# Patient Record
Sex: MALE | Race: WHITE | NOT HISPANIC OR LATINO | Employment: OTHER | URBAN - NONMETROPOLITAN AREA
[De-identification: names, ages, dates, MRNs, and addresses within clinical notes are randomized per-mention and may not be internally consistent; named-entity substitution may affect disease eponyms.]

---

## 2020-05-13 ENCOUNTER — HISTORICAL (OUTPATIENT)
Dept: ADMINISTRATIVE | Facility: HOSPITAL | Age: 80
End: 2020-05-13

## 2020-05-13 LAB
ANION GAP SERPL CALC-SCNC: 9.1 MEQ/L (ref 10–20)
BUN SERPL-MCNC: 45 MG/DL (ref 7–18)
CALCIUM SERPL-MCNC: 9.4 MG/DL (ref 8.5–10.1)
CHLORIDE SERPL-SCNC: 110 MMOL/L (ref 98–107)
CO2 SERPL-SCNC: 29 MMOL/L (ref 22–32)
CREAT SERPL-MCNC: 1.95 MG/DL (ref 0.7–1.3)
EGFR: 36 ML/MIN/1.73M
GLUCOSE SERPL-MCNC: 110 MG/DL (ref 70–99)
OSMOC: 299 MOSM/KG (ref 275–295)
POTASSIUM SERPL-SCNC: 4.1 MMOL/L (ref 3.5–5.1)
SODIUM BLD-SCNC: 144 MMOL/L (ref 136–145)

## 2020-07-08 ENCOUNTER — HISTORICAL (OUTPATIENT)
Dept: ADMINISTRATIVE | Facility: HOSPITAL | Age: 80
End: 2020-07-08

## 2020-07-08 LAB
ALBUMIN SERPL BCP-MCNC: 3.5 G/DL (ref 3.5–5)
ALBUMIN/GLOB SERPL ELPH: 1 {RATIO} (ref 1.5–2.2)
ALP SERPL-CCNC: 71 U/L (ref 50–136)
ALT SERPL W P-5'-P-CCNC: 19 U/L (ref 16–61)
ANION GAP SERPL CALC-SCNC: 8.2 MEQ/L (ref 10–20)
AST SERPL-CCNC: 16 U/L (ref 15–37)
BILIRUB SERPL-MCNC: 0.58 MG/DL (ref 0.2–1)
BUN SERPL-MCNC: 36 MG/DL (ref 7–18)
CALCIUM SERPL-MCNC: 8.8 MG/DL (ref 8.5–10.1)
CHLORIDE SERPL-SCNC: 105 MMOL/L (ref 98–107)
CO2 SERPL-SCNC: 27 MMOL/L (ref 22–32)
CREAT SERPL-MCNC: 2.13 MG/DL (ref 0.7–1.3)
EGFR: 32 ML/MIN/1.73M
ERYTHROCYTE [DISTWIDTH] IN BLOOD BY AUTOMATED COUNT: 14.7 % (ref 11.5–14.5)
GLOBULIN: 3.6 G/DL (ref 2.3–3.5)
GLUCOSE SERPL-MCNC: 106 MG/DL (ref 70–99)
HCT VFR BLD AUTO: 36.6 % (ref 43.5–53.7)
HGB BLD-MCNC: 12.2 G/DL (ref 14.1–18.1)
IPF: 8.7
MAGNESIUM SERPL-MCNC: 2.4 MG/DL (ref 1.8–2.4)
MCH RBC QN AUTO: 32.4 PG (ref 27–31)
MCHC RBC AUTO-ENTMCNC: 33.3 G% (ref 32–35)
MCV RBC AUTO: 97.1 FL (ref 80–97)
OSMOC: 281 MOSM/KG (ref 275–295)
PHOSPHATE FLD-MCNC: 3.34 MG/DL (ref 2.5–4.9)
PMV BLD AUTO: 11 FL (ref 7.4–10.4)
PMV BLD AUTO: 134 10 (ref 142–424)
POTASSIUM SERPL-SCNC: 4.2 MMOL/L (ref 3.5–5.1)
PROT SERPL-MCNC: 7.1 G/DL (ref 6.4–8.2)
RBC # BLD AUTO: 3.77 M/UL (ref 4.69–6.13)
SODIUM BLD-SCNC: 136 MMOL/L (ref 136–145)
WBC # BLD AUTO: 7.1 10 (ref 4.6–10.2)

## 2020-09-23 ENCOUNTER — LAB VISIT (OUTPATIENT)
Dept: LAB | Facility: HOSPITAL | Age: 80
End: 2020-09-23
Attending: INTERNAL MEDICINE
Payer: MEDICARE

## 2020-09-23 ENCOUNTER — HOSPITAL ENCOUNTER (OUTPATIENT)
Dept: RADIOLOGY | Facility: HOSPITAL | Age: 80
Discharge: HOME | End: 2020-09-23
Attending: INTERNAL MEDICINE
Payer: MEDICARE

## 2020-09-23 DIAGNOSIS — Z01.811 PRE-OP CHEST EXAM: Primary | ICD-10-CM

## 2020-09-23 DIAGNOSIS — Z01.818 PRE-OP EVALUATION: Primary | ICD-10-CM

## 2020-09-23 DIAGNOSIS — Z01.811 PRE-OP CHEST EXAM: ICD-10-CM

## 2020-09-23 DIAGNOSIS — I20.9 ANGINA PECTORIS: ICD-10-CM

## 2020-09-23 LAB
ALBUMIN SERPL BCP-MCNC: 3.4 G/DL (ref 3.5–5.2)
ALP SERPL-CCNC: 65 U/L (ref 55–135)
ALT SERPL W/O P-5'-P-CCNC: 25 U/L (ref 10–44)
ANION GAP SERPL CALC-SCNC: 10 MMOL/L (ref 8–16)
AST SERPL-CCNC: 18 U/L (ref 10–40)
BASOPHILS # BLD AUTO: 0.03 K/UL (ref 0–0.2)
BASOPHILS NFR BLD: 0.5 % (ref 0–1.9)
BILIRUB SERPL-MCNC: 0.7 MG/DL (ref 0.1–1)
BUN SERPL-MCNC: 35 MG/DL (ref 8–23)
CALCIUM SERPL-MCNC: 9.1 MG/DL (ref 8.7–10.5)
CHLORIDE SERPL-SCNC: 104 MMOL/L (ref 95–110)
CO2 SERPL-SCNC: 26 MMOL/L (ref 23–29)
CREAT SERPL-MCNC: 1.9 MG/DL (ref 0.5–1.4)
DIFFERENTIAL METHOD: ABNORMAL
EOSINOPHIL # BLD AUTO: 0.1 K/UL (ref 0–0.5)
EOSINOPHIL NFR BLD: 1.3 % (ref 0–8)
ERYTHROCYTE [DISTWIDTH] IN BLOOD BY AUTOMATED COUNT: 14.7 % (ref 11.5–14.5)
EST. GFR  (AFRICAN AMERICAN): 38.5 ML/MIN/1.73 M^2
EST. GFR  (NON AFRICAN AMERICAN): 33.3 ML/MIN/1.73 M^2
GLUCOSE SERPL-MCNC: 87 MG/DL (ref 70–110)
HCT VFR BLD AUTO: 35.8 % (ref 40–54)
HGB BLD-MCNC: 11.6 G/DL (ref 14–18)
IMM GRANULOCYTES # BLD AUTO: 0.02 K/UL (ref 0–0.04)
IMM GRANULOCYTES NFR BLD AUTO: 0.3 % (ref 0–0.5)
LYMPHOCYTES # BLD AUTO: 1.3 K/UL (ref 1–4.8)
LYMPHOCYTES NFR BLD: 21 % (ref 18–48)
MCH RBC QN AUTO: 31.3 PG (ref 27–31)
MCHC RBC AUTO-ENTMCNC: 32.4 G/DL (ref 32–36)
MCV RBC AUTO: 97 FL (ref 82–98)
MONOCYTES # BLD AUTO: 0.7 K/UL (ref 0.3–1)
MONOCYTES NFR BLD: 10.4 % (ref 4–15)
NEUTROPHILS # BLD AUTO: 4.2 K/UL (ref 1.8–7.7)
NEUTROPHILS NFR BLD: 66.5 % (ref 38–73)
NRBC BLD-RTO: 0 /100 WBC
PLATELET # BLD AUTO: 152 K/UL (ref 150–350)
PMV BLD AUTO: 10.4 FL (ref 9.2–12.9)
POTASSIUM SERPL-SCNC: 4.2 MMOL/L (ref 3.5–5.1)
PROT SERPL-MCNC: 7.5 G/DL (ref 6–8.4)
RBC # BLD AUTO: 3.71 M/UL (ref 4.6–6.2)
SARS-COV-2 RNA RESP QL NAA+PROBE: NOT DETECTED
SODIUM SERPL-SCNC: 140 MMOL/L (ref 136–145)
WBC # BLD AUTO: 6.32 K/UL (ref 3.9–12.7)

## 2020-09-23 PROCEDURE — 71046 X-RAY EXAM CHEST 2 VIEWS: CPT | Mod: TC

## 2020-09-23 PROCEDURE — U0002 COVID-19 LAB TEST NON-CDC: HCPCS

## 2020-09-23 PROCEDURE — 80053 COMPREHEN METABOLIC PANEL: CPT

## 2020-09-23 PROCEDURE — 85025 COMPLETE CBC W/AUTO DIFF WBC: CPT

## 2020-09-23 PROCEDURE — 36415 COLL VENOUS BLD VENIPUNCTURE: CPT

## 2020-10-15 ENCOUNTER — APPOINTMENT (OUTPATIENT)
Dept: LAB | Facility: HOSPITAL | Age: 80
End: 2020-10-15
Attending: INTERNAL MEDICINE
Payer: MEDICARE

## 2020-10-15 DIAGNOSIS — Z01.810 PRE-OPERATIVE CARDIOVASCULAR EXAMINATION: Primary | ICD-10-CM

## 2020-10-15 DIAGNOSIS — U07.1 INFECTION DUE TO 2019-NCOV: ICD-10-CM

## 2020-10-15 LAB — SARS-COV-2 RNA RESP QL NAA+PROBE: NOT DETECTED

## 2020-10-15 PROCEDURE — U0002 COVID-19 LAB TEST NON-CDC: HCPCS

## 2020-10-18 ENCOUNTER — HOSPITAL ENCOUNTER (EMERGENCY)
Facility: HOSPITAL | Age: 80
Discharge: HOME | End: 2020-10-18
Attending: EMERGENCY MEDICINE
Payer: MEDICARE

## 2020-10-18 VITALS
DIASTOLIC BLOOD PRESSURE: 75 MMHG | WEIGHT: 255.69 LBS | RESPIRATION RATE: 16 BRPM | BODY MASS INDEX: 35.8 KG/M2 | OXYGEN SATURATION: 98 % | TEMPERATURE: 98 F | SYSTOLIC BLOOD PRESSURE: 132 MMHG | HEIGHT: 71 IN | HEART RATE: 78 BPM

## 2020-10-18 DIAGNOSIS — T14.8XXA MULTIPLE SKIN TEARS: ICD-10-CM

## 2020-10-18 DIAGNOSIS — S09.90XA MINOR HEAD INJURY WITHOUT LOSS OF CONSCIOUSNESS, INITIAL ENCOUNTER: ICD-10-CM

## 2020-10-18 DIAGNOSIS — S01.81XA FACIAL LACERATION, INITIAL ENCOUNTER: ICD-10-CM
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
## 2020-10-18 PROCEDURE — 12011 RPR F/E/E/N/L/M 2.5 CM/<: CPT

## 2020-10-18 PROCEDURE — 99284 EMERGENCY DEPT VISIT MOD MDM: CPT | Mod: 25

## 2020-10-18 PROCEDURE — 25000003 PHARM REV CODE 250: Performed by: EMERGENCY MEDICINE

## 2020-10-18 RX ORDER — SILVER NITRATE 38.21; 12.74 MG/1; MG/1
1 STICK TOPICAL
Status: COMPLETED | OUTPATIENT
Start: 2020-10-18 | End: 2020-10-18

## 2020-10-18 RX ORDER — SILVER NITRATE 38.21; 12.74 MG/1; MG/1
1 STICK TOPICAL
Status: DISCONTINUED | OUTPATIENT
Start: 2020-10-18 | End: 2020-10-18

## 2020-10-18 RX ORDER — EZETIMIBE 10 MG/1
10 TABLET ORAL DAILY
Status: ON HOLD | COMMUNITY
End: 2022-08-25 | Stop reason: HOSPADM

## 2020-10-18 RX ORDER — LIDOCAINE HYDROCHLORIDE AND EPINEPHRINE 10; 10 MG/ML; UG/ML
10 INJECTION, SOLUTION INFILTRATION; PERINEURAL
Status: COMPLETED | OUTPATIENT
Start: 2020-10-18 | End: 2020-10-18

## 2020-10-18 RX ADMIN — LIDOCAINE HYDROCHLORIDE,EPINEPHRINE BITARTRATE 10 ML: 10; .01 INJECTION, SOLUTION INFILTRATION; PERINEURAL at 03:10

## 2020-10-18 RX ADMIN — SILVER NITRATE APPLICATORS 1 APPLICATOR: 25; 75 STICK TOPICAL at 03:10

## 2020-10-18 NOTE — ED PROVIDER NOTES
Encounter Date: 10/18/2020       History     Chief Complaint   Patient presents with    Fall     Pt to ER states he fell.  Pt has puncture wound to right brow and skin tear to left forearm.  Pt c/o pain to right brow.      This is a 79-year-old white male with a history of meningioma, CAD, and hypertension who was brought into the emergency department by EMS after sustaining a fall outdoors.  The patient reports he is walking the grass and tripped over a piece of wood creating him to fall and sustaining multiple injuries including a puncture wound to the right eyebrow area and multiple skin tears to bilateral arms.  Patient reports heavy bleeding from right eyebrow trauma, stating that he takes Eliquis which was placed on hold 2 days ago for an elective angiogram.  Patient denies LOC, nausea/vomiting.  Only complaint of pain is to the right brow area.        Review of patient's allergies indicates:  No Known Allergies  Past Medical History:   Diagnosis Date    Brain tumor     Hypertension     Neuropathy     Renal disorder      Past Surgical History:   Procedure Laterality Date    triple bypass       History reviewed. No pertinent family history.  Social History     Tobacco Use    Smoking status: Former Smoker    Smokeless tobacco: Never Used   Substance Use Topics    Alcohol use: Not Currently     Frequency: Never    Drug use: Not on file     Review of Systems   Constitutional: Negative.    Respiratory: Negative.    Cardiovascular: Negative.    Skin: Positive for wound (Multiple skin tears to bilateral upper extremities, right eyebrow puncture wound).   Neurological: Negative.    Hematological: Bruises/bleeds easily.       Physical Exam     Initial Vitals   BP Pulse Resp Temp SpO2   10/18/20 1540 10/18/20 1540 10/18/20 1540 10/18/20 1547 10/18/20 1540   (!) 175/75 89 16 98.3 °F (36.8 °C) 98 %      MAP       --                Physical Exam    Nursing note and vitals reviewed.  Constitutional: He appears  well-developed and well-nourished. No distress.   HENT:   Head: Normocephalic and atraumatic.   Mouth/Throat: Oropharynx is clear and moist.   Eyes: EOM are normal. Pupils are equal, round, and reactive to light.   Neck: Normal range of motion. Neck supple.   Cardiovascular: Normal rate.   Pulmonary/Chest: Breath sounds normal.   Abdominal: Soft. Bowel sounds are normal.   Musculoskeletal: Normal range of motion.   Neurological: He is alert and oriented to person, place, and time. GCS score is 15. GCS eye subscore is 4. GCS verbal subscore is 5. GCS motor subscore is 6.   Skin: Skin is warm and dry. Capillary refill takes less than 2 seconds.   Multiple skin tears noted to bilateral upper extremities; see corresponding photos uploaded to chart.  Right eyebrow puncture wound noted to lateral aspect of right eyebrow with heavy active bleeding; see corresponding photos   Psychiatric: He has a normal mood and affect. Thought content normal.         ED Course   Lac Repair    Date/Time: 10/18/2020 4:22 PM  Performed by: Jade Catalan NP  Authorized by: Aj Lewis MD     Consent:     Consent obtained:  Verbal    Consent given by:  Patient    Risks discussed:  Pain  Anesthesia (see MAR for exact dosages):     Anesthesia method:  Local infiltration    Local anesthetic:  Lidocaine 1% WITH epi  Laceration details:     Location:  Face    Face location:  R eyebrow    Length (cm):  0.3  Repair type:     Repair type:  Simple  Pre-procedure details:     Preparation:  Patient was prepped and draped in usual sterile fashion  Exploration:     Contaminated: no    Treatment:     Area cleansed with:  Betadine    Amount of cleaning:  Standard    Irrigation solution:  Sterile saline    Irrigation method:  Syringe    Visualized foreign bodies/material removed: no    Skin repair:     Repair method:  Sutures    Suture size:  5-0    Number of sutures:  2  Approximation:     Approximation:  Close  Post-procedure details:     Patient  tolerance of procedure:  Tolerated well, no immediate complications      Labs Reviewed - No data to display       Imaging Results          CT Head Without Contrast (Final result)  Result time 10/18/20 16:17:33    Final result by Marshal Acevedo MD (10/18/20 16:17:33)                 Impression:      1. No acute intracranial findings.  2. 3.4 cm isodense mass in the left frontal region with mild local mass effect, possible meningioma.  Follow-up MRI with contrast recommended.      Electronically signed by: Marshal Acevedo MD  Date:    10/18/2020  Time:    16:17             Narrative:    EXAMINATION:  CT HEAD WITHOUT CONTRAST    CLINICAL HISTORY:  Syncope, simple, normal neuro exam;Syncope, simple, normal neuro exam;    TECHNIQUE:  Iterative reconstruction technique was used.    CT/cardiac nuclear exam/s in prior 12 months:  1.    COMPARISON:  MRI brain 10/09/2015.    FINDINGS:  There is diffuse atrophy and chronic microvascular ischemic change.  There is a 3.4 x 2.2 isodense mass in the left frontal region.  This mass appears to be extra-axial and may represent a meningioma.  Follow-up MRI with contrast is recommended.  There is mild local mass effect.  No midline shift.  Cranial bones are unremarkable.  There is mild paranasal sinus disease.                                                             Clinical Impression:     ICD-10-CM ICD-9-CM   1. Fall, initial encounter  W19.XXXA E888.9   2. Facial laceration, initial encounter  S01.81XA 873.40   3. Multiple skin tears  T14.8XXA 879.8   4. Minor head injury without loss of consciousness, initial encounter  S09.90XA 959.01                          ED Disposition Condition    Discharge Stable        ED Prescriptions     None        Follow-up Information     Follow up With Specialties Details Why Contact Info    PCP Follow UP  Call in 6 days for follow-up, For suture removal                                        Jade Catalan NP  10/30/20 6768

## 2020-10-18 NOTE — ED PROVIDER NOTES
Encounter Date: 10/18/2020       History     Chief Complaint   Patient presents with    Fall     Pt to ER states he fell.  Pt has puncture wound to right brow and skin tear to left forearm.  Pt c/o pain to right brow.      HPI  Review of patient's allergies indicates:  No Known Allergies  Past Medical History:   Diagnosis Date    Brain tumor     Hypertension     Neuropathy     Renal disorder      Past Surgical History:   Procedure Laterality Date    triple bypass       History reviewed. No pertinent family history.  Social History     Tobacco Use    Smoking status: Former Smoker    Smokeless tobacco: Never Used   Substance Use Topics    Alcohol use: Not Currently     Frequency: Never    Drug use: Not on file     Review of Systems    Physical Exam     Initial Vitals   BP Pulse Resp Temp SpO2   10/18/20 1540 10/18/20 1540 10/18/20 1540 10/18/20 1547 10/18/20 1540   (!) 175/75 89 16 98.3 °F (36.8 °C) 98 %      MAP       --                Physical Exam    ED Course   Procedures  Labs Reviewed - No data to display                       Imaging Results          CT Head Without Contrast (Final result)  Result time 10/18/20 16:17:33    Final result by Marshal Acevedo MD (10/18/20 16:17:33)                 Impression:      1. No acute intracranial findings.  2. 3.4 cm isodense mass in the left frontal region with mild local mass effect, possible meningioma.  Follow-up MRI with contrast recommended.      Electronically signed by: Marshal Acevedo MD  Date:    10/18/2020  Time:    16:17             Narrative:    EXAMINATION:  CT HEAD WITHOUT CONTRAST    CLINICAL HISTORY:  Syncope, simple, normal neuro exam;Syncope, simple, normal neuro exam;    TECHNIQUE:  Iterative reconstruction technique was used.    CT/cardiac nuclear exam/s in prior 12 months:  1.    COMPARISON:  MRI brain 10/09/2015.    FINDINGS:  There is diffuse atrophy and chronic microvascular ischemic change.  There is a 3.4 x 2.2 isodense mass in the left  frontal region.  This mass appears to be extra-axial and may represent a meningioma.  Follow-up MRI with contrast is recommended.  There is mild local mass effect.  No midline shift.  Cranial bones are unremarkable.  There is mild paranasal sinus disease.                                                             Clinical Impression:     ICD-10-CM ICD-9-CM   1. Fall, initial encounter  W19.XXXA E888.9   2. Facial laceration, initial encounter  S01.81XA 873.40   3. Multiple skin tears  T14.8XXA 879.8   4. Minor head injury without loss of consciousness, initial encounter  S09.90XA 959.01                          ED Disposition Condition    Discharge Stable        ED Prescriptions     None        Follow-up Information     Follow up With Specialties Details Why Contact Info    PCP Follow UP  Call in 6 days for follow-up, For suture removal                                        Aj Lewis MD  10/19/20 0609

## 2020-10-18 NOTE — ED NOTES
Puncture would above right eye cleaned with betadine and then silver nitrate applied per ER MD and sutured per ER NP. Patient tolerated well.    Skin tear x 2 to right forearm. Size 2x2 in. Cleaned with betadine/saline solution. Pad dry with gauze. Opticell with silver applied to wound and then mepelix dressing placed on top to both sides. Patient and family educated to leave dressing on for 7 days as designed unless becoming heavily soiled.  Expressed understanding.

## 2020-10-18 NOTE — DISCHARGE INSTRUCTIONS
Wash wounds twice daily with soap and water up and keep skin tears covered with nonadherent dressings.  Follow-up with your primary doctor this week for further management and for suture removal.

## 2021-02-13 ENCOUNTER — HOSPITAL ENCOUNTER (EMERGENCY)
Facility: HOSPITAL | Age: 81
Discharge: HOME | End: 2021-02-13
Attending: FAMILY MEDICINE
Payer: MEDICARE

## 2021-02-13 VITALS
OXYGEN SATURATION: 99 % | WEIGHT: 242.63 LBS | HEART RATE: 70 BPM | TEMPERATURE: 99 F | BODY MASS INDEX: 33.97 KG/M2 | SYSTOLIC BLOOD PRESSURE: 124 MMHG | DIASTOLIC BLOOD PRESSURE: 58 MMHG | RESPIRATION RATE: 18 BRPM | HEIGHT: 71 IN

## 2021-02-13 DIAGNOSIS — R11.2 NAUSEA AND VOMITING, INTRACTABILITY OF VOMITING NOT SPECIFIED, UNSPECIFIED VOMITING TYPE: ICD-10-CM

## 2021-02-13 DIAGNOSIS — R19.7 DIARRHEA, UNSPECIFIED TYPE: Primary | ICD-10-CM

## 2021-02-13 DIAGNOSIS — R53.1 WEAKNESS: ICD-10-CM

## 2021-02-13 LAB
ALBUMIN SERPL BCP-MCNC: 3.8 G/DL (ref 3.5–5.2)
ALP SERPL-CCNC: 72 U/L (ref 55–135)
ALT SERPL W/O P-5'-P-CCNC: 21 U/L (ref 10–44)
ANION GAP SERPL CALC-SCNC: 8 MMOL/L (ref 8–16)
AST SERPL-CCNC: 18 U/L (ref 10–40)
BASOPHILS # BLD AUTO: 0.05 K/UL (ref 0–0.2)
BASOPHILS NFR BLD: 0.7 % (ref 0–1.9)
BILIRUB SERPL-MCNC: 0.8 MG/DL (ref 0.1–1)
BUN SERPL-MCNC: 36 MG/DL (ref 8–23)
CALCIUM SERPL-MCNC: 9.4 MG/DL (ref 8.7–10.5)
CHLORIDE SERPL-SCNC: 110 MMOL/L (ref 95–110)
CO2 SERPL-SCNC: 27 MMOL/L (ref 23–29)
CREAT SERPL-MCNC: 2.6 MG/DL (ref 0.5–1.4)
DIFFERENTIAL METHOD: ABNORMAL
EOSINOPHIL # BLD AUTO: 0.2 K/UL (ref 0–0.5)
EOSINOPHIL NFR BLD: 2.8 % (ref 0–8)
ERYTHROCYTE [DISTWIDTH] IN BLOOD BY AUTOMATED COUNT: 15 % (ref 11.5–14.5)
EST. GFR  (AFRICAN AMERICAN): 26.1 ML/MIN/1.73 M^2
EST. GFR  (NON AFRICAN AMERICAN): 22.6 ML/MIN/1.73 M^2
GLUCOSE SERPL-MCNC: 89 MG/DL (ref 70–110)
HCT VFR BLD AUTO: 38.6 % (ref 40–54)
HGB BLD-MCNC: 12.6 G/DL (ref 14–18)
IMM GRANULOCYTES # BLD AUTO: 0.01 K/UL (ref 0–0.04)
IMM GRANULOCYTES NFR BLD AUTO: 0.1 % (ref 0–0.5)
LIPASE SERPL-CCNC: 225 U/L (ref 23–300)
LYMPHOCYTES # BLD AUTO: 1.5 K/UL (ref 1–4.8)
LYMPHOCYTES NFR BLD: 20.1 % (ref 18–48)
MCH RBC QN AUTO: 30.8 PG (ref 27–31)
MCHC RBC AUTO-ENTMCNC: 32.6 G/DL (ref 32–36)
MCV RBC AUTO: 94 FL (ref 82–98)
MONOCYTES # BLD AUTO: 0.7 K/UL (ref 0.3–1)
MONOCYTES NFR BLD: 9.5 % (ref 4–15)
NEUTROPHILS # BLD AUTO: 5 K/UL (ref 1.8–7.7)
NEUTROPHILS NFR BLD: 66.8 % (ref 38–73)
NRBC BLD-RTO: 0 /100 WBC
PLATELET # BLD AUTO: 153 K/UL (ref 150–350)
PMV BLD AUTO: 11.2 FL (ref 9.2–12.9)
POTASSIUM SERPL-SCNC: 3.3 MMOL/L (ref 3.5–5.1)
PROT SERPL-MCNC: 7.5 G/DL (ref 6–8.4)
RBC # BLD AUTO: 4.09 M/UL (ref 4.6–6.2)
SODIUM SERPL-SCNC: 145 MMOL/L (ref 136–145)
WBC # BLD AUTO: 7.55 K/UL (ref 3.9–12.7)

## 2021-02-13 PROCEDURE — 99285 EMERGENCY DEPT VISIT HI MDM: CPT | Mod: 25

## 2021-02-13 PROCEDURE — 83690 ASSAY OF LIPASE: CPT

## 2021-02-13 PROCEDURE — 80053 COMPREHEN METABOLIC PANEL: CPT

## 2021-02-13 PROCEDURE — 93010 EKG 12-LEAD: ICD-10-PCS | Mod: ,,, | Performed by: INTERNAL MEDICINE

## 2021-02-13 PROCEDURE — 93010 ELECTROCARDIOGRAM REPORT: CPT | Mod: ,,, | Performed by: INTERNAL MEDICINE

## 2021-02-13 PROCEDURE — 93005 ELECTROCARDIOGRAM TRACING: CPT

## 2021-02-13 PROCEDURE — 25000003 PHARM REV CODE 250: Performed by: FAMILY MEDICINE

## 2021-02-13 PROCEDURE — 96360 HYDRATION IV INFUSION INIT: CPT

## 2021-02-13 PROCEDURE — 36415 COLL VENOUS BLD VENIPUNCTURE: CPT

## 2021-02-13 PROCEDURE — 85025 COMPLETE CBC W/AUTO DIFF WBC: CPT

## 2021-02-13 RX ORDER — LOVASTATIN 10 MG/1
10 TABLET ORAL NIGHTLY
COMMUNITY
End: 2022-06-14

## 2021-02-13 RX ORDER — OXCARBAZEPINE 600 MG/1
600 TABLET, FILM COATED ORAL 2 TIMES DAILY
COMMUNITY
End: 2022-05-09

## 2021-02-13 RX ORDER — GABAPENTIN 300 MG/1
300 CAPSULE ORAL 2 TIMES DAILY
COMMUNITY
End: 2022-03-30 | Stop reason: ALTCHOICE

## 2021-02-13 RX ORDER — TAMSULOSIN HYDROCHLORIDE 0.4 MG/1
0.4 CAPSULE ORAL DAILY
COMMUNITY

## 2021-02-13 RX ORDER — FLUOXETINE HYDROCHLORIDE 20 MG/1
20 CAPSULE ORAL DAILY
COMMUNITY
End: 2022-04-08 | Stop reason: ALTCHOICE

## 2021-02-13 RX ORDER — SENNOSIDES 8.6 MG/1
1 TABLET ORAL 2 TIMES DAILY
COMMUNITY
End: 2022-03-30 | Stop reason: ALTCHOICE

## 2021-02-13 RX ORDER — DIPHENOXYLATE HYDROCHLORIDE AND ATROPINE SULFATE 2.5; .025 MG/1; MG/1
1 TABLET ORAL 2 TIMES DAILY PRN
Qty: 6 TABLET | Refills: 0 | Status: ON HOLD | OUTPATIENT
Start: 2021-02-13 | End: 2022-01-07 | Stop reason: HOSPADM

## 2021-02-13 RX ORDER — POTASSIUM CHLORIDE 750 MG/1
10 TABLET, EXTENDED RELEASE ORAL
Status: COMPLETED | OUTPATIENT
Start: 2021-02-13 | End: 2021-02-13

## 2021-02-13 RX ORDER — ONDANSETRON 4 MG/1
4 TABLET, FILM COATED ORAL EVERY 12 HOURS PRN
Qty: 8 TABLET | Refills: 0 | Status: ON HOLD | OUTPATIENT
Start: 2021-02-13 | End: 2022-01-07 | Stop reason: HOSPADM

## 2021-02-13 RX ORDER — TORSEMIDE 20 MG/1
20 TABLET ORAL 2 TIMES DAILY
Status: ON HOLD | COMMUNITY
End: 2022-01-07 | Stop reason: SDUPTHER

## 2021-02-13 RX ORDER — HYDROCODONE BITARTRATE AND ACETAMINOPHEN 5; 325 MG/1; MG/1
1 TABLET ORAL EVERY 4 HOURS PRN
COMMUNITY
End: 2022-05-09 | Stop reason: SDUPTHER

## 2021-02-13 RX ORDER — DIPHENOXYLATE HYDROCHLORIDE AND ATROPINE SULFATE 2.5; .025 MG/1; MG/1
1 TABLET ORAL
Status: COMPLETED | OUTPATIENT
Start: 2021-02-13 | End: 2021-02-13

## 2021-02-13 RX ORDER — CARVEDILOL 12.5 MG/1
12.5 TABLET ORAL 2 TIMES DAILY WITH MEALS
Status: ON HOLD | COMMUNITY
End: 2022-01-07 | Stop reason: HOSPADM

## 2021-02-13 RX ORDER — MULTIVIT WITH MINERALS/HERBS
TABLET ORAL DAILY
COMMUNITY
End: 2022-03-30 | Stop reason: ALTCHOICE

## 2021-02-13 RX ORDER — ASPIRIN 81 MG/1
81 TABLET ORAL DAILY
COMMUNITY

## 2021-02-13 RX ADMIN — POTASSIUM CHLORIDE 10 MEQ: 750 TABLET, FILM COATED, EXTENDED RELEASE ORAL at 01:02

## 2021-02-13 RX ADMIN — DIPHENOXYLATE HYDROCHLORIDE AND ATROPINE SULFATE 1 TABLET: 2.5; .025 TABLET ORAL at 12:02

## 2021-02-13 RX ADMIN — SODIUM CHLORIDE 1000 ML: 0.9 INJECTION, SOLUTION INTRAVENOUS at 12:02

## 2021-02-25 ENCOUNTER — IMMUNIZATION (OUTPATIENT)
Dept: OBSTETRICS AND GYNECOLOGY | Facility: CLINIC | Age: 81
End: 2021-02-25
Payer: MEDICARE

## 2021-02-25 DIAGNOSIS — Z23 NEED FOR VACCINATION: Primary | ICD-10-CM

## 2021-02-25 PROCEDURE — 91300 COVID-19, MRNA, LNP-S, PF, 30 MCG/0.3 ML DOSE VACCINE: ICD-10-PCS | Mod: ,,, | Performed by: ANESTHESIOLOGY

## 2021-02-25 PROCEDURE — 0001A COVID-19, MRNA, LNP-S, PF, 30 MCG/0.3 ML DOSE VACCINE: ICD-10-PCS | Mod: CV19,,, | Performed by: ANESTHESIOLOGY

## 2021-02-25 PROCEDURE — 91300 COVID-19, MRNA, LNP-S, PF, 30 MCG/0.3 ML DOSE VACCINE: CPT | Mod: ,,, | Performed by: ANESTHESIOLOGY

## 2021-02-25 PROCEDURE — 0001A COVID-19, MRNA, LNP-S, PF, 30 MCG/0.3 ML DOSE VACCINE: CPT | Mod: CV19,,, | Performed by: ANESTHESIOLOGY

## 2021-03-18 ENCOUNTER — IMMUNIZATION (OUTPATIENT)
Dept: OBSTETRICS AND GYNECOLOGY | Facility: CLINIC | Age: 81
End: 2021-03-18
Payer: MEDICARE

## 2021-03-18 DIAGNOSIS — Z23 NEED FOR VACCINATION: Primary | ICD-10-CM

## 2021-03-18 PROCEDURE — 91300 COVID-19, MRNA, LNP-S, PF, 30 MCG/0.3 ML DOSE VACCINE: CPT | Mod: ,,, | Performed by: ANESTHESIOLOGY

## 2021-03-18 PROCEDURE — 91300 COVID-19, MRNA, LNP-S, PF, 30 MCG/0.3 ML DOSE VACCINE: ICD-10-PCS | Mod: ,,, | Performed by: ANESTHESIOLOGY

## 2021-03-18 PROCEDURE — 0002A COVID-19, MRNA, LNP-S, PF, 30 MCG/0.3 ML DOSE VACCINE: CPT | Mod: CV19,,, | Performed by: ANESTHESIOLOGY

## 2021-03-18 PROCEDURE — 0002A COVID-19, MRNA, LNP-S, PF, 30 MCG/0.3 ML DOSE VACCINE: ICD-10-PCS | Mod: CV19,,, | Performed by: ANESTHESIOLOGY

## 2021-12-21 ENCOUNTER — HOSPITAL ENCOUNTER (OUTPATIENT)
Dept: RADIOLOGY | Facility: HOSPITAL | Age: 81
Discharge: HOME | End: 2021-12-21
Attending: INTERNAL MEDICINE
Payer: MEDICARE

## 2021-12-21 DIAGNOSIS — R59.0 LOCALIZED ENLARGED LYMPH NODES: ICD-10-CM

## 2021-12-21 PROCEDURE — 71250 CT THORAX DX C-: CPT | Mod: TC

## 2021-12-28 ENCOUNTER — HOSPITAL ENCOUNTER (INPATIENT)
Facility: HOSPITAL | Age: 81
LOS: 10 days | Discharge: HOME HEALTH | DRG: 193 | End: 2022-01-07
Attending: STUDENT IN AN ORGANIZED HEALTH CARE EDUCATION/TRAINING PROGRAM | Admitting: EMERGENCY MEDICINE
Payer: MEDICARE

## 2021-12-28 DIAGNOSIS — R53.1 WEAKNESS: ICD-10-CM

## 2021-12-28 DIAGNOSIS — R06.02 SHORTNESS OF BREATH: ICD-10-CM

## 2021-12-28 DIAGNOSIS — J18.9 PNEUMONIA DUE TO INFECTIOUS ORGANISM, UNSPECIFIED LATERALITY, UNSPECIFIED PART OF LUNG: ICD-10-CM

## 2021-12-28 DIAGNOSIS — J81.0 ACUTE PULMONARY EDEMA: ICD-10-CM

## 2021-12-28 DIAGNOSIS — I50.9 ACUTE ON CHRONIC CONGESTIVE HEART FAILURE, UNSPECIFIED HEART FAILURE TYPE: Primary | ICD-10-CM

## 2021-12-28 DIAGNOSIS — J18.9 PNEUMONIA: ICD-10-CM

## 2021-12-28 LAB
ALBUMIN SERPL BCP-MCNC: 3.2 G/DL (ref 3.5–5.2)
ALP SERPL-CCNC: 58 U/L (ref 55–135)
ALT SERPL W/O P-5'-P-CCNC: 19 U/L (ref 10–44)
ANION GAP SERPL CALC-SCNC: 6 MMOL/L (ref 8–16)
AST SERPL-CCNC: 18 U/L (ref 10–40)
BASOPHILS # BLD AUTO: 0.07 K/UL (ref 0–0.2)
BASOPHILS NFR BLD: 0.6 % (ref 0–1.9)
BILIRUB SERPL-MCNC: 0.7 MG/DL (ref 0.1–1)
BILIRUB UR QL STRIP: NEGATIVE
BUN SERPL-MCNC: 27 MG/DL (ref 8–23)
CALCIUM SERPL-MCNC: 9.6 MG/DL (ref 8.7–10.5)
CHLORIDE SERPL-SCNC: 106 MMOL/L (ref 95–110)
CLARITY UR: CLEAR
CO2 SERPL-SCNC: 31 MMOL/L (ref 23–29)
COLOR UR: YELLOW
CREAT SERPL-MCNC: 1.8 MG/DL (ref 0.5–1.4)
CTP QC/QA: YES
DIFFERENTIAL METHOD: ABNORMAL
EOSINOPHIL # BLD AUTO: 0.8 K/UL (ref 0–0.5)
EOSINOPHIL NFR BLD: 7.5 % (ref 0–8)
ERYTHROCYTE [DISTWIDTH] IN BLOOD BY AUTOMATED COUNT: 15.9 % (ref 11.5–14.5)
EST. GFR  (AFRICAN AMERICAN): 40.5 ML/MIN/1.73 M^2
EST. GFR  (NON AFRICAN AMERICAN): 35 ML/MIN/1.73 M^2
GLUCOSE SERPL-MCNC: 111 MG/DL (ref 70–110)
GLUCOSE UR QL STRIP: NEGATIVE
HCT VFR BLD AUTO: 23.9 % (ref 40–54)
HGB BLD-MCNC: 7.9 G/DL (ref 14–18)
HGB UR QL STRIP: NEGATIVE
IMM GRANULOCYTES # BLD AUTO: 0.04 K/UL (ref 0–0.04)
IMM GRANULOCYTES NFR BLD AUTO: 0.4 % (ref 0–0.5)
KETONES UR QL STRIP: NEGATIVE
LACTATE SERPL-SCNC: 1.6 MMOL/L (ref 0.5–2.2)
LEUKOCYTE ESTERASE UR QL STRIP: NEGATIVE
LYMPHOCYTES # BLD AUTO: 1.8 K/UL (ref 1–4.8)
LYMPHOCYTES NFR BLD: 16 % (ref 18–48)
MAGNESIUM SERPL-MCNC: 2.1 MG/DL (ref 1.6–2.6)
MCH RBC QN AUTO: 33.2 PG (ref 27–31)
MCHC RBC AUTO-ENTMCNC: 33.1 G/DL (ref 32–36)
MCV RBC AUTO: 100 FL (ref 82–98)
MONOCYTES # BLD AUTO: 1.1 K/UL (ref 0.3–1)
MONOCYTES NFR BLD: 10.1 % (ref 4–15)
NEUTROPHILS # BLD AUTO: 7.3 K/UL (ref 1.8–7.7)
NEUTROPHILS NFR BLD: 65.4 % (ref 38–73)
NITRITE UR QL STRIP: NEGATIVE
NRBC BLD-RTO: 0 /100 WBC
NT-PROBNP SERPL-MCNC: 3179 PG/ML (ref 5–1800)
OB PNL STL: NEGATIVE
PH UR STRIP: 7 [PH] (ref 5–8)
PLATELET # BLD AUTO: 212 K/UL (ref 150–450)
PMV BLD AUTO: 11.2 FL (ref 9.2–12.9)
POTASSIUM SERPL-SCNC: 2.9 MMOL/L (ref 3.5–5.1)
PROT SERPL-MCNC: 7.3 G/DL (ref 6–8.4)
PROT UR QL STRIP: NEGATIVE
RBC # BLD AUTO: 2.38 M/UL (ref 4.6–6.2)
SARS-COV-2 RDRP RESP QL NAA+PROBE: NEGATIVE
SODIUM SERPL-SCNC: 143 MMOL/L (ref 136–145)
SP GR UR STRIP: 1.01 (ref 1–1.03)
TROPONIN I SERPL DL<=0.01 NG/ML-MCNC: 32.4 PG/ML (ref 0–60)
TSH SERPL DL<=0.005 MIU/L-ACNC: 0.86 UIU/ML (ref 0.4–4)
URN SPEC COLLECT METH UR: NORMAL
UROBILINOGEN UR STRIP-ACNC: NEGATIVE EU/DL
WBC # BLD AUTO: 11.14 K/UL (ref 3.9–12.7)

## 2021-12-28 PROCEDURE — U0002 COVID-19 LAB TEST NON-CDC: HCPCS | Performed by: STUDENT IN AN ORGANIZED HEALTH CARE EDUCATION/TRAINING PROGRAM

## 2021-12-28 PROCEDURE — 84484 ASSAY OF TROPONIN QUANT: CPT | Performed by: STUDENT IN AN ORGANIZED HEALTH CARE EDUCATION/TRAINING PROGRAM

## 2021-12-28 PROCEDURE — 36415 COLL VENOUS BLD VENIPUNCTURE: CPT | Performed by: STUDENT IN AN ORGANIZED HEALTH CARE EDUCATION/TRAINING PROGRAM

## 2021-12-28 PROCEDURE — 83880 ASSAY OF NATRIURETIC PEPTIDE: CPT | Performed by: STUDENT IN AN ORGANIZED HEALTH CARE EDUCATION/TRAINING PROGRAM

## 2021-12-28 PROCEDURE — 93010 EKG 12-LEAD: ICD-10-PCS | Mod: ,,, | Performed by: INTERNAL MEDICINE

## 2021-12-28 PROCEDURE — 25000003 PHARM REV CODE 250: Performed by: NURSE PRACTITIONER

## 2021-12-28 PROCEDURE — 99900035 HC TECH TIME PER 15 MIN (STAT)

## 2021-12-28 PROCEDURE — 84443 ASSAY THYROID STIM HORMONE: CPT | Performed by: STUDENT IN AN ORGANIZED HEALTH CARE EDUCATION/TRAINING PROGRAM

## 2021-12-28 PROCEDURE — 81003 URINALYSIS AUTO W/O SCOPE: CPT | Performed by: STUDENT IN AN ORGANIZED HEALTH CARE EDUCATION/TRAINING PROGRAM

## 2021-12-28 PROCEDURE — 25000242 PHARM REV CODE 250 ALT 637 W/ HCPCS: Performed by: NURSE PRACTITIONER

## 2021-12-28 PROCEDURE — 11000001 HC ACUTE MED/SURG PRIVATE ROOM

## 2021-12-28 PROCEDURE — 93005 ELECTROCARDIOGRAM TRACING: CPT

## 2021-12-28 PROCEDURE — 99285 EMERGENCY DEPT VISIT HI MDM: CPT | Mod: 25

## 2021-12-28 PROCEDURE — 82272 OCCULT BLD FECES 1-3 TESTS: CPT | Performed by: STUDENT IN AN ORGANIZED HEALTH CARE EDUCATION/TRAINING PROGRAM

## 2021-12-28 PROCEDURE — 93010 ELECTROCARDIOGRAM REPORT: CPT | Mod: ,,, | Performed by: INTERNAL MEDICINE

## 2021-12-28 PROCEDURE — 63600175 PHARM REV CODE 636 W HCPCS: Performed by: STUDENT IN AN ORGANIZED HEALTH CARE EDUCATION/TRAINING PROGRAM

## 2021-12-28 PROCEDURE — 94761 N-INVAS EAR/PLS OXIMETRY MLT: CPT

## 2021-12-28 PROCEDURE — 85025 COMPLETE CBC W/AUTO DIFF WBC: CPT | Performed by: STUDENT IN AN ORGANIZED HEALTH CARE EDUCATION/TRAINING PROGRAM

## 2021-12-28 PROCEDURE — 25000003 PHARM REV CODE 250: Performed by: STUDENT IN AN ORGANIZED HEALTH CARE EDUCATION/TRAINING PROGRAM

## 2021-12-28 PROCEDURE — 96374 THER/PROPH/DIAG INJ IV PUSH: CPT

## 2021-12-28 PROCEDURE — 80053 COMPREHEN METABOLIC PANEL: CPT | Performed by: STUDENT IN AN ORGANIZED HEALTH CARE EDUCATION/TRAINING PROGRAM

## 2021-12-28 PROCEDURE — 83605 ASSAY OF LACTIC ACID: CPT | Performed by: STUDENT IN AN ORGANIZED HEALTH CARE EDUCATION/TRAINING PROGRAM

## 2021-12-28 PROCEDURE — 83735 ASSAY OF MAGNESIUM: CPT | Performed by: STUDENT IN AN ORGANIZED HEALTH CARE EDUCATION/TRAINING PROGRAM

## 2021-12-28 PROCEDURE — 87040 BLOOD CULTURE FOR BACTERIA: CPT | Performed by: STUDENT IN AN ORGANIZED HEALTH CARE EDUCATION/TRAINING PROGRAM

## 2021-12-28 RX ORDER — PROMETHAZINE HYDROCHLORIDE AND DEXTROMETHORPHAN HYDROBROMIDE 6.25; 15 MG/5ML; MG/5ML
SYRUP ORAL
Status: ON HOLD | COMMUNITY
Start: 2021-12-13 | End: 2022-01-07 | Stop reason: HOSPADM

## 2021-12-28 RX ORDER — POTASSIUM CHLORIDE 20 MEQ/1
20 TABLET, EXTENDED RELEASE ORAL DAILY
Status: ON HOLD | COMMUNITY
Start: 2021-12-21 | End: 2022-01-07 | Stop reason: HOSPADM

## 2021-12-28 RX ORDER — GUAIFENESIN 100 MG/5ML
200 SOLUTION ORAL EVERY 6 HOURS PRN
Status: DISCONTINUED | OUTPATIENT
Start: 2021-12-28 | End: 2022-01-07 | Stop reason: HOSPADM

## 2021-12-28 RX ORDER — FAMOTIDINE 10 MG/ML
20 INJECTION INTRAVENOUS DAILY
Status: DISCONTINUED | OUTPATIENT
Start: 2021-12-29 | End: 2021-12-31

## 2021-12-28 RX ORDER — IPRATROPIUM BROMIDE AND ALBUTEROL SULFATE 2.5; .5 MG/3ML; MG/3ML
3 SOLUTION RESPIRATORY (INHALATION)
Status: DISCONTINUED | OUTPATIENT
Start: 2021-12-28 | End: 2022-01-07 | Stop reason: HOSPADM

## 2021-12-28 RX ORDER — HYDROCODONE BITARTRATE AND ACETAMINOPHEN 10; 325 MG/1; MG/1
TABLET ORAL
Status: ON HOLD | COMMUNITY
Start: 2021-12-13 | End: 2022-01-07 | Stop reason: HOSPADM

## 2021-12-28 RX ORDER — UMECLIDINIUM BROMIDE AND VILANTEROL TRIFENATATE 62.5; 25 UG/1; UG/1
POWDER RESPIRATORY (INHALATION)
COMMUNITY
Start: 2021-12-15 | End: 2022-03-30 | Stop reason: ALTCHOICE

## 2021-12-28 RX ORDER — ONDANSETRON 2 MG/ML
4 INJECTION INTRAMUSCULAR; INTRAVENOUS EVERY 8 HOURS PRN
Status: DISCONTINUED | OUTPATIENT
Start: 2021-12-28 | End: 2022-01-07 | Stop reason: HOSPADM

## 2021-12-28 RX ORDER — SODIUM CHLORIDE 0.9 % (FLUSH) 0.9 %
10 SYRINGE (ML) INJECTION
Status: DISCONTINUED | OUTPATIENT
Start: 2021-12-28 | End: 2022-01-07 | Stop reason: HOSPADM

## 2021-12-28 RX ORDER — ACETAMINOPHEN 325 MG/1
650 TABLET ORAL EVERY 8 HOURS PRN
Status: DISCONTINUED | OUTPATIENT
Start: 2021-12-28 | End: 2021-12-28

## 2021-12-28 RX ORDER — IBUPROFEN 100 MG/5ML
1000 SUSPENSION, ORAL (FINAL DOSE FORM) ORAL DAILY
COMMUNITY
End: 2022-03-30 | Stop reason: ALTCHOICE

## 2021-12-28 RX ORDER — CARVEDILOL 6.25 MG/1
TABLET ORAL
COMMUNITY
Start: 2021-12-21 | End: 2022-03-30 | Stop reason: ALTCHOICE

## 2021-12-28 RX ORDER — FUROSEMIDE 10 MG/ML
80 INJECTION INTRAMUSCULAR; INTRAVENOUS
Status: COMPLETED | OUTPATIENT
Start: 2021-12-28 | End: 2021-12-28

## 2021-12-28 RX ORDER — ACETAMINOPHEN 325 MG/1
650 TABLET ORAL EVERY 8 HOURS PRN
Status: DISCONTINUED | OUTPATIENT
Start: 2021-12-28 | End: 2022-01-07 | Stop reason: HOSPADM

## 2021-12-28 RX ORDER — TALC
6 POWDER (GRAM) TOPICAL NIGHTLY PRN
Status: DISCONTINUED | OUTPATIENT
Start: 2021-12-28 | End: 2022-01-07 | Stop reason: HOSPADM

## 2021-12-28 RX ORDER — LEVOFLOXACIN 500 MG/1
500 TABLET, FILM COATED ORAL DAILY
Status: ON HOLD | COMMUNITY
Start: 2021-12-21 | End: 2022-01-07 | Stop reason: HOSPADM

## 2021-12-28 RX ADMIN — APIXABAN 5 MG: 5 TABLET, FILM COATED ORAL at 09:12

## 2021-12-28 RX ADMIN — Medication 1 TABLET: at 02:12

## 2021-12-28 RX ADMIN — IPRATROPIUM BROMIDE AND ALBUTEROL SULFATE 3 ML: 2.5; .5 SOLUTION RESPIRATORY (INHALATION) at 11:12

## 2021-12-28 RX ADMIN — GUAIFENESIN 200 MG: 200 SOLUTION ORAL at 10:12

## 2021-12-28 RX ADMIN — POTASSIUM BICARBONATE 25 MEQ: 978 TABLET, EFFERVESCENT ORAL at 02:12

## 2021-12-28 RX ADMIN — FUROSEMIDE 80 MG: 10 INJECTION, SOLUTION INTRAVENOUS at 12:12

## 2021-12-28 NOTE — ED PROVIDER NOTES
Encounter Date: 12/28/2021       History     Chief Complaint   Patient presents with    Shortness of Breath     Pt sent to ED via Dr. Domínguez for admission for unsuccessful outpatient treatment of pneumonia/heart failure. Pt stated that he has been sick for the past month or so.      81-year-old male with history of AFib on Eliquis, hypertension referred from PCP office for admission due to failure of outpatient therapy for pneumonia and heart failure.  Patient says that he has been feeling weak for over a month despite finishing ends antibiotics and being on his medication.  Patient denies any chest pain but does endorse orthopnea, bilateral lower extremity swelling, dyspnea on exertion, chronic cough        Review of patient's allergies indicates:  No Known Allergies  Past Medical History:   Diagnosis Date    Brain tumor     Hypertension     Neuropathy     Renal disorder      Past Surgical History:   Procedure Laterality Date    APPENDECTOMY      HIP SURGERY      KNEE SURGERY      SHOULDER SURGERY      triple bypass       No family history on file.  Social History     Tobacco Use    Smoking status: Former Smoker    Smokeless tobacco: Never Used   Substance Use Topics    Alcohol use: Not Currently     Review of Systems   Constitutional: Positive for fatigue.   HENT: Negative.    Respiratory: Positive for shortness of breath and wheezing.    Cardiovascular: Positive for leg swelling. Negative for chest pain.   Gastrointestinal: Negative.    Genitourinary: Negative.    Musculoskeletal: Negative.    Skin: Negative.    Neurological: Negative.    Psychiatric/Behavioral: Negative.    All other systems reviewed and are negative.      Physical Exam     Initial Vitals [12/28/21 1203]   BP Pulse Resp Temp SpO2   (!) 158/80 90 (!) 28 97.8 °F (36.6 °C) 96 %      MAP       --         Physical Exam    Nursing note and vitals reviewed.  Constitutional:   Patient sitting upright in no respiratory distress speaking full  sentences   HENT:   Head: Normocephalic and atraumatic.   Eyes: Conjunctivae are normal.   Neck:   Normal range of motion.  Cardiovascular:   Irregular irregular   Pulmonary/Chest:   Expiratory wheezing and crackles but good air movement bilaterally   Abdominal: Abdomen is soft. Bowel sounds are normal.   Musculoskeletal:         General: Normal range of motion.      Cervical back: Normal range of motion.     Neurological: He is alert. GCS score is 15. GCS eye subscore is 4. GCS verbal subscore is 5. GCS motor subscore is 6.   Skin: Skin is warm.   Psychiatric: He has a normal mood and affect. Thought content normal.         ED Course   Procedures  Labs Reviewed   CBC W/ AUTO DIFFERENTIAL - Abnormal; Notable for the following components:       Result Value    RBC 2.38 (*)     Hemoglobin 7.9 (*)     Hematocrit 23.9 (*)      (*)     MCH 33.2 (*)     RDW 15.9 (*)     Mono # 1.1 (*)     Eos # 0.8 (*)     Lymph % 16.0 (*)     All other components within normal limits   COMPREHENSIVE METABOLIC PANEL - Abnormal; Notable for the following components:    Potassium 2.9 (*)     CO2 31 (*)     Glucose 111 (*)     BUN 27 (*)     Creatinine 1.8 (*)     Albumin 3.2 (*)     Anion Gap 6 (*)     eGFR if  40.5 (*)     eGFR if non  35.0 (*)     All other components within normal limits   NT-PRO NATRIURETIC PEPTIDE - Abnormal; Notable for the following components:    NT-proBNP 3179 (*)     All other components within normal limits   CULTURE, BLOOD   CULTURE, BLOOD   LACTIC ACID, PLASMA   TROPONIN I HIGH SENSITIVITY   MAGNESIUM   TSH   URINALYSIS, REFLEX TO URINE CULTURE    Narrative:     Preferred Collection Type->Urine, Clean Catch  Specimen Source->Urine   OCCULT BLOOD X 1, STOOL   SARS-COV-2 RDRP GENE    Narrative:     This test utilizes isothermal nucleic acid amplification   technology to detect the SARS-CoV-2 RdRp nucleic acid segment.   The analytical sensitivity (limit of detection) is 125  genome   equivalents/mL.   A POSITIVE result implies infection with the SARS-CoV-2 virus;   the patient is presumed to be contagious.     A NEGATIVE result means that SARS-CoV-2 nucleic acids are not   present above the limit of detection. A NEGATIVE result should be   treated as presumptive. It does not rule out the possibility of   COVID-19 and should not be the sole basis for treatment decisions.   If COVID-19 is strongly suspected based on clinical and exposure   history, re-testing using an alternate molecular assay should be   considered.   This test is only for use under the Food and Drug   Administration s Emergency Use Authorization (EUA).   Commercial kits are provided by [employer].   Performance characteristics of the EUA have been independently   verified by [facility] Department of   Pathology and Laboratory Medicine.   _________________________________________________________________   The authorized Fact Sheet for Healthcare Providers and the authorized Fact   Sheet for Patients of the ID NOW COVID-19 are available on the FDA   website:     https://www.fda.gov/media/482545/download  https://www.fda.gov/media/722280/download         EKG Readings: (Independently Interpreted)   Initial Reading: No STEMI.   AFib with junctional pacemaker with PVC and junctional pacemaker.  Left axis deviation.     ECG Results          EKG 12-lead (In process)  Result time 12/28/21 14:42:33    In process by Interface, Lab In [facility] (12/28/21 14:42:33)                 Narrative:    Test Reason : R53.1,    Vent. Rate : 086 BPM     Atrial Rate : 072 BPM     P-R Int : 000 ms          QRS Dur : 136 ms      QT Int : 426 ms       P-R-T Axes : 000 -56 109 degrees     QTc Int : 509 ms    Atrial fibrillation with a competing junctional pacemaker with premature  ventricular or aberrantly conducted complexes  Left axis deviation  LVH with QRS widening  T wave abnormality, consider lateral ischemia  Abnormal  ECG  When compared with ECG of 13-FEB-2021 11:51,  (RBBB and left anterior fascicular block) is no longer Present    Referred By: [email or web address]   SELF           Confirmed By:                             Imaging Results          X-Ray Chest 1 View (Final result)  Result time 12/28/21 15:11:31    Final result by Jamey Gannon MD (12/28/21 15:11:31)                 Impression:      Right lower lung zone opacity, corresponding with mass identified on recent chest CT.  Mass appears increased in size from prior exam, may reflect interval increase in size of mass and or associated atelectasis or pneumonia.      Electronically signed by: Jamey Gannon MD  Date:    12/28/2021  Time:    15:11             Narrative:    EXAMINATION:  XR CHEST 1 VIEW    CLINICAL HISTORY:  Weakness, pulmonary mass on recent chest    COMPARISON:  CT chest without IV contrast 12/21/2021.    FINDINGS:  Frontal chest radiograph demonstrates heterogeneous opacity projecting over the right lower lung zone, corresponding with site of mass identified recent chest CT.  There is no pleural fluid.  The cardiomediastinal silhouette is unremarkable.  No osseous abnormality.                                 Medications   B12-levomefolate calcium-B6 (FOLBIC RF) 2-1.13-25 mg tablet 1 tablet (1 tablet Oral Given 12/28/21 1437)   apixaban tablet 5 mg (has no administration in time range)   furosemide injection 80 mg (80 mg Intravenous Given 12/28/21 1249)   potassium bicarbonate disintegrating tablet 25 mEq (25 mEq Oral Given 12/28/21 1437)     Medical Decision Making:   Initial Assessment:   81-year-old male with history of AFib on Eliquis, pacemaker, hypertension referred from PCP office for admission due to failure of outpatient therapy for pneumonia and heart failure.  Afebrile non tachy.  Saturating 96% on room air.  Patient appears comfortable.  Physical concerning for fluid overload.  Will get labs and imaging to rule out sepsis, electrolyte  derangement, CHF, infection.  Plan to admit for diuresis  Clinical Tests:   Lab Tests: Ordered and Reviewed  The following lab test(s) were unremarkable: CBC, CMP, Troponin, BNP and Urinalysis  Radiological Study: Ordered and Reviewed  Medical Tests: Reviewed and Ordered             ED Course as of 12/28/21 1643   Tue Dec 28, 2021   1308 EKG 12-lead [HD]   1334 Labs and imaging noted.  Replete potassium.  Hemoglobin appears low.  Will check for rectal bleeding.  Will repeat patient's folate and B12 [HD]   1606 No lower GI bleed.  Hemoglobin may be low due to fluid overload along with electrolyte and vitamin deficiency.  Will start patient's Eliquis tomorrow as patient is high risk for PEs [HD]      ED Course User Index  [HD] Tristian Cortes MD             Clinical Impression:   Final diagnoses:  [R53.1] Weakness  [I50.9] Acute on chronic congestive heart failure, unspecified heart failure type (Primary)  [J81.0] Acute pulmonary edema          ED Disposition Condition    Admit               Tristian Cortes MD  12/28/21 2434

## 2021-12-29 ENCOUNTER — CLINICAL SUPPORT (OUTPATIENT)
Dept: CARDIOLOGY | Facility: HOSPITAL | Age: 81
DRG: 193 | End: 2021-12-29
Payer: MEDICARE

## 2021-12-29 PROBLEM — Z79.899 DVT PROPHYLAXIS: Status: ACTIVE | Noted: 2021-12-29

## 2021-12-29 PROBLEM — J81.0 ACUTE PULMONARY EDEMA: Status: ACTIVE | Noted: 2021-12-29

## 2021-12-29 PROBLEM — I50.9 ACUTE ON CHRONIC CONGESTIVE HEART FAILURE: Status: ACTIVE | Noted: 2021-12-29

## 2021-12-29 PROBLEM — J18.9 PNEUMONIA: Status: ACTIVE | Noted: 2021-12-29

## 2021-12-29 PROBLEM — R53.1 WEAKNESS: Status: ACTIVE | Noted: 2021-12-29

## 2021-12-29 LAB
ALBUMIN SERPL BCP-MCNC: 2.7 G/DL (ref 3.5–5.2)
ALP SERPL-CCNC: 50 U/L (ref 55–135)
ALT SERPL W/O P-5'-P-CCNC: 19 U/L (ref 10–44)
ANION GAP SERPL CALC-SCNC: 6 MMOL/L (ref 8–16)
AST SERPL-CCNC: 20 U/L (ref 10–40)
BASOPHILS # BLD AUTO: 0.05 K/UL (ref 0–0.2)
BASOPHILS NFR BLD: 0.6 % (ref 0–1.9)
BILIRUB SERPL-MCNC: 0.6 MG/DL (ref 0.1–1)
BUN SERPL-MCNC: 23 MG/DL (ref 8–23)
CALCIUM SERPL-MCNC: 9.1 MG/DL (ref 8.7–10.5)
CHLORIDE SERPL-SCNC: 107 MMOL/L (ref 95–110)
CO2 SERPL-SCNC: 31 MMOL/L (ref 23–29)
CREAT SERPL-MCNC: 1.5 MG/DL (ref 0.5–1.4)
DIFFERENTIAL METHOD: ABNORMAL
EOSINOPHIL # BLD AUTO: 0.6 K/UL (ref 0–0.5)
EOSINOPHIL NFR BLD: 7.6 % (ref 0–8)
ERYTHROCYTE [DISTWIDTH] IN BLOOD BY AUTOMATED COUNT: 15.8 % (ref 11.5–14.5)
EST. GFR  (AFRICAN AMERICAN): 50.5 ML/MIN/1.73 M^2
EST. GFR  (NON AFRICAN AMERICAN): 43.7 ML/MIN/1.73 M^2
GLUCOSE SERPL-MCNC: 103 MG/DL (ref 70–110)
HCT VFR BLD AUTO: 32.7 % (ref 40–54)
HGB BLD-MCNC: 10.8 G/DL (ref 14–18)
IMM GRANULOCYTES # BLD AUTO: 0.03 K/UL (ref 0–0.04)
IMM GRANULOCYTES NFR BLD AUTO: 0.4 % (ref 0–0.5)
LYMPHOCYTES # BLD AUTO: 1.3 K/UL (ref 1–4.8)
LYMPHOCYTES NFR BLD: 15.3 % (ref 18–48)
MCH RBC QN AUTO: 32.7 PG (ref 27–31)
MCHC RBC AUTO-ENTMCNC: 33 G/DL (ref 32–36)
MCV RBC AUTO: 99 FL (ref 82–98)
MONOCYTES # BLD AUTO: 0.9 K/UL (ref 0.3–1)
MONOCYTES NFR BLD: 11 % (ref 4–15)
NEUTROPHILS # BLD AUTO: 5.5 K/UL (ref 1.8–7.7)
NEUTROPHILS NFR BLD: 65.1 % (ref 38–73)
NRBC BLD-RTO: 0 /100 WBC
PLATELET # BLD AUTO: 153 K/UL (ref 150–450)
PMV BLD AUTO: 10.9 FL (ref 9.2–12.9)
POTASSIUM SERPL-SCNC: 3.1 MMOL/L (ref 3.5–5.1)
PROT SERPL-MCNC: 6.4 G/DL (ref 6–8.4)
RBC # BLD AUTO: 3.3 M/UL (ref 4.6–6.2)
SODIUM SERPL-SCNC: 144 MMOL/L (ref 136–145)
WBC # BLD AUTO: 8.37 K/UL (ref 3.9–12.7)

## 2021-12-29 PROCEDURE — 94761 N-INVAS EAR/PLS OXIMETRY MLT: CPT

## 2021-12-29 PROCEDURE — 63600175 PHARM REV CODE 636 W HCPCS: Performed by: NURSE PRACTITIONER

## 2021-12-29 PROCEDURE — 99900031 HC PATIENT EDUCATION (STAT)

## 2021-12-29 PROCEDURE — 93306 TTE W/DOPPLER COMPLETE: CPT

## 2021-12-29 PROCEDURE — 27000221 HC OXYGEN, UP TO 24 HOURS

## 2021-12-29 PROCEDURE — 85025 COMPLETE CBC W/AUTO DIFF WBC: CPT | Performed by: STUDENT IN AN ORGANIZED HEALTH CARE EDUCATION/TRAINING PROGRAM

## 2021-12-29 PROCEDURE — 25000003 PHARM REV CODE 250: Performed by: NURSE PRACTITIONER

## 2021-12-29 PROCEDURE — 94640 AIRWAY INHALATION TREATMENT: CPT

## 2021-12-29 PROCEDURE — 36415 COLL VENOUS BLD VENIPUNCTURE: CPT | Performed by: STUDENT IN AN ORGANIZED HEALTH CARE EDUCATION/TRAINING PROGRAM

## 2021-12-29 PROCEDURE — 11000001 HC ACUTE MED/SURG PRIVATE ROOM

## 2021-12-29 PROCEDURE — 63600175 PHARM REV CODE 636 W HCPCS: Performed by: INTERNAL MEDICINE

## 2021-12-29 PROCEDURE — 25000242 PHARM REV CODE 250 ALT 637 W/ HCPCS: Performed by: NURSE PRACTITIONER

## 2021-12-29 PROCEDURE — 99900035 HC TECH TIME PER 15 MIN (STAT)

## 2021-12-29 PROCEDURE — 80053 COMPREHEN METABOLIC PANEL: CPT | Performed by: STUDENT IN AN ORGANIZED HEALTH CARE EDUCATION/TRAINING PROGRAM

## 2021-12-29 PROCEDURE — 25000003 PHARM REV CODE 250: Performed by: STUDENT IN AN ORGANIZED HEALTH CARE EDUCATION/TRAINING PROGRAM

## 2021-12-29 RX ORDER — GUAIFENESIN 600 MG/1
600 TABLET, EXTENDED RELEASE ORAL 2 TIMES DAILY
Status: DISCONTINUED | OUTPATIENT
Start: 2021-12-29 | End: 2022-01-07 | Stop reason: HOSPADM

## 2021-12-29 RX ORDER — EZETIMIBE 10 MG/1
10 TABLET ORAL DAILY
Status: DISCONTINUED | OUTPATIENT
Start: 2021-12-29 | End: 2022-01-07 | Stop reason: HOSPADM

## 2021-12-29 RX ORDER — GABAPENTIN 300 MG/1
300 CAPSULE ORAL 2 TIMES DAILY
Status: DISCONTINUED | OUTPATIENT
Start: 2021-12-29 | End: 2022-01-07 | Stop reason: HOSPADM

## 2021-12-29 RX ORDER — HYDROCODONE BITARTRATE AND ACETAMINOPHEN 5; 325 MG/1; MG/1
1 TABLET ORAL EVERY 4 HOURS PRN
Status: DISCONTINUED | OUTPATIENT
Start: 2021-12-29 | End: 2022-01-07 | Stop reason: HOSPADM

## 2021-12-29 RX ORDER — LINEZOLID 2 MG/ML
600 INJECTION, SOLUTION INTRAVENOUS
Status: DISCONTINUED | OUTPATIENT
Start: 2021-12-29 | End: 2021-12-29

## 2021-12-29 RX ORDER — LINEZOLID 2 MG/ML
600 INJECTION, SOLUTION INTRAVENOUS
Status: COMPLETED | OUTPATIENT
Start: 2021-12-29 | End: 2022-01-04

## 2021-12-29 RX ORDER — POTASSIUM CHLORIDE 7.45 MG/ML
10 INJECTION INTRAVENOUS 3 TIMES DAILY
Status: DISCONTINUED | OUTPATIENT
Start: 2021-12-29 | End: 2021-12-29

## 2021-12-29 RX ORDER — PRAVASTATIN SODIUM 10 MG/1
10 TABLET ORAL NIGHTLY
Status: DISCONTINUED | OUTPATIENT
Start: 2021-12-29 | End: 2022-01-07 | Stop reason: HOSPADM

## 2021-12-29 RX ORDER — TAMSULOSIN HYDROCHLORIDE 0.4 MG/1
0.4 CAPSULE ORAL DAILY
Status: DISCONTINUED | OUTPATIENT
Start: 2021-12-29 | End: 2022-01-07 | Stop reason: HOSPADM

## 2021-12-29 RX ORDER — ASPIRIN 81 MG/1
81 TABLET ORAL DAILY
Status: DISCONTINUED | OUTPATIENT
Start: 2021-12-29 | End: 2022-01-07 | Stop reason: HOSPADM

## 2021-12-29 RX ORDER — CARVEDILOL 3.12 MG/1
6.25 TABLET ORAL 2 TIMES DAILY
Status: DISCONTINUED | OUTPATIENT
Start: 2021-12-29 | End: 2022-01-07 | Stop reason: HOSPADM

## 2021-12-29 RX ORDER — FLUOXETINE HYDROCHLORIDE 20 MG/1
20 CAPSULE ORAL DAILY
Status: DISCONTINUED | OUTPATIENT
Start: 2021-12-29 | End: 2022-01-07 | Stop reason: HOSPADM

## 2021-12-29 RX ORDER — OXCARBAZEPINE 300 MG/1
600 TABLET, FILM COATED ORAL 2 TIMES DAILY
Status: DISCONTINUED | OUTPATIENT
Start: 2021-12-29 | End: 2022-01-07 | Stop reason: HOSPADM

## 2021-12-29 RX ORDER — FUROSEMIDE 10 MG/ML
40 INJECTION INTRAMUSCULAR; INTRAVENOUS
Status: DISCONTINUED | OUTPATIENT
Start: 2021-12-29 | End: 2021-12-29

## 2021-12-29 RX ADMIN — GUAIFENESIN 600 MG: 600 TABLET, EXTENDED RELEASE ORAL at 09:12

## 2021-12-29 RX ADMIN — GABAPENTIN 300 MG: 300 CAPSULE ORAL at 09:12

## 2021-12-29 RX ADMIN — FLUOXETINE HYDROCHLORIDE 20 MG: 20 CAPSULE ORAL at 11:12

## 2021-12-29 RX ADMIN — ASPIRIN 81 MG: 81 TABLET, COATED ORAL at 11:12

## 2021-12-29 RX ADMIN — APIXABAN 5 MG: 5 TABLET, FILM COATED ORAL at 09:12

## 2021-12-29 RX ADMIN — CARVEDILOL 6.25 MG: 3.12 TABLET, FILM COATED ORAL at 09:12

## 2021-12-29 RX ADMIN — IPRATROPIUM BROMIDE AND ALBUTEROL SULFATE 3 ML: 2.5; .5 SOLUTION RESPIRATORY (INHALATION) at 04:12

## 2021-12-29 RX ADMIN — IPRATROPIUM BROMIDE AND ALBUTEROL SULFATE 3 ML: 2.5; .5 SOLUTION RESPIRATORY (INHALATION) at 12:12

## 2021-12-29 RX ADMIN — IPRATROPIUM BROMIDE AND ALBUTEROL SULFATE 3 ML: 2.5; .5 SOLUTION RESPIRATORY (INHALATION) at 07:12

## 2021-12-29 RX ADMIN — OXCARBAZEPINE 600 MG: 300 TABLET, FILM COATED ORAL at 09:12

## 2021-12-29 RX ADMIN — IPRATROPIUM BROMIDE AND ALBUTEROL SULFATE 3 ML: 2.5; .5 SOLUTION RESPIRATORY (INHALATION) at 08:12

## 2021-12-29 RX ADMIN — PIPERACILLIN AND TAZOBACTAM 4.5 G: 4; .5 INJECTION, POWDER, LYOPHILIZED, FOR SOLUTION INTRAVENOUS; PARENTERAL at 10:12

## 2021-12-29 RX ADMIN — GUAIFENESIN 200 MG: 200 SOLUTION ORAL at 08:12

## 2021-12-29 RX ADMIN — LINEZOLID 600 MG: 600 INJECTION, SOLUTION INTRAVENOUS at 10:12

## 2021-12-29 RX ADMIN — APIXABAN 5 MG: 5 TABLET, FILM COATED ORAL at 08:12

## 2021-12-29 RX ADMIN — PIPERACILLIN AND TAZOBACTAM 4.5 G: 4; .5 INJECTION, POWDER, LYOPHILIZED, FOR SOLUTION INTRAVENOUS; PARENTERAL at 06:12

## 2021-12-29 RX ADMIN — GABAPENTIN 300 MG: 300 CAPSULE ORAL at 11:12

## 2021-12-29 RX ADMIN — PRAVASTATIN SODIUM 10 MG: 10 TABLET ORAL at 09:12

## 2021-12-29 RX ADMIN — EZETIMIBE 10 MG: 10 TABLET ORAL at 11:12

## 2021-12-29 RX ADMIN — CARVEDILOL 6.25 MG: 3.12 TABLET, FILM COATED ORAL at 11:12

## 2021-12-29 RX ADMIN — Medication 1 TABLET: at 08:12

## 2021-12-29 RX ADMIN — TAMSULOSIN HYDROCHLORIDE 0.4 MG: 0.4 CAPSULE ORAL at 11:12

## 2021-12-29 NOTE — NURSING
Writer spoke with SALUD De La Cruz Np concerning pt with congested cough, potassium level and med ordered, oxygen and history of A-fib.  New orders added.

## 2021-12-29 NOTE — PLAN OF CARE
[place] [facility]  Initial Discharge Assessment       Primary Care Provider: Navi Domínguez III, MD    Admission Diagnosis: Acute pulmonary edema [J81.0]  Weakness [R53.1]  Acute on chronic congestive heart failure, unspecified heart failure type [I50.9]    Admission Date: 12/28/2021  Expected Discharge Date:          Payor: MEDICARE / Plan: MEDICARE PART A & B / Product Type: Government /     Extended Emergency Contact Information  Primary Emergency Contact: Aleyda Lei  Mobile Phone: [phone]  Relation: Relative  Secondary Emergency Contact: Aj Robin  Mobile Phone: [phone]  Relation: Relative  Preferred language: English   needed? No    Discharge Plan A: Home with family  Discharge Plan B: Home with family,Home Health      [facility] [place] [address] [address]  [facility] [address]  [facility] [address]  Phone: [phone] Fax: [phone]    [facility] [address]  [address] [facility] [address]  Phone: [phone] Fax: [phone]      Initial Assessment (most recent)     Adult Discharge Assessment - 12/29/21 1109        Discharge Assessment    Assessment Type Discharge Planning Assessment     Confirmed/corrected address, phone number and insurance Yes     Confirmed Demographics Correct on Facesheet     Source of Information patient;family   The patient ex-wife, Aleyda, was present at his beside.    When was your last doctors appointment? --   Aleyda stated that his appointment was a few weeks ago.    Reason For Admission Pneumonia     Lives With grandchild(ibrahima)   The patient stated that his grandchildren lives with him, but he will be moving in with Aleyda once he is discharged.    Do you expect to return to your current living situation? No     Do you have help at home or someone to help you manage your care at home? Yes     Who are your caregiver(s) and their phone number(s)? Aleyda (relative) [phone]     Prior to  hospitilization cognitive status: Alert/Oriented     Current cognitive status: Alert/Oriented     Walking or Climbing Stairs Difficulty ambulation difficulty, requires equipment     Mobility Management cane (straight) and rollator (PRN)     Dressing/Bathing Difficulty bathing difficulty, assistance 1 person     Dressing/Bathing Management shower chair     Home Layout Able to live on 1st floor     Equipment Currently Used at Home shower chair;cane, straight;nebulizer   rollator (PRN) , and inhaler    Readmission within 30 days? No     Patient currently being followed by outpatient case management? No     Do you currently have service(s) that help you manage your care at home? No     Do you take prescription medications? Yes     Do you have prescription coverage? Yes     Do you have any problems affording any of your prescribed medications? No     Is the patient taking medications as prescribed? yes     Who is going to help you get home at discharge? Aleyda (relative) [phone]     How do you get to doctors appointments? family or friend will provide     Are you on dialysis? No     Do you take coumadin? No     Discharge Plan A Home with family     Discharge Plan B Home with family;Home Health     DME Needed Upon Discharge  --   TBD    Discharge Plan discussed with: Patient   The patient's ex-wife, Aleyda, was present at her bedside.              Initial discharge assessment is completed. JAILENE went to see the patient in his room. He was alert and oriented to the questions being asked. The patient's ex-wife, Aleyda was present at the patient's bedside. The patient stated that he wanted to add Aleyda to his emergency contact. The patient currently lives with his grandchildren, but he plans to move-in with Aleyda once he is discharged. He has a cane (straight), rollatar (PRN), inhaler, and nebulizer.     JAILENE will continue to monitor.

## 2021-12-29 NOTE — PLAN OF CARE
12/29/21 0748   Medicare Message   Important Message from Medicare regarding Discharge Appeal Rights Given to patient/caregiver;Explained to patient/caregiver;Signed/date by patient/caregiver   Date IMM was signed 12/28/21   Time IMM was signed 1034

## 2021-12-29 NOTE — H&P
[facility] [place] [facility]  History & Physical    Patient Name: Jamey Lei  MRN: [id number]  Patient Class: IP- Inpatient  Admission Date: 12/28/2021  Attending Physician: Navi Domínguez III, MD   Primary Care Provider: Navi Domínguez III, MD         Patient information was obtained from patient, past medical records and ER records.     Subjective:     Principal Problem:Pneumonia    Chief Complaint:   Chief Complaint   Patient presents with    Shortness of Breath     Pt sent to ED via Dr. Domínguez for admission for unsuccessful outpatient treatment of pneumonia/heart failure. Pt stated that he has been sick for the past month or so.         HPI:    Shortness of Breath       Pt sent to ED via Dr. Domínguez for admission for unsuccessful outpatient treatment of pneumonia/heart failure. Pt stated that he has been sick for the past month or so.       81-year-old male with history of AFib on Eliquis, hypertension referred from PCP office for admission due to failure of outpatient therapy for pneumonia and heart failure.  Patient says that he has been feeling weak for over a month despite finishing ends antibiotics and being on his medication.  Patient denies any chest pain but does endorse orthopnea, bilateral lower extremity swelling, dyspnea on exertion, chronic cough      Past Medical History:   Diagnosis Date    Arthritis     Brain tumor     Coronary artery disease     Encounter for blood transfusion     Hypertension     Neuropathy     Renal disorder        Past Surgical History:   Procedure Laterality Date    APPENDECTOMY      HIP SURGERY      KNEE SURGERY      SHOULDER SURGERY      triple bypass         Review of patient's allergies indicates:  No Known Allergies    No current facility-administered medications on file prior to encounter.     Current Outpatient Medications on File Prior to Encounter   Medication Sig    ANORO ELLIPTA 62.5-25 mcg/actuation DsDv     apixaban (ELIQUIS) 5 mg Tab  Take 5 mg by mouth 2 (two) times daily.    ascorbic acid, vitamin C, (VITAMIN C) 1000 MG tablet Take 1,000 mg by mouth once daily.    aspirin (ECOTRIN) 81 MG EC tablet Take 81 mg by mouth once daily.    b complex vitamins tablet Take by mouth once daily.    diphenoxylate-atropine 2.5-0.025 mg (LOMOTIL) 2.5-0.025 mg per tablet Take 1 tablet by mouth 2 (two) times daily as needed for Diarrhea.    ergocalciferol, vitamin D2, (VITAMIN D ORAL) Take 1,000 capsules by mouth once daily.    ezetimibe (ZETIA) 10 mg tablet Take 10 mg by mouth once daily.    FLUoxetine 20 MG capsule Take 20 mg by mouth once daily.    gabapentin (NEURONTIN) 300 MG capsule Take 300 mg by mouth 2 (two) times daily.    HYDROcodone-acetaminophen (NORCO)  mg per tablet TAKE 1 TABLET BY MOUTH TWICE DAILY AS NEEDED FOR PAIN WILL MAKE DROWSY    lovastatin (MEVACOR) 10 MG tablet Take 10 mg by mouth every evening.    senna (SENOKOT) 8.6 mg tablet Take 1 tablet by mouth 2 (two) times daily.     tamsulosin (FLOMAX) 0.4 mg Cap Take 0.4 mg by mouth once daily.    torsemide (DEMADEX) 20 MG Tab Take 20 mg by mouth 2 (two) times a day.    carvediloL (COREG) 12.5 MG tablet Take 12.5 mg by mouth 2 (two) times daily with meals.    carvediloL (COREG) 6.25 MG tablet SMARTSI Tablet(s) By Mouth Every 12 Hours    HYDROcodone-acetaminophen (NORCO) 5-325 mg per tablet Take 1 tablet by mouth every 4 (four) hours as needed for Pain.    levoFLOXacin (LEVAQUIN) 500 MG tablet Take 500 mg by mouth once daily.    ondansetron (ZOFRAN) 4 MG tablet Take 1 tablet (4 mg total) by mouth every 12 (twelve) hours as needed for Nausea.    OXcarbazepine (TRILEPTAL) 600 MG Tab Take 600 mg by mouth 2 (two) times daily.    potassium chloride SA (K-DUR,KLOR-CON) 20 MEQ tablet Take 20 mEq by mouth once daily.    promethazine-dextromethorphan (PROMETHAZINE-DM) 6.25-15 mg/5 mL Syrp TAKE 1 TEASPOONFUL BY MOUTH EVERY 6 HOURS AS NEEDED WILL MAKE DROWSY     Family  History    None       Tobacco Use    Smoking status: Former Smoker    Smokeless tobacco: Never Used   Substance and Sexual Activity    Alcohol use: Not Currently    Drug use: Not on file    Sexual activity: Not on file     Review of Systems   Constitutional: Positive for activity change, appetite change and fatigue.   Respiratory: Positive for cough, shortness of breath and wheezing.    Cardiovascular: Positive for leg swelling. Negative for chest pain.   Gastrointestinal: Positive for abdominal distention.   Musculoskeletal: Positive for arthralgias and myalgias.   Neurological: Positive for weakness.     Objective:     Vital Signs (Most Recent):  Temp: 97.6 °F (36.4 °C) (12/29/21 0756)  Pulse: 89 (12/29/21 0808)  Resp: 16 (12/29/21 0808)  BP: (!) 154/79 (12/29/21 0756)  SpO2: 99 % (12/29/21 0756) Vital Signs (24h Range):  Temp:  [97.6 °F (36.4 °C)-98.3 °F (36.8 °C)] 97.6 °F (36.4 °C)  Pulse:  [] 89  Resp:  [14-73] 16  SpO2:  [84 %-100 %] 99 %  BP: (138-216)/(64-92) 154/79     Weight: 113.4 kg (250 lb)  Body mass index is 35.87 kg/m².    Physical Exam  Constitutional:       Appearance: He is obese. He is ill-appearing.   HENT:      Head: Normocephalic.      Nose: Nose normal.      Mouth/Throat:      Mouth: Mucous membranes are moist.   Cardiovascular:      Rate and Rhythm: Normal rate. Rhythm irregular.      Pulses: Normal pulses.   Pulmonary:      Breath sounds: Wheezing and rhonchi present.   Abdominal:      General: Bowel sounds are normal.   Musculoskeletal:      Right lower leg: Edema present.      Left lower leg: Edema present.   Neurological:      Mental Status: He is alert. Mental status is at baseline.             Significant Labs:   All pertinent labs within the past 24 hours have been reviewed.  Blood Culture:   Recent Labs   Lab 12/28/21  1252 12/28/21  1256   LABBLOO No Growth to date No Growth to date     CBC:   Recent Labs   Lab 12/28/21  1252 12/29/21  0530   WBC 11.14 8.37   HGB 7.9*  10.8*   HCT 23.9* 32.7*    153     CMP:   Recent Labs   Lab 12/28/21  1252 12/29/21  0530    144   K 2.9* 3.1*    107   CO2 31* 31*   * 103   BUN 27* 23   CREATININE 1.8* 1.5*   CALCIUM 9.6 9.1   PROT 7.3 6.4   ALBUMIN 3.2* 2.7*   BILITOT 0.7 0.6   ALKPHOS 58 50*   AST 18 20   ALT 19 19   ANIONGAP 6* 6*   EGFRNONAA 35.0* 43.7*     Lactic Acid:   Recent Labs   Lab 12/28/21  1251   LACTATE 1.6     Troponin: No results for input(s): TROPONINI in the last 48 hours.  TSH:   Recent Labs   Lab 12/28/21  1252   TSH 0.857     Urine Culture: No results for input(s): LABURIN in the last 48 hours.  Urine Studies:   Recent Labs   Lab 12/28/21  1440   COLORU Yellow   APPEARANCEUA Clear   PHUR 7.0   SPECGRAV 1.010   PROTEINUA Negative   GLUCUA Negative   KETONESU Negative   BILIRUBINUA Negative   OCCULTUA Negative   NITRITE Negative   UROBILINOGEN Negative   LEUKOCYTESUR Negative       Significant Imaging: Chest xray:Right lower lung zone opacity, corresponding with mass identified on recent chest CT.  Mass appears increased in size from prior exam, may reflect interval increase in size of mass and or associated atelectasis or pneumonia.      Assessment/Plan:     * Pneumonia  Zosyn and zyvox, duo nebs, oxygen per protocol, mucinex, cough medication and monitor. Patient has failed multiple outpatient treatments      DVT prophylaxis  On eliquis      Weakness  Likely secondary to acute illness, monitor and incorporate PT when appropriate      Acute pulmonary edema  Lasix drip       Acute on chronic congestive heart failure  Start lasix drip and consult cardiology for assistance in management, accurate I/o's        VTE Risk Mitigation (From admission, onward)         Ordered     IP VTE HIGH RISK PATIENT  Once         12/28/21 1805     Place sequential compression device  Until discontinued         12/28/21 1805     apixaban tablet 5 mg  2 times daily         12/28/21 1607                   Rosalva Ventura,  NP  Department of Hospital Medicine   [facility]

## 2021-12-29 NOTE — PLAN OF CARE
Pt noted with congested cough. Some relief with cough med and neb treatments. No acute resp distress noted. Amb to BR using a cane wit stand bye assist. Void without diff. Edema to bilateral lower ext, with circumferential redness. Small amt of clear drainage noted when dressing removed. More dryness to the area when left open to air. Continue to observe.

## 2021-12-29 NOTE — SUBJECTIVE & OBJECTIVE
Past Medical History:   Diagnosis Date    Arthritis     Brain tumor     Coronary artery disease     Encounter for blood transfusion     Hypertension     Neuropathy     Renal disorder        Past Surgical History:   Procedure Laterality Date    APPENDECTOMY      HIP SURGERY      KNEE SURGERY      SHOULDER SURGERY      triple bypass         Review of patient's allergies indicates:  No Known Allergies    No current facility-administered medications on file prior to encounter.     Current Outpatient Medications on File Prior to Encounter   Medication Sig    ANORO ELLIPTA 62.5-25 mcg/actuation DsDv     apixaban (ELIQUIS) 5 mg Tab Take 5 mg by mouth 2 (two) times daily.    ascorbic acid, vitamin C, (VITAMIN C) 1000 MG tablet Take 1,000 mg by mouth once daily.    aspirin (ECOTRIN) 81 MG EC tablet Take 81 mg by mouth once daily.    b complex vitamins tablet Take by mouth once daily.    diphenoxylate-atropine 2.5-0.025 mg (LOMOTIL) 2.5-0.025 mg per tablet Take 1 tablet by mouth 2 (two) times daily as needed for Diarrhea.    ergocalciferol, vitamin D2, (VITAMIN D ORAL) Take 1,000 capsules by mouth once daily.    ezetimibe (ZETIA) 10 mg tablet Take 10 mg by mouth once daily.    FLUoxetine 20 MG capsule Take 20 mg by mouth once daily.    gabapentin (NEURONTIN) 300 MG capsule Take 300 mg by mouth 2 (two) times daily.    HYDROcodone-acetaminophen (NORCO)  mg per tablet TAKE 1 TABLET BY MOUTH TWICE DAILY AS NEEDED FOR PAIN WILL MAKE DROWSY    lovastatin (MEVACOR) 10 MG tablet Take 10 mg by mouth every evening.    senna (SENOKOT) 8.6 mg tablet Take 1 tablet by mouth 2 (two) times daily.     tamsulosin (FLOMAX) 0.4 mg Cap Take 0.4 mg by mouth once daily.    torsemide (DEMADEX) 20 MG Tab Take 20 mg by mouth 2 (two) times a day.    carvediloL (COREG) 12.5 MG tablet Take 12.5 mg by mouth 2 (two) times daily with meals.    carvediloL (COREG) 6.25 MG tablet SMARTSI Tablet(s) By Mouth Every 12 Hours     HYDROcodone-acetaminophen (NORCO) 5-325 mg per tablet Take 1 tablet by mouth every 4 (four) hours as needed for Pain.    levoFLOXacin (LEVAQUIN) 500 MG tablet Take 500 mg by mouth once daily.    ondansetron (ZOFRAN) 4 MG tablet Take 1 tablet (4 mg total) by mouth every 12 (twelve) hours as needed for Nausea.    OXcarbazepine (TRILEPTAL) 600 MG Tab Take 600 mg by mouth 2 (two) times daily.    potassium chloride SA (K-DUR,KLOR-CON) 20 MEQ tablet Take 20 mEq by mouth once daily.    promethazine-dextromethorphan (PROMETHAZINE-DM) 6.25-15 mg/5 mL Syrp TAKE 1 TEASPOONFUL BY MOUTH EVERY 6 HOURS AS NEEDED WILL MAKE DROWSY     Family History    None       Tobacco Use    Smoking status: Former Smoker    Smokeless tobacco: Never Used   Substance and Sexual Activity    Alcohol use: Not Currently    Drug use: Not on file    Sexual activity: Not on file     Review of Systems   Constitutional: Positive for activity change, appetite change and fatigue.   Respiratory: Positive for cough, shortness of breath and wheezing.    Cardiovascular: Positive for leg swelling. Negative for chest pain.   Gastrointestinal: Positive for abdominal distention.   Musculoskeletal: Positive for arthralgias and myalgias.   Neurological: Positive for weakness.     Objective:     Vital Signs (Most Recent):  Temp: 97.6 °F (36.4 °C) (12/29/21 0756)  Pulse: 89 (12/29/21 0808)  Resp: 16 (12/29/21 0808)  BP: (!) 154/79 (12/29/21 0756)  SpO2: 99 % (12/29/21 0756) Vital Signs (24h Range):  Temp:  [97.6 °F (36.4 °C)-98.3 °F (36.8 °C)] 97.6 °F (36.4 °C)  Pulse:  [] 89  Resp:  [14-73] 16  SpO2:  [84 %-100 %] 99 %  BP: (138-216)/(64-92) 154/79     Weight: 113.4 kg (250 lb)  Body mass index is 35.87 kg/m².    Physical Exam  Constitutional:       Appearance: He is obese. He is ill-appearing.   HENT:      Head: Normocephalic.      Nose: Nose normal.      Mouth/Throat:      Mouth: Mucous membranes are moist.   Cardiovascular:      Rate and Rhythm:  Normal rate. Rhythm irregular.      Pulses: Normal pulses.   Pulmonary:      Breath sounds: Wheezing and rhonchi present.   Abdominal:      General: Bowel sounds are normal.   Musculoskeletal:      Right lower leg: Edema present.      Left lower leg: Edema present.   Neurological:      Mental Status: He is alert. Mental status is at baseline.             Significant Labs:   All pertinent labs within the past 24 hours have been reviewed.  Blood Culture:   Recent Labs   Lab 12/28/21  1252 12/28/21  1256   LABBLOO No Growth to date No Growth to date     CBC:   Recent Labs   Lab 12/28/21  1252 12/29/21  0530   WBC 11.14 8.37   HGB 7.9* 10.8*   HCT 23.9* 32.7*    153     CMP:   Recent Labs   Lab 12/28/21  1252 12/29/21  0530    144   K 2.9* 3.1*    107   CO2 31* 31*   * 103   BUN 27* 23   CREATININE 1.8* 1.5*   CALCIUM 9.6 9.1   PROT 7.3 6.4   ALBUMIN 3.2* 2.7*   BILITOT 0.7 0.6   ALKPHOS 58 50*   AST 18 20   ALT 19 19   ANIONGAP 6* 6*   EGFRNONAA 35.0* 43.7*     Lactic Acid:   Recent Labs   Lab 12/28/21  1251   LACTATE 1.6     Troponin: No results for input(s): TROPONINI in the last 48 hours.  TSH:   Recent Labs   Lab 12/28/21  1252   TSH 0.857     Urine Culture: No results for input(s): LABURIN in the last 48 hours.  Urine Studies:   Recent Labs   Lab 12/28/21  1440   COLORU Yellow   APPEARANCEUA Clear   PHUR 7.0   SPECGRAV 1.010   PROTEINUA Negative   GLUCUA Negative   KETONESU Negative   BILIRUBINUA Negative   OCCULTUA Negative   NITRITE Negative   UROBILINOGEN Negative   LEUKOCYTESUR Negative       Significant Imaging: Chest xray:Right lower lung zone opacity, corresponding with mass identified on recent chest CT.  Mass appears increased in size from prior exam, may reflect interval increase in size of mass and or associated atelectasis or pneumonia.

## 2021-12-29 NOTE — CONSULTS
[facility]  Cardiology  Consult Note    Patient Name: Jamey Lei  Patient : 1940  MRN: [id number]  Admission Date: 2021  Hospital Length of Stay: 1 days  Code Status: Full Code   Attending Provider: Navi Domínguez III, MD   Consulting Provider: SOPHIE Flor  Primary Care Physician: Navi Domínguez III, MD  Principal Problem:Pneumonia      Patient information was obtained from patient, past medical records and ER records.     Inpatient consult to Cardiology  Consult performed by: SOPHIE Flor  Consult ordered by: Rosalva Ventura NP        Subjective:     Chief Complaint:  Shortness of breath      HPI:   Patient is a 81-year-old male with CAD s/p 3V CABG in , permanent A. fib on Eliquis, moderate to severe bilateral CVD, hypertension, hyperlipidemia and stage III chronic kidney disease.    Presented to hospital after outpatient follow up with pcp for recent PNA treatment.  Continues to have shortness of breath and edema.  Most recent CT thorax suggested an enlarging pulmonary mass for which he is in need of biopsy, concerning for malignancy per CT report.  Denies chest pain.  Patient was started on lasix gtt on admission.      Past Medical History:   Diagnosis Date    Arthritis     Brain tumor     Coronary artery disease     Encounter for blood transfusion     Hypertension     Neuropathy     Renal disorder        Past Surgical History:   Procedure Laterality Date    APPENDECTOMY      HIP SURGERY      KNEE SURGERY      SHOULDER SURGERY      triple bypass         Review of patient's allergies indicates:  No Known Allergies    No current facility-administered medications on file prior to encounter.     Current Outpatient Medications on File Prior to Encounter   Medication Sig    ANORO ELLIPTA 62.5-25 mcg/actuation DsDv     apixaban (ELIQUIS) 5 mg Tab Take 5 mg by mouth 2 (two) times daily.    ascorbic acid, vitamin C, (VITAMIN C) 1000 MG tablet Take 1,000 mg by  mouth once daily.    aspirin (ECOTRIN) 81 MG EC tablet Take 81 mg by mouth once daily.    b complex vitamins tablet Take by mouth once daily.    diphenoxylate-atropine 2.5-0.025 mg (LOMOTIL) 2.5-0.025 mg per tablet Take 1 tablet by mouth 2 (two) times daily as needed for Diarrhea.    ergocalciferol, vitamin D2, (VITAMIN D ORAL) Take 1,000 capsules by mouth once daily.    ezetimibe (ZETIA) 10 mg tablet Take 10 mg by mouth once daily.    FLUoxetine 20 MG capsule Take 20 mg by mouth once daily.    gabapentin (NEURONTIN) 300 MG capsule Take 300 mg by mouth 2 (two) times daily.    HYDROcodone-acetaminophen (NORCO)  mg per tablet TAKE 1 TABLET BY MOUTH TWICE DAILY AS NEEDED FOR PAIN WILL MAKE DROWSY    lovastatin (MEVACOR) 10 MG tablet Take 10 mg by mouth every evening.    senna (SENOKOT) 8.6 mg tablet Take 1 tablet by mouth 2 (two) times daily.     tamsulosin (FLOMAX) 0.4 mg Cap Take 0.4 mg by mouth once daily.    torsemide (DEMADEX) 20 MG Tab Take 20 mg by mouth 2 (two) times a day.    carvediloL (COREG) 12.5 MG tablet Take 12.5 mg by mouth 2 (two) times daily with meals.    carvediloL (COREG) 6.25 MG tablet SMARTSI Tablet(s) By Mouth Every 12 Hours    HYDROcodone-acetaminophen (NORCO) 5-325 mg per tablet Take 1 tablet by mouth every 4 (four) hours as needed for Pain.    levoFLOXacin (LEVAQUIN) 500 MG tablet Take 500 mg by mouth once daily.    ondansetron (ZOFRAN) 4 MG tablet Take 1 tablet (4 mg total) by mouth every 12 (twelve) hours as needed for Nausea.    OXcarbazepine (TRILEPTAL) 600 MG Tab Take 600 mg by mouth 2 (two) times daily.    potassium chloride SA (K-DUR,KLOR-CON) 20 MEQ tablet Take 20 mEq by mouth once daily.    promethazine-dextromethorphan (PROMETHAZINE-DM) 6.25-15 mg/5 mL Syrp TAKE 1 TEASPOONFUL BY MOUTH EVERY 6 HOURS AS NEEDED WILL MAKE DROWSY     Family History    None       Tobacco Use    Smoking status: Former Smoker    Smokeless tobacco: Never Used   Substance and  Sexual Activity    Alcohol use: Not Currently    Drug use: Not on file    Sexual activity: Not on file     Review of Systems   Constitutional: Positive for activity change and fatigue.   HENT: Negative.    Eyes: Negative.    Respiratory: Positive for shortness of breath and wheezing.    Cardiovascular: Positive for leg swelling. Negative for chest pain and palpitations.   Gastrointestinal: Negative.    Endocrine: Negative.    Genitourinary: Negative.    Musculoskeletal: Negative.    Skin: Negative.    Allergic/Immunologic: Negative.    Neurological: Positive for weakness.   Hematological: Negative.    Psychiatric/Behavioral: Negative.      Objective:     Vital Signs (Most Recent):  Temp: 97.6 °F (36.4 °C) (12/29/21 0756)  Pulse: 89 (12/29/21 0808)  Resp: 16 (12/29/21 0808)  BP: (!) 154/79 (12/29/21 0756)  SpO2: 99 % (12/29/21 0756) Vital Signs (24h Range):  Temp:  [97.6 °F (36.4 °C)-98.3 °F (36.8 °C)] 97.6 °F (36.4 °C)  Pulse:  [] 89  Resp:  [14-73] 16  SpO2:  [84 %-100 %] 99 %  BP: (138-216)/(64-92) 154/79     Weight: 113.4 kg (250 lb)  Body mass index is 35.87 kg/m².    SpO2: 99 %  O2 Device (Oxygen Therapy): nasal cannula      Intake/Output Summary (Last 24 hours) at 12/29/2021 1050  Last data filed at 12/29/2021 0600  Gross per 24 hour   Intake 880 ml   Output 1050 ml   Net -170 ml       Lines/Drains/Airways     Peripheral Intravenous Line                 Peripheral IV - Single Lumen 12/28/21 1244 18 G Right Antecubital <1 day                Physical Exam  Vitals and nursing note reviewed.   Constitutional:       Appearance: He is obese. He is ill-appearing.   HENT:      Head: Normocephalic.      Nose: Nose normal.      Mouth/Throat:      Mouth: Mucous membranes are moist.   Eyes:      Pupils: Pupils are equal, round, and reactive to light.   Cardiovascular:      Rate and Rhythm: Normal rate. Rhythm irregular.      Heart sounds: No murmur heard.      Pulmonary:      Breath sounds: Wheezing and rhonchi  present.   Abdominal:      General: Abdomen is flat.      Palpations: Abdomen is soft.   Musculoskeletal:      Right lower leg: Edema present.      Left lower leg: Edema present.   Skin:     General: Skin is warm and dry.   Neurological:      General: No focal deficit present.      Mental Status: He is alert.   Psychiatric:         Mood and Affect: Mood normal.         Behavior: Behavior normal.         Significant Labs:  All pertinent lab results from the last 24 hours have been reviewed.   Recent Lab Results  (Last 5 results in the past 72 hours)      12/29/21  0530   12/28/21  1440   12/28/21  1429   12/28/21  1319   12/28/21  1256        Albumin 2.7               Alkaline Phosphatase 50               ALT 19               Anion Gap 6               Appearance, UA   Clear             AST 20               Baso # 0.05               Basophil % 0.6               Bilirubin (UA)   Negative             BILIRUBIN TOTAL 0.6  Comment: For infants and newborns, interpretation of results should be based  on gestational age, weight and in agreement with clinical  observations.    Premature Infant recommended reference ranges:  Up to 24 hours.............<8.0 mg/dL  Up to 48 hours............<12.0 mg/dL  3-5 days..................<15.0 mg/dL  6-29 days.................<15.0 mg/dL    For patients on Eltrombopag therapy, use of Dimension [place] TBIL is   not   recommended.                 Blood Culture, Routine         No Growth to date  [P]       BUN 23               Calcium 9.1               Chloride 107               CO2 31               Color, UA   Yellow             Creatinine 1.5               Differential Method Automated               eGFR if  50.5               eGFR if non  43.7  Comment: Calculation used to obtain the estimated glomerular filtration  rate (eGFR) is the CKD-EPI equation.                  Eos # 0.6               Eosinophil % 7.6               Glucose 103                Glucose, UA   Negative             Gran # (ANC) 5.5               Gran % 65.1               Hematocrit 32.7               Hemoglobin 10.8               Immature Grans (Abs) 0.03  Comment: Mild elevation in immature granulocytes is non specific and   can be seen in a variety of conditions including stress response,   acute inflammation, trauma and pregnancy. Correlation with other   laboratory and clinical findings is essential.                 Immature Granulocytes 0.4               Ketones, UA   Negative             Leukocytes, UA   Negative             Lymph # 1.3               Lymph % 15.3               MCH 32.7               MCHC 33.0               MCV 99               Mono # 0.9               Mono % 11.0               MPV 10.9               NITRITE UA   Negative             nRBC 0               Occult Blood     Negative           Occult Blood UA   Negative             pH, UA   7.0             Platelets 153               Potassium 3.1               PROTEIN TOTAL 6.4               Protein, UA   Negative  Comment: Recommend a 24 hour urine protein or a urine   protein/creatinine ratio if globulin induced proteinuria is  clinically suspected.                Acceptable       Yes         RBC 3.30               RDW 15.8               SARS-CoV-2 RNA, Amplification, Qual       Negative         Sodium 144               Specific [place], UA   1.010             Specimen UA   Urine, Clean Catch             UROBILINOGEN UA   Negative             WBC 8.37                                     [P] - Preliminary Result             Significant Imaging:  Imaging Results          X-Ray Chest 1 View (Final result)  Result time 12/28/21 15:11:31    Final result by Jamey Gannon MD (12/28/21 15:11:31)                 Impression:      Right lower lung zone opacity, corresponding with mass identified on recent chest CT.  Mass appears increased in size from prior exam, may reflect interval increase in size of mass and  or associated atelectasis or pneumonia.      Electronically signed by: Jamey Gannon MD  Date:    12/28/2021  Time:    15:11             Narrative:    EXAMINATION:  XR CHEST 1 VIEW    CLINICAL HISTORY:  Weakness, pulmonary mass on recent chest    COMPARISON:  CT chest without IV contrast 12/21/2021.    FINDINGS:  Frontal chest radiograph demonstrates heterogeneous opacity projecting over the right lower lung zone, corresponding with site of mass identified recent chest CT.  There is no pleural fluid.  The cardiomediastinal silhouette is unremarkable.  No osseous abnormality.                                        TELEMETRY: Chronic atrial fibrillation with controlled ventricular response.     LAST PERFUSION:   10/05/2021     This is a normal perfusion study, no perfusion defects noted.   This scan is suggestive of low risk for future cardiovascular events.   The left ventricular cavity is noted to be normal on the stress study. The left ventricular ejection fraction was calculated to be 58% and left ventricular global function is normal.   The study quality is good.       MEDICATIONS:     Current Facility-Administered Medications:     acetaminophen tablet 650 mg, 650 mg, Oral, Q8H PRN, Rosalva Ventura NP    albuterol-ipratropium 2.5 mg-0.5 mg/3 mL nebulizer solution 3 mL, 3 mL, Nebulization, Q4H WAKE, Rosalva Ventura NP, 3 mL at 12/29/21 0808    albuterol-ipratropium 2.5 mg-0.5 mg/3 mL nebulizer solution 3 mL, 3 mL, Nebulization, Q2H PRN, Rosalva Ventura NP, 3 mL at 12/29/21 0431    apixaban tablet 5 mg, 5 mg, Oral, BID, Rosalva Ventura NP, 5 mg at 12/29/21 0826    aspirin EC tablet 81 mg, 81 mg, Oral, Daily, Rosalva Ventura NP    carvediloL tablet 6.25 mg, 6.25 mg, Oral, BID, Rosalva Ventura NP    ezetimibe tablet 10 mg, 10 mg, Oral, Daily, Rosalva Ventura NP    famotidine (PF) injection 20 mg, 20 mg, Intravenous, Daily, Rosalva Ventura NP    FLUoxetine capsule 20 mg, 20  mg, Oral, Daily, Rosalva Ventura NP    furosemide (LASIX) 200 mg in dextrose 5 % 100 mL continuous infusion (conc: 2 mg/mL), 10 mg/hr, Intravenous, Continuous, Rosalva Ventura NP    gabapentin capsule 300 mg, 300 mg, Oral, BID, Rosalva Ventura NP    guaiFENesin 100 mg/5 ml syrup 200 mg, 200 mg, Oral, Q6H PRN, Rosalva Ventura NP, 200 mg at 12/29/21 0826    guaiFENesin 12 hr tablet 600 mg, 600 mg, Oral, BID, Rosalva Ventura NP    HYDROcodone-acetaminophen 5-325 mg per tablet 1 tablet, 1 tablet, Oral, Q4H PRN, Rosalva Ventura NP    linezolid 600 mg/300 mL IVPB 600 mg, 600 mg, Intravenous, Q12H, Rosalva Ventura NP    melatonin tablet 6 mg, 6 mg, Oral, Nightly PRN, Rosalva Ventura NP    ondansetron injection 4 mg, 4 mg, Intravenous, Q8H PRN, Rosalva Ventura NP    OXcarbazepine tablet 600 mg, 600 mg, Oral, BID, Rosalva Ventura NP    piperacillin-tazobactam 4.5 g in dextrose 5 % 100 mL IVPB (ready to mix system), 4.5 g, Intravenous, Q8H, Rosalva Ventura NP    pravastatin tablet 10 mg, 10 mg, Oral, QHS, Rosalva Ventura NP    sodium chloride 0.9% flush 10 mL, 10 mL, Intravenous, PRN, Rosalva Ventura NP    tamsulosin 24 hr capsule 0.4 mg, 0.4 mg, Oral, Daily, Rosalva Ventura NP      Assessment and Plan:     Active Diagnoses:    Diagnosis Date Noted POA    PRINCIPAL PROBLEM:  Pneumonia [J18.9] 12/29/2021 Yes    Acute on chronic congestive heart failure [I50.9] 12/29/2021 Yes    Acute pulmonary edema [J81.0] 12/29/2021 Yes    Weakness [R53.1] 12/29/2021 Yes    DVT prophylaxis [Z29.9] 12/29/2021 Not Applicable      Problems Resolved During this Admission:       VTE Risk Mitigation (From admission, onward)         Ordered     IP VTE HIGH RISK PATIENT  Once         12/28/21 1805     Place sequential compression device  Until discontinued         12/28/21 1805     apixaban tablet 5 mg  2 times daily         12/28/21 1607                Assessment and  Plan:  1.  Pneumonia:   continue therapy as directed by IM services.     2.  Shortness of breath:   CXR suggestive of increasing mass vs PNA.  Obtain echo to check EF.  Nuclear imaging from 10/5/21 showed a preserved ejection fraction. Peripheral edema likely from pulmonary process.     3.  Permanent atrial fibrillation:  Rate stable.     4.  CAD:  No chest pain.  Continue to follow     5.  HTN:  Overall BP stable.      6.  CKD:   Creatinine stable.  Continue to monitor closely while on lasix gtt    Thank you for your consult.          SOPHIE Flor  Cardiology  [place] [facility]  12/29/2021

## 2021-12-29 NOTE — ASSESSMENT & PLAN NOTE
Zosyn and zyvox, duo nebs, oxygen per protocol, mucinex, cough medication and monitor. Patient has failed multiple outpatient treatments

## 2021-12-29 NOTE — HPI
Shortness of Breath       Pt sent to ED via Dr. Domínguez for admission for unsuccessful outpatient treatment of pneumonia/heart failure. Pt stated that he has been sick for the past month or so.       81-year-old male with history of AFib on Eliquis, hypertension referred from PCP office for admission due to failure of outpatient therapy for pneumonia and heart failure.  Patient says that he has been feeling weak for over a month despite finishing ends antibiotics and being on his medication.  Patient denies any chest pain but does endorse orthopnea, bilateral lower extremity swelling, dyspnea on exertion, chronic cough

## 2021-12-30 PROBLEM — R60.9 EDEMA: Status: ACTIVE | Noted: 2021-12-30

## 2021-12-30 PROBLEM — E87.6 HYPOKALEMIA: Status: ACTIVE | Noted: 2021-12-30

## 2021-12-30 PROBLEM — S81.802A WOUND OF LEFT LEG: Status: ACTIVE | Noted: 2021-12-30

## 2021-12-30 LAB
ALBUMIN SERPL BCP-MCNC: 2.7 G/DL (ref 3.5–5.2)
ALP SERPL-CCNC: 49 U/L (ref 55–135)
ALT SERPL W/O P-5'-P-CCNC: 19 U/L (ref 10–44)
ANION GAP SERPL CALC-SCNC: 7 MMOL/L (ref 8–16)
AORTIC ROOT ANNULUS: 3.48 CM
AST SERPL-CCNC: 20 U/L (ref 10–40)
AV INDEX (PROSTH): 1.09
AV MEAN GRADIENT: 2 MMHG
AV PEAK GRADIENT: 3 MMHG
AV VALVE AREA: 5.51 CM2
AV VELOCITY RATIO: 1.08
BASOPHILS # BLD AUTO: 0.06 K/UL (ref 0–0.2)
BASOPHILS NFR BLD: 0.8 % (ref 0–1.9)
BILIRUB SERPL-MCNC: 0.6 MG/DL (ref 0.1–1)
BSA FOR ECHO PROCEDURE: 2.37 M2
BUN SERPL-MCNC: 23 MG/DL (ref 8–23)
CALCIUM SERPL-MCNC: 9.2 MG/DL (ref 8.7–10.5)
CHLORIDE SERPL-SCNC: 105 MMOL/L (ref 95–110)
CO2 SERPL-SCNC: 32 MMOL/L (ref 23–29)
CREAT SERPL-MCNC: 1.6 MG/DL (ref 0.5–1.4)
CV ECHO LV RWT: 0.54 CM
DIFFERENTIAL METHOD: ABNORMAL
DOP CALC AO PEAK VEL: 0.9 M/S
DOP CALC AO VTI: 19.64 CM
DOP CALC LVOT AREA: 5.1 CM2
DOP CALC LVOT DIAMETER: 2.54 CM
DOP CALC LVOT PEAK VEL: 0.97 M/S
DOP CALC LVOT STROKE VOLUME: 108.23 CM3
DOP CALCLVOT PEAK VEL VTI: 21.37 CM
E WAVE DECELERATION TIME: 135.77 MSEC
E/A RATIO: 2.12
ECHO LV POSTERIOR WALL: 1.26 CM (ref 0.6–1.1)
EJECTION FRACTION: 65 %
EOSINOPHIL # BLD AUTO: 0.8 K/UL (ref 0–0.5)
EOSINOPHIL NFR BLD: 10.6 % (ref 0–8)
ERYTHROCYTE [DISTWIDTH] IN BLOOD BY AUTOMATED COUNT: 15.7 % (ref 11.5–14.5)
EST. GFR  (AFRICAN AMERICAN): 46.7 ML/MIN/1.73 M^2
EST. GFR  (NON AFRICAN AMERICAN): 40.4 ML/MIN/1.73 M^2
FRACTIONAL SHORTENING: 36 % (ref 28–44)
GLUCOSE SERPL-MCNC: 126 MG/DL (ref 70–110)
HCT VFR BLD AUTO: 34.4 % (ref 40–54)
HGB BLD-MCNC: 11.2 G/DL (ref 14–18)
IMM GRANULOCYTES # BLD AUTO: 0.03 K/UL (ref 0–0.04)
IMM GRANULOCYTES NFR BLD AUTO: 0.4 % (ref 0–0.5)
INTERVENTRICULAR SEPTUM: 1.74 CM (ref 0.6–1.1)
LEFT ATRIUM SIZE: 4.93 CM
LEFT INTERNAL DIMENSION IN SYSTOLE: 3.02 CM (ref 2.1–4)
LEFT VENTRICLE DIASTOLIC VOLUME INDEX: 45.16 ML/M2
LEFT VENTRICLE DIASTOLIC VOLUME: 102.96 ML
LEFT VENTRICLE MASS INDEX: 129 G/M2
LEFT VENTRICLE SYSTOLIC VOLUME INDEX: 15.6 ML/M2
LEFT VENTRICLE SYSTOLIC VOLUME: 35.67 ML
LEFT VENTRICULAR INTERNAL DIMENSION IN DIASTOLE: 4.71 CM (ref 3.5–6)
LEFT VENTRICULAR MASS: 294.98 G
LYMPHOCYTES # BLD AUTO: 1.1 K/UL (ref 1–4.8)
LYMPHOCYTES NFR BLD: 15.7 % (ref 18–48)
MAGNESIUM SERPL-MCNC: 2.1 MG/DL (ref 1.6–2.6)
MCH RBC QN AUTO: 32.6 PG (ref 27–31)
MCHC RBC AUTO-ENTMCNC: 32.6 G/DL (ref 32–36)
MCV RBC AUTO: 100 FL (ref 82–98)
MONOCYTES # BLD AUTO: 0.7 K/UL (ref 0.3–1)
MONOCYTES NFR BLD: 9.9 % (ref 4–15)
MV PEAK A VEL: 0.58 M/S
MV PEAK E VEL: 1.23 M/S
MV STENOSIS PRESSURE HALF TIME: 39.37 MS
MV VALVE AREA P 1/2 METHOD: 5.59 CM2
NEUTROPHILS # BLD AUTO: 4.6 K/UL (ref 1.8–7.7)
NEUTROPHILS NFR BLD: 62.6 % (ref 38–73)
NRBC BLD-RTO: 0 /100 WBC
PISA MRMAX VEL: 0.04 M/S
PISA TR MAX VEL: 2.57 M/S
PLATELET # BLD AUTO: 160 K/UL (ref 150–450)
PMV BLD AUTO: 10.9 FL (ref 9.2–12.9)
POTASSIUM SERPL-SCNC: 2.8 MMOL/L (ref 3.5–5.1)
PROT SERPL-MCNC: 6.6 G/DL (ref 6–8.4)
PV PEAK VELOCITY: 0.94 CM/S
RA PRESSURE: 8 MMHG
RBC # BLD AUTO: 3.44 M/UL (ref 4.6–6.2)
RIGHT VENTRICULAR END-DIASTOLIC DIMENSION: 3.62 CM
SODIUM SERPL-SCNC: 144 MMOL/L (ref 136–145)
TR MAX PG: 26 MMHG
TV REST PULMONARY ARTERY PRESSURE: 34 MMHG
WBC # BLD AUTO: 7.27 K/UL (ref 3.9–12.7)

## 2021-12-30 PROCEDURE — 99900031 HC PATIENT EDUCATION (STAT)

## 2021-12-30 PROCEDURE — 27000221 HC OXYGEN, UP TO 24 HOURS

## 2021-12-30 PROCEDURE — 80053 COMPREHEN METABOLIC PANEL: CPT | Performed by: NURSE PRACTITIONER

## 2021-12-30 PROCEDURE — 25000003 PHARM REV CODE 250: Performed by: NURSE PRACTITIONER

## 2021-12-30 PROCEDURE — 63600175 PHARM REV CODE 636 W HCPCS: Performed by: NURSE PRACTITIONER

## 2021-12-30 PROCEDURE — 83735 ASSAY OF MAGNESIUM: CPT | Performed by: NURSE PRACTITIONER

## 2021-12-30 PROCEDURE — 63600175 PHARM REV CODE 636 W HCPCS: Performed by: INTERNAL MEDICINE

## 2021-12-30 PROCEDURE — 97161 PT EVAL LOW COMPLEX 20 MIN: CPT

## 2021-12-30 PROCEDURE — 94761 N-INVAS EAR/PLS OXIMETRY MLT: CPT

## 2021-12-30 PROCEDURE — 11000001 HC ACUTE MED/SURG PRIVATE ROOM

## 2021-12-30 PROCEDURE — 36415 COLL VENOUS BLD VENIPUNCTURE: CPT | Performed by: NURSE PRACTITIONER

## 2021-12-30 PROCEDURE — 97165 OT EVAL LOW COMPLEX 30 MIN: CPT

## 2021-12-30 PROCEDURE — 99900035 HC TECH TIME PER 15 MIN (STAT)

## 2021-12-30 PROCEDURE — 94640 AIRWAY INHALATION TREATMENT: CPT

## 2021-12-30 PROCEDURE — 25000242 PHARM REV CODE 250 ALT 637 W/ HCPCS: Performed by: NURSE PRACTITIONER

## 2021-12-30 PROCEDURE — 85025 COMPLETE CBC W/AUTO DIFF WBC: CPT | Performed by: NURSE PRACTITIONER

## 2021-12-30 RX ORDER — POTASSIUM CHLORIDE 20 MEQ/1
40 TABLET, EXTENDED RELEASE ORAL 2 TIMES DAILY
Status: DISCONTINUED | OUTPATIENT
Start: 2021-12-30 | End: 2022-01-07 | Stop reason: HOSPADM

## 2021-12-30 RX ADMIN — PIPERACILLIN AND TAZOBACTAM 4.5 G: 4; .5 INJECTION, POWDER, LYOPHILIZED, FOR SOLUTION INTRAVENOUS; PARENTERAL at 11:12

## 2021-12-30 RX ADMIN — TAMSULOSIN HYDROCHLORIDE 0.4 MG: 0.4 CAPSULE ORAL at 08:12

## 2021-12-30 RX ADMIN — IPRATROPIUM BROMIDE AND ALBUTEROL SULFATE 3 ML: 2.5; .5 SOLUTION RESPIRATORY (INHALATION) at 07:12

## 2021-12-30 RX ADMIN — PIPERACILLIN AND TAZOBACTAM 4.5 G: 4; .5 INJECTION, POWDER, LYOPHILIZED, FOR SOLUTION INTRAVENOUS; PARENTERAL at 02:12

## 2021-12-30 RX ADMIN — GUAIFENESIN 600 MG: 600 TABLET, EXTENDED RELEASE ORAL at 08:12

## 2021-12-30 RX ADMIN — CARVEDILOL 6.25 MG: 3.12 TABLET, FILM COATED ORAL at 08:12

## 2021-12-30 RX ADMIN — OXCARBAZEPINE 600 MG: 300 TABLET, FILM COATED ORAL at 08:12

## 2021-12-30 RX ADMIN — FLUOXETINE HYDROCHLORIDE 20 MG: 20 CAPSULE ORAL at 08:12

## 2021-12-30 RX ADMIN — GABAPENTIN 300 MG: 300 CAPSULE ORAL at 08:12

## 2021-12-30 RX ADMIN — APIXABAN 5 MG: 5 TABLET, FILM COATED ORAL at 08:12

## 2021-12-30 RX ADMIN — IPRATROPIUM BROMIDE AND ALBUTEROL SULFATE 3 ML: 2.5; .5 SOLUTION RESPIRATORY (INHALATION) at 04:12

## 2021-12-30 RX ADMIN — LINEZOLID 600 MG: 600 INJECTION, SOLUTION INTRAVENOUS at 09:12

## 2021-12-30 RX ADMIN — FAMOTIDINE 20 MG: 10 INJECTION INTRAVENOUS at 09:12

## 2021-12-30 RX ADMIN — POTASSIUM CHLORIDE 40 MEQ: 1500 TABLET, EXTENDED RELEASE ORAL at 08:12

## 2021-12-30 RX ADMIN — POTASSIUM CHLORIDE 40 MEQ: 1500 TABLET, EXTENDED RELEASE ORAL at 11:12

## 2021-12-30 RX ADMIN — FUROSEMIDE 10 MG/HR: 10 INJECTION, SOLUTION INTRAMUSCULAR; INTRAVENOUS at 06:12

## 2021-12-30 RX ADMIN — IPRATROPIUM BROMIDE AND ALBUTEROL SULFATE 3 ML: 2.5; .5 SOLUTION RESPIRATORY (INHALATION) at 08:12

## 2021-12-30 RX ADMIN — ASPIRIN 81 MG: 81 TABLET, COATED ORAL at 08:12

## 2021-12-30 RX ADMIN — EZETIMIBE 10 MG: 10 TABLET ORAL at 08:12

## 2021-12-30 RX ADMIN — PRAVASTATIN SODIUM 10 MG: 10 TABLET ORAL at 08:12

## 2021-12-30 RX ADMIN — IPRATROPIUM BROMIDE AND ALBUTEROL SULFATE 3 ML: 2.5; .5 SOLUTION RESPIRATORY (INHALATION) at 12:12

## 2021-12-30 NOTE — RESPIRATORY THERAPY
12/30/21 1304   PRE-TX-O2   O2 Device (Oxygen Therapy) (S)  nasal cannula   $ Is the patient on Low Flow Oxygen? Yes   Flow (L/min) (S)  1   Oxygen Concentration (%) 24   SpO2 (S)  99 %   Pulse 68   Resp 20   Positioning Sitting in chair       Weaned patient's oxygen from 2lpm nasal cannula to 1lpm nasal cannula at this time. No distress noted. Julienne ARIAS notified

## 2021-12-30 NOTE — SUBJECTIVE & OBJECTIVE
Past Medical History:   Diagnosis Date    Arthritis     Brain tumor     Coronary artery disease     Encounter for blood transfusion     Hypertension     Neuropathy     Renal disorder        Past Surgical History:   Procedure Laterality Date    APPENDECTOMY      HIP SURGERY      KNEE SURGERY      SHOULDER SURGERY      triple bypass         Review of patient's allergies indicates:  No Known Allergies    No current facility-administered medications on file prior to encounter.     Current Outpatient Medications on File Prior to Encounter   Medication Sig    ANORO ELLIPTA 62.5-25 mcg/actuation DsDv     apixaban (ELIQUIS) 5 mg Tab Take 5 mg by mouth 2 (two) times daily.    ascorbic acid, vitamin C, (VITAMIN C) 1000 MG tablet Take 1,000 mg by mouth once daily.    aspirin (ECOTRIN) 81 MG EC tablet Take 81 mg by mouth once daily.    b complex vitamins tablet Take by mouth once daily.    diphenoxylate-atropine 2.5-0.025 mg (LOMOTIL) 2.5-0.025 mg per tablet Take 1 tablet by mouth 2 (two) times daily as needed for Diarrhea.    ergocalciferol, vitamin D2, (VITAMIN D ORAL) Take 1,000 capsules by mouth once daily.    ezetimibe (ZETIA) 10 mg tablet Take 10 mg by mouth once daily.    FLUoxetine 20 MG capsule Take 20 mg by mouth once daily.    gabapentin (NEURONTIN) 300 MG capsule Take 300 mg by mouth 2 (two) times daily.    HYDROcodone-acetaminophen (NORCO)  mg per tablet TAKE 1 TABLET BY MOUTH TWICE DAILY AS NEEDED FOR PAIN WILL MAKE DROWSY    lovastatin (MEVACOR) 10 MG tablet Take 10 mg by mouth every evening.    senna (SENOKOT) 8.6 mg tablet Take 1 tablet by mouth 2 (two) times daily.     tamsulosin (FLOMAX) 0.4 mg Cap Take 0.4 mg by mouth once daily.    torsemide (DEMADEX) 20 MG Tab Take 20 mg by mouth 2 (two) times a day.    carvediloL (COREG) 12.5 MG tablet Take 12.5 mg by mouth 2 (two) times daily with meals.    carvediloL (COREG) 6.25 MG tablet SMARTSI Tablet(s) By Mouth Every 12 Hours     HYDROcodone-acetaminophen (NORCO) 5-325 mg per tablet Take 1 tablet by mouth every 4 (four) hours as needed for Pain.    levoFLOXacin (LEVAQUIN) 500 MG tablet Take 500 mg by mouth once daily.    ondansetron (ZOFRAN) 4 MG tablet Take 1 tablet (4 mg total) by mouth every 12 (twelve) hours as needed for Nausea.    OXcarbazepine (TRILEPTAL) 600 MG Tab Take 600 mg by mouth 2 (two) times daily.    potassium chloride SA (K-DUR,KLOR-CON) 20 MEQ tablet Take 20 mEq by mouth once daily.    promethazine-dextromethorphan (PROMETHAZINE-DM) 6.25-15 mg/5 mL Syrp TAKE 1 TEASPOONFUL BY MOUTH EVERY 6 HOURS AS NEEDED WILL MAKE DROWSY     Family History    None       Tobacco Use    Smoking status: Former Smoker    Smokeless tobacco: Never Used   Substance and Sexual Activity    Alcohol use: Not Currently    Drug use: Not on file    Sexual activity: Not on file     Review of Systems   Constitutional: Positive for activity change, appetite change and fatigue.   Respiratory: Positive for cough, shortness of breath and wheezing.    Cardiovascular: Positive for leg swelling. Negative for chest pain.   Gastrointestinal: Positive for abdominal distention.   Musculoskeletal: Positive for arthralgias and myalgias.   Neurological: Positive for weakness.     Objective:     Vital Signs (Most Recent):  Temp: 97.9 °F (36.6 °C) (12/30/21 0735)  Pulse: 82 (12/30/21 0833)  Resp: 18 (12/30/21 0833)  BP: (!) 164/82 (12/30/21 0735)  SpO2: 99 % (12/30/21 0833) Vital Signs (24h Range):  Temp:  [97.8 °F (36.6 °C)-99 °F (37.2 °C)] 97.9 °F (36.6 °C)  Pulse:  [] 82  Resp:  [18-22] 18  SpO2:  [94 %-100 %] 99 %  BP: (122-164)/(64-95) 164/82     Weight: 112.2 kg (247 lb 6.4 oz)  Body mass index is 35.5 kg/m².    Physical Exam  Constitutional:       Appearance: He is obese. He is ill-appearing.   HENT:      Head: Normocephalic.      Nose: Nose normal.      Mouth/Throat:      Mouth: Mucous membranes are moist.   Cardiovascular:      Rate and  Rhythm: Normal rate. Rhythm irregular.      Pulses: Normal pulses.   Pulmonary:      Breath sounds: Wheezing and rhonchi present.   Abdominal:      General: Bowel sounds are normal.   Musculoskeletal:      Right lower leg: Edema present.      Left lower leg: Edema present.   Skin:         Neurological:      Mental Status: He is alert. Mental status is at baseline.             Significant Labs:   All pertinent labs within the past 24 hours have been reviewed.  Blood Culture:   Recent Labs   Lab 12/28/21  1252 12/28/21  1256   LABBLOO No Growth to date  No Growth to date No Growth to date  No Growth to date     CBC:   Recent Labs   Lab 12/28/21  1252 12/29/21  0530 12/30/21  0847   WBC 11.14 8.37 7.27   HGB 7.9* 10.8* 11.2*   HCT 23.9* 32.7* 34.4*    153 160     CMP:   Recent Labs   Lab 12/28/21  1252 12/29/21  0530 12/30/21  0847    144 144   K 2.9* 3.1* 2.8*    107 105   CO2 31* 31* 32*   * 103 126*   BUN 27* 23 23   CREATININE 1.8* 1.5* 1.6*   CALCIUM 9.6 9.1 9.2   PROT 7.3 6.4 6.6   ALBUMIN 3.2* 2.7* 2.7*   BILITOT 0.7 0.6 0.6   ALKPHOS 58 50* 49*   AST 18 20 20   ALT 19 19 19   ANIONGAP 6* 6* 7*   EGFRNONAA 35.0* 43.7* 40.4*     Lactic Acid:   Recent Labs   Lab 12/28/21  1251   LACTATE 1.6     Troponin: No results for input(s): TROPONINI in the last 48 hours.  TSH:   Recent Labs   Lab 12/28/21  1252   TSH 0.857     Urine Culture: No results for input(s): LABURIN in the last 48 hours.  Urine Studies:   Recent Labs   Lab 12/28/21  1440   COLORU Yellow   APPEARANCEUA Clear   PHUR 7.0   SPECGRAV 1.010   PROTEINUA Negative   GLUCUA Negative   KETONESU Negative   BILIRUBINUA Negative   OCCULTUA Negative   NITRITE Negative   UROBILINOGEN Negative   LEUKOCYTESUR Negative       Significant Imaging: Chest xray:Right lower lung zone opacity, corresponding with mass identified on recent chest CT.  Mass appears increased in size from prior exam, may reflect interval increase in size of mass and or  associated atelectasis or pneumonia.

## 2021-12-30 NOTE — PROGRESS NOTES
12/30/21 1437        Altered Skin Integrity 12/28/21 2129 Right anterior;distal;lower Leg #2   Date First Assessed/Time First Assessed: 12/28/21 2129   Altered Skin Integrity Present on Admission: yes  Side: Right  Orientation: anterior;distal;lower  Location: Leg  Wound Number: #2  Is this injury device related?: No   Description of Altered Skin Integrity Intact skin with non-blanchable redness of localized area   Dressing Appearance Open to air   Periwound Area Hemosiderin Staining;Swelling   Compression Unna's Boot   Dressing Change Due 01/03/22

## 2021-12-30 NOTE — ASSESSMENT & PLAN NOTE
Likely secondary to acute illness, monitor and incorporate PT when appropriate    12/30/21 [facility] add PT/OT eval and treat

## 2021-12-30 NOTE — PT/OT/SLP EVAL
Occupational Therapy   Evaluation    Name: Jamey Lei  MRN: [id number]  Admitting Diagnosis:  Pneumonia  Recent Surgery: * No surgery found *      Recommendations:     Discharge Recommendations: other (see comments) (To be further determined based on progress prior to discharge.)  Discharge Equipment Recommendations:  none  Barriers to discharge:  Other (Comment) (Medical and functional status)    Assessment:     Jamey Lei is a 81 y.o. male with a medical diagnosis of Pneumonia.  He presents with functional deficits impacting independence with ADL's including functional mobility. Performance deficits affecting function are weakness,impaired endurance,impaired self care skills,impaired functional mobilty,impaired balance,edema,impaired cardiopulmonary response to activity.     Rehab Prognosis:  Good; patient would benefit from acute skilled OT services to address these deficits and reach maximum level of function.       Plan:     Patient to be seen 6 x/week to address the above listed problems via self-care/home management,therapeutic activities,therapeutic exercises  · Plan of Care Expires: 01/07/22  · Plan of Care Reviewed with: patient    Subjective     Chief Complaint: weakness  Patient/Family Comments/goals: Pt would like to increase his strength while regain independence in order to return home without assistance needed from family.    Occupational Profile:  Living Environment: Pt lives with family in a [facility] without steps to enter / exit.  Previous level of function: Modified Independent   Roles and Routines: ADL's and light IADL's  Equipment Used at Home:  cane, straight,grab bar,shower chair,walker, rolling,wheelchair,nebulizer  Assistance upon Discharge: Pt's family can assist as needed.     Pain/Comfort:  · Pain Rating 1: 0/10  · Pain Rating Post-Intervention 1: 0/10    Patients cultural, spiritual, Alevism conflicts given the current situation: no    Objective:     Communicated with: nurse prior  to session.  Patient found up in chair with telemetry,peripheral IV upon OT entry to room.    General Precautions: Standard, fall   Orthopedic Precautions:N/A   Braces: N/A  Respiratory Status: Room air     Occupational Performance:     Bed Mobility:    · Patient completed Rolling/Turning to Left with  supervision  · Patient completed Rolling/Turning to Right with supervision  · Patient completed Scooting/Bridging with supervision  · Patient completed Supine to Sit with supervision  · Patient completed Sit to Supine with supervision     Functional Mobility/Transfers:  · Patient completed Sit <> Stand Transfer with contact guard assistance  with  straight cane   · Patient completed Bed <> Chair Transfer using Step Transfer technique with contact guard assistance with straight cane  · Patient completed Toilet Transfer Step Transfer technique with minimum assistance with  grab bars  · Functional Mobility: Pt ambulated 37' requiring CGA utilizing SC on room air.     Activities of Daily Living:  · Feeding:  independence    · Grooming: setup assistance    · Bathing: minimum assistance    · Upper Body Dressing: setup assistance    · Lower Body Dressing: moderate assistance    · Toileting: minimum assistance      Cognitive/Visual Perceptual:  Cognitive/Psychosocial Skills:  -       Oriented to: Person, Place, Time and Situation   -       Follows Commands/attention:Follows multistep  commands  -       Communication: clear/fluent  -       Memory: No Deficits noted  -       Safety awareness/insight to disability: intact   -       Mood/Affect/Coping skills/emotional control: Appropriate to situation    Physical Exam:  Postural examination/scapula alignment: -       Rounded shoulders  Edema:  Mild bilateral lower legs  Sensation: -       Intact  light/touch    Dominant hand: -       Right  Upper Extremity Range of Motion:  -       Right Upper Extremity: WFL  -       Left Upper Extremity: WFL  Upper Extremity Strength: -        Right Upper Extremity: 4- to 4 / 5  -       Left Upper Extremity: 4- to 4 / 5   Strength: -       Right Upper Extremity: 4 / 5  -       Left Upper Extremity: 4 / 5  Fine Motor Coordination: -       Intact  Gross motor coordination: WFL     AMPA 6 Click ADL: 21    Treatment & Education:  Pt was provided education / instruction regarding role of OT and established OT POC.    Patient left up in chair with all lines intact, call button in reach and nurse notifiedEducation:      GOALS:   Goals to be met by: 01/07/21     Patient will increase functional independence with ADLs by performing:    UE Dressing with Modified [place].  LE Dressing with Modified [place].  Grooming while standing at sink with Modified [place].  Toileting from toilet with Modified [place] for hygiene and clothing management.   Bathing from  shower chair/bench with Supervision.  Step transfer with Modified [place]  Toilet transfer to toilet with Modified [place].  Increased functional strength to 4+/5 for bilateral UE's.      History:     Past Medical History:   Diagnosis Date    Arthritis     Brain tumor     Coronary artery disease     Encounter for blood transfusion     Hypertension     Neuropathy     Renal disorder        Past Surgical History:   Procedure Laterality Date    APPENDECTOMY      HIP SURGERY      KNEE SURGERY      SHOULDER SURGERY      triple bypass         Time Tracking:     OT Date of Treatment: 12/30/21  OT Start Time: 1645  OT Stop Time: 1728  OT Total Time (min): 43 min    Billable Minutes:Evaluation 43    OT/DENISHA: OT          12/30/2021

## 2021-12-30 NOTE — ASSESSMENT & PLAN NOTE
Start lasix drip and consult cardiology for assistance in management, accurate I/o's    12/30/21 [facility] continue lasix drip, diuresing well

## 2021-12-30 NOTE — PROGRESS NOTES
[facility] Surg  Cardiology  Progress Note    Patient Name: Jamey Lei  Patient :  1940  MRN: [id number]  Admission Date: 2021  Hospital Length of Stay: 2 days  Code Status: Full Code   Attending Physician: Navi Domníguez III, MD   Primary Care Physician: Navi Domínguez III, MD  Expected Discharge Date:   Principal Problem:Pneumonia    Subjective:     Brief HPI:     Chief Complaint:  Shortness of breath       HPI:   Patient is a 81-year-old male with CAD s/p 3V CABG in , permanent A. fib on Eliquis, moderate to severe bilateral CVD, hypertension, hyperlipidemia and stage III chronic kidney disease.     Presented to hospital after outpatient follow up with pcp for recent PNA treatment.  Continues to have shortness of breath and edema.  Most recent CT thorax suggested an enlarging pulmonary mass for which he is in need of biopsy, concerning for malignancy per CT report.  Denies chest pain.  Patient was started on lasix gtt on admission.       Interval History:   2021:  Continues with cough and shortness of breath.  No chest pain.      Review of Systems:  Review of Systems   Constitutional: Positive for fatigue.   HENT: Negative.    Eyes: Negative.    Respiratory: Positive for cough, shortness of breath and wheezing.    Cardiovascular: Positive for leg swelling. Negative for chest pain and palpitations.   Gastrointestinal: Negative.    Endocrine: Negative.    Genitourinary: Negative.    Musculoskeletal: Positive for gait problem.   Skin: Positive for wound.   Allergic/Immunologic: Negative.    Neurological: Positive for weakness.   Hematological: Negative.    Psychiatric/Behavioral: Negative.        Social History:  Social History     Tobacco Use    Smoking status: Former Smoker    Smokeless tobacco: Never Used   Substance Use Topics    Alcohol use: Not Currently       Objective:     Vital Signs (Most Recent):  Temp: 97.9 °F (36.6 °C) (21 0735)  Pulse: 82 (21 0833)  Resp:  18 (12/30/21 0833)  BP: (!) 164/82 (12/30/21 0735)  SpO2: 99 % (12/30/21 0833) Vital Signs (24h Range):  Temp:  [97.8 °F (36.6 °C)-99 °F (37.2 °C)] 97.9 °F (36.6 °C)  Pulse:  [] 82  Resp:  [18-22] 18  SpO2:  [94 %-100 %] 99 %  BP: (122-164)/(64-95) 164/82     Weight: 112.2 kg (247 lb 6.4 oz)  Body mass index is 35.5 kg/m².    SpO2: 99 %  O2 Device (Oxygen Therapy): nasal cannula      Intake/Output Summary (Last 24 hours) at 12/30/2021 0844  Last data filed at 12/30/2021 0500  Gross per 24 hour   Intake 360 ml   Output 1300 ml   Net -940 ml       Lines/Drains/Airways     Peripheral Intravenous Line                 Peripheral IV - Single Lumen 12/28/21 1244 18 G Right Antecubital 1 day                VTE Risk Mitigation (From admission, onward)         Ordered     IP VTE HIGH RISK PATIENT  Once         12/28/21 1805     Place sequential compression device  Until discontinued         12/28/21 1805     apixaban tablet 5 mg  2 times daily         12/28/21 1607                Significant Labs:   Recent Lab Results  (Last 5 results in the past 72 hours)      12/29/21  0530   12/28/21  1440   12/28/21  1429   12/28/21  1319   12/28/21  1256        Albumin 2.7               Alkaline Phosphatase 50               ALT 19               Anion Gap 6               Appearance, UA   Clear             AST 20               Baso # 0.05               Basophil % 0.6               Bilirubin (UA)   Negative             BILIRUBIN TOTAL 0.6  Comment: For infants and newborns, interpretation of results should be based  on gestational age, weight and in agreement with clinical  observations.    Premature Infant recommended reference ranges:  Up to 24 hours.............<8.0 mg/dL  Up to 48 hours............<12.0 mg/dL  3-5 days..................<15.0 mg/dL  6-29 days.................<15.0 mg/dL    For patients on Eltrombopag therapy, use of Dimension [place] TBIL is   not   recommended.                 Blood Culture, Routine         No  Growth to date  [P]                No Growth to date  [P]       BUN 23               Calcium 9.1               Chloride 107               CO2 31               Color, UA   Yellow             Creatinine 1.5               Differential Method Automated               eGFR if  50.5               eGFR if non  43.7  Comment: Calculation used to obtain the estimated glomerular filtration  rate (eGFR) is the CKD-EPI equation.                  Eos # 0.6               Eosinophil % 7.6               Glucose 103               Glucose, UA   Negative             Gran # (ANC) 5.5               Gran % 65.1               Hematocrit 32.7               Hemoglobin 10.8               Immature Grans (Abs) 0.03  Comment: Mild elevation in immature granulocytes is non specific and   can be seen in a variety of conditions including stress response,   acute inflammation, trauma and pregnancy. Correlation with other   laboratory and clinical findings is essential.                 Immature Granulocytes 0.4               Ketones, UA   Negative             Leukocytes, UA   Negative             Lymph # 1.3               Lymph % 15.3               MCH 32.7               MCHC 33.0               MCV 99               Mono # 0.9               Mono % 11.0               MPV 10.9               NITRITE UA   Negative             nRBC 0               Occult Blood     Negative           Occult Blood UA   Negative             pH, UA   7.0             Platelets 153               Potassium 3.1               PROTEIN TOTAL 6.4               Protein, UA   Negative  Comment: Recommend a 24 hour urine protein or a urine   protein/creatinine ratio if globulin induced proteinuria is  clinically suspected.                Acceptable       Yes         RBC 3.30               RDW 15.8               SARS-CoV-2 RNA, Amplification, Qual       Negative         Sodium 144               Specific [place], UA   1.010              Specimen UA   Urine, Clean Catch             UROBILINOGEN UA   Negative             WBC 8.37                                     [P] - Preliminary Result             Significant Imaging:   Imaging Results          X-Ray Chest 1 View (Final result)  Result time 12/28/21 15:11:31    Final result by Jamey Gannon MD (12/28/21 15:11:31)                 Impression:      Right lower lung zone opacity, corresponding with mass identified on recent chest CT.  Mass appears increased in size from prior exam, may reflect interval increase in size of mass and or associated atelectasis or pneumonia.      Electronically signed by: Jamey Gannon MD  Date:    12/28/2021  Time:    15:11             Narrative:    EXAMINATION:  XR CHEST 1 VIEW    CLINICAL HISTORY:  Weakness, pulmonary mass on recent chest    COMPARISON:  CT chest without IV contrast 12/21/2021.    FINDINGS:  Frontal chest radiograph demonstrates heterogeneous opacity projecting over the right lower lung zone, corresponding with site of mass identified recent chest CT.  There is no pleural fluid.  The cardiomediastinal silhouette is unremarkable.  No osseous abnormality.                                Recent Diagnostics:    Last Echocardiogram:  Results pending    Telemetry:  Atrial fibrillation with controlled rate       MEDICATIONS:    Current Facility-Administered Medications:     acetaminophen tablet 650 mg, 650 mg, Oral, Q8H PRN, Rosalva Ventura NP    albuterol-ipratropium 2.5 mg-0.5 mg/3 mL nebulizer solution 3 mL, 3 mL, Nebulization, Q4H WAKE, Rosalva Ventura, NP, 3 mL at 12/30/21 0833    albuterol-ipratropium 2.5 mg-0.5 mg/3 mL nebulizer solution 3 mL, 3 mL, Nebulization, Q2H PRN, Rosalva Ventura NP, 3 mL at 12/29/21 0431    apixaban tablet 5 mg, 5 mg, Oral, BID, Rosalva Ventura NP, 5 mg at 12/29/21 2145    aspirin EC tablet 81 mg, 81 mg, Oral, Daily, Rosalva Ventura NP, 81 mg at 12/29/21 1107    carvediloL tablet 6.25 mg, 6.25  mg, Oral, BID, Rosalva F Audrey, NP, 6.25 mg at 12/29/21 2145    ezetimibe tablet 10 mg, 10 mg, Oral, Daily, Rosalva F Audrey, NP, 10 mg at 12/29/21 1107    famotidine (PF) injection 20 mg, 20 mg, Intravenous, Daily, Rosalva Guyutier, NP    FLUoxetine capsule 20 mg, 20 mg, Oral, Daily, Rosalva F Audrey, NP, 20 mg at 12/29/21 1107    furosemide (LASIX) 200 mg in dextrose 5 % 100 mL continuous infusion (conc: 2 mg/mL), 10 mg/hr, Intravenous, Continuous, Rosalva Guyutier, NP, Last Rate: 5 mL/hr at 12/30/21 0640, 10 mg/hr at 12/30/21 0640    gabapentin capsule 300 mg, 300 mg, Oral, BID, Rosalva F Audrey, NP, 300 mg at 12/29/21 2145    guaiFENesin 100 mg/5 ml syrup 200 mg, 200 mg, Oral, Q6H PRN, Rosalva F Audrey, NP, 200 mg at 12/29/21 0826    guaiFENesin 12 hr tablet 600 mg, 600 mg, Oral, BID, Rosalva F Audrey, NP, 600 mg at 12/29/21 2145    HYDROcodone-acetaminophen 5-325 mg per tablet 1 tablet, 1 tablet, Oral, Q4H PRN, Rosalva F Audrey, NP    linezolid 600 mg/300 mL IVPB 600 mg, 600 mg, Intravenous, Q12H, Navi H. Catrachita LAWLER MD, Stopped at 12/29/21 2323    melatonin tablet 6 mg, 6 mg, Oral, Nightly PRN, Rosalva F Audrey, NP    ondansetron injection 4 mg, 4 mg, Intravenous, Q8H PRN, Rosalva F Audrey, NP    OXcarbazepine tablet 600 mg, 600 mg, Oral, BID, Rosalva F Audrey, NP, 600 mg at 12/29/21 2145    piperacillin-tazobactam 4.5 g in dextrose 5 % 100 mL IVPB (ready to mix system), 4.5 g, Intravenous, Q8H, Rosalva Ventura NP, Stopped at 12/30/21 0640    pravastatin tablet 10 mg, 10 mg, Oral, QHS, Rosalva Ventura NP, 10 mg at 12/29/21 2145    sodium chloride 0.9% flush 10 mL, 10 mL, Intravenous, PRN, Rosalva Ventura NP    tamsulosin 24 hr capsule 0.4 mg, 0.4 mg, Oral, Daily, Rosalva Ventura NP, 0.4 mg at 12/29/21 1107    Physical Exam:  Physical Exam  Vitals and nursing note reviewed.   Constitutional:       Appearance: Normal appearance. He is obese.   HENT:       Head: Normocephalic.      Nose: Nose normal.      Mouth/Throat:      Mouth: Mucous membranes are moist.   Eyes:      Pupils: Pupils are equal, round, and reactive to light.   Cardiovascular:      Rate and Rhythm: Normal rate. Rhythm irregular.      Heart sounds: No murmur heard.      Pulmonary:      Effort: Pulmonary effort is normal.      Breath sounds: Wheezing and rhonchi present.   Abdominal:      General: Abdomen is flat.   Musculoskeletal:      Right lower leg: Edema present.      Left lower leg: Edema present.   Skin:     General: Skin is warm.      Capillary Refill: Capillary refill takes less than 2 seconds.      Findings: Lesion present.   Neurological:      General: No focal deficit present.   Psychiatric:         Mood and Affect: Mood normal.         Behavior: Behavior normal.         Assessment:     Active Diagnoses:    Diagnosis Date Noted POA    PRINCIPAL PROBLEM:  Pneumonia [J18.9] 12/29/2021 Yes    Acute on chronic congestive heart failure [I50.9] 12/29/2021 Yes    Acute pulmonary edema [J81.0] 12/29/2021 Yes    Weakness [R53.1] 12/29/2021 Yes    DVT prophylaxis [Z29.9] 12/29/2021 Not Applicable      Problems Resolved During this Admission:       VTE Risk Mitigation (From admission, onward)         Ordered     IP VTE HIGH RISK PATIENT  Once         12/28/21 1805     Place sequential compression device  Until discontinued         12/28/21 1805     apixaban tablet 5 mg  2 times daily         12/28/21 1607                    Plan:     Assessment and Plan:  1.  Pneumonia:   continue therapy as directed by IM services.   12/30/2021:  Continues to have cough, sometimes productive.  Continue current therapy.     2.  Shortness of breath:   CXR suggestive of increasing mass vs PNA.  Obtain echo to check EF.  Nuclear imaging from 10/5/21 showed a preserved ejection fraction. Peripheral edema likely from pulmonary process.   12/30/2021:  Echo results pending.  Continue lasix and pulmonary treatment.       3.  Permanent atrial fibrillation:  Rate stable.   12/30/2021:  Rate remains stable on telemetry. Continue eliquis.      4.  CAD:  No chest pain.  Continue to follow      5.  HTN:  Overall BP stable.       6.  CKD:   Creatinine stable.  Continue to monitor closely while on lasix gtt    7.  Venous stasis ulcerations:  12/30/2021: Wound care consult today for evaluation.                             SOPHIE Flor  Cardiology  [place] [facility] S[facility]  12/30/2021

## 2021-12-30 NOTE — PROGRESS NOTES
12/30/21 1433        Altered Skin Integrity 12/28/21 2124 Left anterior;distal;lower Leg #1 Venous Ulcer Partial thickness tissue loss. Shallow open ulcer with a red or pink wound bed, without slough. Intact or Open/Ruptured Serum-filled blister.   Date First Assessed/Time First Assessed: 12/28/21 2124   Altered Skin Integrity Present on Admission: yes  Side: Left  Orientation: anterior;distal;lower  Location: Leg  Wound Number: #1  Is this injury device related?: No  Primary Wound Type: Venous ...   Wound Image    Description of Altered Skin Integrity Partial thickness tissue loss. Shallow open ulcer with a red or pink wound bed, without slough. Intact or Open/Ruptured Serum-filled blister.   Dressing Appearance Moist drainage   Drainage Amount Small   Drainage Characteristics/Odor Serous   Appearance Moist;Red   Tissue loss description Partial thickness   Periwound Area Hemosiderin Staining;Swelling   Wound Edges Defined   Wound Length (cm) 3 cm   Wound Width (cm) 1.5 cm   Wound Depth (cm) 0.05 cm   Wound Volume (cm^3) 0.225 cm^3   Wound Surface Area (cm^2) 4.5 cm^2   Care Cleansed with:;Wound cleanser   Dressing Applied;Hydrofiber  (Hydrofiber with silver)   Compression Unna's Boot   Dressing Change Due 01/03/22   RICKY 1.06

## 2021-12-30 NOTE — RESPIRATORY THERAPY
12/30/21 1705   PRE-TX-O2   O2 Device (Oxygen Therapy) (S)  room air   SpO2 (S)  96 %   Pulse 81   Resp 20   Positioning Sitting on edge of bed (dangling)       Weaned patient's Oxygen from 1lpm nasal cannula to room air at this time. No distress noted on room air. Julienne ARIAS notified

## 2021-12-30 NOTE — ASSESSMENT & PLAN NOTE
Zosyn and zyvox, duo nebs, oxygen per protocol, mucinex, cough medication and monitor. Patient has failed multiple outpatient treatments    12/30/21 [facility] continue with treatment

## 2021-12-30 NOTE — PT/OT/SLP EVAL
Physical Therapy Evaluation    Patient Name:  Jamey Lei   MRN:  [id number]    Recommendations:     Discharge Recommendations:      Discharge Equipment Recommendations:     Barriers to discharge: decreased fxnl mobility    Assessment:     Jamey Lei is a 81 y.o. male admitted with a medical diagnosis of Pneumonia.  He presents with the following impairments/functional limitations:  impaired endurance,weakness,impaired functional mobilty .    Rehab Prognosis: Fair; patient would benefit from acute skilled PT services to address these deficits and reach maximum level of function.    Recent Surgery: * No surgery found *      Plan:     During this hospitalization, patient to be seen 6 x/week to address the identified rehab impairments via gait training,therapeutic activities,therapeutic exercises and progress toward the following goals:    · Plan of Care Expires:       Subjective     Chief Complaint:  Patient/Family Comments/goals:  Pain/Comfort:  · Pain Rating 1: 0/10    Patients cultural, spiritual, Catholic conflicts given the current situation:      Living Environment:  Pt lives with family in his house  Prior to admission, patients level of function was mod ind with a cane.  Equipment used at home: cane, straight,shower chair.  DME owned (not currently used): .  Upon discharge, patient will have assistance from family    Objective:     Communicated with nurse prior to session.  Patient found supine with    upon PT entry to room.    General Precautions: Standard, fall   Orthopedic Precautions:    Braces:    Respiratory Status: Nasal cannula, flow 2 L/min    Exams:  · RLE ROM: WFL  · RLE Strength: hip 3+/5 and rest of leg normal strength  · LLE ROM: WFL  · LLE Strength: same as RLE    Functional Mobility:  · Bed Mobility:     · Rolling Left:  supervision  · Rolling Right: supervision  · Scooting: supervision  · Supine to Sit: supervision  · Sit to Supine: supervision  · Transfers:     · Sit to Stand:   contact guard assistance with straight cane  · Bed to Chair: contact guard assistance with  straight cane  using  Step Transfer  · Gait: pt ambulated with straight cane about 25 ft in room    Therapeutic Activities and Exercises:       AM-PAC 6 CLICK MOBILITY  Total Score:24     Patient left supine with call button in reach.    GOALS:   STGs  1. Pt will be mod ind with transfers with cane  2. Pt will be modind with cane for 150ft or greater    History:     Past Medical History:   Diagnosis Date    Arthritis     Brain tumor     Coronary artery disease     Encounter for blood transfusion     Hypertension     Neuropathy     Renal disorder        Past Surgical History:   Procedure Laterality Date    APPENDECTOMY      HIP SURGERY      KNEE SURGERY      SHOULDER SURGERY      triple bypass         Time Tracking:     PT Received On:    PT Start Time: 1117     PT Stop Time: 1140  PT Total Time (min): 23 min     Billable Minutes: Evaluation 23      12/30/2021

## 2021-12-30 NOTE — PROGRESS NOTES
[facility] Medicine  Progress Note    Patient Name: Jaemy Lei  MRN: [id number]  Patient Class: IP- Inpatient   Admission Date: 12/28/2021  Length of Stay: 2 days  Attending Physician: Navi Domínguez III, MD  Primary Care Provider: Navi Domínguez III, MD        Subjective:     Principal Problem:Pneumonia        HPI:   Shortness of Breath       Pt sent to ED via Dr. Domínguez for admission for unsuccessful outpatient treatment of pneumonia/heart failure. Pt stated that he has been sick for the past month or so.       81-year-old male with history of AFib on Eliquis, hypertension referred from PCP office for admission due to failure of outpatient therapy for pneumonia and heart failure.  Patient says that he has been feeling weak for over a month despite finishing ends antibiotics and being on his medication.  Patient denies any chest pain but does endorse orthopnea, bilateral lower extremity swelling, dyspnea on exertion, chronic cough      Overview/Hospital Course:  12/30/21 [facility] patient reports that he is still having significant coughing, no shortness of breath, sitting up in recliner. Cardiology following, on lasix drip, significant edema and wounds to lower ext. Will continue lasix drip, continue abx for pneumonia, have wound care see, consider unna boots      Past Medical History:   Diagnosis Date    Arthritis     Brain tumor     Coronary artery disease     Encounter for blood transfusion     Hypertension     Neuropathy     Renal disorder        Past Surgical History:   Procedure Laterality Date    APPENDECTOMY      HIP SURGERY      KNEE SURGERY      SHOULDER SURGERY      triple bypass         Review of patient's allergies indicates:  No Known Allergies    No current facility-administered medications on file prior to encounter.     Current Outpatient Medications on File Prior to Encounter   Medication Sig    ANORO ELLIPTA 62.5-25 mcg/actuation DsDv     apixaban (ELIQUIS) 5 mg Tab  Take 5 mg by mouth 2 (two) times daily.    ascorbic acid, vitamin C, (VITAMIN C) 1000 MG tablet Take 1,000 mg by mouth once daily.    aspirin (ECOTRIN) 81 MG EC tablet Take 81 mg by mouth once daily.    b complex vitamins tablet Take by mouth once daily.    diphenoxylate-atropine 2.5-0.025 mg (LOMOTIL) 2.5-0.025 mg per tablet Take 1 tablet by mouth 2 (two) times daily as needed for Diarrhea.    ergocalciferol, vitamin D2, (VITAMIN D ORAL) Take 1,000 capsules by mouth once daily.    ezetimibe (ZETIA) 10 mg tablet Take 10 mg by mouth once daily.    FLUoxetine 20 MG capsule Take 20 mg by mouth once daily.    gabapentin (NEURONTIN) 300 MG capsule Take 300 mg by mouth 2 (two) times daily.    HYDROcodone-acetaminophen (NORCO)  mg per tablet TAKE 1 TABLET BY MOUTH TWICE DAILY AS NEEDED FOR PAIN WILL MAKE DROWSY    lovastatin (MEVACOR) 10 MG tablet Take 10 mg by mouth every evening.    senna (SENOKOT) 8.6 mg tablet Take 1 tablet by mouth 2 (two) times daily.     tamsulosin (FLOMAX) 0.4 mg Cap Take 0.4 mg by mouth once daily.    torsemide (DEMADEX) 20 MG Tab Take 20 mg by mouth 2 (two) times a day.    carvediloL (COREG) 12.5 MG tablet Take 12.5 mg by mouth 2 (two) times daily with meals.    carvediloL (COREG) 6.25 MG tablet SMARTSI Tablet(s) By Mouth Every 12 Hours    HYDROcodone-acetaminophen (NORCO) 5-325 mg per tablet Take 1 tablet by mouth every 4 (four) hours as needed for Pain.    levoFLOXacin (LEVAQUIN) 500 MG tablet Take 500 mg by mouth once daily.    ondansetron (ZOFRAN) 4 MG tablet Take 1 tablet (4 mg total) by mouth every 12 (twelve) hours as needed for Nausea.    OXcarbazepine (TRILEPTAL) 600 MG Tab Take 600 mg by mouth 2 (two) times daily.    potassium chloride SA (K-DUR,KLOR-CON) 20 MEQ tablet Take 20 mEq by mouth once daily.    promethazine-dextromethorphan (PROMETHAZINE-DM) 6.25-15 mg/5 mL Syrp TAKE 1 TEASPOONFUL BY MOUTH EVERY 6 HOURS AS NEEDED WILL MAKE DROWSY     Family  History    None       Tobacco Use    Smoking status: Former Smoker    Smokeless tobacco: Never Used   Substance and Sexual Activity    Alcohol use: Not Currently    Drug use: Not on file    Sexual activity: Not on file     Review of Systems   Constitutional: Positive for activity change, appetite change and fatigue.   Respiratory: Positive for cough, shortness of breath and wheezing.    Cardiovascular: Positive for leg swelling. Negative for chest pain.   Gastrointestinal: Positive for abdominal distention.   Musculoskeletal: Positive for arthralgias and myalgias.   Neurological: Positive for weakness.     Objective:     Vital Signs (Most Recent):  Temp: 97.9 °F (36.6 °C) (12/30/21 0735)  Pulse: 82 (12/30/21 0833)  Resp: 18 (12/30/21 0833)  BP: (!) 164/82 (12/30/21 0735)  SpO2: 99 % (12/30/21 0833) Vital Signs (24h Range):  Temp:  [97.8 °F (36.6 °C)-99 °F (37.2 °C)] 97.9 °F (36.6 °C)  Pulse:  [] 82  Resp:  [18-22] 18  SpO2:  [94 %-100 %] 99 %  BP: (122-164)/(64-95) 164/82     Weight: 112.2 kg (247 lb 6.4 oz)  Body mass index is 35.5 kg/m².    Physical Exam  Constitutional:       Appearance: He is obese. He is ill-appearing.   HENT:      Head: Normocephalic.      Nose: Nose normal.      Mouth/Throat:      Mouth: Mucous membranes are moist.   Cardiovascular:      Rate and Rhythm: Normal rate. Rhythm irregular.      Pulses: Normal pulses.   Pulmonary:      Breath sounds: Wheezing and rhonchi present.   Abdominal:      General: Bowel sounds are normal.   Musculoskeletal:      Right lower leg: Edema present.      Left lower leg: Edema present.   Skin:         Neurological:      Mental Status: He is alert. Mental status is at baseline.             Significant Labs:   All pertinent labs within the past 24 hours have been reviewed.  Blood Culture:   Recent Labs   Lab 12/28/21  1252 12/28/21  1256   LABBLOO No Growth to date  No Growth to date No Growth to date  No Growth to date     CBC:   Recent Labs   Lab  12/28/21  1252 12/29/21  0530 12/30/21  0847   WBC 11.14 8.37 7.27   HGB 7.9* 10.8* 11.2*   HCT 23.9* 32.7* 34.4*    153 160     CMP:   Recent Labs   Lab 12/28/21  1252 12/29/21  0530 12/30/21  0847    144 144   K 2.9* 3.1* 2.8*    107 105   CO2 31* 31* 32*   * 103 126*   BUN 27* 23 23   CREATININE 1.8* 1.5* 1.6*   CALCIUM 9.6 9.1 9.2   PROT 7.3 6.4 6.6   ALBUMIN 3.2* 2.7* 2.7*   BILITOT 0.7 0.6 0.6   ALKPHOS 58 50* 49*   AST 18 20 20   ALT 19 19 19   ANIONGAP 6* 6* 7*   EGFRNONAA 35.0* 43.7* 40.4*     Lactic Acid:   Recent Labs   Lab 12/28/21  1251   LACTATE 1.6     Troponin: No results for input(s): TROPONINI in the last 48 hours.  TSH:   Recent Labs   Lab 12/28/21  1252   TSH 0.857     Urine Culture: No results for input(s): LABURIN in the last 48 hours.  Urine Studies:   Recent Labs   Lab 12/28/21  1440   COLORU Yellow   APPEARANCEUA Clear   PHUR 7.0   SPECGRAV 1.010   PROTEINUA Negative   GLUCUA Negative   KETONESU Negative   BILIRUBINUA Negative   OCCULTUA Negative   NITRITE Negative   UROBILINOGEN Negative   LEUKOCYTESUR Negative       Significant Imaging: Chest xray:Right lower lung zone opacity, corresponding with mass identified on recent chest CT.  Mass appears increased in size from prior exam, may reflect interval increase in size of mass and or associated atelectasis or pneumonia.        Assessment/Plan:      * Pneumonia  Zosyn and zyvox, duo nebs, oxygen per protocol, mucinex, cough medication and monitor. Patient has failed multiple outpatient treatments    12/30/21 [facility] continue with treatment       Hypokalemia  Replace and monitor.       Wound of left leg  Consult wound care      Edema  Continue lasix drip and consult wound care of unna boots      DVT prophylaxis  On eliquis      Weakness  Likely secondary to acute illness, monitor and incorporate PT when appropriate    12/30/21 [facility] add PT/OT eval and treat      Acute pulmonary edema  Lasix drip       Acute on chronic  congestive heart failure  Start lasix drip and consult cardiology for assistance in management, accurate I/o's    12/30/21 [facility] continue lasix drip, diuresing well        VTE Risk Mitigation (From admission, onward)         Ordered     IP VTE HIGH RISK PATIENT  Once         12/28/21 1805     Place sequential compression device  Until discontinued         12/28/21 1805     apixaban tablet 5 mg  2 times daily         12/28/21 1607                Discharge Planning   GLORY:      Code Status: Full Code   Is the patient medically ready for discharge?:     Reason for patient still in hospital (select all that apply): Patient trending condition, Laboratory test, Treatment and PT / OT recommendations  Discharge Plan A: Home with family                  Rosalva Ventura NP  Department of Hospital Medicine   [facility]urg

## 2021-12-31 LAB
ALBUMIN SERPL BCP-MCNC: 2.8 G/DL (ref 3.5–5.2)
ALP SERPL-CCNC: 53 U/L (ref 55–135)
ALT SERPL W/O P-5'-P-CCNC: 20 U/L (ref 10–44)
ANION GAP SERPL CALC-SCNC: 8 MMOL/L (ref 8–16)
AST SERPL-CCNC: 22 U/L (ref 10–40)
BASOPHILS # BLD AUTO: 0.07 K/UL (ref 0–0.2)
BASOPHILS NFR BLD: 0.7 % (ref 0–1.9)
BILIRUB SERPL-MCNC: 0.6 MG/DL (ref 0.1–1)
BUN SERPL-MCNC: 26 MG/DL (ref 8–23)
CALCIUM SERPL-MCNC: 9.4 MG/DL (ref 8.7–10.5)
CHLORIDE SERPL-SCNC: 105 MMOL/L (ref 95–110)
CO2 SERPL-SCNC: 30 MMOL/L (ref 23–29)
CREAT SERPL-MCNC: 1.7 MG/DL (ref 0.5–1.4)
DIFFERENTIAL METHOD: ABNORMAL
EOSINOPHIL # BLD AUTO: 0.9 K/UL (ref 0–0.5)
EOSINOPHIL NFR BLD: 9.3 % (ref 0–8)
ERYTHROCYTE [DISTWIDTH] IN BLOOD BY AUTOMATED COUNT: 15.5 % (ref 11.5–14.5)
EST. GFR  (AFRICAN AMERICAN): 43.4 ML/MIN/1.73 M^2
EST. GFR  (NON AFRICAN AMERICAN): 37.5 ML/MIN/1.73 M^2
GLUCOSE SERPL-MCNC: 115 MG/DL (ref 70–110)
HCT VFR BLD AUTO: 34.8 % (ref 40–54)
HGB BLD-MCNC: 11.4 G/DL (ref 14–18)
IMM GRANULOCYTES # BLD AUTO: 0.04 K/UL (ref 0–0.04)
IMM GRANULOCYTES NFR BLD AUTO: 0.4 % (ref 0–0.5)
LYMPHOCYTES # BLD AUTO: 1.3 K/UL (ref 1–4.8)
LYMPHOCYTES NFR BLD: 14 % (ref 18–48)
MAGNESIUM SERPL-MCNC: 2.1 MG/DL (ref 1.6–2.6)
MCH RBC QN AUTO: 32.3 PG (ref 27–31)
MCHC RBC AUTO-ENTMCNC: 32.8 G/DL (ref 32–36)
MCV RBC AUTO: 99 FL (ref 82–98)
MONOCYTES # BLD AUTO: 1 K/UL (ref 0.3–1)
MONOCYTES NFR BLD: 10.1 % (ref 4–15)
NEUTROPHILS # BLD AUTO: 6.2 K/UL (ref 1.8–7.7)
NEUTROPHILS NFR BLD: 65.5 % (ref 38–73)
NRBC BLD-RTO: 0 /100 WBC
PLATELET # BLD AUTO: 176 K/UL (ref 150–450)
PMV BLD AUTO: 11.1 FL (ref 9.2–12.9)
POTASSIUM SERPL-SCNC: 3.4 MMOL/L (ref 3.5–5.1)
PROT SERPL-MCNC: 6.8 G/DL (ref 6–8.4)
RBC # BLD AUTO: 3.53 M/UL (ref 4.6–6.2)
SODIUM SERPL-SCNC: 143 MMOL/L (ref 136–145)
WBC # BLD AUTO: 9.46 K/UL (ref 3.9–12.7)

## 2021-12-31 PROCEDURE — 25000003 PHARM REV CODE 250: Performed by: INTERNAL MEDICINE

## 2021-12-31 PROCEDURE — 97116 GAIT TRAINING THERAPY: CPT | Mod: CQ

## 2021-12-31 PROCEDURE — 97530 THERAPEUTIC ACTIVITIES: CPT

## 2021-12-31 PROCEDURE — 25000003 PHARM REV CODE 250: Performed by: NURSE PRACTITIONER

## 2021-12-31 PROCEDURE — 63600175 PHARM REV CODE 636 W HCPCS: Performed by: INTERNAL MEDICINE

## 2021-12-31 PROCEDURE — 99900031 HC PATIENT EDUCATION (STAT)

## 2021-12-31 PROCEDURE — 94640 AIRWAY INHALATION TREATMENT: CPT

## 2021-12-31 PROCEDURE — 83735 ASSAY OF MAGNESIUM: CPT | Performed by: NURSE PRACTITIONER

## 2021-12-31 PROCEDURE — 85025 COMPLETE CBC W/AUTO DIFF WBC: CPT | Performed by: NURSE PRACTITIONER

## 2021-12-31 PROCEDURE — 11000001 HC ACUTE MED/SURG PRIVATE ROOM

## 2021-12-31 PROCEDURE — 25000242 PHARM REV CODE 250 ALT 637 W/ HCPCS: Performed by: NURSE PRACTITIONER

## 2021-12-31 PROCEDURE — 80053 COMPREHEN METABOLIC PANEL: CPT | Performed by: NURSE PRACTITIONER

## 2021-12-31 PROCEDURE — 36415 COLL VENOUS BLD VENIPUNCTURE: CPT | Performed by: NURSE PRACTITIONER

## 2021-12-31 PROCEDURE — 27000221 HC OXYGEN, UP TO 24 HOURS

## 2021-12-31 PROCEDURE — 99900035 HC TECH TIME PER 15 MIN (STAT)

## 2021-12-31 PROCEDURE — 97110 THERAPEUTIC EXERCISES: CPT

## 2021-12-31 PROCEDURE — 94761 N-INVAS EAR/PLS OXIMETRY MLT: CPT

## 2021-12-31 PROCEDURE — 63600175 PHARM REV CODE 636 W HCPCS: Performed by: NURSE PRACTITIONER

## 2021-12-31 RX ORDER — FAMOTIDINE 20 MG/1
20 TABLET, FILM COATED ORAL DAILY
Status: DISCONTINUED | OUTPATIENT
Start: 2021-12-31 | End: 2022-01-07 | Stop reason: HOSPADM

## 2021-12-31 RX ADMIN — IPRATROPIUM BROMIDE AND ALBUTEROL SULFATE 3 ML: 2.5; .5 SOLUTION RESPIRATORY (INHALATION) at 07:12

## 2021-12-31 RX ADMIN — PIPERACILLIN AND TAZOBACTAM 4.5 G: 4; .5 INJECTION, POWDER, LYOPHILIZED, FOR SOLUTION INTRAVENOUS; PARENTERAL at 01:12

## 2021-12-31 RX ADMIN — APIXABAN 5 MG: 5 TABLET, FILM COATED ORAL at 08:12

## 2021-12-31 RX ADMIN — TAMSULOSIN HYDROCHLORIDE 0.4 MG: 0.4 CAPSULE ORAL at 09:12

## 2021-12-31 RX ADMIN — APIXABAN 5 MG: 5 TABLET, FILM COATED ORAL at 09:12

## 2021-12-31 RX ADMIN — EZETIMIBE 10 MG: 10 TABLET ORAL at 09:12

## 2021-12-31 RX ADMIN — FLUOXETINE HYDROCHLORIDE 20 MG: 20 CAPSULE ORAL at 09:12

## 2021-12-31 RX ADMIN — GABAPENTIN 300 MG: 300 CAPSULE ORAL at 09:12

## 2021-12-31 RX ADMIN — CARVEDILOL 6.25 MG: 3.12 TABLET, FILM COATED ORAL at 09:12

## 2021-12-31 RX ADMIN — LINEZOLID 600 MG: 600 INJECTION, SOLUTION INTRAVENOUS at 08:12

## 2021-12-31 RX ADMIN — GABAPENTIN 300 MG: 300 CAPSULE ORAL at 08:12

## 2021-12-31 RX ADMIN — FUROSEMIDE 10 MG/HR: 10 INJECTION, SOLUTION INTRAMUSCULAR; INTRAVENOUS at 01:12

## 2021-12-31 RX ADMIN — GUAIFENESIN 600 MG: 600 TABLET, EXTENDED RELEASE ORAL at 08:12

## 2021-12-31 RX ADMIN — OXCARBAZEPINE 600 MG: 300 TABLET, FILM COATED ORAL at 08:12

## 2021-12-31 RX ADMIN — IPRATROPIUM BROMIDE AND ALBUTEROL SULFATE 3 ML: 2.5; .5 SOLUTION RESPIRATORY (INHALATION) at 12:12

## 2021-12-31 RX ADMIN — POTASSIUM CHLORIDE 40 MEQ: 1500 TABLET, EXTENDED RELEASE ORAL at 09:12

## 2021-12-31 RX ADMIN — FUROSEMIDE 10 MG/HR: 10 INJECTION, SOLUTION INTRAMUSCULAR; INTRAVENOUS at 10:12

## 2021-12-31 RX ADMIN — PRAVASTATIN SODIUM 10 MG: 10 TABLET ORAL at 08:12

## 2021-12-31 RX ADMIN — FAMOTIDINE 20 MG: 20 TABLET ORAL at 09:12

## 2021-12-31 RX ADMIN — POTASSIUM CHLORIDE 40 MEQ: 1500 TABLET, EXTENDED RELEASE ORAL at 08:12

## 2021-12-31 RX ADMIN — CARVEDILOL 6.25 MG: 3.12 TABLET, FILM COATED ORAL at 08:12

## 2021-12-31 RX ADMIN — PIPERACILLIN AND TAZOBACTAM 4.5 G: 4; .5 INJECTION, POWDER, LYOPHILIZED, FOR SOLUTION INTRAVENOUS; PARENTERAL at 05:12

## 2021-12-31 RX ADMIN — LINEZOLID 600 MG: 600 INJECTION, SOLUTION INTRAVENOUS at 09:12

## 2021-12-31 RX ADMIN — GUAIFENESIN 600 MG: 600 TABLET, EXTENDED RELEASE ORAL at 09:12

## 2021-12-31 RX ADMIN — PIPERACILLIN AND TAZOBACTAM 4.5 G: 4; .5 INJECTION, POWDER, LYOPHILIZED, FOR SOLUTION INTRAVENOUS; PARENTERAL at 09:12

## 2021-12-31 RX ADMIN — IPRATROPIUM BROMIDE AND ALBUTEROL SULFATE 3 ML: 2.5; .5 SOLUTION RESPIRATORY (INHALATION) at 04:12

## 2021-12-31 RX ADMIN — OXCARBAZEPINE 600 MG: 300 TABLET, FILM COATED ORAL at 09:12

## 2021-12-31 RX ADMIN — ASPIRIN 81 MG: 81 TABLET, COATED ORAL at 09:12

## 2021-12-31 NOTE — PROGRESS NOTES
Pharmacist Intervention IV to PO Note    Jamey Lei is a 81 y.o. male being treated with IV medication famotidine    Patient Data:    Vital Signs (Most Recent):  Temp: 97.7 °F (36.5 °C) (12/31/21 0721)  Pulse: 96 (12/31/21 0735)  Resp: 20 (12/31/21 0735)  BP: (!) 144/77 (12/31/21 0721)  SpO2: (!) 92 % (12/31/21 0735)   Vital Signs (72h Range):  Temp:  [97.6 °F (36.4 °C)-99 °F (37.2 °C)]   Pulse:  []   Resp:  [14-73]   BP: (122-216)/(64-95)   SpO2:  [84 %-100 %]      CBC:  Recent Labs   Lab 12/29/21  0530 12/30/21  0847 12/31/21  0355   WBC 8.37 7.27 9.46   RBC 3.30* 3.44* 3.53*   HGB 10.8* 11.2* 11.4*   HCT 32.7* 34.4* 34.8*    160 176   MCV 99* 100* 99*   MCH 32.7* 32.6* 32.3*   MCHC 33.0 32.6 32.8     CMP:     Recent Labs   Lab 12/29/21  0530 12/29/21  0530 12/30/21  0847 12/30/21  0847 12/31/21  0355     --  126*  --  115*   CALCIUM 9.1   < > 9.2   < > 9.4   ALBUMIN 2.7*  --  2.7*  --  2.8*   PROT 6.4  --  6.6  --  6.8     --  144  --  143   K 3.1*  --  2.8*  --  3.4*   CO2 31*  --  32*  --  30*     --  105  --  105   BUN 23  --  23  --  26*   CREATININE 1.5*  --  1.6*  --  1.7*   ALKPHOS 50*  --  49*  --  53*   ALT 19  --  19  --  20   AST 20  --  20  --  22   BILITOT 0.6  --  0.6  --  0.6    < > = values in this interval not displayed.       Dietary Orders:  Diet Orders  Report           Diet Cardiac: Cardiac starting at 12/28 1806            Based on the following criteria, this patient qualifies for intravenous to oral conversion:  [x] The patients gastrointestinal tract is functioning (tolerating medications via oral or enteral route for 24 hours and tolerating food or enteral feeds for 24 hours).  [x] The patient is hemodynamically stable for 24 hours (heart rate <100 beats per minute, systolic blood pressure >99 mm Hg, and respiratory rate <20 breaths per minute).  [x] The patient shows clinical improvement (afebrile for at least 24 hours and white blood cell count  downtrending or normalized). Additionally, the patient must be non-neutropenic (absolute neutrophil count >500 cells/mm3).  [x] For antimicrobials, the patient has received IV therapy for at least 24 hours.    IV medication famotidine will be changed to oral medication     Pharmacist's Name: KAMLESH MACKEY  Pharmacist's Extension: [phone]

## 2021-12-31 NOTE — PROGRESS NOTES
[facility] Medicine  Progress Note    Patient Name: Jamey Lei  MRN: [id number]  Patient Class: IP- Inpatient   Admission Date: 12/28/2021  Length of Stay: 3 days  Attending Physician: Navi Domínguez III, MD  Primary Care Provider: Navi Domínguez III, MD        Subjective:     Principal Problem:Pneumonia        HPI:   Shortness of Breath       Pt sent to ED via Dr. Domínguez for admission for unsuccessful outpatient treatment of pneumonia/heart failure. Pt stated that he has been sick for the past month or so.       81-year-old male with history of AFib on Eliquis, hypertension referred from PCP office for admission due to failure of outpatient therapy for pneumonia and heart failure.  Patient says that he has been feeling weak for over a month despite finishing ends antibiotics and being on his medication.  Patient denies any chest pain but does endorse orthopnea, bilateral lower extremity swelling, dyspnea on exertion, chronic cough      Overview/Hospital Course:  12/30/21 [facility] patient reports that he is still having significant coughing, no shortness of breath, sitting up in recliner. Cardiology following, on lasix drip, significant edema and wounds to lower ext. Will continue lasix drip, continue abx for pneumonia, have wound care see, consider unna boots    12/31/21 CG: Still with cough, says he is still swollen though this has gotten better with lasix and wraps.       No new subjective & objective note has been filed under this hospital service since the last note was generated.      Assessment/Plan:      * Pneumonia  Zosyn and zyvox, duo nebs, oxygen per protocol, mucinex, cough medication and monitor. Patient has failed multiple outpatient treatments    12/30/21 [facility] continue with treatment       Hypokalemia  Replace and monitor.       Wound of left leg  Consult wound care      Edema  Continue lasix drip and consult wound care of unna boots      DVT prophylaxis  On  eliquis      Weakness  Likely secondary to acute illness, monitor and incorporate PT when appropriate    12/30/21 [facility] add PT/OT eval and treat      Acute pulmonary edema  Lasix drip       Acute on chronic congestive heart failure  Start lasix drip and consult cardiology for assistance in management, accurate I/o's    12/30/21 [facility] continue lasix drip, diuresing well        VTE Risk Mitigation (From admission, onward)         Ordered     IP VTE HIGH RISK PATIENT  Once         12/28/21 1805     Place sequential compression device  Until discontinued         12/28/21 1805     apixaban tablet 5 mg  2 times daily         12/28/21 1607                Discharge Planning   GLORY:      Code Status: Full Code   Is the patient medically ready for discharge?:     Reason for patient still in hospital (select all that apply): Treatment  Discharge Plan A: Home with family                  Eddie Newton DO  Department of Hospital Medicine   [facility] S[facility]

## 2021-12-31 NOTE — PLAN OF CARE
Problem: Adult Inpatient Plan of Care  Goal: Plan of Care Review  Outcome: Ongoing, Progressing     Problem: Fall Injury Risk  Goal: Absence of Fall and Fall-Related Injury  Outcome: Ongoing, Progressing     Plan of care reviewed with patient.

## 2021-12-31 NOTE — PT/OT/SLP PROGRESS
Physical Therapy Treatment    Patient Name:  Jamey Lei   MRN:  [id number]    Time Tracking:     PT Start Time: 0931     PT Stop Time: 0939  PT Total Time (min): 8 min     Billable Minutes: Gait Training 8  Natalie Gudino, PTA  12/31/2021      Subjective     Patient/Family Comments/goals: Pt agreeable to therapy. Pt states they just rewrapped his legs. He reports he feels tightness in calf from swelling.   Pain/Comfort: none reported      Objective:     General Precautions: Standard, fall     Communicated with PT prior to session. Patient found up in chair upon PT entry to room.     Functional Mobility:     · Sit to Stand:  stand by assistance with straight cane     Therapeutic Activities and Exercises:  gt x 25', CGA d/t 1 episode of slight imbalance using pt's personal SPC. Returned to chair for seated recovery. Instructed on LE ex's including heel & toe raises & LAQ, w/ rest breaks as needed due to breathing recovery and coughing spells.      Patient left up in chair with all lines intact and Nrs present.     GOALS:   STGs  1. Pt will be mod ind with transfers with cane  2. Pt will be modind with cane for 150ft or greater    Assessment:     Jamey Lei is a 81 y.o. male admitted with a medical diagnosis of Pneumonia. Pt's gait distance limited by coughing spells with exertion.     Plan:     During this hospitalization, patient to be seen 6 x/week to address the identified rehab impairments gait training,therapeutic activities,therapeutic exercises and progress toward the goals.      Recommendations:     Cont to progress as tolerated.

## 2021-12-31 NOTE — PT/OT/SLP PROGRESS
Occupational Therapy   Treatment    Name: Jamey Lei  MRN: [id number]  Admitting Diagnosis:  Pneumonia       Recommendations:     Discharge Recommendations: other (see comments) (To be further determined based on progress prior to discharge.)  Discharge Equipment Recommendations:  none  Barriers to discharge:  Other (Comment) (Medical and functional status)    Assessment:     Jamey Lei is a 81 y.o. male with a medical diagnosis of Pneumonia. Performance deficits affecting function are weakness,impaired endurance,impaired self care skills,impaired functional mobilty,impaired balance,edema,impaired cardiopulmonary response to activity.     Rehab Prognosis:  Good; patient would benefit from acute skilled OT services to address these deficits and reach maximum level of function.       Plan:     Patient to be seen 6 x/week to address the above listed problems via self-care/home management,therapeutic activities,therapeutic exercises  · Plan of Care Expires: 01/07/22  · Plan of Care Reviewed with: patient    Subjective     Pain/Comfort:  · Pain Rating 1: 0/10  · Pain Rating Post-Intervention 1: 0/10    Objective:     Communicated with: nurse prior to session.  Patient found up in chair with telemetry,peripheral IV upon OT entry to room.    General Precautions: Standard, fall   Orthopedic Precautions:N/A   Braces:    Respiratory Status: Room air     Occupational Performance:     Functional Mobility/Transfers:  · Patient completed Sit <> Stand Transfer with contact guard assistance  with  straight cane   · Patient completed Bed <> Chair Transfer using Step Transfer technique with contact guard assistance with straight cane  · Functional Mobility: Pt ambulated 72' requiring CGA utilizing SC on room air.      Treatment & Education:  Pt was cooperative and motivated without verbal encouragement while exhibiting positive affect. He participated in functional transfer retraining to / from recliner and chair emphasizing  fall prevention and use of SC providing extra time with repetition requiring CGA secondary to intermittent steadying assist for safety with min verbal and tactile cueing for safety and technique. He ambulated 72' between surfaces requiring CGA utilizing SC on room air. Also, he participated in therapeutic exercise performing 3x10 seated pushups requiring verbal and tactile cueing for technique challenging him to lift buttocks off seated surface to end range elbow extension in order to strengthen triceps brachii to improve performance with sit <> stand transitions.    Patient left up in chair with all lines intact, call button in reach and nurse notifiedEducation:      GOALS:   Multidisciplinary Problems     Occupational Therapy Goals        Problem: Occupational Therapy Goal    Goal Priority Disciplines Outcome Interventions   Occupational Therapy Goal     OT, PT/OT     Description: Goals to be met by: 01/07/21     Patient will increase functional independence with ADLs by performing:    UE Dressing with Modified [place].  LE Dressing with Modified [place].  Grooming while standing at sink with Modified [place].  Toileting from toilet with Modified [place] for hygiene and clothing management.   Bathing from  shower chair/bench with Supervision.  Step transfer with Modified [place]  Toilet transfer to toilet with Modified [place].  Increased functional strength to 4+/5 for bilateral UE's.                     Time Tracking:     OT Date of Treatment: 12/31/21  OT Start Time: 1330  OT Stop Time: 1405  OT Total Time (min): 35 min    Billable Minutes:Therapeutic Activity 15  Therapeutic Exercise 20    OT/DENISHA: OT          12/31/2021

## 2022-01-01 LAB
ALBUMIN SERPL BCP-MCNC: 2.9 G/DL (ref 3.5–5.2)
ALP SERPL-CCNC: 48 U/L (ref 55–135)
ALT SERPL W/O P-5'-P-CCNC: 18 U/L (ref 10–44)
ANION GAP SERPL CALC-SCNC: 9 MMOL/L (ref 8–16)
AST SERPL-CCNC: 17 U/L (ref 10–40)
BASOPHILS # BLD AUTO: 0.06 K/UL (ref 0–0.2)
BASOPHILS NFR BLD: 0.7 % (ref 0–1.9)
BILIRUB SERPL-MCNC: 0.7 MG/DL (ref 0.1–1)
BUN SERPL-MCNC: 28 MG/DL (ref 8–23)
CALCIUM SERPL-MCNC: 9.8 MG/DL (ref 8.7–10.5)
CHLORIDE SERPL-SCNC: 105 MMOL/L (ref 95–110)
CO2 SERPL-SCNC: 30 MMOL/L (ref 23–29)
CREAT SERPL-MCNC: 1.6 MG/DL (ref 0.5–1.4)
DIFFERENTIAL METHOD: ABNORMAL
EOSINOPHIL # BLD AUTO: 0.7 K/UL (ref 0–0.5)
EOSINOPHIL NFR BLD: 7.7 % (ref 0–8)
ERYTHROCYTE [DISTWIDTH] IN BLOOD BY AUTOMATED COUNT: 15.6 % (ref 11.5–14.5)
EST. GFR  (AFRICAN AMERICAN): 46.7 ML/MIN/1.73 M^2
EST. GFR  (NON AFRICAN AMERICAN): 40.4 ML/MIN/1.73 M^2
GLUCOSE SERPL-MCNC: 109 MG/DL (ref 70–110)
HCT VFR BLD AUTO: 35.6 % (ref 40–54)
HGB BLD-MCNC: 11.6 G/DL (ref 14–18)
IMM GRANULOCYTES # BLD AUTO: 0.03 K/UL (ref 0–0.04)
IMM GRANULOCYTES NFR BLD AUTO: 0.3 % (ref 0–0.5)
LYMPHOCYTES # BLD AUTO: 1.4 K/UL (ref 1–4.8)
LYMPHOCYTES NFR BLD: 15.3 % (ref 18–48)
MAGNESIUM SERPL-MCNC: 2.1 MG/DL (ref 1.6–2.6)
MCH RBC QN AUTO: 32.2 PG (ref 27–31)
MCHC RBC AUTO-ENTMCNC: 32.6 G/DL (ref 32–36)
MCV RBC AUTO: 99 FL (ref 82–98)
MONOCYTES # BLD AUTO: 0.9 K/UL (ref 0.3–1)
MONOCYTES NFR BLD: 10 % (ref 4–15)
NEUTROPHILS # BLD AUTO: 6 K/UL (ref 1.8–7.7)
NEUTROPHILS NFR BLD: 66 % (ref 38–73)
NRBC BLD-RTO: 0 /100 WBC
PLATELET # BLD AUTO: 163 K/UL (ref 150–450)
PMV BLD AUTO: 10.7 FL (ref 9.2–12.9)
POTASSIUM SERPL-SCNC: 3.4 MMOL/L (ref 3.5–5.1)
PROT SERPL-MCNC: 7 G/DL (ref 6–8.4)
RBC # BLD AUTO: 3.6 M/UL (ref 4.6–6.2)
SODIUM SERPL-SCNC: 144 MMOL/L (ref 136–145)
WBC # BLD AUTO: 9.01 K/UL (ref 3.9–12.7)

## 2022-01-01 PROCEDURE — 25000242 PHARM REV CODE 250 ALT 637 W/ HCPCS: Performed by: NURSE PRACTITIONER

## 2022-01-01 PROCEDURE — 99900031 HC PATIENT EDUCATION (STAT)

## 2022-01-01 PROCEDURE — 25000003 PHARM REV CODE 250: Performed by: NURSE PRACTITIONER

## 2022-01-01 PROCEDURE — 94761 N-INVAS EAR/PLS OXIMETRY MLT: CPT

## 2022-01-01 PROCEDURE — 80053 COMPREHEN METABOLIC PANEL: CPT | Performed by: NURSE PRACTITIONER

## 2022-01-01 PROCEDURE — 25000003 PHARM REV CODE 250: Performed by: INTERNAL MEDICINE

## 2022-01-01 PROCEDURE — 94640 AIRWAY INHALATION TREATMENT: CPT

## 2022-01-01 PROCEDURE — 63600175 PHARM REV CODE 636 W HCPCS: Performed by: NURSE PRACTITIONER

## 2022-01-01 PROCEDURE — 99900035 HC TECH TIME PER 15 MIN (STAT)

## 2022-01-01 PROCEDURE — 36415 COLL VENOUS BLD VENIPUNCTURE: CPT | Performed by: NURSE PRACTITIONER

## 2022-01-01 PROCEDURE — 63600175 PHARM REV CODE 636 W HCPCS: Performed by: INTERNAL MEDICINE

## 2022-01-01 PROCEDURE — 11000001 HC ACUTE MED/SURG PRIVATE ROOM

## 2022-01-01 PROCEDURE — 85025 COMPLETE CBC W/AUTO DIFF WBC: CPT | Performed by: NURSE PRACTITIONER

## 2022-01-01 PROCEDURE — 83735 ASSAY OF MAGNESIUM: CPT | Performed by: NURSE PRACTITIONER

## 2022-01-01 RX ADMIN — OXCARBAZEPINE 600 MG: 300 TABLET, FILM COATED ORAL at 08:01

## 2022-01-01 RX ADMIN — PRAVASTATIN SODIUM 10 MG: 10 TABLET ORAL at 08:01

## 2022-01-01 RX ADMIN — PIPERACILLIN AND TAZOBACTAM 4.5 G: 4; .5 INJECTION, POWDER, LYOPHILIZED, FOR SOLUTION INTRAVENOUS; PARENTERAL at 10:01

## 2022-01-01 RX ADMIN — GUAIFENESIN 600 MG: 600 TABLET, EXTENDED RELEASE ORAL at 08:01

## 2022-01-01 RX ADMIN — IPRATROPIUM BROMIDE AND ALBUTEROL SULFATE 3 ML: 2.5; .5 SOLUTION RESPIRATORY (INHALATION) at 08:01

## 2022-01-01 RX ADMIN — PIPERACILLIN AND TAZOBACTAM 4.5 G: 4; .5 INJECTION, POWDER, LYOPHILIZED, FOR SOLUTION INTRAVENOUS; PARENTERAL at 02:01

## 2022-01-01 RX ADMIN — APIXABAN 5 MG: 5 TABLET, FILM COATED ORAL at 08:01

## 2022-01-01 RX ADMIN — IPRATROPIUM BROMIDE AND ALBUTEROL SULFATE 3 ML: 2.5; .5 SOLUTION RESPIRATORY (INHALATION) at 12:01

## 2022-01-01 RX ADMIN — FLUOXETINE HYDROCHLORIDE 20 MG: 20 CAPSULE ORAL at 08:01

## 2022-01-01 RX ADMIN — ASPIRIN 81 MG: 81 TABLET, COATED ORAL at 08:01

## 2022-01-01 RX ADMIN — FAMOTIDINE 20 MG: 20 TABLET ORAL at 08:01

## 2022-01-01 RX ADMIN — IPRATROPIUM BROMIDE AND ALBUTEROL SULFATE 3 ML: 2.5; .5 SOLUTION RESPIRATORY (INHALATION) at 04:01

## 2022-01-01 RX ADMIN — POTASSIUM CHLORIDE 40 MEQ: 1500 TABLET, EXTENDED RELEASE ORAL at 08:01

## 2022-01-01 RX ADMIN — CARVEDILOL 6.25 MG: 3.12 TABLET, FILM COATED ORAL at 08:01

## 2022-01-01 RX ADMIN — LINEZOLID 600 MG: 600 INJECTION, SOLUTION INTRAVENOUS at 08:01

## 2022-01-01 RX ADMIN — FUROSEMIDE 10 MG/HR: 10 INJECTION, SOLUTION INTRAMUSCULAR; INTRAVENOUS at 08:01

## 2022-01-01 RX ADMIN — EZETIMIBE 10 MG: 10 TABLET ORAL at 08:01

## 2022-01-01 RX ADMIN — GABAPENTIN 300 MG: 300 CAPSULE ORAL at 08:01

## 2022-01-01 RX ADMIN — IPRATROPIUM BROMIDE AND ALBUTEROL SULFATE 3 ML: 2.5; .5 SOLUTION RESPIRATORY (INHALATION) at 07:01

## 2022-01-01 RX ADMIN — PIPERACILLIN AND TAZOBACTAM 4.5 G: 4; .5 INJECTION, POWDER, LYOPHILIZED, FOR SOLUTION INTRAVENOUS; PARENTERAL at 05:01

## 2022-01-01 RX ADMIN — TAMSULOSIN HYDROCHLORIDE 0.4 MG: 0.4 CAPSULE ORAL at 08:01

## 2022-01-01 NOTE — PROGRESS NOTES
[facility] Medicine  Progress Note    Patient Name: Jamey Lei  MRN: [id number]  Patient Class: IP- Inpatient   Admission Date: 12/28/2021  Length of Stay: 4 days  Attending Physician: Navi Domínguez III, MD  Primary Care Provider: Navi Domínguez III, MD        Subjective:     Principal Problem:Pneumonia        HPI:   Shortness of Breath       Pt sent to ED via Dr. Domínguez for admission for unsuccessful outpatient treatment of pneumonia/heart failure. Pt stated that he has been sick for the past month or so.       81-year-old male with history of AFib on Eliquis, hypertension referred from PCP office for admission due to failure of outpatient therapy for pneumonia and heart failure.  Patient says that he has been feeling weak for over a month despite finishing ends antibiotics and being on his medication.  Patient denies any chest pain but does endorse orthopnea, bilateral lower extremity swelling, dyspnea on exertion, chronic cough      Overview/Hospital Course:  12/30/21 [facility] patient reports that he is still having significant coughing, no shortness of breath, sitting up in recliner. Cardiology following, on lasix drip, significant edema and wounds to lower ext. Will continue lasix drip, continue abx for pneumonia, have wound care see, consider unna boots    12/31/21 CG: Still with cough, says he is still swollen though this has gotten better with lasix and wraps.     1/1/22 CG: Legs still wrapped, no acute overnight events. Still fluid overloaded      No new subjective & objective note has been filed under this hospital service since the last note was generated.      Assessment/Plan:      * Pneumonia  Zosyn and zyvox, duo nebs, oxygen per protocol, mucinex, cough medication and monitor. Patient has failed multiple outpatient treatments    12/30/21 [facility] continue with treatment       Hypokalemia  Replace and monitor.       Wound of left leg  Consult wound care      Edema  Continue lasix drip and  consult wound care of unna boots      DVT prophylaxis  On eliquis      Weakness  Likely secondary to acute illness, monitor and incorporate PT when appropriate    12/30/21 [facility] add PT/OT eval and treat      Acute pulmonary edema  Lasix drip       Acute on chronic congestive heart failure  Start lasix drip and consult cardiology for assistance in management, accurate I/o's    12/30/21 [facility] continue lasix drip, diuresing well        VTE Risk Mitigation (From admission, onward)         Ordered     IP VTE HIGH RISK PATIENT  Once         12/28/21 1805     Place sequential compression device  Until discontinued         12/28/21 1805     apixaban tablet 5 mg  2 times daily         12/28/21 1607                Discharge Planning   GLORY:      Code Status: Full Code   Is the patient medically ready for discharge?:     Reason for patient still in hospital (select all that apply): Treatment  Discharge Plan A: Home with family                  Eddie Newton DO  Department of Hospital Medicine   [facility]urg

## 2022-01-02 LAB
ALBUMIN SERPL BCP-MCNC: 2.8 G/DL (ref 3.5–5.2)
ALP SERPL-CCNC: 60 U/L (ref 55–135)
ALT SERPL W/O P-5'-P-CCNC: 18 U/L (ref 10–44)
ANION GAP SERPL CALC-SCNC: 7 MMOL/L (ref 8–16)
AST SERPL-CCNC: 15 U/L (ref 10–40)
BACTERIA BLD CULT: NORMAL
BACTERIA BLD CULT: NORMAL
BASOPHILS # BLD AUTO: 0.07 K/UL (ref 0–0.2)
BASOPHILS NFR BLD: 0.7 % (ref 0–1.9)
BILIRUB SERPL-MCNC: 0.5 MG/DL (ref 0.1–1)
BUN SERPL-MCNC: 34 MG/DL (ref 8–23)
CALCIUM SERPL-MCNC: 9.9 MG/DL (ref 8.7–10.5)
CHLORIDE SERPL-SCNC: 108 MMOL/L (ref 95–110)
CO2 SERPL-SCNC: 30 MMOL/L (ref 23–29)
CREAT SERPL-MCNC: 2 MG/DL (ref 0.5–1.4)
DIFFERENTIAL METHOD: ABNORMAL
EOSINOPHIL # BLD AUTO: 0.9 K/UL (ref 0–0.5)
EOSINOPHIL NFR BLD: 8.4 % (ref 0–8)
ERYTHROCYTE [DISTWIDTH] IN BLOOD BY AUTOMATED COUNT: 15.6 % (ref 11.5–14.5)
EST. GFR  (AFRICAN AMERICAN): 35.6 ML/MIN/1.73 M^2
EST. GFR  (NON AFRICAN AMERICAN): 30.8 ML/MIN/1.73 M^2
GLUCOSE SERPL-MCNC: 117 MG/DL (ref 70–110)
HCT VFR BLD AUTO: 35.5 % (ref 40–54)
HGB BLD-MCNC: 11.4 G/DL (ref 14–18)
IMM GRANULOCYTES # BLD AUTO: 0.02 K/UL (ref 0–0.04)
IMM GRANULOCYTES NFR BLD AUTO: 0.2 % (ref 0–0.5)
LYMPHOCYTES # BLD AUTO: 1.3 K/UL (ref 1–4.8)
LYMPHOCYTES NFR BLD: 13 % (ref 18–48)
MAGNESIUM SERPL-MCNC: 2.4 MG/DL (ref 1.6–2.6)
MCH RBC QN AUTO: 32.3 PG (ref 27–31)
MCHC RBC AUTO-ENTMCNC: 32.1 G/DL (ref 32–36)
MCV RBC AUTO: 101 FL (ref 82–98)
MONOCYTES # BLD AUTO: 1 K/UL (ref 0.3–1)
MONOCYTES NFR BLD: 9.5 % (ref 4–15)
NEUTROPHILS # BLD AUTO: 6.9 K/UL (ref 1.8–7.7)
NEUTROPHILS NFR BLD: 68.2 % (ref 38–73)
NRBC BLD-RTO: 0 /100 WBC
PLATELET # BLD AUTO: 163 K/UL (ref 150–450)
PMV BLD AUTO: 11 FL (ref 9.2–12.9)
POTASSIUM SERPL-SCNC: 3.8 MMOL/L (ref 3.5–5.1)
PROT SERPL-MCNC: 6.9 G/DL (ref 6–8.4)
RBC # BLD AUTO: 3.53 M/UL (ref 4.6–6.2)
SODIUM SERPL-SCNC: 145 MMOL/L (ref 136–145)
WBC # BLD AUTO: 10.07 K/UL (ref 3.9–12.7)

## 2022-01-02 PROCEDURE — 94640 AIRWAY INHALATION TREATMENT: CPT

## 2022-01-02 PROCEDURE — 36415 COLL VENOUS BLD VENIPUNCTURE: CPT | Performed by: NURSE PRACTITIONER

## 2022-01-02 PROCEDURE — 25000003 PHARM REV CODE 250: Performed by: NURSE PRACTITIONER

## 2022-01-02 PROCEDURE — 25000242 PHARM REV CODE 250 ALT 637 W/ HCPCS: Performed by: NURSE PRACTITIONER

## 2022-01-02 PROCEDURE — 83735 ASSAY OF MAGNESIUM: CPT | Performed by: NURSE PRACTITIONER

## 2022-01-02 PROCEDURE — 80053 COMPREHEN METABOLIC PANEL: CPT | Performed by: NURSE PRACTITIONER

## 2022-01-02 PROCEDURE — 63600175 PHARM REV CODE 636 W HCPCS: Performed by: INTERNAL MEDICINE

## 2022-01-02 PROCEDURE — 94761 N-INVAS EAR/PLS OXIMETRY MLT: CPT

## 2022-01-02 PROCEDURE — 63600175 PHARM REV CODE 636 W HCPCS: Performed by: NURSE PRACTITIONER

## 2022-01-02 PROCEDURE — 99900031 HC PATIENT EDUCATION (STAT)

## 2022-01-02 PROCEDURE — 99900035 HC TECH TIME PER 15 MIN (STAT)

## 2022-01-02 PROCEDURE — 11000001 HC ACUTE MED/SURG PRIVATE ROOM

## 2022-01-02 PROCEDURE — 85025 COMPLETE CBC W/AUTO DIFF WBC: CPT | Performed by: NURSE PRACTITIONER

## 2022-01-02 PROCEDURE — 25000003 PHARM REV CODE 250: Performed by: INTERNAL MEDICINE

## 2022-01-02 RX ADMIN — FUROSEMIDE 10 MG/HR: 10 INJECTION, SOLUTION INTRAMUSCULAR; INTRAVENOUS at 04:01

## 2022-01-02 RX ADMIN — PIPERACILLIN AND TAZOBACTAM 4.5 G: 4; .5 INJECTION, POWDER, LYOPHILIZED, FOR SOLUTION INTRAVENOUS; PARENTERAL at 09:01

## 2022-01-02 RX ADMIN — TAMSULOSIN HYDROCHLORIDE 0.4 MG: 0.4 CAPSULE ORAL at 08:01

## 2022-01-02 RX ADMIN — APIXABAN 5 MG: 5 TABLET, FILM COATED ORAL at 08:01

## 2022-01-02 RX ADMIN — IPRATROPIUM BROMIDE AND ALBUTEROL SULFATE 3 ML: 2.5; .5 SOLUTION RESPIRATORY (INHALATION) at 04:01

## 2022-01-02 RX ADMIN — EZETIMIBE 10 MG: 10 TABLET ORAL at 08:01

## 2022-01-02 RX ADMIN — IPRATROPIUM BROMIDE AND ALBUTEROL SULFATE 3 ML: 2.5; .5 SOLUTION RESPIRATORY (INHALATION) at 07:01

## 2022-01-02 RX ADMIN — IPRATROPIUM BROMIDE AND ALBUTEROL SULFATE 3 ML: 2.5; .5 SOLUTION RESPIRATORY (INHALATION) at 11:01

## 2022-01-02 RX ADMIN — LINEZOLID 600 MG: 600 INJECTION, SOLUTION INTRAVENOUS at 08:01

## 2022-01-02 RX ADMIN — GABAPENTIN 300 MG: 300 CAPSULE ORAL at 09:01

## 2022-01-02 RX ADMIN — GUAIFENESIN 200 MG: 200 SOLUTION ORAL at 04:01

## 2022-01-02 RX ADMIN — LINEZOLID 600 MG: 600 INJECTION, SOLUTION INTRAVENOUS at 09:01

## 2022-01-02 RX ADMIN — FLUOXETINE HYDROCHLORIDE 20 MG: 20 CAPSULE ORAL at 08:01

## 2022-01-02 RX ADMIN — PRAVASTATIN SODIUM 10 MG: 10 TABLET ORAL at 09:01

## 2022-01-02 RX ADMIN — APIXABAN 5 MG: 5 TABLET, FILM COATED ORAL at 09:01

## 2022-01-02 RX ADMIN — POTASSIUM CHLORIDE 40 MEQ: 1500 TABLET, EXTENDED RELEASE ORAL at 08:01

## 2022-01-02 RX ADMIN — CARVEDILOL 6.25 MG: 3.12 TABLET, FILM COATED ORAL at 09:01

## 2022-01-02 RX ADMIN — PIPERACILLIN AND TAZOBACTAM 4.5 G: 4; .5 INJECTION, POWDER, LYOPHILIZED, FOR SOLUTION INTRAVENOUS; PARENTERAL at 01:01

## 2022-01-02 RX ADMIN — CARVEDILOL 6.25 MG: 3.12 TABLET, FILM COATED ORAL at 08:01

## 2022-01-02 RX ADMIN — FAMOTIDINE 20 MG: 20 TABLET ORAL at 08:01

## 2022-01-02 RX ADMIN — GUAIFENESIN 600 MG: 600 TABLET, EXTENDED RELEASE ORAL at 08:01

## 2022-01-02 RX ADMIN — GUAIFENESIN 600 MG: 600 TABLET, EXTENDED RELEASE ORAL at 09:01

## 2022-01-02 RX ADMIN — PIPERACILLIN AND TAZOBACTAM 4.5 G: 4; .5 INJECTION, POWDER, LYOPHILIZED, FOR SOLUTION INTRAVENOUS; PARENTERAL at 05:01

## 2022-01-02 RX ADMIN — GABAPENTIN 300 MG: 300 CAPSULE ORAL at 08:01

## 2022-01-02 RX ADMIN — POTASSIUM CHLORIDE 40 MEQ: 1500 TABLET, EXTENDED RELEASE ORAL at 09:01

## 2022-01-02 RX ADMIN — OXCARBAZEPINE 600 MG: 300 TABLET, FILM COATED ORAL at 09:01

## 2022-01-02 RX ADMIN — ASPIRIN 81 MG: 81 TABLET, COATED ORAL at 08:01

## 2022-01-02 RX ADMIN — OXCARBAZEPINE 600 MG: 300 TABLET, FILM COATED ORAL at 08:01

## 2022-01-02 NOTE — PROGRESS NOTES
[facility] Medicine  Progress Note    Patient Name: Jamey Lei  MRN: [id number]  Patient Class: IP- Inpatient   Admission Date: 12/28/2021  Length of Stay: 5 days  Attending Physician: Navi Domínguez III, MD  Primary Care Provider: Navi Domínguez III, MD        Subjective:     Principal Problem:Pneumonia        HPI:   Shortness of Breath       Pt sent to ED via Dr. Domínguez for admission for unsuccessful outpatient treatment of pneumonia/heart failure. Pt stated that he has been sick for the past month or so.       81-year-old male with history of AFib on Eliquis, hypertension referred from PCP office for admission due to failure of outpatient therapy for pneumonia and heart failure.  Patient says that he has been feeling weak for over a month despite finishing ends antibiotics and being on his medication.  Patient denies any chest pain but does endorse orthopnea, bilateral lower extremity swelling, dyspnea on exertion, chronic cough      Overview/Hospital Course:  12/30/21 [facility] patient reports that he is still having significant coughing, no shortness of breath, sitting up in recliner. Cardiology following, on lasix drip, significant edema and wounds to lower ext. Will continue lasix drip, continue abx for pneumonia, have wound care see, consider unna boots    12/31/21 [facility]: Still with cough, says he is still swollen though this has gotten better with lasix and wraps.     1/1/22 [facility]: Legs still wrapped, no acute overnight events. Still fluid overloaded    1/2/22 [facility]: Still with coarse breath sounds. Gentle diuresis      No new subjective & objective note has been filed under this hospital service since the last note was generated.      Assessment/Plan:      * Pneumonia  Zosyn and zyvox, duo nebs, oxygen per protocol, mucinex, cough medication and monitor. Patient has failed multiple outpatient treatments    12/30/21 [facility] continue with treatment       Hypokalemia  Replace and monitor.       Wound of  left leg  Consult wound care      Edema  Continue lasix drip and consult wound care of unna boots      DVT prophylaxis  On eliquis      Weakness  Likely secondary to acute illness, monitor and incorporate PT when appropriate    12/30/21 [facility] add PT/OT eval and treat      Acute pulmonary edema  Lasix drip       Acute on chronic congestive heart failure  Start lasix drip and consult cardiology for assistance in management, accurate I/o's    12/30/21 [facility] continue lasix drip, diuresing well        VTE Risk Mitigation (From admission, onward)         Ordered     IP VTE HIGH RISK PATIENT  Once         12/28/21 1805     Place sequential compression device  Until discontinued         12/28/21 1805     apixaban tablet 5 mg  2 times daily         12/28/21 1607                Discharge Planning   GLORY:      Code Status: Full Code   Is the patient medically ready for discharge?:     Reason for patient still in hospital (select all that apply): Treatment  Discharge Plan A: Home with family                  Eddie Newton DO  Department of Hospital Medicine   [facility] Surg

## 2022-01-03 LAB
ALBUMIN SERPL BCP-MCNC: 3 G/DL (ref 3.5–5.2)
ALP SERPL-CCNC: 52 U/L (ref 55–135)
ALT SERPL W/O P-5'-P-CCNC: 19 U/L (ref 10–44)
ANION GAP SERPL CALC-SCNC: 6 MMOL/L (ref 8–16)
AST SERPL-CCNC: 14 U/L (ref 10–40)
BASOPHILS # BLD AUTO: 0.09 K/UL (ref 0–0.2)
BASOPHILS NFR BLD: 0.8 % (ref 0–1.9)
BILIRUB SERPL-MCNC: 0.5 MG/DL (ref 0.1–1)
BUN SERPL-MCNC: 37 MG/DL (ref 8–23)
CALCIUM SERPL-MCNC: 10 MG/DL (ref 8.7–10.5)
CHLORIDE SERPL-SCNC: 106 MMOL/L (ref 95–110)
CO2 SERPL-SCNC: 33 MMOL/L (ref 23–29)
CREAT SERPL-MCNC: 2 MG/DL (ref 0.5–1.4)
DIFFERENTIAL METHOD: ABNORMAL
EOSINOPHIL # BLD AUTO: 0.8 K/UL (ref 0–0.5)
EOSINOPHIL NFR BLD: 6.9 % (ref 0–8)
ERYTHROCYTE [DISTWIDTH] IN BLOOD BY AUTOMATED COUNT: 15.4 % (ref 11.5–14.5)
EST. GFR  (AFRICAN AMERICAN): 35.6 ML/MIN/1.73 M^2
EST. GFR  (NON AFRICAN AMERICAN): 30.8 ML/MIN/1.73 M^2
GLUCOSE SERPL-MCNC: 116 MG/DL (ref 70–110)
HCT VFR BLD AUTO: 37 % (ref 40–54)
HGB BLD-MCNC: 12 G/DL (ref 14–18)
IMM GRANULOCYTES # BLD AUTO: 0.06 K/UL (ref 0–0.04)
IMM GRANULOCYTES NFR BLD AUTO: 0.5 % (ref 0–0.5)
LYMPHOCYTES # BLD AUTO: 1.5 K/UL (ref 1–4.8)
LYMPHOCYTES NFR BLD: 12.8 % (ref 18–48)
MAGNESIUM SERPL-MCNC: 2.3 MG/DL (ref 1.6–2.6)
MCH RBC QN AUTO: 33 PG (ref 27–31)
MCHC RBC AUTO-ENTMCNC: 32.4 G/DL (ref 32–36)
MCV RBC AUTO: 102 FL (ref 82–98)
MONOCYTES # BLD AUTO: 1 K/UL (ref 0.3–1)
MONOCYTES NFR BLD: 8.1 % (ref 4–15)
NEUTROPHILS # BLD AUTO: 8.4 K/UL (ref 1.8–7.7)
NEUTROPHILS NFR BLD: 70.9 % (ref 38–73)
NRBC BLD-RTO: 0 /100 WBC
PLATELET # BLD AUTO: 184 K/UL (ref 150–450)
PMV BLD AUTO: 10.7 FL (ref 9.2–12.9)
POTASSIUM SERPL-SCNC: 3.6 MMOL/L (ref 3.5–5.1)
PROT SERPL-MCNC: 7.3 G/DL (ref 6–8.4)
RBC # BLD AUTO: 3.64 M/UL (ref 4.6–6.2)
SODIUM SERPL-SCNC: 145 MMOL/L (ref 136–145)
WBC # BLD AUTO: 11.79 K/UL (ref 3.9–12.7)

## 2022-01-03 PROCEDURE — 83735 ASSAY OF MAGNESIUM: CPT | Performed by: NURSE PRACTITIONER

## 2022-01-03 PROCEDURE — 63600175 PHARM REV CODE 636 W HCPCS: Performed by: INTERNAL MEDICINE

## 2022-01-03 PROCEDURE — 36410 VNPNXR 3YR/> PHY/QHP DX/THER: CPT

## 2022-01-03 PROCEDURE — 85025 COMPLETE CBC W/AUTO DIFF WBC: CPT | Performed by: NURSE PRACTITIONER

## 2022-01-03 PROCEDURE — 94640 AIRWAY INHALATION TREATMENT: CPT

## 2022-01-03 PROCEDURE — 25000003 PHARM REV CODE 250: Performed by: NURSE PRACTITIONER

## 2022-01-03 PROCEDURE — 97530 THERAPEUTIC ACTIVITIES: CPT

## 2022-01-03 PROCEDURE — 63600175 PHARM REV CODE 636 W HCPCS: Performed by: NURSE PRACTITIONER

## 2022-01-03 PROCEDURE — 25000003 PHARM REV CODE 250: Performed by: INTERNAL MEDICINE

## 2022-01-03 PROCEDURE — 25000242 PHARM REV CODE 250 ALT 637 W/ HCPCS: Performed by: NURSE PRACTITIONER

## 2022-01-03 PROCEDURE — 97535 SELF CARE MNGMENT TRAINING: CPT

## 2022-01-03 PROCEDURE — 11000001 HC ACUTE MED/SURG PRIVATE ROOM

## 2022-01-03 PROCEDURE — 80053 COMPREHEN METABOLIC PANEL: CPT | Performed by: NURSE PRACTITIONER

## 2022-01-03 PROCEDURE — 97110 THERAPEUTIC EXERCISES: CPT

## 2022-01-03 PROCEDURE — 99900035 HC TECH TIME PER 15 MIN (STAT)

## 2022-01-03 PROCEDURE — C1751 CATH, INF, PER/CENT/MIDLINE: HCPCS

## 2022-01-03 PROCEDURE — 94761 N-INVAS EAR/PLS OXIMETRY MLT: CPT

## 2022-01-03 PROCEDURE — 99900031 HC PATIENT EDUCATION (STAT)

## 2022-01-03 PROCEDURE — 36415 COLL VENOUS BLD VENIPUNCTURE: CPT | Performed by: NURSE PRACTITIONER

## 2022-01-03 RX ORDER — METOLAZONE 5 MG/1
5 TABLET ORAL DAILY
Status: DISCONTINUED | OUTPATIENT
Start: 2022-01-03 | End: 2022-01-07 | Stop reason: HOSPADM

## 2022-01-03 RX ADMIN — GUAIFENESIN 200 MG: 200 SOLUTION ORAL at 10:01

## 2022-01-03 RX ADMIN — LINEZOLID 600 MG: 600 INJECTION, SOLUTION INTRAVENOUS at 08:01

## 2022-01-03 RX ADMIN — POTASSIUM CHLORIDE 40 MEQ: 1500 TABLET, EXTENDED RELEASE ORAL at 08:01

## 2022-01-03 RX ADMIN — ASPIRIN 81 MG: 81 TABLET, COATED ORAL at 08:01

## 2022-01-03 RX ADMIN — IPRATROPIUM BROMIDE AND ALBUTEROL SULFATE 3 ML: 2.5; .5 SOLUTION RESPIRATORY (INHALATION) at 08:01

## 2022-01-03 RX ADMIN — APIXABAN 5 MG: 5 TABLET, FILM COATED ORAL at 08:01

## 2022-01-03 RX ADMIN — GUAIFENESIN 600 MG: 600 TABLET, EXTENDED RELEASE ORAL at 08:01

## 2022-01-03 RX ADMIN — HYDROCODONE BITARTRATE AND ACETAMINOPHEN 1 TABLET: 5; 325 TABLET ORAL at 08:01

## 2022-01-03 RX ADMIN — FUROSEMIDE 10 MG/HR: 10 INJECTION, SOLUTION INTRAMUSCULAR; INTRAVENOUS at 05:01

## 2022-01-03 RX ADMIN — METOLAZONE 5 MG: 5 TABLET ORAL at 12:01

## 2022-01-03 RX ADMIN — FLUOXETINE HYDROCHLORIDE 20 MG: 20 CAPSULE ORAL at 08:01

## 2022-01-03 RX ADMIN — GUAIFENESIN 200 MG: 200 SOLUTION ORAL at 08:01

## 2022-01-03 RX ADMIN — CARVEDILOL 6.25 MG: 3.12 TABLET, FILM COATED ORAL at 08:01

## 2022-01-03 RX ADMIN — EZETIMIBE 10 MG: 10 TABLET ORAL at 08:01

## 2022-01-03 RX ADMIN — IPRATROPIUM BROMIDE AND ALBUTEROL SULFATE 3 ML: 2.5; .5 SOLUTION RESPIRATORY (INHALATION) at 05:01

## 2022-01-03 RX ADMIN — PRAVASTATIN SODIUM 10 MG: 10 TABLET ORAL at 08:01

## 2022-01-03 RX ADMIN — PIPERACILLIN AND TAZOBACTAM 4.5 G: 4; .5 INJECTION, POWDER, LYOPHILIZED, FOR SOLUTION INTRAVENOUS; PARENTERAL at 03:01

## 2022-01-03 RX ADMIN — TAMSULOSIN HYDROCHLORIDE 0.4 MG: 0.4 CAPSULE ORAL at 08:01

## 2022-01-03 RX ADMIN — IPRATROPIUM BROMIDE AND ALBUTEROL SULFATE 3 ML: 2.5; .5 SOLUTION RESPIRATORY (INHALATION) at 12:01

## 2022-01-03 RX ADMIN — OXCARBAZEPINE 600 MG: 300 TABLET, FILM COATED ORAL at 08:01

## 2022-01-03 RX ADMIN — PIPERACILLIN AND TAZOBACTAM 4.5 G: 4; .5 INJECTION, POWDER, LYOPHILIZED, FOR SOLUTION INTRAVENOUS; PARENTERAL at 09:01

## 2022-01-03 RX ADMIN — FAMOTIDINE 20 MG: 20 TABLET ORAL at 08:01

## 2022-01-03 RX ADMIN — GABAPENTIN 300 MG: 300 CAPSULE ORAL at 08:01

## 2022-01-03 RX ADMIN — PIPERACILLIN AND TAZOBACTAM 4.5 G: 4; .5 INJECTION, POWDER, LYOPHILIZED, FOR SOLUTION INTRAVENOUS; PARENTERAL at 05:01

## 2022-01-03 NOTE — PROGRESS NOTES
01/03/22 1033        Altered Skin Integrity 12/28/21 2129 Right anterior;distal;lower Leg #2   Date First Assessed/Time First Assessed: 12/28/21 2129   Altered Skin Integrity Present on Admission: yes  Side: Right  Orientation: anterior;distal;lower  Location: Leg  Wound Number: #2  Is this injury device related?: No   Description of Altered Skin Integrity Intact skin with non-blanchable redness of localized area   Dressing Appearance Dry;Clean;Intact   Drainage Amount None   Periwound Area Hemosiderin Staining;Swelling   Care Cleansed with:  (Cleansing cloths)   Compression Unna's Boot   Dressing Change Due 01/06/22

## 2022-01-03 NOTE — PROGRESS NOTES
01/03/22 1037        Altered Skin Integrity 12/28/21 2124 Left anterior;distal;lower Leg #1 Venous Ulcer Partial thickness tissue loss. Shallow open ulcer with a red or pink wound bed, without slough. Intact or Open/Ruptured Serum-filled blister.   Date First Assessed/Time First Assessed: 12/28/21 2124   Altered Skin Integrity Present on Admission: yes  Side: Left  Orientation: anterior;distal;lower  Location: Leg  Wound Number: #1  Is this injury device related?: No  Primary Wound Type: Venous ...   Wound Image    Description of Altered Skin Integrity Partial thickness tissue loss. Shallow open ulcer with a red or pink wound bed, without slough. Intact or Open/Ruptured Serum-filled blister.   Dressing Appearance Moist drainage   Drainage Amount Small   Drainage Characteristics/Odor Yellow;Serosanguineous   Appearance Red;Pink;Moist   Tissue loss description Partial thickness   Periwound Area Hemosiderin Staining;Swelling   Wound Edges Defined   Wound Length (cm) 2.5 cm   Wound Width (cm) 1.4 cm   Wound Depth (cm) 0.05 cm   Wound Volume (cm^3) 0.175 cm^3   Wound Surface Area (cm^2) 3.5 cm^2   Care Cleansed with:;Wound cleanser   Dressing Applied;Hydrofiber  (Applied Hydrofiber with silver to wound bed)   Compression Unna's Boot   Dressing Change Due 01/06/22

## 2022-01-03 NOTE — ASSESSMENT & PLAN NOTE
Zosyn and zyvox, duo nebs, oxygen per protocol, mucinex, cough medication and monitor. Patient has failed multiple outpatient treatments    12/30/21 [facility] continue with treatment     1/3/22 [facility] continue treatment

## 2022-01-03 NOTE — ASSESSMENT & PLAN NOTE
Likely secondary to acute illness, monitor and incorporate PT when appropriate    12/30/21 [facility] add PT/OT eval and treat    1/3/22 [facility] continue PT/OT

## 2022-01-03 NOTE — SUBJECTIVE & OBJECTIVE
Past Medical History:   Diagnosis Date    Arthritis     Brain tumor     Coronary artery disease     Encounter for blood transfusion     Hypertension     Neuropathy     Renal disorder        Past Surgical History:   Procedure Laterality Date    APPENDECTOMY      HIP SURGERY      KNEE SURGERY      SHOULDER SURGERY      triple bypass         Review of patient's allergies indicates:  No Known Allergies    No current facility-administered medications on file prior to encounter.     Current Outpatient Medications on File Prior to Encounter   Medication Sig    ANORO ELLIPTA 62.5-25 mcg/actuation DsDv     apixaban (ELIQUIS) 5 mg Tab Take 5 mg by mouth 2 (two) times daily.    ascorbic acid, vitamin C, (VITAMIN C) 1000 MG tablet Take 1,000 mg by mouth once daily.    aspirin (ECOTRIN) 81 MG EC tablet Take 81 mg by mouth once daily.    b complex vitamins tablet Take by mouth once daily.    diphenoxylate-atropine 2.5-0.025 mg (LOMOTIL) 2.5-0.025 mg per tablet Take 1 tablet by mouth 2 (two) times daily as needed for Diarrhea.    ergocalciferol, vitamin D2, (VITAMIN D ORAL) Take 1,000 capsules by mouth once daily.    ezetimibe (ZETIA) 10 mg tablet Take 10 mg by mouth once daily.    FLUoxetine 20 MG capsule Take 20 mg by mouth once daily.    gabapentin (NEURONTIN) 300 MG capsule Take 300 mg by mouth 2 (two) times daily.    HYDROcodone-acetaminophen (NORCO)  mg per tablet TAKE 1 TABLET BY MOUTH TWICE DAILY AS NEEDED FOR PAIN WILL MAKE DROWSY    lovastatin (MEVACOR) 10 MG tablet Take 10 mg by mouth every evening.    senna (SENOKOT) 8.6 mg tablet Take 1 tablet by mouth 2 (two) times daily.     tamsulosin (FLOMAX) 0.4 mg Cap Take 0.4 mg by mouth once daily.    torsemide (DEMADEX) 20 MG Tab Take 20 mg by mouth 2 (two) times a day.    carvediloL (COREG) 12.5 MG tablet Take 12.5 mg by mouth 2 (two) times daily with meals.    carvediloL (COREG) 6.25 MG tablet SMARTSI Tablet(s) By Mouth Every 12 Hours     HYDROcodone-acetaminophen (NORCO) 5-325 mg per tablet Take 1 tablet by mouth every 4 (four) hours as needed for Pain.    levoFLOXacin (LEVAQUIN) 500 MG tablet Take 500 mg by mouth once daily.    ondansetron (ZOFRAN) 4 MG tablet Take 1 tablet (4 mg total) by mouth every 12 (twelve) hours as needed for Nausea.    OXcarbazepine (TRILEPTAL) 600 MG Tab Take 600 mg by mouth 2 (two) times daily.    potassium chloride SA (K-DUR,KLOR-CON) 20 MEQ tablet Take 20 mEq by mouth once daily.    promethazine-dextromethorphan (PROMETHAZINE-DM) 6.25-15 mg/5 mL Syrp TAKE 1 TEASPOONFUL BY MOUTH EVERY 6 HOURS AS NEEDED WILL MAKE DROWSY     Family History    None       Tobacco Use    Smoking status: Former Smoker    Smokeless tobacco: Never Used   Substance and Sexual Activity    Alcohol use: Not Currently    Drug use: Not on file    Sexual activity: Not on file     Review of Systems   Constitutional: Positive for activity change, appetite change and fatigue.   Respiratory: Positive for cough, shortness of breath and wheezing.    Cardiovascular: Positive for leg swelling. Negative for chest pain.   Gastrointestinal: Positive for abdominal distention.   Musculoskeletal: Positive for arthralgias and myalgias.   Neurological: Positive for weakness.     Objective:     Vital Signs (Most Recent):  Temp: 97.6 °F (36.4 °C) (01/03/22 1130)  Pulse: 85 (01/03/22 1130)  Resp: 20 (01/03/22 1130)  BP: 125/61 (01/03/22 1130)  SpO2: (!) 94 % (01/03/22 1130) Vital Signs (24h Range):  Temp:  [97.6 °F (36.4 °C)-98 °F (36.7 °C)] 97.6 °F (36.4 °C)  Pulse:  [] 85  Resp:  [18-20] 20  SpO2:  [93 %-98 %] 94 %  BP: (125-173)/(61-89) 125/61     Weight: 112.2 kg (247 lb 6.4 oz)  Body mass index is 35.5 kg/m².    Physical Exam  Constitutional:       Appearance: He is obese. He is ill-appearing.   HENT:      Head: Normocephalic.      Nose: Nose normal.      Mouth/Throat:      Mouth: Mucous membranes are moist.   Cardiovascular:      Rate and  Rhythm: Normal rate. Rhythm irregular.      Pulses: Normal pulses.   Pulmonary:      Breath sounds: Wheezing and rhonchi present.   Abdominal:      General: Bowel sounds are normal.   Musculoskeletal:      Right lower leg: Edema present.      Left lower leg: Edema present.   Skin:         Neurological:      Mental Status: He is alert. Mental status is at baseline.             Significant Labs:   All pertinent labs within the past 24 hours have been reviewed.  Blood Culture:   No results for input(s): LABBLOO in the last 48 hours.  CBC:   Recent Labs   Lab 01/02/22  0519 01/03/22  0512   WBC 10.07 11.79   HGB 11.4* 12.0*   HCT 35.5* 37.0*    184     CMP:   Recent Labs   Lab 01/02/22 0519 01/03/22 0512    145   K 3.8 3.6    106   CO2 30* 33*   * 116*   BUN 34* 37*   CREATININE 2.0* 2.0*   CALCIUM 9.9 10.0   PROT 6.9 7.3   ALBUMIN 2.8* 3.0*   BILITOT 0.5 0.5   ALKPHOS 60 52*   AST 15 14   ALT 18 19   ANIONGAP 7* 6*   EGFRNONAA 30.8* 30.8*     Lactic Acid:   No results for input(s): LACTATE in the last 48 hours.  Troponin: No results for input(s): TROPONINI in the last 48 hours.  TSH:   Recent Labs   Lab 12/28/21  1252   TSH 0.857     Urine Culture: No results for input(s): LABURIN in the last 48 hours.  Urine Studies:   No results for input(s): COLORU, APPEARANCEUA, PHUR, SPECGRAV, PROTEINUA, GLUCUA, KETONESU, BILIRUBINUA, OCCULTUA, NITRITE, UROBILINOGEN, LEUKOCYTESUR, RBCUA, WBCUA, BACTERIA, SQUAMEPITHEL, HYALINECASTS in the last 48 hours.    Invalid input(s): WRIGHTSUR    Significant Imaging: Chest xray:Right lower lung zone opacity, corresponding with mass identified on recent chest CT.  Mass appears increased in size from prior exam, may reflect interval increase in size of mass and or associated atelectasis or pneumonia.

## 2022-01-03 NOTE — ASSESSMENT & PLAN NOTE
Start lasix drip and consult cardiology for assistance in management, accurate I/o's    12/30/21 [facility] continue lasix drip, diuresing well    1/3/22 [facility]: patient still + on I/o's will add zaroxolyn and monitor. Will also scan bladder post void

## 2022-01-03 NOTE — PT/OT/SLP PROGRESS
Occupational Therapy   Treatment    Name: Jamey Lei  MRN: [id number]  Admitting Diagnosis:  Pneumonia       Recommendations:     Discharge Recommendations: other (see comments) (To be further determined based on progress prior to discharge.)  Discharge Equipment Recommendations:  none  Barriers to discharge:  Other (Comment) (Medical and functional status)    Assessment:     Jamey Lei is a 81 y.o. male with a medical diagnosis of Pneumonia. Performance deficits affecting function are weakness,impaired endurance,impaired self care skills,impaired functional mobilty,impaired balance,edema,impaired cardiopulmonary response to activity.     Rehab Prognosis:  Good; patient would benefit from acute skilled OT services to address these deficits and reach maximum level of function.       Plan:     Patient to be seen 6 x/week to address the above listed problems via self-care/home management,therapeutic activities,therapeutic exercises  · Plan of Care Expires: 01/07/22  · Plan of Care Reviewed with: patient    Subjective     Pain/Comfort:  · Pain Rating 1: 7/10  · Location - Side 1: Left  · Location 1: knee  · Pain Addressed 1: Pre-medicate for activity,Cessation of Activity,Reposition,Other (see comments) (BioFreeze application)  · Pain Rating Post-Intervention 1: 4/10    Objective:     Communicated with: nurse prior to session.  Patient found up in chair with telemetry,peripheral IV upon OT entry to room.    General Precautions: Standard, fall   Orthopedic Precautions:N/A   Braces:    Respiratory Status: Room air     Occupational Performance:     Functional Mobility/Transfers:  · Patient completed Sit <> Stand Transfer with stand by assistance  with  rolling walker   · Patient completed Bed <> Chair Transfer using Step Transfer technique with stand by assistance with rolling walker  · Functional Mobility: Pt ambulated 84' requiring SBA utilizing RW on room air.    Activities of Daily Living:  · Lower Body Dressing:  minimum assistance secondary to steadying assist when threading bilateral LE's into boxers and when pulling / adjusting boxers to waist       AMPA 6 Click ADL:      Treatment & Education:  Pt was cooperative and motivated with verbal encouragement secondary to increased pain in (L) knee. He participated in ADL retraining regarding LB dressing emphasizing fall prevention providing extra time requiring min assist secondary to steadying assist when threading bilateral LE's into boxers and when pulling / adjusting boxers to waist with additional cueing. Pt then participated in functional transfer retraining to / from recliner and chair emphasizing fall prevention and use of RW providing extra time with repetition requiring SBA for safety with min verbal cueing for safety and technique. He ambulated 84' between surfaces requiring SBA utilizing RW on room air. Next, he participated in therapeutic exercise performing 3x12 seated pushups requiring verbal and tactile cueing for technique challenging him to lift buttocks off seated surface to end range elbow extension in order to strengthen triceps brachii to improve performance with sit <> stand transitions.    Patient left up in chair with call button in reach and nurse notifiedEducation:      GOALS:   Multidisciplinary Problems     Occupational Therapy Goals        Problem: Occupational Therapy Goal    Goal Priority Disciplines Outcome Interventions   Occupational Therapy Goal     OT, PT/OT     Description: Goals to be met by: 01/07/21     Patient will increase functional independence with ADLs by performing:    UE Dressing with Modified [place].  LE Dressing with Modified [place].  Grooming while standing at sink with Modified [place].  Toileting from toilet with Modified [place] for hygiene and clothing management.   Bathing from  shower chair/bench with Supervision.  Step transfer with Modified [place]  Toilet transfer to toilet with Modified  [employer].  Increased functional strength to 4+/5 for bilateral UE's.                     Time Tracking:     OT Date of Treatment: 01/03/22  OT Start Time: 1235  OT Stop Time: 1315  OT Total Time (min): 40 min    Billable Minutes:Self Care/Home Management 10  Therapeutic Activity 15  Therapeutic Exercise 15    OT/DENISHA: OT          1/3/2022

## 2022-01-03 NOTE — PT/OT/SLP PROGRESS
"Physical Therapy Treatment    Patient Name:  Jamey Lei   MRN:  [id number]    Time Tracking:     PT Start Time: 0955     PT Stop Time: 1010  PT Total Time (min): 15 min     Billable Minutes: There Act 15  Zechariah Nguyen, PT  01/03/2022      Subjective     Patient/Family Comments/goals: Pt agreeable to therapy. Pt states (L) knee pain "but I don't know why. I didn't do anything."   Pain/Comfort: 6/10 (L) knee      Objective:     General Precautions: Standard, fall     Communicated with RN prior to session. Patient found up in chair upon PT entry to room.     Functional Mobility:  · Sit to stand with SPC, CGA.    Therapeutic Activities and Exercises:  Stood with SPC, weight shifting, AROM (L) LE x 5 minutes. Seated heel / toe raises, LAQ, hip flexion 2x10 (B). Pain noted with (L) knee ROM.      Patient left up in chair with all lines intact and Nrs present.     GOALS:   STGs  1. Pt will be mod ind with transfers with cane  2. Pt will be modind with cane for 150ft or greater    Assessment:     Jamey Lei is a 81 y.o. male admitted with a medical diagnosis of Pneumonia. Pt's gait distance limited by coughing spells with exertion.     Plan:     During this hospitalization, patient to be seen 6 x/week to address the identified rehab impairments gait training,therapeutic activities,therapeutic exercises and progress toward the goals.      Recommendations:     Cont to progress as tolerated.   "

## 2022-01-03 NOTE — PROGRESS NOTES
[facility] Medicine  Progress Note    Patient Name: Jamey Lei  MRN: [id number]  Patient Class: IP- Inpatient   Admission Date: 12/28/2021  Length of Stay: 6 days  Attending Physician: Navi Domínguez III, MD  Primary Care Provider: Navi Domínguez III, MD        Subjective:     Principal Problem:Pneumonia        HPI:   Shortness of Breath       Pt sent to ED via Dr. Domínguez for admission for unsuccessful outpatient treatment of pneumonia/heart failure. Pt stated that he has been sick for the past month or so.       81-year-old male with history of AFib on Eliquis, hypertension referred from PCP office for admission due to failure of outpatient therapy for pneumonia and heart failure.  Patient says that he has been feeling weak for over a month despite finishing ends antibiotics and being on his medication.  Patient denies any chest pain but does endorse orthopnea, bilateral lower extremity swelling, dyspnea on exertion, chronic cough      Overview/Hospital Course:  12/30/21 L[facility] patient reports that he is still having significant coughing, no shortness of breath, sitting up in recliner. Cardiology following, on lasix drip, significant edema and wounds to lower ext. Will continue lasix drip, continue abx for pneumonia, have wound care see, consider unna boots    12/31/21 CG: Still with cough, says he is still swollen though this has gotten better with lasix and wraps.     1/1/22 CG: Legs still wrapped, no acute overnight events. Still fluid overloaded    1/2/22 CG: Still with coarse breath sounds. Gentle diuresis    1/3/22 LFC Still with coarse breath sounds and coughing. Sitting up in the recliner with legs elevated. Patient still positive with I/o's get bladder scan and add zaroxolyn       Past Medical History:   Diagnosis Date    Arthritis     Brain tumor     Coronary artery disease     Encounter for blood transfusion     Hypertension     Neuropathy     Renal disorder        Past Surgical  History:   Procedure Laterality Date    APPENDECTOMY      HIP SURGERY      KNEE SURGERY      SHOULDER SURGERY      triple bypass         Review of patient's allergies indicates:  No Known Allergies    No current facility-administered medications on file prior to encounter.     Current Outpatient Medications on File Prior to Encounter   Medication Sig    ANORO ELLIPTA 62.5-25 mcg/actuation DsDv     apixaban (ELIQUIS) 5 mg Tab Take 5 mg by mouth 2 (two) times daily.    ascorbic acid, vitamin C, (VITAMIN C) 1000 MG tablet Take 1,000 mg by mouth once daily.    aspirin (ECOTRIN) 81 MG EC tablet Take 81 mg by mouth once daily.    b complex vitamins tablet Take by mouth once daily.    diphenoxylate-atropine 2.5-0.025 mg (LOMOTIL) 2.5-0.025 mg per tablet Take 1 tablet by mouth 2 (two) times daily as needed for Diarrhea.    ergocalciferol, vitamin D2, (VITAMIN D ORAL) Take 1,000 capsules by mouth once daily.    ezetimibe (ZETIA) 10 mg tablet Take 10 mg by mouth once daily.    FLUoxetine 20 MG capsule Take 20 mg by mouth once daily.    gabapentin (NEURONTIN) 300 MG capsule Take 300 mg by mouth 2 (two) times daily.    HYDROcodone-acetaminophen (NORCO)  mg per tablet TAKE 1 TABLET BY MOUTH TWICE DAILY AS NEEDED FOR PAIN WILL MAKE DROWSY    lovastatin (MEVACOR) 10 MG tablet Take 10 mg by mouth every evening.    senna (SENOKOT) 8.6 mg tablet Take 1 tablet by mouth 2 (two) times daily.     tamsulosin (FLOMAX) 0.4 mg Cap Take 0.4 mg by mouth once daily.    torsemide (DEMADEX) 20 MG Tab Take 20 mg by mouth 2 (two) times a day.    carvediloL (COREG) 12.5 MG tablet Take 12.5 mg by mouth 2 (two) times daily with meals.    carvediloL (COREG) 6.25 MG tablet SMARTSI Tablet(s) By Mouth Every 12 Hours    HYDROcodone-acetaminophen (NORCO) 5-325 mg per tablet Take 1 tablet by mouth every 4 (four) hours as needed for Pain.    levoFLOXacin (LEVAQUIN) 500 MG tablet Take 500 mg by mouth once daily.    ondansetron  (ZOFRAN) 4 MG tablet Take 1 tablet (4 mg total) by mouth every 12 (twelve) hours as needed for Nausea.    OXcarbazepine (TRILEPTAL) 600 MG Tab Take 600 mg by mouth 2 (two) times daily.    potassium chloride SA (K-DUR,KLOR-CON) 20 MEQ tablet Take 20 mEq by mouth once daily.    promethazine-dextromethorphan (PROMETHAZINE-DM) 6.25-15 mg/5 mL Syrp TAKE 1 TEASPOONFUL BY MOUTH EVERY 6 HOURS AS NEEDED WILL MAKE DROWSY     Family History    None       Tobacco Use    Smoking status: Former Smoker    Smokeless tobacco: Never Used   Substance and Sexual Activity    Alcohol use: Not Currently    Drug use: Not on file    Sexual activity: Not on file     Review of Systems   Constitutional: Positive for activity change, appetite change and fatigue.   Respiratory: Positive for cough, shortness of breath and wheezing.    Cardiovascular: Positive for leg swelling. Negative for chest pain.   Gastrointestinal: Positive for abdominal distention.   Musculoskeletal: Positive for arthralgias and myalgias.   Neurological: Positive for weakness.     Objective:     Vital Signs (Most Recent):  Temp: 97.6 °F (36.4 °C) (01/03/22 1130)  Pulse: 85 (01/03/22 1130)  Resp: 20 (01/03/22 1130)  BP: 125/61 (01/03/22 1130)  SpO2: (!) 94 % (01/03/22 1130) Vital Signs (24h Range):  Temp:  [97.6 °F (36.4 °C)-98 °F (36.7 °C)] 97.6 °F (36.4 °C)  Pulse:  [] 85  Resp:  [18-20] 20  SpO2:  [93 %-98 %] 94 %  BP: (125-173)/(61-89) 125/61     Weight: 112.2 kg (247 lb 6.4 oz)  Body mass index is 35.5 kg/m².    Physical Exam  Constitutional:       Appearance: He is obese. He is ill-appearing.   HENT:      Head: Normocephalic.      Nose: Nose normal.      Mouth/Throat:      Mouth: Mucous membranes are moist.   Cardiovascular:      Rate and Rhythm: Normal rate. Rhythm irregular.      Pulses: Normal pulses.   Pulmonary:      Breath sounds: Wheezing and rhonchi present.   Abdominal:      General: Bowel sounds are normal.   Musculoskeletal:      Right lower  leg: Edema present.      Left lower leg: Edema present.   Skin:         Neurological:      Mental Status: He is alert. Mental status is at baseline.             Significant Labs:   All pertinent labs within the past 24 hours have been reviewed.  Blood Culture:   No results for input(s): LABBLOO in the last 48 hours.  CBC:   Recent Labs   Lab 01/02/22  0519 01/03/22  0512   WBC 10.07 11.79   HGB 11.4* 12.0*   HCT 35.5* 37.0*    184     CMP:   Recent Labs   Lab 01/02/22  0519 01/03/22  0512    145   K 3.8 3.6    106   CO2 30* 33*   * 116*   BUN 34* 37*   CREATININE 2.0* 2.0*   CALCIUM 9.9 10.0   PROT 6.9 7.3   ALBUMIN 2.8* 3.0*   BILITOT 0.5 0.5   ALKPHOS 60 52*   AST 15 14   ALT 18 19   ANIONGAP 7* 6*   EGFRNONAA 30.8* 30.8*     Lactic Acid:   No results for input(s): LACTATE in the last 48 hours.  Troponin: No results for input(s): TROPONINI in the last 48 hours.  TSH:   Recent Labs   Lab 12/28/21  1252   TSH 0.857     Urine Culture: No results for input(s): LABURIN in the last 48 hours.  Urine Studies:   No results for input(s): COLORU, APPEARANCEUA, PHUR, SPECGRAV, PROTEINUA, GLUCUA, KETONESU, BILIRUBINUA, OCCULTUA, NITRITE, UROBILINOGEN, LEUKOCYTESUR, RBCUA, WBCUA, BACTERIA, SQUAMEPITHEL, HYALINECASTS in the last 48 hours.    Invalid input(s): WRIGHTSUR    Significant Imaging: Chest xray:Right lower lung zone opacity, corresponding with mass identified on recent chest CT.  Mass appears increased in size from prior exam, may reflect interval increase in size of mass and or associated atelectasis or pneumonia.        Assessment/Plan:      * Pneumonia  Zosyn and zyvox, duo nebs, oxygen per protocol, mucinex, cough medication and monitor. Patient has failed multiple outpatient treatments    12/30/21 LFC continue with treatment     1/3/22 LFC continue treatment       Hypokalemia  Replace and monitor.       Wound of left leg  Consult wound care      Edema  Continue lasix drip and consult wound  care of unna boots        DVT prophylaxis  On eliquis      Weakness  Likely secondary to acute illness, monitor and incorporate PT when appropriate    12/30/21 LF add PT/OT eval and treat    1/3/22 LF continue PT/OT      Acute pulmonary edema  Lasix drip add zaroxolyn      Acute on chronic congestive heart failure  Start lasix drip and consult cardiology for assistance in management, accurate I/o's    12/30/21 LFC continue lasix drip, diuresing well    1/3/22 LFC: patient still + on I/o's will add zaroxolyn and monitor. Will also scan bladder post void         VTE Risk Mitigation (From admission, onward)         Ordered     IP VTE HIGH RISK PATIENT  Once         12/28/21 1805     Place sequential compression device  Until discontinued         12/28/21 1805     apixaban tablet 5 mg  2 times daily         12/28/21 1607                Discharge Planning   GLORY:      Code Status: Full Code   Is the patient medically ready for discharge?:     Reason for patient still in hospital (select all that apply): Patient trending condition, Laboratory test, Treatment and PT / OT recommendations  Discharge Plan A: Home with family                  Rosalva Ventura NP  Department of Hospital Medicine   [facility]

## 2022-01-04 LAB
ALBUMIN SERPL BCP-MCNC: 3.1 G/DL (ref 3.5–5.2)
ALP SERPL-CCNC: 52 U/L (ref 55–135)
ALT SERPL W/O P-5'-P-CCNC: 19 U/L (ref 10–44)
ANION GAP SERPL CALC-SCNC: 10 MMOL/L (ref 8–16)
AST SERPL-CCNC: 16 U/L (ref 10–40)
BASOPHILS # BLD AUTO: 0.09 K/UL (ref 0–0.2)
BASOPHILS NFR BLD: 0.7 % (ref 0–1.9)
BILIRUB SERPL-MCNC: 0.7 MG/DL (ref 0.1–1)
BUN SERPL-MCNC: 42 MG/DL (ref 8–23)
CALCIUM SERPL-MCNC: 10.4 MG/DL (ref 8.7–10.5)
CHLORIDE SERPL-SCNC: 104 MMOL/L (ref 95–110)
CO2 SERPL-SCNC: 30 MMOL/L (ref 23–29)
CREAT SERPL-MCNC: 2.1 MG/DL (ref 0.5–1.4)
DIFFERENTIAL METHOD: ABNORMAL
EOSINOPHIL # BLD AUTO: 0.7 K/UL (ref 0–0.5)
EOSINOPHIL NFR BLD: 5.5 % (ref 0–8)
ERYTHROCYTE [DISTWIDTH] IN BLOOD BY AUTOMATED COUNT: 15.6 % (ref 11.5–14.5)
EST. GFR  (AFRICAN AMERICAN): 33.6 ML/MIN/1.73 M^2
EST. GFR  (NON AFRICAN AMERICAN): 29.1 ML/MIN/1.73 M^2
GLUCOSE SERPL-MCNC: 115 MG/DL (ref 70–110)
HCT VFR BLD AUTO: 37 % (ref 40–54)
HGB BLD-MCNC: 12 G/DL (ref 14–18)
IMM GRANULOCYTES # BLD AUTO: 0.04 K/UL (ref 0–0.04)
IMM GRANULOCYTES NFR BLD AUTO: 0.3 % (ref 0–0.5)
LYMPHOCYTES # BLD AUTO: 1.7 K/UL (ref 1–4.8)
LYMPHOCYTES NFR BLD: 14.4 % (ref 18–48)
MAGNESIUM SERPL-MCNC: 2.5 MG/DL (ref 1.6–2.6)
MCH RBC QN AUTO: 32.5 PG (ref 27–31)
MCHC RBC AUTO-ENTMCNC: 32.4 G/DL (ref 32–36)
MCV RBC AUTO: 100 FL (ref 82–98)
MONOCYTES # BLD AUTO: 0.9 K/UL (ref 0.3–1)
MONOCYTES NFR BLD: 7.1 % (ref 4–15)
NEUTROPHILS # BLD AUTO: 8.7 K/UL (ref 1.8–7.7)
NEUTROPHILS NFR BLD: 72 % (ref 38–73)
NRBC BLD-RTO: 0 /100 WBC
PLATELET # BLD AUTO: 187 K/UL (ref 150–450)
PMV BLD AUTO: 10.7 FL (ref 9.2–12.9)
POTASSIUM SERPL-SCNC: 3.4 MMOL/L (ref 3.5–5.1)
PROT SERPL-MCNC: 7.5 G/DL (ref 6–8.4)
RBC # BLD AUTO: 3.69 M/UL (ref 4.6–6.2)
SODIUM SERPL-SCNC: 144 MMOL/L (ref 136–145)
WBC # BLD AUTO: 12.05 K/UL (ref 3.9–12.7)

## 2022-01-04 PROCEDURE — 97110 THERAPEUTIC EXERCISES: CPT

## 2022-01-04 PROCEDURE — 99900035 HC TECH TIME PER 15 MIN (STAT)

## 2022-01-04 PROCEDURE — 99900031 HC PATIENT EDUCATION (STAT)

## 2022-01-04 PROCEDURE — 94640 AIRWAY INHALATION TREATMENT: CPT

## 2022-01-04 PROCEDURE — 25000003 PHARM REV CODE 250: Performed by: NURSE PRACTITIONER

## 2022-01-04 PROCEDURE — 83735 ASSAY OF MAGNESIUM: CPT | Performed by: NURSE PRACTITIONER

## 2022-01-04 PROCEDURE — 97530 THERAPEUTIC ACTIVITIES: CPT

## 2022-01-04 PROCEDURE — C1751 CATH, INF, PER/CENT/MIDLINE: HCPCS

## 2022-01-04 PROCEDURE — 36410 VNPNXR 3YR/> PHY/QHP DX/THER: CPT

## 2022-01-04 PROCEDURE — 25000242 PHARM REV CODE 250 ALT 637 W/ HCPCS: Performed by: NURSE PRACTITIONER

## 2022-01-04 PROCEDURE — 11000001 HC ACUTE MED/SURG PRIVATE ROOM

## 2022-01-04 PROCEDURE — 85025 COMPLETE CBC W/AUTO DIFF WBC: CPT | Performed by: NURSE PRACTITIONER

## 2022-01-04 PROCEDURE — 63600175 PHARM REV CODE 636 W HCPCS: Performed by: INTERNAL MEDICINE

## 2022-01-04 PROCEDURE — 63600175 PHARM REV CODE 636 W HCPCS: Performed by: NURSE PRACTITIONER

## 2022-01-04 PROCEDURE — 80053 COMPREHEN METABOLIC PANEL: CPT | Performed by: NURSE PRACTITIONER

## 2022-01-04 PROCEDURE — 25000003 PHARM REV CODE 250: Performed by: INTERNAL MEDICINE

## 2022-01-04 PROCEDURE — 94761 N-INVAS EAR/PLS OXIMETRY MLT: CPT

## 2022-01-04 RX ADMIN — FAMOTIDINE 20 MG: 20 TABLET ORAL at 08:01

## 2022-01-04 RX ADMIN — HYDROCODONE BITARTRATE AND ACETAMINOPHEN 1 TABLET: 5; 325 TABLET ORAL at 03:01

## 2022-01-04 RX ADMIN — IPRATROPIUM BROMIDE AND ALBUTEROL SULFATE 3 ML: 2.5; .5 SOLUTION RESPIRATORY (INHALATION) at 07:01

## 2022-01-04 RX ADMIN — GUAIFENESIN 600 MG: 600 TABLET, EXTENDED RELEASE ORAL at 08:01

## 2022-01-04 RX ADMIN — IPRATROPIUM BROMIDE AND ALBUTEROL SULFATE 3 ML: 2.5; .5 SOLUTION RESPIRATORY (INHALATION) at 04:01

## 2022-01-04 RX ADMIN — PRAVASTATIN SODIUM 10 MG: 10 TABLET ORAL at 08:01

## 2022-01-04 RX ADMIN — GUAIFENESIN 200 MG: 200 SOLUTION ORAL at 03:01

## 2022-01-04 RX ADMIN — CARVEDILOL 6.25 MG: 3.12 TABLET, FILM COATED ORAL at 08:01

## 2022-01-04 RX ADMIN — IPRATROPIUM BROMIDE AND ALBUTEROL SULFATE 3 ML: 2.5; .5 SOLUTION RESPIRATORY (INHALATION) at 12:01

## 2022-01-04 RX ADMIN — ASPIRIN 81 MG: 81 TABLET, COATED ORAL at 08:01

## 2022-01-04 RX ADMIN — GABAPENTIN 300 MG: 300 CAPSULE ORAL at 08:01

## 2022-01-04 RX ADMIN — GUAIFENESIN 200 MG: 200 SOLUTION ORAL at 08:01

## 2022-01-04 RX ADMIN — PIPERACILLIN AND TAZOBACTAM 4.5 G: 4; .5 INJECTION, POWDER, LYOPHILIZED, FOR SOLUTION INTRAVENOUS; PARENTERAL at 09:01

## 2022-01-04 RX ADMIN — METOLAZONE 5 MG: 5 TABLET ORAL at 08:01

## 2022-01-04 RX ADMIN — HYDROCODONE BITARTRATE AND ACETAMINOPHEN 1 TABLET: 5; 325 TABLET ORAL at 08:01

## 2022-01-04 RX ADMIN — OXCARBAZEPINE 600 MG: 300 TABLET, FILM COATED ORAL at 08:01

## 2022-01-04 RX ADMIN — POTASSIUM CHLORIDE 40 MEQ: 1500 TABLET, EXTENDED RELEASE ORAL at 08:01

## 2022-01-04 RX ADMIN — PIPERACILLIN AND TAZOBACTAM 4.5 G: 4; .5 INJECTION, POWDER, LYOPHILIZED, FOR SOLUTION INTRAVENOUS; PARENTERAL at 05:01

## 2022-01-04 RX ADMIN — APIXABAN 5 MG: 5 TABLET, FILM COATED ORAL at 08:01

## 2022-01-04 RX ADMIN — POTASSIUM CHLORIDE 40 MEQ: 1500 TABLET, EXTENDED RELEASE ORAL at 10:01

## 2022-01-04 RX ADMIN — EZETIMIBE 10 MG: 10 TABLET ORAL at 08:01

## 2022-01-04 RX ADMIN — PIPERACILLIN AND TAZOBACTAM 4.5 G: 4; .5 INJECTION, POWDER, LYOPHILIZED, FOR SOLUTION INTRAVENOUS; PARENTERAL at 04:01

## 2022-01-04 RX ADMIN — LINEZOLID 600 MG: 600 INJECTION, SOLUTION INTRAVENOUS at 08:01

## 2022-01-04 RX ADMIN — FUROSEMIDE 10 MG/HR: 10 INJECTION, SOLUTION INTRAMUSCULAR; INTRAVENOUS at 08:01

## 2022-01-04 RX ADMIN — TAMSULOSIN HYDROCHLORIDE 0.4 MG: 0.4 CAPSULE ORAL at 08:01

## 2022-01-04 RX ADMIN — FLUOXETINE HYDROCHLORIDE 20 MG: 20 CAPSULE ORAL at 08:01

## 2022-01-04 NOTE — PHYSICIAN QUERY
PT Name: Berny Rebolledo  MR #: [id number]     DOCUMENTATION CLARIFICATION     CDS/: Patricia Matthews RN CDIS               Contact information: [email or web address]    This form is a permanent document in the medical record.     Query Date: January 3, 2022    By submitting this query, we are merely seeking further clarification of documentation.  Please utilize your independent clinical judgment when addressing the question(s) below.    The Medical Record contains the following   Indicators Supporting Clinical Findings Location in Medical Record   x Heart Failure documented    Acute on chronic congestive heart failure  Start lasix drip and consult cardiology for assistance in management, accurate I/o's     12/30/21 LFC continue lasix drip, diuresing well     1/3/22 LFC: patient still + on I/o's will add zaroxolyn and monitor. Will also scan bladder post void    HM note 1/3    x BNP Results for BERNY REBOLLEDO (MRN [id number]) as of 1/3/2022 18:18   Ref. Range 12/28/2021 12:52   NT-proBNP Latest Ref Range: 5 - 1800 pg/mL 3179 (H)    Labs    x EF/Echo The estimated ejection fraction is 65%. Concentric hypertrophy and normal systolic function.  Normal left ventricular diastolic function.  Mild right ventricular enlargement.  Mild right atrial enlargement.  Mild-to-moderate mitral regurgitation.  Mild to moderate tricuspid regurgitation.  The estimated PA systolic pressure is 34 mmHg.     TTE 12/29    x Radiology findings   Right lower lung zone opacity, corresponding with mass identified on recent chest CT.  Mass appears increased in size from prior exam, may reflect interval increase in size of mass and or associated atelectasis or pneumonia.   CXR 12/28    x Subjective/Objective Respiratory Conditions      Breath sounds: Wheezing and rhonchi present   H&P     Recent/Current MI      Heart Transplant, LVAD     x Edema, JVD Musculoskeletal:      Right lower leg: Edema present.      Left lower leg: Edema present. H&P      Ascites     x Diuretics/Meds furosemide (LASIX) 200 mg in dextrose 5 % 100 mL continuous infusion (conc: 2 mg/mL)  Rate: 5 mL/hr Dose: 10 mg/hr  Freq: Continuous Route: IV  Start: 12/29/21 1015    metOLazone tablet 5 mg  Dose: 5 mg  Freq: Daily Route: Oral  Start: 01/03/22 1300    furosemide injection 80 mg  Dose: 80 mg  Freq: ED 1 Time Route: IV  Start: 12/28/21 1230 End: 12/28/21 1249 MAR             MAR           MAR     Other Treatment      Other       Heart failure is a clinical diagnosis which includes symptomatic fluid retention, elevated intracardiac pressures, and/or the inability of the heart to deliver adequate blood flow.    Heart Failure with reduced Ejection Fraction (HFrEF) or Systolic Heart Failure (loses ability to contract normally, EF is <40%)    Heart Failure with preserved Ejection Fraction (HFpEF) or Diastolic Heart Failure (stiff ventricles, does not relax properly, EF is >50%)     Heart Failure with Combined Systolic and Diastolic Failure (stiff ventricles, does not relax properly and EF is <50%)    Mid-range or mildly reduced ejection fraction (HFmrEF) is classified as systolic heart failure.   Common clues to acute exacerbation:  Rapidly progressive symptoms (w/in 2 weeks of presentation), using IV diuretics, using supplemental O2, pulmonary edema on Xray, new or worsening pleural effusion, +JVD or other signs of volume overload, MI w/in 4 weeks, and/or BNP >500  The clinical guidelines noted are only system guidelines, and do not replace the providers clinical judgment.    Provider, please specify the diagnosis associated with the above clinical findings.    [  X ]  Acute on Chronic Diastolic Heart Failure (HFpEF) - worsening of CHF signs/symptoms in preexisting CHF   [   ]  Other (please specify): ___________________________________   [  ]  Clinically Undetermined         Please document in your progress notes daily for the duration of treatment until resolved and include in your  discharge summary.    References:  American Heart Association editorial staff. (2017, May). Ejection Fraction Heart Failure Measurement. American Heart Association. https://www.heart.org/en/health-topics/heart-failure/diagnosing-heart-failure/ejection-fraction-heart-failure-measurement#:~:text=Ejection%20fraction%20(EF)%20is%20a,pushed%20out%20with%20each%20heartbeat  TORSTEN Valderrama (2020, December 15). Heart failure with preserved ejection fraction: Clinical manifestations and diagnosis. [employer]. https://www.[employer].MMIT/contents/heart-failure-with-preserved-ejection-fraction-clinical-manifestations-and-diagnosis.  ICD-10-CM/PCS Coding Clinic Third Quarter ICD-10, Effective with discharges: September 8, 2020 [facility] § Heart failure with mid-range or mildly reduced ejection fraction (2020).  Form No. 52784

## 2022-01-04 NOTE — ASSESSMENT & PLAN NOTE
Zosyn and zyvox, duo nebs, oxygen per protocol, mucinex, cough medication and monitor. Patient has failed multiple outpatient treatments    12/30/21 L[facility] continue with treatment     1/3/22 [facility] continue treatment     1/4/22 [facility] continue treatment

## 2022-01-04 NOTE — PT/OT/SLP PROGRESS
Physical Therapy Treatment    Patient Name:  Jamey Lei   MRN:  [id number]    Recommendations:     Discharge Recommendations:      Discharge Equipment Recommendations:     Barriers to discharge: weakness and decreased fxnl mobility    Assessment:     Jamey Lei is a 81 y.o. male admitted with a medical diagnosis of Pneumonia.  He presents with the following impairments/functional limitations:  impaired endurance,weakness,impaired functional mobilty . Pt was still feeling very weak from earlier walk. So he performed standing BLE exs.    Rehab Prognosis: Good; patient would benefit from acute skilled PT services to address these deficits and reach maximum level of function.    Recent Surgery: * No surgery found *      Plan:     During this hospitalization, patient to be seen 6 x/week to address the identified rehab impairments via gait training,therapeutic activities,therapeutic exercises and progress toward the following goals:    · Plan of Care Expires:       Subjective     Chief Complaint: weakness  Patient/Family Comments/goals:   Pain/Comfort:  ·        Objective:     Communicated with nurse prior to session.  Patient found supine with   upon PT entry to room.     General Precautions: Standard, fall   Orthopedic Precautions:    Braces:    Respiratory Status: Room air     Functional Mobility:  · Transfers:     · Sit to Stand:  contact guard assistance with rolling walker  · Bed to Chair: contact guard assistance with  rolling walker  using  Step Transfer      AM-PAC 6 CLICK MOBILITY          Therapeutic Activities and Exercises:   pt performed 2 sets of 15 BLEexs in supported standing    Patient left supine with call button in reach..    GOALS:   Multidisciplinary Problems     Physical Therapy Goals     Not on file                Time Tracking:     PT Received On:    PT Start Time: 1540     PT Stop Time: 1555  PT Total Time (min): 15 min     Billable Minutes: Therapeutic Activity 15             PTA Visit  Number: 1     01/04/2022

## 2022-01-04 NOTE — PROGRESS NOTES
[facility] Medicine  Progress Note    Patient Name: aJmey Lei  MRN: [id number]  Patient Class: IP- Inpatient   Admission Date: 12/28/2021  Length of Stay: 7 days  Attending Physician: Navi Domínguez III, MD  Primary Care Provider: Navi Domínguez III, MD        Subjective:     Principal Problem:Pneumonia        HPI:   Shortness of Breath       Pt sent to ED via Dr. Domínguez for admission for unsuccessful outpatient treatment of pneumonia/heart failure. Pt stated that he has been sick for the past month or so.       81-year-old male with history of AFib on Eliquis, hypertension referred from PCP office for admission due to failure of outpatient therapy for pneumonia and heart failure.  Patient says that he has been feeling weak for over a month despite finishing ends antibiotics and being on his medication.  Patient denies any chest pain but does endorse orthopnea, bilateral lower extremity swelling, dyspnea on exertion, chronic cough      Overview/Hospital Course:  12/30/21 LFC patient reports that he is still having significant coughing, no shortness of breath, sitting up in recliner. Cardiology following, on lasix drip, significant edema and wounds to lower ext. Will continue lasix drip, continue abx for pneumonia, have wound care see, consider unna boots    12/31/21 CG: Still with cough, says he is still swollen though this has gotten better with lasix and wraps.     1/1/22 CG: Legs still wrapped, no acute overnight events. Still fluid overloaded    1/2/22 CG: Still with coarse breath sounds. Gentle diuresis    1/3/22 LFC Still with coarse breath sounds and coughing. Sitting up in the recliner with legs elevated. Patient still positive with I/o's get bladder scan and add zaroxolyn     1/4/22 LFC despite orders still no accurate I/o's have discussed with patient. Today he reports that he is feeling better, less coughing      Past Medical History:   Diagnosis Date    Arthritis     Brain tumor      Coronary artery disease     Encounter for blood transfusion     Hypertension     Neuropathy     Renal disorder        Past Surgical History:   Procedure Laterality Date    APPENDECTOMY      HIP SURGERY      KNEE SURGERY      SHOULDER SURGERY      triple bypass         Review of patient's allergies indicates:  No Known Allergies    No current facility-administered medications on file prior to encounter.     Current Outpatient Medications on File Prior to Encounter   Medication Sig    ANORO ELLIPTA 62.5-25 mcg/actuation DsDv     apixaban (ELIQUIS) 5 mg Tab Take 5 mg by mouth 2 (two) times daily.    ascorbic acid, vitamin C, (VITAMIN C) 1000 MG tablet Take 1,000 mg by mouth once daily.    aspirin (ECOTRIN) 81 MG EC tablet Take 81 mg by mouth once daily.    b complex vitamins tablet Take by mouth once daily.    diphenoxylate-atropine 2.5-0.025 mg (LOMOTIL) 2.5-0.025 mg per tablet Take 1 tablet by mouth 2 (two) times daily as needed for Diarrhea.    ergocalciferol, vitamin D2, (VITAMIN D ORAL) Take 1,000 capsules by mouth once daily.    ezetimibe (ZETIA) 10 mg tablet Take 10 mg by mouth once daily.    FLUoxetine 20 MG capsule Take 20 mg by mouth once daily.    gabapentin (NEURONTIN) 300 MG capsule Take 300 mg by mouth 2 (two) times daily.    HYDROcodone-acetaminophen (NORCO)  mg per tablet TAKE 1 TABLET BY MOUTH TWICE DAILY AS NEEDED FOR PAIN WILL MAKE DROWSY    lovastatin (MEVACOR) 10 MG tablet Take 10 mg by mouth every evening.    senna (SENOKOT) 8.6 mg tablet Take 1 tablet by mouth 2 (two) times daily.     tamsulosin (FLOMAX) 0.4 mg Cap Take 0.4 mg by mouth once daily.    torsemide (DEMADEX) 20 MG Tab Take 20 mg by mouth 2 (two) times a day.    carvediloL (COREG) 12.5 MG tablet Take 12.5 mg by mouth 2 (two) times daily with meals.    carvediloL (COREG) 6.25 MG tablet SMARTSI Tablet(s) By Mouth Every 12 Hours    HYDROcodone-acetaminophen (NORCO) 5-325 mg per tablet Take 1 tablet  by mouth every 4 (four) hours as needed for Pain.    levoFLOXacin (LEVAQUIN) 500 MG tablet Take 500 mg by mouth once daily.    ondansetron (ZOFRAN) 4 MG tablet Take 1 tablet (4 mg total) by mouth every 12 (twelve) hours as needed for Nausea.    OXcarbazepine (TRILEPTAL) 600 MG Tab Take 600 mg by mouth 2 (two) times daily.    potassium chloride SA (K-DUR,KLOR-CON) 20 MEQ tablet Take 20 mEq by mouth once daily.    promethazine-dextromethorphan (PROMETHAZINE-DM) 6.25-15 mg/5 mL Syrp TAKE 1 TEASPOONFUL BY MOUTH EVERY 6 HOURS AS NEEDED WILL MAKE DROWSY     Family History    None       Tobacco Use    Smoking status: Former Smoker    Smokeless tobacco: Never Used   Substance and Sexual Activity    Alcohol use: Not Currently    Drug use: Not on file    Sexual activity: Not on file     Review of Systems   Constitutional: Positive for activity change, appetite change and fatigue.   Respiratory: Positive for cough, shortness of breath and wheezing.    Cardiovascular: Positive for leg swelling. Negative for chest pain.   Gastrointestinal: Positive for abdominal distention.   Musculoskeletal: Positive for arthralgias and myalgias.   Neurological: Positive for weakness.     Objective:     Vital Signs (Most Recent):  Temp: 98.9 °F (37.2 °C) (01/04/22 0723)  Pulse: 100 (01/04/22 0744)  Resp: 20 (01/04/22 0828)  BP: (!) 157/72 (01/04/22 0723)  SpO2: (!) 93 % (01/04/22 0744) Vital Signs (24h Range):  Temp:  [97.4 °F (36.3 °C)-98.9 °F (37.2 °C)] 98.9 °F (37.2 °C)  Pulse:  [] 100  Resp:  [18-20] 20  SpO2:  [93 %-97 %] 93 %  BP: (125-195)/(61-88) 157/72     Weight: 112.2 kg (247 lb 6.4 oz)  Body mass index is 35.5 kg/m².    Physical Exam  Constitutional:       Appearance: He is obese. He is ill-appearing.   HENT:      Head: Normocephalic.      Nose: Nose normal.      Mouth/Throat:      Mouth: Mucous membranes are moist.   Cardiovascular:      Rate and Rhythm: Normal rate. Rhythm irregular.      Pulses: Normal pulses.    Pulmonary:      Breath sounds: Wheezing and rhonchi present.   Abdominal:      General: Bowel sounds are normal.   Musculoskeletal:      Right lower leg: Edema present.      Left lower leg: Edema present.   Skin:         Neurological:      Mental Status: He is alert. Mental status is at baseline.             Significant Labs:   All pertinent labs within the past 24 hours have been reviewed.  Blood Culture:   No results for input(s): LABBLOO in the last 48 hours.  CBC:   Recent Labs   Lab 01/03/22  0512 01/04/22  0544   WBC 11.79 12.05   HGB 12.0* 12.0*   HCT 37.0* 37.0*    187     CMP:   Recent Labs   Lab 01/03/22  0512 01/04/22  0544    144   K 3.6 3.4*    104   CO2 33* 30*   * 115*   BUN 37* 42*   CREATININE 2.0* 2.1*   CALCIUM 10.0 10.4   PROT 7.3 7.5   ALBUMIN 3.0* 3.1*   BILITOT 0.5 0.7   ALKPHOS 52* 52*   AST 14 16   ALT 19 19   ANIONGAP 6* 10   EGFRNONAA 30.8* 29.1*     Lactic Acid:   No results for input(s): LACTATE in the last 48 hours.  Troponin: No results for input(s): TROPONINI in the last 48 hours.  TSH:   Recent Labs   Lab 12/28/21  1252   TSH 0.857       Significant Imaging: Chest xray:Right lower lung zone opacity, corresponding with mass identified on recent chest CT.  Mass appears increased in size from prior exam, may reflect interval increase in size of mass and or associated atelectasis or pneumonia.        Assessment/Plan:      * Pneumonia  Zosyn and zyvox, duo nebs, oxygen per protocol, mucinex, cough medication and monitor. Patient has failed multiple outpatient treatments    12/30/21 LFC continue with treatment     1/3/22 LFC continue treatment     1/4/22 LFC continue treatment      Hypokalemia  Replace and monitor.       Wound of left leg  Consult wound care      Edema  Continue lasix drip and consult wound care of unna boots        DVT prophylaxis  On eliquis      Weakness  Likely secondary to acute illness, monitor and incorporate PT when  appropriate    12/30/21 LF add PT/OT eval and treat    1/3/22 LF continue PT/OT    1/4/22 LF continue Pt/OT      Acute pulmonary edema  Lasix drip add zaroxolyn    1/4/22 LF continue treatment      Acute on chronic congestive heart failure  Start lasix drip and consult cardiology for assistance in management, accurate I/o's    12/30/21 LF continue lasix drip, diuresing well    1/3/22 LFC: patient still + on I/o's will add zaroxolyn and monitor. Will also scan bladder post void     1/4/22 LFC: encouraged accurate I/o's. Post void bladder scan 233      VTE Risk Mitigation (From admission, onward)         Ordered     IP VTE HIGH RISK PATIENT  Once         12/28/21 1805     Place sequential compression device  Until discontinued         12/28/21 1805     apixaban tablet 5 mg  2 times daily         12/28/21 1607                Discharge Planning   GLORY:      Code Status: Full Code   Is the patient medically ready for discharge?:     Reason for patient still in hospital (select all that apply): Patient trending condition, Laboratory test, Treatment and PT / OT recommendations  Discharge Plan A: Home with family                  Rosalva Ventura NP  Department of Hospital Medicine   [facility]

## 2022-01-04 NOTE — ASSESSMENT & PLAN NOTE
Start lasix drip and consult cardiology for assistance in management, accurate I/o's    12/30/21 LFC continue lasix drip, diuresing well    1/3/22 LFC: patient still + on I/o's will add zaroxolyn and monitor. Will also scan bladder post void     1/4/22 LFC: encouraged accurate I/o's. Post void bladder scan 233

## 2022-01-04 NOTE — PHYSICIAN QUERY
PT Name: Jamey Lei  MR #: [id number]    DOCUMENTATION CLARIFICATION     CDS/: Patricia Matthews RN CDIS              Contact information: [email or web address]      This form is a permanent document in the medical record.     Query Date: January 3, 2022    By submitting this query, we are merely seeking further clarification of documentation. Please utilize your independent clinical judgment when addressing the question(s) below.    The Medical Record contains the following:   Indicator   Supporting Clinical Findings Location in Medical Record   x Ulcer/Injury Wound of left leg  Consult wound care HM note 1/3    x Wound Care Consult  01/03/22 1037        Altered Skin Integrity 12/28/21 2124 Left anterior;distal;lower Leg #1 Venous Ulcer Partial thickness tissue loss. Shallow open ulcer with a red or pink wound bed, without slough. Intact or Open/Ruptured Serum-filled blister.   Date First Assessed/Time First Assessed: 12/28/21 2124   Altered Skin Integrity Present on Admission: yes  Side: Left  Orientation: anterior;distal;lower  Location: Leg  Wound Number: #1  Is this injury device related?: No  Primary Wound Type: Venous ...   Wound Image    Description of Altered Skin Integrity Partial thickness tissue loss. Shallow open ulcer with a red or pink wound bed, without slough. Intact or Open/Ruptured Serum-filled blister.   Dressing Appearance Moist drainage   Drainage Amount Small   Drainage Characteristics/Odor Yellow;Serosanguineous   Appearance Red;Pink;Moist   Tissue loss description Partial thickness   Periwound Area Hemosiderin Staining;Swelling   Wound Edges Defined   Wound Length (cm) 2.5 cm   Wound Width (cm) 1.4 cm   Wound Depth (cm) 0.05 cm   Wound Volume (cm^3) 0.175 cm^3   Wound Surface Area (cm^2) 3.5 cm^2   Care Cleansed with:;Wound cleanser   Dressing Applied;Hydrofiber  (Applied Hydrofiber with silver to wound bed)   Compression Unna's Boot   Dressing Change Due 01/06/22      Wound care note  1/3     Radiology Findings      Acute/Chronic Illness      Medication/Treatment      Other       Provider, please provide the integumentary diagnosis related to the documentation of the Left lower leg :     [X   ] Non-pressure ulcer, skin breakdown only   [   ] Non-pressure ulcer, exposed fat layer   [   ] Non-pressure ulcer, other depth (please specify): ___________   [   ] Other Integumentary Diagnosis (please specify): ___________   [  ] Clinically Undetermined       Please document in your progress notes daily for the duration of treatment, until resolved, and include in your discharge summary.    Form No. [id number]

## 2022-01-04 NOTE — ASSESSMENT & PLAN NOTE
Likely secondary to acute illness, monitor and incorporate PT when appropriate    12/30/21 LFC add PT/OT eval and treat    1/3/22 LFC continue PT/OT    1/4/22 LFC continue Pt/OT

## 2022-01-04 NOTE — SUBJECTIVE & OBJECTIVE
Past Medical History:   Diagnosis Date    Arthritis     Brain tumor     Coronary artery disease     Encounter for blood transfusion     Hypertension     Neuropathy     Renal disorder        Past Surgical History:   Procedure Laterality Date    APPENDECTOMY      HIP SURGERY      KNEE SURGERY      SHOULDER SURGERY      triple bypass         Review of patient's allergies indicates:  No Known Allergies    No current facility-administered medications on file prior to encounter.     Current Outpatient Medications on File Prior to Encounter   Medication Sig    ANORO ELLIPTA 62.5-25 mcg/actuation DsDv     apixaban (ELIQUIS) 5 mg Tab Take 5 mg by mouth 2 (two) times daily.    ascorbic acid, vitamin C, (VITAMIN C) 1000 MG tablet Take 1,000 mg by mouth once daily.    aspirin (ECOTRIN) 81 MG EC tablet Take 81 mg by mouth once daily.    b complex vitamins tablet Take by mouth once daily.    diphenoxylate-atropine 2.5-0.025 mg (LOMOTIL) 2.5-0.025 mg per tablet Take 1 tablet by mouth 2 (two) times daily as needed for Diarrhea.    ergocalciferol, vitamin D2, (VITAMIN D ORAL) Take 1,000 capsules by mouth once daily.    ezetimibe (ZETIA) 10 mg tablet Take 10 mg by mouth once daily.    FLUoxetine 20 MG capsule Take 20 mg by mouth once daily.    gabapentin (NEURONTIN) 300 MG capsule Take 300 mg by mouth 2 (two) times daily.    HYDROcodone-acetaminophen (NORCO)  mg per tablet TAKE 1 TABLET BY MOUTH TWICE DAILY AS NEEDED FOR PAIN WILL MAKE DROWSY    lovastatin (MEVACOR) 10 MG tablet Take 10 mg by mouth every evening.    senna (SENOKOT) 8.6 mg tablet Take 1 tablet by mouth 2 (two) times daily.     tamsulosin (FLOMAX) 0.4 mg Cap Take 0.4 mg by mouth once daily.    torsemide (DEMADEX) 20 MG Tab Take 20 mg by mouth 2 (two) times a day.    carvediloL (COREG) 12.5 MG tablet Take 12.5 mg by mouth 2 (two) times daily with meals.    carvediloL (COREG) 6.25 MG tablet SMARTSI Tablet(s) By Mouth Every 12 Hours     HYDROcodone-acetaminophen (NORCO) 5-325 mg per tablet Take 1 tablet by mouth every 4 (four) hours as needed for Pain.    levoFLOXacin (LEVAQUIN) 500 MG tablet Take 500 mg by mouth once daily.    ondansetron (ZOFRAN) 4 MG tablet Take 1 tablet (4 mg total) by mouth every 12 (twelve) hours as needed for Nausea.    OXcarbazepine (TRILEPTAL) 600 MG Tab Take 600 mg by mouth 2 (two) times daily.    potassium chloride SA (K-DUR,KLOR-CON) 20 MEQ tablet Take 20 mEq by mouth once daily.    promethazine-dextromethorphan (PROMETHAZINE-DM) 6.25-15 mg/5 mL Syrp TAKE 1 TEASPOONFUL BY MOUTH EVERY 6 HOURS AS NEEDED WILL MAKE DROWSY     Family History    None       Tobacco Use    Smoking status: Former Smoker    Smokeless tobacco: Never Used   Substance and Sexual Activity    Alcohol use: Not Currently    Drug use: Not on file    Sexual activity: Not on file     Review of Systems   Constitutional: Positive for activity change, appetite change and fatigue.   Respiratory: Positive for cough, shortness of breath and wheezing.    Cardiovascular: Positive for leg swelling. Negative for chest pain.   Gastrointestinal: Positive for abdominal distention.   Musculoskeletal: Positive for arthralgias and myalgias.   Neurological: Positive for weakness.     Objective:     Vital Signs (Most Recent):  Temp: 98.9 °F (37.2 °C) (01/04/22 0723)  Pulse: 100 (01/04/22 0744)  Resp: 20 (01/04/22 0828)  BP: (!) 157/72 (01/04/22 0723)  SpO2: (!) 93 % (01/04/22 0744) Vital Signs (24h Range):  Temp:  [97.4 °F (36.3 °C)-98.9 °F (37.2 °C)] 98.9 °F (37.2 °C)  Pulse:  [] 100  Resp:  [18-20] 20  SpO2:  [93 %-97 %] 93 %  BP: (125-195)/(61-88) 157/72     Weight: 112.2 kg (247 lb 6.4 oz)  Body mass index is 35.5 kg/m².    Physical Exam  Constitutional:       Appearance: He is obese. He is ill-appearing.   HENT:      Head: Normocephalic.      Nose: Nose normal.      Mouth/Throat:      Mouth: Mucous membranes are moist.   Cardiovascular:      Rate  and Rhythm: Normal rate. Rhythm irregular.      Pulses: Normal pulses.   Pulmonary:      Breath sounds: Wheezing and rhonchi present.   Abdominal:      General: Bowel sounds are normal.   Musculoskeletal:      Right lower leg: Edema present.      Left lower leg: Edema present.   Skin:         Neurological:      Mental Status: He is alert. Mental status is at baseline.             Significant Labs:   All pertinent labs within the past 24 hours have been reviewed.  Blood Culture:   No results for input(s): LABBLOO in the last 48 hours.  CBC:   Recent Labs   Lab 01/03/22  0512 01/04/22  0544   WBC 11.79 12.05   HGB 12.0* 12.0*   HCT 37.0* 37.0*    187     CMP:   Recent Labs   Lab 01/03/22  0512 01/04/22  0544    144   K 3.6 3.4*    104   CO2 33* 30*   * 115*   BUN 37* 42*   CREATININE 2.0* 2.1*   CALCIUM 10.0 10.4   PROT 7.3 7.5   ALBUMIN 3.0* 3.1*   BILITOT 0.5 0.7   ALKPHOS 52* 52*   AST 14 16   ALT 19 19   ANIONGAP 6* 10   EGFRNONAA 30.8* 29.1*     Lactic Acid:   No results for input(s): LACTATE in the last 48 hours.  Troponin: No results for input(s): TROPONINI in the last 48 hours.  TSH:   Recent Labs   Lab 12/28/21  1252   TSH 0.857       Significant Imaging: Chest xray:Right lower lung zone opacity, corresponding with mass identified on recent chest CT.  Mass appears increased in size from prior exam, may reflect interval increase in size of mass and or associated atelectasis or pneumonia.

## 2022-01-04 NOTE — PT/OT/SLP PROGRESS
Occupational Therapy   Treatment    Name: Jamey Lei  MRN: [id number]  Admitting Diagnosis:  Pneumonia       Recommendations:     Discharge Recommendations: other (see comments) (To be further determined based on progress prior to discharge.)  Discharge Equipment Recommendations:  none  Barriers to discharge:  Other (Comment) (Medical and functional status)    Assessment:     Jamey Lei is a 81 y.o. male with a medical diagnosis of Pneumonia. Performance deficits affecting function are weakness,impaired endurance,impaired self care skills,impaired functional mobilty,impaired balance,edema,impaired cardiopulmonary response to activity.     Rehab Prognosis:  Good; patient would benefit from acute skilled OT services to address these deficits and reach maximum level of function.       Plan:     Patient to be seen 6 x/week to address the above listed problems via self-care/home management,therapeutic activities,therapeutic exercises  · Plan of Care Expires: 01/07/22  · Plan of Care Reviewed with: patient    Subjective     Pain/Comfort:  · Pain Rating 1: 6/10  · Location - Side 1: Bilateral  · Location 1: knee  · Pain Addressed 1: Reposition,Cessation of Activity,Nurse notified,Other (see comments) ([employer] application)  · Pain Rating Post-Intervention 1: 5/10    Objective:     Communicated with: nurse prior to session.  Patient found up in chair with telemetry upon OT entry to room.    General Precautions: Standard, fall   Orthopedic Precautions:N/A   Braces:    Respiratory Status: Room air     Occupational Performance:     Functional Mobility/Transfers:  · Patient completed Sit <> Stand Transfer with stand by assistance  with  rolling walker   · Patient completed Bed <> Chair Transfer using Step Transfer technique with stand by assistance with rolling walker  · Patient completed Toilet Transfer Step Transfer technique with stand by assistance with  grab bars  · Functional Mobility: Pt ambulated 88' requiring  SBA utilizing RW.    Treatment & Education:  Pt was cooperative and motivated with moderate verbal encouragement secondary to bilateral knee pain. He participated in extensive functional transfer retraining to / from recliner, chair, and toilet emphasizing fall prevention and use of RW providing extra time with repetition requiring supervision secondary to SBA for safety with verbal cueing for safety awareness. Pt then participated in therapeutic exercise performing 3x12-14 seated pushups requiring verbal and tactile cueing for technique challenging him to lift buttocks off seated surface to end range elbow extension in order to strengthen triceps brachii to improve performance with sit <> stand transitions. He ambulated 88' requiring SBA utilizing RW.    Patient left up in chair with call button in reach and nurse notifiedEducation:      GOALS:   Multidisciplinary Problems     Occupational Therapy Goals        Problem: Occupational Therapy Goal    Goal Priority Disciplines Outcome Interventions   Occupational Therapy Goal     OT, PT/OT     Description: Goals to be met by: 01/07/21     Patient will increase functional independence with ADLs by performing:    UE Dressing with Modified [place].  LE Dressing with Modified [place].  Grooming while standing at sink with Modified [place].  Toileting from toilet with Modified [place] for hygiene and clothing management.   Bathing from  shower chair/bench with Supervision.  Step transfer with Modified [place]  Toilet transfer to toilet with Modified [place].  Increased functional strength to 4+/5 for bilateral UE's.                     Time Tracking:     OT Date of Treatment: 01/04/22  OT Start Time: 1430  OT Stop Time: 1515  OT Total Time (min): 45 min    Billable Minutes:Therapeutic Activity 25  Therapeutic Exercise 20    OT/DENISHA: OT          1/4/2022

## 2022-01-05 LAB
ALBUMIN SERPL BCP-MCNC: 3.1 G/DL (ref 3.5–5.2)
ALP SERPL-CCNC: 54 U/L (ref 55–135)
ALT SERPL W/O P-5'-P-CCNC: 24 U/L (ref 10–44)
ANION GAP SERPL CALC-SCNC: 10 MMOL/L (ref 8–16)
AST SERPL-CCNC: 20 U/L (ref 10–40)
BASOPHILS # BLD AUTO: 0.09 K/UL (ref 0–0.2)
BASOPHILS NFR BLD: 0.7 % (ref 0–1.9)
BILIRUB SERPL-MCNC: 0.6 MG/DL (ref 0.1–1)
BUN SERPL-MCNC: 57 MG/DL (ref 8–23)
CALCIUM SERPL-MCNC: 10.1 MG/DL (ref 8.7–10.5)
CHLORIDE SERPL-SCNC: 101 MMOL/L (ref 95–110)
CO2 SERPL-SCNC: 32 MMOL/L (ref 23–29)
CREAT SERPL-MCNC: 2.7 MG/DL (ref 0.5–1.4)
DIFFERENTIAL METHOD: ABNORMAL
EOSINOPHIL # BLD AUTO: 0.9 K/UL (ref 0–0.5)
EOSINOPHIL NFR BLD: 7.1 % (ref 0–8)
ERYTHROCYTE [DISTWIDTH] IN BLOOD BY AUTOMATED COUNT: 15.2 % (ref 11.5–14.5)
EST. GFR  (AFRICAN AMERICAN): 24.8 ML/MIN/1.73 M^2
EST. GFR  (NON AFRICAN AMERICAN): 21.4 ML/MIN/1.73 M^2
GLUCOSE SERPL-MCNC: 103 MG/DL (ref 70–110)
HCT VFR BLD AUTO: 36.8 % (ref 40–54)
HGB BLD-MCNC: 12 G/DL (ref 14–18)
IMM GRANULOCYTES # BLD AUTO: 0.04 K/UL (ref 0–0.04)
IMM GRANULOCYTES NFR BLD AUTO: 0.3 % (ref 0–0.5)
LYMPHOCYTES # BLD AUTO: 1.9 K/UL (ref 1–4.8)
LYMPHOCYTES NFR BLD: 15.1 % (ref 18–48)
MAGNESIUM SERPL-MCNC: 2.5 MG/DL (ref 1.6–2.6)
MCH RBC QN AUTO: 32.6 PG (ref 27–31)
MCHC RBC AUTO-ENTMCNC: 32.6 G/DL (ref 32–36)
MCV RBC AUTO: 100 FL (ref 82–98)
MONOCYTES # BLD AUTO: 1.1 K/UL (ref 0.3–1)
MONOCYTES NFR BLD: 8.6 % (ref 4–15)
NEUTROPHILS # BLD AUTO: 8.7 K/UL (ref 1.8–7.7)
NEUTROPHILS NFR BLD: 68.2 % (ref 38–73)
NRBC BLD-RTO: 0 /100 WBC
PLATELET # BLD AUTO: 186 K/UL (ref 150–450)
PMV BLD AUTO: 10.8 FL (ref 9.2–12.9)
POTASSIUM SERPL-SCNC: 3.4 MMOL/L (ref 3.5–5.1)
PROT SERPL-MCNC: 7.5 G/DL (ref 6–8.4)
RBC # BLD AUTO: 3.68 M/UL (ref 4.6–6.2)
SODIUM SERPL-SCNC: 143 MMOL/L (ref 136–145)
WBC # BLD AUTO: 12.79 K/UL (ref 3.9–12.7)

## 2022-01-05 PROCEDURE — 25000003 PHARM REV CODE 250: Performed by: NURSE PRACTITIONER

## 2022-01-05 PROCEDURE — 99900035 HC TECH TIME PER 15 MIN (STAT)

## 2022-01-05 PROCEDURE — 25000242 PHARM REV CODE 250 ALT 637 W/ HCPCS: Performed by: NURSE PRACTITIONER

## 2022-01-05 PROCEDURE — 99900031 HC PATIENT EDUCATION (STAT)

## 2022-01-05 PROCEDURE — 25000003 PHARM REV CODE 250: Performed by: INTERNAL MEDICINE

## 2022-01-05 PROCEDURE — 80053 COMPREHEN METABOLIC PANEL: CPT | Performed by: NURSE PRACTITIONER

## 2022-01-05 PROCEDURE — 94640 AIRWAY INHALATION TREATMENT: CPT

## 2022-01-05 PROCEDURE — 85025 COMPLETE CBC W/AUTO DIFF WBC: CPT | Performed by: NURSE PRACTITIONER

## 2022-01-05 PROCEDURE — 11000001 HC ACUTE MED/SURG PRIVATE ROOM

## 2022-01-05 PROCEDURE — 83735 ASSAY OF MAGNESIUM: CPT | Performed by: NURSE PRACTITIONER

## 2022-01-05 PROCEDURE — 94761 N-INVAS EAR/PLS OXIMETRY MLT: CPT

## 2022-01-05 PROCEDURE — 97116 GAIT TRAINING THERAPY: CPT

## 2022-01-05 PROCEDURE — 36415 COLL VENOUS BLD VENIPUNCTURE: CPT | Performed by: NURSE PRACTITIONER

## 2022-01-05 PROCEDURE — 63600175 PHARM REV CODE 636 W HCPCS: Performed by: NURSE PRACTITIONER

## 2022-01-05 RX ADMIN — ASPIRIN 81 MG: 81 TABLET, COATED ORAL at 09:01

## 2022-01-05 RX ADMIN — POTASSIUM CHLORIDE 40 MEQ: 1500 TABLET, EXTENDED RELEASE ORAL at 09:01

## 2022-01-05 RX ADMIN — IPRATROPIUM BROMIDE AND ALBUTEROL SULFATE 3 ML: 2.5; .5 SOLUTION RESPIRATORY (INHALATION) at 07:01

## 2022-01-05 RX ADMIN — GABAPENTIN 300 MG: 300 CAPSULE ORAL at 09:01

## 2022-01-05 RX ADMIN — FLUOXETINE HYDROCHLORIDE 20 MG: 20 CAPSULE ORAL at 09:01

## 2022-01-05 RX ADMIN — PIPERACILLIN AND TAZOBACTAM 4.5 G: 4; .5 INJECTION, POWDER, LYOPHILIZED, FOR SOLUTION INTRAVENOUS; PARENTERAL at 01:01

## 2022-01-05 RX ADMIN — GUAIFENESIN 600 MG: 600 TABLET, EXTENDED RELEASE ORAL at 09:01

## 2022-01-05 RX ADMIN — IPRATROPIUM BROMIDE AND ALBUTEROL SULFATE 3 ML: 2.5; .5 SOLUTION RESPIRATORY (INHALATION) at 08:01

## 2022-01-05 RX ADMIN — METOLAZONE 5 MG: 5 TABLET ORAL at 09:01

## 2022-01-05 RX ADMIN — CARVEDILOL 6.25 MG: 3.12 TABLET, FILM COATED ORAL at 09:01

## 2022-01-05 RX ADMIN — EZETIMIBE 10 MG: 10 TABLET ORAL at 09:01

## 2022-01-05 RX ADMIN — TAMSULOSIN HYDROCHLORIDE 0.4 MG: 0.4 CAPSULE ORAL at 09:01

## 2022-01-05 RX ADMIN — FAMOTIDINE 20 MG: 20 TABLET ORAL at 09:01

## 2022-01-05 RX ADMIN — APIXABAN 5 MG: 5 TABLET, FILM COATED ORAL at 09:01

## 2022-01-05 RX ADMIN — OXCARBAZEPINE 600 MG: 300 TABLET, FILM COATED ORAL at 09:01

## 2022-01-05 RX ADMIN — IPRATROPIUM BROMIDE AND ALBUTEROL SULFATE 3 ML: 2.5; .5 SOLUTION RESPIRATORY (INHALATION) at 04:01

## 2022-01-05 RX ADMIN — IPRATROPIUM BROMIDE AND ALBUTEROL SULFATE 3 ML: 2.5; .5 SOLUTION RESPIRATORY (INHALATION) at 01:01

## 2022-01-05 RX ADMIN — PIPERACILLIN AND TAZOBACTAM 4.5 G: 4; .5 INJECTION, POWDER, LYOPHILIZED, FOR SOLUTION INTRAVENOUS; PARENTERAL at 05:01

## 2022-01-05 RX ADMIN — PIPERACILLIN AND TAZOBACTAM 4.5 G: 4; .5 INJECTION, POWDER, LYOPHILIZED, FOR SOLUTION INTRAVENOUS; PARENTERAL at 09:01

## 2022-01-05 RX ADMIN — PRAVASTATIN SODIUM 10 MG: 10 TABLET ORAL at 09:01

## 2022-01-05 NOTE — ASSESSMENT & PLAN NOTE
Zosyn and zyvox, duo nebs, oxygen per protocol, mucinex, cough medication and monitor. Patient has failed multiple outpatient treatments    12/30/21 LFC continue with treatment     1/3/22 LFC continue treatment     1/4/22 LFC continue treatment    1/5/22 LFC continue treatment patient will need biopsy for lung mass soon, however need to optimize condition

## 2022-01-05 NOTE — PT/OT/SLP PROGRESS
Occupational Therapy   Treatment    Name: Jamey Lei  MRN: [id number]  Admitting Diagnosis:  Pneumonia       Recommendations:     Discharge Recommendations: other (see comments) (To be further determined based on progress prior to discharge.)  Discharge Equipment Recommendations:  none  Barriers to discharge:  Other (Comment) (Medical and functional status)    Assessment:     Jamey Lei is a 81 y.o. male with a medical diagnosis of Pneumonia. Performance deficits affecting function are weakness,impaired endurance,impaired self care skills,impaired functional mobilty,impaired balance,edema,impaired cardiopulmonary response to activity.     Rehab Prognosis:  Good; patient would benefit from acute skilled OT services to address these deficits and reach maximum level of function.       Plan:     Patient to be seen 6 x/week to address the above listed problems via self-care/home management,therapeutic activities,therapeutic exercises  · Plan of Care Expires: 01/07/22  · Plan of Care Reviewed with: patient    Subjective     Pain/Comfort:  · Pain Rating 1: 5/10  · Location - Side 1: Bilateral  · Location 1: knee  · Pain Addressed 1: Reposition,Cessation of Activity,Nurse notified ([employer] application)  · Pain Rating Post-Intervention 1: 4/10    Objective:     Communicated with: nurse prior to session.  Patient found up in chair with telemetry,peripheral IV upon OT entry to room.    General Precautions: Standard, fall   Orthopedic Precautions:N/A   Braces:    Respiratory Status: Room air     Occupational Performance:     Functional Mobility/Transfers:  · Patient completed Sit <> Stand Transfer with stand by assistance  with  rolling walker   · Patient completed Bed <> Chair Transfer using Step Transfer technique with contact guard assistance with rolling walker  · Patient completed Toilet Transfer Step Transfer technique with minimum assistance with  grab bars  · Functional Mobility: Pt ambulated 42'  requiring CGA utilizing RW.    Activities of Daily Living:  · Toileting: contact guard assistance secondary to intermittent steadying assist during clothing management    Treatment & Education:  Pt was cooperative and motivated with verbal encouragement secondary to bilateral knee pain. He participated in functional transfer retraining to / from recliner and toilet emphasizing fall prevention and use of RW providing extra time requiring CGA to min assist secondary to steadying assist with verbal and tactile cueing for safety awareness. Pt then participated in ADL retraining regarding toileting providing extra time requiring CGA secondary to intermittent steadying assist during clothing management with verbal and tactile cueing. He ambulated 42' between surfaces requiring CGA utilizing RW.    Patient left up in chair with call button in reach and nurse notifiedEducation:      GOALS:   Multidisciplinary Problems     Occupational Therapy Goals        Problem: Occupational Therapy Goal    Goal Priority Disciplines Outcome Interventions   Occupational Therapy Goal     OT, PT/OT     Description: Goals to be met by: 01/07/21     Patient will increase functional independence with ADLs by performing:    UE Dressing with Modified Howard.  LE Dressing with Modified Howard.  Grooming while standing at sink with Modified Howard.  Toileting from toilet with Modified Howard for hygiene and clothing management.   Bathing from  shower chair/bench with Supervision.  Step transfer with Modified Howard  Toilet transfer to toilet with Modified Howard.  Increased functional strength to 4+/5 for bilateral UE's.                     Time Tracking:     OT Date of Treatment: 01/05/22  OT Start Time: 1511  OT Stop Time: 1539  OT Total Time (min): 28 min    Billable Minutes:Self Care/Home Management 15  Therapeutic Activity 13    OT/DENISHA: OT          1/5/2022

## 2022-01-05 NOTE — SUBJECTIVE & OBJECTIVE
Past Medical History:   Diagnosis Date    Arthritis     Brain tumor     Coronary artery disease     Encounter for blood transfusion     Hypertension     Neuropathy     Renal disorder        Past Surgical History:   Procedure Laterality Date    APPENDECTOMY      HIP SURGERY      KNEE SURGERY      SHOULDER SURGERY      triple bypass         Review of patient's allergies indicates:  No Known Allergies    No current facility-administered medications on file prior to encounter.     Current Outpatient Medications on File Prior to Encounter   Medication Sig    ANORO ELLIPTA 62.5-25 mcg/actuation DsDv     apixaban (ELIQUIS) 5 mg Tab Take 5 mg by mouth 2 (two) times daily.    ascorbic acid, vitamin C, (VITAMIN C) 1000 MG tablet Take 1,000 mg by mouth once daily.    aspirin (ECOTRIN) 81 MG EC tablet Take 81 mg by mouth once daily.    b complex vitamins tablet Take by mouth once daily.    diphenoxylate-atropine 2.5-0.025 mg (LOMOTIL) 2.5-0.025 mg per tablet Take 1 tablet by mouth 2 (two) times daily as needed for Diarrhea.    ergocalciferol, vitamin D2, (VITAMIN D ORAL) Take 1,000 capsules by mouth once daily.    ezetimibe (ZETIA) 10 mg tablet Take 10 mg by mouth once daily.    FLUoxetine 20 MG capsule Take 20 mg by mouth once daily.    gabapentin (NEURONTIN) 300 MG capsule Take 300 mg by mouth 2 (two) times daily.    HYDROcodone-acetaminophen (NORCO)  mg per tablet TAKE 1 TABLET BY MOUTH TWICE DAILY AS NEEDED FOR PAIN WILL MAKE DROWSY    lovastatin (MEVACOR) 10 MG tablet Take 10 mg by mouth every evening.    senna (SENOKOT) 8.6 mg tablet Take 1 tablet by mouth 2 (two) times daily.     tamsulosin (FLOMAX) 0.4 mg Cap Take 0.4 mg by mouth once daily.    torsemide (DEMADEX) 20 MG Tab Take 20 mg by mouth 2 (two) times a day.    carvediloL (COREG) 12.5 MG tablet Take 12.5 mg by mouth 2 (two) times daily with meals.    carvediloL (COREG) 6.25 MG tablet SMARTSI Tablet(s) By Mouth Every 12 Hours     HYDROcodone-acetaminophen (NORCO) 5-325 mg per tablet Take 1 tablet by mouth every 4 (four) hours as needed for Pain.    levoFLOXacin (LEVAQUIN) 500 MG tablet Take 500 mg by mouth once daily.    ondansetron (ZOFRAN) 4 MG tablet Take 1 tablet (4 mg total) by mouth every 12 (twelve) hours as needed for Nausea.    OXcarbazepine (TRILEPTAL) 600 MG Tab Take 600 mg by mouth 2 (two) times daily.    potassium chloride SA (K-DUR,KLOR-CON) 20 MEQ tablet Take 20 mEq by mouth once daily.    promethazine-dextromethorphan (PROMETHAZINE-DM) 6.25-15 mg/5 mL Syrp TAKE 1 TEASPOONFUL BY MOUTH EVERY 6 HOURS AS NEEDED WILL MAKE DROWSY     Family History    None       Tobacco Use    Smoking status: Former Smoker    Smokeless tobacco: Never Used   Substance and Sexual Activity    Alcohol use: Not Currently    Drug use: Not on file    Sexual activity: Not on file     Review of Systems   Constitutional: Positive for activity change, appetite change and fatigue.   Respiratory: Positive for cough, shortness of breath and wheezing.    Cardiovascular: Positive for leg swelling. Negative for chest pain.   Gastrointestinal: Positive for abdominal distention.   Musculoskeletal: Positive for arthralgias and myalgias.   Neurological: Positive for weakness.     Objective:     Vital Signs (Most Recent):  Temp: 97.6 °F (36.4 °C) (01/05/22 0756)  Pulse: 105 (01/05/22 0756)  Resp: 20 (01/05/22 0756)  BP: (!) 144/67 (01/05/22 0756)  SpO2: 97 % (01/05/22 0756) Vital Signs (24h Range):  Temp:  [97 °F (36.1 °C)-97.9 °F (36.6 °C)] 97.6 °F (36.4 °C)  Pulse:  [] 105  Resp:  [18-30] 20  SpO2:  [91 %-100 %] 97 %  BP: (115-154)/(55-86) 144/67     Weight: 108 kg (238 lb)  Body mass index is 34.15 kg/m².    Physical Exam  Constitutional:       Appearance: He is obese. He is ill-appearing.   HENT:      Head: Normocephalic.      Nose: Nose normal.      Mouth/Throat:      Mouth: Mucous membranes are moist.   Cardiovascular:      Rate and Rhythm:  Normal rate. Rhythm irregular.      Pulses: Normal pulses.   Pulmonary:      Breath sounds: Wheezing and rhonchi present.   Abdominal:      General: Bowel sounds are normal.   Musculoskeletal:      Right lower leg: Edema present.      Left lower leg: Edema present.   Skin:         Neurological:      Mental Status: He is alert. Mental status is at baseline.             Significant Labs:   All pertinent labs within the past 24 hours have been reviewed.  Blood Culture:   No results for input(s): LABBLOO in the last 48 hours.  CBC:   Recent Labs   Lab 01/04/22  0544 01/05/22  0545   WBC 12.05 12.79*   HGB 12.0* 12.0*   HCT 37.0* 36.8*    186     CMP:   Recent Labs   Lab 01/04/22  0544 01/05/22  0545    143   K 3.4* 3.4*    101   CO2 30* 32*   * 103   BUN 42* 57*   CREATININE 2.1* 2.7*   CALCIUM 10.4 10.1   PROT 7.5 7.5   ALBUMIN 3.1* 3.1*   BILITOT 0.7 0.6   ALKPHOS 52* 54*   AST 16 20   ALT 19 24   ANIONGAP 10 10   EGFRNONAA 29.1* 21.4*     Lactic Acid:   No results for input(s): LACTATE in the last 48 hours.  Troponin: No results for input(s): TROPONINI in the last 48 hours.  TSH:   Recent Labs   Lab 12/28/21  1252   TSH 0.857       Significant Imaging: Chest xray:Right lower lung zone opacity, corresponding with mass identified on recent chest CT.  Mass appears increased in size from prior exam, may reflect interval increase in size of mass and or associated atelectasis or pneumonia.

## 2022-01-05 NOTE — ASSESSMENT & PLAN NOTE
Likely secondary to acute illness, monitor and incorporate PT when appropriate    12/30/21 [facility] add PT/OT eval and treat    1/3/22 [facility] continue PT/OT    1/4/22 [facility] continue Pt/OT    1/5/22 [facility] PT/OT

## 2022-01-05 NOTE — ASSESSMENT & PLAN NOTE
Start lasix drip and consult cardiology for assistance in management, accurate I/o's    12/30/21 [facility] continue lasix drip, diuresing well    1/3/22 [facility]: patient still + on I/o's will add zaroxolyn and monitor. Will also scan bladder post void     1/4/22 [facility]: encouraged accurate I/o's. Post void bladder scan 233    1/5/22 [facility] patient is diuresing well continue accurate I/o's down 16 pounds since admission

## 2022-01-05 NOTE — PT/OT/SLP PROGRESS
Physical Therapy Treatment    Patient Name:  Jamey Lei   MRN:  [id number]    Recommendations:     Discharge Recommendations:      Discharge Equipment Recommendations:     Barriers to discharge: decreased fxnl mobility and safety    Assessment:     Jamey Lei is a 81 y.o. male admitted with a medical diagnosis of Pneumonia.  He presents with the following impairments/functional limitations:  impaired endurance,weakness,impaired functional mobilty . PT appeared more confused today. He asked about his wife several times, and claimed he could not walk with IV connected.    Rehab Prognosis: Good; patient would benefit from acute skilled PT services to address these deficits and reach maximum level of function.    Recent Surgery: * No surgery found *      Plan:     During this hospitalization, patient to be seen 6 x/week to address the identified rehab impairments via gait training,therapeutic activities,therapeutic exercises and progress toward the following goals:    · Plan of Care Expires:       Subjective     Chief Complaint: none  Patient/Family Comments/goals: none  Pain/Comfort:  ·        Objective:     Communicated with nurse prior to session.  Patient found supine with   upon PT entry to room.     General Precautions: Standard, fall   Orthopedic Precautions:    Braces:    Respiratory Status: Room air     Functional Mobility:  · Transfers:     · Sit to Stand:  minimum assistance with rolling walker  · Bed to Chair: minimum assistance with  rolling walker  using  Step Transfer  · Gait: he ambulated 100ft with rw with cga/sba with pt needing directional cues      AM-PAC 6 CLICK MOBILITY          Therapeutic Activities and Exercises:      Patient left supine with call button in reach..    GOALS:   Multidisciplinary Problems     Physical Therapy Goals     Not on file                Time Tracking:     PT Received On:    PT Start Time: 1540     PT Stop Time: 1555  PT Total Time (min): 15 min     Billable Minutes:  Gait Training 15             PTA Visit Number: 1 01/05/2022

## 2022-01-05 NOTE — PROGRESS NOTES
[facility] Medicine  Progress Note    Patient Name: Jamey Lei  MRN: [id number]  Patient Class: IP- Inpatient   Admission Date: 12/28/2021  Length of Stay: 8 days  Attending Physician: Navi Domínguez III, MD  Primary Care Provider: Navi Domínguez III, MD        Subjective:     Principal Problem:Pneumonia        HPI:   Shortness of Breath       Pt sent to ED via Dr. Domínguez for admission for unsuccessful outpatient treatment of pneumonia/heart failure. Pt stated that he has been sick for the past month or so.       81-year-old male with history of AFib on Eliquis, hypertension referred from PCP office for admission due to failure of outpatient therapy for pneumonia and heart failure.  Patient says that he has been feeling weak for over a month despite finishing ends antibiotics and being on his medication.  Patient denies any chest pain but does endorse orthopnea, bilateral lower extremity swelling, dyspnea on exertion, chronic cough      Overview/Hospital Course:  12/30/21 [facility] patient reports that he is still having significant coughing, no shortness of breath, sitting up in recliner. Cardiology following, on lasix drip, significant edema and wounds to lower ext. Will continue lasix drip, continue abx for pneumonia, have wound care see, consider unna boots    12/31/21 [facility]: Still with cough, says he is still swollen though this has gotten better with lasix and wraps.     1/1/22 [facility]: Legs still wrapped, no acute overnight events. Still fluid overloaded    1/2/22 [facility]: Still with coarse breath sounds. Gentle diuresis    1/3/22 [facility] Still with coarse breath sounds and coughing. Sitting up in the recliner with legs elevated. Patient still positive with I/o's get bladder scan and add zaroxolyn     1/4/22 [facility] despite orders still no accurate I/o's have discussed with patient. Today he reports that he is feeling better, less coughing    1/5/22 [facility] patient is down 16 pounds since admission. Discussed kidney  function and that patient appear a little dry however this is goal based on condition      Past Medical History:   Diagnosis Date    Arthritis     Brain tumor     Coronary artery disease     Encounter for blood transfusion     Hypertension     Neuropathy     Renal disorder        Past Surgical History:   Procedure Laterality Date    APPENDECTOMY      HIP SURGERY      KNEE SURGERY      SHOULDER SURGERY      triple bypass         Review of patient's allergies indicates:  No Known Allergies    No current facility-administered medications on file prior to encounter.     Current Outpatient Medications on File Prior to Encounter   Medication Sig    ANORO ELLIPTA 62.5-25 mcg/actuation DsDv     apixaban (ELIQUIS) 5 mg Tab Take 5 mg by mouth 2 (two) times daily.    ascorbic acid, vitamin C, (VITAMIN C) 1000 MG tablet Take 1,000 mg by mouth once daily.    aspirin (ECOTRIN) 81 MG EC tablet Take 81 mg by mouth once daily.    b complex vitamins tablet Take by mouth once daily.    diphenoxylate-atropine 2.5-0.025 mg (LOMOTIL) 2.5-0.025 mg per tablet Take 1 tablet by mouth 2 (two) times daily as needed for Diarrhea.    ergocalciferol, vitamin D2, (VITAMIN D ORAL) Take 1,000 capsules by mouth once daily.    ezetimibe (ZETIA) 10 mg tablet Take 10 mg by mouth once daily.    FLUoxetine 20 MG capsule Take 20 mg by mouth once daily.    gabapentin (NEURONTIN) 300 MG capsule Take 300 mg by mouth 2 (two) times daily.    HYDROcodone-acetaminophen (NORCO)  mg per tablet TAKE 1 TABLET BY MOUTH TWICE DAILY AS NEEDED FOR PAIN WILL MAKE DROWSY    lovastatin (MEVACOR) 10 MG tablet Take 10 mg by mouth every evening.    senna (SENOKOT) 8.6 mg tablet Take 1 tablet by mouth 2 (two) times daily.     tamsulosin (FLOMAX) 0.4 mg Cap Take 0.4 mg by mouth once daily.    torsemide (DEMADEX) 20 MG Tab Take 20 mg by mouth 2 (two) times a day.    carvediloL (COREG) 12.5 MG tablet Take 12.5 mg by mouth 2 (two) times daily with  meals.    carvediloL (COREG) 6.25 MG tablet SMARTSI Tablet(s) By Mouth Every 12 Hours    HYDROcodone-acetaminophen (NORCO) 5-325 mg per tablet Take 1 tablet by mouth every 4 (four) hours as needed for Pain.    levoFLOXacin (LEVAQUIN) 500 MG tablet Take 500 mg by mouth once daily.    ondansetron (ZOFRAN) 4 MG tablet Take 1 tablet (4 mg total) by mouth every 12 (twelve) hours as needed for Nausea.    OXcarbazepine (TRILEPTAL) 600 MG Tab Take 600 mg by mouth 2 (two) times daily.    potassium chloride SA (K-DUR,KLOR-CON) 20 MEQ tablet Take 20 mEq by mouth once daily.    promethazine-dextromethorphan (PROMETHAZINE-DM) 6.25-15 mg/5 mL Syrp TAKE 1 TEASPOONFUL BY MOUTH EVERY 6 HOURS AS NEEDED WILL MAKE DROWSY     Family History    None       Tobacco Use    Smoking status: Former Smoker    Smokeless tobacco: Never Used   Substance and Sexual Activity    Alcohol use: Not Currently    Drug use: Not on file    Sexual activity: Not on file     Review of Systems   Constitutional: Positive for activity change, appetite change and fatigue.   Respiratory: Positive for cough, shortness of breath and wheezing.    Cardiovascular: Positive for leg swelling. Negative for chest pain.   Gastrointestinal: Positive for abdominal distention.   Musculoskeletal: Positive for arthralgias and myalgias.   Neurological: Positive for weakness.     Objective:     Vital Signs (Most Recent):  Temp: 97.6 °F (36.4 °C) (22 0756)  Pulse: 105 (22 0756)  Resp: 20 (22 0756)  BP: (!) 144/67 (22 0756)  SpO2: 97 % (22 0756) Vital Signs (24h Range):  Temp:  [97 °F (36.1 °C)-97.9 °F (36.6 °C)] 97.6 °F (36.4 °C)  Pulse:  [] 105  Resp:  [18-30] 20  SpO2:  [91 %-100 %] 97 %  BP: (115-154)/(55-86) 144/67     Weight: 108 kg (238 lb)  Body mass index is 34.15 kg/m².    Physical Exam  Constitutional:       Appearance: He is obese. He is ill-appearing.   HENT:      Head: Normocephalic.      Nose: Nose normal.       Mouth/Throat:      Mouth: Mucous membranes are moist.   Cardiovascular:      Rate and Rhythm: Normal rate. Rhythm irregular.      Pulses: Normal pulses.   Pulmonary:      Breath sounds: Wheezing and rhonchi present.   Abdominal:      General: Bowel sounds are normal.   Musculoskeletal:      Right lower leg: Edema present.      Left lower leg: Edema present.   Skin:         Neurological:      Mental Status: He is alert. Mental status is at baseline.             Significant Labs:   All pertinent labs within the past 24 hours have been reviewed.  Blood Culture:   No results for input(s): LABBLOO in the last 48 hours.  CBC:   Recent Labs   Lab 01/04/22  0544 01/05/22  0545   WBC 12.05 12.79*   HGB 12.0* 12.0*   HCT 37.0* 36.8*    186     CMP:   Recent Labs   Lab 01/04/22  0544 01/05/22  0545    143   K 3.4* 3.4*    101   CO2 30* 32*   * 103   BUN 42* 57*   CREATININE 2.1* 2.7*   CALCIUM 10.4 10.1   PROT 7.5 7.5   ALBUMIN 3.1* 3.1*   BILITOT 0.7 0.6   ALKPHOS 52* 54*   AST 16 20   ALT 19 24   ANIONGAP 10 10   EGFRNONAA 29.1* 21.4*     Lactic Acid:   No results for input(s): LACTATE in the last 48 hours.  Troponin: No results for input(s): TROPONINI in the last 48 hours.  TSH:   Recent Labs   Lab 12/28/21  1252   TSH 0.857       Significant Imaging: Chest xray:Right lower lung zone opacity, corresponding with mass identified on recent chest CT.  Mass appears increased in size from prior exam, may reflect interval increase in size of mass and or associated atelectasis or pneumonia.        Assessment/Plan:      * Pneumonia  Zosyn and zyvox, duo nebs, oxygen per protocol, mucinex, cough medication and monitor. Patient has failed multiple outpatient treatments    12/30/21 [facility] continue with treatment     1/3/22 [facility] continue treatment     1/4/22 [facility] continue treatment    1/5/22 [facility] continue treatment patient will need biopsy for lung mass soon, however need to optimize  condition      Hypokalemia  Replace and monitor.       Wound of left leg  Consult wound care      Edema  Continue lasix drip and consult wound care of unna boots        DVT prophylaxis  On eliquis      Weakness  Likely secondary to acute illness, monitor and incorporate PT when appropriate    12/30/21 [facility] add PT/OT eval and treat    1/3/22 [facility] continue PT/OT    1/4/22 [facility] continue Pt/OT    1/5/22 [facility] PT/OT      Acute pulmonary edema  Lasix drip add zaroxolyn    1/4/22 [facility] continue treatment      Acute on chronic congestive heart failure  Start lasix drip and consult cardiology for assistance in management, accurate I/o's    12/30/21 [facility] continue lasix drip, diuresing well    1/3/22 [facility]: patient still + on I/o's will add zaroxolyn and monitor. Will also scan bladder post void     1/4/22 [facility]: encouraged accurate I/o's. Post void bladder scan 233    1/5/22 [facility] patient is diuresing well continue accurate I/o's down 16 pounds since admission      VTE Risk Mitigation (From admission, onward)         Ordered     IP VTE HIGH RISK PATIENT  Once         12/28/21 1805     Place sequential compression device  Until discontinued         12/28/21 1805     apixaban tablet 5 mg  2 times daily         12/28/21 1607                Discharge Planning   GLORY:      Code Status: Full Code   Is the patient medically ready for discharge?:     Reason for patient still in hospital (select all that apply): Patient trending condition, Laboratory test, Treatment and PT / OT recommendations  Discharge Plan A: Home with family                  Rosalva Ventura NP  Department of Hospital Medicine   [facility]

## 2022-01-05 NOTE — PLAN OF CARE
01/05/22 1413   Medicare Message   Important Message from Medicare regarding Discharge Appeal Rights Given to patient/caregiver;Explained to patient/caregiver;Signed/date by patient/caregiver   Date IMM was signed 01/05/22   Time IMM was signed [id number]

## 2022-01-06 LAB
ALBUMIN SERPL BCP-MCNC: 2.9 G/DL (ref 3.5–5.2)
ALP SERPL-CCNC: 46 U/L (ref 55–135)
ALT SERPL W/O P-5'-P-CCNC: 22 U/L (ref 10–44)
ANION GAP SERPL CALC-SCNC: 10 MMOL/L (ref 8–16)
AST SERPL-CCNC: 19 U/L (ref 10–40)
BASOPHILS # BLD AUTO: 0.08 K/UL (ref 0–0.2)
BASOPHILS NFR BLD: 0.6 % (ref 0–1.9)
BILIRUB SERPL-MCNC: 0.6 MG/DL (ref 0.1–1)
BUN SERPL-MCNC: 72 MG/DL (ref 8–23)
CALCIUM SERPL-MCNC: 9.4 MG/DL (ref 8.7–10.5)
CHLORIDE SERPL-SCNC: 101 MMOL/L (ref 95–110)
CO2 SERPL-SCNC: 31 MMOL/L (ref 23–29)
CREAT SERPL-MCNC: 2.8 MG/DL (ref 0.5–1.4)
DIFFERENTIAL METHOD: ABNORMAL
EOSINOPHIL # BLD AUTO: 0.8 K/UL (ref 0–0.5)
EOSINOPHIL NFR BLD: 6.4 % (ref 0–8)
ERYTHROCYTE [DISTWIDTH] IN BLOOD BY AUTOMATED COUNT: 15.2 % (ref 11.5–14.5)
EST. GFR  (AFRICAN AMERICAN): 23.7 ML/MIN/1.73 M^2
EST. GFR  (NON AFRICAN AMERICAN): 20.5 ML/MIN/1.73 M^2
GLUCOSE SERPL-MCNC: 113 MG/DL (ref 70–110)
HCT VFR BLD AUTO: 34.9 % (ref 40–54)
HGB BLD-MCNC: 11.5 G/DL (ref 14–18)
IMM GRANULOCYTES # BLD AUTO: 0.04 K/UL (ref 0–0.04)
IMM GRANULOCYTES NFR BLD AUTO: 0.3 % (ref 0–0.5)
LYMPHOCYTES # BLD AUTO: 1.4 K/UL (ref 1–4.8)
LYMPHOCYTES NFR BLD: 11.2 % (ref 18–48)
MAGNESIUM SERPL-MCNC: 2.5 MG/DL (ref 1.6–2.6)
MCH RBC QN AUTO: 32.8 PG (ref 27–31)
MCHC RBC AUTO-ENTMCNC: 33 G/DL (ref 32–36)
MCV RBC AUTO: 99 FL (ref 82–98)
MONOCYTES # BLD AUTO: 1.1 K/UL (ref 0.3–1)
MONOCYTES NFR BLD: 8.8 % (ref 4–15)
NEUTROPHILS # BLD AUTO: 9.4 K/UL (ref 1.8–7.7)
NEUTROPHILS NFR BLD: 72.7 % (ref 38–73)
NRBC BLD-RTO: 0 /100 WBC
PLATELET # BLD AUTO: 164 K/UL (ref 150–450)
PMV BLD AUTO: 10.4 FL (ref 9.2–12.9)
POTASSIUM SERPL-SCNC: 3.4 MMOL/L (ref 3.5–5.1)
PROT SERPL-MCNC: 7.1 G/DL (ref 6–8.4)
RBC # BLD AUTO: 3.51 M/UL (ref 4.6–6.2)
SODIUM SERPL-SCNC: 142 MMOL/L (ref 136–145)
WBC # BLD AUTO: 12.87 K/UL (ref 3.9–12.7)

## 2022-01-06 PROCEDURE — 97530 THERAPEUTIC ACTIVITIES: CPT

## 2022-01-06 PROCEDURE — 11000001 HC ACUTE MED/SURG PRIVATE ROOM

## 2022-01-06 PROCEDURE — 97116 GAIT TRAINING THERAPY: CPT

## 2022-01-06 PROCEDURE — 99900031 HC PATIENT EDUCATION (STAT)

## 2022-01-06 PROCEDURE — 83735 ASSAY OF MAGNESIUM: CPT | Performed by: NURSE PRACTITIONER

## 2022-01-06 PROCEDURE — 99900035 HC TECH TIME PER 15 MIN (STAT)

## 2022-01-06 PROCEDURE — 25000003 PHARM REV CODE 250: Performed by: INTERNAL MEDICINE

## 2022-01-06 PROCEDURE — 85025 COMPLETE CBC W/AUTO DIFF WBC: CPT | Performed by: NURSE PRACTITIONER

## 2022-01-06 PROCEDURE — 27000221 HC OXYGEN, UP TO 24 HOURS

## 2022-01-06 PROCEDURE — 94640 AIRWAY INHALATION TREATMENT: CPT

## 2022-01-06 PROCEDURE — 94761 N-INVAS EAR/PLS OXIMETRY MLT: CPT

## 2022-01-06 PROCEDURE — 25000242 PHARM REV CODE 250 ALT 637 W/ HCPCS: Performed by: NURSE PRACTITIONER

## 2022-01-06 PROCEDURE — 80053 COMPREHEN METABOLIC PANEL: CPT | Performed by: NURSE PRACTITIONER

## 2022-01-06 PROCEDURE — 25000003 PHARM REV CODE 250: Performed by: NURSE PRACTITIONER

## 2022-01-06 PROCEDURE — 97110 THERAPEUTIC EXERCISES: CPT

## 2022-01-06 PROCEDURE — 36415 COLL VENOUS BLD VENIPUNCTURE: CPT | Performed by: NURSE PRACTITIONER

## 2022-01-06 PROCEDURE — 63600175 PHARM REV CODE 636 W HCPCS: Performed by: NURSE PRACTITIONER

## 2022-01-06 RX ORDER — FUROSEMIDE 40 MG/1
80 TABLET ORAL 2 TIMES DAILY
Status: DISCONTINUED | OUTPATIENT
Start: 2022-01-06 | End: 2022-01-07 | Stop reason: HOSPADM

## 2022-01-06 RX ADMIN — OXCARBAZEPINE 600 MG: 300 TABLET, FILM COATED ORAL at 09:01

## 2022-01-06 RX ADMIN — POTASSIUM CHLORIDE 40 MEQ: 1500 TABLET, EXTENDED RELEASE ORAL at 09:01

## 2022-01-06 RX ADMIN — IPRATROPIUM BROMIDE AND ALBUTEROL SULFATE 3 ML: 2.5; .5 SOLUTION RESPIRATORY (INHALATION) at 07:01

## 2022-01-06 RX ADMIN — APIXABAN 5 MG: 5 TABLET, FILM COATED ORAL at 11:01

## 2022-01-06 RX ADMIN — ASPIRIN 81 MG: 81 TABLET, COATED ORAL at 11:01

## 2022-01-06 RX ADMIN — PRAVASTATIN SODIUM 10 MG: 10 TABLET ORAL at 09:01

## 2022-01-06 RX ADMIN — IPRATROPIUM BROMIDE AND ALBUTEROL SULFATE 3 ML: 2.5; .5 SOLUTION RESPIRATORY (INHALATION) at 04:01

## 2022-01-06 RX ADMIN — GABAPENTIN 300 MG: 300 CAPSULE ORAL at 11:01

## 2022-01-06 RX ADMIN — OXCARBAZEPINE 600 MG: 300 TABLET, FILM COATED ORAL at 11:01

## 2022-01-06 RX ADMIN — POTASSIUM CHLORIDE 40 MEQ: 1500 TABLET, EXTENDED RELEASE ORAL at 11:01

## 2022-01-06 RX ADMIN — GUAIFENESIN 600 MG: 600 TABLET, EXTENDED RELEASE ORAL at 09:01

## 2022-01-06 RX ADMIN — APIXABAN 5 MG: 5 TABLET, FILM COATED ORAL at 09:01

## 2022-01-06 RX ADMIN — GABAPENTIN 300 MG: 300 CAPSULE ORAL at 09:01

## 2022-01-06 RX ADMIN — TAMSULOSIN HYDROCHLORIDE 0.4 MG: 0.4 CAPSULE ORAL at 11:01

## 2022-01-06 RX ADMIN — GUAIFENESIN 600 MG: 600 TABLET, EXTENDED RELEASE ORAL at 11:01

## 2022-01-06 RX ADMIN — PIPERACILLIN AND TAZOBACTAM 4.5 G: 4; .5 INJECTION, POWDER, LYOPHILIZED, FOR SOLUTION INTRAVENOUS; PARENTERAL at 11:01

## 2022-01-06 RX ADMIN — FAMOTIDINE 20 MG: 20 TABLET ORAL at 11:01

## 2022-01-06 RX ADMIN — FUROSEMIDE 80 MG: 40 TABLET ORAL at 06:01

## 2022-01-06 RX ADMIN — EZETIMIBE 10 MG: 10 TABLET ORAL at 11:01

## 2022-01-06 RX ADMIN — CARVEDILOL 6.25 MG: 3.12 TABLET, FILM COATED ORAL at 09:01

## 2022-01-06 RX ADMIN — PIPERACILLIN AND TAZOBACTAM 4.5 G: 4; .5 INJECTION, POWDER, LYOPHILIZED, FOR SOLUTION INTRAVENOUS; PARENTERAL at 02:01

## 2022-01-06 RX ADMIN — IPRATROPIUM BROMIDE AND ALBUTEROL SULFATE 3 ML: 2.5; .5 SOLUTION RESPIRATORY (INHALATION) at 01:01

## 2022-01-06 RX ADMIN — METOLAZONE 5 MG: 5 TABLET ORAL at 11:01

## 2022-01-06 RX ADMIN — FLUOXETINE HYDROCHLORIDE 20 MG: 20 CAPSULE ORAL at 11:01

## 2022-01-06 RX ADMIN — PIPERACILLIN AND TAZOBACTAM 4.5 G: 4; .5 INJECTION, POWDER, LYOPHILIZED, FOR SOLUTION INTRAVENOUS; PARENTERAL at 06:01

## 2022-01-06 RX ADMIN — CARVEDILOL 6.25 MG: 3.12 TABLET, FILM COATED ORAL at 11:01

## 2022-01-06 NOTE — ASSESSMENT & PLAN NOTE
Zosyn and zyvox, duo nebs, oxygen per protocol, mucinex, cough medication and monitor. Patient has failed multiple outpatient treatments    12/30/21 [facility] continue with treatment     1/3/22 [facility] continue treatment     1/4/22 [facility] continue treatment    1/5/22 [facility] continue treatment patient will need biopsy for lung mass soon, however need to optimize condition    1/6/22 [facility] continue treatment on zosyn #9 completed linezolid x 7 days

## 2022-01-06 NOTE — PROGRESS NOTES
01/06/22 1125        Altered Skin Integrity 12/28/21 2124 Left anterior;distal;lower Leg #1 Venous Ulcer Partial thickness tissue loss. Shallow open ulcer with a red or pink wound bed, without slough. Intact or Open/Ruptured Serum-filled blister.   Date First Assessed/Time First Assessed: 12/28/21 2124   Altered Skin Integrity Present on Admission: yes  Side: Left  Orientation: anterior;distal;lower  Location: Leg  Wound Number: #1  Is this injury device related?: No  Primary Wound Type: Venous ...   Description of Altered Skin Integrity Partial thickness tissue loss. Shallow open ulcer with a red or pink wound bed, without slough. Intact or Open/Ruptured Serum-filled blister.   Dressing Appearance Moist drainage   Drainage Amount Small   Drainage Characteristics/Odor Serosanguineous   Appearance Pink;Red   Tissue loss description Partial thickness   Red (%), Wound Tissue Color 100 %   Periwound Area Swelling;Hemosiderin Staining  (Swelling decreased from Monday)   Wound Edges Defined   Wound Length (cm)   (Wound not measured today)   Care Cleansed with:;Wound cleanser   Dressing Applied;Hydrofiber;Other (comment)  (Applied hydrofiber with silver to wound bed)   Compression Ifeoma's Boot   Dressing Change Due 01/10/22

## 2022-01-06 NOTE — ASSESSMENT & PLAN NOTE
Lasix drip add zaroxolyn    1/4/22 [facility] continue treatment    1/6/22 [facility] stop lasix drip change to lasix po bid, continue zaroxolyn

## 2022-01-06 NOTE — PROGRESS NOTES
01/06/22 1127        Altered Skin Integrity 12/28/21 2129 Right anterior;distal;lower Leg #2   Date First Assessed/Time First Assessed: 12/28/21 2129   Altered Skin Integrity Present on Admission: yes  Side: Right  Orientation: anterior;distal;lower  Location: Leg  Wound Number: #2  Is this injury device related?: No   Description of Altered Skin Integrity Intact skin with non-blanchable redness of localized area   Dressing Appearance Dry;Intact;Clean   Drainage Amount None   Periwound Area Swelling;Hemosiderin Staining  (Swelling improved from Monday)   Care   (CG showered patient and cleansed with soap and water)   Compression Unna's Boot   Dressing Change Due 01/10/22

## 2022-01-06 NOTE — PROGRESS NOTES
[facility] Medicine  Progress Note    Patient Name: Jamey Lei  MRN: [id number]  Patient Class: IP- Inpatient   Admission Date: 12/28/2021  Length of Stay: 9 days  Attending Physician: Navi Domínguez III, MD  Primary Care Provider: Navi Domínguez III, MD        Subjective:     Principal Problem:Pneumonia        HPI:   Shortness of Breath       Pt sent to ED via Dr. Domínguez for admission for unsuccessful outpatient treatment of pneumonia/heart failure. Pt stated that he has been sick for the past month or so.       81-year-old male with history of AFib on Eliquis, hypertension referred from PCP office for admission due to failure of outpatient therapy for pneumonia and heart failure.  Patient says that he has been feeling weak for over a month despite finishing ends antibiotics and being on his medication.  Patient denies any chest pain but does endorse orthopnea, bilateral lower extremity swelling, dyspnea on exertion, chronic cough      Overview/Hospital Course:  12/30/21 [facility] patient reports that he is still having significant coughing, no shortness of breath, sitting up in recliner. Cardiology following, on lasix drip, significant edema and wounds to lower ext. Will continue lasix drip, continue abx for pneumonia, have wound care see, consider unna boots    12/31/21 CG: Still with cough, says he is still swollen though this has gotten better with lasix and wraps.     1/1/22 CG: Legs still wrapped, no acute overnight events. Still fluid overloaded    1/2/22 CG: Still with coarse breath sounds. Gentle diuresis    1/3/22 [facility] Still with coarse breath sounds and coughing. Sitting up in the recliner with legs elevated. Patient still positive with I/o's get bladder scan and add zaroxolyn     1/4/22 [facility] despite orders still no accurate I/o's have discussed with patient. Today he reports that he is feeling better, less coughing    1/5/22 [facility] patient is down 16 pounds since admission. Discussed kidney  function and that patient appear a little dry however this is goal based on condition    1/6/22 [facility] family at bedside. Patient weight is stable, legs unwrapped today and significant improvement in swelling. Will stop lasix drip today and monitor plan for likely discharge home tomorrow      Past Medical History:   Diagnosis Date    Arthritis     Brain tumor     Coronary artery disease     Encounter for blood transfusion     Hypertension     Neuropathy     Renal disorder        Past Surgical History:   Procedure Laterality Date    APPENDECTOMY      HIP SURGERY      KNEE SURGERY      SHOULDER SURGERY      triple bypass         Review of patient's allergies indicates:  No Known Allergies    No current facility-administered medications on file prior to encounter.     Current Outpatient Medications on File Prior to Encounter   Medication Sig    ANORO ELLIPTA 62.5-25 mcg/actuation DsDv     apixaban (ELIQUIS) 5 mg Tab Take 5 mg by mouth 2 (two) times daily.    ascorbic acid, vitamin C, (VITAMIN C) 1000 MG tablet Take 1,000 mg by mouth once daily.    aspirin (ECOTRIN) 81 MG EC tablet Take 81 mg by mouth once daily.    b complex vitamins tablet Take by mouth once daily.    diphenoxylate-atropine 2.5-0.025 mg (LOMOTIL) 2.5-0.025 mg per tablet Take 1 tablet by mouth 2 (two) times daily as needed for Diarrhea.    ergocalciferol, vitamin D2, (VITAMIN D ORAL) Take 1,000 capsules by mouth once daily.    ezetimibe (ZETIA) 10 mg tablet Take 10 mg by mouth once daily.    FLUoxetine 20 MG capsule Take 20 mg by mouth once daily.    gabapentin (NEURONTIN) 300 MG capsule Take 300 mg by mouth 2 (two) times daily.    HYDROcodone-acetaminophen (NORCO)  mg per tablet TAKE 1 TABLET BY MOUTH TWICE DAILY AS NEEDED FOR PAIN WILL MAKE DROWSY    lovastatin (MEVACOR) 10 MG tablet Take 10 mg by mouth every evening.    senna (SENOKOT) 8.6 mg tablet Take 1 tablet by mouth 2 (two) times daily.     tamsulosin (FLOMAX) 0.4  mg Cap Take 0.4 mg by mouth once daily.    torsemide (DEMADEX) 20 MG Tab Take 20 mg by mouth 2 (two) times a day.    carvediloL (COREG) 12.5 MG tablet Take 12.5 mg by mouth 2 (two) times daily with meals.    carvediloL (COREG) 6.25 MG tablet SMARTSI Tablet(s) By Mouth Every 12 Hours    HYDROcodone-acetaminophen (NORCO) 5-325 mg per tablet Take 1 tablet by mouth every 4 (four) hours as needed for Pain.    levoFLOXacin (LEVAQUIN) 500 MG tablet Take 500 mg by mouth once daily.    ondansetron (ZOFRAN) 4 MG tablet Take 1 tablet (4 mg total) by mouth every 12 (twelve) hours as needed for Nausea.    OXcarbazepine (TRILEPTAL) 600 MG Tab Take 600 mg by mouth 2 (two) times daily.    potassium chloride SA (K-DUR,KLOR-CON) 20 MEQ tablet Take 20 mEq by mouth once daily.    promethazine-dextromethorphan (PROMETHAZINE-DM) 6.25-15 mg/5 mL Syrp TAKE 1 TEASPOONFUL BY MOUTH EVERY 6 HOURS AS NEEDED WILL MAKE DROWSY     Family History    None       Tobacco Use    Smoking status: Former Smoker    Smokeless tobacco: Never Used   Substance and Sexual Activity    Alcohol use: Not Currently    Drug use: Not on file    Sexual activity: Not on file     Review of Systems   Constitutional: Positive for activity change, appetite change and fatigue.   Respiratory: Positive for cough, shortness of breath and wheezing.    Cardiovascular: Positive for leg swelling. Negative for chest pain.   Gastrointestinal: Positive for abdominal distention.   Musculoskeletal: Positive for arthralgias and myalgias.   Neurological: Positive for weakness.     Objective:     Vital Signs (Most Recent):  Temp: 97.9 °F (36.6 °C) (22)  Pulse: 86 (22 0750)  Resp: 20 (22)  BP: 135/60 (2234)  SpO2: 95 % (22) Vital Signs (24h Range):  Temp:  [97.5 °F (36.4 °C)-98.3 °F (36.8 °C)] 97.9 °F (36.6 °C)  Pulse:  [] 86  Resp:  [17-30] 20  SpO2:  [92 %-95 %] 95 %  BP: (120-158)/(60-87) 135/60     Weight: 108 kg  (238 lb)  Body mass index is 34.15 kg/m².    Physical Exam  Constitutional:       Appearance: He is obese. He is ill-appearing.   HENT:      Head: Normocephalic.      Nose: Nose normal.      Mouth/Throat:      Mouth: Mucous membranes are moist.   Cardiovascular:      Rate and Rhythm: Normal rate. Rhythm irregular.      Pulses: Normal pulses.   Pulmonary:      Breath sounds: Wheezing and rhonchi present.   Abdominal:      General: Bowel sounds are normal.   Musculoskeletal:      Right lower leg: Edema present.      Left lower leg: Edema present.   Skin:         Neurological:      Mental Status: He is alert. Mental status is at baseline.             Significant Labs:   All pertinent labs within the past 24 hours have been reviewed.  Blood Culture:   No results for input(s): LABBLOO in the last 48 hours.  CBC:   Recent Labs   Lab 01/05/22  0545 01/06/22  0537   WBC 12.79* 12.87*   HGB 12.0* 11.5*   HCT 36.8* 34.9*    164     CMP:   Recent Labs   Lab 01/05/22  0545 01/06/22  0537    142   K 3.4* 3.4*    101   CO2 32* 31*    113*   BUN 57* 72*   CREATININE 2.7* 2.8*   CALCIUM 10.1 9.4   PROT 7.5 7.1   ALBUMIN 3.1* 2.9*   BILITOT 0.6 0.6   ALKPHOS 54* 46*   AST 20 19   ALT 24 22   ANIONGAP 10 10   EGFRNONAA 21.4* 20.5*     Lactic Acid:   No results for input(s): LACTATE in the last 48 hours.  Troponin: No results for input(s): TROPONINI in the last 48 hours.  TSH:   Recent Labs   Lab 12/28/21  1252   TSH 0.857       Significant Imaging: Chest xray:Right lower lung zone opacity, corresponding with mass identified on recent chest CT.  Mass appears increased in size from prior exam, may reflect interval increase in size of mass and or associated atelectasis or pneumonia.        Assessment/Plan:      * Pneumonia  Zosyn and zyvox, duo nebs, oxygen per protocol, mucinex, cough medication and monitor. Patient has failed multiple outpatient treatments    12/30/21 [facility] continue with treatment     1/3/22  [facility] continue treatment     1/4/22 [facility] continue treatment    1/5/22 [facility] continue treatment patient will need biopsy for lung mass soon, however need to optimize condition    1/6/22 [facility] continue treatment on zosyn #9 completed linezolid x 7 days      Hypokalemia  Replace and monitor.     1/6/22 [facility] continue replacement and monitor.     Wound of left leg  Consult wound care    1/6/22 [facility] improving continue wound care    Edema  Continue lasix drip and consult wound care of unna boots    1/6/22 [facility] significant improvement in swelling to BLE        DVT prophylaxis  On eliquis      Weakness  Likely secondary to acute illness, monitor and incorporate PT when appropriate    12/30/21 [facility] add PT/OT eval and treat    1/3/22 [facility] continue PT/OT    1/4/22 [facility] continue Pt/OT    1/5/22 [facility] PT/OT    1/6/22 [facility] continue PT/OT    Acute pulmonary edema  Lasix drip add zaroxolyn    1/4/22 [facility] continue treatment    1/6/22 [facility] stop lasix drip change to lasix po bid, continue zaroxolyn    Acute on chronic congestive heart failure  Start lasix drip and consult cardiology for assistance in management, accurate I/o's    12/30/21 [facility] continue lasix drip, diuresing well    1/3/22 [facility]: patient still + on I/o's will add zaroxolyn and monitor. Will also scan bladder post void     1/4/22 [facility]: encouraged accurate I/o's. Post void bladder scan 233    1/5/22 [facility] patient is diuresing well continue accurate I/o's down 16 pounds since admission    1/6/22 [facility] stable      VTE Risk Mitigation (From admission, onward)         Ordered     IP VTE HIGH RISK PATIENT  Once         12/28/21 1805     Place sequential compression device  Until discontinued         12/28/21 1805     apixaban tablet 5 mg  2 times daily         12/28/21 1607                Discharge Planning   GLORY:      Code Status: Full Code   Is the patient medically ready for discharge?:     Reason for patient still in hospital (select all that apply): Patient trending condition, Laboratory test,  Treatment and PT / OT recommendations  Discharge Plan A: Home with family                  Rosalva Ventura NP  Department of Hospital Medicine   [facility]

## 2022-01-06 NOTE — RESPIRATORY THERAPY
01/06/22 1050   Home Oxygen Qualification   $ Home O2 Qualification Tech time 15 minutes   Room Air SpO2 At Rest 93 %   Room Air SpO2 During Ambulation (!) 84 %   Ambulation O2 LPM 2 LPM   SpO2 Post Ambulation 94 %   Post Ambulation Heart Rate 109 bpm     Jadyn ARIAS notified.

## 2022-01-06 NOTE — SUBJECTIVE & OBJECTIVE
Past Medical History:   Diagnosis Date    Arthritis     Brain tumor     Coronary artery disease     Encounter for blood transfusion     Hypertension     Neuropathy     Renal disorder        Past Surgical History:   Procedure Laterality Date    APPENDECTOMY      HIP SURGERY      KNEE SURGERY      SHOULDER SURGERY      triple bypass         Review of patient's allergies indicates:  No Known Allergies    No current facility-administered medications on file prior to encounter.     Current Outpatient Medications on File Prior to Encounter   Medication Sig    ANORO ELLIPTA 62.5-25 mcg/actuation DsDv     apixaban (ELIQUIS) 5 mg Tab Take 5 mg by mouth 2 (two) times daily.    ascorbic acid, vitamin C, (VITAMIN C) 1000 MG tablet Take 1,000 mg by mouth once daily.    aspirin (ECOTRIN) 81 MG EC tablet Take 81 mg by mouth once daily.    b complex vitamins tablet Take by mouth once daily.    diphenoxylate-atropine 2.5-0.025 mg (LOMOTIL) 2.5-0.025 mg per tablet Take 1 tablet by mouth 2 (two) times daily as needed for Diarrhea.    ergocalciferol, vitamin D2, (VITAMIN D ORAL) Take 1,000 capsules by mouth once daily.    ezetimibe (ZETIA) 10 mg tablet Take 10 mg by mouth once daily.    FLUoxetine 20 MG capsule Take 20 mg by mouth once daily.    gabapentin (NEURONTIN) 300 MG capsule Take 300 mg by mouth 2 (two) times daily.    HYDROcodone-acetaminophen (NORCO)  mg per tablet TAKE 1 TABLET BY MOUTH TWICE DAILY AS NEEDED FOR PAIN WILL MAKE DROWSY    lovastatin (MEVACOR) 10 MG tablet Take 10 mg by mouth every evening.    senna (SENOKOT) 8.6 mg tablet Take 1 tablet by mouth 2 (two) times daily.     tamsulosin (FLOMAX) 0.4 mg Cap Take 0.4 mg by mouth once daily.    torsemide (DEMADEX) 20 MG Tab Take 20 mg by mouth 2 (two) times a day.    carvediloL (COREG) 12.5 MG tablet Take 12.5 mg by mouth 2 (two) times daily with meals.    carvediloL (COREG) 6.25 MG tablet SMARTSI Tablet(s) By Mouth Every 12 Hours     HYDROcodone-acetaminophen (NORCO) 5-325 mg per tablet Take 1 tablet by mouth every 4 (four) hours as needed for Pain.    levoFLOXacin (LEVAQUIN) 500 MG tablet Take 500 mg by mouth once daily.    ondansetron (ZOFRAN) 4 MG tablet Take 1 tablet (4 mg total) by mouth every 12 (twelve) hours as needed for Nausea.    OXcarbazepine (TRILEPTAL) 600 MG Tab Take 600 mg by mouth 2 (two) times daily.    potassium chloride SA (K-DUR,KLOR-CON) 20 MEQ tablet Take 20 mEq by mouth once daily.    promethazine-dextromethorphan (PROMETHAZINE-DM) 6.25-15 mg/5 mL Syrp TAKE 1 TEASPOONFUL BY MOUTH EVERY 6 HOURS AS NEEDED WILL MAKE DROWSY     Family History    None       Tobacco Use    Smoking status: Former Smoker    Smokeless tobacco: Never Used   Substance and Sexual Activity    Alcohol use: Not Currently    Drug use: Not on file    Sexual activity: Not on file     Review of Systems   Constitutional: Positive for activity change, appetite change and fatigue.   Respiratory: Positive for cough, shortness of breath and wheezing.    Cardiovascular: Positive for leg swelling. Negative for chest pain.   Gastrointestinal: Positive for abdominal distention.   Musculoskeletal: Positive for arthralgias and myalgias.   Neurological: Positive for weakness.     Objective:     Vital Signs (Most Recent):  Temp: 97.9 °F (36.6 °C) (01/06/22 0734)  Pulse: 86 (01/06/22 0750)  Resp: 20 (01/06/22 0750)  BP: 135/60 (01/06/22 0734)  SpO2: 95 % (01/06/22 0750) Vital Signs (24h Range):  Temp:  [97.5 °F (36.4 °C)-98.3 °F (36.8 °C)] 97.9 °F (36.6 °C)  Pulse:  [] 86  Resp:  [17-30] 20  SpO2:  [92 %-95 %] 95 %  BP: (120-158)/(60-87) 135/60     Weight: 108 kg (238 lb)  Body mass index is 34.15 kg/m².    Physical Exam  Constitutional:       Appearance: He is obese. He is ill-appearing.   HENT:      Head: Normocephalic.      Nose: Nose normal.      Mouth/Throat:      Mouth: Mucous membranes are moist.   Cardiovascular:      Rate and Rhythm: Normal  rate. Rhythm irregular.      Pulses: Normal pulses.   Pulmonary:      Breath sounds: Wheezing and rhonchi present.   Abdominal:      General: Bowel sounds are normal.   Musculoskeletal:      Right lower leg: Edema present.      Left lower leg: Edema present.   Skin:         Neurological:      Mental Status: He is alert. Mental status is at baseline.             Significant Labs:   All pertinent labs within the past 24 hours have been reviewed.  Blood Culture:   No results for input(s): LABBLOO in the last 48 hours.  CBC:   Recent Labs   Lab 01/05/22  0545 01/06/22  0537   WBC 12.79* 12.87*   HGB 12.0* 11.5*   HCT 36.8* 34.9*    164     CMP:   Recent Labs   Lab 01/05/22  0545 01/06/22  0537    142   K 3.4* 3.4*    101   CO2 32* 31*    113*   BUN 57* 72*   CREATININE 2.7* 2.8*   CALCIUM 10.1 9.4   PROT 7.5 7.1   ALBUMIN 3.1* 2.9*   BILITOT 0.6 0.6   ALKPHOS 54* 46*   AST 20 19   ALT 24 22   ANIONGAP 10 10   EGFRNONAA 21.4* 20.5*     Lactic Acid:   No results for input(s): LACTATE in the last 48 hours.  Troponin: No results for input(s): TROPONINI in the last 48 hours.  TSH:   Recent Labs   Lab 12/28/21  1252   TSH 0.857       Significant Imaging: Chest xray:Right lower lung zone opacity, corresponding with mass identified on recent chest CT.  Mass appears increased in size from prior exam, may reflect interval increase in size of mass and or associated atelectasis or pneumonia.

## 2022-01-06 NOTE — ASSESSMENT & PLAN NOTE
Likely secondary to acute illness, monitor and incorporate PT when appropriate    12/30/21 [facility] add PT/OT eval and treat    1/3/22 [facility] continue PT/OT    1/4/22 [facility] continue Pt/OT    1/5/22 [facility] PT/OT    1/6/22 [facility] continue PT/OT

## 2022-01-06 NOTE — ASSESSMENT & PLAN NOTE
Start lasix drip and consult cardiology for assistance in management, accurate I/o's    12/30/21 [facility] continue lasix drip, diuresing well    1/3/22 [facility]: patient still + on I/o's will add zaroxolyn and monitor. Will also scan bladder post void     1/4/22 [facility]: encouraged accurate I/o's. Post void bladder scan 233    1/5/22 [facility] patient is diuresing well continue accurate I/o's down 16 pounds since admission    1/6/22 [facility] stable

## 2022-01-06 NOTE — PLAN OF CARE
01/06/22 1206   Post-Acute Status   Post-Acute Authorization Home Health   Home Health Status Referrals Sent   Discharge Plan   Discharge Plan A Home with family;Home Health   Discharge Plan B Home with family;Home Health     Patient and his family requested home health care with [facility]. The patient's choice form was signed by the patient. New referral was sent to [facility] Via [facility].    Addendum: 1300-The patient was approved for home health care per Leeanna with [facility].

## 2022-01-06 NOTE — PT/OT/SLP PROGRESS
Occupational Therapy   Treatment    Name: Jamey Lei  MRN: [id number]  Admitting Diagnosis:  Pneumonia       Recommendations:     Discharge Recommendations: other (see comments) (To be further determined based on progress prior to discharge.)  Discharge Equipment Recommendations:  none  Barriers to discharge:  Other (Comment) (Medical and functional status)    Assessment:     Jamey Lei is a 81 y.o. male with a medical diagnosis of Pneumonia. Performance deficits affecting function are weakness,impaired endurance,impaired self care skills,impaired functional mobilty,impaired balance,edema,impaired cardiopulmonary response to activity.     Rehab Prognosis:  Good; patient would benefit from acute skilled OT services to address these deficits and reach maximum level of function.       Plan:     Patient to be seen 6 x/week to address the above listed problems via self-care/home management,therapeutic activities,therapeutic exercises  · Plan of Care Expires: 01/07/22  · Plan of Care Reviewed with: patient    Subjective     Pain/Comfort:  · Pain Rating 1: 5/10  · Location - Side 1: Left  · Location 1: knee  · Pain Addressed 1: Reposition,Cessation of Activity  · Pain Rating Post-Intervention 1: 3/10    Objective:     Communicated with: nurse prior to session.  Patient found up in chair with telemetry,peripheral IV upon OT entry to room.    General Precautions: Standard, fall   Orthopedic Precautions:N/A   Braces:    Respiratory Status: Room air     Occupational Performance:     Functional Mobility/Transfers:  · Patient completed Sit <> Stand Transfer with supervision  with  rolling walker   · Patient completed Bed <> Chair Transfer using Step Transfer technique with supervision with rolling walker  · Patient completed Toilet Transfer Step Transfer technique with stand by assistance with  grab bars  · Functional Mobility: Pt ambulated 45' requiring SBA utilizing RW.      Treatment & Education:  Pt was cooperative and  motivated with minimal verbal encouragement while exhibiting more positive affect. He participated in functional transfer retraining to / from recliner and toilet emphasizing fall prevention and use of RW providing extra time requiring supervision secondary to verbal cueing for safety awareness. Pt ambulated 45' between surfaces requiring SBA utilizing RW. Also, he participated in therapeutic exercise performing 4x12 seated pushups requiring verbal and tactile cueing for technique challenging him to lift buttocks off seated surface to end range elbow extension in order to strengthen triceps brachii to improve performance with sit <> stand transitions.    Patient left up in chair with call button in reach and nurse notifiedEducation:      GOALS:   Multidisciplinary Problems     Occupational Therapy Goals        Problem: Occupational Therapy Goal    Goal Priority Disciplines Outcome Interventions   Occupational Therapy Goal     OT, PT/OT     Description: Goals to be met by: 01/07/21     Patient will increase functional independence with ADLs by performing:    UE Dressing with Modified [place].  LE Dressing with Modified [place].  Grooming while standing at sink with Modified [place].  Toileting from toilet with Modified [place] for hygiene and clothing management.   Bathing from  shower chair/bench with Supervision.  Step transfer with Modified [place]  Toilet transfer to toilet with Modified [place].  Increased functional strength to 4+/5 for bilateral UE's.                     Time Tracking:     OT Date of Treatment: 01/06/22  OT Start Time: 1546  OT Stop Time: 1620  OT Total Time (min): 34 min    Billable Minutes:Therapeutic Activity 15  Therapeutic Exercise 19    OT/DENISHA: OT          1/6/2022

## 2022-01-06 NOTE — PLAN OF CARE
01/06/22 1214   Post-Acute Status   Post-Acute Authorization E   E Status Referrals Sent       New order for home oxygen. Referral sent to [facility] via [facility]. Notified Antonio with [facility] of new referral. She is aware that the anticipated discharge date is tomorrow.

## 2022-01-06 NOTE — ASSESSMENT & PLAN NOTE
Continue lasix drip and consult wound care of unna boots    1/6/22 [facility] significant improvement in swelling to BLE

## 2022-01-06 NOTE — PROGRESS NOTES
Physical Therapy Treatment    Patient Name:  Jamey Lei   MRN:  [id number]    Recommendations:     Discharge Recommendations:      Discharge Equipment Recommendations:     Barriers to discharge: decreased safety and fxn'l mobility    Assessment:     Jamey Lei is a 81 y.o. male admitted with a medical diagnosis of Pneumonia.  He presents with the following impairments/functional limitations:  impaired endurance,weakness,impaired functional mobilty . Pt less confused today and quality of gait was better.    Rehab Prognosis: Good; patient would benefit from acute skilled PT services to address these deficits and reach maximum level of function.    Recent Surgery: * No surgery found *      Plan:     During this hospitalization, patient to be seen 6 x/week to address the identified rehab impairments via gait training,therapeutic activities,therapeutic exercises and progress toward the following goals:    · Plan of Care Expires:       Subjective     Chief Complaint:  Patient/Family Comments/goals:   Pain/Comfort:  ·        Objective:     Communicated with nurse prior to session.  Patient found supine with   upon PT entry to room.     General Precautions: Standard, fall   Orthopedic Precautions:    Braces:    Respiratory Status: Nasal cannula, flow 2 L/min     Functional Mobility:  · Transfers:     · Sit to Stand:  minimum assistance with rolling walker  · Bed to Chair: minimum assistance with  rolling walker  using  Step Transfer  · Gait: ambulated 120ft with rw and 2l o2 with cga      AM-PAC 6 CLICK MOBILITY          Therapeutic Activities and Exercises:       Patient left supine with call button in reach..    GOALS:   Multidisciplinary Problems     Physical Therapy Goals     Not on file                Time Tracking:     PT Received On:    PT Start Time: 1716     PT Stop Time: 1728  PT Total Time (min): 12 min     Billable Minutes: Gait Training 12             PTA Visit Number: 1 01/06/2022

## 2022-01-07 VITALS
WEIGHT: 237 LBS | HEIGHT: 70 IN | TEMPERATURE: 98 F | DIASTOLIC BLOOD PRESSURE: 55 MMHG | BODY MASS INDEX: 33.93 KG/M2 | OXYGEN SATURATION: 92 % | RESPIRATION RATE: 20 BRPM | HEART RATE: 87 BPM | SYSTOLIC BLOOD PRESSURE: 107 MMHG

## 2022-01-07 LAB
ALBUMIN SERPL BCP-MCNC: 3 G/DL (ref 3.5–5.2)
ALP SERPL-CCNC: 52 U/L (ref 55–135)
ALT SERPL W/O P-5'-P-CCNC: 24 U/L (ref 10–44)
ANION GAP SERPL CALC-SCNC: 9 MMOL/L (ref 8–16)
AST SERPL-CCNC: 19 U/L (ref 10–40)
BASOPHILS # BLD AUTO: 0.08 K/UL (ref 0–0.2)
BASOPHILS NFR BLD: 0.7 % (ref 0–1.9)
BILIRUB SERPL-MCNC: 0.6 MG/DL (ref 0.1–1)
BUN SERPL-MCNC: 81 MG/DL (ref 8–23)
CALCIUM SERPL-MCNC: 10 MG/DL (ref 8.7–10.5)
CHLORIDE SERPL-SCNC: 100 MMOL/L (ref 95–110)
CO2 SERPL-SCNC: 32 MMOL/L (ref 23–29)
CREAT SERPL-MCNC: 3 MG/DL (ref 0.5–1.4)
DIFFERENTIAL METHOD: ABNORMAL
EOSINOPHIL # BLD AUTO: 0.7 K/UL (ref 0–0.5)
EOSINOPHIL NFR BLD: 6.9 % (ref 0–8)
ERYTHROCYTE [DISTWIDTH] IN BLOOD BY AUTOMATED COUNT: 14.7 % (ref 11.5–14.5)
EST. GFR  (AFRICAN AMERICAN): 21.8 ML/MIN/1.73 M^2
EST. GFR  (NON AFRICAN AMERICAN): 18.9 ML/MIN/1.73 M^2
GLUCOSE SERPL-MCNC: 98 MG/DL (ref 70–110)
HCT VFR BLD AUTO: 34 % (ref 40–54)
HGB BLD-MCNC: 11.5 G/DL (ref 14–18)
IMM GRANULOCYTES # BLD AUTO: 0.03 K/UL (ref 0–0.04)
IMM GRANULOCYTES NFR BLD AUTO: 0.3 % (ref 0–0.5)
LYMPHOCYTES # BLD AUTO: 1.8 K/UL (ref 1–4.8)
LYMPHOCYTES NFR BLD: 16.9 % (ref 18–48)
MAGNESIUM SERPL-MCNC: 2.6 MG/DL (ref 1.6–2.6)
MCH RBC QN AUTO: 33.2 PG (ref 27–31)
MCHC RBC AUTO-ENTMCNC: 33.8 G/DL (ref 32–36)
MCV RBC AUTO: 98 FL (ref 82–98)
MONOCYTES # BLD AUTO: 1.1 K/UL (ref 0.3–1)
MONOCYTES NFR BLD: 10.6 % (ref 4–15)
NEUTROPHILS # BLD AUTO: 6.9 K/UL (ref 1.8–7.7)
NEUTROPHILS NFR BLD: 64.6 % (ref 38–73)
NRBC BLD-RTO: 0 /100 WBC
PLATELET # BLD AUTO: 157 K/UL (ref 150–450)
PMV BLD AUTO: 10.5 FL (ref 9.2–12.9)
POTASSIUM SERPL-SCNC: 3.3 MMOL/L (ref 3.5–5.1)
PROT SERPL-MCNC: 7.3 G/DL (ref 6–8.4)
RBC # BLD AUTO: 3.46 M/UL (ref 4.6–6.2)
SODIUM SERPL-SCNC: 141 MMOL/L (ref 136–145)
WBC # BLD AUTO: 10.74 K/UL (ref 3.9–12.7)

## 2022-01-07 PROCEDURE — 25000003 PHARM REV CODE 250: Performed by: NURSE PRACTITIONER

## 2022-01-07 PROCEDURE — 94640 AIRWAY INHALATION TREATMENT: CPT

## 2022-01-07 PROCEDURE — 36415 COLL VENOUS BLD VENIPUNCTURE: CPT | Performed by: NURSE PRACTITIONER

## 2022-01-07 PROCEDURE — 94761 N-INVAS EAR/PLS OXIMETRY MLT: CPT

## 2022-01-07 PROCEDURE — 25000003 PHARM REV CODE 250: Performed by: INTERNAL MEDICINE

## 2022-01-07 PROCEDURE — 83735 ASSAY OF MAGNESIUM: CPT | Performed by: NURSE PRACTITIONER

## 2022-01-07 PROCEDURE — 99900031 HC PATIENT EDUCATION (STAT)

## 2022-01-07 PROCEDURE — 27000221 HC OXYGEN, UP TO 24 HOURS

## 2022-01-07 PROCEDURE — 63600175 PHARM REV CODE 636 W HCPCS: Performed by: NURSE PRACTITIONER

## 2022-01-07 PROCEDURE — 99900035 HC TECH TIME PER 15 MIN (STAT)

## 2022-01-07 PROCEDURE — 85025 COMPLETE CBC W/AUTO DIFF WBC: CPT | Performed by: NURSE PRACTITIONER

## 2022-01-07 PROCEDURE — 80053 COMPREHEN METABOLIC PANEL: CPT | Performed by: NURSE PRACTITIONER

## 2022-01-07 PROCEDURE — 25000242 PHARM REV CODE 250 ALT 637 W/ HCPCS: Performed by: NURSE PRACTITIONER

## 2022-01-07 RX ORDER — POTASSIUM CHLORIDE 20 MEQ/1
40 TABLET, EXTENDED RELEASE ORAL 2 TIMES DAILY
Qty: 120 TABLET | Refills: 0 | Status: SHIPPED | OUTPATIENT
Start: 2022-01-07 | End: 2022-02-06

## 2022-01-07 RX ORDER — GUAIFENESIN 600 MG/1
600 TABLET, EXTENDED RELEASE ORAL 2 TIMES DAILY
Qty: 20 TABLET | Refills: 0 | Status: SHIPPED | OUTPATIENT
Start: 2022-01-07 | End: 2022-01-17

## 2022-01-07 RX ORDER — TORSEMIDE 20 MG/1
40 TABLET ORAL 2 TIMES DAILY
Qty: 120 TABLET | Refills: 0 | Status: SHIPPED | OUTPATIENT
Start: 2022-01-07 | End: 2022-02-06

## 2022-01-07 RX ORDER — TORSEMIDE 20 MG/1
20 TABLET ORAL DAILY
Qty: 30 TABLET | Refills: 11 | Status: SHIPPED | OUTPATIENT
Start: 2022-01-07 | End: 2022-03-03 | Stop reason: SDUPTHER

## 2022-01-07 RX ORDER — METOLAZONE 5 MG/1
5 TABLET ORAL DAILY
Qty: 30 TABLET | Refills: 0 | Status: SHIPPED | OUTPATIENT
Start: 2022-01-08 | End: 2022-06-14

## 2022-01-07 RX ADMIN — EZETIMIBE 10 MG: 10 TABLET ORAL at 09:01

## 2022-01-07 RX ADMIN — APIXABAN 5 MG: 5 TABLET, FILM COATED ORAL at 09:01

## 2022-01-07 RX ADMIN — ASPIRIN 81 MG: 81 TABLET, COATED ORAL at 09:01

## 2022-01-07 RX ADMIN — GABAPENTIN 300 MG: 300 CAPSULE ORAL at 09:01

## 2022-01-07 RX ADMIN — CARVEDILOL 6.25 MG: 3.12 TABLET, FILM COATED ORAL at 09:01

## 2022-01-07 RX ADMIN — METOLAZONE 5 MG: 5 TABLET ORAL at 09:01

## 2022-01-07 RX ADMIN — FUROSEMIDE 80 MG: 40 TABLET ORAL at 09:01

## 2022-01-07 RX ADMIN — TAMSULOSIN HYDROCHLORIDE 0.4 MG: 0.4 CAPSULE ORAL at 09:01

## 2022-01-07 RX ADMIN — FLUOXETINE HYDROCHLORIDE 20 MG: 20 CAPSULE ORAL at 09:01

## 2022-01-07 RX ADMIN — FAMOTIDINE 20 MG: 20 TABLET ORAL at 09:01

## 2022-01-07 RX ADMIN — IPRATROPIUM BROMIDE AND ALBUTEROL SULFATE 3 ML: 2.5; .5 SOLUTION RESPIRATORY (INHALATION) at 07:01

## 2022-01-07 RX ADMIN — OXCARBAZEPINE 600 MG: 300 TABLET, FILM COATED ORAL at 09:01

## 2022-01-07 RX ADMIN — GUAIFENESIN 200 MG: 200 SOLUTION ORAL at 09:01

## 2022-01-07 RX ADMIN — POTASSIUM CHLORIDE 40 MEQ: 1500 TABLET, EXTENDED RELEASE ORAL at 09:01

## 2022-01-07 RX ADMIN — PIPERACILLIN AND TAZOBACTAM 4.5 G: 4; .5 INJECTION, POWDER, LYOPHILIZED, FOR SOLUTION INTRAVENOUS; PARENTERAL at 02:01

## 2022-01-07 RX ADMIN — GUAIFENESIN 600 MG: 600 TABLET, EXTENDED RELEASE ORAL at 09:01

## 2022-01-07 RX ADMIN — PIPERACILLIN AND TAZOBACTAM 4.5 G: 4; .5 INJECTION, POWDER, LYOPHILIZED, FOR SOLUTION INTRAVENOUS; PARENTERAL at 09:01

## 2022-01-07 NOTE — ASSESSMENT & PLAN NOTE
Continue lasix drip and consult wound care of unna boots    1/6/22 [facility] significant improvement in swelling to BLE    1/7/22 [facility] continue outpatient unna boots per home health

## 2022-01-07 NOTE — ASSESSMENT & PLAN NOTE
Zosyn and zyvox, duo nebs, oxygen per protocol, mucinex, cough medication and monitor. Patient has failed multiple outpatient treatments    12/30/21 [facility] continue with treatment     1/3/22 [facility] continue treatment     1/4/22 [facility] continue treatment    1/5/22 [facility] continue treatment patient will need biopsy for lung mass soon, however need to optimize condition    1/6/22 [facility] continue treatment on zosyn #9 completed linezolid x 7 days    1/7/22 [facility] antibiotics completed

## 2022-01-07 NOTE — ASSESSMENT & PLAN NOTE
Replace and monitor.     1/6/22 [facility] continue replacement and monitor.     1/7/22 [facility] monitor labs outpatient

## 2022-01-07 NOTE — ASSESSMENT & PLAN NOTE
Likely secondary to acute illness, monitor and incorporate PT when appropriate    12/30/21 [facility] add PT/OT eval and treat    1/3/22 [facility] continue PT/OT    1/4/22 [facility] continue Pt/OT    1/5/22 [facility] PT/OT    1/7/22 [facility] home health for PT/OT    1/6/22 [facility] continue PT/OT

## 2022-01-07 NOTE — DISCHARGE SUMMARY
[facility] Medicine  Discharge Summary      Patient Name: Jamey Lei  MRN: [id number]  Patient Class: IP- Inpatient  Admission Date: 12/28/2021  Hospital Length of Stay: 10 days  Discharge Date and Time:  01/07/2022 10:04 AM  Attending Physician: Navi Domínguez III, MD   Discharging Provider: Rosalva Ventura NP  Primary Care Provider: Navi Domínguez III, MD      HPI:    Shortness of Breath       Pt sent to ED via Dr. Domínguez for admission for unsuccessful outpatient treatment of pneumonia/heart failure. Pt stated that he has been sick for the past month or so.       81-year-old male with history of AFib on Eliquis, hypertension referred from PCP office for admission due to failure of outpatient therapy for pneumonia and heart failure.  Patient says that he has been feeling weak for over a month despite finishing ends antibiotics and being on his medication.  Patient denies any chest pain but does endorse orthopnea, bilateral lower extremity swelling, dyspnea on exertion, chronic cough      * No surgery found *      Hospital Course:   12/30/21 [facility] patient reports that he is still having significant coughing, no shortness of breath, sitting up in recliner. Cardiology following, on lasix drip, significant edema and wounds to lower ext. Will continue lasix drip, continue abx for pneumonia, have wound care see, consider unna boots    12/31/21 [facility]: Still with cough, says he is still swollen though this has gotten better with lasix and wraps.     1/1/22 [facility]: Legs still wrapped, no acute overnight events. Still fluid overloaded    1/2/22 [facility]: Still with coarse breath sounds. Gentle diuresis    1/3/22 [facility] Still with coarse breath sounds and coughing. Sitting up in the recliner with legs elevated. Patient still positive with I/o's get bladder scan and add zaroxolyn     1/4/22 [facility] despite orders still no accurate I/o's have discussed with patient. Today he reports that he is feeling better, less  coughing    1/5/22 [facility] patient is down 16 pounds since admission. Discussed kidney function and that patient appear a little dry however this is goal based on condition    1/6/22 [facility] family at bedside. Patient weight is stable, legs unwrapped today and significant improvement in swelling. Will stop lasix drip today and monitor plan for likely discharge home tomorrow       Goals of Care Treatment Preferences:  Code Status: Full Code      Consults:   Consults (From admission, onward)        Status Ordering Provider     Inpatient consult to Social Work/Case Management  Once        Provider:  (Not yet assigned)    Completed BETSY BAUTISTA     Inpatient consult to Social Work/Case Management  Once        Provider:  (Not yet assigned)    Acknowledged BETSY BAUTISTA     Inpatient consult to Cardiology  Once        Provider:  Christian Webster MD    Completed BETSY BAUTISTA          * Pneumonia  Zosyn and zyvox, duo nebs, oxygen per protocol, mucinex, cough medication and monitor. Patient has failed multiple outpatient treatments    12/30/21 [facility] continue with treatment     1/3/22 [facility] continue treatment     1/4/22 [facility] continue treatment    1/5/22 [facility] continue treatment patient will need biopsy for lung mass soon, however need to optimize condition    1/6/22 [facility] continue treatment on zosyn #9 completed linezolid x 7 days    1/7/22 [facility] antibiotics completed      Hypokalemia  Replace and monitor.     1/6/22 [facility] continue replacement and monitor.     1/7/22 [facility] monitor labs outpatient    Wound of left leg  Consult wound care    1/6/22 [facility] improving continue wound care    Edema  Continue lasix drip and consult wound care of unna boots    1/6/22 [facility] significant improvement in swelling to BLE    1/7/22 [facility] continue outpatient unna boots per home health      DVT prophylaxis  On eliquis      Weakness  Likely secondary to acute illness, monitor and incorporate PT when appropriate    12/30/21 [facility] add PT/OT eval and  treat    1/3/22 [facility] continue PT/OT    1/4/22 [facility] continue Pt/OT    1/5/22 [facility] PT/OT    1/7/22 [facility] home health for PT/OT    1/6/22 [facility] continue PT/OT    Acute pulmonary edema  Lasix drip add zaroxolyn    1/4/22 [facility] continue treatment    1/6/22 [facility] stop lasix drip change to lasix po bid, continue zaroxolyn    Acute on chronic congestive heart failure  Start lasix drip and consult cardiology for assistance in management, accurate I/o's    12/30/21 [facility] continue lasix drip, diuresing well    1/3/22 [facility]: patient still + on I/o's will add zaroxolyn and monitor. Will also scan bladder post void     1/4/22 [facility]: encouraged accurate I/o's. Post void bladder scan 233    1/5/22 [facility] patient is diuresing well continue accurate I/o's down 16 pounds since admission    1/6/22 [facility] stable    1/7/22 [facility] down 17 pounds since admission torsemide 40 mg po bid and zaroxlyn 5 mg po daily      Final Active Diagnoses:    Diagnosis Date Noted POA    PRINCIPAL PROBLEM:  Pneumonia [J18.9] 12/29/2021 Yes    Edema [R60.9] 12/30/2021 Yes    Wound of left leg [S81.802A] 12/30/2021 Yes    Hypokalemia [E87.6] 12/30/2021 Yes    Acute on chronic congestive heart failure [I50.9] 12/29/2021 Yes    Acute pulmonary edema [J81.0] 12/29/2021 Yes    Weakness [R53.1] 12/29/2021 Yes    DVT prophylaxis [Z29.9] 12/29/2021 Not Applicable      Problems Resolved During this Admission:       Discharged Condition: good    Disposition: Home-Health Care [facility]    Follow Up:   Contact information for follow-up providers     Navi Domínguez III, MD In 1 week.    Specialty: Internal Medicine  Why: hospital follow up  Contact information:  [address] [place] [id number]  [phone]                   Contact information for after-discharge care     Durable Medical Equipment     [facility] .    Service: Durable Medical Equipment  Contact information:  [facility] [employer]  [facility] [id number] [phone]                           Patient Instructions:     "  OXYGEN FOR HOME USE     Order Specific Question Answer Comments   Liter Flow 2    Duration Continuous    Qualifying Test Performed at: Activity    Oxygen saturation at rest 93    Oxygen saturation with activity 84    Oxygen saturation with activity on oxygen 94    Portable mode: pulse dose acceptable    Mode: Conserving device    Route nasal cannula    Device: home concentrator with portable concentrator    Length of need (in months): 99 mos    Patient condition with qualifying saturation CHF    Height: 5' 10" (1.778 m)    Weight: 108 kg (238 lb)    Alternative treatment measures have been tried or considered and deemed clinically ineffective. Yes      Ambulatory referral/consult to Home Health   Standing Status: Future   Referral Priority: Routine Referral Type: Home Health   Referral Reason: Specialty Services Required   Requested Specialty: Home Health Services   Number of Visits Requested: 1     Diet Cardiac     Home O2 eval (desaturation screen)     Activity as tolerated       Significant Diagnostic Studies: Labs:   CMP   Recent Labs   Lab 01/06/22  0537 01/07/22  0710    141   K 3.4* 3.3*    100   CO2 31* 32*   * 98   BUN 72* 81*   CREATININE 2.8* 3.0*   CALCIUM 9.4 10.0   PROT 7.1 7.3   ALBUMIN 2.9* 3.0*   BILITOT 0.6 0.6   ALKPHOS 46* 52*   AST 19 19   ALT 22 24   ANIONGAP 10 9   ESTGFRAFRICA 23.7* 21.8*   EGFRNONAA 20.5* 18.9*    and CBC   Recent Labs   Lab 01/06/22  0537 01/06/22  0537 01/07/22  0710   WBC 12.87*  --  10.74   HGB 11.5*  --  11.5*   HCT 34.9*   < > 34.0*     --  157    < > = values in this interval not displayed.       Pending Diagnostic Studies:     None         Medications:  Reconciled Home Medications:      Medication List      START taking these medications    guaiFENesin 600 mg 12 hr tablet  Commonly known as: MUCINEX  Take 1 tablet (600 mg total) by mouth 2 (two) times daily. for 10 days     metOLazone 5 MG tablet  Commonly known as: ZAROXOLYN  Take 1 " tablet (5 mg total) by mouth once daily.  Start taking on: 2022        CHANGE how you take these medications    carvediloL 6.25 MG tablet  Commonly known as: COREG  SMARTSI Tablet(s) By Mouth Every 12 Hours  What changed: Another medication with the same name was removed. Continue taking this medication, and follow the directions you see here.     HYDROcodone-acetaminophen 5-325 mg per tablet  Commonly known as: NORCO  Take 1 tablet by mouth every 4 (four) hours as needed for Pain.  What changed: Another medication with the same name was removed. Continue taking this medication, and follow the directions you see here.     potassium chloride SA 20 MEQ tablet  Commonly known as: K-DUR,KLOR-CON  Take 2 tablets (40 mEq total) by mouth 2 (two) times daily.  What changed:   · how much to take  · when to take this     * torsemide 20 MG Tab  Commonly known as: DEMADEX  Take 1 tablet (20 mg total) by mouth once daily.  What changed: You were already taking a medication with the same name, and this prescription was added. Make sure you understand how and when to take each.     * torsemide 20 MG Tab  Commonly known as: DEMADEX  Take 2 tablets (40 mg total) by mouth 2 (two) times a day.  What changed: how much to take         * This list has 2 medication(s) that are the same as other medications prescribed for you. Read the directions carefully, and ask your doctor or other care provider to review them with you.            CONTINUE taking these medications    ANORO ELLIPTA 62.5-25 mcg/actuation Dsdv  Generic drug: umeclidinium-vilanteroL     apixaban 5 mg Tab  Commonly known as: ELIQUIS  Take 5 mg by mouth 2 (two) times daily.     aspirin 81 MG EC tablet  Commonly known as: ECOTRIN  Take 81 mg by mouth once daily.     b complex vitamins tablet  Take by mouth once daily.     ezetimibe 10 mg tablet  Commonly known as: ZETIA  Take 10 mg by mouth once daily.     FLUoxetine 20 MG capsule  Take 20 mg by mouth once  daily.     gabapentin 300 MG capsule  Commonly known as: NEURONTIN  Take 300 mg by mouth 2 (two) times daily.     lovastatin 10 MG tablet  Commonly known as: MEVACOR  Take 10 mg by mouth every evening.     OXcarbazepine 600 MG Tab  Commonly known as: TRILEPTAL  Take 600 mg by mouth 2 (two) times daily.     senna 8.6 mg tablet  Commonly known as: SENOKOT  Take 1 tablet by mouth 2 (two) times daily.     tamsulosin 0.4 mg Cap  Commonly known as: FLOMAX  Take 0.4 mg by mouth once daily.     VITAMIN C 1000 MG tablet  Generic drug: ascorbic acid (vitamin C)  Take 1,000 mg by mouth once daily.     VITAMIN D ORAL  Take 1,000 capsules by mouth once daily.        STOP taking these medications    diphenoxylate-atropine 2.5-0.025 mg 2.5-0.025 mg per tablet  Commonly known as: LOMOTIL     levoFLOXacin 500 MG tablet  Commonly known as: LEVAQUIN     ondansetron 4 MG tablet  Commonly known as: ZOFRAN     promethazine-dextromethorphan 6.25-15 mg/5 mL Syrp  Commonly known as: PROMETHAZINE-DM            Indwelling Lines/Drains at time of discharge:   Lines/Drains/Airways     None                 Time spent on the discharge of patient: 45 minutes         Rosalva Ventuar NP  Department of Hospital Medicine  [facility]

## 2022-01-07 NOTE — ASSESSMENT & PLAN NOTE
Start lasix drip and consult cardiology for assistance in management, accurate I/o's    12/30/21 [facility] continue lasix drip, diuresing well    1/3/22 [facility]: patient still + on I/o's will add zaroxolyn and monitor. Will also scan bladder post void     1/4/22 [facility]: encouraged accurate I/o's. Post void bladder scan 233    1/5/22 [facility] patient is diuresing well continue accurate I/o's down 16 pounds since admission    1/6/22 [facility] stable    1/7/22 [facility] down 17 pounds since admission torsemide 40 mg po bid and zaroxlyn 5 mg po daily

## 2022-01-17 ENCOUNTER — HOSPITAL ENCOUNTER (EMERGENCY)
Facility: HOSPITAL | Age: 82
Discharge: HOME | End: 2022-01-17
Attending: EMERGENCY MEDICINE
Payer: MEDICARE

## 2022-01-17 VITALS
DIASTOLIC BLOOD PRESSURE: 64 MMHG | WEIGHT: 231.13 LBS | OXYGEN SATURATION: 98 % | HEIGHT: 70 IN | TEMPERATURE: 98 F | HEART RATE: 98 BPM | SYSTOLIC BLOOD PRESSURE: 141 MMHG | RESPIRATION RATE: 18 BRPM | BODY MASS INDEX: 33.09 KG/M2
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
## 2022-01-17 DIAGNOSIS — S00.83XA CONTUSION OF OTHER PART OF HEAD, INITIAL ENCOUNTER: ICD-10-CM

## 2022-01-17 PROCEDURE — 99284 EMERGENCY DEPT VISIT MOD MDM: CPT | Mod: 25

## 2022-01-17 NOTE — ED PROVIDER NOTES
EMERGENCY DEPARTMENT HISTORY AND PHYSICAL EXAM          Date: 2022   Patient Name: Jamey Lei       History of Presenting Illness           Chief Complaint   Patient presents with    Fall     Fall at home while in the restroom. Hit occipital area of head has small abrasion to head. No LOC, denies pain.          History Provided By: Patient and Children    [id number]   Jamey Lei is a 81 y.o. male with PMHX of hypertension, CHF, recent admission for pneumonia and 2 week hospitalization who presents to the emergency department C/O fall.    Patient reports fall at home and restroom.  Patient says he is unsure why felt did not lose consciousness.  No dizziness.  Patient hit back of head.  No LOC.  Patient grossly denies all complaints to me.  Was recently discharged home after admission for pneumonia still complain of some left-sided pleuritic chest pain.  No new injuries.  Patient is on Eliquis and aspirin.      PCP: Navi Domínguez III, MD        No current facility-administered medications for this encounter.     Current Outpatient Medications   Medication Sig Dispense Refill    ANORO ELLIPTA 62.5-25 mcg/actuation DsDv       apixaban (ELIQUIS) 5 mg Tab Take 5 mg by mouth 2 (two) times daily.      ascorbic acid, vitamin C, (VITAMIN C) 1000 MG tablet Take 1,000 mg by mouth once daily.      aspirin (ECOTRIN) 81 MG EC tablet Take 81 mg by mouth once daily.      b complex vitamins tablet Take by mouth once daily.      carvediloL (COREG) 6.25 MG tablet SMARTSI Tablet(s) By Mouth Every 12 Hours      ergocalciferol, vitamin D2, (VITAMIN D ORAL) Take 1,000 capsules by mouth once daily.      ezetimibe (ZETIA) 10 mg tablet Take 10 mg by mouth once daily.      FLUoxetine 20 MG capsule Take 20 mg by mouth once daily.      gabapentin (NEURONTIN) 300 MG capsule Take 300 mg by mouth 2 (two) times daily.      guaiFENesin (MUCINEX) 600 mg 12 hr tablet Take 1 tablet (600 mg total) by mouth 2 (two) times daily. for  10 days 20 tablet 0    HYDROcodone-acetaminophen (NORCO) 5-325 mg per tablet Take 1 tablet by mouth every 4 (four) hours as needed for Pain.      lovastatin (MEVACOR) 10 MG tablet Take 10 mg by mouth every evening.      metOLazone (ZAROXOLYN) 5 MG tablet Take 1 tablet (5 mg total) by mouth once daily. 30 tablet 0    OXcarbazepine (TRILEPTAL) 600 MG Tab Take 600 mg by mouth 2 (two) times daily.      potassium chloride SA (K-DUR,KLOR-CON) 20 MEQ tablet Take 2 tablets (40 mEq total) by mouth 2 (two) times daily. 120 tablet 0    senna (SENOKOT) 8.6 mg tablet Take 1 tablet by mouth 2 (two) times daily.       tamsulosin (FLOMAX) 0.4 mg Cap Take 0.4 mg by mouth once daily.      torsemide (DEMADEX) 20 MG Tab Take 1 tablet (20 mg total) by mouth once daily. 30 tablet 11    torsemide (DEMADEX) 20 MG Tab Take 2 tablets (40 mg total) by mouth 2 (two) times a day. 120 tablet 0           Past History     Past Medical History:   Past Medical History:   Diagnosis Date    Arthritis     Brain tumor     Coronary artery disease     Encounter for blood transfusion     Hypertension     Neuropathy     Renal disorder         Past Surgical History:   Past Surgical History:   Procedure Laterality Date    APPENDECTOMY      HIP SURGERY      KNEE SURGERY      SHOULDER SURGERY      triple bypass          Family History:   No family history on file.     Social History:   Social History     Tobacco Use    Smoking status: Former Smoker    Smokeless tobacco: Never Used   Substance Use Topics    Alcohol use: Not Currently        Allergies:   Review of patient's allergies indicates:  No Known Allergies       Review of Systems   Review of Systems   Constitutional: Positive for activity change, appetite change and fatigue. Negative for chills and fever.   Respiratory: Negative for shortness of breath.    Cardiovascular: Positive for chest pain (pleurisy left).   Musculoskeletal: Negative for back pain, gait problem, neck pain and  "neck stiffness.   Skin: Positive for wound.   Neurological: Negative for headaches.   All other systems reviewed and are negative.               Physical Exam     Vitals:    01/17/22 1315 01/17/22 1653   BP: (!) 143/63 (!) 141/64   BP Location: Right arm Right arm   Patient Position: Sitting Sitting   Pulse: 89 98   Resp: 18 18   Temp: 98.5 °F (36.9 °C) 98.1 °F (36.7 °C)   TempSrc: Oral Oral   SpO2: 98%    Weight: 104.8 kg (231 lb 1.6 oz)    Height: 5' 10" (1.778 m)       Physical Exam  Vitals and nursing note reviewed.   Constitutional:       General: He is not in acute distress.     Appearance: Normal appearance. He is well-developed. He is obese. He is not ill-appearing.   HENT:      Head: Normocephalic.      Comments: Small abrasion and hematoma left occipital     Nose: No congestion or rhinorrhea.   Eyes:      Conjunctiva/sclera: Conjunctivae normal.      Pupils: Pupils are equal, round, and reactive to light.   Cardiovascular:      Rate and Rhythm: Regular rhythm.      Heart sounds: Normal heart sounds.   Pulmonary:      Effort: Pulmonary effort is normal. No respiratory distress.      Breath sounds: Decreased breath sounds and wheezing present.   Abdominal:      General: There is no distension.      Palpations: Abdomen is soft.      Tenderness: There is no abdominal tenderness.   Musculoskeletal:         General: No deformity or signs of injury. Normal range of motion.      Cervical back: Normal range of motion and neck supple. No rigidity or tenderness.   Skin:     General: Skin is warm and dry.      Coloration: Skin is not pale.      Findings: No lesion.   Neurological:      General: No focal deficit present.      Mental Status: He is alert and oriented to person, place, and time.      Cranial Nerves: No cranial nerve deficit.      Sensory: No sensory deficit.      Motor: No weakness.   Psychiatric:         Mood and Affect: Mood normal.         Behavior: Behavior normal.              Diagnostic Study " Results      Labs -   No results found for this or any previous visit (from the past 12 hour(s)).     Radiologic Studies -    CT Head Without Contrast   Final Result      No evidence of acute intracranial hemorrhage.      Approximately 3.9 x 3.0 cm isodense, likely extra-axial mass at the left frontal region.  This mass appears to have slightly increased in size from the previous exam and could reflect a meningioma.  If not already performed, a follow-up MRI of the brain with contrast may be obtained on a nonemergent basis for further evaluation.      Chronic brain parenchymal changes as above.         Electronically signed by: Nick Jovel MD   Date:    01/17/2022   Time:    15:37           Medications given in the ED-   Medications - No data to display        Medical Decision Making    I am the first provider for this patient.     I reviewed the vital signs, available nursing notes, past medical history, past surgical history, family history and social history.     Vital Signs-Reviewed the patient's vital signs.     Pulse Oximetry Analysis and Interpretation:    98% on NC, normal baseline        Records Reviewed: Old medical records.  Nursing notes.  Previous radiology studies.      Provider Notes (Medical Decision Making): Jamey Lei is a 81 y.o. male here after fall.  Denies syncopal episode.  Patient was minor hematoma to posterior scalp.  Given age and on blood thinners and antiplatelet agents require CT rule out subdural or intracranial hemorrhage.      Procedures:   Procedures      ED Course:    CT head negative for acute pathology.  Will refer to primary care for follow-up as patient has possible meningioma on CT which has been ill stated in prior studies.  Return precautions given.           Diagnosis and Disposition     Critical Care:      DISCHARGE NOTE:       Jamey Lei's  results have been reviewed with him.  He has been counseled regarding his diagnosis, treatment, and plan.  He verbally conveys  understanding and agreement of the signs, symptoms, diagnosis, treatment and prognosis and additionally agrees to follow up as discussed.  He also agrees with the care-plan and conveys that all of his questions have been answered.  I have also provided discharge instructions for him that include: educational information regarding their diagnosis and treatment, and list of reasons why they would want to return to the ED prior to their follow-up appointment, should his condition change. He has been provided with education for proper emergency department utilization.         CLINICAL IMPRESSION:        1. Fall, initial encounter    2. Contusion of other part of head, initial encounter              PLAN:   1. Discharge Home  2.      Medication List      ASK your doctor about these medications    ANORO ELLIPTA 62.5-25 mcg/actuation Dsdv  Generic drug: umeclidinium-vilanteroL     apixaban 5 mg Tab  Commonly known as: ELIQUIS     aspirin 81 MG EC tablet  Commonly known as: ECOTRIN     b complex vitamins tablet     carvediloL 6.25 MG tablet  Commonly known as: COREG     ezetimibe 10 mg tablet  Commonly known as: ZETIA     FLUoxetine 20 MG capsule     gabapentin 300 MG capsule  Commonly known as: NEURONTIN     guaiFENesin 600 mg 12 hr tablet  Commonly known as: MUCINEX  Take 1 tablet (600 mg total) by mouth 2 (two) times daily. for 10 days     HYDROcodone-acetaminophen 5-325 mg per tablet  Commonly known as: NORCO     lovastatin 10 MG tablet  Commonly known as: MEVACOR     metOLazone 5 MG tablet  Commonly known as: ZAROXOLYN  Take 1 tablet (5 mg total) by mouth once daily.     OXcarbazepine 600 MG Tab  Commonly known as: TRILEPTAL     potassium chloride SA 20 MEQ tablet  Commonly known as: K-DUR,KLOR-CON  Take 2 tablets (40 mEq total) by mouth 2 (two) times daily.     senna 8.6 mg tablet  Commonly known as: SENOKOT     tamsulosin 0.4 mg Cap  Commonly known as: FLOMAX     * torsemide 20 MG Tab  Commonly known as:  DEMADEX  Take 1 tablet (20 mg total) by mouth once daily.     * torsemide 20 MG Tab  Commonly known as: DEMADEX  Take 2 tablets (40 mg total) by mouth 2 (two) times a day.     VITAMIN C 1000 MG tablet  Generic drug: ascorbic acid (vitamin C)     VITAMIN D ORAL         * This list has 2 medication(s) that are the same as other medications prescribed for you. Read the directions carefully, and ask your doctor or other care provider to review them with you.               3. Navi Domínguez III, MD  [address] [place] [id number]  [phone]    Schedule an appointment as soon as possible for a visit   Primary care follow up       _______________________________     Please note that this dictation was completed with [email or web address], the computer voice recognition software.  Quite often unanticipated grammatical, syntax, homophones, and other interpretive errors are inadvertently transcribed by the computer software.  Please disregard these errors.  Please excuse any errors that have escaped final proofreading.             Jonathan Betts MD  01/17/22 [id number]

## 2022-01-18 ENCOUNTER — APPOINTMENT (OUTPATIENT)
Dept: LAB | Facility: HOSPITAL | Age: 82
End: 2022-01-18
Attending: INTERNAL MEDICINE
Payer: MEDICARE

## 2022-01-18 DIAGNOSIS — U07.1 CLINICAL DIAGNOSIS OF SEVERE ACUTE RESPIRATORY SYNDROME CORONAVIRUS 2 (SARS-COV-2) DISEASE: Primary | ICD-10-CM

## 2022-01-18 LAB — SARS-COV-2 RDRP RESP QL NAA+PROBE: NEGATIVE

## 2022-01-18 PROCEDURE — U0002 COVID-19 LAB TEST NON-CDC: HCPCS | Performed by: INTERNAL MEDICINE

## 2022-03-03 ENCOUNTER — HOSPITAL ENCOUNTER (EMERGENCY)
Facility: HOSPITAL | Age: 82
Discharge: HOME | End: 2022-03-03
Attending: STUDENT IN AN ORGANIZED HEALTH CARE EDUCATION/TRAINING PROGRAM
Payer: MEDICARE

## 2022-03-03 VITALS
SYSTOLIC BLOOD PRESSURE: 135 MMHG | DIASTOLIC BLOOD PRESSURE: 86 MMHG | BODY MASS INDEX: 35.42 KG/M2 | HEART RATE: 88 BPM | RESPIRATION RATE: 18 BRPM | HEIGHT: 70 IN | OXYGEN SATURATION: 99 % | TEMPERATURE: 98 F | WEIGHT: 247.38 LBS

## 2022-03-03 DIAGNOSIS — M79.89 LEG SWELLING: Primary | ICD-10-CM

## 2022-03-03 PROCEDURE — 99282 EMERGENCY DEPT VISIT SF MDM: CPT

## 2022-03-03 RX ORDER — TORSEMIDE 20 MG/1
40 TABLET ORAL DAILY
Qty: 60 TABLET | Refills: 11 | Status: SHIPPED | OUTPATIENT
Start: 2022-03-03 | End: 2022-03-30 | Stop reason: SDUPTHER

## 2022-03-03 NOTE — ED PROVIDER NOTES
Encounter Date: 3/3/2022       History     Chief Complaint   Patient presents with    Leg Swelling     Pt had edema to lower extremities and are weeping. HHN onscene unable to contact PCP and referred to ED for evaluation.      81-year-old male with history of congestive heart failure on torsemide, hypertension presents with bilateral lower extremity swelling with weeping.  Patient says that swelling has been going on for months.  Patient also endorses chronic dyspnea on exertion.  Denies any chest pain, vomiting, fever, chills        Review of patient's allergies indicates:  No Known Allergies  Past Medical History:   Diagnosis Date    Arthritis     Brain tumor     Coronary artery disease     Encounter for blood transfusion     Hypertension     Neuropathy     Renal disorder      Past Surgical History:   Procedure Laterality Date    APPENDECTOMY      HIP SURGERY      KNEE SURGERY      SHOULDER SURGERY      triple bypass       No family history on file.  Social History     Tobacco Use    Smoking status: Former Smoker    Smokeless tobacco: Never Used   Substance Use Topics    Alcohol use: Not Currently     Review of Systems   Constitutional: Negative.    HENT: Negative.    Respiratory: Positive for cough and shortness of breath.    Cardiovascular: Positive for leg swelling.   Gastrointestinal: Negative.    Genitourinary: Negative.    Musculoskeletal: Negative.    Skin: Positive for wound.   Neurological: Negative.    Psychiatric/Behavioral: Negative.    All other systems reviewed and are negative.      Physical Exam     Initial Vitals [03/03/22 1149]   BP Pulse Resp Temp SpO2   135/86 88 18 97.7 °F (36.5 °C) 99 %      MAP       --         Physical Exam    Nursing note and vitals reviewed.  Constitutional: Vital signs are normal. No distress.   HENT:   Head: Normocephalic and atraumatic.   Eyes: Conjunctivae and lids are normal.   Neck: Trachea normal. Neck supple.   Cardiovascular: Normal rate, regular  rhythm, normal heart sounds and normal pulses.   Pulmonary/Chest: Breath sounds normal. No respiratory distress. He has no wheezes. He has no rhonchi.   Abdominal: Abdomen is soft. Bowel sounds are normal. He exhibits no distension. There is no abdominal tenderness. There is no rebound and no guarding.   Musculoskeletal:         General: Normal range of motion.      Cervical back: Neck supple.     Neurological: He is alert and oriented to person, place, and time. He has normal strength. GCS eye subscore is 4. GCS verbal subscore is 5. GCS motor subscore is 6.   Skin: Capillary refill takes less than 2 seconds.   Bilateral lower extremity swelling up to knee 3+.  Weeping on bilateral lower extremity clear fluid without any cellulitic change.  Nontender to palpation.  No erythema   Psychiatric: He has a normal mood and affect. His speech is normal. Thought content normal.         ED Course   Procedures  Labs Reviewed - No data to display       Imaging Results    None          Medications - No data to display  Medical Decision Making:   Initial Assessment:   81-year-old male with history of congestive heart failure on torsemide, hypertension presents with bilateral lower extremity swelling with weeping.  Afebrile vitals stable.  Physical noted.  Wound from severe edema.  No cellulitic change.  Will wrap leg and give follow-up to wound care.  Advised patient to increase torsemide to 40 mg daily and follow-up with PCP                      Clinical Impression:   Final diagnoses:  [M79.89] Leg swelling (Primary)                 Tristian Cortes MD  03/03/22 5802

## 2022-03-22 ENCOUNTER — OFFICE VISIT (OUTPATIENT)
Dept: WOUND CARE | Facility: HOSPITAL | Age: 82
End: 2022-03-22
Attending: STUDENT IN AN ORGANIZED HEALTH CARE EDUCATION/TRAINING PROGRAM
Payer: MEDICARE

## 2022-03-22 VITALS
SYSTOLIC BLOOD PRESSURE: 146 MMHG | HEART RATE: 70 BPM | TEMPERATURE: 98 F | DIASTOLIC BLOOD PRESSURE: 78 MMHG | RESPIRATION RATE: 20 BRPM

## 2022-03-22 DIAGNOSIS — L97.822 NON-PRESSURE CHRONIC ULCER OF OTHER PART OF LEFT LOWER LEG WITH FAT LAYER EXPOSED: ICD-10-CM

## 2022-03-22 DIAGNOSIS — I87.332: ICD-10-CM

## 2022-03-22 DIAGNOSIS — L97.812 NON-PRESSURE CHRONIC ULCER OF OTHER PART OF RIGHT LOWER LEG WITH FAT LAYER EXPOSED: Primary | ICD-10-CM

## 2022-03-22 DIAGNOSIS — I87.331 IDIOPATHIC CHRONIC VENOUS HYPERTENSION OF RIGHT LOWER EXTREMITY WITH ULCER AND INFLAMMATION: ICD-10-CM

## 2022-03-22 PROCEDURE — 99203 OFFICE O/P NEW LOW 30 MIN: CPT | Mod: ,,, | Performed by: STUDENT IN AN ORGANIZED HEALTH CARE EDUCATION/TRAINING PROGRAM

## 2022-03-22 PROCEDURE — 99203 PR OFFICE/OUTPT VISIT, NEW, LEVL III, 30-44 MIN: ICD-10-PCS | Mod: ,,, | Performed by: STUDENT IN AN ORGANIZED HEALTH CARE EDUCATION/TRAINING PROGRAM

## 2022-03-22 PROCEDURE — 29580 STRAPPING UNNA BOOT: CPT | Mod: RT

## 2022-03-22 PROCEDURE — 99213 OFFICE O/P EST LOW 20 MIN: CPT

## 2022-03-22 PROCEDURE — 29581 APPL MULTLAYER CMPRN SYS LEG: CPT

## 2022-03-28 NOTE — H&P
[facility]  Wound Care  History and Physical    Problem List Items Addressed This Visit    None     Visit Diagnoses     Non-pressure chronic ulcer of other part of right lower leg with fat layer exposed    -  Primary    Non-pressure chronic ulcer of other part of left lower leg with fat layer exposed        Idiopathic chronic venous hypertension of lower extremity with ulcer and inflammation, left        Idiopathic chronic venous hypertension of right lower extremity with ulcer and inflammation                History:  80yo male with history of CHF, CAD, HTN, and venous insufficiency who presents with bilateral LE swelling.  Home health nurse noticed excessive LE swelling and cutaneous weeping two weeks ago, and initiated unna boot treatment.  Per family, swelling has greatly improved since and weeping has stopped.  Patient voices no complaints at this time.     Past Medical History:   Diagnosis Date    Arthritis     Brain tumor     Coronary artery disease     Encounter for blood transfusion     Hypertension     Neuropathy     Renal disorder        Past Surgical History:   Procedure Laterality Date    APPENDECTOMY      HIP SURGERY      KNEE SURGERY      SHOULDER SURGERY      triple bypass         No family history on file.     reports that he has quit smoking. He has never used smokeless tobacco. He reports previous alcohol use. No history on file for drug use.    has a current medication list which includes the following prescription(s): anoro ellipta, apixaban, ascorbic acid (vitamin c), aspirin, b complex vitamins, carvedilol, ergocalciferol (vitamin d2), ezetimibe, fluoxetine, gabapentin, hydrocodone-acetaminophen, lovastatin, metolazone, oxcarbazepine, senna, tamsulosin, and torsemide.    Allergies:  Patient has no known allergies.    Review of Systems:  Review of Systems   Constitutional: Negative for chills and fever.   Respiratory: Negative for cough and shortness of breath.     Cardiovascular: Positive for orthopnea and leg swelling. Negative for chest pain.   Gastrointestinal: Negative for abdominal pain, blood in stool, constipation, diarrhea, heartburn, melena, nausea and vomiting.   Skin: Negative for itching and rash.        Darkening of skin to bilateral lower legs          Vitals:    03/22/22 1135   BP: (!) 146/78   Pulse: 70   Resp: 20   Temp: 98 °F (36.7 °C)         BMI:  There is no height or weight on file to calculate BMI.    Physical Exam:  Physical Exam  Constitutional:       General: He is not in acute distress.     Appearance: He is obese. He is ill-appearing. He is not toxic-appearing.   Cardiovascular:      Rate and Rhythm: Normal rate and regular rhythm.   Pulmonary:      Effort: Pulmonary effort is normal. No respiratory distress.   Musculoskeletal:      Right lower leg: Edema present.      Left lower leg: Edema present.      Comments: +1 pitting edema to bilateral LE   Few scattered, sub-centimeter skin excoriations to bilateral LE without erythema, induration, or purulence    Skin:     General: Skin is warm and dry.      Capillary Refill: Capillary refill takes less than 2 seconds.      Comments: Stasis dermatitis to bilateral LE to the knee         A1C:  No results for input(s): HGBA1C in the last 2160 hours.  BMP:  Recent Labs   Lab Result Units 01/05/22  0545 01/06/22  0537 01/07/22  0710   Glucose mg/dL 103 113* 98   Sodium mmol/L 143 142 141   Potassium mmol/L 3.4* 3.4* 3.3*   Chloride mmol/L 101 101 100   CO2 mmol/L 32* 31* 32*   BUN mg/dL 57* 72* 81*   Creatinine mg/dL 2.7* 2.8* 3.0*   Calcium mg/dL 10.1 9.4 10.0   Magnesium mg/dL 2.5 2.5 2.6      CBC:  Recent Labs   Lab Result Units 01/05/22  0545 01/06/22  0537 01/07/22  0710   WBC K/uL 12.79* 12.87* 10.74   RBC M/uL 3.68* 3.51* 3.46*   Hemoglobin g/dL 12.0* 11.5* 11.5*   Hematocrit % 36.8* 34.9* 34.0*   Platelets K/uL 186 164 157   MCV fL 100* 99* 98   MCH pg 32.6* 32.8* 33.2*   MCHC g/dL 32.6 33.0 33.8      CMP:  Recent Labs   Lab Result Units 01/05/22  0545 01/06/22  0537 01/07/22  0710   Glucose mg/dL 103 113* 98   Calcium mg/dL 10.1 9.4 10.0   Albumin g/dL 3.1* 2.9* 3.0*   Total Protein g/dL 7.5 7.1 7.3   Sodium mmol/L 143 142 141   Potassium mmol/L 3.4* 3.4* 3.3*   CO2 mmol/L 32* 31* 32*   Chloride mmol/L 101 101 100   BUN mg/dL 57* 72* 81*   Alkaline Phosphatase U/L 54* 46* 52*   ALT U/L 24 22 24   AST U/L 20 19 19   Total Bilirubin mg/dL 0.6 0.6 0.6     PREALBUMIN:  No results for input(s): PREALBUMIN in the last 2160 hours.  WOUND CULTURES:  No results for input(s): LABAERO in the last 2160 hours.        Plan:  See Wound Docs note for plan and follow up.  Continue bilateral unna boots  Continuous leg elevation while sitting  RTC 1 week       Katrina Castille [facility]

## 2022-03-30 PROBLEM — N25.81 SECONDARY HYPERPARATHYROIDISM OF RENAL ORIGIN: Status: ACTIVE | Noted: 2022-03-30

## 2022-03-30 PROBLEM — G40.909 SEIZURE DISORDER: Status: ACTIVE | Noted: 2022-03-30

## 2022-03-30 PROBLEM — R60.0 BILATERAL LOWER EXTREMITY EDEMA: Status: ACTIVE | Noted: 2021-12-30

## 2022-04-04 PROBLEM — J81.0 ACUTE PULMONARY EDEMA: Status: RESOLVED | Noted: 2021-12-29 | Resolved: 2022-04-04

## 2022-04-10 ENCOUNTER — HOSPITAL ENCOUNTER (INPATIENT)
Facility: HOSPITAL | Age: 82
LOS: 8 days | Discharge: NURSING, REHAB OR LONG-TERM CARE | DRG: 193 | End: 2022-04-18
Attending: FAMILY MEDICINE | Admitting: INTERNAL MEDICINE
Payer: MEDICARE

## 2022-04-10 DIAGNOSIS — J18.9 PNEUMONIA DUE TO INFECTIOUS ORGANISM, UNSPECIFIED LATERALITY, UNSPECIFIED PART OF LUNG: ICD-10-CM

## 2022-04-10 DIAGNOSIS — J18.9 PNEUMONIA: ICD-10-CM

## 2022-04-10 DIAGNOSIS — R56.9 SEIZURE: ICD-10-CM

## 2022-04-10 DIAGNOSIS — N18.4 CKD (CHRONIC KIDNEY DISEASE), STAGE IV: ICD-10-CM

## 2022-04-10 DIAGNOSIS — A41.9 SEPSIS, DUE TO UNSPECIFIED ORGANISM, UNSPECIFIED WHETHER ACUTE ORGAN DYSFUNCTION PRESENT: ICD-10-CM

## 2022-04-10 DIAGNOSIS — J18.9 PNEUMONIA OF RIGHT LOWER LOBE DUE TO INFECTIOUS ORGANISM: Primary | ICD-10-CM

## 2022-04-10 DIAGNOSIS — G92.9 ENCEPHALOPATHY, TOXIC: ICD-10-CM

## 2022-04-10 DIAGNOSIS — E87.6 HYPOKALEMIA: ICD-10-CM

## 2022-04-10 LAB
ALBUMIN SERPL BCP-MCNC: 2.3 G/DL (ref 3.5–5.2)
ALP SERPL-CCNC: 53 U/L (ref 55–135)
ALT SERPL W/O P-5'-P-CCNC: 30 U/L (ref 10–44)
ANION GAP SERPL CALC-SCNC: 7 MMOL/L (ref 8–16)
AST SERPL-CCNC: 22 U/L (ref 10–40)
BASOPHILS # BLD AUTO: 0.05 K/UL (ref 0–0.2)
BASOPHILS NFR BLD: 0.4 % (ref 0–1.9)
BILIRUB SERPL-MCNC: 0.5 MG/DL (ref 0.1–1)
BILIRUB UR QL STRIP: NEGATIVE
BUN SERPL-MCNC: 50 MG/DL (ref 8–23)
CALCIUM SERPL-MCNC: 9.5 MG/DL (ref 8.7–10.5)
CHLORIDE SERPL-SCNC: 103 MMOL/L (ref 95–110)
CLARITY UR: CLEAR
CO2 SERPL-SCNC: 34 MMOL/L (ref 23–29)
COLOR UR: YELLOW
CREAT SERPL-MCNC: 1.7 MG/DL (ref 0.5–1.4)
CTP QC/QA: YES
DIFFERENTIAL METHOD: ABNORMAL
EOSINOPHIL # BLD AUTO: 0.1 K/UL (ref 0–0.5)
EOSINOPHIL NFR BLD: 0.8 % (ref 0–8)
ERYTHROCYTE [DISTWIDTH] IN BLOOD BY AUTOMATED COUNT: 17.1 % (ref 11.5–14.5)
EST. GFR  (AFRICAN AMERICAN): 43.4 ML/MIN/1.73 M^2
EST. GFR  (NON AFRICAN AMERICAN): 37.5 ML/MIN/1.73 M^2
GLUCOSE SERPL-MCNC: 134 MG/DL (ref 70–110)
GLUCOSE UR QL STRIP: NEGATIVE
HCT VFR BLD AUTO: 34.8 % (ref 40–54)
HGB BLD-MCNC: 11 G/DL (ref 14–18)
HGB UR QL STRIP: NEGATIVE
IMM GRANULOCYTES # BLD AUTO: 0.14 K/UL (ref 0–0.04)
IMM GRANULOCYTES NFR BLD AUTO: 1.1 % (ref 0–0.5)
KETONES UR QL STRIP: NEGATIVE
LACTATE SERPL-SCNC: 1.1 MMOL/L (ref 0.5–2.2)
LEUKOCYTE ESTERASE UR QL STRIP: NEGATIVE
LYMPHOCYTES # BLD AUTO: 1.3 K/UL (ref 1–4.8)
LYMPHOCYTES NFR BLD: 9.8 % (ref 18–48)
MAGNESIUM SERPL-MCNC: 2.2 MG/DL (ref 1.6–2.6)
MCH RBC QN AUTO: 29.5 PG (ref 27–31)
MCHC RBC AUTO-ENTMCNC: 31.6 G/DL (ref 32–36)
MCV RBC AUTO: 93 FL (ref 82–98)
MONOCYTES # BLD AUTO: 0.9 K/UL (ref 0.3–1)
MONOCYTES NFR BLD: 7.2 % (ref 4–15)
NEUTROPHILS # BLD AUTO: 10.5 K/UL (ref 1.8–7.7)
NEUTROPHILS NFR BLD: 80.7 % (ref 38–73)
NITRITE UR QL STRIP: NEGATIVE
NRBC BLD-RTO: 0 /100 WBC
PH UR STRIP: 5 [PH] (ref 5–8)
PLATELET # BLD AUTO: 309 K/UL (ref 150–450)
PMV BLD AUTO: 9.9 FL (ref 9.2–12.9)
POTASSIUM SERPL-SCNC: 2.8 MMOL/L (ref 3.5–5.1)
PROT SERPL-MCNC: 7.2 G/DL (ref 6–8.4)
PROT UR QL STRIP: NEGATIVE
RBC # BLD AUTO: 3.73 M/UL (ref 4.6–6.2)
SARS-COV-2 RDRP RESP QL NAA+PROBE: NEGATIVE
SODIUM SERPL-SCNC: 144 MMOL/L (ref 136–145)
SP GR UR STRIP: 1.01 (ref 1–1.03)
URN SPEC COLLECT METH UR: NORMAL
UROBILINOGEN UR STRIP-ACNC: 1 EU/DL
WBC # BLD AUTO: 12.97 K/UL (ref 3.9–12.7)

## 2022-04-10 PROCEDURE — 83605 ASSAY OF LACTIC ACID: CPT | Performed by: FAMILY MEDICINE

## 2022-04-10 PROCEDURE — 93005 ELECTROCARDIOGRAM TRACING: CPT

## 2022-04-10 PROCEDURE — U0002 COVID-19 LAB TEST NON-CDC: HCPCS | Performed by: FAMILY MEDICINE

## 2022-04-10 PROCEDURE — 93010 ELECTROCARDIOGRAM REPORT: CPT | Mod: ,,, | Performed by: INTERNAL MEDICINE

## 2022-04-10 PROCEDURE — 80053 COMPREHEN METABOLIC PANEL: CPT | Performed by: FAMILY MEDICINE

## 2022-04-10 PROCEDURE — 96374 THER/PROPH/DIAG INJ IV PUSH: CPT

## 2022-04-10 PROCEDURE — 36415 COLL VENOUS BLD VENIPUNCTURE: CPT | Performed by: FAMILY MEDICINE

## 2022-04-10 PROCEDURE — 99291 CRITICAL CARE FIRST HOUR: CPT | Mod: 25

## 2022-04-10 PROCEDURE — 96375 TX/PRO/DX INJ NEW DRUG ADDON: CPT

## 2022-04-10 PROCEDURE — 87040 BLOOD CULTURE FOR BACTERIA: CPT | Performed by: FAMILY MEDICINE

## 2022-04-10 PROCEDURE — 85025 COMPLETE CBC W/AUTO DIFF WBC: CPT | Performed by: FAMILY MEDICINE

## 2022-04-10 PROCEDURE — 83735 ASSAY OF MAGNESIUM: CPT | Performed by: FAMILY MEDICINE

## 2022-04-10 PROCEDURE — 96360 HYDRATION IV INFUSION INIT: CPT

## 2022-04-10 PROCEDURE — 81003 URINALYSIS AUTO W/O SCOPE: CPT | Performed by: FAMILY MEDICINE

## 2022-04-10 PROCEDURE — 63600175 PHARM REV CODE 636 W HCPCS: Performed by: FAMILY MEDICINE

## 2022-04-10 PROCEDURE — 93010 EKG 12-LEAD: ICD-10-PCS | Mod: ,,, | Performed by: INTERNAL MEDICINE

## 2022-04-10 PROCEDURE — 11000001 HC ACUTE MED/SURG PRIVATE ROOM

## 2022-04-10 PROCEDURE — 25000003 PHARM REV CODE 250: Performed by: FAMILY MEDICINE

## 2022-04-10 RX ORDER — GUAIFENESIN 600 MG/1
1200 TABLET, EXTENDED RELEASE ORAL 2 TIMES DAILY
Status: DISCONTINUED | OUTPATIENT
Start: 2022-04-11 | End: 2022-04-18 | Stop reason: HOSPADM

## 2022-04-10 RX ORDER — DOCUSATE SODIUM 100 MG/1
100 CAPSULE, LIQUID FILLED ORAL 2 TIMES DAILY
Status: ON HOLD | COMMUNITY
End: 2022-08-25 | Stop reason: HOSPADM

## 2022-04-10 RX ORDER — POTASSIUM CHLORIDE 7.45 MG/ML
10 INJECTION INTRAVENOUS
Status: COMPLETED | OUTPATIENT
Start: 2022-04-10 | End: 2022-04-10

## 2022-04-10 RX ORDER — TORSEMIDE 10 MG/1
20 TABLET ORAL 2 TIMES DAILY
Status: ON HOLD | COMMUNITY
End: 2022-04-29 | Stop reason: SDUPTHER

## 2022-04-10 RX ORDER — POTASSIUM CHLORIDE 750 MG/1
20 CAPSULE, EXTENDED RELEASE ORAL ONCE
COMMUNITY
End: 2022-05-09 | Stop reason: SDUPTHER

## 2022-04-10 RX ORDER — OXCARBAZEPINE 150 MG/1
150 TABLET, FILM COATED ORAL 2 TIMES DAILY
Status: DISCONTINUED | OUTPATIENT
Start: 2022-04-11 | End: 2022-04-11

## 2022-04-10 RX ORDER — DOCUSATE SODIUM 100 MG/1
100 CAPSULE, LIQUID FILLED ORAL 2 TIMES DAILY
Status: DISCONTINUED | OUTPATIENT
Start: 2022-04-11 | End: 2022-04-18 | Stop reason: HOSPADM

## 2022-04-10 RX ORDER — ASPIRIN 81 MG/1
81 TABLET ORAL DAILY
COMMUNITY
End: 2022-07-27

## 2022-04-10 RX ORDER — GUAIFENESIN 600 MG/1
1200 TABLET, EXTENDED RELEASE ORAL 2 TIMES DAILY
Status: ON HOLD | COMMUNITY
End: 2022-08-25 | Stop reason: HOSPADM

## 2022-04-10 RX ORDER — LOVASTATIN 10 MG/1
10 TABLET ORAL NIGHTLY
Status: ON HOLD | COMMUNITY
End: 2022-04-29 | Stop reason: HOSPADM

## 2022-04-10 RX ORDER — CHOLECALCIFEROL (VITAMIN D3) 25 MCG
1000 TABLET ORAL DAILY
Status: ON HOLD | COMMUNITY
End: 2022-09-19 | Stop reason: HOSPADM

## 2022-04-10 RX ORDER — MICONAZOLE NITRATE 2 %
POWDER (GRAM) TOPICAL
Status: ON HOLD | COMMUNITY
End: 2022-08-21

## 2022-04-10 RX ORDER — EZETIMIBE 10 MG/1
10 TABLET ORAL DAILY
Status: ON HOLD | COMMUNITY
End: 2022-08-25 | Stop reason: HOSPADM

## 2022-04-10 RX ORDER — METOPROLOL TARTRATE 25 MG/1
25 TABLET, FILM COATED ORAL 2 TIMES DAILY
Status: DISCONTINUED | OUTPATIENT
Start: 2022-04-11 | End: 2022-04-18 | Stop reason: HOSPADM

## 2022-04-10 RX ORDER — OXCARBAZEPINE 600 MG/1
150 TABLET, FILM COATED ORAL 2 TIMES DAILY
Status: ON HOLD | COMMUNITY
End: 2022-04-29 | Stop reason: HOSPADM

## 2022-04-10 RX ORDER — POTASSIUM CHLORIDE 1.5 G/1.58G
40 POWDER, FOR SOLUTION ORAL DAILY
Status: DISCONTINUED | OUTPATIENT
Start: 2022-04-11 | End: 2022-04-11

## 2022-04-10 RX ORDER — METOPROLOL TARTRATE 25 MG/1
25 TABLET, FILM COATED ORAL 2 TIMES DAILY
COMMUNITY
End: 2022-08-26 | Stop reason: ALTCHOICE

## 2022-04-10 RX ORDER — TORSEMIDE 20 MG/1
20 TABLET ORAL 2 TIMES DAILY
Status: DISCONTINUED | OUTPATIENT
Start: 2022-04-11 | End: 2022-04-11

## 2022-04-10 RX ORDER — OXCARBAZEPINE 150 MG/1
150 TABLET, FILM COATED ORAL 2 TIMES DAILY
Status: DISCONTINUED | OUTPATIENT
Start: 2022-04-10 | End: 2022-04-10

## 2022-04-10 RX ORDER — ASPIRIN 81 MG/1
81 TABLET ORAL DAILY
Status: DISCONTINUED | OUTPATIENT
Start: 2022-04-11 | End: 2022-04-18 | Stop reason: HOSPADM

## 2022-04-10 RX ORDER — LEVOFLOXACIN 5 MG/ML
750 INJECTION, SOLUTION INTRAVENOUS
Status: DISCONTINUED | OUTPATIENT
Start: 2022-04-11 | End: 2022-04-10

## 2022-04-10 RX ORDER — LEVOFLOXACIN 5 MG/ML
750 INJECTION, SOLUTION INTRAVENOUS
Status: DISCONTINUED | OUTPATIENT
Start: 2022-04-11 | End: 2022-04-11

## 2022-04-10 RX ORDER — EZETIMIBE 10 MG/1
10 TABLET ORAL DAILY
Status: DISCONTINUED | OUTPATIENT
Start: 2022-04-11 | End: 2022-04-18 | Stop reason: HOSPADM

## 2022-04-10 RX ORDER — LANOLIN ALCOHOL/MO/W.PET/CERES
400 CREAM (GRAM) TOPICAL ONCE
Status: COMPLETED | OUTPATIENT
Start: 2022-04-11 | End: 2022-04-10

## 2022-04-10 RX ORDER — HYDROCODONE BITARTRATE AND ACETAMINOPHEN 5; 325 MG/1; MG/1
1 TABLET ORAL EVERY 6 HOURS PRN
COMMUNITY
End: 2022-07-27 | Stop reason: SDUPTHER

## 2022-04-10 RX ORDER — CALCIUM CARBONATE 600 MG
600 TABLET ORAL ONCE
Status: ON HOLD | COMMUNITY
End: 2022-04-29 | Stop reason: HOSPADM

## 2022-04-10 RX ORDER — ATORVASTATIN CALCIUM 20 MG/1
20 TABLET, FILM COATED ORAL DAILY
Status: DISCONTINUED | OUTPATIENT
Start: 2022-04-11 | End: 2022-04-18 | Stop reason: HOSPADM

## 2022-04-10 RX ADMIN — SODIUM CHLORIDE, SODIUM LACTATE, POTASSIUM CHLORIDE, AND CALCIUM CHLORIDE 3129 ML: .6; .31; .03; .02 INJECTION, SOLUTION INTRAVENOUS at 11:04

## 2022-04-10 RX ADMIN — POTASSIUM BICARBONATE 20 MEQ: 391 TABLET, EFFERVESCENT ORAL at 11:04

## 2022-04-10 RX ADMIN — Medication 400 MG: at 11:04

## 2022-04-10 RX ADMIN — LEVOFLOXACIN 750 MG: 750 INJECTION, SOLUTION INTRAVENOUS at 11:04

## 2022-04-10 RX ADMIN — POTASSIUM CHLORIDE 10 MEQ: 7.46 INJECTION, SOLUTION INTRAVENOUS at 11:04

## 2022-04-11 PROBLEM — I12.9 CHRONIC KIDNEY DISEASE DUE TO HYPERTENSION: Status: ACTIVE | Noted: 2022-04-11

## 2022-04-11 PROBLEM — N18.30 STAGE 3 CHRONIC KIDNEY DISEASE: Status: ACTIVE | Noted: 2022-04-11

## 2022-04-11 PROBLEM — N18.4 CKD (CHRONIC KIDNEY DISEASE), STAGE IV: Status: ACTIVE | Noted: 2022-04-11

## 2022-04-11 PROBLEM — G40.909 SEIZURE DISORDER: Status: ACTIVE | Noted: 2022-04-11

## 2022-04-11 PROBLEM — N25.81 HYPERPARATHYROIDISM DUE TO RENAL INSUFFICIENCY: Status: ACTIVE | Noted: 2022-04-11

## 2022-04-11 PROBLEM — G57.93 NEUROPATHY INVOLVING BOTH LOWER EXTREMITIES: Status: ACTIVE | Noted: 2022-04-11

## 2022-04-11 PROBLEM — J18.9 PNEUMONIA: Status: ACTIVE | Noted: 2022-04-11

## 2022-04-11 PROBLEM — Z79.899 OTHER LONG TERM (CURRENT) DRUG THERAPY: Status: ACTIVE | Noted: 2022-04-11

## 2022-04-11 PROBLEM — R91.8 LUNG MASS: Status: ACTIVE | Noted: 2022-04-11

## 2022-04-11 PROBLEM — E66.01 MORBID OBESITY: Status: ACTIVE | Noted: 2022-04-11

## 2022-04-11 PROBLEM — E87.1 HYPONATREMIA: Status: ACTIVE | Noted: 2022-04-11

## 2022-04-11 PROBLEM — D32.9 MENINGIOMA: Status: ACTIVE | Noted: 2022-04-11

## 2022-04-11 PROBLEM — M19.90 CHRONIC OSTEOARTHRITIS: Status: ACTIVE | Noted: 2022-04-11

## 2022-04-11 PROBLEM — E87.6 HYPOKALEMIA: Status: ACTIVE | Noted: 2022-04-11

## 2022-04-11 LAB — LACTATE SERPL-SCNC: 3.5 MMOL/L (ref 0.5–2.2)

## 2022-04-11 PROCEDURE — 63600175 PHARM REV CODE 636 W HCPCS: Performed by: INTERNAL MEDICINE

## 2022-04-11 PROCEDURE — 63600175 PHARM REV CODE 636 W HCPCS: Performed by: FAMILY MEDICINE

## 2022-04-11 PROCEDURE — 25000242 PHARM REV CODE 250 ALT 637 W/ HCPCS: Performed by: FAMILY MEDICINE

## 2022-04-11 PROCEDURE — 99900035 HC TECH TIME PER 15 MIN (STAT)

## 2022-04-11 PROCEDURE — 27000221 HC OXYGEN, UP TO 24 HOURS

## 2022-04-11 PROCEDURE — 25000003 PHARM REV CODE 250: Performed by: INTERNAL MEDICINE

## 2022-04-11 PROCEDURE — 83605 ASSAY OF LACTIC ACID: CPT | Performed by: FAMILY MEDICINE

## 2022-04-11 PROCEDURE — 94640 AIRWAY INHALATION TREATMENT: CPT

## 2022-04-11 PROCEDURE — 99900031 HC PATIENT EDUCATION (STAT)

## 2022-04-11 PROCEDURE — 11000001 HC ACUTE MED/SURG PRIVATE ROOM

## 2022-04-11 PROCEDURE — 94761 N-INVAS EAR/PLS OXIMETRY MLT: CPT

## 2022-04-11 PROCEDURE — 63600175 PHARM REV CODE 636 W HCPCS

## 2022-04-11 PROCEDURE — 36415 COLL VENOUS BLD VENIPUNCTURE: CPT | Performed by: FAMILY MEDICINE

## 2022-04-11 PROCEDURE — 25000003 PHARM REV CODE 250: Performed by: FAMILY MEDICINE

## 2022-04-11 RX ORDER — LORAZEPAM 2 MG/ML
INJECTION INTRAMUSCULAR
Status: COMPLETED
Start: 2022-04-11 | End: 2022-04-11

## 2022-04-11 RX ORDER — OXCARBAZEPINE 300 MG/1
300 TABLET, FILM COATED ORAL 2 TIMES DAILY
Status: DISCONTINUED | OUTPATIENT
Start: 2022-04-11 | End: 2022-04-11

## 2022-04-11 RX ORDER — ACETAMINOPHEN 325 MG/1
650 TABLET ORAL EVERY 8 HOURS PRN
Status: DISCONTINUED | OUTPATIENT
Start: 2022-04-11 | End: 2022-04-18 | Stop reason: HOSPADM

## 2022-04-11 RX ORDER — DEXTROSE MONOHYDRATE, SODIUM CHLORIDE, AND POTASSIUM CHLORIDE 50; 1.49; 4.5 G/1000ML; G/1000ML; G/1000ML
INJECTION, SOLUTION INTRAVENOUS CONTINUOUS
Status: DISCONTINUED | OUTPATIENT
Start: 2022-04-11 | End: 2022-04-13

## 2022-04-11 RX ORDER — ENOXAPARIN SODIUM 100 MG/ML
30 INJECTION SUBCUTANEOUS EVERY 24 HOURS
Status: DISCONTINUED | OUTPATIENT
Start: 2022-04-11 | End: 2022-04-11

## 2022-04-11 RX ORDER — OXCARBAZEPINE 300 MG/1
600 TABLET, FILM COATED ORAL 2 TIMES DAILY
Status: DISCONTINUED | OUTPATIENT
Start: 2022-04-11 | End: 2022-04-12

## 2022-04-11 RX ORDER — ONDANSETRON 2 MG/ML
4 INJECTION INTRAMUSCULAR; INTRAVENOUS EVERY 8 HOURS PRN
Status: DISCONTINUED | OUTPATIENT
Start: 2022-04-11 | End: 2022-04-18 | Stop reason: HOSPADM

## 2022-04-11 RX ORDER — ALBUTEROL SULFATE 2.5 MG/.5ML
2.5 SOLUTION RESPIRATORY (INHALATION) EVERY 4 HOURS PRN
Status: DISPENSED | OUTPATIENT
Start: 2022-04-11 | End: 2022-04-14

## 2022-04-11 RX ORDER — SODIUM CHLORIDE, SODIUM LACTATE, POTASSIUM CHLORIDE, CALCIUM CHLORIDE 600; 310; 30; 20 MG/100ML; MG/100ML; MG/100ML; MG/100ML
INJECTION, SOLUTION INTRAVENOUS CONTINUOUS
Status: DISCONTINUED | OUTPATIENT
Start: 2022-04-11 | End: 2022-04-11

## 2022-04-11 RX ORDER — TALC
6 POWDER (GRAM) TOPICAL NIGHTLY PRN
Status: DISCONTINUED | OUTPATIENT
Start: 2022-04-11 | End: 2022-04-18 | Stop reason: HOSPADM

## 2022-04-11 RX ORDER — ENOXAPARIN SODIUM 100 MG/ML
40 INJECTION SUBCUTANEOUS EVERY 24 HOURS
Status: DISCONTINUED | OUTPATIENT
Start: 2022-04-11 | End: 2022-04-18 | Stop reason: HOSPADM

## 2022-04-11 RX ORDER — MORPHINE SULFATE 2 MG/ML
2 INJECTION, SOLUTION INTRAMUSCULAR; INTRAVENOUS EVERY 4 HOURS PRN
Status: DISCONTINUED | OUTPATIENT
Start: 2022-04-11 | End: 2022-04-18 | Stop reason: HOSPADM

## 2022-04-11 RX ORDER — POTASSIUM CHLORIDE 7.45 MG/ML
10 INJECTION INTRAVENOUS
Status: COMPLETED | OUTPATIENT
Start: 2022-04-11 | End: 2022-04-11

## 2022-04-11 RX ORDER — TORSEMIDE 20 MG/1
20 TABLET ORAL DAILY
Status: DISCONTINUED | OUTPATIENT
Start: 2022-04-12 | End: 2022-04-13

## 2022-04-11 RX ADMIN — ASPIRIN 81 MG: 81 TABLET, COATED ORAL at 12:04

## 2022-04-11 RX ADMIN — DOCUSATE SODIUM 100 MG: 100 CAPSULE, LIQUID FILLED ORAL at 01:04

## 2022-04-11 RX ADMIN — ALBUTEROL SULFATE 2.5 MG: 2.5 SOLUTION RESPIRATORY (INHALATION) at 01:04

## 2022-04-11 RX ADMIN — METOPROLOL TARTRATE 25 MG: 25 TABLET, FILM COATED ORAL at 01:04

## 2022-04-11 RX ADMIN — PIPERACILLIN AND TAZOBACTAM 4.5 G: 4; .5 INJECTION, POWDER, LYOPHILIZED, FOR SOLUTION INTRAVENOUS; PARENTERAL at 06:04

## 2022-04-11 RX ADMIN — POTASSIUM CHLORIDE 10 MEQ: 7.46 INJECTION, SOLUTION INTRAVENOUS at 09:04

## 2022-04-11 RX ADMIN — GUAIFENESIN 1200 MG: 600 TABLET, EXTENDED RELEASE ORAL at 12:04

## 2022-04-11 RX ADMIN — METOPROLOL TARTRATE 25 MG: 25 TABLET, FILM COATED ORAL at 12:04

## 2022-04-11 RX ADMIN — ATORVASTATIN CALCIUM 20 MG: 20 TABLET, FILM COATED ORAL at 12:04

## 2022-04-11 RX ADMIN — SODIUM CHLORIDE, SODIUM LACTATE, POTASSIUM CHLORIDE, AND CALCIUM CHLORIDE: .6; .31; .03; .02 INJECTION, SOLUTION INTRAVENOUS at 01:04

## 2022-04-11 RX ADMIN — POTASSIUM CHLORIDE 10 MEQ: 7.46 INJECTION, SOLUTION INTRAVENOUS at 12:04

## 2022-04-11 RX ADMIN — POTASSIUM BICARBONATE 40 MEQ: 391 TABLET, EFFERVESCENT ORAL at 02:04

## 2022-04-11 RX ADMIN — PIPERACILLIN AND TAZOBACTAM 4.5 G: 4; .5 INJECTION, POWDER, LYOPHILIZED, FOR SOLUTION INTRAVENOUS; PARENTERAL at 02:04

## 2022-04-11 RX ADMIN — TORSEMIDE 20 MG: 20 TABLET ORAL at 01:04

## 2022-04-11 RX ADMIN — LORAZEPAM: 2 INJECTION INTRAMUSCULAR; INTRAVENOUS at 05:04

## 2022-04-11 RX ADMIN — DOCUSATE SODIUM 100 MG: 100 CAPSULE, LIQUID FILLED ORAL at 12:04

## 2022-04-11 RX ADMIN — ALBUTEROL SULFATE 2.5 MG: 2.5 SOLUTION RESPIRATORY (INHALATION) at 07:04

## 2022-04-11 RX ADMIN — ENOXAPARIN SODIUM 40 MG: 40 INJECTION SUBCUTANEOUS at 04:04

## 2022-04-11 RX ADMIN — OXCARBAZEPINE 600 MG: 300 TABLET, FILM COATED ORAL at 12:04

## 2022-04-11 RX ADMIN — POTASSIUM CHLORIDE 10 MEQ: 7.46 INJECTION, SOLUTION INTRAVENOUS at 11:04

## 2022-04-11 RX ADMIN — GUAIFENESIN 1200 MG: 600 TABLET, EXTENDED RELEASE ORAL at 01:04

## 2022-04-11 RX ADMIN — DEXTROSE, SODIUM CHLORIDE, AND POTASSIUM CHLORIDE: 5; .45; .15 INJECTION INTRAVENOUS at 11:04

## 2022-04-11 RX ADMIN — POTASSIUM CHLORIDE 10 MEQ: 7.46 INJECTION, SOLUTION INTRAVENOUS at 01:04

## 2022-04-11 RX ADMIN — EZETIMIBE 10 MG: 10 TABLET ORAL at 12:04

## 2022-04-11 RX ADMIN — LORAZEPAM 1 MG: 2 INJECTION INTRAMUSCULAR; INTRAVENOUS at 05:04

## 2022-04-11 RX ADMIN — PIPERACILLIN AND TAZOBACTAM 4.5 G: 4; .5 INJECTION, POWDER, LYOPHILIZED, FOR SOLUTION INTRAVENOUS; PARENTERAL at 11:04

## 2022-04-11 RX ADMIN — DEXTROSE, SODIUM CHLORIDE, AND POTASSIUM CHLORIDE: 5; .45; .15 INJECTION INTRAVENOUS at 09:04

## 2022-04-11 NOTE — ASSESSMENT & PLAN NOTE
Body mass index is 31.66 kg/m². Morbid obesity complicates all aspects of disease management from diagnostic modalities to treatment. Weight loss encouraged and health benefits explained to patient.

## 2022-04-11 NOTE — HPI
Patient is a 81-year-old male with a history of multiple comorbidities including a right-sided lung mass that was biopsied recently within the last few weeks and was negative for malignancy.  The mass is spiculated and enlarging and is very concerning for malignancy.  The patient has had a deterioration in his condition over the last few months with several prolonged hospitalizations.  The patient has type 2 diabetes chronic kidney disease morbid obesity peripheral neuropathy and chronic lower back pain requiring chronic opiate therapy.  The patient is requiring more and more care from caregivers and family to assist him in his ADLs.  Patient reportedly over the last few days has not been active in self stopped taking his medicines and began having seizures.  The patient has a history of a meningioma and seizure disorder.  This morning the patient is snoring he is resting he was given doses of IV Ativan for his seizure last night in the emergency department.  I have spoken with the patient's family and I am recommending do not resuscitate while we care for the patient's medical issues.  They will have a family meeting and discuss.

## 2022-04-11 NOTE — CONSULTS
[facility] Surg  Adult Nutrition  Consult Note    SUMMARY     Recommendations    Recommendation/Intervention: 1. Add Lino 1 pk po BID to promote wound healing 2. Encourage meal consumption of 75% 3. Assist with meal set up to provide the best opportunity for improved po intake 4. Will monitor glucose and modify to ADA restrictions as necessary  Goals: increase po intake and promote wound healing  Nutrition Goal Status: new    Assessment and Plan    Nutrition Problem  Increased nutrient needs; Overweight/Obesity    Related to (etiology):   Altered skin integrity; decrease in po intake; pt is 132% IBW    Signs and Symptoms (as evidenced by):   Wound to right buttock with consult for wound care and RD recs; less than 25% intake; BMI 31.6    Interventions/Recommendations (treatment strategy):  1. Add Lino 1 pk po BID to promote wound healing 2. Encourage meal consumption of 75% 3. Assist with meal set up to provide the best opportunity for improved po intake 4. Will monitor glucose and modify to ADA restrictions as necessary  Goals: increase po intake and promote wound healing  Nutrition Goal Status: new  Nutrition Diagnosis Status:   New       Malnutrition Assessment                                       Reason for Assessment    Reason For Assessment: consult, identified at risk by screening criteria (altered skin integrity)  Diagnosis: other (see comments) (pneumonia)  Relevant Medical History: right side lung mass, DM type 2, CKD, morbin obesity, peripheral neuropathy, seizures  General Information Comments: Pt resting. Spoke with exwife at bedside. Poor po intake reported currently and prior to admit. Pt requires some assistance with meal setup but is able to feed himself. No issues with difficutly chewing or swallowing reported. Pt has wound to right buttock. Regular diet ordered. When reviewing medical history, exwife stated pt does not have diabetes. Will monitor glucose levels and modify diet as  "needed.    Nutrition Risk Screen    Nutrition Risk Screen: large or nonhealing wound, burn or pressure injury    Nutrition/Diet History    Food Preferences: none  Spiritual, Cultural Beliefs, Anglican Practices, Values that Affect Care: no  Food Allergies: NKFA  Factors Affecting Nutritional Intake: decreased appetite    Anthropometrics    Temp: 97.5 °F (36.4 °C)  Height Method: Stated  Height: 5' 11" (180.3 cm)  Height (inches): 71 in  Weight Method: Bed Scale  Weight: 103 kg (227 lb 1.2 oz)  Weight (lb): 227.08 lb  Ideal Body Weight (IBW), Male: 172 lb  % Ideal Body Weight, Male (lb): 132.02 %  BMI (Calculated): 31.7  BMI Grade: 30 - 34.9- obesity - grade I  Usual Body Weight (UBW), k kg  % Usual Body Weight: 101.19  % Weight Change From Usual Weight: 0.98 %  Weight Loss Since Admission: 0 lb  % Weight Change Since Admission: 0       Lab/Procedures/Meds    Pertinent Labs Reviewed: reviewed  Pertinent Labs Comments: Glu 134, Alb 2.3  Pertinent Medications Reviewed: reviewed    Physical Findings/Assessment         Estimated/Assessed Needs    Weight Used For Calorie Calculations: 84 kg (185 lb 3 oz)  Energy Calorie Requirements (kcal): 2100  Energy Need Method: Kcal/kg  Protein Requirements: 101  Weight Used For Protein Calculations: 84 kg (185 lb 3 oz)     Estimated Fluid Requirement Method: RDA Method  RDA Method (mL): 2100         Nutrition Prescription Ordered    Current Diet Order: regular    Evaluation of Received Nutrient/Fluid Intake    Energy Calories Required: not meeting needs  Protein Required: not meeting needs  Fluid Required: not meeting needs  Tolerance: tolerating  % Intake of Estimated Energy Needs: 0 - 25 %  % Meal Intake: 0 - 25 %    Nutrition Risk    Level of Risk/Frequency of Follow-up: moderate       Monitor and Evaluation    Food and Nutrient Intake: energy intake  Food and Nutrient Adminstration: diet order  Anthropometric Measurements: weight, body mass index  Biochemical Data, " Medical Tests and Procedures: glucose/endocrine profile  Nutrition-Focused Physical Findings: skin       Nutrition Follow-Up    RD Follow-up?: Yes

## 2022-04-11 NOTE — PLAN OF CARE
04/11/22 1503   Medicare Message   Important Message from Medicare regarding Discharge Appeal Rights Given to patient/caregiver;Explained to patient/caregiver;Signed/date by patient/caregiver  (family member at bedside.  Patient is sleeping.  Caretaker signs IM after CM explains medicare IM appeal rights.  Copy left at bedside.)   Date IMM was signed 04/11/22   Time IMM was signed 1505   Caretaker at bedside.  Patient is sleeping.  Explained medicare IM appeal rights and caretaker signs.  Copy left at bedside.

## 2022-04-11 NOTE — NURSING
Pt is complaining that he cannot urinate. Bladder scan done. Left side 405ml center 772ml, right 992ml. Notified Dr. Barton to place espinal.

## 2022-04-11 NOTE — PLAN OF CARE
[facility]  Initial Discharge Assessment       Primary Care Provider: No primary care provider on file.    Admission Diagnosis: Seizure [R56.9]  Pneumonia of right lower lobe due to infectious organism [J18.9]  Sepsis, due to unspecified organism, unspecified whether acute organ dysfunction present [A41.9]    Admission Date: 4/10/2022  Expected Discharge Date:     Discharge Barriers Identified: None    Payor: MEDICARE / Plan: MEDICARE PART A & B / Product Type: Government /     Extended Emergency Contact Information  Primary Emergency Contact: BROOKE Lei  Mobile Phone: [phone]  Relation: Son  Preferred language: English   needed? No    Discharge Plan A: Home with family, Home Health  Discharge Plan B: Rehab      [facility] [place] [address] [address]  [facility] [address]  Phone: [phone] Fax: [phone]      Initial Assessment (most recent)     Adult Discharge Assessment - 04/11/22 0735        Discharge Assessment    Assessment Type Discharge Planning Assessment     Confirmed/corrected address, phone number and insurance Yes     Source of Information family   spoke with patient's son  brooke Lei    Communicated GLORY with patient/caregiver No     Reason For Admission seizue     Lives With grandchild(ibrahima);other (see comments)   grand daughter and her  live with patient, son checks on patient frequently    Facility Arrived From: home     Do you expect to return to your current living situation? Yes     Do you have help at home or someone to help you manage your care at home? Yes     Who are your caregiver(s) and their phone number(s)? [facility], also sitters 6 hours day 5 days week     Prior to hospitilization cognitive status: Unable to Assess     Current cognitive status: --   somnolent at this time    Walking or Climbing Stairs Difficulty ambulation difficulty, assistance 1 person;ambulation difficulty, requires equipment      Dressing/Bathing Difficulty bathing difficulty, assistance 1 person     Dressing/Bathing Management needs assistance for dressing and bathing     Home Accessibility wheelchair accessible     Home Layout Able to live on 1st floor     Equipment Currently Used at Home walker, rolling;wheelchair;oxygen     Readmission within 30 days? No     Patient currently being followed by outpatient case management? No     Do you currently have service(s) that help you manage your care at home? Yes     How Many hours does patient receive services 6   private sitters 6 hours day    Name and Contact number of agency [facility]     Is the pt/caregiver preference to resume services with current agency Yes     Do you take prescription medications? Yes     Do you have any problems affording any of your prescribed medications? No     Is the patient taking medications as prescribed? yes   family manages medications for patient.    Who is going to help you get home at discharge? family and and resume services     How do you get to doctors appointments? family or friend will provide     Are you on dialysis? No     Do you take coumadin? No     Discharge Plan A Home with family;Home Health     Discharge Plan B Rehab     DME Needed Upon Discharge  bedside commode     Discharge Plan discussed with: Adult children     Discharge Barriers Identified None               Patient is sleeping at this time,  Spoke with son per phone,  He states patient lives in his own home, and grand daughter and her  stay with the patient.  Also has sitters at home 6 hours day, 5 days/week according to son.  States home [facility] is seeing patient, he thinks patient is being seen by [facility].  Patient has walker, wc, oxygen concentrator, and portable oxygen at home.  Son states does not have BSC and would like one.  Son stating plan is for patient to return home upon discharge, or would be interested in IPR if appropriate for patient when  medically stable.   Reached out juni [facility] and they state patient is not currenlty being seen by the.  Reached out to [facility], they state they have seen patient in the past, but he is not currently being seen by them.    Uses [facility] in [place].  Family manages patient's medications.

## 2022-04-11 NOTE — SUBJECTIVE & OBJECTIVE
History reviewed. No pertinent past medical history.    History reviewed. No pertinent surgical history.    Review of patient's allergies indicates:  No Known Allergies    No current facility-administered medications on file prior to encounter.     Current Outpatient Medications on File Prior to Encounter   Medication Sig    aspirin (ECOTRIN) 81 MG EC tablet Take 81 mg by mouth once daily.    calcium carbonate (OS-DAVID) 600 mg calcium (1,500 mg) Tab Take 600 mg by mouth once.    docusate sodium (COLACE) 100 MG capsule Take 100 mg by mouth 2 (two) times daily.    ezetimibe (ZETIA) 10 mg tablet Take 10 mg by mouth once daily.    guaiFENesin (MUCINEX) 600 mg 12 hr tablet Take 1,200 mg by mouth 2 (two) times daily.    HYDROcodone-acetaminophen (NORCO) 5-325 mg per tablet Take 1 tablet by mouth every 6 (six) hours as needed for Pain.    lovastatin (MEVACOR) 10 MG tablet Take 10 mg by mouth every evening.    metoprolol tartrate (LOPRESSOR) 25 MG tablet Take 25 mg by mouth 2 (two) times daily.    miconazole NITRATE 2 % (MICOTIN) 2 % top powder Apply topically as needed for Itching.    multivitamin capsule Take 1 capsule by mouth once daily.    OXcarbazepine (TRILEPTAL) 600 MG Tab Take 150 mg by mouth 2 (two) times daily.    potassium chloride (MICRO-K) 10 MEQ CpSR Take 20 mEq by mouth once. Every other day    torsemide (DEMADEX) 10 MG Tab Take 20 mg by mouth 2 (two) times daily.    vitamin D (VITAMIN D3) 1000 units Tab Take 1,000 Units by mouth once daily.     Family History    None       Tobacco Use    Smoking status: Not on file    Smokeless tobacco: Not on file   Substance and Sexual Activity    Alcohol use: Not on file    Drug use: Not on file    Sexual activity: Not on file     Review of Systems   Unable to perform ROS: Mental status change   Objective:     Vital Signs (Most Recent):  Temp: 97.9 °F (36.6 °C) (04/11/22 0725)  Pulse: 75 (04/11/22 0749)  Resp: 20 (04/11/22 0749)  BP: 136/81 (04/11/22 0725)  SpO2: 97 %  (04/11/22 0749) Vital Signs (24h Range):  Temp:  [97.5 °F (36.4 °C)-98.6 °F (37 °C)] 97.9 °F (36.6 °C)  Pulse:  [] 75  Resp:  [14-22] 20  SpO2:  [95 %-98 %] 97 %  BP: (136-155)/(75-91) 136/81     Weight: 103 kg (227 lb)  Body mass index is 31.66 kg/m².    Physical Exam  Constitutional:       General: He is not in acute distress.     Appearance: He is obese. He is ill-appearing.   HENT:      Head: Normocephalic and atraumatic.      Nose: Nose normal. No congestion or rhinorrhea.      Mouth/Throat:      Mouth: Mucous membranes are dry.      Pharynx: No oropharyngeal exudate.   Eyes:      General: No scleral icterus.     Pupils: Pupils are equal, round, and reactive to light.   Neck:      Vascular: No carotid bruit.   Cardiovascular:      Rate and Rhythm: Normal rate and regular rhythm.      Heart sounds: Normal heart sounds. No murmur heard.    No friction rub. No gallop.   Pulmonary:      Effort: No respiratory distress.      Breath sounds: No stridor. Rhonchi present. No wheezing or rales.   Chest:      Chest wall: No tenderness.   Abdominal:      General: There is no distension.      Palpations: There is no mass.      Tenderness: There is no abdominal tenderness. There is no right CVA tenderness, left CVA tenderness, guarding or rebound.      Hernia: No hernia is present.   Musculoskeletal:         General: No swelling, tenderness or deformity.      Cervical back: Normal range of motion. No rigidity.      Right lower leg: Edema present.      Left lower leg: Edema present.   Lymphadenopathy:      Cervical: No cervical adenopathy.   Skin:     Coloration: Skin is not jaundiced or pale.      Findings: No rash.   Neurological:      Comments: + obtunded, snoring        Significant Labs: All pertinent labs within the past 24 hours have been reviewed.    Significant Imaging: I have reviewed all pertinent imaging results/findings within the past 24 hours.

## 2022-04-11 NOTE — ED PROVIDER NOTES
Encounter Date: 4/10/2022       History     Chief Complaint   Patient presents with    Seizures     Pt had a witnessed seizure at home. Brought in by EMS. Pt was caught by family and did not hit his head. Pt has a Hx of seizures, CHF, and brain tumors. Pt has been non-compliant with his medications for 3+ days     81 -year-old male presents the ED accompanied by brother with significant past medical history for right-sided brain mass presents the ED with reports of decreased appetite for the last 3 days.  Further reports he has not been taking his medications or eating and drinking.  Patient denies any falls per report they called him prior to falling.  Family reports patient was admitted to the hospital 2 months ago for pneumonia then was transferred to rehab in [place].  Patient has been home a total of 3 weeks since discharge from rehab.  His PCP is       Seizures   This is a recurrent problem. The current episode started just prior to arrival. The problem has been unchanged. There was 1 seizure. The most recent episode lasted less than 30 seconds. Pertinent negatives include no sleepiness, no confusion, no headaches, no speech difficulty, no visual disturbance, no neck stiffness, no sore throat, no chest pain, no cough, no nausea, no vomiting, no diarrhea and no muscle weakness. Characteristics do not include eye blinking, eye deviation, bowel incontinence, bladder incontinence, rhythmic jerking, loss of consciousness, bit tongue, apnea or cyanosis. Witnessed: Patient family reports weakness no focal shaking. The seizures did not continue in the ED. The seizure(s) had no focality. Possible causes include missed seizure meds. There were no medications administered prior to arrival.     Review of patient's allergies indicates:  No Known Allergies  History reviewed. No pertinent past medical history.  History reviewed. No pertinent surgical history.  History reviewed. No pertinent family history.      Review of Systems   HENT: Negative for sore throat.    Eyes: Negative for visual disturbance.   Respiratory: Negative for apnea and cough.    Cardiovascular: Negative for chest pain and cyanosis.   Gastrointestinal: Negative for bowel incontinence, diarrhea, nausea and vomiting.   Genitourinary: Negative for bladder incontinence.   Neurological: Positive for seizures. Negative for loss of consciousness, speech difficulty and headaches.   Psychiatric/Behavioral: Negative for confusion.   All other systems reviewed and are negative.      Physical Exam     Initial Vitals [04/10/22 2200]   BP Pulse Resp Temp SpO2   (!) 149/75 (!) 114 14 98.6 °F (37 °C) 96 %      MAP       --         Physical Exam    Nursing note and vitals reviewed.  Constitutional: Vital signs are normal. He appears well-developed and well-nourished.   HENT:   Head: Normocephalic and atraumatic.   Eyes: Conjunctivae, EOM and lids are normal. Pupils are equal, round, and reactive to light. Lids are everted and swept, no foreign bodies found.   Neck: Trachea normal and phonation normal. Neck supple.   Normal range of motion.   Full passive range of motion without pain.     Cardiovascular: Normal rate, regular rhythm, normal heart sounds, intact distal pulses and normal pulses.   Pulmonary/Chest: He has no decreased breath sounds. He has no wheezes. He has rhonchi in the right lower field. He has rales in the right lower field.   Abdominal: Abdomen is soft. Bowel sounds are normal. He exhibits no distension. There is no abdominal tenderness.   Musculoskeletal:      Cervical back: Full passive range of motion without pain, normal range of motion and neck supple.     Neurological: He is alert. He has normal strength and normal reflexes. He displays normal reflexes. No cranial nerve deficit or sensory deficit.   Skin: Capillary refill takes less than 2 seconds. No rash and no abscess noted. No erythema. No pallor.         ED Course   Critical  Care    Date/Time: 4/11/2022 12:03 AM  Performed by: Golden Wallace Jr., MD  Authorized by: Golden Wallace Jr., MD   Direct patient critical care time: 15 minutes  Additional history critical care time: 15 minutes  Ordering / reviewing critical care time: 15 minutes  Documentation critical care time: 15 minutes  Total critical care time (exclusive of procedural time) : 60 minutes  Critical care was necessary to treat or prevent imminent or life-threatening deterioration of the following conditions: circulatory failure, metabolic crisis, toxidrome, shock, hepatic failure, cardiac failure, CNS failure or compromise, renal failure, trauma, respiratory failure, dehydration, endocrine crisis and sepsis.  Critical care was time spent personally by me on the following activities: discussions with consultants, evaluation of patient's response to treatment, obtaining history from patient or surrogate, ordering and review of laboratory studies, pulse oximetry, review of old charts, discussions with primary provider, ordering and performing treatments and interventions, examination of patient, ordering and review of radiographic studies and re-evaluation of patient's condition.        Labs Reviewed   CBC W/ AUTO DIFFERENTIAL - Abnormal; Notable for the following components:       Result Value    WBC 12.97 (*)     RBC 3.73 (*)     Hemoglobin 11.0 (*)     Hematocrit 34.8 (*)     MCHC 31.6 (*)     RDW 17.1 (*)     Immature Granulocytes 1.1 (*)     Gran # (ANC) 10.5 (*)     Immature Grans (Abs) 0.14 (*)     Gran % 80.7 (*)     Lymph % 9.8 (*)     All other components within normal limits   COMPREHENSIVE METABOLIC PANEL - Abnormal; Notable for the following components:    Potassium 2.8 (*)     CO2 34 (*)     Glucose 134 (*)     BUN 50 (*)     Creatinine 1.7 (*)     Albumin 2.3 (*)     Alkaline Phosphatase 53 (*)     Anion Gap 7 (*)     eGFR if  43.4 (*)     eGFR if non  37.5 (*)     All other  components within normal limits   CULTURE, BLOOD   CULTURE, BLOOD   LACTIC ACID, PLASMA   URINALYSIS, REFLEX TO URINE CULTURE    Narrative:     Preferred Collection Type->Urine, Clean Catch  Specimen Source->Urine   MAGNESIUM   SARS-COV-2 RDRP GENE     EKG Readings: (Independently Interpreted)   Rhythm: Normal Sinus Rhythm. Heart Rate: 112. Conduction: Normal. ST Segments: Normal ST Segments. T Waves: Normal. Clinical Impression: Sinus Tachycardia with PVCs       Imaging Results          CT Head Without Contrast (In process)                X-Ray Chest AP Portable (Preliminary result)  Result time 04/10/22 23:14:33    ED Interpretation by Golden Wallace Jr., MD (04/10/22 23:14:33, [facility], Emergency Medicine)    Right medial lung infiltrate                               Medications   levoFLOXacin 750 mg/150 mL IVPB 750 mg (750 mg Intravenous New Bag 4/10/22 2349)   aspirin EC tablet 81 mg (has no administration in time range)   docusate sodium capsule 100 mg (has no administration in time range)   ezetimibe tablet 10 mg (has no administration in time range)   guaiFENesin 12 hr tablet 1,200 mg (has no administration in time range)   atorvastatin tablet 20 mg (has no administration in time range)   metoprolol tartrate (LOPRESSOR) tablet 25 mg (has no administration in time range)   OXcarbazepine tablet 150 mg (has no administration in time range)   torsemide tablet 20 mg (has no administration in time range)   potassium chloride packet 40 mEq (has no administration in time range)   lactated ringers bolus 3,129 mL (0 mL/kg × 104.3 kg Intravenous Stopped 4/10/22 2348)   potassium bicarbonate disintegrating tablet 20 mEq (20 mEq Oral Given 4/10/22 2314)   potassium chloride 10 mEq in 100 mL IVPB (10 mEq Intravenous New Bag 4/10/22 2314)   magnesium oxide tablet 400 mg (400 mg Oral Given 4/10/22 2314)     Medical Decision Making:   Clinical Tests:   Lab Tests: Ordered and Reviewed  The following lab  "test(s) were unremarkable: CBC, CMP, Urinalysis and Lactate  Radiological Study: Ordered and Reviewed  Medical Tests: Ordered and Reviewed  Sepsis Perfusion Assessment: "I attest a sepsis perfusion exam was performed within 6 hours of sepsis, severe sepsis, or septic shock presentation, following fluid resuscitation."    Sepsis Perfusion Assessment Complete: 4/11/2022 12:01 AM    ED Management:  The patient has 2/4 sirs criteria with normal lactic acid.  Source of right-sided pneumonia.  No severe sepsis.  Patient has noted multiple electrolyte abnormalities.  Replete with potassium and magnesium.  Patient will be started on Levaquin which will be really does secondary to creatinine 1.7.  Patient will need to be admitted secondary to inability to take p.o. medications multiple comorbidities.  His primary care provider Dr. Domínguez will consult his on-call colleague Dr. Yang   Other:   I have discussed this case with another health care provider.       <> Summary of the Discussion: Discussed patient's presentation, vital signs, labs, x-ray reports with Dr. Yang.  She accepted admission to [facility]                      Clinical Impression:   Final diagnoses:  [R56.9] Seizure  [J18.9] Pneumonia of right lower lobe due to infectious organism (Primary)  [A41.9] Sepsis, due to unspecified organism, unspecified whether acute organ dysfunction present          ED Disposition Condition    Admit               Golden Wallace Jr., MD  04/11/22 0004    "

## 2022-04-11 NOTE — NURSING
At about 0530, patient had a seizure, 1mg ativan given, lactate 3.5. doctor Trey notified. Zosyn ordered. See MAR. Unable to reach son on phone. Two attempts.

## 2022-04-11 NOTE — H&P
[facility] Medicine  History & Physical    Patient Name: Jamey Lei  MRN: [id number]  Patient Class: IP- Inpatient  Admission Date: 4/10/2022  Attending Physician: Navi Domínguez III, MD  Primary Care Provider: No primary care provider on file.         Patient information was obtained from patient, past medical records and ER records.     Subjective:     Principal Problem:Pneumonia    Chief Complaint:   Chief Complaint   Patient presents with    Seizures     Pt had a witnessed seizure at home. Brought in by EMS. Pt was caught by family and did not hit his head. Pt has a Hx of seizures, CHF, and brain tumors. Pt has been non-compliant with his medications for 3+ days        HPI: Patient is a 81-year-old male with a history of multiple comorbidities including a right-sided lung mass that was biopsied recently within the last few weeks and was negative for malignancy.  The mass is spiculated and enlarging and is very concerning for malignancy.  The patient has had a deterioration in his condition over the last few months with several prolonged hospitalizations.  The patient has type 2 diabetes chronic kidney disease morbid obesity peripheral neuropathy and chronic lower back pain requiring chronic opiate therapy.  The patient is requiring more and more care from caregivers and family to assist him in his ADLs.  Patient reportedly over the last few days has not been active in self stopped taking his medicines and began having seizures.  The patient has a history of a meningioma and seizure disorder.  This morning the patient is snoring he is resting he was given doses of IV Ativan for his seizure last night in the emergency department.  I have spoken with the patient's family and I am recommending do not resuscitate while we care for the patient's medical issues.  They will have a family meeting and discuss.      History reviewed. No pertinent past medical history.    History reviewed. No pertinent  surgical history.    Review of patient's allergies indicates:  No Known Allergies    No current facility-administered medications on file prior to encounter.     Current Outpatient Medications on File Prior to Encounter   Medication Sig    aspirin (ECOTRIN) 81 MG EC tablet Take 81 mg by mouth once daily.    calcium carbonate (OS-DAVID) 600 mg calcium (1,500 mg) Tab Take 600 mg by mouth once.    docusate sodium (COLACE) 100 MG capsule Take 100 mg by mouth 2 (two) times daily.    ezetimibe (ZETIA) 10 mg tablet Take 10 mg by mouth once daily.    guaiFENesin (MUCINEX) 600 mg 12 hr tablet Take 1,200 mg by mouth 2 (two) times daily.    HYDROcodone-acetaminophen (NORCO) 5-325 mg per tablet Take 1 tablet by mouth every 6 (six) hours as needed for Pain.    lovastatin (MEVACOR) 10 MG tablet Take 10 mg by mouth every evening.    metoprolol tartrate (LOPRESSOR) 25 MG tablet Take 25 mg by mouth 2 (two) times daily.    miconazole NITRATE 2 % (MICOTIN) 2 % top powder Apply topically as needed for Itching.    multivitamin capsule Take 1 capsule by mouth once daily.    OXcarbazepine (TRILEPTAL) 600 MG Tab Take 150 mg by mouth 2 (two) times daily.    potassium chloride (MICRO-K) 10 MEQ CpSR Take 20 mEq by mouth once. Every other day    torsemide (DEMADEX) 10 MG Tab Take 20 mg by mouth 2 (two) times daily.    vitamin D (VITAMIN D3) 1000 units Tab Take 1,000 Units by mouth once daily.     Family History    None       Tobacco Use    Smoking status: Not on file    Smokeless tobacco: Not on file   Substance and Sexual Activity    Alcohol use: Not on file    Drug use: Not on file    Sexual activity: Not on file     Review of Systems   Unable to perform ROS: Mental status change   Objective:     Vital Signs (Most Recent):  Temp: 97.9 °F (36.6 °C) (04/11/22 0725)  Pulse: 75 (04/11/22 0749)  Resp: 20 (04/11/22 0749)  BP: 136/81 (04/11/22 0725)  SpO2: 97 % (04/11/22 0749) Vital Signs (24h Range):  Temp:  [97.5 °F (36.4  °C)-98.6 °F (37 °C)] 97.9 °F (36.6 °C)  Pulse:  [] 75  Resp:  [14-22] 20  SpO2:  [95 %-98 %] 97 %  BP: (136-155)/(75-91) 136/81     Weight: 103 kg (227 lb)  Body mass index is 31.66 kg/m².    Physical Exam  Constitutional:       General: He is not in acute distress.     Appearance: He is obese. He is ill-appearing.   HENT:      Head: Normocephalic and atraumatic.      Nose: Nose normal. No congestion or rhinorrhea.      Mouth/Throat:      Mouth: Mucous membranes are dry.      Pharynx: No oropharyngeal exudate.   Eyes:      General: No scleral icterus.     Pupils: Pupils are equal, round, and reactive to light.   Neck:      Vascular: No carotid bruit.   Cardiovascular:      Rate and Rhythm: Normal rate and regular rhythm.      Heart sounds: Normal heart sounds. No murmur heard.    No friction rub. No gallop.   Pulmonary:      Effort: No respiratory distress.      Breath sounds: No stridor. Rhonchi present. No wheezing or rales.   Chest:      Chest wall: No tenderness.   Abdominal:      General: There is no distension.      Palpations: There is no mass.      Tenderness: There is no abdominal tenderness. There is no right CVA tenderness, left CVA tenderness, guarding or rebound.      Hernia: No hernia is present.   Musculoskeletal:         General: No swelling, tenderness or deformity.      Cervical back: Normal range of motion. No rigidity.      Right lower leg: Edema present.      Left lower leg: Edema present.   Lymphadenopathy:      Cervical: No cervical adenopathy.   Skin:     Coloration: Skin is not jaundiced or pale.      Findings: No rash.   Neurological:      Comments: + obtunded, snoring        Significant Labs: All pertinent labs within the past 24 hours have been reviewed.    Significant Imaging: I have reviewed all pertinent imaging results/findings within the past 24 hours.    Assessment/Plan:     * Pneumonia        Morbid obesity  Body mass index is 31.66 kg/m². Morbid obesity complicates all  aspects of disease management from diagnostic modalities to treatment. Weight loss encouraged and health benefits explained to patient.         Neuropathy involving both lower extremities        CKD (chronic kidney disease), stage IV        Lung mass        Seizure disorder        Meningioma        Hypokalemia        Chronic osteoarthritis          VTE Risk Mitigation (From admission, onward)         Ordered     enoxaparin injection 30 mg  Daily         04/11/22 0824     IP VTE HIGH RISK PATIENT  Once         04/11/22 0026     Place sequential compression device  Until discontinued         04/11/22 0026                   Navi Domínguez III, MD  Department of Hospital Medicine   [facility]

## 2022-04-11 NOTE — PROGRESS NOTES
Pharmacist Renal Dose Adjustment Note    Jamey Lei is a 81 y.o. male being treated with the medication levofloxacin    Patient Data:    Vital Signs (Most Recent):  Temp: 98.6 °F (37 °C) (04/10/22 2200)  Pulse: (!) 114 (04/10/22 2200)  Resp: 14 (04/10/22 2200)  BP: (!) 149/75 (04/10/22 2200)  SpO2: 96 % (04/10/22 2200) Vital Signs (72h Range):  Temp:  [98.6 °F (37 °C)]   Pulse:  [114]   Resp:  [14]   BP: (149)/(75)   SpO2:  [96 %]      Recent Labs   Lab 04/10/22  2226   CREATININE 1.7*     Serum creatinine: 1.7 mg/dL (H) 04/10/22 2226  Estimated creatinine clearance: 43.3 mL/min (A)    Medication:levofloxacin dose: 500mg frequency q24h will be changed to medication:levofloxacin dose:750mg frequency:q48h    Pharmacist's Name: Katerin Cannon  Pharmacist's Extension:

## 2022-04-11 NOTE — PLAN OF CARE
04/11/22 0716   Medicare Message   Important Message from Medicare regarding Discharge Appeal Rights Explained to patient/caregiver  (obtained by admission, not date or time documented)   Im obtained per admitting, no documentation of time and date.

## 2022-04-11 NOTE — PLAN OF CARE
Spoke to [facility] to confirm if patient is active with their service.  [facility] stating they have never had him as patient.  Reached out to [facility] per phone, they state hs is not a currently patient but they have seen him in the past.

## 2022-04-12 PROBLEM — G92.9 ENCEPHALOPATHY, TOXIC: Status: ACTIVE | Noted: 2022-04-12

## 2022-04-12 LAB
ALBUMIN SERPL BCP-MCNC: 1.9 G/DL (ref 3.5–5.2)
ALP SERPL-CCNC: 39 U/L (ref 55–135)
ALT SERPL W/O P-5'-P-CCNC: 26 U/L (ref 10–44)
ANION GAP SERPL CALC-SCNC: 5 MMOL/L (ref 8–16)
AST SERPL-CCNC: 22 U/L (ref 10–40)
BASOPHILS # BLD AUTO: 0.06 K/UL (ref 0–0.2)
BASOPHILS NFR BLD: 0.6 % (ref 0–1.9)
BILIRUB SERPL-MCNC: 0.4 MG/DL (ref 0.1–1)
BUN SERPL-MCNC: 35 MG/DL (ref 8–23)
CALCIUM SERPL-MCNC: 9 MG/DL (ref 8.7–10.5)
CHLORIDE SERPL-SCNC: 105 MMOL/L (ref 95–110)
CO2 SERPL-SCNC: 34 MMOL/L (ref 23–29)
CREAT SERPL-MCNC: 1.5 MG/DL (ref 0.5–1.4)
DIFFERENTIAL METHOD: ABNORMAL
EOSINOPHIL # BLD AUTO: 0.2 K/UL (ref 0–0.5)
EOSINOPHIL NFR BLD: 2.2 % (ref 0–8)
ERYTHROCYTE [DISTWIDTH] IN BLOOD BY AUTOMATED COUNT: 17.2 % (ref 11.5–14.5)
EST. GFR  (AFRICAN AMERICAN): 50.5 ML/MIN/1.73 M^2
EST. GFR  (NON AFRICAN AMERICAN): 43.7 ML/MIN/1.73 M^2
GLUCOSE SERPL-MCNC: 97 MG/DL (ref 70–110)
HCT VFR BLD AUTO: 32.7 % (ref 40–54)
HGB BLD-MCNC: 10 G/DL (ref 14–18)
IMM GRANULOCYTES # BLD AUTO: 0.1 K/UL (ref 0–0.04)
IMM GRANULOCYTES NFR BLD AUTO: 0.9 % (ref 0–0.5)
LYMPHOCYTES # BLD AUTO: 1.1 K/UL (ref 1–4.8)
LYMPHOCYTES NFR BLD: 10.1 % (ref 18–48)
MAGNESIUM SERPL-MCNC: 2.1 MG/DL (ref 1.6–2.6)
MCH RBC QN AUTO: 29.6 PG (ref 27–31)
MCHC RBC AUTO-ENTMCNC: 30.6 G/DL (ref 32–36)
MCV RBC AUTO: 97 FL (ref 82–98)
MONOCYTES # BLD AUTO: 0.8 K/UL (ref 0.3–1)
MONOCYTES NFR BLD: 7.3 % (ref 4–15)
NEUTROPHILS # BLD AUTO: 8.4 K/UL (ref 1.8–7.7)
NEUTROPHILS NFR BLD: 78.9 % (ref 38–73)
NRBC BLD-RTO: 0 /100 WBC
PLATELET # BLD AUTO: 264 K/UL (ref 150–450)
PMV BLD AUTO: 10.4 FL (ref 9.2–12.9)
POTASSIUM SERPL-SCNC: 3.7 MMOL/L (ref 3.5–5.1)
PROT SERPL-MCNC: 6.4 G/DL (ref 6–8.4)
RBC # BLD AUTO: 3.38 M/UL (ref 4.6–6.2)
SODIUM SERPL-SCNC: 144 MMOL/L (ref 136–145)
WBC # BLD AUTO: 10.65 K/UL (ref 3.9–12.7)

## 2022-04-12 PROCEDURE — 80053 COMPREHEN METABOLIC PANEL: CPT | Performed by: INTERNAL MEDICINE

## 2022-04-12 PROCEDURE — 94640 AIRWAY INHALATION TREATMENT: CPT

## 2022-04-12 PROCEDURE — 97162 PT EVAL MOD COMPLEX 30 MIN: CPT

## 2022-04-12 PROCEDURE — 99900035 HC TECH TIME PER 15 MIN (STAT)

## 2022-04-12 PROCEDURE — 97165 OT EVAL LOW COMPLEX 30 MIN: CPT

## 2022-04-12 PROCEDURE — 11000001 HC ACUTE MED/SURG PRIVATE ROOM

## 2022-04-12 PROCEDURE — 25000003 PHARM REV CODE 250: Performed by: FAMILY MEDICINE

## 2022-04-12 PROCEDURE — 27000221 HC OXYGEN, UP TO 24 HOURS

## 2022-04-12 PROCEDURE — 36415 COLL VENOUS BLD VENIPUNCTURE: CPT | Performed by: INTERNAL MEDICINE

## 2022-04-12 PROCEDURE — 97535 SELF CARE MNGMENT TRAINING: CPT

## 2022-04-12 PROCEDURE — 25000242 PHARM REV CODE 250 ALT 637 W/ HCPCS: Performed by: FAMILY MEDICINE

## 2022-04-12 PROCEDURE — 83735 ASSAY OF MAGNESIUM: CPT | Performed by: INTERNAL MEDICINE

## 2022-04-12 PROCEDURE — 99900031 HC PATIENT EDUCATION (STAT)

## 2022-04-12 PROCEDURE — 94761 N-INVAS EAR/PLS OXIMETRY MLT: CPT

## 2022-04-12 PROCEDURE — 85025 COMPLETE CBC W/AUTO DIFF WBC: CPT | Performed by: INTERNAL MEDICINE

## 2022-04-12 PROCEDURE — 25000003 PHARM REV CODE 250: Performed by: INTERNAL MEDICINE

## 2022-04-12 PROCEDURE — 63600175 PHARM REV CODE 636 W HCPCS: Performed by: INTERNAL MEDICINE

## 2022-04-12 RX ORDER — OXCARBAZEPINE 300 MG/1
300 TABLET, FILM COATED ORAL 2 TIMES DAILY
Status: DISCONTINUED | OUTPATIENT
Start: 2022-04-12 | End: 2022-04-18 | Stop reason: HOSPADM

## 2022-04-12 RX ORDER — MUPIROCIN 20 MG/G
OINTMENT TOPICAL 2 TIMES DAILY
Status: COMPLETED | OUTPATIENT
Start: 2022-04-12 | End: 2022-04-16

## 2022-04-12 RX ADMIN — GUAIFENESIN 1200 MG: 600 TABLET, EXTENDED RELEASE ORAL at 09:04

## 2022-04-12 RX ADMIN — ALBUTEROL SULFATE 2.5 MG: 2.5 SOLUTION RESPIRATORY (INHALATION) at 07:04

## 2022-04-12 RX ADMIN — ASPIRIN 81 MG: 81 TABLET, COATED ORAL at 09:04

## 2022-04-12 RX ADMIN — TORSEMIDE 20 MG: 20 TABLET ORAL at 09:04

## 2022-04-12 RX ADMIN — GUAIFENESIN 1200 MG: 600 TABLET, EXTENDED RELEASE ORAL at 08:04

## 2022-04-12 RX ADMIN — POTASSIUM BICARBONATE 40 MEQ: 391 TABLET, EFFERVESCENT ORAL at 09:04

## 2022-04-12 RX ADMIN — METOPROLOL TARTRATE 25 MG: 25 TABLET, FILM COATED ORAL at 08:04

## 2022-04-12 RX ADMIN — MUPIROCIN: 20 OINTMENT TOPICAL at 08:04

## 2022-04-12 RX ADMIN — DOCUSATE SODIUM 100 MG: 100 CAPSULE, LIQUID FILLED ORAL at 08:04

## 2022-04-12 RX ADMIN — PIPERACILLIN AND TAZOBACTAM 4.5 G: 4; .5 INJECTION, POWDER, LYOPHILIZED, FOR SOLUTION INTRAVENOUS; PARENTERAL at 02:04

## 2022-04-12 RX ADMIN — METOPROLOL TARTRATE 25 MG: 25 TABLET, FILM COATED ORAL at 09:04

## 2022-04-12 RX ADMIN — EZETIMIBE 10 MG: 10 TABLET ORAL at 09:04

## 2022-04-12 RX ADMIN — DEXTROSE, SODIUM CHLORIDE, AND POTASSIUM CHLORIDE: 5; .45; .15 INJECTION INTRAVENOUS at 02:04

## 2022-04-12 RX ADMIN — Medication 6 MG: at 08:04

## 2022-04-12 RX ADMIN — OXCARBAZEPINE 300 MG: 300 TABLET, FILM COATED ORAL at 08:04

## 2022-04-12 RX ADMIN — MUPIROCIN: 20 OINTMENT TOPICAL at 09:04

## 2022-04-12 RX ADMIN — ALBUTEROL SULFATE 2.5 MG: 2.5 SOLUTION RESPIRATORY (INHALATION) at 08:04

## 2022-04-12 RX ADMIN — ATORVASTATIN CALCIUM 20 MG: 20 TABLET, FILM COATED ORAL at 09:04

## 2022-04-12 RX ADMIN — OXCARBAZEPINE 300 MG: 300 TABLET, FILM COATED ORAL at 09:04

## 2022-04-12 RX ADMIN — PIPERACILLIN AND TAZOBACTAM 4.5 G: 4; .5 INJECTION, POWDER, LYOPHILIZED, FOR SOLUTION INTRAVENOUS; PARENTERAL at 08:04

## 2022-04-12 RX ADMIN — ENOXAPARIN SODIUM 40 MG: 40 INJECTION SUBCUTANEOUS at 04:04

## 2022-04-12 RX ADMIN — ALBUTEROL SULFATE 2.5 MG: 2.5 SOLUTION RESPIRATORY (INHALATION) at 12:04

## 2022-04-12 RX ADMIN — DOCUSATE SODIUM 100 MG: 100 CAPSULE, LIQUID FILLED ORAL at 09:04

## 2022-04-12 RX ADMIN — PIPERACILLIN AND TAZOBACTAM 4.5 G: 4; .5 INJECTION, POWDER, LYOPHILIZED, FOR SOLUTION INTRAVENOUS; PARENTERAL at 11:04

## 2022-04-12 NOTE — PT/OT/SLP EVAL
"Occupational Therapy   Evaluation    Name: Jamey Lei  MRN: [id number]  Admitting Diagnosis:  Pneumonia  Recent Surgery: * No surgery found *      Recommendations:     Discharge Recommendations: rehabilitation facility, nursing facility, skilled  Discharge Equipment Recommendations:   (TBD)  Barriers to discharge:  Decreased caregiver support (Increased need for assistance with ADLs)    Assessment:     Jamey Lei is a 81 y.o. male with a medical diagnosis of Pneumonia.   Performance deficits affecting function: weakness, impaired endurance, impaired self care skills, impaired functional mobilty, gait instability, impaired balance, impaired cognition, decreased safety awareness, impaired coordination, impaired skin, impaired cardiopulmonary response to activity.      Rehab Prognosis: Fair; patient would benefit from acute skilled OT services to address these deficits and reach maximum level of function.       Plan:     Patient to be seen 5 x/week to address the above listed problems via self-care/home management, therapeutic activities, therapeutic exercises, neuromuscular re-education  · Plan of Care Expires: 04/22/22  · Plan of Care Reviewed with: patient, caregiver, son    Subjective     Chief Complaint: "I need to go to the bathroom"  Patient/Family Comments/goals: Pt's son reports they are unsure at this time for pt's course of care, he reports that the pt has been in and out of hospital for over a year and that he has had inpatient rehabilitation before and that has just returned home back to his old habits including non-compliance with medication, general inactivity and poor diet.      Occupational Profile:  Living Environment: house on slab  Previous level of function: required "a lot" of assistance for ADLs, used a Rollator for functional mobility.  According to son and ex-wife, pt had multiple falls at home over course of year.   Roles and Routines: Pt with increased need for assistance with " ADLs.  Equipment Used at Home:  bedside commode, rollator  Assistance upon Discharge: IRF vs SNF    Pain/Comfort:  · Pain Rating 1: 0/10    Patients cultural, spiritual, Temple conflicts given the current situation: no    Objective:     Communicated with: nursing prior to session.  Patient found supine with espinal catheter, oxygen, peripheral IV, pulse ox (continuous), telemetry upon OT entry to room.    General Precautions: Standard, fall   Orthopedic Precautions:N/A   Braces: N/A  Respiratory Status: Nasal cannula, flow 2 L/min    Occupational Performance:    Bed Mobility:    · Patient completed Supine to Sit with moderate assistance  · Patient completed Sit to Supine with moderate assistance    Functional Mobility/Transfers:  · Patient completed Toilet Transfer Step Transfer technique with moderate assistance with  rolling walker  · Functional Mobility: Pt with two side steps after stand transfer from [facility] with MOD A for discoordination.     Activities of Daily Living:  · Feeding:  minimum assistance set up, cues  · Grooming: minimum assistance set up, cues  · Bathing: maximal assistance set up, cues, balance  · Upper Body Dressing: moderate assistance n/a  · Lower Body Dressing: total assistance due to poor dynamic sitting and standing balance  · Toileting: total assistance for donning, doffing brief and hygiene    Cognitive/Visual Perceptual:  Cognitive/Psychosocial Skills:     -       Oriented to: Person, Place and Situation   -       Follows Commands/attention:Follows one-step commands    Physical Exam:  Balance:    -       Dynamic sitting- Poor  Motor Planning:    -       impaired  Upper Extremity Range of Motion:     -       Right Upper Extremity: WFL  -       Left Upper Extremity: WFL  Upper Extremity Strength:    -       Right Upper Extremity: Deficits: 3/5  -       Left Upper Extremity: Deficits: 3/5   Strength:    -       Right Upper Extremity: Deficits: 2/5  -       Left Upper Extremity:  Deficits: 2/5  Gross motor coordination:   ataxia    AMPAC 6 Click ADL:  AMPAC Total Score: 12    Treatment & Education:  Pt agreeable to evaluation today, due to needing to get up to commode for a bowel movement.  Pt's ex-wife and son in room reported pt intermittently confused, able to communicate needs.  Pt with one LOB in standing during transfer to [facility], did not result in fall, required tactile force to right him.   Education:    Patient left supine with all lines intact, call button in reach and family present    GOALS:   Multidisciplinary Problems     Occupational Therapy Goals        Problem: Occupational Therapy    Goal Priority Disciplines Outcome Interventions   Occupational Therapy Goal     OT, PT/OT     Description: Patient will increase functional independence with ADLs by performing:    Grooming while seated with Supervision.  Toileting from bedside commode with Supervision for hygiene and clothing management.   Sitting at edge of bed x15 minutes with Supervision.  Supine to sit with Contact Guard Assistance.  Toilet transfer to bedside commode with Contact Guard Assistance.                     History:     History reviewed. No pertinent past medical history.    History reviewed. No pertinent surgical history.    Time Tracking:     OT Date of Treatment: 04/12/22  OT Start Time: 1120  OT Stop Time: 1149  OT Total Time (min): 29 min    Billable Minutes:Evaluation 15  Self Care/Home Management 14    4/12/2022

## 2022-04-12 NOTE — ASSESSMENT & PLAN NOTE
Body mass index is 31.67 kg/m². Morbid obesity complicates all aspects of disease management from diagnostic modalities to treatment. Weight loss encouraged and health benefits explained to patient.

## 2022-04-12 NOTE — PLAN OF CARE
Pt. Rested well through the night, very sleepy, not able to stay awake long enough to swallow po meds, able to talk but disoriented to place, time, situation, follows commands, delayed responses, makes needs known, indwelling espinal in place, patent, draining to urometer bag for urinary retention, continuous IV fluids with KCL as ordered, oral suction at bedside, congestion nonproductive cough, tele# [phone],  instructed to call with needs/assistance, will continue to monitor.

## 2022-04-12 NOTE — HOSPITAL COURSE
4/12/2022 FM:  Patient is awake and alert and family at the bedside.  Family states when he takes his antiepileptic he becomes very groggy and sedated.  We are going to try a lower dose while he is here and we are treating him for pneumonia.  Otherwise the patient is eating his breakfast and seems to have a good appetite and eating well will start therapy today.  4/13/2022 FM:  Patient ate 100% of breakfast this morning.  Patient's family is at bedside.  We discuss code status and the patient does not want mechanical ventilation and intubation so we will place partial code status on the chart.  Patient otherwise is having increasing coughing and rattling in the chest today.  He did attempt to get out of bed yesterday and is severely debilitated and weak.  The family is asking about inpatient rehab and I have informed them that we need to see effort and some improvements over the next day or so to ensure that the prognosis is fair to good.  4/14/2022 FM:  Patient's having audible rhonchi today.  Patient was up out of bed yesterday and sat in a chair until 2:00 a.m..  We are attempting inpatient rehab therapy.  I am adding nebulized treatments today and Mucomyst.  4/15/2022 CG: Patient is still having some shortness of breath with rhonchi. He says the breathing treatments have helped a little bit.   4/16/2022 CG: Doing well, sitting up in bed and feels less short of breath  4/17/2022 CG:  No new changes, continue with breathing treatments and breath sounds still coarse.  4/18/2022 FM:  Patient is sitting up in the chair and ate 100% of his breakfast.  Patient is eager to begin working with therapy.  His performance on Friday was not significant enough to be able to complete inpatient rehab but we will reassess today and hopefully moved inpatient rehab in home.  I have informed the family if he is not able to moved inpatient rehab will have to make arrangements at home or a skilled nursing facility.  Discharge Note:   Patient's pulmonary symptoms improved but he is still requiring IV antibiotics.  We suspect that the patient has a postobstructive pneumonia.  Patient was accepted to inpatient rehab and we will move up stairs today for aggressive therapy and then follow up with General surgery in the outpatient setting.

## 2022-04-12 NOTE — SUBJECTIVE & OBJECTIVE
History reviewed. No pertinent past medical history.    History reviewed. No pertinent surgical history.    Review of patient's allergies indicates:  No Known Allergies    No current facility-administered medications on file prior to encounter.     Current Outpatient Medications on File Prior to Encounter   Medication Sig    aspirin (ECOTRIN) 81 MG EC tablet Take 81 mg by mouth once daily.    calcium carbonate (OS-DAVID) 600 mg calcium (1,500 mg) Tab Take 600 mg by mouth once.    docusate sodium (COLACE) 100 MG capsule Take 100 mg by mouth 2 (two) times daily.    ezetimibe (ZETIA) 10 mg tablet Take 10 mg by mouth once daily.    guaiFENesin (MUCINEX) 600 mg 12 hr tablet Take 1,200 mg by mouth 2 (two) times daily.    HYDROcodone-acetaminophen (NORCO) 5-325 mg per tablet Take 1 tablet by mouth every 6 (six) hours as needed for Pain.    lovastatin (MEVACOR) 10 MG tablet Take 10 mg by mouth every evening.    metoprolol tartrate (LOPRESSOR) 25 MG tablet Take 25 mg by mouth 2 (two) times daily.    miconazole NITRATE 2 % (MICOTIN) 2 % top powder Apply topically as needed for Itching.    multivitamin capsule Take 1 capsule by mouth once daily.    OXcarbazepine (TRILEPTAL) 600 MG Tab Take 150 mg by mouth 2 (two) times daily.    potassium chloride (MICRO-K) 10 MEQ CpSR Take 20 mEq by mouth once. Every other day    torsemide (DEMADEX) 10 MG Tab Take 20 mg by mouth 2 (two) times daily.    vitamin D (VITAMIN D3) 1000 units Tab Take 1,000 Units by mouth once daily.     Family History    None       Tobacco Use    Smoking status: Not on file    Smokeless tobacco: Not on file   Substance and Sexual Activity    Alcohol use: Not on file    Drug use: Not on file    Sexual activity: Not on file     Review of Systems   Unable to perform ROS: Mental status change   Objective:     Vital Signs (Most Recent):  Temp: 98 °F (36.7 °C) (04/12/22 0725)  Pulse: 95 (04/12/22 0725)  Resp: 20 (04/12/22 0725)  BP: (!) 157/86 (04/12/22 0725)  SpO2: 100 %  (04/12/22 0725) Vital Signs (24h Range):  Temp:  [96.1 °F (35.6 °C)-98.3 °F (36.8 °C)] 98 °F (36.7 °C)  Pulse:  [] 95  Resp:  [18-30] 20  SpO2:  [95 %-100 %] 100 %  BP: (132-176)/(72-90) 157/86     Weight: 103 kg (227 lb 1.2 oz)  Body mass index is 31.67 kg/m².    Physical Exam  Constitutional:       General: He is not in acute distress.     Appearance: He is obese. He is not ill-appearing.   HENT:      Head: Normocephalic and atraumatic.      Nose: Nose normal. No congestion or rhinorrhea.      Mouth/Throat:      Mouth: Mucous membranes are dry.      Pharynx: No oropharyngeal exudate.   Eyes:      General: No scleral icterus.     Pupils: Pupils are equal, round, and reactive to light.   Neck:      Vascular: No carotid bruit.   Cardiovascular:      Rate and Rhythm: Normal rate and regular rhythm.      Heart sounds: Normal heart sounds. No murmur heard.    No friction rub. No gallop.   Pulmonary:      Effort: No respiratory distress.      Breath sounds: No stridor. Rhonchi present. No wheezing or rales.   Chest:      Chest wall: No tenderness.   Abdominal:      General: There is no distension.      Palpations: There is no mass.      Tenderness: There is no abdominal tenderness. There is no right CVA tenderness, left CVA tenderness, guarding or rebound.      Hernia: No hernia is present.   Musculoskeletal:         General: No swelling, tenderness or deformity.      Cervical back: Normal range of motion. No rigidity.      Right lower leg: Edema present.      Left lower leg: Edema present.   Lymphadenopathy:      Cervical: No cervical adenopathy.   Skin:     Coloration: Skin is not jaundiced or pale.      Findings: No rash.   Neurological:      Motor: Weakness present.   Psychiatric:         Mood and Affect: Mood normal.         Behavior: Behavior normal.        Significant Labs: All pertinent labs within the past 24 hours have been reviewed.    Significant Imaging: I have reviewed all pertinent imaging  results/findings within the past 24 hours.

## 2022-04-12 NOTE — PT/OT/SLP EVAL
Physical Therapy Evaluation    Patient Name:  Jamey Lei   MRN:  [id number]    Recommendations:     Discharge Recommendations:  rehabilitation facility   Discharge Equipment Recommendations:     Barriers to discharge: decreased fxn'l mobility    Assessment:     Jamey Lei is a 81 y.o. male admitted with a medical diagnosis of Pneumonia.  He presents with the following impairments/functional limitations:  weakness, impaired endurance, impaired functional mobilty .    Rehab Prognosis: Good; patient would benefit from acute skilled PT services to address these deficits and reach maximum level of function.    Recent Surgery: * No surgery found *      Plan:     During this hospitalization, patient to be seen   to address the identified rehab impairments via gait training, therapeutic activities, therapeutic exercises and progress toward the following goals:    · Plan of Care Expires:       Subjective     Chief Complaint: weakness  Patient/Family Comments/goals:   Pain/Comfort:  ·      Patients cultural, spiritual, Restorationist conflicts given the current situation:      Living Environment:  Lives in a house on a slab  Prior to admission, patients level of function was one that he required a lot of assist for adls and used a rollator.  Equipment used at home:  .  DME owned (not currently used):   Upon discharge, patient will have assistance from family    Objective:     Communicated with nurse  prior to session.  Patient found supine with    upon PT entry to room.    General Precautions: Standard,     Orthopedic Precautions:    Braces:    Respiratory Status: Nasal cannula, flow   L/min    Exams:  · RLE ROM: WFL  · RLE Strength: 3+/5 hip but rest of leg 4/5  · LLE ROM: WFL  LLE strength same as right  Functional Mobility:  · Bed Mobility:     · Rolling Left:  minimum assistance  · Rolling Right: minimum assistance  · Scooting: moderate assistance  · Supine to Sit: moderate assistance  · Sit to Supine: moderate  assistance  · Transfers:     · Sit to Stand:  moderate assistance with no AD  · Bed to Chair: moderate assistance with  no AD  using  Stand Pivot  · Gait: took 2 steps with moda with PT support in front    Therapeutic Activities and Exercises:       AM-PAC 6 CLICK MOBILITY  Total Score:11     Patient left supine with call button in reach.    GOALS:   Multidisciplinary Problems     Physical Therapy Goals     Not on file                History:     History reviewed. No pertinent past medical history.    History reviewed. No pertinent surgical history.    Time Tracking:     PT Received On:    PT Start Time: 1510     PT Stop Time: 1533  PT Total Time (min): 23 min     Billable Minutes: Evaluation 23 04/12/2022

## 2022-04-12 NOTE — PROGRESS NOTES
[facility] Medicine  Progress Note    Patient Name: Jamey Lei  MRN: [id number]  Patient Class: IP- Inpatient   Admission Date: 4/10/2022  Length of Stay: 2 days  Attending Physician: Navi Domínguez III, MD  Primary Care Provider: Navi Domínguez III, MD        Subjective:     Principal Problem:Pneumonia        HPI:  Patient is a 81-year-old male with a history of multiple comorbidities including a right-sided lung mass that was biopsied recently within the last few weeks and was negative for malignancy.  The mass is spiculated and enlarging and is very concerning for malignancy.  The patient has had a deterioration in his condition over the last few months with several prolonged hospitalizations.  The patient has type 2 diabetes chronic kidney disease morbid obesity peripheral neuropathy and chronic lower back pain requiring chronic opiate therapy.  The patient is requiring more and more care from caregivers and family to assist him in his ADLs.  Patient reportedly over the last few days has not been active in self stopped taking his medicines and began having seizures.  The patient has a history of a meningioma and seizure disorder.  This morning the patient is snoring he is resting he was given doses of IV Ativan for his seizure last night in the emergency department.  I have spoken with the patient's family and I am recommending do not resuscitate while we care for the patient's medical issues.  They will have a family meeting and discuss.      Overview/Hospital Course:  4/12/2022 FM:  Patient is awake and alert and family at the bedside.  Family states when he takes his antiepileptic he becomes very groggy and sedated.  We are going to try a lower dose while he is here and we are treating him for pneumonia.  Otherwise the patient is eating his breakfast and seems to have a good appetite and eating well will start therapy today.      History reviewed. No pertinent past medical  history.    History reviewed. No pertinent surgical history.    Review of patient's allergies indicates:  No Known Allergies    No current facility-administered medications on file prior to encounter.     Current Outpatient Medications on File Prior to Encounter   Medication Sig    aspirin (ECOTRIN) 81 MG EC tablet Take 81 mg by mouth once daily.    calcium carbonate (OS-DAVID) 600 mg calcium (1,500 mg) Tab Take 600 mg by mouth once.    docusate sodium (COLACE) 100 MG capsule Take 100 mg by mouth 2 (two) times daily.    ezetimibe (ZETIA) 10 mg tablet Take 10 mg by mouth once daily.    guaiFENesin (MUCINEX) 600 mg 12 hr tablet Take 1,200 mg by mouth 2 (two) times daily.    HYDROcodone-acetaminophen (NORCO) 5-325 mg per tablet Take 1 tablet by mouth every 6 (six) hours as needed for Pain.    lovastatin (MEVACOR) 10 MG tablet Take 10 mg by mouth every evening.    metoprolol tartrate (LOPRESSOR) 25 MG tablet Take 25 mg by mouth 2 (two) times daily.    miconazole NITRATE 2 % (MICOTIN) 2 % top powder Apply topically as needed for Itching.    multivitamin capsule Take 1 capsule by mouth once daily.    OXcarbazepine (TRILEPTAL) 600 MG Tab Take 150 mg by mouth 2 (two) times daily.    potassium chloride (MICRO-K) 10 MEQ CpSR Take 20 mEq by mouth once. Every other day    torsemide (DEMADEX) 10 MG Tab Take 20 mg by mouth 2 (two) times daily.    vitamin D (VITAMIN D3) 1000 units Tab Take 1,000 Units by mouth once daily.     Family History    None       Tobacco Use    Smoking status: Not on file    Smokeless tobacco: Not on file   Substance and Sexual Activity    Alcohol use: Not on file    Drug use: Not on file    Sexual activity: Not on file     Review of Systems   Unable to perform ROS: Mental status change   Objective:     Vital Signs (Most Recent):  Temp: 98 °F (36.7 °C) (04/12/22 0725)  Pulse: 95 (04/12/22 0725)  Resp: 20 (04/12/22 0725)  BP: (!) 157/86 (04/12/22 0725)  SpO2: 100 % (04/12/22 0725) Vital  Signs (24h Range):  Temp:  [96.1 °F (35.6 °C)-98.3 °F (36.8 °C)] 98 °F (36.7 °C)  Pulse:  [] 95  Resp:  [18-30] 20  SpO2:  [95 %-100 %] 100 %  BP: (132-176)/(72-90) 157/86     Weight: 103 kg (227 lb 1.2 oz)  Body mass index is 31.67 kg/m².    Physical Exam  Constitutional:       General: He is not in acute distress.     Appearance: He is obese. He is not ill-appearing.   HENT:      Head: Normocephalic and atraumatic.      Nose: Nose normal. No congestion or rhinorrhea.      Mouth/Throat:      Mouth: Mucous membranes are dry.      Pharynx: No oropharyngeal exudate.   Eyes:      General: No scleral icterus.     Pupils: Pupils are equal, round, and reactive to light.   Neck:      Vascular: No carotid bruit.   Cardiovascular:      Rate and Rhythm: Normal rate and regular rhythm.      Heart sounds: Normal heart sounds. No murmur heard.    No friction rub. No gallop.   Pulmonary:      Effort: No respiratory distress.      Breath sounds: No stridor. Rhonchi present. No wheezing or rales.   Chest:      Chest wall: No tenderness.   Abdominal:      General: There is no distension.      Palpations: There is no mass.      Tenderness: There is no abdominal tenderness. There is no right CVA tenderness, left CVA tenderness, guarding or rebound.      Hernia: No hernia is present.   Musculoskeletal:         General: No swelling, tenderness or deformity.      Cervical back: Normal range of motion. No rigidity.      Right lower leg: Edema present.      Left lower leg: Edema present.   Lymphadenopathy:      Cervical: No cervical adenopathy.   Skin:     Coloration: Skin is not jaundiced or pale.      Findings: No rash.   Neurological:      Motor: Weakness present.   Psychiatric:         Mood and Affect: Mood normal.         Behavior: Behavior normal.        Significant Labs: All pertinent labs within the past 24 hours have been reviewed.    Significant Imaging: I have reviewed all pertinent imaging results/findings within the past  24 hours.      Assessment/Plan:      * Pneumonia  improved      Encephalopathy, toxic        Morbid obesity  Body mass index is 31.67 kg/m². Morbid obesity complicates all aspects of disease management from diagnostic modalities to treatment. Weight loss encouraged and health benefits explained to patient.         Neuropathy involving both lower extremities        CKD (chronic kidney disease), stage IV        Lung mass  Strong suspect for ca, initial biopsy was negative.      Seizure disorder  Try lower dose of tileptal.      Meningioma        Hypokalemia  Labs pending      Chronic osteoarthritis          VTE Risk Mitigation (From admission, onward)         Ordered     enoxaparin injection 40 mg  Daily         04/11/22 0914     IP VTE HIGH RISK PATIENT  Once         04/11/22 0026     Place sequential compression device  Until discontinued         04/11/22 0026                Discharge Planning   GLORY:      Code Status: Full Code   Is the patient medically ready for discharge?:     Reason for patient still in hospital (select all that apply): Patient trending condition  Discharge Plan A: Home with family, Home Health                  Navi Domínguez III, MD  Department of Hospital Medicine   [facility] Surg

## 2022-04-13 LAB
ALBUMIN SERPL BCP-MCNC: 1.7 G/DL (ref 3.5–5.2)
ALP SERPL-CCNC: 40 U/L (ref 55–135)
ALT SERPL W/O P-5'-P-CCNC: 22 U/L (ref 10–44)
ANION GAP SERPL CALC-SCNC: 3 MMOL/L (ref 8–16)
AST SERPL-CCNC: 20 U/L (ref 10–40)
BASOPHILS # BLD AUTO: 0.05 K/UL (ref 0–0.2)
BASOPHILS NFR BLD: 0.5 % (ref 0–1.9)
BILIRUB SERPL-MCNC: 0.5 MG/DL (ref 0.1–1)
BUN SERPL-MCNC: 37 MG/DL (ref 8–23)
CALCIUM SERPL-MCNC: 8.7 MG/DL (ref 8.7–10.5)
CHLORIDE SERPL-SCNC: 105 MMOL/L (ref 95–110)
CO2 SERPL-SCNC: 34 MMOL/L (ref 23–29)
CREAT SERPL-MCNC: 1.6 MG/DL (ref 0.5–1.4)
DIFFERENTIAL METHOD: ABNORMAL
EOSINOPHIL # BLD AUTO: 0.2 K/UL (ref 0–0.5)
EOSINOPHIL NFR BLD: 2.1 % (ref 0–8)
ERYTHROCYTE [DISTWIDTH] IN BLOOD BY AUTOMATED COUNT: 17.1 % (ref 11.5–14.5)
EST. GFR  (AFRICAN AMERICAN): 46.7 ML/MIN/1.73 M^2
EST. GFR  (NON AFRICAN AMERICAN): 40.4 ML/MIN/1.73 M^2
GLUCOSE SERPL-MCNC: 96 MG/DL (ref 70–110)
HCT VFR BLD AUTO: 30.1 % (ref 40–54)
HGB BLD-MCNC: 9.2 G/DL (ref 14–18)
IMM GRANULOCYTES # BLD AUTO: 0.09 K/UL (ref 0–0.04)
IMM GRANULOCYTES NFR BLD AUTO: 0.9 % (ref 0–0.5)
LYMPHOCYTES # BLD AUTO: 1.3 K/UL (ref 1–4.8)
LYMPHOCYTES NFR BLD: 13 % (ref 18–48)
MAGNESIUM SERPL-MCNC: 2.1 MG/DL (ref 1.6–2.6)
MCH RBC QN AUTO: 29.3 PG (ref 27–31)
MCHC RBC AUTO-ENTMCNC: 30.6 G/DL (ref 32–36)
MCV RBC AUTO: 96 FL (ref 82–98)
MONOCYTES # BLD AUTO: 0.7 K/UL (ref 0.3–1)
MONOCYTES NFR BLD: 6.7 % (ref 4–15)
NEUTROPHILS # BLD AUTO: 7.6 K/UL (ref 1.8–7.7)
NEUTROPHILS NFR BLD: 76.8 % (ref 38–73)
NRBC BLD-RTO: 0 /100 WBC
PLATELET # BLD AUTO: 231 K/UL (ref 150–450)
PMV BLD AUTO: 10 FL (ref 9.2–12.9)
POTASSIUM SERPL-SCNC: 4.1 MMOL/L (ref 3.5–5.1)
PROT SERPL-MCNC: 6.3 G/DL (ref 6–8.4)
RBC # BLD AUTO: 3.14 M/UL (ref 4.6–6.2)
SODIUM SERPL-SCNC: 142 MMOL/L (ref 136–145)
WBC # BLD AUTO: 9.93 K/UL (ref 3.9–12.7)

## 2022-04-13 PROCEDURE — 99900031 HC PATIENT EDUCATION (STAT)

## 2022-04-13 PROCEDURE — 63600175 PHARM REV CODE 636 W HCPCS: Performed by: INTERNAL MEDICINE

## 2022-04-13 PROCEDURE — 97110 THERAPEUTIC EXERCISES: CPT

## 2022-04-13 PROCEDURE — 97530 THERAPEUTIC ACTIVITIES: CPT

## 2022-04-13 PROCEDURE — 25000003 PHARM REV CODE 250: Performed by: FAMILY MEDICINE

## 2022-04-13 PROCEDURE — 25000242 PHARM REV CODE 250 ALT 637 W/ HCPCS: Performed by: FAMILY MEDICINE

## 2022-04-13 PROCEDURE — 94640 AIRWAY INHALATION TREATMENT: CPT

## 2022-04-13 PROCEDURE — 11000001 HC ACUTE MED/SURG PRIVATE ROOM

## 2022-04-13 PROCEDURE — 85025 COMPLETE CBC W/AUTO DIFF WBC: CPT | Performed by: INTERNAL MEDICINE

## 2022-04-13 PROCEDURE — 36415 COLL VENOUS BLD VENIPUNCTURE: CPT | Performed by: INTERNAL MEDICINE

## 2022-04-13 PROCEDURE — 80053 COMPREHEN METABOLIC PANEL: CPT | Performed by: INTERNAL MEDICINE

## 2022-04-13 PROCEDURE — 25000003 PHARM REV CODE 250: Performed by: INTERNAL MEDICINE

## 2022-04-13 PROCEDURE — 83735 ASSAY OF MAGNESIUM: CPT | Performed by: INTERNAL MEDICINE

## 2022-04-13 PROCEDURE — 94761 N-INVAS EAR/PLS OXIMETRY MLT: CPT

## 2022-04-13 PROCEDURE — 27000221 HC OXYGEN, UP TO 24 HOURS

## 2022-04-13 PROCEDURE — 99900035 HC TECH TIME PER 15 MIN (STAT)

## 2022-04-13 RX ORDER — TORSEMIDE 20 MG/1
100 TABLET ORAL DAILY
Status: DISCONTINUED | OUTPATIENT
Start: 2022-04-13 | End: 2022-04-18 | Stop reason: HOSPADM

## 2022-04-13 RX ADMIN — OXCARBAZEPINE 300 MG: 300 TABLET, FILM COATED ORAL at 08:04

## 2022-04-13 RX ADMIN — ENOXAPARIN SODIUM 40 MG: 40 INJECTION SUBCUTANEOUS at 05:04

## 2022-04-13 RX ADMIN — MUPIROCIN: 20 OINTMENT TOPICAL at 09:04

## 2022-04-13 RX ADMIN — TORSEMIDE 100 MG: 20 TABLET ORAL at 09:04

## 2022-04-13 RX ADMIN — ALBUTEROL SULFATE 2.5 MG: 2.5 SOLUTION RESPIRATORY (INHALATION) at 09:04

## 2022-04-13 RX ADMIN — METOPROLOL TARTRATE 25 MG: 25 TABLET, FILM COATED ORAL at 09:04

## 2022-04-13 RX ADMIN — PIPERACILLIN AND TAZOBACTAM 4.5 G: 4; .5 INJECTION, POWDER, LYOPHILIZED, FOR SOLUTION INTRAVENOUS; PARENTERAL at 11:04

## 2022-04-13 RX ADMIN — MUPIROCIN: 20 OINTMENT TOPICAL at 08:04

## 2022-04-13 RX ADMIN — ATORVASTATIN CALCIUM 20 MG: 20 TABLET, FILM COATED ORAL at 09:04

## 2022-04-13 RX ADMIN — Medication 6 MG: at 08:04

## 2022-04-13 RX ADMIN — PIPERACILLIN AND TAZOBACTAM 4.5 G: 4; .5 INJECTION, POWDER, LYOPHILIZED, FOR SOLUTION INTRAVENOUS; PARENTERAL at 03:04

## 2022-04-13 RX ADMIN — GUAIFENESIN 1200 MG: 600 TABLET, EXTENDED RELEASE ORAL at 08:04

## 2022-04-13 RX ADMIN — ASPIRIN 81 MG: 81 TABLET, COATED ORAL at 09:04

## 2022-04-13 RX ADMIN — OXCARBAZEPINE 300 MG: 300 TABLET, FILM COATED ORAL at 09:04

## 2022-04-13 RX ADMIN — PIPERACILLIN AND TAZOBACTAM 4.5 G: 4; .5 INJECTION, POWDER, LYOPHILIZED, FOR SOLUTION INTRAVENOUS; PARENTERAL at 07:04

## 2022-04-13 RX ADMIN — EZETIMIBE 10 MG: 10 TABLET ORAL at 09:04

## 2022-04-13 RX ADMIN — ALBUTEROL SULFATE 2.5 MG: 2.5 SOLUTION RESPIRATORY (INHALATION) at 07:04

## 2022-04-13 RX ADMIN — DOCUSATE SODIUM 100 MG: 100 CAPSULE, LIQUID FILLED ORAL at 08:04

## 2022-04-13 RX ADMIN — DOCUSATE SODIUM 100 MG: 100 CAPSULE, LIQUID FILLED ORAL at 09:04

## 2022-04-13 RX ADMIN — GUAIFENESIN 1200 MG: 600 TABLET, EXTENDED RELEASE ORAL at 09:04

## 2022-04-13 RX ADMIN — METOPROLOL TARTRATE 25 MG: 25 TABLET, FILM COATED ORAL at 08:04

## 2022-04-13 RX ADMIN — POTASSIUM BICARBONATE 40 MEQ: 391 TABLET, EFFERVESCENT ORAL at 09:04

## 2022-04-13 NOTE — PLAN OF CARE
04/13/22 1335   Medicare Message   Important Message from Medicare regarding Discharge Appeal Rights Given to patient/caregiver;Explained to patient/caregiver;Signed/date by patient/caregiver   Date IMM was signed 04/13/22   Time IMM was signed [id number]

## 2022-04-13 NOTE — SUBJECTIVE & OBJECTIVE
History reviewed. No pertinent past medical history.    History reviewed. No pertinent surgical history.    Review of patient's allergies indicates:  No Known Allergies    No current facility-administered medications on file prior to encounter.     Current Outpatient Medications on File Prior to Encounter   Medication Sig    aspirin (ECOTRIN) 81 MG EC tablet Take 81 mg by mouth once daily.    calcium carbonate (OS-DAVID) 600 mg calcium (1,500 mg) Tab Take 600 mg by mouth once.    docusate sodium (COLACE) 100 MG capsule Take 100 mg by mouth 2 (two) times daily.    ezetimibe (ZETIA) 10 mg tablet Take 10 mg by mouth once daily.    guaiFENesin (MUCINEX) 600 mg 12 hr tablet Take 1,200 mg by mouth 2 (two) times daily.    HYDROcodone-acetaminophen (NORCO) 5-325 mg per tablet Take 1 tablet by mouth every 6 (six) hours as needed for Pain.    lovastatin (MEVACOR) 10 MG tablet Take 10 mg by mouth every evening.    metoprolol tartrate (LOPRESSOR) 25 MG tablet Take 25 mg by mouth 2 (two) times daily.    miconazole NITRATE 2 % (MICOTIN) 2 % top powder Apply topically as needed for Itching.    multivitamin capsule Take 1 capsule by mouth once daily.    OXcarbazepine (TRILEPTAL) 600 MG Tab Take 150 mg by mouth 2 (two) times daily.    potassium chloride (MICRO-K) 10 MEQ CpSR Take 20 mEq by mouth once. Every other day    torsemide (DEMADEX) 10 MG Tab Take 20 mg by mouth 2 (two) times daily.    vitamin D (VITAMIN D3) 1000 units Tab Take 1,000 Units by mouth once daily.     Family History    None       Tobacco Use    Smoking status: Not on file    Smokeless tobacco: Not on file   Substance and Sexual Activity    Alcohol use: Not on file    Drug use: Not on file    Sexual activity: Not on file     Review of Systems   Constitutional:  Positive for activity change and appetite change. Negative for chills and fever.   HENT:  Negative for ear pain, mouth sores, nosebleeds and sore throat.    Eyes:  Negative for visual disturbance.    Respiratory:  Positive for cough and shortness of breath. Negative for wheezing.    Cardiovascular:  Positive for leg swelling. Negative for chest pain and palpitations.   Gastrointestinal:  Negative for abdominal distention, abdominal pain, blood in stool, constipation, diarrhea, nausea and vomiting.   Endocrine: Negative for polyphagia.   Genitourinary:  Negative for difficulty urinating, dysuria, flank pain and frequency.   Musculoskeletal:  Positive for arthralgias, back pain and myalgias.   Skin:  Negative for rash.   Neurological:  Positive for seizures and weakness. Negative for dizziness, tremors, syncope, facial asymmetry, speech difficulty and headaches.   Hematological:  Negative for adenopathy.   Psychiatric/Behavioral:  Positive for confusion. Negative for agitation and hallucinations. The patient is nervous/anxious.    Objective:     Vital Signs (Most Recent):  Temp: 97.2 °F (36.2 °C) (04/13/22 0735)  Pulse: 92 (04/13/22 0735)  Resp: 18 (04/13/22 0735)  BP: (!) 145/72 (04/13/22 0735)  SpO2: (!) 94 % (04/13/22 0735) Vital Signs (24h Range):  Temp:  [97.2 °F (36.2 °C)-98.3 °F (36.8 °C)] 97.2 °F (36.2 °C)  Pulse:  [] 92  Resp:  [18-20] 18  SpO2:  [94 %-99 %] 94 %  BP: (113-160)/(72-93) 145/72     Weight: 103 kg (227 lb 1.2 oz)  Body mass index is 31.67 kg/m².    Physical Exam  Constitutional:       General: He is not in acute distress.     Appearance: He is obese. He is not ill-appearing.   HENT:      Head: Normocephalic and atraumatic.      Nose: Nose normal. No congestion or rhinorrhea.      Mouth/Throat:      Mouth: Mucous membranes are dry.      Pharynx: No oropharyngeal exudate.   Eyes:      General: No scleral icterus.     Pupils: Pupils are equal, round, and reactive to light.   Neck:      Vascular: No carotid bruit.   Cardiovascular:      Rate and Rhythm: Normal rate and regular rhythm.      Heart sounds: Normal heart sounds. No murmur heard.    No friction rub. No gallop.   Pulmonary:       Effort: No respiratory distress.      Breath sounds: No stridor. Rhonchi present. No wheezing or rales.   Chest:      Chest wall: No tenderness.   Abdominal:      General: There is no distension.      Palpations: There is no mass.      Tenderness: There is no abdominal tenderness. There is no right CVA tenderness, left CVA tenderness, guarding or rebound.      Hernia: No hernia is present.   Musculoskeletal:         General: No swelling, tenderness or deformity.      Cervical back: Normal range of motion. No rigidity.      Right lower leg: Edema present.      Left lower leg: Edema present.   Lymphadenopathy:      Cervical: No cervical adenopathy.   Skin:     Coloration: Skin is not jaundiced or pale.      Findings: No rash.   Neurological:      Motor: Weakness present.   Psychiatric:         Mood and Affect: Mood normal.         Behavior: Behavior normal.        Significant Labs: All pertinent labs within the past 24 hours have been reviewed.    Significant Imaging: I have reviewed all pertinent imaging results/findings within the past 24 hours.

## 2022-04-13 NOTE — PLAN OF CARE
Pt. Sitting up resting in bed, able to make needs known, congested nonproductive cough noted, more alert this shift, takes meds whole one at a time, tele# [phone], indwelling espinal, patient, draining, for urinary retention, continuous IV fluids as ordered, on continuous O2 per nasal cannula at 2L, disoriented to place, time, situation, call bell in reach, instructed to call with needs, no evidence of learning.

## 2022-04-13 NOTE — PT/OT/SLP PROGRESS
Occupational Therapy   Treatment    Name: Jamey Lei  MRN: [id number]  Admitting Diagnosis:  Pneumonia       Recommendations:     Discharge Recommendations: rehabilitation facility  Discharge Equipment Recommendations:   (TBD)  Barriers to discharge:   Medical and functional status    Assessment:     Jamey Lei is a 81 y.o. male with a medical diagnosis of Pneumonia. Performance deficits affecting function are weakness, impaired endurance, impaired self care skills, impaired functional mobilty, gait instability, impaired balance, impaired cognition, decreased coordination, decreased safety awareness, impaired skin, edema, impaired cardiopulmonary response to activity.     Rehab Prognosis:  Good; patient would benefit from acute skilled OT services to address these deficits and reach maximum level of function.       Plan:     Patient to be seen 5 x/week to address the above listed problems via self-care/home management, therapeutic activities, therapeutic exercises, neuromuscular re-education  · Plan of Care Expires: 04/22/22  · Plan of Care Reviewed with: patient, caregiver, son    Subjective     Pain/Comfort:  · Pain Rating 1: 0/10  · Pain Rating Post-Intervention 1: 0/10    Objective:     Communicated with: nurse prior to session.  Patient found supine with telemetry, pulse ox (continuous), peripheral IV, oxygen, espinal catheter upon OT entry to room.    General Precautions: Standard, fall   Orthopedic Precautions:N/A   Braces:    Respiratory Status: Nasal cannula, flow 3 L/min     Occupational Performance:     Bed Mobility:    · Patient completed Rolling/Turning to Left with  minimum assistance  · Patient completed Rolling/Turning to Right with minimum assistance  · Patient completed Scooting/Bridging with moderate assistance  · Patient completed Supine to Sit with moderate assistance  · Patient completed Sit to Supine with moderate assistance     Functional Mobility/Transfers:  · Patient completed Sit <>  Stand Transfer with moderate assistance  with  rolling walker   · Patient completed Bed <> Chair Transfer using Step Transfer technique with moderate assistance with rolling walker  · Functional Mobility: Pt ambulated 12' requiring steadying assist utilizing RW.      Treatment & Education:  Pt was cooperative and motivated without verbal encouragement while verbalizing desire to sit in recliner as opposed to bed. He performed bed mobility requiring mostly mod assist secondary to lifting assist and stabilization of trunk during supine to sit transition, scooting assist at bilateral hips to EOB, and lifting assist of bilateral LE's during sit to supine transition. Pt then participated in functional transfer retraining to / from bed, recliner, and BS emphasizing fall prevention providing extra time requiring mod assist secondary to lifting assist and steadying with mod verbal and tactile cueing for safety awareness and technique utilizing RW. Also, he participated in therapeutic exercise performing 3x8 seated pushups requiring lifting assist, and verbal and tactile cueing for technique challenging him to lift buttocks off seated surface to end range elbow extension in order to strengthen triceps brachii to improve performance with sit <> stand transitions particularly lower surfaces.    Patient left up in chair with all lines intact, call button in reach and nurse notifiedEducation:      GOALS:   Multidisciplinary Problems     Occupational Therapy Goals        Problem: Occupational Therapy    Goal Priority Disciplines Outcome Interventions   Occupational Therapy Goal     OT, PT/OT     Description: Patient will increase functional independence with ADLs by performing:    Grooming while seated with Supervision.  Toileting from bedside commode with Supervision for hygiene and clothing management.   Sitting at edge of bed x15 minutes with Supervision.  Supine to sit with Contact Guard Assistance.  Toilet transfer to  bedside commode with Contact Guard Assistance.                     Time Tracking:     OT Date of Treatment: 04/13/22  OT Start Time: 1630  OT Stop Time: 1715  OT Total Time (min): 45 min    Billable Minutes:Therapeutic Activity 25  Therapeutic Exercise 20    OT/DENISHA: OT          4/13/2022

## 2022-04-13 NOTE — PROGRESS NOTES
[facility] Medicine  Progress Note    Patient Name: Jamey Lei  MRN: [id number]  Patient Class: IP- Inpatient   Admission Date: 4/10/2022  Length of Stay: 3 days  Attending Physician: Navi Domínguez III, MD  Primary Care Provider: Navi Domínguez III, MD        Subjective:     Principal Problem:Pneumonia        HPI:  Patient is a 81-year-old male with a history of multiple comorbidities including a right-sided lung mass that was biopsied recently within the last few weeks and was negative for malignancy.  The mass is spiculated and enlarging and is very concerning for malignancy.  The patient has had a deterioration in his condition over the last few months with several prolonged hospitalizations.  The patient has type 2 diabetes chronic kidney disease morbid obesity peripheral neuropathy and chronic lower back pain requiring chronic opiate therapy.  The patient is requiring more and more care from caregivers and family to assist him in his ADLs.  Patient reportedly over the last few days has not been active in self stopped taking his medicines and began having seizures.  The patient has a history of a meningioma and seizure disorder.  This morning the patient is snoring he is resting he was given doses of IV Ativan for his seizure last night in the emergency department.  I have spoken with the patient's family and I am recommending do not resuscitate while we care for the patient's medical issues.  They will have a family meeting and discuss.      Overview/Hospital Course:  4/12/2022 FM:  Patient is awake and alert and family at the bedside.  Family states when he takes his antiepileptic he becomes very groggy and sedated.  We are going to try a lower dose while he is here and we are treating him for pneumonia.  Otherwise the patient is eating his breakfast and seems to have a good appetite and eating well will start therapy today.  4/13/2022 FM:  Patient ate 100% of breakfast this morning.   Patient's family is at bedside.  We discuss code status and the patient does not want mechanical ventilation and intubation so we will place partial code status on the chart.  Patient otherwise is having increasing coughing and rattling in the chest today.  He did attempt to get out of bed yesterday and is severely debilitated and weak.  The family is asking about inpatient rehab and I have informed them that we need to see effort and some improvements over the next day or so to ensure that the prognosis is fair to good.      History reviewed. No pertinent past medical history.    History reviewed. No pertinent surgical history.    Review of patient's allergies indicates:  No Known Allergies    No current facility-administered medications on file prior to encounter.     Current Outpatient Medications on File Prior to Encounter   Medication Sig    aspirin (ECOTRIN) 81 MG EC tablet Take 81 mg by mouth once daily.    calcium carbonate (OS-DAVID) 600 mg calcium (1,500 mg) Tab Take 600 mg by mouth once.    docusate sodium (COLACE) 100 MG capsule Take 100 mg by mouth 2 (two) times daily.    ezetimibe (ZETIA) 10 mg tablet Take 10 mg by mouth once daily.    guaiFENesin (MUCINEX) 600 mg 12 hr tablet Take 1,200 mg by mouth 2 (two) times daily.    HYDROcodone-acetaminophen (NORCO) 5-325 mg per tablet Take 1 tablet by mouth every 6 (six) hours as needed for Pain.    lovastatin (MEVACOR) 10 MG tablet Take 10 mg by mouth every evening.    metoprolol tartrate (LOPRESSOR) 25 MG tablet Take 25 mg by mouth 2 (two) times daily.    miconazole NITRATE 2 % (MICOTIN) 2 % top powder Apply topically as needed for Itching.    multivitamin capsule Take 1 capsule by mouth once daily.    OXcarbazepine (TRILEPTAL) 600 MG Tab Take 150 mg by mouth 2 (two) times daily.    potassium chloride (MICRO-K) 10 MEQ CpSR Take 20 mEq by mouth once. Every other day    torsemide (DEMADEX) 10 MG Tab Take 20 mg by mouth 2 (two) times daily.     vitamin D (VITAMIN D3) 1000 units Tab Take 1,000 Units by mouth once daily.     Family History    None       Tobacco Use    Smoking status: Not on file    Smokeless tobacco: Not on file   Substance and Sexual Activity    Alcohol use: Not on file    Drug use: Not on file    Sexual activity: Not on file     Review of Systems   Constitutional:  Positive for activity change and appetite change. Negative for chills and fever.   HENT:  Negative for ear pain, mouth sores, nosebleeds and sore throat.    Eyes:  Negative for visual disturbance.   Respiratory:  Positive for cough and shortness of breath. Negative for wheezing.    Cardiovascular:  Positive for leg swelling. Negative for chest pain and palpitations.   Gastrointestinal:  Negative for abdominal distention, abdominal pain, blood in stool, constipation, diarrhea, nausea and vomiting.   Endocrine: Negative for polyphagia.   Genitourinary:  Negative for difficulty urinating, dysuria, flank pain and frequency.   Musculoskeletal:  Positive for arthralgias, back pain and myalgias.   Skin:  Negative for rash.   Neurological:  Positive for seizures and weakness. Negative for dizziness, tremors, syncope, facial asymmetry, speech difficulty and headaches.   Hematological:  Negative for adenopathy.   Psychiatric/Behavioral:  Positive for confusion. Negative for agitation and hallucinations. The patient is nervous/anxious.    Objective:     Vital Signs (Most Recent):  Temp: 97.2 °F (36.2 °C) (04/13/22 0735)  Pulse: 92 (04/13/22 0735)  Resp: 18 (04/13/22 0735)  BP: (!) 145/72 (04/13/22 0735)  SpO2: (!) 94 % (04/13/22 0735) Vital Signs (24h Range):  Temp:  [97.2 °F (36.2 °C)-98.3 °F (36.8 °C)] 97.2 °F (36.2 °C)  Pulse:  [] 92  Resp:  [18-20] 18  SpO2:  [94 %-99 %] 94 %  BP: (113-160)/(72-93) 145/72     Weight: 103 kg (227 lb 1.2 oz)  Body mass index is 31.67 kg/m².    Physical Exam  Constitutional:       General: He is not in acute distress.     Appearance: He is  obese. He is not ill-appearing.   HENT:      Head: Normocephalic and atraumatic.      Nose: Nose normal. No congestion or rhinorrhea.      Mouth/Throat:      Mouth: Mucous membranes are dry.      Pharynx: No oropharyngeal exudate.   Eyes:      General: No scleral icterus.     Pupils: Pupils are equal, round, and reactive to light.   Neck:      Vascular: No carotid bruit.   Cardiovascular:      Rate and Rhythm: Normal rate and regular rhythm.      Heart sounds: Normal heart sounds. No murmur heard.    No friction rub. No gallop.   Pulmonary:      Effort: No respiratory distress.      Breath sounds: No stridor. Rhonchi present. No wheezing or rales.   Chest:      Chest wall: No tenderness.   Abdominal:      General: There is no distension.      Palpations: There is no mass.      Tenderness: There is no abdominal tenderness. There is no right CVA tenderness, left CVA tenderness, guarding or rebound.      Hernia: No hernia is present.   Musculoskeletal:         General: No swelling, tenderness or deformity.      Cervical back: Normal range of motion. No rigidity.      Right lower leg: Edema present.      Left lower leg: Edema present.   Lymphadenopathy:      Cervical: No cervical adenopathy.   Skin:     Coloration: Skin is not jaundiced or pale.      Findings: No rash.   Neurological:      Motor: Weakness present.   Psychiatric:         Mood and Affect: Mood normal.         Behavior: Behavior normal.        Significant Labs: All pertinent labs within the past 24 hours have been reviewed.    Significant Imaging: I have reviewed all pertinent imaging results/findings within the past 24 hours.      Assessment/Plan:      * Pneumonia  improved      Encephalopathy, toxic        Morbid obesity  Body mass index is 31.67 kg/m². Morbid obesity complicates all aspects of disease management from diagnostic modalities to treatment. Weight loss encouraged and health benefits explained to patient.         Neuropathy involving both  lower extremities        CKD (chronic kidney disease), stage IV        Lung mass  Strong suspect for ca, initial biopsy was negative.      Seizure disorder  Try lower dose of tileptal.  More alert, will consult IPR.      Meningioma        Hypokalemia  Improved      Chronic osteoarthritis  Cont. Treatement, PT/OT.        VTE Risk Mitigation (From admission, onward)         Ordered     enoxaparin injection 40 mg  Daily         04/11/22 0914     IP VTE HIGH RISK PATIENT  Once         04/11/22 0026     Place sequential compression device  Until discontinued         04/11/22 0026                Discharge Planning   GLORY:      Code Status: Partial Code   Is the patient medically ready for discharge?:     Reason for patient still in hospital (select all that apply): Patient trending condition  Discharge Plan A: Home with family, Home Health                  Navi Domínguez III, MD  Department of Hospital Medicine   [facility]

## 2022-04-13 NOTE — PT/OT/SLP PROGRESS
Physical Therapy Treatment    Patient Name:  Jamey Lei   MRN:  [id number]    Recommendations:     Discharge Recommendations:  rehabilitation facility   Discharge Equipment Recommendations:     Barriers to discharge: decreased enduranc and strength , and his decreased fxn'l mobility    Assessment:     Jamey Lei is a 81 y.o. male admitted with a medical diagnosis of Pneumonia.  He presents with the following impairments/functional limitations:  weakness, impaired endurance, impaired functional mobilty . He needed less help with transfers today and tolerated more activity.    Rehab Prognosis: Good; patient would benefit from acute skilled PT services to address these deficits and reach maximum level of function.    Recent Surgery: * No surgery found *      Plan:     During this hospitalization, patient to be seen   to address the identified rehab impairments via gait training, therapeutic activities, therapeutic exercises and progress toward the following goals:    · Plan of Care Expires:       Subjective     Chief Complaint: sob  Patient/Family Comments/goals:   Pain/Comfort:  ·        Objective:     Communicated with PT prior to session.  Patient found in chair with   upon PT entry to room. Pt was in chair for 3 hours    General Precautions: Standard,     Orthopedic Precautions:    Braces:    Respiratory Status: Nasal cannula, flow   L/min     Functional Mobility:  · Bed Mobility:     · Rolling Left:  minimum assistance  · Rolling Right: minimum assistance  · Scooting: moderate assistance  · Supine to Sit: moderate assistance  · Sit to Supine: moderate assistance  · Transfers:     · Sit to Stand:  minimum assistance with no AD  · Bed to Chair: moderate assistance with  no AD  using  Step Transfer  · Gait: pt took 4 steps forward and backwrd 3 consecutive times before sitting. this was repeated 2 times with PT providing support in fron of him      AM-PAC 6 CLICK MOBILITY          Therapeutic Activities and  Exercises:   pt performed 3 sets of 10 reps of BLE in long sitting position in a chair    Patient left supine with call button in reach..    GOALS:   Multidisciplinary Problems     Physical Therapy Goals     Not on file                Time Tracking:     PT Received On:    PT Start Time: 1215     PT Stop Time: 1240  PT Total Time (min): 25 min     Billable Minutes: 10 min there ex, 15 min  There act                   04/13/2022

## 2022-04-14 LAB
ALBUMIN SERPL BCP-MCNC: 1.8 G/DL (ref 3.5–5.2)
ALP SERPL-CCNC: 39 U/L (ref 55–135)
ALT SERPL W/O P-5'-P-CCNC: 25 U/L (ref 10–44)
ANION GAP SERPL CALC-SCNC: 2 MMOL/L (ref 8–16)
AST SERPL-CCNC: 22 U/L (ref 10–40)
BASOPHILS # BLD AUTO: 0.04 K/UL (ref 0–0.2)
BASOPHILS NFR BLD: 0.4 % (ref 0–1.9)
BILIRUB SERPL-MCNC: 0.4 MG/DL (ref 0.1–1)
BUN SERPL-MCNC: 37 MG/DL (ref 8–23)
CALCIUM SERPL-MCNC: 9.1 MG/DL (ref 8.7–10.5)
CHLORIDE SERPL-SCNC: 105 MMOL/L (ref 95–110)
CO2 SERPL-SCNC: 35 MMOL/L (ref 23–29)
CREAT SERPL-MCNC: 1.6 MG/DL (ref 0.5–1.4)
DIFFERENTIAL METHOD: ABNORMAL
EOSINOPHIL # BLD AUTO: 0.2 K/UL (ref 0–0.5)
EOSINOPHIL NFR BLD: 2.3 % (ref 0–8)
ERYTHROCYTE [DISTWIDTH] IN BLOOD BY AUTOMATED COUNT: 17.2 % (ref 11.5–14.5)
EST. GFR  (AFRICAN AMERICAN): 46.7 ML/MIN/1.73 M^2
EST. GFR  (NON AFRICAN AMERICAN): 40.4 ML/MIN/1.73 M^2
GLUCOSE SERPL-MCNC: 85 MG/DL (ref 70–110)
HCT VFR BLD AUTO: 32.5 % (ref 40–54)
HGB BLD-MCNC: 9.8 G/DL (ref 14–18)
IMM GRANULOCYTES # BLD AUTO: 0.06 K/UL (ref 0–0.04)
IMM GRANULOCYTES NFR BLD AUTO: 0.7 % (ref 0–0.5)
LYMPHOCYTES # BLD AUTO: 1.1 K/UL (ref 1–4.8)
LYMPHOCYTES NFR BLD: 12.4 % (ref 18–48)
MAGNESIUM SERPL-MCNC: 2.3 MG/DL (ref 1.6–2.6)
MCH RBC QN AUTO: 29.3 PG (ref 27–31)
MCHC RBC AUTO-ENTMCNC: 30.2 G/DL (ref 32–36)
MCV RBC AUTO: 97 FL (ref 82–98)
MONOCYTES # BLD AUTO: 0.7 K/UL (ref 0.3–1)
MONOCYTES NFR BLD: 7.5 % (ref 4–15)
NEUTROPHILS # BLD AUTO: 6.9 K/UL (ref 1.8–7.7)
NEUTROPHILS NFR BLD: 76.7 % (ref 38–73)
NRBC BLD-RTO: 0 /100 WBC
PLATELET # BLD AUTO: 239 K/UL (ref 150–450)
PMV BLD AUTO: 10.3 FL (ref 9.2–12.9)
POTASSIUM SERPL-SCNC: 3.9 MMOL/L (ref 3.5–5.1)
PROT SERPL-MCNC: 6.5 G/DL (ref 6–8.4)
RBC # BLD AUTO: 3.35 M/UL (ref 4.6–6.2)
SODIUM SERPL-SCNC: 142 MMOL/L (ref 136–145)
WBC # BLD AUTO: 8.97 K/UL (ref 3.9–12.7)

## 2022-04-14 PROCEDURE — 27000221 HC OXYGEN, UP TO 24 HOURS

## 2022-04-14 PROCEDURE — 25000003 PHARM REV CODE 250: Performed by: INTERNAL MEDICINE

## 2022-04-14 PROCEDURE — 25000242 PHARM REV CODE 250 ALT 637 W/ HCPCS: Performed by: INTERNAL MEDICINE

## 2022-04-14 PROCEDURE — 63600175 PHARM REV CODE 636 W HCPCS: Performed by: INTERNAL MEDICINE

## 2022-04-14 PROCEDURE — 97530 THERAPEUTIC ACTIVITIES: CPT

## 2022-04-14 PROCEDURE — 25000003 PHARM REV CODE 250: Performed by: FAMILY MEDICINE

## 2022-04-14 PROCEDURE — 83735 ASSAY OF MAGNESIUM: CPT | Performed by: INTERNAL MEDICINE

## 2022-04-14 PROCEDURE — 99900035 HC TECH TIME PER 15 MIN (STAT)

## 2022-04-14 PROCEDURE — 80053 COMPREHEN METABOLIC PANEL: CPT | Performed by: INTERNAL MEDICINE

## 2022-04-14 PROCEDURE — 99900031 HC PATIENT EDUCATION (STAT)

## 2022-04-14 PROCEDURE — 97110 THERAPEUTIC EXERCISES: CPT

## 2022-04-14 PROCEDURE — 85025 COMPLETE CBC W/AUTO DIFF WBC: CPT | Performed by: INTERNAL MEDICINE

## 2022-04-14 PROCEDURE — 94640 AIRWAY INHALATION TREATMENT: CPT

## 2022-04-14 PROCEDURE — 36415 COLL VENOUS BLD VENIPUNCTURE: CPT | Performed by: INTERNAL MEDICINE

## 2022-04-14 PROCEDURE — 11000001 HC ACUTE MED/SURG PRIVATE ROOM

## 2022-04-14 PROCEDURE — 94761 N-INVAS EAR/PLS OXIMETRY MLT: CPT

## 2022-04-14 RX ORDER — ACETYLCYSTEINE 200 MG/ML
4 SOLUTION ORAL; RESPIRATORY (INHALATION) 2 TIMES DAILY
Status: DISCONTINUED | OUTPATIENT
Start: 2022-04-14 | End: 2022-04-18 | Stop reason: HOSPADM

## 2022-04-14 RX ORDER — IPRATROPIUM BROMIDE AND ALBUTEROL SULFATE 2.5; .5 MG/3ML; MG/3ML
3 SOLUTION RESPIRATORY (INHALATION)
Status: DISCONTINUED | OUTPATIENT
Start: 2022-04-14 | End: 2022-04-18 | Stop reason: HOSPADM

## 2022-04-14 RX ADMIN — ATORVASTATIN CALCIUM 20 MG: 20 TABLET, FILM COATED ORAL at 10:04

## 2022-04-14 RX ADMIN — IPRATROPIUM BROMIDE AND ALBUTEROL SULFATE 3 ML: 2.5; .5 SOLUTION RESPIRATORY (INHALATION) at 12:04

## 2022-04-14 RX ADMIN — OXCARBAZEPINE 300 MG: 300 TABLET, FILM COATED ORAL at 09:04

## 2022-04-14 RX ADMIN — METOPROLOL TARTRATE 25 MG: 25 TABLET, FILM COATED ORAL at 10:04

## 2022-04-14 RX ADMIN — PIPERACILLIN AND TAZOBACTAM 4.5 G: 4; .5 INJECTION, POWDER, LYOPHILIZED, FOR SOLUTION INTRAVENOUS; PARENTERAL at 03:04

## 2022-04-14 RX ADMIN — IPRATROPIUM BROMIDE AND ALBUTEROL SULFATE 3 ML: 2.5; .5 SOLUTION RESPIRATORY (INHALATION) at 08:04

## 2022-04-14 RX ADMIN — DOCUSATE SODIUM 100 MG: 100 CAPSULE, LIQUID FILLED ORAL at 10:04

## 2022-04-14 RX ADMIN — EZETIMIBE 10 MG: 10 TABLET ORAL at 10:04

## 2022-04-14 RX ADMIN — ENOXAPARIN SODIUM 40 MG: 40 INJECTION SUBCUTANEOUS at 04:04

## 2022-04-14 RX ADMIN — MUPIROCIN: 20 OINTMENT TOPICAL at 10:04

## 2022-04-14 RX ADMIN — OXCARBAZEPINE 300 MG: 300 TABLET, FILM COATED ORAL at 10:04

## 2022-04-14 RX ADMIN — TORSEMIDE 100 MG: 20 TABLET ORAL at 10:04

## 2022-04-14 RX ADMIN — GUAIFENESIN 1200 MG: 600 TABLET, EXTENDED RELEASE ORAL at 09:04

## 2022-04-14 RX ADMIN — ACETYLCYSTEINE 4 ML: 200 SOLUTION ORAL; RESPIRATORY (INHALATION) at 08:04

## 2022-04-14 RX ADMIN — PIPERACILLIN AND TAZOBACTAM 4.5 G: 4; .5 INJECTION, POWDER, LYOPHILIZED, FOR SOLUTION INTRAVENOUS; PARENTERAL at 11:04

## 2022-04-14 RX ADMIN — IPRATROPIUM BROMIDE AND ALBUTEROL SULFATE 3 ML: 2.5; .5 SOLUTION RESPIRATORY (INHALATION) at 05:04

## 2022-04-14 RX ADMIN — DOCUSATE SODIUM 100 MG: 100 CAPSULE, LIQUID FILLED ORAL at 09:04

## 2022-04-14 RX ADMIN — METOPROLOL TARTRATE 25 MG: 25 TABLET, FILM COATED ORAL at 09:04

## 2022-04-14 RX ADMIN — MUPIROCIN: 20 OINTMENT TOPICAL at 09:04

## 2022-04-14 RX ADMIN — GUAIFENESIN 1200 MG: 600 TABLET, EXTENDED RELEASE ORAL at 10:04

## 2022-04-14 RX ADMIN — POTASSIUM BICARBONATE 40 MEQ: 391 TABLET, EFFERVESCENT ORAL at 10:04

## 2022-04-14 RX ADMIN — PIPERACILLIN AND TAZOBACTAM 4.5 G: 4; .5 INJECTION, POWDER, LYOPHILIZED, FOR SOLUTION INTRAVENOUS; PARENTERAL at 07:04

## 2022-04-14 RX ADMIN — ASPIRIN 81 MG: 81 TABLET, COATED ORAL at 10:04

## 2022-04-14 NOTE — PT/OT/SLP PROGRESS
Physical Therapy Treatment    Patient Name:  Jamey Lei   MRN:  [id number]    Recommendations:     Discharge Recommendations:  rehabilitation facility   Discharge Equipment Recommendations:     Barriers to discharge: decreased fxn'l mobility, weakness,and decreased endurance    Assessment:     Jamey Lei is a 81 y.o. male admitted with a medical diagnosis of Pneumonia.  He presents with the following impairments/functional limitations:  weakness, impaired endurance, impaired functional mobilty. He gets up easier with a rw today but still has significant endurance issues    Rehab Prognosis: Good; patient would benefit from acute skilled PT services to address these deficits and reach maximum level of function.    Recent Surgery: * No surgery found *      Plan:     During this hospitalization, patient to be seen   to address the identified rehab impairments via gait training, therapeutic activities, therapeutic exercises and progress toward the following goals:    · Plan of Care Expires:       Subjective     Chief Complaint: sob  Patient/Family Comments/goals:  Pain/Comfort:  ·        Objective:     Communicated with nurseprior to session.  Patient found supine with   upon PT entry to room.     General Precautions: Standard,     Orthopedic Precautions:    Braces:    Respiratory Status: Nasal cannula, flow   L/min     Functional Mobility:  · Bed Mobility:     · Scooting: moderate assistance  · Supine to Sit: moderate assistance  · Sit to Supine: moderate assistance  · Transfers:     · Sit to Stand:  minimum assistance with rolling walker  · Bed to Chair: moderate assistance with  rolling walker and bobby lift  using  Step Transfer  · Gait: amb 4-8ft with a RW with moda x 3 attempts with sob occuring after each gait attempt      AM-PAC 6 CLICK MOBILITY          Therapeutic Activities and Exercises:   pt left up in chair after gait    Patient left up in chair with call button in reach..    GOALS:    Multidisciplinary Problems     Physical Therapy Goals     Not on file                Time Tracking:     PT Received On:    PT Start Time:    PT Stop Time:   PT Total Time (min):     Billable Minutes: Gait Training 15                   04/14/2022

## 2022-04-14 NOTE — SUBJECTIVE & OBJECTIVE
Review of patient's allergies indicates:  No Known Allergies    No current facility-administered medications on file prior to encounter.     Current Outpatient Medications on File Prior to Encounter   Medication Sig    aspirin (ECOTRIN) 81 MG EC tablet Take 81 mg by mouth once daily.    calcium carbonate (OS-DAVID) 600 mg calcium (1,500 mg) Tab Take 600 mg by mouth once.    docusate sodium (COLACE) 100 MG capsule Take 100 mg by mouth 2 (two) times daily.    ezetimibe (ZETIA) 10 mg tablet Take 10 mg by mouth once daily.    guaiFENesin (MUCINEX) 600 mg 12 hr tablet Take 1,200 mg by mouth 2 (two) times daily.    HYDROcodone-acetaminophen (NORCO) 5-325 mg per tablet Take 1 tablet by mouth every 6 (six) hours as needed for Pain.    lovastatin (MEVACOR) 10 MG tablet Take 10 mg by mouth every evening.    metoprolol tartrate (LOPRESSOR) 25 MG tablet Take 25 mg by mouth 2 (two) times daily.    miconazole NITRATE 2 % (MICOTIN) 2 % top powder Apply topically as needed for Itching.    multivitamin capsule Take 1 capsule by mouth once daily.    OXcarbazepine (TRILEPTAL) 600 MG Tab Take 150 mg by mouth 2 (two) times daily.    potassium chloride (MICRO-K) 10 MEQ CpSR Take 20 mEq by mouth once. Every other day    torsemide (DEMADEX) 10 MG Tab Take 20 mg by mouth 2 (two) times daily.    vitamin D (VITAMIN D3) 1000 units Tab Take 1,000 Units by mouth once daily.     Family History    None       Tobacco Use    Smoking status: Not on file    Smokeless tobacco: Not on file   Substance and Sexual Activity    Alcohol use: Not on file    Drug use: Not on file    Sexual activity: Not on file     Review of Systems   Constitutional:  Positive for activity change and appetite change. Negative for chills and fever.   HENT:  Negative for ear pain, mouth sores, nosebleeds and sore throat.    Eyes:  Negative for visual disturbance.   Respiratory:  Positive for cough and shortness of breath. Negative for wheezing.    Cardiovascular:  Positive for  leg swelling. Negative for chest pain and palpitations.   Gastrointestinal:  Negative for abdominal distention, abdominal pain, blood in stool, constipation, diarrhea, nausea and vomiting.   Endocrine: Negative for polyphagia.   Genitourinary:  Negative for difficulty urinating, dysuria, flank pain and frequency.   Musculoskeletal:  Positive for arthralgias, back pain and myalgias.   Skin:  Negative for rash.   Neurological:  Positive for seizures and weakness. Negative for dizziness, tremors, syncope, facial asymmetry, speech difficulty and headaches.   Hematological:  Negative for adenopathy.   Psychiatric/Behavioral:  Positive for confusion. Negative for agitation and hallucinations. The patient is nervous/anxious.    Objective:     Vital Signs (Most Recent):  Temp: 98.2 °F (36.8 °C) (04/14/22 0801)  Pulse: 91 (04/14/22 0805)  Resp: (!) 22 (04/14/22 0801)  BP: 136/76 (04/14/22 0801)  SpO2: 98 % (04/14/22 0805) Vital Signs (24h Range):  Temp:  [97.5 °F (36.4 °C)-99 °F (37.2 °C)] 98.2 °F (36.8 °C)  Pulse:  [84-94] 91  Resp:  [17-22] 22  SpO2:  [92 %-98 %] 98 %  BP: (131-144)/(62-79) 136/76     Weight: 103 kg (227 lb 1.2 oz)  Body mass index is 31.67 kg/m².    Physical Exam  Constitutional:       General: He is not in acute distress.     Appearance: He is obese. He is not ill-appearing.   HENT:      Head: Normocephalic and atraumatic.      Nose: Nose normal. No congestion or rhinorrhea.      Mouth/Throat:      Mouth: Mucous membranes are dry.      Pharynx: No oropharyngeal exudate.   Eyes:      General: No scleral icterus.     Pupils: Pupils are equal, round, and reactive to light.   Neck:      Vascular: No carotid bruit.   Cardiovascular:      Rate and Rhythm: Normal rate and regular rhythm.      Heart sounds: Normal heart sounds. No murmur heard.    No friction rub. No gallop.   Pulmonary:      Effort: No respiratory distress.      Breath sounds: No stridor. Rhonchi present. No wheezing or rales.   Chest:       Chest wall: No tenderness.   Abdominal:      General: There is no distension.      Palpations: There is no mass.      Tenderness: There is no abdominal tenderness. There is no right CVA tenderness, left CVA tenderness, guarding or rebound.      Hernia: No hernia is present.   Musculoskeletal:         General: No swelling, tenderness or deformity.      Cervical back: Normal range of motion. No rigidity.      Right lower leg: Edema present.      Left lower leg: Edema present.   Lymphadenopathy:      Cervical: No cervical adenopathy.   Skin:     Coloration: Skin is not jaundiced or pale.      Findings: No rash.   Neurological:      Motor: Weakness present.   Psychiatric:         Mood and Affect: Mood normal.         Behavior: Behavior normal.        Significant Labs: All pertinent labs within the past 24 hours have been reviewed.    Significant Imaging: I have reviewed all pertinent imaging results/findings within the past 24 hours.

## 2022-04-14 NOTE — PLAN OF CARE
Pt. Was in put in chair per therapy on day shift at beginning of this shift, pt. Wanted to stay in chair most of the shift, this nurse asked patient multiple times if he wanted to get back to bed, patient wanted to stay in chair, pt. Was  Transferred back to bed at 2:15 am per this nurse and CNA, indwelling espinal for urinary retention, makes needs known, disoriented to place, time, situation, new IV initiated this shift, takes meds whole, congested wet nonproductive cough noted, oral suction at bedside, neb treatments as ordered prn, tele# [phone], call bell in reach, will continue to monitor.

## 2022-04-14 NOTE — PT/OT/SLP PROGRESS
Occupational Therapy   Treatment    Name: Jamey Lei  MRN: [id number]  Admitting Diagnosis:  Pneumonia       Recommendations:     Discharge Recommendations: rehabilitation facility  Discharge Equipment Recommendations:   (TBD)  Barriers to discharge:  Medical and functional status    Assessment:     Jamey Lei is a 81 y.o. male with a medical diagnosis of Pneumonia. Performance deficits affecting function are weakness, impaired endurance, impaired self care skills, impaired functional mobilty, gait instability, impaired balance, impaired cognition, decreased coordination, decreased safety awareness, impaired skin, edema, impaired cardiopulmonary response to activity.     Rehab Prognosis:  Good; patient would benefit from acute skilled OT services to address these deficits and reach maximum level of function.       Plan:     Patient to be seen 5 x/week to address the above listed problems via self-care/home management, therapeutic activities, therapeutic exercises, neuromuscular re-education  · Plan of Care Expires: 04/22/22  · Plan of Care Reviewed with: patient, caregiver, son    Subjective     Pain/Comfort:  · Pain Rating 1: 0/10  · Pain Rating Post-Intervention 1: 0/10    Objective:     Communicated with: nurse prior to session.  Patient found HOB elevated with telemetry, pulse ox (continuous), peripheral IV, oxygen, espinal catheter upon OT entry to room.    General Precautions: Standard, fall   Orthopedic Precautions:N/A   Braces:    Respiratory Status: Nasal cannula, flow 2 L/min     Occupational Performance:     Bed Mobility:    · Patient completed Rolling/Turning to Left with  minimum assistance  · Patient completed Rolling/Turning to Right with minimum assistance  · Patient completed Scooting/Bridging with minimum assistance  · Patient completed Supine to Sit with moderate assistance  · Patient completed Sit to Supine with moderate assistance     Functional Mobility/Transfers:  · Patient completed Sit  <> Stand Transfer with minimum assistance  with  rolling walker   · Patient completed Bed <> Chair Transfer using Step Transfer technique with minimum assistance with rolling walker  · Functional Mobility: Pt ambulated 23' between surfaces requiring steadying assist utilizing RW.    Treatment & Education:  Pt was cooperative and motivated with minimal verbal encouragement while exhibiting positive affect. He performed bed mobility requiring min to mod assist secondary to lifting assist and stabilization of trunk during supine to sit transition and lifting assist of bilateral LE's during sit to supine transition while requiring decreased scooting assist to EOB. Pt then participated in functional transfer retraining to / from bed and chair emphasizing fall prevention providing extra time requiring min assist secondary to steadying assist with mod verbal and tactile cueing for safety awareness and technique utilizing RW. Next, he participated in therapeutic exercise performing 5x8 seated pushups requiring intermittent lifting assist with verbal and tactile cueing for technique challenging him to lift buttocks off seated surface to end range elbow extension in order to strengthen triceps brachii to improve performance with sit <> stand transitions particularly lower surfaces. Pt ambulated 23' between surfaces requiring steadying assist utilizing RW on 2 L O2 per NC.    Patient left HOB elevated with all lines intact, call button in reach and nurse notifiedEducation:      GOALS:   Multidisciplinary Problems     Occupational Therapy Goals        Problem: Occupational Therapy    Goal Priority Disciplines Outcome Interventions   Occupational Therapy Goal     OT, PT/OT     Description: Patient will increase functional independence with ADLs by performing:    Grooming while seated with Supervision.  Toileting from bedside commode with Supervision for hygiene and clothing management.   Sitting at edge of bed x15 minutes with  Supervision.  Supine to sit with Contact Guard Assistance.  Toilet transfer to bedside commode with Contact Guard Assistance.                     Time Tracking:     OT Date of Treatment: 04/14/22  OT Start Time: 1615  OT Stop Time: 1655  OT Total Time (min): 40 min    Billable Minutes:Therapeutic Activity 15  Therapeutic Exercise 25    OT/DENISHA: OT          4/14/2022

## 2022-04-14 NOTE — PROGRESS NOTES
[facility] Surg  Adult Nutrition  Progress Note    SUMMARY       Recommendations    Recommendation/Intervention: 1. Add Lino 1 pk po BID to promote wound healing 2. Encourage meal consumption of 75% 3. Assist with meal set up to provide the best opportunity for improved po intake 4. Will monitor glucose and modify to ADA restrictions as necessary  Goals: increase po intake and promote wound healing  Nutrition Goal Status: new    Assessment and Plan  Nutrition Problem  Increased nutrient needs; Overweight/Obesity     Related to (etiology):   Altered skin integrity; decrease in po intake; pt is 132% IBW     Signs and Symptoms (as evidenced by):   Wound to right buttock with consult for wound care and RD recs; less than 25% intake; BMI 31.6     Interventions/Recommendations (treatment strategy):  1. Add Lino 1 pk po BID to promote wound healing 2. Encourage meal consumption of 75% 3. Assist with meal set up to provide the best opportunity for improved po intake 4. Will monitor glucose and modify to ADA restrictions as necessary  Goals: increase po intake and promote wound healing  Nutrition Goal Status: new  Nutrition Diagnosis Status:   Continue       Reason for Assessment    Reason For Assessment: consult, identified at risk by screening criteria (altered skin integrity)  Diagnosis: other (see comments) (pneumonia)  Relevant Medical History: right side lung mass, DM type 2, CKD, morbin obesity, peripheral neuropathy, seizures  General Information Comments: Pt resting. Spoke with exwife at bedside. Poor po intake reported currently and prior to admit. Pt requires some assistance with meal setup but is able to feed himself. No issues with difficutly chewing or swallowing reported. Pt has wound to right buttock. Regular diet ordered. When reviewing medical history, exwife stated pt does not have diabetes. Will monitor glucose levels and modify diet as needed.    4/14/22 - Pt continues on regular diet with Lino  "added po BID. Po intake is improving as documented intake is 75% of meals. Labs reviewed. No additional recs at this time. Will cont to monitor and make recs as appropriate.     Nutrition Risk Screen    Nutrition Risk Screen: large or nonhealing wound, burn or pressure injury    Nutrition/Diet History    Food Preferences: none  Spiritual, Cultural Beliefs, Moravian Practices, Values that Affect Care: no  Food Allergies: NKFA  Factors Affecting Nutritional Intake: decreased appetite    Anthropometrics    Temp: 97.9 °F (36.6 °C)  Height Method: Stated  Height: 5' 11" (180.3 cm)  Height (inches): 71 in  Weight Method: Bed Scale  Weight: 103 kg (227 lb 1.2 oz)  Weight (lb): 227.08 lb  Ideal Body Weight (IBW), Male: 172 lb  % Ideal Body Weight, Male (lb): 132.02 %  BMI (Calculated): 31.7  BMI Grade: 30 - 34.9- obesity - grade I  Usual Body Weight (UBW), k kg  % Usual Body Weight: 101.19  % Weight Change From Usual Weight: 0.98 %  Weight Loss Since Admission: 0 lb  % Weight Change Since Admission: 0       Lab/Procedures/Meds    Pertinent Labs Reviewed: reviewed  Pertinent Labs Comments: Glu 134, Alb 2.3  Pertinent Medications Reviewed: reviewed    22 - Labs  Glu 85, Alb 1.8      Estimated/Assessed Needs    Weight Used For Calorie Calculations: 84 kg (185 lb 3 oz)  Energy Calorie Requirements (kcal): 2100  Energy Need Method: Kcal/kg  Protein Requirements: 101  Weight Used For Protein Calculations: 84 kg (185 lb 3 oz)     Estimated Fluid Requirement Method: RDA Method  RDA Method (mL): 2100         Nutrition Prescription Ordered    Current Diet Order: regular    Evaluation of Received Nutrient/Fluid Intake    Energy Calories Required: not meeting needs  Protein Required: not meeting needs  Fluid Required: not meeting needs  Tolerance: tolerating  % Intake of Estimated Energy Needs: 75 - 100 %  % Meal Intake: 75 - 100 %    Nutrition Risk    Level of Risk/Frequency of Follow-up: moderate     Monitor and " Evaluation    Food and Nutrient Intake: energy intake  Food and Nutrient Adminstration: diet order  Anthropometric Measurements: weight, body mass index  Biochemical Data, Medical Tests and Procedures: glucose/endocrine profile  Nutrition-Focused Physical Findings: skin     Nutrition Follow-Up    RD Follow-up?: Yes

## 2022-04-14 NOTE — PLAN OF CARE
04/14/22 0911   Post-Acute Status   Post-Acute Authorization Placement   Post-Acute Placement Status Referrals Sent   Discharge Delays None known at this time   Discharge Plan   Discharge Plan A Rehab   Discharge Plan B Rehab     Spoke to the patient and his son regarding [facility]. The patient verbalized that he was okay with going to [facility]. The patient choice form was signed, and MADELYN Jones is aware of the referral.     Will continue to monitor.     Addendum: Therapy feels the patient is not ready to be discharged to [facility] today. They will plan to reevaluate the patient next week, per rehab case management.   Dr. Domínguez was informed.     Will continue to monitor.

## 2022-04-14 NOTE — PROGRESS NOTES
[facility] Medicine  Progress Note    Patient Name: Jamey Lei  MRN: [id number]  Patient Class: IP- Inpatient   Admission Date: 4/10/2022  Length of Stay: 4 days  Attending Physician: Navi Domínguez III, MD  Primary Care Provider: Navi Domínguez III, MD        Subjective:     Principal Problem:Pneumonia        HPI:  Patient is a 81-year-old male with a history of multiple comorbidities including a right-sided lung mass that was biopsied recently within the last few weeks and was negative for malignancy.  The mass is spiculated and enlarging and is very concerning for malignancy.  The patient has had a deterioration in his condition over the last few months with several prolonged hospitalizations.  The patient has type 2 diabetes chronic kidney disease morbid obesity peripheral neuropathy and chronic lower back pain requiring chronic opiate therapy.  The patient is requiring more and more care from caregivers and family to assist him in his ADLs.  Patient reportedly over the last few days has not been active in self stopped taking his medicines and began having seizures.  The patient has a history of a meningioma and seizure disorder.  This morning the patient is snoring he is resting he was given doses of IV Ativan for his seizure last night in the emergency department.  I have spoken with the patient's family and I am recommending do not resuscitate while we care for the patient's medical issues.  They will have a family meeting and discuss.      Overview/Hospital Course:  4/12/2022 FM:  Patient is awake and alert and family at the bedside.  Family states when he takes his antiepileptic he becomes very groggy and sedated.  We are going to try a lower dose while he is here and we are treating him for pneumonia.  Otherwise the patient is eating his breakfast and seems to have a good appetite and eating well will start therapy today.  4/13/2022 FM:  Patient ate 100% of breakfast this morning.   Patient's family is at bedside.  We discuss code status and the patient does not want mechanical ventilation and intubation so we will place partial code status on the chart.  Patient otherwise is having increasing coughing and rattling in the chest today.  He did attempt to get out of bed yesterday and is severely debilitated and weak.  The family is asking about inpatient rehab and I have informed them that we need to see effort and some improvements over the next day or so to ensure that the prognosis is fair to good.  4/14/2022 FM:  Patient's having audible rhonchi today.  Patient was up out of bed yesterday and sat in a chair until 2:00 a.m..  We are attempting inpatient rehab therapy.  I am adding nebulized treatments today and Mucomyst.        Review of patient's allergies indicates:  No Known Allergies    No current facility-administered medications on file prior to encounter.     Current Outpatient Medications on File Prior to Encounter   Medication Sig    aspirin (ECOTRIN) 81 MG EC tablet Take 81 mg by mouth once daily.    calcium carbonate (OS-DAVID) 600 mg calcium (1,500 mg) Tab Take 600 mg by mouth once.    docusate sodium (COLACE) 100 MG capsule Take 100 mg by mouth 2 (two) times daily.    ezetimibe (ZETIA) 10 mg tablet Take 10 mg by mouth once daily.    guaiFENesin (MUCINEX) 600 mg 12 hr tablet Take 1,200 mg by mouth 2 (two) times daily.    HYDROcodone-acetaminophen (NORCO) 5-325 mg per tablet Take 1 tablet by mouth every 6 (six) hours as needed for Pain.    lovastatin (MEVACOR) 10 MG tablet Take 10 mg by mouth every evening.    metoprolol tartrate (LOPRESSOR) 25 MG tablet Take 25 mg by mouth 2 (two) times daily.    miconazole NITRATE 2 % (MICOTIN) 2 % top powder Apply topically as needed for Itching.    multivitamin capsule Take 1 capsule by mouth once daily.    OXcarbazepine (TRILEPTAL) 600 MG Tab Take 150 mg by mouth 2 (two) times daily.    potassium chloride (MICRO-K) 10 MEQ CpSR Take 20  mEq by mouth once. Every other day    torsemide (DEMADEX) 10 MG Tab Take 20 mg by mouth 2 (two) times daily.    vitamin D (VITAMIN D3) 1000 units Tab Take 1,000 Units by mouth once daily.     Family History    None       Tobacco Use    Smoking status: Not on file    Smokeless tobacco: Not on file   Substance and Sexual Activity    Alcohol use: Not on file    Drug use: Not on file    Sexual activity: Not on file     Review of Systems   Constitutional:  Positive for activity change and appetite change. Negative for chills and fever.   HENT:  Negative for ear pain, mouth sores, nosebleeds and sore throat.    Eyes:  Negative for visual disturbance.   Respiratory:  Positive for cough and shortness of breath. Negative for wheezing.    Cardiovascular:  Positive for leg swelling. Negative for chest pain and palpitations.   Gastrointestinal:  Negative for abdominal distention, abdominal pain, blood in stool, constipation, diarrhea, nausea and vomiting.   Endocrine: Negative for polyphagia.   Genitourinary:  Negative for difficulty urinating, dysuria, flank pain and frequency.   Musculoskeletal:  Positive for arthralgias, back pain and myalgias.   Skin:  Negative for rash.   Neurological:  Positive for seizures and weakness. Negative for dizziness, tremors, syncope, facial asymmetry, speech difficulty and headaches.   Hematological:  Negative for adenopathy.   Psychiatric/Behavioral:  Positive for confusion. Negative for agitation and hallucinations. The patient is nervous/anxious.    Objective:     Vital Signs (Most Recent):  Temp: 98.2 °F (36.8 °C) (04/14/22 0801)  Pulse: 91 (04/14/22 0805)  Resp: (!) 22 (04/14/22 0801)  BP: 136/76 (04/14/22 0801)  SpO2: 98 % (04/14/22 0805) Vital Signs (24h Range):  Temp:  [97.5 °F (36.4 °C)-99 °F (37.2 °C)] 98.2 °F (36.8 °C)  Pulse:  [84-94] 91  Resp:  [17-22] 22  SpO2:  [92 %-98 %] 98 %  BP: (131-144)/(62-79) 136/76     Weight: 103 kg (227 lb 1.2 oz)  Body mass index is 31.67  kg/m².    Physical Exam  Constitutional:       General: He is not in acute distress.     Appearance: He is obese. He is not ill-appearing.   HENT:      Head: Normocephalic and atraumatic.      Nose: Nose normal. No congestion or rhinorrhea.      Mouth/Throat:      Mouth: Mucous membranes are dry.      Pharynx: No oropharyngeal exudate.   Eyes:      General: No scleral icterus.     Pupils: Pupils are equal, round, and reactive to light.   Neck:      Vascular: No carotid bruit.   Cardiovascular:      Rate and Rhythm: Normal rate and regular rhythm.      Heart sounds: Normal heart sounds. No murmur heard.    No friction rub. No gallop.   Pulmonary:      Effort: No respiratory distress.      Breath sounds: No stridor. Rhonchi present. No wheezing or rales.   Chest:      Chest wall: No tenderness.   Abdominal:      General: There is no distension.      Palpations: There is no mass.      Tenderness: There is no abdominal tenderness. There is no right CVA tenderness, left CVA tenderness, guarding or rebound.      Hernia: No hernia is present.   Musculoskeletal:         General: No swelling, tenderness or deformity.      Cervical back: Normal range of motion. No rigidity.      Right lower leg: Edema present.      Left lower leg: Edema present.   Lymphadenopathy:      Cervical: No cervical adenopathy.   Skin:     Coloration: Skin is not jaundiced or pale.      Findings: No rash.   Neurological:      Motor: Weakness present.   Psychiatric:         Mood and Affect: Mood normal.         Behavior: Behavior normal.        Significant Labs: All pertinent labs within the past 24 hours have been reviewed.    Significant Imaging: I have reviewed all pertinent imaging results/findings within the past 24 hours.      Assessment/Plan:      * Pneumonia  improved      Encephalopathy, toxic  Improved.    Morbid obesity  Body mass index is 31.67 kg/m². Morbid obesity complicates all aspects of disease management from diagnostic modalities to  treatment. Weight loss encouraged and health benefits explained to patient.         Neuropathy involving both lower extremities        CKD (chronic kidney disease), stage IV  Cr improved.    Lung mass  Strong suspect for ca, initial biopsy was negative.      Seizure disorder  Try lower dose of tileptal.  More alert, will consult IPR.      Meningioma        Hypokalemia  Improved      Chronic osteoarthritis  Cont. Treatement, PT/OT.        VTE Risk Mitigation (From admission, onward)         Ordered     enoxaparin injection 40 mg  Daily         04/11/22 0914     IP VTE HIGH RISK PATIENT  Once         04/11/22 0026     Place sequential compression device  Until discontinued         04/11/22 0026                Discharge Planning   GLORY:      Code Status: Partial Code   Is the patient medically ready for discharge?:     Reason for patient still in hospital (select all that apply): Patient trending condition  Discharge Plan A: Home with family, Home Health                  Navi Domínguez III, MD  Department of Hospital Medicine   [facility] Surg

## 2022-04-15 LAB
ALBUMIN SERPL BCP-MCNC: 1.9 G/DL (ref 3.5–5.2)
ALP SERPL-CCNC: 53 U/L (ref 55–135)
ALT SERPL W/O P-5'-P-CCNC: 31 U/L (ref 10–44)
ANION GAP SERPL CALC-SCNC: 1 MMOL/L (ref 8–16)
AST SERPL-CCNC: 26 U/L (ref 10–40)
BASOPHILS # BLD AUTO: 0.05 K/UL (ref 0–0.2)
BASOPHILS NFR BLD: 0.5 % (ref 0–1.9)
BILIRUB SERPL-MCNC: 0.5 MG/DL (ref 0.1–1)
BUN SERPL-MCNC: 36 MG/DL (ref 8–23)
CALCIUM SERPL-MCNC: 9.3 MG/DL (ref 8.7–10.5)
CHLORIDE SERPL-SCNC: 104 MMOL/L (ref 95–110)
CO2 SERPL-SCNC: 35 MMOL/L (ref 23–29)
CREAT SERPL-MCNC: 1.6 MG/DL (ref 0.5–1.4)
DIFFERENTIAL METHOD: ABNORMAL
EOSINOPHIL # BLD AUTO: 0.2 K/UL (ref 0–0.5)
EOSINOPHIL NFR BLD: 2.1 % (ref 0–8)
ERYTHROCYTE [DISTWIDTH] IN BLOOD BY AUTOMATED COUNT: 17.2 % (ref 11.5–14.5)
EST. GFR  (AFRICAN AMERICAN): 46.7 ML/MIN/1.73 M^2
EST. GFR  (NON AFRICAN AMERICAN): 40.4 ML/MIN/1.73 M^2
GLUCOSE SERPL-MCNC: 105 MG/DL (ref 70–110)
HCT VFR BLD AUTO: 33.3 % (ref 40–54)
HGB BLD-MCNC: 10.3 G/DL (ref 14–18)
IMM GRANULOCYTES # BLD AUTO: 0.09 K/UL (ref 0–0.04)
IMM GRANULOCYTES NFR BLD AUTO: 0.8 % (ref 0–0.5)
LYMPHOCYTES # BLD AUTO: 1.4 K/UL (ref 1–4.8)
LYMPHOCYTES NFR BLD: 13 % (ref 18–48)
MAGNESIUM SERPL-MCNC: 2.2 MG/DL (ref 1.6–2.6)
MCH RBC QN AUTO: 29.3 PG (ref 27–31)
MCHC RBC AUTO-ENTMCNC: 30.9 G/DL (ref 32–36)
MCV RBC AUTO: 95 FL (ref 82–98)
MONOCYTES # BLD AUTO: 0.6 K/UL (ref 0.3–1)
MONOCYTES NFR BLD: 5.3 % (ref 4–15)
NEUTROPHILS # BLD AUTO: 8.6 K/UL (ref 1.8–7.7)
NEUTROPHILS NFR BLD: 78.3 % (ref 38–73)
NRBC BLD-RTO: 0 /100 WBC
PLATELET # BLD AUTO: 252 K/UL (ref 150–450)
PMV BLD AUTO: 10.3 FL (ref 9.2–12.9)
POTASSIUM SERPL-SCNC: 4 MMOL/L (ref 3.5–5.1)
PROT SERPL-MCNC: 6.8 G/DL (ref 6–8.4)
RBC # BLD AUTO: 3.52 M/UL (ref 4.6–6.2)
SODIUM SERPL-SCNC: 140 MMOL/L (ref 136–145)
WBC # BLD AUTO: 10.93 K/UL (ref 3.9–12.7)

## 2022-04-15 PROCEDURE — 80053 COMPREHEN METABOLIC PANEL: CPT | Performed by: INTERNAL MEDICINE

## 2022-04-15 PROCEDURE — 99900031 HC PATIENT EDUCATION (STAT)

## 2022-04-15 PROCEDURE — 27000221 HC OXYGEN, UP TO 24 HOURS

## 2022-04-15 PROCEDURE — 94761 N-INVAS EAR/PLS OXIMETRY MLT: CPT

## 2022-04-15 PROCEDURE — 97110 THERAPEUTIC EXERCISES: CPT

## 2022-04-15 PROCEDURE — 25000003 PHARM REV CODE 250: Performed by: INTERNAL MEDICINE

## 2022-04-15 PROCEDURE — 94640 AIRWAY INHALATION TREATMENT: CPT

## 2022-04-15 PROCEDURE — 85025 COMPLETE CBC W/AUTO DIFF WBC: CPT | Performed by: INTERNAL MEDICINE

## 2022-04-15 PROCEDURE — 83735 ASSAY OF MAGNESIUM: CPT | Performed by: INTERNAL MEDICINE

## 2022-04-15 PROCEDURE — 97116 GAIT TRAINING THERAPY: CPT | Mod: CQ

## 2022-04-15 PROCEDURE — 97530 THERAPEUTIC ACTIVITIES: CPT

## 2022-04-15 PROCEDURE — 11000001 HC ACUTE MED/SURG PRIVATE ROOM

## 2022-04-15 PROCEDURE — 63600175 PHARM REV CODE 636 W HCPCS: Performed by: INTERNAL MEDICINE

## 2022-04-15 PROCEDURE — 99900035 HC TECH TIME PER 15 MIN (STAT)

## 2022-04-15 PROCEDURE — 25000003 PHARM REV CODE 250: Performed by: FAMILY MEDICINE

## 2022-04-15 PROCEDURE — 25000242 PHARM REV CODE 250 ALT 637 W/ HCPCS: Performed by: INTERNAL MEDICINE

## 2022-04-15 PROCEDURE — 36415 COLL VENOUS BLD VENIPUNCTURE: CPT | Performed by: INTERNAL MEDICINE

## 2022-04-15 RX ORDER — IPRATROPIUM BROMIDE AND ALBUTEROL SULFATE 2.5; .5 MG/3ML; MG/3ML
3 SOLUTION RESPIRATORY (INHALATION) EVERY 6 HOURS PRN
Status: CANCELLED | OUTPATIENT
Start: 2022-04-15

## 2022-04-15 RX ADMIN — ACETYLCYSTEINE 4 ML: 200 SOLUTION ORAL; RESPIRATORY (INHALATION) at 07:04

## 2022-04-15 RX ADMIN — POTASSIUM BICARBONATE 40 MEQ: 391 TABLET, EFFERVESCENT ORAL at 08:04

## 2022-04-15 RX ADMIN — IPRATROPIUM BROMIDE AND ALBUTEROL SULFATE 3 ML: 2.5; .5 SOLUTION RESPIRATORY (INHALATION) at 04:04

## 2022-04-15 RX ADMIN — OXCARBAZEPINE 300 MG: 300 TABLET, FILM COATED ORAL at 08:04

## 2022-04-15 RX ADMIN — IPRATROPIUM BROMIDE AND ALBUTEROL SULFATE 3 ML: 2.5; .5 SOLUTION RESPIRATORY (INHALATION) at 11:04

## 2022-04-15 RX ADMIN — EZETIMIBE 10 MG: 10 TABLET ORAL at 08:04

## 2022-04-15 RX ADMIN — METOPROLOL TARTRATE 25 MG: 25 TABLET, FILM COATED ORAL at 08:04

## 2022-04-15 RX ADMIN — ACETYLCYSTEINE 4 ML: 200 SOLUTION ORAL; RESPIRATORY (INHALATION) at 08:04

## 2022-04-15 RX ADMIN — PIPERACILLIN AND TAZOBACTAM 4.5 G: 4; .5 INJECTION, POWDER, LYOPHILIZED, FOR SOLUTION INTRAVENOUS; PARENTERAL at 08:04

## 2022-04-15 RX ADMIN — PIPERACILLIN AND TAZOBACTAM 4.5 G: 4; .5 INJECTION, POWDER, LYOPHILIZED, FOR SOLUTION INTRAVENOUS; PARENTERAL at 11:04

## 2022-04-15 RX ADMIN — GUAIFENESIN 1200 MG: 600 TABLET, EXTENDED RELEASE ORAL at 08:04

## 2022-04-15 RX ADMIN — DOCUSATE SODIUM 100 MG: 100 CAPSULE, LIQUID FILLED ORAL at 08:04

## 2022-04-15 RX ADMIN — IPRATROPIUM BROMIDE AND ALBUTEROL SULFATE 3 ML: 2.5; .5 SOLUTION RESPIRATORY (INHALATION) at 07:04

## 2022-04-15 RX ADMIN — ENOXAPARIN SODIUM 40 MG: 40 INJECTION SUBCUTANEOUS at 04:04

## 2022-04-15 RX ADMIN — MUPIROCIN: 20 OINTMENT TOPICAL at 08:04

## 2022-04-15 RX ADMIN — IPRATROPIUM BROMIDE AND ALBUTEROL SULFATE 3 ML: 2.5; .5 SOLUTION RESPIRATORY (INHALATION) at 08:04

## 2022-04-15 RX ADMIN — TORSEMIDE 100 MG: 20 TABLET ORAL at 08:04

## 2022-04-15 RX ADMIN — ASPIRIN 81 MG: 81 TABLET, COATED ORAL at 08:04

## 2022-04-15 RX ADMIN — ATORVASTATIN CALCIUM 20 MG: 20 TABLET, FILM COATED ORAL at 08:04

## 2022-04-15 RX ADMIN — PIPERACILLIN AND TAZOBACTAM 4.5 G: 4; .5 INJECTION, POWDER, LYOPHILIZED, FOR SOLUTION INTRAVENOUS; PARENTERAL at 02:04

## 2022-04-15 NOTE — SUBJECTIVE & OBJECTIVE
Interval History: no acute overnight events, diuresed well    Review of Systems   Constitutional:  Positive for fatigue.   HENT:  Positive for congestion.    Respiratory:  Positive for cough and shortness of breath.    Endocrine: Positive for cold intolerance.   Musculoskeletal:  Positive for myalgias.   Allergic/Immunologic: Negative.    Hematological: Negative.    Objective:     Vital Signs (Most Recent):  Temp: 98.6 °F (37 °C) (04/15/22 1233)  Pulse: 95 (04/15/22 1233)  Resp: 18 (04/15/22 1233)  BP: 127/61 (04/15/22 1233)  SpO2: (!) 92 % (04/15/22 1233)   Vital Signs (24h Range):  Temp:  [97.9 °F (36.6 °C)-98.6 °F (37 °C)] 98.6 °F (37 °C)  Pulse:  [91-98] 95  Resp:  [18-22] 18  SpO2:  [92 %-96 %] 92 %  BP: (127-169)/() 127/61     Weight: 103 kg (227 lb 1.2 oz)  Body mass index is 31.67 kg/m².    Intake/Output Summary (Last 24 hours) at 4/15/2022 1337  Last data filed at 4/15/2022 0600  Gross per 24 hour   Intake 1920 ml   Output 3450 ml   Net -1530 ml      Physical Exam  HENT:      Mouth/Throat:      Mouth: Mucous membranes are dry.   Eyes:      Extraocular Movements: Extraocular movements intact.      Pupils: Pupils are equal, round, and reactive to light.   Pulmonary:      Breath sounds: Rhonchi present.   Skin:     General: Skin is warm and dry.   Neurological:      Mental Status: He is alert.       Significant Labs: All pertinent labs within the past 24 hours have been reviewed.  CBC:   Recent Labs   Lab 04/14/22  0546 04/15/22  0436   WBC 8.97 10.93   HGB 9.8* 10.3*   HCT 32.5* 33.3*    252     CMP:   Recent Labs   Lab 04/14/22  0546 04/15/22  0436    140   K 3.9 4.0    104   CO2 35* 35*   GLU 85 105   BUN 37* 36*   CREATININE 1.6* 1.6*   CALCIUM 9.1 9.3   PROT 6.5 6.8   ALBUMIN 1.8* 1.9*   BILITOT 0.4 0.5   ALKPHOS 39* 53*   AST 22 26   ALT 25 31   ANIONGAP 2* 1*   EGFRNONAA 40.4* 40.4*       Significant Imaging: I have reviewed all pertinent imaging results/findings within the past  24 hours.

## 2022-04-15 NOTE — PT/OT/SLP PROGRESS
Physical Therapy Treatment    Patient Name:  Jamey Lei   MRN:  [id number]    Time Tracking:     PT Start Time: 1217     PT Stop Time: 1230  PT Total Time (min): 13 min     Billable Minutes: Gait Training 13  Natalie Gudino, MIRELLA  04/15/2022      Subjective     Patient/Family Comments/goals: pt agreeable to therapy.  Pain/Comfort: none noted or reported      Objective:     General Precautions: Standard,       Communicated with OT, PT prior to session.  Patient found with bed in chair position upon PT entry to room.     Functional Mobility:  ·    · Scooting: stand by assistance  · Supine to Sit: stand by assistance  · Sit to Supine: minimum assistance  ·    · Sit to Stand:  minimum assistance with rolling walker     Therapeutic Activities and Exercises:   Gt x 15' forward/backward at bedside & 4 steps laterally w/ MIN A cueing for posture and increasing proximity of RW. Pt fatigued and assisted back to seated in bed position.     Patient left with bed in chair position with all lines intact, call button in reach and lunch tray in front..      Assessment:     Jamey Lei is a 81 y.o. male admitted with a medical diagnosis of Pneumonia. Very cooperative and pleasant. Improved gait tolerance today.       Plan:     During this hospitalization, patient to be seen   to address the identified rehab impairments gait training, therapeutic activities, therapeutic exercises and progress toward the goals.      Recommendations:     Cont to progress as tolerated.

## 2022-04-15 NOTE — PROGRESS NOTES
[facility] Medicine  Progress Note    Patient Name: Jamey Lei  MRN: [id number]  Patient Class: IP- Inpatient   Admission Date: 4/10/2022  Length of Stay: 5 days  Attending Physician: Navi Domínguez III, MD  Primary Care Provider: Navi Domínguez III, MD        Subjective:     Principal Problem:Pneumonia        HPI:  Patient is a 81-year-old male with a history of multiple comorbidities including a right-sided lung mass that was biopsied recently within the last few weeks and was negative for malignancy.  The mass is spiculated and enlarging and is very concerning for malignancy.  The patient has had a deterioration in his condition over the last few months with several prolonged hospitalizations.  The patient has type 2 diabetes chronic kidney disease morbid obesity peripheral neuropathy and chronic lower back pain requiring chronic opiate therapy.  The patient is requiring more and more care from caregivers and family to assist him in his ADLs.  Patient reportedly over the last few days has not been active in self stopped taking his medicines and began having seizures.  The patient has a history of a meningioma and seizure disorder.  This morning the patient is snoring he is resting he was given doses of IV Ativan for his seizure last night in the emergency department.  I have spoken with the patient's family and I am recommending do not resuscitate while we care for the patient's medical issues.  They will have a family meeting and discuss.      Overview/Hospital Course:  4/12/2022 FM:  Patient is awake and alert and family at the bedside.  Family states when he takes his antiepileptic he becomes very groggy and sedated.  We are going to try a lower dose while he is here and we are treating him for pneumonia.  Otherwise the patient is eating his breakfast and seems to have a good appetite and eating well will start therapy today.  4/13/2022 FM:  Patient ate 100% of breakfast this morning.   Patient's family is at bedside.  We discuss code status and the patient does not want mechanical ventilation and intubation so we will place partial code status on the chart.  Patient otherwise is having increasing coughing and rattling in the chest today.  He did attempt to get out of bed yesterday and is severely debilitated and weak.  The family is asking about inpatient rehab and I have informed them that we need to see effort and some improvements over the next day or so to ensure that the prognosis is fair to good.  4/14/2022 FM:  Patient's having audible rhonchi today.  Patient was up out of bed yesterday and sat in a chair until 2:00 a.m..  We are attempting inpatient rehab therapy.  I am adding nebulized treatments today and Mucomyst.  4/15/2022 CG: Patient is still having some shortness of breath with rhonchi. He says the breathing treatments have helped a little bit.       Interval History: no acute overnight events, diuresed well    Review of Systems   Constitutional:  Positive for fatigue.   HENT:  Positive for congestion.    Respiratory:  Positive for cough and shortness of breath.    Endocrine: Positive for cold intolerance.   Musculoskeletal:  Positive for myalgias.   Allergic/Immunologic: Negative.    Hematological: Negative.    Objective:     Vital Signs (Most Recent):  Temp: 98.6 °F (37 °C) (04/15/22 1233)  Pulse: 95 (04/15/22 1233)  Resp: 18 (04/15/22 1233)  BP: 127/61 (04/15/22 1233)  SpO2: (!) 92 % (04/15/22 1233)   Vital Signs (24h Range):  Temp:  [97.9 °F (36.6 °C)-98.6 °F (37 °C)] 98.6 °F (37 °C)  Pulse:  [91-98] 95  Resp:  [18-22] 18  SpO2:  [92 %-96 %] 92 %  BP: (127-169)/() 127/61     Weight: 103 kg (227 lb 1.2 oz)  Body mass index is 31.67 kg/m².    Intake/Output Summary (Last 24 hours) at 4/15/2022 1337  Last data filed at 4/15/2022 0600  Gross per 24 hour   Intake 1920 ml   Output 3450 ml   Net -1530 ml      Physical Exam  HENT:      Mouth/Throat:      Mouth: Mucous membranes are  dry.   Eyes:      Extraocular Movements: Extraocular movements intact.      Pupils: Pupils are equal, round, and reactive to light.   Pulmonary:      Breath sounds: Rhonchi present.   Skin:     General: Skin is warm and dry.   Neurological:      Mental Status: He is alert.       Significant Labs: All pertinent labs within the past 24 hours have been reviewed.  CBC:   Recent Labs   Lab 04/14/22  0546 04/15/22  0436   WBC 8.97 10.93   HGB 9.8* 10.3*   HCT 32.5* 33.3*    252     CMP:   Recent Labs   Lab 04/14/22  0546 04/15/22  0436    140   K 3.9 4.0    104   CO2 35* 35*   GLU 85 105   BUN 37* 36*   CREATININE 1.6* 1.6*   CALCIUM 9.1 9.3   PROT 6.5 6.8   ALBUMIN 1.8* 1.9*   BILITOT 0.4 0.5   ALKPHOS 39* 53*   AST 22 26   ALT 25 31   ANIONGAP 2* 1*   EGFRNONAA 40.4* 40.4*       Significant Imaging: I have reviewed all pertinent imaging results/findings within the past 24 hours.      Assessment/Plan:      * Pneumonia  Improved, continue current medical regimen      Encephalopathy, toxic  Improved.    Morbid obesity  Body mass index is 31.67 kg/m². Morbid obesity complicates all aspects of disease management from diagnostic modalities to treatment. Weight loss encouraged and health benefits explained to patient.         Neuropathy involving both lower extremities        CKD (chronic kidney disease), stage IV  Cr improved.    Lung mass  Strong suspect for ca, initial biopsy was negative.      Seizure disorder  Try lower dose of tileptal.  More alert, will consult IPR.      Meningioma        Hypokalemia  Improved      Chronic osteoarthritis  Cont. Treatement, PT/OT.        VTE Risk Mitigation (From admission, onward)         Ordered     enoxaparin injection 40 mg  Daily         04/11/22 0914     IP VTE HIGH RISK PATIENT  Once         04/11/22 0026     Place sequential compression device  Until discontinued         04/11/22 0026                Discharge Planning   GLORY:      Code Status: Partial Code   Is  the patient medically ready for discharge?:     Reason for patient still in hospital (select all that apply): Treatment  Discharge Plan A: Rehab   Discharge Delays: None known at this time              Eddie Newton DO  Department of Hospital Medicine   [facility]

## 2022-04-15 NOTE — PT/OT/SLP PROGRESS
Occupational Therapy   Treatment    Name: Jamey Lei  MRN: [id number]  Admitting Diagnosis:  Pneumonia       Recommendations:     Discharge Recommendations: rehabilitation facility  Discharge Equipment Recommendations:   (TBD)  Barriers to discharge:    medical status, functional status    Assessment:     Jamey Lei is a 81 y.o. male with a medical diagnosis of Pneumonia. Performance deficits affecting function are weakness, impaired endurance, impaired self care skills, impaired functional mobilty, gait instability, impaired balance, impaired cognition, decreased coordination, decreased safety awareness, impaired skin, edema, impaired cardiopulmonary response to activity.      Rehab Prognosis:  Good; patient would benefit from acute skilled OT services to address these deficits and reach maximum level of function.       Plan:     Patient to be seen 5 x/week to address the above listed problems via self-care/home management, therapeutic activities, therapeutic exercises, neuromuscular re-education  · Plan of Care Expires: 04/22/22  · Plan of Care Reviewed with: patient, caregiver, son    Subjective     Pain/Comfort:  · Pain Rating 1: 0/10    Objective:     Communicated with: nursing prior to session.  Patient found up in chair with telemetry, pulse ox (continuous), peripheral IV, oxygen, espinal catheter , son present upon OT entry to room.    General Precautions: Standard, fall   Orthopedic Precautions:N/A   Braces:        AMPAC 6 Click ADL: 13    Treatment & Education:  Pt presents sitting up in chair with son present and is agreeable to tx. Pt with good motivation and participation. Pt facilitated in core strengthening activity to improve core strength required for trunk mobility and maintaining balance during ADL tasks. Pt trained in HEP to include BUE AROM all joints through all planes 3x10 reps to improve strength and endurance required for performance of ADLs and use of UE during t/f and mobility tasks.  Pt also facilitated in static and dynamic sitting and standing balance tasks with RW in standing to improve tolerance for upright position and improve core strength, endurance, and balance for functional reaching through all planes. Pt tolerated all tasks well, but does require multiple therapeutic rest breaks for recovery from activity due to SOB.     Patient left up in chair with all lines intact, call button in reach and son presentEducation:      GOALS:   Multidisciplinary Problems     Occupational Therapy Goals        Problem: Occupational Therapy    Goal Priority Disciplines Outcome Interventions   Occupational Therapy Goal     OT, PT/OT     Description: Patient will increase functional independence with ADLs by performing:    Grooming while seated with Supervision.  Toileting from bedside commode with Supervision for hygiene and clothing management.   Sitting at edge of bed x15 minutes with Supervision.  Supine to sit with Contact Guard Assistance.  Toilet transfer to bedside commode with Contact Guard Assistance.                     Time Tracking:     OT Date of Treatment: 04/15/22  OT Start Time: 0900  OT Stop Time: 0930  OT Total Time (min): 30 min    Billable Minutes:Therapeutic Activity 10  Therapeutic Exercise 20               4/15/2022

## 2022-04-16 LAB
ALBUMIN SERPL BCP-MCNC: 2 G/DL (ref 3.5–5.2)
ALP SERPL-CCNC: 48 U/L (ref 55–135)
ALT SERPL W/O P-5'-P-CCNC: 25 U/L (ref 10–44)
ANION GAP SERPL CALC-SCNC: 5 MMOL/L (ref 8–16)
AST SERPL-CCNC: 23 U/L (ref 10–40)
BACTERIA BLD CULT: NORMAL
BACTERIA BLD CULT: NORMAL
BASOPHILS # BLD AUTO: 0.06 K/UL (ref 0–0.2)
BASOPHILS NFR BLD: 0.6 % (ref 0–1.9)
BILIRUB SERPL-MCNC: 0.6 MG/DL (ref 0.1–1)
BUN SERPL-MCNC: 32 MG/DL (ref 8–23)
CALCIUM SERPL-MCNC: 9.7 MG/DL (ref 8.7–10.5)
CHLORIDE SERPL-SCNC: 104 MMOL/L (ref 95–110)
CO2 SERPL-SCNC: 34 MMOL/L (ref 23–29)
CREAT SERPL-MCNC: 1.5 MG/DL (ref 0.5–1.4)
DIFFERENTIAL METHOD: ABNORMAL
EOSINOPHIL # BLD AUTO: 0.2 K/UL (ref 0–0.5)
EOSINOPHIL NFR BLD: 2 % (ref 0–8)
ERYTHROCYTE [DISTWIDTH] IN BLOOD BY AUTOMATED COUNT: 17.3 % (ref 11.5–14.5)
EST. GFR  (AFRICAN AMERICAN): 50.5 ML/MIN/1.73 M^2
EST. GFR  (NON AFRICAN AMERICAN): 43.7 ML/MIN/1.73 M^2
GLUCOSE SERPL-MCNC: 98 MG/DL (ref 70–110)
HCT VFR BLD AUTO: 34.8 % (ref 40–54)
HGB BLD-MCNC: 10.8 G/DL (ref 14–18)
IMM GRANULOCYTES # BLD AUTO: 0.08 K/UL (ref 0–0.04)
IMM GRANULOCYTES NFR BLD AUTO: 0.8 % (ref 0–0.5)
LYMPHOCYTES # BLD AUTO: 1.2 K/UL (ref 1–4.8)
LYMPHOCYTES NFR BLD: 12.9 % (ref 18–48)
MAGNESIUM SERPL-MCNC: 2.3 MG/DL (ref 1.6–2.6)
MCH RBC QN AUTO: 29 PG (ref 27–31)
MCHC RBC AUTO-ENTMCNC: 31 G/DL (ref 32–36)
MCV RBC AUTO: 94 FL (ref 82–98)
MONOCYTES # BLD AUTO: 0.6 K/UL (ref 0.3–1)
MONOCYTES NFR BLD: 6.7 % (ref 4–15)
NEUTROPHILS # BLD AUTO: 7.3 K/UL (ref 1.8–7.7)
NEUTROPHILS NFR BLD: 77 % (ref 38–73)
NRBC BLD-RTO: 0 /100 WBC
PLATELET # BLD AUTO: 262 K/UL (ref 150–450)
PMV BLD AUTO: 10.4 FL (ref 9.2–12.9)
POTASSIUM SERPL-SCNC: 3.7 MMOL/L (ref 3.5–5.1)
PROT SERPL-MCNC: 7.2 G/DL (ref 6–8.4)
RBC # BLD AUTO: 3.72 M/UL (ref 4.6–6.2)
SODIUM SERPL-SCNC: 143 MMOL/L (ref 136–145)
WBC # BLD AUTO: 9.47 K/UL (ref 3.9–12.7)

## 2022-04-16 PROCEDURE — 27000221 HC OXYGEN, UP TO 24 HOURS

## 2022-04-16 PROCEDURE — 83735 ASSAY OF MAGNESIUM: CPT | Performed by: INTERNAL MEDICINE

## 2022-04-16 PROCEDURE — 85025 COMPLETE CBC W/AUTO DIFF WBC: CPT | Performed by: INTERNAL MEDICINE

## 2022-04-16 PROCEDURE — 80053 COMPREHEN METABOLIC PANEL: CPT | Performed by: INTERNAL MEDICINE

## 2022-04-16 PROCEDURE — 94761 N-INVAS EAR/PLS OXIMETRY MLT: CPT

## 2022-04-16 PROCEDURE — 94760 N-INVAS EAR/PLS OXIMETRY 1: CPT

## 2022-04-16 PROCEDURE — 99900035 HC TECH TIME PER 15 MIN (STAT)

## 2022-04-16 PROCEDURE — 11000001 HC ACUTE MED/SURG PRIVATE ROOM

## 2022-04-16 PROCEDURE — 25000003 PHARM REV CODE 250: Performed by: INTERNAL MEDICINE

## 2022-04-16 PROCEDURE — 97530 THERAPEUTIC ACTIVITIES: CPT | Mod: CO

## 2022-04-16 PROCEDURE — 63600175 PHARM REV CODE 636 W HCPCS: Performed by: INTERNAL MEDICINE

## 2022-04-16 PROCEDURE — 25000003 PHARM REV CODE 250: Performed by: FAMILY MEDICINE

## 2022-04-16 PROCEDURE — 25000242 PHARM REV CODE 250 ALT 637 W/ HCPCS: Performed by: INTERNAL MEDICINE

## 2022-04-16 PROCEDURE — 99900031 HC PATIENT EDUCATION (STAT)

## 2022-04-16 PROCEDURE — 97116 GAIT TRAINING THERAPY: CPT | Mod: CQ

## 2022-04-16 PROCEDURE — 94640 AIRWAY INHALATION TREATMENT: CPT

## 2022-04-16 PROCEDURE — 36415 COLL VENOUS BLD VENIPUNCTURE: CPT | Performed by: INTERNAL MEDICINE

## 2022-04-16 RX ADMIN — IPRATROPIUM BROMIDE AND ALBUTEROL SULFATE 3 ML: 2.5; .5 SOLUTION RESPIRATORY (INHALATION) at 08:04

## 2022-04-16 RX ADMIN — DOCUSATE SODIUM 100 MG: 100 CAPSULE, LIQUID FILLED ORAL at 09:04

## 2022-04-16 RX ADMIN — METOPROLOL TARTRATE 25 MG: 25 TABLET, FILM COATED ORAL at 08:04

## 2022-04-16 RX ADMIN — TORSEMIDE 100 MG: 20 TABLET ORAL at 09:04

## 2022-04-16 RX ADMIN — POTASSIUM BICARBONATE 40 MEQ: 391 TABLET, EFFERVESCENT ORAL at 09:04

## 2022-04-16 RX ADMIN — IPRATROPIUM BROMIDE AND ALBUTEROL SULFATE 3 ML: 2.5; .5 SOLUTION RESPIRATORY (INHALATION) at 12:04

## 2022-04-16 RX ADMIN — PIPERACILLIN AND TAZOBACTAM 4.5 G: 4; .5 INJECTION, POWDER, LYOPHILIZED, FOR SOLUTION INTRAVENOUS; PARENTERAL at 04:04

## 2022-04-16 RX ADMIN — ACETYLCYSTEINE 4 ML: 200 SOLUTION ORAL; RESPIRATORY (INHALATION) at 07:04

## 2022-04-16 RX ADMIN — EZETIMIBE 10 MG: 10 TABLET ORAL at 09:04

## 2022-04-16 RX ADMIN — ACETYLCYSTEINE 4 ML: 200 SOLUTION ORAL; RESPIRATORY (INHALATION) at 08:04

## 2022-04-16 RX ADMIN — OXCARBAZEPINE 300 MG: 300 TABLET, FILM COATED ORAL at 08:04

## 2022-04-16 RX ADMIN — OXCARBAZEPINE 300 MG: 300 TABLET, FILM COATED ORAL at 09:04

## 2022-04-16 RX ADMIN — GUAIFENESIN 1200 MG: 600 TABLET, EXTENDED RELEASE ORAL at 09:04

## 2022-04-16 RX ADMIN — IPRATROPIUM BROMIDE AND ALBUTEROL SULFATE 3 ML: 2.5; .5 SOLUTION RESPIRATORY (INHALATION) at 04:04

## 2022-04-16 RX ADMIN — DOCUSATE SODIUM 100 MG: 100 CAPSULE, LIQUID FILLED ORAL at 08:04

## 2022-04-16 RX ADMIN — ASPIRIN 81 MG: 81 TABLET, COATED ORAL at 09:04

## 2022-04-16 RX ADMIN — GUAIFENESIN 1200 MG: 600 TABLET, EXTENDED RELEASE ORAL at 08:04

## 2022-04-16 RX ADMIN — ENOXAPARIN SODIUM 40 MG: 40 INJECTION SUBCUTANEOUS at 04:04

## 2022-04-16 RX ADMIN — MUPIROCIN: 20 OINTMENT TOPICAL at 09:04

## 2022-04-16 RX ADMIN — IPRATROPIUM BROMIDE AND ALBUTEROL SULFATE 3 ML: 2.5; .5 SOLUTION RESPIRATORY (INHALATION) at 07:04

## 2022-04-16 RX ADMIN — MUPIROCIN: 20 OINTMENT TOPICAL at 08:04

## 2022-04-16 RX ADMIN — METOPROLOL TARTRATE 25 MG: 25 TABLET, FILM COATED ORAL at 09:04

## 2022-04-16 RX ADMIN — PIPERACILLIN AND TAZOBACTAM 4.5 G: 4; .5 INJECTION, POWDER, LYOPHILIZED, FOR SOLUTION INTRAVENOUS; PARENTERAL at 07:04

## 2022-04-16 RX ADMIN — ATORVASTATIN CALCIUM 20 MG: 20 TABLET, FILM COATED ORAL at 09:04

## 2022-04-16 NOTE — SUBJECTIVE & OBJECTIVE
Interval History: no acute overnight events, diuresed well    Review of Systems   Constitutional:  Positive for fatigue.   HENT:  Positive for congestion.    Respiratory:  Positive for cough and shortness of breath.    Endocrine: Positive for cold intolerance.   Musculoskeletal:  Positive for myalgias.   Allergic/Immunologic: Negative.    Hematological: Negative.    Objective:     Vital Signs (Most Recent):  Temp: 97.5 °F (36.4 °C) (04/16/22 1119)  Pulse: 80 (04/16/22 1245)  Resp: 20 (04/16/22 1245)  BP: (!) 126/58 (04/16/22 1119)  SpO2: 95 % (04/16/22 1245)   Vital Signs (24h Range):  Temp:  [97.5 °F (36.4 °C)-98.6 °F (37 °C)] 97.5 °F (36.4 °C)  Pulse:  [] 80  Resp:  [18-22] 20  SpO2:  [90 %-99 %] 95 %  BP: (126-169)/(58-88) 126/58     Weight: 103 kg (227 lb 1.2 oz)  Body mass index is 31.67 kg/m².    Intake/Output Summary (Last 24 hours) at 4/16/2022 1414  Last data filed at 4/16/2022 0600  Gross per 24 hour   Intake 1120 ml   Output 3700 ml   Net -2580 ml        Physical Exam  HENT:      Mouth/Throat:      Mouth: Mucous membranes are dry.   Eyes:      Extraocular Movements: Extraocular movements intact.      Pupils: Pupils are equal, round, and reactive to light.   Pulmonary:      Breath sounds: Rhonchi present.   Skin:     General: Skin is warm and dry.   Neurological:      Mental Status: He is alert.       Significant Labs: All pertinent labs within the past 24 hours have been reviewed.  CBC:   Recent Labs   Lab 04/15/22  0436 04/16/22  0414   WBC 10.93 9.47   HGB 10.3* 10.8*   HCT 33.3* 34.8*    262       CMP:   Recent Labs   Lab 04/15/22  0436 04/16/22  0414    143   K 4.0 3.7    104   CO2 35* 34*    98   BUN 36* 32*   CREATININE 1.6* 1.5*   CALCIUM 9.3 9.7   PROT 6.8 7.2   ALBUMIN 1.9* 2.0*   BILITOT 0.5 0.6   ALKPHOS 53* 48*   AST 26 23   ALT 31 25   ANIONGAP 1* 5*   EGFRNONAA 40.4* 43.7*         Significant Imaging: I have reviewed all pertinent imaging results/findings  within the past 24 hours.

## 2022-04-16 NOTE — PT/OT/SLP PROGRESS
Occupational Therapy   Treatment    Name: Jamey Lei  MRN: [id number]  Admitting Diagnosis:  Pneumonia       Recommendations:     Discharge Recommendations: rehabilitation facility  Discharge Equipment Recommendations:   (TBD)  Barriers to discharge:   (Further medical care required)    Assessment:     Jamey Lei is a 81 y.o. male with a medical diagnosis of Pneumonia. Performance deficits affecting function are weakness, impaired endurance, impaired self care skills, impaired functional mobilty, gait instability, impaired cardiopulmonary response to activity, impaired balance, decreased safety awareness.     Rehab Prognosis:  Good; patient would benefit from acute skilled OT services to address these deficits and reach maximum level of function.       Plan:     Patient to be seen 5 x/week to address the above listed problems via self-care/home management, community/work re-entry, therapeutic activities, therapeutic exercises, neuromuscular re-education  · Plan of Care Expires: 04/22/22  · Plan of Care Reviewed with: patient    Subjective     Pain/Comfort:  · Pain Rating 1: 0/10  · Pain Rating Post-Intervention 1: 0/10    Objective:     Communicated with: Nurse, PTA prior to session.  Patient found up in chair with peripheral IV, espinal catheter, oxygen, pulse ox (continuous), telemetry upon OT entry to room.    General Precautions: Standard, fall   Orthopedic Precautions:N/A   Braces: N/A  Respiratory Status: Nasal cannula     Occupational Performance:     Functional Mobility/Transfers:  · Patient completed Sit <> Stand Transfer with contact guard assistance  with  rolling walker   · Functional Mobility: Performed mobility with PTA, see PTA note for assistance level and tolerance    Treatment & Education:  Pt up in chair and agreeable to treatment this day. Pt performed sit to stand and performed functional mobility required verbal cueing and consistent monitoring throughout by GONZALEZ to ensure safety and  decrease balance deficits. Pt required therapeutic rest break between trials due to fatigue and shortness of breath. Pt return to sitting in chair at end of treatment and reports that he is comfortable and requesting to stay up. Pt left with call light in reach and educated to utilize when assistance is needed. Cont POC as tolerated by patient at next treatment session.    Patient left up in chair with all lines intact, call button in reach and nurse notifiedEducation:      GOALS:   Multidisciplinary Problems     Occupational Therapy Goals        Problem: Occupational Therapy    Goal Priority Disciplines Outcome Interventions   Occupational Therapy Goal     OT, PT/OT Ongoing, Progressing    Description: Patient will increase functional independence with ADLs by performing:    Grooming while seated with Supervision.  Toileting from bedside commode with Supervision for hygiene and clothing management.   Sitting at edge of bed x15 minutes with Supervision.  Supine to sit with Contact Guard Assistance.  Toilet transfer to bedside commode with Contact Guard Assistance.                     Time Tracking:     OT Date of Treatment: 04/16/22  OT Start Time: 0855  OT Stop Time: 0920  OT Total Time (min): 25 min    Billable Minutes:Therapeutic Activity 25    OT/DENISHA: DENIHSA          4/16/2022

## 2022-04-16 NOTE — PT/OT/SLP PROGRESS
Physical Therapy Treatment    Patient Name:  Jamey Lei   MRN:  [id number]    Time Tracking:     PT Start Time: 0855     PT Stop Time: 0920  PT Total Time (min): 25 min     Billable Minutes: Gait Training 25  Nataliejarret Gudino, MIRELLA  04/16/2022      Subjective     Patient/Family Comments/goals: pt agreeable to therapy     Pain/Comfort: none reported      Objective:     General Precautions: Standard,     Orthopedic Precautions:  [place]    Communicated with PT prior to session.  Patient found seated up in recliner upon PT entry to room.     Functional Mobility:  ·    · Sit to Stand:  stand by assistance with rolling walker     Therapeutic Activities and Exercises:   gait training while monitoring SOB and taking breaks as needed. Gt included 15'x2, walking forward and backward.     Patient left up in chair with call button in reach.      Assessment:     Jamey Lei is a 81 y.o. male admitted with a medical diagnosis of Pneumonia. Increased gait tolerance w/ close SBA and monitoring of symptoms.       Plan:     During this hospitalization, patient to be seen   to address the identified rehab impairments gait training, therapeutic activities, therapeutic exercises and progress toward the goals.      Recommendations:     Cont to progress as tolerated.

## 2022-04-16 NOTE — PROGRESS NOTES
[facility] Medicine  Progress Note    Patient Name: Jamey Lei  MRN: [id number]  Patient Class: IP- Inpatient   Admission Date: 4/10/2022  Length of Stay: 6 days  Attending Physician: Navi Domínguez III, MD  Primary Care Provider: Navi Domínguez III, MD        Subjective:     Principal Problem:Pneumonia        HPI:  Patient is a 81-year-old male with a history of multiple comorbidities including a right-sided lung mass that was biopsied recently within the last few weeks and was negative for malignancy.  The mass is spiculated and enlarging and is very concerning for malignancy.  The patient has had a deterioration in his condition over the last few months with several prolonged hospitalizations.  The patient has type 2 diabetes chronic kidney disease morbid obesity peripheral neuropathy and chronic lower back pain requiring chronic opiate therapy.  The patient is requiring more and more care from caregivers and family to assist him in his ADLs.  Patient reportedly over the last few days has not been active in self stopped taking his medicines and began having seizures.  The patient has a history of a meningioma and seizure disorder.  This morning the patient is snoring he is resting he was given doses of IV Ativan for his seizure last night in the emergency department.  I have spoken with the patient's family and I am recommending do not resuscitate while we care for the patient's medical issues.  They will have a family meeting and discuss.      Overview/Hospital Course:  4/12/2022 FM:  Patient is awake and alert and family at the bedside.  Family states when he takes his antiepileptic he becomes very groggy and sedated.  We are going to try a lower dose while he is here and we are treating him for pneumonia.  Otherwise the patient is eating his breakfast and seems to have a good appetite and eating well will start therapy today.  4/13/2022 FM:  Patient ate 100% of breakfast this morning.   Patient's family is at bedside.  We discuss code status and the patient does not want mechanical ventilation and intubation so we will place partial code status on the chart.  Patient otherwise is having increasing coughing and rattling in the chest today.  He did attempt to get out of bed yesterday and is severely debilitated and weak.  The family is asking about inpatient rehab and I have informed them that we need to see effort and some improvements over the next day or so to ensure that the prognosis is fair to good.  4/14/2022 FM:  Patient's having audible rhonchi today.  Patient was up out of bed yesterday and sat in a chair until 2:00 a.m..  We are attempting inpatient rehab therapy.  I am adding nebulized treatments today and Mucomyst.  4/15/2022 CG: Patient is still having some shortness of breath with rhonchi. He says the breathing treatments have helped a little bit.   4/16/2022 CG: Doing well, sitting up in bed and feels less short of breath      Interval History: no acute overnight events, diuresed well    Review of Systems   Constitutional:  Positive for fatigue.   HENT:  Positive for congestion.    Respiratory:  Positive for cough and shortness of breath.    Endocrine: Positive for cold intolerance.   Musculoskeletal:  Positive for myalgias.   Allergic/Immunologic: Negative.    Hematological: Negative.    Objective:     Vital Signs (Most Recent):  Temp: 97.5 °F (36.4 °C) (04/16/22 1119)  Pulse: 80 (04/16/22 1245)  Resp: 20 (04/16/22 1245)  BP: (!) 126/58 (04/16/22 1119)  SpO2: 95 % (04/16/22 1245)   Vital Signs (24h Range):  Temp:  [97.5 °F (36.4 °C)-98.6 °F (37 °C)] 97.5 °F (36.4 °C)  Pulse:  [] 80  Resp:  [18-22] 20  SpO2:  [90 %-99 %] 95 %  BP: (126-169)/(58-88) 126/58     Weight: 103 kg (227 lb 1.2 oz)  Body mass index is 31.67 kg/m².    Intake/Output Summary (Last 24 hours) at 4/16/2022 1414  Last data filed at 4/16/2022 0600  Gross per 24 hour   Intake 1120 ml   Output 3700 ml   Net -2587  ml        Physical Exam  HENT:      Mouth/Throat:      Mouth: Mucous membranes are dry.   Eyes:      Extraocular Movements: Extraocular movements intact.      Pupils: Pupils are equal, round, and reactive to light.   Pulmonary:      Breath sounds: Rhonchi present.   Skin:     General: Skin is warm and dry.   Neurological:      Mental Status: He is alert.       Significant Labs: All pertinent labs within the past 24 hours have been reviewed.  CBC:   Recent Labs   Lab 04/15/22  0436 04/16/22 0414   WBC 10.93 9.47   HGB 10.3* 10.8*   HCT 33.3* 34.8*    262       CMP:   Recent Labs   Lab 04/15/22  0436 04/16/22 0414    143   K 4.0 3.7    104   CO2 35* 34*    98   BUN 36* 32*   CREATININE 1.6* 1.5*   CALCIUM 9.3 9.7   PROT 6.8 7.2   ALBUMIN 1.9* 2.0*   BILITOT 0.5 0.6   ALKPHOS 53* 48*   AST 26 23   ALT 31 25   ANIONGAP 1* 5*   EGFRNONAA 40.4* 43.7*         Significant Imaging: I have reviewed all pertinent imaging results/findings within the past 24 hours.      Assessment/Plan:      * Pneumonia  Improved, continue current medical regimen      Encephalopathy, toxic  Improved.    Morbid obesity  Body mass index is 31.67 kg/m². Morbid obesity complicates all aspects of disease management from diagnostic modalities to treatment. Weight loss encouraged and health benefits explained to patient.         Neuropathy involving both lower extremities        CKD (chronic kidney disease), stage IV  Cr improved.    Lung mass  Strong suspect for ca, initial biopsy was negative.      Seizure disorder  Try lower dose of tileptal.  More alert, will consult IPR.      Meningioma        Hypokalemia  Improved      Chronic osteoarthritis  Cont. Treatement, PT/OT.        VTE Risk Mitigation (From admission, onward)         Ordered     enoxaparin injection 40 mg  Daily         04/11/22 0914     IP VTE HIGH RISK PATIENT  Once         04/11/22 0026     Place sequential compression device  Until discontinued          04/11/22 0026                Discharge Planning   GLORY:      Code Status: Partial Code   Is the patient medically ready for discharge?:     Reason for patient still in hospital (select all that apply): Treatment  Discharge Plan A: Rehab   Discharge Delays: None known at this time              Eddie Newton DO  Department of Hospital Medicine   [facility]

## 2022-04-17 LAB
ALBUMIN SERPL BCP-MCNC: 2.1 G/DL (ref 3.5–5.2)
ALP SERPL-CCNC: 42 U/L (ref 55–135)
ALT SERPL W/O P-5'-P-CCNC: 23 U/L (ref 10–44)
ANION GAP SERPL CALC-SCNC: 5 MMOL/L (ref 8–16)
AST SERPL-CCNC: 20 U/L (ref 10–40)
BASOPHILS # BLD AUTO: 0.05 K/UL (ref 0–0.2)
BASOPHILS NFR BLD: 0.6 % (ref 0–1.9)
BILIRUB SERPL-MCNC: 0.5 MG/DL (ref 0.1–1)
BUN SERPL-MCNC: 34 MG/DL (ref 8–23)
CALCIUM SERPL-MCNC: 9.8 MG/DL (ref 8.7–10.5)
CHLORIDE SERPL-SCNC: 105 MMOL/L (ref 95–110)
CO2 SERPL-SCNC: 34 MMOL/L (ref 23–29)
CREAT SERPL-MCNC: 1.9 MG/DL (ref 0.5–1.4)
DIFFERENTIAL METHOD: ABNORMAL
EOSINOPHIL # BLD AUTO: 0.2 K/UL (ref 0–0.5)
EOSINOPHIL NFR BLD: 1.8 % (ref 0–8)
ERYTHROCYTE [DISTWIDTH] IN BLOOD BY AUTOMATED COUNT: 17.9 % (ref 11.5–14.5)
EST. GFR  (AFRICAN AMERICAN): 37.9 ML/MIN/1.73 M^2
EST. GFR  (NON AFRICAN AMERICAN): 32.8 ML/MIN/1.73 M^2
GLUCOSE SERPL-MCNC: 91 MG/DL (ref 70–110)
HCT VFR BLD AUTO: 33.4 % (ref 40–54)
HGB BLD-MCNC: 10.3 G/DL (ref 14–18)
IMM GRANULOCYTES # BLD AUTO: 0.04 K/UL (ref 0–0.04)
IMM GRANULOCYTES NFR BLD AUTO: 0.5 % (ref 0–0.5)
LYMPHOCYTES # BLD AUTO: 1.3 K/UL (ref 1–4.8)
LYMPHOCYTES NFR BLD: 14.6 % (ref 18–48)
MAGNESIUM SERPL-MCNC: 2.5 MG/DL (ref 1.6–2.6)
MCH RBC QN AUTO: 29.2 PG (ref 27–31)
MCHC RBC AUTO-ENTMCNC: 30.8 G/DL (ref 32–36)
MCV RBC AUTO: 95 FL (ref 82–98)
MONOCYTES # BLD AUTO: 0.7 K/UL (ref 0.3–1)
MONOCYTES NFR BLD: 7.5 % (ref 4–15)
NEUTROPHILS # BLD AUTO: 6.5 K/UL (ref 1.8–7.7)
NEUTROPHILS NFR BLD: 75 % (ref 38–73)
NRBC BLD-RTO: 0 /100 WBC
PLATELET # BLD AUTO: 253 K/UL (ref 150–450)
PMV BLD AUTO: 10.5 FL (ref 9.2–12.9)
POTASSIUM SERPL-SCNC: 3.5 MMOL/L (ref 3.5–5.1)
PROT SERPL-MCNC: 7.2 G/DL (ref 6–8.4)
RBC # BLD AUTO: 3.53 M/UL (ref 4.6–6.2)
SODIUM SERPL-SCNC: 144 MMOL/L (ref 136–145)
WBC # BLD AUTO: 8.65 K/UL (ref 3.9–12.7)

## 2022-04-17 PROCEDURE — 25000242 PHARM REV CODE 250 ALT 637 W/ HCPCS: Performed by: INTERNAL MEDICINE

## 2022-04-17 PROCEDURE — 80053 COMPREHEN METABOLIC PANEL: CPT | Performed by: INTERNAL MEDICINE

## 2022-04-17 PROCEDURE — 27000221 HC OXYGEN, UP TO 24 HOURS

## 2022-04-17 PROCEDURE — 99900031 HC PATIENT EDUCATION (STAT)

## 2022-04-17 PROCEDURE — 83735 ASSAY OF MAGNESIUM: CPT | Performed by: INTERNAL MEDICINE

## 2022-04-17 PROCEDURE — 94761 N-INVAS EAR/PLS OXIMETRY MLT: CPT

## 2022-04-17 PROCEDURE — 25000003 PHARM REV CODE 250: Performed by: FAMILY MEDICINE

## 2022-04-17 PROCEDURE — 85025 COMPLETE CBC W/AUTO DIFF WBC: CPT | Performed by: INTERNAL MEDICINE

## 2022-04-17 PROCEDURE — 94640 AIRWAY INHALATION TREATMENT: CPT

## 2022-04-17 PROCEDURE — 25000003 PHARM REV CODE 250: Performed by: INTERNAL MEDICINE

## 2022-04-17 PROCEDURE — 99900035 HC TECH TIME PER 15 MIN (STAT)

## 2022-04-17 PROCEDURE — 63600175 PHARM REV CODE 636 W HCPCS: Performed by: INTERNAL MEDICINE

## 2022-04-17 PROCEDURE — 36415 COLL VENOUS BLD VENIPUNCTURE: CPT | Performed by: INTERNAL MEDICINE

## 2022-04-17 PROCEDURE — 11000001 HC ACUTE MED/SURG PRIVATE ROOM

## 2022-04-17 RX ADMIN — TORSEMIDE 100 MG: 20 TABLET ORAL at 08:04

## 2022-04-17 RX ADMIN — OXCARBAZEPINE 300 MG: 300 TABLET, FILM COATED ORAL at 08:04

## 2022-04-17 RX ADMIN — ACETYLCYSTEINE 4 ML: 200 SOLUTION ORAL; RESPIRATORY (INHALATION) at 08:04

## 2022-04-17 RX ADMIN — ASPIRIN 81 MG: 81 TABLET, COATED ORAL at 08:04

## 2022-04-17 RX ADMIN — DOCUSATE SODIUM 100 MG: 100 CAPSULE, LIQUID FILLED ORAL at 08:04

## 2022-04-17 RX ADMIN — IPRATROPIUM BROMIDE AND ALBUTEROL SULFATE 3 ML: 2.5; .5 SOLUTION RESPIRATORY (INHALATION) at 12:04

## 2022-04-17 RX ADMIN — GUAIFENESIN 1200 MG: 600 TABLET, EXTENDED RELEASE ORAL at 08:04

## 2022-04-17 RX ADMIN — EZETIMIBE 10 MG: 10 TABLET ORAL at 08:04

## 2022-04-17 RX ADMIN — PIPERACILLIN AND TAZOBACTAM 4.5 G: 4; .5 INJECTION, POWDER, LYOPHILIZED, FOR SOLUTION INTRAVENOUS; PARENTERAL at 12:04

## 2022-04-17 RX ADMIN — ATORVASTATIN CALCIUM 20 MG: 20 TABLET, FILM COATED ORAL at 08:04

## 2022-04-17 RX ADMIN — IPRATROPIUM BROMIDE AND ALBUTEROL SULFATE 3 ML: 2.5; .5 SOLUTION RESPIRATORY (INHALATION) at 07:04

## 2022-04-17 RX ADMIN — PIPERACILLIN AND TAZOBACTAM 4.5 G: 4; .5 INJECTION, POWDER, LYOPHILIZED, FOR SOLUTION INTRAVENOUS; PARENTERAL at 08:04

## 2022-04-17 RX ADMIN — POTASSIUM BICARBONATE 40 MEQ: 391 TABLET, EFFERVESCENT ORAL at 08:04

## 2022-04-17 RX ADMIN — ENOXAPARIN SODIUM 40 MG: 40 INJECTION SUBCUTANEOUS at 04:04

## 2022-04-17 RX ADMIN — IPRATROPIUM BROMIDE AND ALBUTEROL SULFATE 3 ML: 2.5; .5 SOLUTION RESPIRATORY (INHALATION) at 08:04

## 2022-04-17 RX ADMIN — PIPERACILLIN AND TAZOBACTAM 4.5 G: 4; .5 INJECTION, POWDER, LYOPHILIZED, FOR SOLUTION INTRAVENOUS; PARENTERAL at 03:04

## 2022-04-17 RX ADMIN — IPRATROPIUM BROMIDE AND ALBUTEROL SULFATE 3 ML: 2.5; .5 SOLUTION RESPIRATORY (INHALATION) at 04:04

## 2022-04-17 RX ADMIN — METOPROLOL TARTRATE 25 MG: 25 TABLET, FILM COATED ORAL at 08:04

## 2022-04-17 RX ADMIN — ACETYLCYSTEINE 4 ML: 200 SOLUTION ORAL; RESPIRATORY (INHALATION) at 07:04

## 2022-04-17 NOTE — SUBJECTIVE & OBJECTIVE
Interval History: no acute overnight events, diuresed well    Review of Systems   Constitutional:  Positive for fatigue.   HENT:  Positive for congestion.    Respiratory:  Positive for cough and shortness of breath.    Endocrine: Positive for cold intolerance.   Musculoskeletal:  Positive for myalgias.   Allergic/Immunologic: Negative.    Hematological: Negative.    Objective:     Vital Signs (Most Recent):  Temp: 97.6 °F (36.4 °C) (04/17/22 1140)  Pulse: 98 (04/17/22 1400)  Resp: 20 (04/17/22 1238)  BP: (!) 125/59 (04/17/22 1140)  SpO2: 98 % (04/17/22 1238)   Vital Signs (24h Range):  Temp:  [97.6 °F (36.4 °C)-98.5 °F (36.9 °C)] 97.6 °F (36.4 °C)  Pulse:  [] 98  Resp:  [18-20] 20  SpO2:  [93 %-98 %] 98 %  BP: (116-176)/() 125/59     Weight: 103 kg (227 lb 1.2 oz)  Body mass index is 31.67 kg/m².    Intake/Output Summary (Last 24 hours) at 4/17/2022 1431  Last data filed at 4/17/2022 0500  Gross per 24 hour   Intake 3000 ml   Output 1000 ml   Net 2000 ml        Physical Exam  HENT:      Mouth/Throat:      Mouth: Mucous membranes are dry.   Eyes:      Extraocular Movements: Extraocular movements intact.      Pupils: Pupils are equal, round, and reactive to light.   Pulmonary:      Breath sounds: Rhonchi present.   Skin:     General: Skin is warm and dry.   Neurological:      Mental Status: He is alert.       Significant Labs: All pertinent labs within the past 24 hours have been reviewed.  CBC:   Recent Labs   Lab 04/16/22 0414 04/17/22  0519   WBC 9.47 8.65   HGB 10.8* 10.3*   HCT 34.8* 33.4*    253       CMP:   Recent Labs   Lab 04/16/22 0414 04/17/22  0519    144   K 3.7 3.5    105   CO2 34* 34*   GLU 98 91   BUN 32* 34*   CREATININE 1.5* 1.9*   CALCIUM 9.7 9.8   PROT 7.2 7.2   ALBUMIN 2.0* 2.1*   BILITOT 0.6 0.5   ALKPHOS 48* 42*   AST 23 20   ALT 25 23   ANIONGAP 5* 5*   EGFRNONAA 43.7* 32.8*         Significant Imaging: I have reviewed all pertinent imaging results/findings  within the past 24 hours.

## 2022-04-17 NOTE — PROGRESS NOTES
[facility] Medicine  Progress Note    Patient Name: Jamey Lei  MRN: [id number]  Patient Class: IP- Inpatient   Admission Date: 4/10/2022  Length of Stay: 7 days  Attending Physician: Navi Domínguez III, MD  Primary Care Provider: Navi Domínguez III, MD        Subjective:     Principal Problem:Pneumonia        HPI:  Patient is a 81-year-old male with a history of multiple comorbidities including a right-sided lung mass that was biopsied recently within the last few weeks and was negative for malignancy.  The mass is spiculated and enlarging and is very concerning for malignancy.  The patient has had a deterioration in his condition over the last few months with several prolonged hospitalizations.  The patient has type 2 diabetes chronic kidney disease morbid obesity peripheral neuropathy and chronic lower back pain requiring chronic opiate therapy.  The patient is requiring more and more care from caregivers and family to assist him in his ADLs.  Patient reportedly over the last few days has not been active in self stopped taking his medicines and began having seizures.  The patient has a history of a meningioma and seizure disorder.  This morning the patient is snoring he is resting he was given doses of IV Ativan for his seizure last night in the emergency department.  I have spoken with the patient's family and I am recommending do not resuscitate while we care for the patient's medical issues.  They will have a family meeting and discuss.      Overview/Hospital Course:  4/12/2022 FM:  Patient is awake and alert and family at the bedside.  Family states when he takes his antiepileptic he becomes very groggy and sedated.  We are going to try a lower dose while he is here and we are treating him for pneumonia.  Otherwise the patient is eating his breakfast and seems to have a good appetite and eating well will start therapy today.  4/13/2022 FM:  Patient ate 100% of breakfast this morning.   Patient's family is at bedside.  We discuss code status and the patient does not want mechanical ventilation and intubation so we will place partial code status on the chart.  Patient otherwise is having increasing coughing and rattling in the chest today.  He did attempt to get out of bed yesterday and is severely debilitated and weak.  The family is asking about inpatient rehab and I have informed them that we need to see effort and some improvements over the next day or so to ensure that the prognosis is fair to good.  4/14/2022 FM:  Patient's having audible rhonchi today.  Patient was up out of bed yesterday and sat in a chair until 2:00 a.m..  We are attempting inpatient rehab therapy.  I am adding nebulized treatments today and Mucomyst.  4/15/2022 CG: Patient is still having some shortness of breath with rhonchi. He says the breathing treatments have helped a little bit.   4/16/2022 CG: Doing well, sitting up in bed and feels less short of breath  4/17/2022 CG:  No new changes, continue with breathing treatments and breath sounds still coarse.      Interval History: no acute overnight events, diuresed well    Review of Systems   Constitutional:  Positive for fatigue.   HENT:  Positive for congestion.    Respiratory:  Positive for cough and shortness of breath.    Endocrine: Positive for cold intolerance.   Musculoskeletal:  Positive for myalgias.   Allergic/Immunologic: Negative.    Hematological: Negative.    Objective:     Vital Signs (Most Recent):  Temp: 97.6 °F (36.4 °C) (04/17/22 1140)  Pulse: 98 (04/17/22 1400)  Resp: 20 (04/17/22 1238)  BP: (!) 125/59 (04/17/22 1140)  SpO2: 98 % (04/17/22 1238)   Vital Signs (24h Range):  Temp:  [97.6 °F (36.4 °C)-98.5 °F (36.9 °C)] 97.6 °F (36.4 °C)  Pulse:  [] 98  Resp:  [18-20] 20  SpO2:  [93 %-98 %] 98 %  BP: (116-176)/() 125/59     Weight: 103 kg (227 lb 1.2 oz)  Body mass index is 31.67 kg/m².    Intake/Output Summary (Last 24 hours) at 4/17/2022  1431  Last data filed at 4/17/2022 0500  Gross per 24 hour   Intake 3000 ml   Output 1000 ml   Net 2000 ml        Physical Exam  HENT:      Mouth/Throat:      Mouth: Mucous membranes are dry.   Eyes:      Extraocular Movements: Extraocular movements intact.      Pupils: Pupils are equal, round, and reactive to light.   Pulmonary:      Breath sounds: Rhonchi present.   Skin:     General: Skin is warm and dry.   Neurological:      Mental Status: He is alert.       Significant Labs: All pertinent labs within the past 24 hours have been reviewed.  CBC:   Recent Labs   Lab 04/16/22 0414 04/17/22 0519   WBC 9.47 8.65   HGB 10.8* 10.3*   HCT 34.8* 33.4*    253       CMP:   Recent Labs   Lab 04/16/22 0414 04/17/22 0519    144   K 3.7 3.5    105   CO2 34* 34*   GLU 98 91   BUN 32* 34*   CREATININE 1.5* 1.9*   CALCIUM 9.7 9.8   PROT 7.2 7.2   ALBUMIN 2.0* 2.1*   BILITOT 0.6 0.5   ALKPHOS 48* 42*   AST 23 20   ALT 25 23   ANIONGAP 5* 5*   EGFRNONAA 43.7* 32.8*         Significant Imaging: I have reviewed all pertinent imaging results/findings within the past 24 hours.      Assessment/Plan:      * Pneumonia  Improved, continue current medical regimen      Encephalopathy, toxic  Improved.    Morbid obesity  Body mass index is 31.67 kg/m². Morbid obesity complicates all aspects of disease management from diagnostic modalities to treatment. Weight loss encouraged and health benefits explained to patient.         Neuropathy involving both lower extremities        CKD (chronic kidney disease), stage IV  Cr improved.    Lung mass  Strong suspect for ca, initial biopsy was negative.      Seizure disorder  Try lower dose of tileptal.  More alert, will consult IPR.      Meningioma        Hypokalemia  Improved      Chronic osteoarthritis  Cont. Treatement, PT/OT.        VTE Risk Mitigation (From admission, onward)         Ordered     enoxaparin injection 40 mg  Daily         04/11/22 0914     IP VTE HIGH RISK  PATIENT  Once         04/11/22 0026     Place sequential compression device  Until discontinued         04/11/22 0026                Discharge Planning   GLORY:      Code Status: Partial Code   Is the patient medically ready for discharge?:     Reason for patient still in hospital (select all that apply): Treatment  Discharge Plan A: Rehab   Discharge Delays: None known at this time              Eddie Newton DO  Department of Hospital Medicine   [facility] [address]

## 2022-04-18 ENCOUNTER — HOSPITAL ENCOUNTER (INPATIENT)
Facility: HOSPITAL | Age: 82
LOS: 11 days | Discharge: HOME HEALTH | DRG: 091 | End: 2022-04-29
Attending: INTERNAL MEDICINE | Admitting: INTERNAL MEDICINE
Payer: MEDICARE

## 2022-04-18 VITALS
SYSTOLIC BLOOD PRESSURE: 121 MMHG | OXYGEN SATURATION: 98 % | BODY MASS INDEX: 31.79 KG/M2 | RESPIRATION RATE: 20 BRPM | HEART RATE: 99 BPM | DIASTOLIC BLOOD PRESSURE: 57 MMHG | HEIGHT: 71 IN | WEIGHT: 227.06 LBS | TEMPERATURE: 98 F

## 2022-04-18 DIAGNOSIS — G93.41 METABOLIC ENCEPHALOPATHY: ICD-10-CM

## 2022-04-18 DIAGNOSIS — N18.4 CKD (CHRONIC KIDNEY DISEASE), STAGE IV: ICD-10-CM

## 2022-04-18 DIAGNOSIS — G40.909 SEIZURE DISORDER: ICD-10-CM

## 2022-04-18 DIAGNOSIS — R91.8 LUNG MASS: Primary | ICD-10-CM

## 2022-04-18 DIAGNOSIS — G92.9 ENCEPHALOPATHY, TOXIC: ICD-10-CM

## 2022-04-18 LAB
ALBUMIN SERPL BCP-MCNC: 2.1 G/DL (ref 3.5–5.2)
ALP SERPL-CCNC: 44 U/L (ref 55–135)
ALT SERPL W/O P-5'-P-CCNC: 21 U/L (ref 10–44)
ANION GAP SERPL CALC-SCNC: 6 MMOL/L (ref 8–16)
AST SERPL-CCNC: 19 U/L (ref 10–40)
BASOPHILS # BLD AUTO: 0.04 K/UL (ref 0–0.2)
BASOPHILS NFR BLD: 0.5 % (ref 0–1.9)
BILIRUB SERPL-MCNC: 0.5 MG/DL (ref 0.1–1)
BUN SERPL-MCNC: 28 MG/DL (ref 8–23)
CALCIUM SERPL-MCNC: 9.8 MG/DL (ref 8.7–10.5)
CHLORIDE SERPL-SCNC: 105 MMOL/L (ref 95–110)
CO2 SERPL-SCNC: 32 MMOL/L (ref 23–29)
CREAT SERPL-MCNC: 1.9 MG/DL (ref 0.5–1.4)
DIFFERENTIAL METHOD: ABNORMAL
EOSINOPHIL # BLD AUTO: 0.2 K/UL (ref 0–0.5)
EOSINOPHIL NFR BLD: 2.6 % (ref 0–8)
ERYTHROCYTE [DISTWIDTH] IN BLOOD BY AUTOMATED COUNT: 18.2 % (ref 11.5–14.5)
EST. GFR  (AFRICAN AMERICAN): 37.9 ML/MIN/1.73 M^2
EST. GFR  (NON AFRICAN AMERICAN): 32.8 ML/MIN/1.73 M^2
GLUCOSE SERPL-MCNC: 86 MG/DL (ref 70–110)
HCT VFR BLD AUTO: 33.7 % (ref 40–54)
HGB BLD-MCNC: 10.4 G/DL (ref 14–18)
IMM GRANULOCYTES # BLD AUTO: 0.03 K/UL (ref 0–0.04)
IMM GRANULOCYTES NFR BLD AUTO: 0.4 % (ref 0–0.5)
LYMPHOCYTES # BLD AUTO: 1.1 K/UL (ref 1–4.8)
LYMPHOCYTES NFR BLD: 14.6 % (ref 18–48)
MAGNESIUM SERPL-MCNC: 2.5 MG/DL (ref 1.6–2.6)
MCH RBC QN AUTO: 29 PG (ref 27–31)
MCHC RBC AUTO-ENTMCNC: 30.9 G/DL (ref 32–36)
MCV RBC AUTO: 94 FL (ref 82–98)
MONOCYTES # BLD AUTO: 0.7 K/UL (ref 0.3–1)
MONOCYTES NFR BLD: 9.3 % (ref 4–15)
NEUTROPHILS # BLD AUTO: 5.5 K/UL (ref 1.8–7.7)
NEUTROPHILS NFR BLD: 72.6 % (ref 38–73)
NRBC BLD-RTO: 0 /100 WBC
PLATELET # BLD AUTO: 232 K/UL (ref 150–450)
PMV BLD AUTO: 10.1 FL (ref 9.2–12.9)
POTASSIUM SERPL-SCNC: 3.6 MMOL/L (ref 3.5–5.1)
PROT SERPL-MCNC: 7 G/DL (ref 6–8.4)
RBC # BLD AUTO: 3.59 M/UL (ref 4.6–6.2)
SODIUM SERPL-SCNC: 143 MMOL/L (ref 136–145)
WBC # BLD AUTO: 7.61 K/UL (ref 3.9–12.7)

## 2022-04-18 PROCEDURE — 94761 N-INVAS EAR/PLS OXIMETRY MLT: CPT

## 2022-04-18 PROCEDURE — 94640 AIRWAY INHALATION TREATMENT: CPT

## 2022-04-18 PROCEDURE — 63600175 PHARM REV CODE 636 W HCPCS: Performed by: INTERNAL MEDICINE

## 2022-04-18 PROCEDURE — 85025 COMPLETE CBC W/AUTO DIFF WBC: CPT | Performed by: INTERNAL MEDICINE

## 2022-04-18 PROCEDURE — 99900035 HC TECH TIME PER 15 MIN (STAT)

## 2022-04-18 PROCEDURE — 99900031 HC PATIENT EDUCATION (STAT)

## 2022-04-18 PROCEDURE — 27000221 HC OXYGEN, UP TO 24 HOURS

## 2022-04-18 PROCEDURE — 25000242 PHARM REV CODE 250 ALT 637 W/ HCPCS: Performed by: INTERNAL MEDICINE

## 2022-04-18 PROCEDURE — 11000001 HC ACUTE MED/SURG PRIVATE ROOM

## 2022-04-18 PROCEDURE — 25000003 PHARM REV CODE 250: Performed by: INTERNAL MEDICINE

## 2022-04-18 PROCEDURE — 80053 COMPREHEN METABOLIC PANEL: CPT | Performed by: INTERNAL MEDICINE

## 2022-04-18 PROCEDURE — 97162 PT EVAL MOD COMPLEX 30 MIN: CPT

## 2022-04-18 PROCEDURE — 97166 OT EVAL MOD COMPLEX 45 MIN: CPT

## 2022-04-18 PROCEDURE — 83735 ASSAY OF MAGNESIUM: CPT | Performed by: INTERNAL MEDICINE

## 2022-04-18 PROCEDURE — 36415 COLL VENOUS BLD VENIPUNCTURE: CPT | Performed by: INTERNAL MEDICINE

## 2022-04-18 PROCEDURE — 25000003 PHARM REV CODE 250: Performed by: FAMILY MEDICINE

## 2022-04-18 RX ORDER — METOPROLOL TARTRATE 25 MG/1
25 TABLET, FILM COATED ORAL 2 TIMES DAILY
Status: CANCELLED | OUTPATIENT
Start: 2022-04-18

## 2022-04-18 RX ORDER — ATORVASTATIN CALCIUM 20 MG/1
20 TABLET, FILM COATED ORAL DAILY
Status: DISCONTINUED | OUTPATIENT
Start: 2022-04-19 | End: 2022-04-29 | Stop reason: HOSPADM

## 2022-04-18 RX ORDER — DOCUSATE SODIUM 100 MG/1
100 CAPSULE, LIQUID FILLED ORAL 2 TIMES DAILY
Status: CANCELLED | OUTPATIENT
Start: 2022-04-18

## 2022-04-18 RX ORDER — ACETAMINOPHEN 325 MG/1
650 TABLET ORAL EVERY 8 HOURS PRN
Status: DISCONTINUED | OUTPATIENT
Start: 2022-04-18 | End: 2022-04-29 | Stop reason: HOSPADM

## 2022-04-18 RX ORDER — IPRATROPIUM BROMIDE AND ALBUTEROL SULFATE 2.5; .5 MG/3ML; MG/3ML
3 SOLUTION RESPIRATORY (INHALATION)
Status: DISCONTINUED | OUTPATIENT
Start: 2022-04-18 | End: 2022-04-19

## 2022-04-18 RX ORDER — ACETYLCYSTEINE 200 MG/ML
4 SOLUTION ORAL; RESPIRATORY (INHALATION) 2 TIMES DAILY
Status: CANCELLED | OUTPATIENT
Start: 2022-04-18

## 2022-04-18 RX ORDER — MORPHINE SULFATE 2 MG/ML
2 INJECTION, SOLUTION INTRAMUSCULAR; INTRAVENOUS EVERY 4 HOURS PRN
Status: CANCELLED | OUTPATIENT
Start: 2022-04-18

## 2022-04-18 RX ORDER — ASPIRIN 81 MG/1
81 TABLET ORAL DAILY
Status: DISCONTINUED | OUTPATIENT
Start: 2022-04-19 | End: 2022-04-29 | Stop reason: HOSPADM

## 2022-04-18 RX ORDER — GUAIFENESIN 600 MG/1
1200 TABLET, EXTENDED RELEASE ORAL 2 TIMES DAILY
Status: CANCELLED | OUTPATIENT
Start: 2022-04-18

## 2022-04-18 RX ORDER — MORPHINE SULFATE 2 MG/ML
2 INJECTION, SOLUTION INTRAMUSCULAR; INTRAVENOUS EVERY 4 HOURS PRN
Status: DISCONTINUED | OUTPATIENT
Start: 2022-04-18 | End: 2022-04-19

## 2022-04-18 RX ORDER — ACETYLCYSTEINE 200 MG/ML
4 SOLUTION ORAL; RESPIRATORY (INHALATION) 2 TIMES DAILY
Status: DISCONTINUED | OUTPATIENT
Start: 2022-04-18 | End: 2022-04-27

## 2022-04-18 RX ORDER — TORSEMIDE 20 MG/1
100 TABLET ORAL DAILY
Status: CANCELLED | OUTPATIENT
Start: 2022-04-19

## 2022-04-18 RX ORDER — TALC
6 POWDER (GRAM) TOPICAL NIGHTLY PRN
Status: CANCELLED | OUTPATIENT
Start: 2022-04-18

## 2022-04-18 RX ORDER — ENOXAPARIN SODIUM 100 MG/ML
40 INJECTION SUBCUTANEOUS EVERY 24 HOURS
Status: DISCONTINUED | OUTPATIENT
Start: 2022-04-18 | End: 2022-04-29 | Stop reason: HOSPADM

## 2022-04-18 RX ORDER — ONDANSETRON 2 MG/ML
4 INJECTION INTRAMUSCULAR; INTRAVENOUS EVERY 8 HOURS PRN
Status: DISCONTINUED | OUTPATIENT
Start: 2022-04-18 | End: 2022-04-29 | Stop reason: HOSPADM

## 2022-04-18 RX ORDER — EZETIMIBE 10 MG/1
10 TABLET ORAL DAILY
Status: DISCONTINUED | OUTPATIENT
Start: 2022-04-19 | End: 2022-04-29 | Stop reason: HOSPADM

## 2022-04-18 RX ORDER — ONDANSETRON 2 MG/ML
4 INJECTION INTRAMUSCULAR; INTRAVENOUS EVERY 8 HOURS PRN
Status: CANCELLED | OUTPATIENT
Start: 2022-04-18

## 2022-04-18 RX ORDER — ATORVASTATIN CALCIUM 20 MG/1
20 TABLET, FILM COATED ORAL DAILY
Status: CANCELLED | OUTPATIENT
Start: 2022-04-19

## 2022-04-18 RX ORDER — TORSEMIDE 20 MG/1
100 TABLET ORAL DAILY
Status: DISCONTINUED | OUTPATIENT
Start: 2022-04-19 | End: 2022-04-29 | Stop reason: HOSPADM

## 2022-04-18 RX ORDER — ASPIRIN 81 MG/1
81 TABLET ORAL DAILY
Status: CANCELLED | OUTPATIENT
Start: 2022-04-19

## 2022-04-18 RX ORDER — ENOXAPARIN SODIUM 100 MG/ML
40 INJECTION SUBCUTANEOUS EVERY 24 HOURS
Status: CANCELLED | OUTPATIENT
Start: 2022-04-18

## 2022-04-18 RX ORDER — METOPROLOL TARTRATE 25 MG/1
25 TABLET, FILM COATED ORAL 2 TIMES DAILY
Status: DISCONTINUED | OUTPATIENT
Start: 2022-04-18 | End: 2022-04-29 | Stop reason: HOSPADM

## 2022-04-18 RX ORDER — DOCUSATE SODIUM 100 MG/1
100 CAPSULE, LIQUID FILLED ORAL 2 TIMES DAILY
Status: DISCONTINUED | OUTPATIENT
Start: 2022-04-18 | End: 2022-04-29 | Stop reason: HOSPADM

## 2022-04-18 RX ORDER — EZETIMIBE 10 MG/1
10 TABLET ORAL DAILY
Status: CANCELLED | OUTPATIENT
Start: 2022-04-19

## 2022-04-18 RX ORDER — OXCARBAZEPINE 300 MG/1
300 TABLET, FILM COATED ORAL 2 TIMES DAILY
Status: CANCELLED | OUTPATIENT
Start: 2022-04-18

## 2022-04-18 RX ORDER — GUAIFENESIN 600 MG/1
1200 TABLET, EXTENDED RELEASE ORAL 2 TIMES DAILY
Status: DISCONTINUED | OUTPATIENT
Start: 2022-04-18 | End: 2022-04-29 | Stop reason: HOSPADM

## 2022-04-18 RX ORDER — ACETAMINOPHEN 325 MG/1
650 TABLET ORAL EVERY 8 HOURS PRN
Status: CANCELLED | OUTPATIENT
Start: 2022-04-18

## 2022-04-18 RX ORDER — OXCARBAZEPINE 300 MG/1
300 TABLET, FILM COATED ORAL 2 TIMES DAILY
Status: DISCONTINUED | OUTPATIENT
Start: 2022-04-18 | End: 2022-04-29 | Stop reason: HOSPADM

## 2022-04-18 RX ORDER — TALC
6 POWDER (GRAM) TOPICAL NIGHTLY PRN
Status: DISCONTINUED | OUTPATIENT
Start: 2022-04-18 | End: 2022-04-29 | Stop reason: HOSPADM

## 2022-04-18 RX ORDER — IPRATROPIUM BROMIDE AND ALBUTEROL SULFATE 2.5; .5 MG/3ML; MG/3ML
3 SOLUTION RESPIRATORY (INHALATION)
Status: CANCELLED | OUTPATIENT
Start: 2022-04-18

## 2022-04-18 RX ADMIN — METOPROLOL TARTRATE 25 MG: 25 TABLET, FILM COATED ORAL at 09:04

## 2022-04-18 RX ADMIN — GUAIFENESIN 1200 MG: 600 TABLET, EXTENDED RELEASE ORAL at 09:04

## 2022-04-18 RX ADMIN — IPRATROPIUM BROMIDE AND ALBUTEROL SULFATE 3 ML: 2.5; .5 SOLUTION RESPIRATORY (INHALATION) at 07:04

## 2022-04-18 RX ADMIN — Medication 6 MG: at 11:04

## 2022-04-18 RX ADMIN — OXCARBAZEPINE 300 MG: 300 TABLET, FILM COATED ORAL at 09:04

## 2022-04-18 RX ADMIN — ACETYLCYSTEINE 4 ML: 200 SOLUTION ORAL; RESPIRATORY (INHALATION) at 07:04

## 2022-04-18 RX ADMIN — PIPERACILLIN AND TAZOBACTAM 4.5 G: 4; .5 INJECTION, POWDER, LYOPHILIZED, FOR SOLUTION INTRAVENOUS; PARENTERAL at 11:04

## 2022-04-18 RX ADMIN — DOCUSATE SODIUM 100 MG: 100 CAPSULE, LIQUID FILLED ORAL at 09:04

## 2022-04-18 RX ADMIN — PIPERACILLIN AND TAZOBACTAM 4.5 G: 4; .5 INJECTION, POWDER, LYOPHILIZED, FOR SOLUTION INTRAVENOUS; PARENTERAL at 12:04

## 2022-04-18 RX ADMIN — IPRATROPIUM BROMIDE AND ALBUTEROL SULFATE 3 ML: 2.5; .5 SOLUTION RESPIRATORY (INHALATION) at 03:04

## 2022-04-18 RX ADMIN — PIPERACILLIN AND TAZOBACTAM 4.5 G: 4; .5 INJECTION, POWDER, LYOPHILIZED, FOR SOLUTION INTRAVENOUS; PARENTERAL at 09:04

## 2022-04-18 RX ADMIN — ATORVASTATIN CALCIUM 20 MG: 20 TABLET, FILM COATED ORAL at 09:04

## 2022-04-18 RX ADMIN — ENOXAPARIN SODIUM 40 MG: 40 INJECTION SUBCUTANEOUS at 04:04

## 2022-04-18 RX ADMIN — IPRATROPIUM BROMIDE AND ALBUTEROL SULFATE 3 ML: 2.5; .5 SOLUTION RESPIRATORY (INHALATION) at 11:04

## 2022-04-18 RX ADMIN — PIPERACILLIN AND TAZOBACTAM 4.5 G: 4; .5 INJECTION, POWDER, LYOPHILIZED, FOR SOLUTION INTRAVENOUS; PARENTERAL at 04:04

## 2022-04-18 RX ADMIN — TORSEMIDE 100 MG: 20 TABLET ORAL at 09:04

## 2022-04-18 RX ADMIN — ASPIRIN 81 MG: 81 TABLET, COATED ORAL at 09:04

## 2022-04-18 RX ADMIN — POTASSIUM BICARBONATE 40 MEQ: 391 TABLET, EFFERVESCENT ORAL at 09:04

## 2022-04-18 RX ADMIN — EZETIMIBE 10 MG: 10 TABLET ORAL at 09:04

## 2022-04-18 NOTE — SUBJECTIVE & OBJECTIVE
Interval History: no acute overnight events, diuresed well    Review of Systems   Constitutional:  Positive for fatigue.   HENT:  Positive for congestion.    Respiratory:  Positive for cough and shortness of breath.    Endocrine: Positive for cold intolerance.   Musculoskeletal:  Positive for myalgias.   Allergic/Immunologic: Negative.    Hematological: Negative.    Objective:     Vital Signs (Most Recent):  Temp: 97.7 °F (36.5 °C) (04/18/22 0743)  Pulse: 100 (04/18/22 0743)  Resp: 20 (04/18/22 0743)  BP: (!) 159/78 (04/18/22 0743)  SpO2: 98 % (04/18/22 0743)   Vital Signs (24h Range):  Temp:  [97.6 °F (36.4 °C)-98.4 °F (36.9 °C)] 97.7 °F (36.5 °C)  Pulse:  [] 100  Resp:  [17-20] 20  SpO2:  [94 %-100 %] 98 %  BP: (125-176)/(59-83) 159/78     Weight: 103 kg (227 lb 1.2 oz)  Body mass index is 31.67 kg/m².    Intake/Output Summary (Last 24 hours) at 4/18/2022 0826  Last data filed at 4/18/2022 0600  Gross per 24 hour   Intake 1880 ml   Output 1900 ml   Net -20 ml        Physical Exam  HENT:      Mouth/Throat:      Mouth: Mucous membranes are dry.   Eyes:      Extraocular Movements: Extraocular movements intact.      Pupils: Pupils are equal, round, and reactive to light.   Pulmonary:      Breath sounds: Rhonchi present.   Skin:     General: Skin is warm and dry.   Neurological:      Mental Status: He is alert.       Significant Labs: All pertinent labs within the past 24 hours have been reviewed.  CBC:   Recent Labs   Lab 04/17/22 0519 04/18/22 0422   WBC 8.65 7.61   HGB 10.3* 10.4*   HCT 33.4* 33.7*    232       CMP:   Recent Labs   Lab 04/17/22 0519 04/18/22 0422    143   K 3.5 3.6    105   CO2 34* 32*   GLU 91 86   BUN 34* 28*   CREATININE 1.9* 1.9*   CALCIUM 9.8 9.8   PROT 7.2 7.0   ALBUMIN 2.1* 2.1*   BILITOT 0.5 0.5   ALKPHOS 42* 44*   AST 20 19   ALT 23 21   ANIONGAP 5* 6*   EGFRNONAA 32.8* 32.8*         Significant Imaging: I have reviewed all pertinent imaging results/findings  within the past 24 hours.

## 2022-04-18 NOTE — NURSING
Admit skin assessment note: Patient has bruising to bilateral arms, brownish discoloration to BLE, several scabs to BUE, abrasion to left FA, Pinkness with moisture to bilateral groin area and wound

## 2022-04-18 NOTE — PROGRESS NOTES
[facility] Medicine  Progress Note    Patient Name: Jamey Lei  MRN: [id number]  Patient Class: IP- Inpatient   Admission Date: 4/10/2022  Length of Stay: 8 days  Attending Physician: Navi Domínguez III, MD  Primary Care Provider: Navi Domínguez III, MD        Subjective:     Principal Problem:Pneumonia        HPI:  Patient is a 81-year-old male with a history of multiple comorbidities including a right-sided lung mass that was biopsied recently within the last few weeks and was negative for malignancy.  The mass is spiculated and enlarging and is very concerning for malignancy.  The patient has had a deterioration in his condition over the last few months with several prolonged hospitalizations.  The patient has type 2 diabetes chronic kidney disease morbid obesity peripheral neuropathy and chronic lower back pain requiring chronic opiate therapy.  The patient is requiring more and more care from caregivers and family to assist him in his ADLs.  Patient reportedly over the last few days has not been active in self stopped taking his medicines and began having seizures.  The patient has a history of a meningioma and seizure disorder.  This morning the patient is snoring he is resting he was given doses of IV Ativan for his seizure last night in the emergency department.  I have spoken with the patient's family and I am recommending do not resuscitate while we care for the patient's medical issues.  They will have a family meeting and discuss.      Overview/Hospital Course:  4/12/2022 FM:  Patient is awake and alert and family at the bedside.  Family states when he takes his antiepileptic he becomes very groggy and sedated.  We are going to try a lower dose while he is here and we are treating him for pneumonia.  Otherwise the patient is eating his breakfast and seems to have a good appetite and eating well will start therapy today.  4/13/2022 FM:  Patient ate 100% of breakfast this morning.   Patient's family is at bedside.  We discuss code status and the patient does not want mechanical ventilation and intubation so we will place partial code status on the chart.  Patient otherwise is having increasing coughing and rattling in the chest today.  He did attempt to get out of bed yesterday and is severely debilitated and weak.  The family is asking about inpatient rehab and I have informed them that we need to see effort and some improvements over the next day or so to ensure that the prognosis is fair to good.  4/14/2022 FM:  Patient's having audible rhonchi today.  Patient was up out of bed yesterday and sat in a chair until 2:00 a.m..  We are attempting inpatient rehab therapy.  I am adding nebulized treatments today and Mucomyst.  4/15/2022 CG: Patient is still having some shortness of breath with rhonchi. He says the breathing treatments have helped a little bit.   4/16/2022 CG: Doing well, sitting up in bed and feels less short of breath  4/17/2022 CG:  No new changes, continue with breathing treatments and breath sounds still coarse.  4/18/2022 FM:  Patient is sitting up in the chair and ate 100% of his breakfast.  Patient is eager to begin working with therapy.  His performance on Friday was not significant enough to be able to complete inpatient rehab but we will reassess today and hopefully moved inpatient rehab in home.  I have informed the family if he is not able to moved inpatient rehab will have to make arrangements at home or a skilled nursing facility.      Interval History: no acute overnight events, diuresed well    Review of Systems   Constitutional:  Positive for fatigue.   HENT:  Positive for congestion.    Respiratory:  Positive for cough and shortness of breath.    Endocrine: Positive for cold intolerance.   Musculoskeletal:  Positive for myalgias.   Allergic/Immunologic: Negative.    Hematological: Negative.    Objective:     Vital Signs (Most Recent):  Temp: 97.7 °F (36.5 °C)  (04/18/22 0743)  Pulse: 100 (04/18/22 0743)  Resp: 20 (04/18/22 0743)  BP: (!) 159/78 (04/18/22 0743)  SpO2: 98 % (04/18/22 0743)   Vital Signs (24h Range):  Temp:  [97.6 °F (36.4 °C)-98.4 °F (36.9 °C)] 97.7 °F (36.5 °C)  Pulse:  [] 100  Resp:  [17-20] 20  SpO2:  [94 %-100 %] 98 %  BP: (125-176)/(59-83) 159/78     Weight: 103 kg (227 lb 1.2 oz)  Body mass index is 31.67 kg/m².    Intake/Output Summary (Last 24 hours) at 4/18/2022 0826  Last data filed at 4/18/2022 0600  Gross per 24 hour   Intake 1880 ml   Output 1900 ml   Net -20 ml        Physical Exam  HENT:      Mouth/Throat:      Mouth: Mucous membranes are dry.   Eyes:      Extraocular Movements: Extraocular movements intact.      Pupils: Pupils are equal, round, and reactive to light.   Pulmonary:      Breath sounds: Rhonchi present.   Skin:     General: Skin is warm and dry.   Neurological:      Mental Status: He is alert.       Significant Labs: All pertinent labs within the past 24 hours have been reviewed.  CBC:   Recent Labs   Lab 04/17/22 0519 04/18/22 0422   WBC 8.65 7.61   HGB 10.3* 10.4*   HCT 33.4* 33.7*    232       CMP:   Recent Labs   Lab 04/17/22 0519 04/18/22  0422    143   K 3.5 3.6    105   CO2 34* 32*   GLU 91 86   BUN 34* 28*   CREATININE 1.9* 1.9*   CALCIUM 9.8 9.8   PROT 7.2 7.0   ALBUMIN 2.1* 2.1*   BILITOT 0.5 0.5   ALKPHOS 42* 44*   AST 20 19   ALT 23 21   ANIONGAP 5* 6*   EGFRNONAA 32.8* 32.8*         Significant Imaging: I have reviewed all pertinent imaging results/findings within the past 24 hours.      Assessment/Plan:      * Pneumonia  Improved, continue current medical regimen, suspect para-neoplastic pna.      Encephalopathy, toxic  Improved.    Morbid obesity  Body mass index is 31.67 kg/m². Morbid obesity complicates all aspects of disease management from diagnostic modalities to treatment. Weight loss encouraged and health benefits explained to patient.         Neuropathy involving both lower  extremities  PT/OT.      CKD (chronic kidney disease), stage IV  Cr noted, still slightly volume overloaded.    Lung mass  Strong suspect for ca, initial biopsy was negative.      Seizure disorder  Try lower dose of tileptal.  More alert, will consult IPR.      Meningioma        Hypokalemia  Improved      Chronic osteoarthritis  Cont. Treatement, PT/OT.        VTE Risk Mitigation (From admission, onward)         Ordered     enoxaparin injection 40 mg  Daily         04/11/22 0914     IP VTE HIGH RISK PATIENT  Once         04/11/22 0026     Place sequential compression device  Until discontinued         04/11/22 0026                Discharge Planning   GLORY:      Code Status: Partial Code   Is the patient medically ready for discharge?:     Reason for patient still in hospital (select all that apply): Patient trending condition  Discharge Plan A: Rehab   Discharge Delays: None known at this time              Navi Domínguez III, MD  Department of Hospital Medicine   [facility]

## 2022-04-18 NOTE — PHYSICIAN QUERY
PT Name: Jamey Lei  MR #: [id number]    DOCUMENTATION CLARIFICATION     CDS/: Ashley Head               Contact information:  This form is a permanent document in the medical record.     Query Date: April 18, 2022    By submitting this query, we are merely seeking further clarification of documentation. Please utilize your independent clinical judgment when addressing the question(s) below.    The Medical Record contains the following:   Indicators   Supporting Clinical Findings Location in Medical Record    x AMS, Confusion,  LOC, etc.   Obtunded     Encephalopathy, Toxic  4/11 H&P, Dr. Catrachita LAWLER     4/12 , Dr. Catrachita LAWLER    x Acute/Chronic Illness  The patient has type 2 diabetes chronic kidney disease morbid obesity peripheral neuropathy and chronic lower back pain requiring chronic opiate therapy.  The patient is requiring more and more care from caregivers and family to assist him in his ADLs.  Patient reportedly over the last few days has not been active in self stopped taking his medicines and began having seizures.     Pneumonia   Morbid obesity   CKD stage IV   Seizure disorder  4/11 H&P, Dr. Catrachita LAWLER    x Radiology Findings  CT Head: 4.1 x 3 cm mass in the high left frontal parietal vertex question if it is intraparenchymal or extradural with significant amount of surrounding edema or gliosis but no significant mass effect or midline shift  4/10 Radiology    Electrolyte Imbalance      x Medication  Oxcarbazepine 600mg PO BID   Oxcarbazepine 300mg PO BID   4/11 MAR   4/12-Present MAR    x Treatment          Family states when he takes his antiepileptic he becomes very groggy and sedated.  We are going to try a lower dose while he is here and we are treating him for pneumonia.    4/12 , Dr. Domínguez III    Other       The noted clinical guidelines are only system guidelines and do not replace the providers clinical judgment.    The National [place] of Neurologic Disorders and Stroke (NINDS) of  the NIH describes encephalopathy as any diffuse disease of the brain that alters brain function or structure.    Provider, please specify the diagnosis or diagnoses associated with above clinical findings.  [   ]  Toxic Encephalopathy - Due to Oxycarbazepine   [   x]  Toxic Encephalopathy - Due to Unspecified drug, chemical or toxic substance   [   ]  Other clarification: __________   [   ]  Clinically Undetermined     Please document in your progress notes daily for the duration of treatment until resolved, and include in your discharge summary.    References:  DORIS Segundo RN, CCDS. (2018, June 9). Notes from the Instructor: Encephalopathy tips. Retrieved October 22, 2020, from [email or web address]://[email or web address]/articles/[email or web address]-instructor-encephalopathy-tips    ICD-9-CM Coding Clinic First Quarter 2013, Effective with discharges: October 21, 2013 [facility] § Seizure with encephalopathy due to postictal state (2013).    ICD-10-CM/[employer] Integrated Codebook (Version V.20.8.10.0) [Computer software]. (2020). Retrieved October 21, 2020.    National [place] of Neurological Disorders and Stroke. (2019, March 27). Retrieved October 22, 2020, from https://www.ninds.nih.gov/Disorders/All-Disorders/Szicmnhqgrzjiw-Rlkqxdvccnj-Uydp    Form No. 74361

## 2022-04-18 NOTE — PT/OT/SLP EVAL
PhysicalTherapy   Evaluation    Jamey Lei   MRN: [id number]     PT Received On: 04/18/22                     Billable Minutes:  Evaluation 90  Total Minutes: 90  AM Start Time:  AM End Time:   PM Start Time: 1530  PM End Time: 1700  Treatment Type: Individual 90  Diagnosis: <principal problem not specified>  Past Medical History:   Diagnosis Date    Anticoagulant long-term use     Arthritis     CHF (congestive heart failure)     COPD (chronic obstructive pulmonary disease)     Hypertension     Renal disorder     Seizures     Sleep apnea       Past Surgical History:   Procedure Laterality Date    APPENDECTOMY      CORONARY ARTERY BYPASS GRAFT      FOOT SURGERY Left     HIP REPLACEMENT ARTHROPLASTY Right     JOINT REPLACEMENT      SHOULDER SURGERY Left     TOTAL KNEE ARTHROPLASTY Right        Referring physician: Dr Domínguez  Date referred to PT: 04-18-22    General Precautions: Standard,    Orthopedic Precautions:     Braces:            Patient History:  Lives with rafa and her children in [place]. He was needing a lot of assist with adls, and moved around the hose with a rollator. He has a BSC and a rollator         Previous Level of Function:  Ambulation Skills: needs assist  Transfer Skills: needs assist  ADL Skills: needs assist  Work/Leisure Activity: needs assist    Subjective:  Communicated with nurse prior to session.    Chief Complaint: sob and weakness  Patient goals: to be more independent so he can go home  Family goals: same    Pain/Comfort  Pain Rating 1: 0/10    Objective:  Patient found in wc, with      Cognitive Exam:  Oriented to: Person, Place and Time  Follows Commands/attention: Follows two-step commands  Communication: clear/fluent  Safety awareness/insight to disability: impaired    Physical Exam:  Postural examination/scapula alignment:    -       No postural abnormalities identified    Skin integrity: Visible skin intact  Edema: moderate lower leg edema  bilaterally    Sensation:   Impaired light touch BLE    Upper Extremity Range of Motion:  Refer to OT evaluation for UE ROM.    Upper Extremity Strength:  Refer to OT evaluation for UE strength.    Lower Extremity Range of Motion:  Right Lower Extremity: WFL  Left Lower Extremity: WFL    Lower Extremity Strength:  Right Lower Extremity: 3+/5 hip, 4-/5 knee and ankle  Left Lower Extremity: same as rle     Fine motor coordination:      Gross motor coordination: wnl    Functional Mobility:    Bed Mobility :   Supine to sit: Minimal Assistance   Sit to supine: Standby Assistance   Rolling: Standby Assistance   Scooting: Minimal Assistance    Transfers:  Sit to stand:Minimal Assistance with Rolling Walker    Bed <> Chair:  Step Transfer with Minimal Assistance with Rolling Walker      Wheelchair:  Pt pushed wc 50ft with sba    Gait:  Pt ambulated 50 ft with a rw with cga     Stairs:  No steps today     Balance:   Static Sit: GOOD: Takes MODERATE challenges from all directions  Dynamic Sit:  FAIR: Cannot move trunk without losing balance  Static Stand: FAIR: Maintains without assist but unable to take challenges  Dynamic stand: FAIR: Needs CONTACT GUARD during gait    Endurance deficit:  fair    Special Tests:      Additional Treatment:    Patient left supine with call button in reach.    Assessment:  Jamey Lei is a 81 y.o. male with a medical diagnosis of <principal problem not specified>. He presents with weakness in proximal BLEs and decrease in his fxn'l mobility.    Rehab potential is good.    Activity tolerance: Good    Plan: pt will be seen for 90 min 5-7days per week for 2 weeks    Discharge recommendations:       Equipment recommendations:      GOALS:   STGS  1. Pt will be sba with all bed mobility  2. Pt will be sba with transfers  3. Pt will ambulate with a rw 150ft sba  4. Pt will go up and down 12 steps with mahamed  5. Pt will perform wc mobility 150ft withsba  LTGs  1. Pt will be ind with bed mobility  2.  Pt will be ind with all transfers  3. Pt will be ind with gait with a rw 150ft   4. Pt will be sba with going up and down steps with use of rails  5. Pt ind with wc mobility 150ft    PLAN:    Patient to be seen 5 x/week to address the above listed problems via therapeutic activities, gait training, wheelchair management/training, therapeutic exercises, therapeutic groups, neuromuscular re-education  Plan of Care expires: 05/02/22  Plan of Care reviewed with: patient          4/18/2022

## 2022-04-19 LAB
ALBUMIN SERPL BCP-MCNC: 2 G/DL (ref 3.5–5.2)
ALP SERPL-CCNC: 43 U/L (ref 55–135)
ALT SERPL W/O P-5'-P-CCNC: 20 U/L (ref 10–44)
ANION GAP SERPL CALC-SCNC: 7 MMOL/L (ref 8–16)
AST SERPL-CCNC: 20 U/L (ref 10–40)
BASOPHILS # BLD AUTO: 0.06 K/UL (ref 0–0.2)
BASOPHILS NFR BLD: 0.9 % (ref 0–1.9)
BILIRUB SERPL-MCNC: 0.5 MG/DL (ref 0.1–1)
BUN SERPL-MCNC: 29 MG/DL (ref 8–23)
CALCIUM SERPL-MCNC: 9.4 MG/DL (ref 8.7–10.5)
CHLORIDE SERPL-SCNC: 106 MMOL/L (ref 95–110)
CO2 SERPL-SCNC: 30 MMOL/L (ref 23–29)
CREAT SERPL-MCNC: 1.8 MG/DL (ref 0.5–1.4)
DIFFERENTIAL METHOD: ABNORMAL
EOSINOPHIL # BLD AUTO: 0.2 K/UL (ref 0–0.5)
EOSINOPHIL NFR BLD: 2.3 % (ref 0–8)
ERYTHROCYTE [DISTWIDTH] IN BLOOD BY AUTOMATED COUNT: 18.6 % (ref 11.5–14.5)
EST. GFR  (AFRICAN AMERICAN): 40.5 ML/MIN/1.73 M^2
EST. GFR  (NON AFRICAN AMERICAN): 35 ML/MIN/1.73 M^2
GLUCOSE SERPL-MCNC: 95 MG/DL (ref 70–110)
HCT VFR BLD AUTO: 32 % (ref 40–54)
HGB BLD-MCNC: 10 G/DL (ref 14–18)
IMM GRANULOCYTES # BLD AUTO: 0.02 K/UL (ref 0–0.04)
IMM GRANULOCYTES NFR BLD AUTO: 0.3 % (ref 0–0.5)
LYMPHOCYTES # BLD AUTO: 0.9 K/UL (ref 1–4.8)
LYMPHOCYTES NFR BLD: 13.1 % (ref 18–48)
MAGNESIUM SERPL-MCNC: 2.4 MG/DL (ref 1.6–2.6)
MCH RBC QN AUTO: 29.2 PG (ref 27–31)
MCHC RBC AUTO-ENTMCNC: 31.3 G/DL (ref 32–36)
MCV RBC AUTO: 93 FL (ref 82–98)
MONOCYTES # BLD AUTO: 0.7 K/UL (ref 0.3–1)
MONOCYTES NFR BLD: 10.3 % (ref 4–15)
NEUTROPHILS # BLD AUTO: 4.9 K/UL (ref 1.8–7.7)
NEUTROPHILS NFR BLD: 73.1 % (ref 38–73)
NRBC BLD-RTO: 0 /100 WBC
PLATELET # BLD AUTO: 190 K/UL (ref 150–450)
PMV BLD AUTO: 10.3 FL (ref 9.2–12.9)
POTASSIUM SERPL-SCNC: 3.3 MMOL/L (ref 3.5–5.1)
PROT SERPL-MCNC: 6.7 G/DL (ref 6–8.4)
RBC # BLD AUTO: 3.43 M/UL (ref 4.6–6.2)
SODIUM SERPL-SCNC: 143 MMOL/L (ref 136–145)
WBC # BLD AUTO: 6.63 K/UL (ref 3.9–12.7)

## 2022-04-19 PROCEDURE — 99900035 HC TECH TIME PER 15 MIN (STAT)

## 2022-04-19 PROCEDURE — 25000003 PHARM REV CODE 250: Performed by: INTERNAL MEDICINE

## 2022-04-19 PROCEDURE — 97116 GAIT TRAINING THERAPY: CPT

## 2022-04-19 PROCEDURE — 97110 THERAPEUTIC EXERCISES: CPT

## 2022-04-19 PROCEDURE — 83735 ASSAY OF MAGNESIUM: CPT | Performed by: INTERNAL MEDICINE

## 2022-04-19 PROCEDURE — 25000242 PHARM REV CODE 250 ALT 637 W/ HCPCS: Performed by: INTERNAL MEDICINE

## 2022-04-19 PROCEDURE — 97530 THERAPEUTIC ACTIVITIES: CPT

## 2022-04-19 PROCEDURE — 63600175 PHARM REV CODE 636 W HCPCS: Performed by: INTERNAL MEDICINE

## 2022-04-19 PROCEDURE — 85025 COMPLETE CBC W/AUTO DIFF WBC: CPT | Performed by: INTERNAL MEDICINE

## 2022-04-19 PROCEDURE — 94640 AIRWAY INHALATION TREATMENT: CPT

## 2022-04-19 PROCEDURE — 94761 N-INVAS EAR/PLS OXIMETRY MLT: CPT

## 2022-04-19 PROCEDURE — 80053 COMPREHEN METABOLIC PANEL: CPT | Performed by: INTERNAL MEDICINE

## 2022-04-19 PROCEDURE — 99900031 HC PATIENT EDUCATION (STAT)

## 2022-04-19 PROCEDURE — 36415 COLL VENOUS BLD VENIPUNCTURE: CPT | Performed by: INTERNAL MEDICINE

## 2022-04-19 PROCEDURE — 27000221 HC OXYGEN, UP TO 24 HOURS

## 2022-04-19 PROCEDURE — 11000001 HC ACUTE MED/SURG PRIVATE ROOM

## 2022-04-19 RX ORDER — IPRATROPIUM BROMIDE AND ALBUTEROL SULFATE 2.5; .5 MG/3ML; MG/3ML
3 SOLUTION RESPIRATORY (INHALATION) 3 TIMES DAILY
Status: DISCONTINUED | OUTPATIENT
Start: 2022-04-19 | End: 2022-04-19

## 2022-04-19 RX ORDER — IPRATROPIUM BROMIDE AND ALBUTEROL SULFATE 2.5; .5 MG/3ML; MG/3ML
3 SOLUTION RESPIRATORY (INHALATION) 3 TIMES DAILY
Status: DISCONTINUED | OUTPATIENT
Start: 2022-04-19 | End: 2022-04-20

## 2022-04-19 RX ORDER — HYDROCODONE BITARTRATE AND ACETAMINOPHEN 7.5; 325 MG/1; MG/1
1 TABLET ORAL EVERY 6 HOURS PRN
Status: DISCONTINUED | OUTPATIENT
Start: 2022-04-19 | End: 2022-04-29 | Stop reason: HOSPADM

## 2022-04-19 RX ORDER — MUPIROCIN 20 MG/G
OINTMENT TOPICAL 2 TIMES DAILY
Status: DISPENSED | OUTPATIENT
Start: 2022-04-19 | End: 2022-04-24

## 2022-04-19 RX ADMIN — ASPIRIN 81 MG: 81 TABLET, COATED ORAL at 08:04

## 2022-04-19 RX ADMIN — OXCARBAZEPINE 300 MG: 300 TABLET, FILM COATED ORAL at 09:04

## 2022-04-19 RX ADMIN — ACETYLCYSTEINE 4 ML: 200 SOLUTION ORAL; RESPIRATORY (INHALATION) at 07:04

## 2022-04-19 RX ADMIN — IPRATROPIUM BROMIDE AND ALBUTEROL SULFATE 3 ML: 2.5; .5 SOLUTION RESPIRATORY (INHALATION) at 03:04

## 2022-04-19 RX ADMIN — Medication 6 MG: at 09:04

## 2022-04-19 RX ADMIN — PIPERACILLIN AND TAZOBACTAM 4.5 G: 4; .5 INJECTION, POWDER, LYOPHILIZED, FOR SOLUTION INTRAVENOUS; PARENTERAL at 08:04

## 2022-04-19 RX ADMIN — DOCUSATE SODIUM 100 MG: 100 CAPSULE, LIQUID FILLED ORAL at 09:04

## 2022-04-19 RX ADMIN — TORSEMIDE 100 MG: 20 TABLET ORAL at 08:04

## 2022-04-19 RX ADMIN — ENOXAPARIN SODIUM 40 MG: 40 INJECTION SUBCUTANEOUS at 04:04

## 2022-04-19 RX ADMIN — IPRATROPIUM BROMIDE AND ALBUTEROL SULFATE 3 ML: 2.5; .5 SOLUTION RESPIRATORY (INHALATION) at 11:04

## 2022-04-19 RX ADMIN — EZETIMIBE 10 MG: 10 TABLET ORAL at 08:04

## 2022-04-19 RX ADMIN — GUAIFENESIN 1200 MG: 600 TABLET, EXTENDED RELEASE ORAL at 09:04

## 2022-04-19 RX ADMIN — POTASSIUM BICARBONATE 40 MEQ: 391 TABLET, EFFERVESCENT ORAL at 08:04

## 2022-04-19 RX ADMIN — MUPIROCIN: 20 OINTMENT TOPICAL at 09:04

## 2022-04-19 RX ADMIN — ATORVASTATIN CALCIUM 20 MG: 20 TABLET, FILM COATED ORAL at 08:04

## 2022-04-19 RX ADMIN — OXCARBAZEPINE 300 MG: 300 TABLET, FILM COATED ORAL at 08:04

## 2022-04-19 RX ADMIN — PIPERACILLIN AND TAZOBACTAM 4.5 G: 4; .5 INJECTION, POWDER, LYOPHILIZED, FOR SOLUTION INTRAVENOUS; PARENTERAL at 03:04

## 2022-04-19 RX ADMIN — ACETAMINOPHEN 650 MG: 325 TABLET ORAL at 12:04

## 2022-04-19 RX ADMIN — IPRATROPIUM BROMIDE AND ALBUTEROL SULFATE 3 ML: 2.5; .5 SOLUTION RESPIRATORY (INHALATION) at 07:04

## 2022-04-19 RX ADMIN — PIPERACILLIN AND TAZOBACTAM 4.5 G: 4; .5 INJECTION, POWDER, LYOPHILIZED, FOR SOLUTION INTRAVENOUS; PARENTERAL at 11:04

## 2022-04-19 RX ADMIN — IPRATROPIUM BROMIDE AND ALBUTEROL SULFATE 3 ML: .5; 3 SOLUTION RESPIRATORY (INHALATION) at 07:04

## 2022-04-19 RX ADMIN — METOPROLOL TARTRATE 25 MG: 25 TABLET, FILM COATED ORAL at 09:04

## 2022-04-19 RX ADMIN — GUAIFENESIN 1200 MG: 600 TABLET, EXTENDED RELEASE ORAL at 08:04

## 2022-04-19 RX ADMIN — HYDROCODONE BITARTRATE AND ACETAMINOPHEN 1 TABLET: 7.5; 325 TABLET ORAL at 03:04

## 2022-04-19 RX ADMIN — METOPROLOL TARTRATE 25 MG: 25 TABLET, FILM COATED ORAL at 08:04

## 2022-04-19 NOTE — PT/OT/SLP PROGRESS
Occupational Therapy  Treatment    Jamey Lei   MRN: [id number]   Admitting Diagnosis: Encephalopathy, toxic    OT Date of Treatment: 04/19/22   AM Start Time: na  AM End Time: na  PM Start Time: 1400  PM End Time: 1530  Treatment Type: Individual 60 and Concurrent 30  Total Time (min): 90 min    Billable Minutes:  Therapeutic Activity 45 and Therapeutic Exercise 45  Total Minutes: 60    General Precautions: Standard, fall  Orthopedic Precautions: N/A  Braces:      Spiritual, Cultural Beliefs, Orthodoxy Practices, Values that Affect Care: no    Subjective:  Communicated with nurse prior to session.    Pain/Comfort  Pain Rating 1: 7/10  Location - Side 1: Bilateral  Location - Orientation 1: lower  Location 1: leg  Pain Addressed 1: Reposition, Nurse notified, Cessation of Activity  Pain Rating Post-Intervention 1: 7/10    Objective:  Patient found with: espinal catheter, oxygen (2 L O2 per NC). Pt was cooperative and motivated with minimal verbal encouragement while exhibiting positive affect, but remains pleasantly confused. He participated in functional transfer retraining throughout session to / from recliner, chair, and toilet emphasizing fall prevention providing extra time with repetition requiring min assist secondary to steadying assist with verbal and tactile cueing for safety awareness and technique. Pt then participated in therapeutic activity addressing trunk mobility, trunk control, dynamic sitting balance, and trunk strength utilizing large stability ball challenging him to perform trunk flexion, extension, and lateral flexion with bilateral hands stabilized on ball requiring verbal and tactile cueing to facilitate optimal movement patterns and increased range per trial in order to improve active ROM impacting performance with functional tasks below waist. Next, he participated in therapeutic exercise performing 5x8 seated pushups requiring verbal and tactile cueing for technique challenging him to  lift buttocks off seated surface to end range elbow extension in order to strengthen triceps brachii to improve performance with sit <> stand transitions particularly from lower surfaces. Pt then participated in therapeutic exercise performing 3x12 bilateral UE proximal strengthening strengthening exercises utilizing moderate resistance theraband with scapular retraction, shoulder extension, shoulder adduction, horizontal abduction, shoulder flexion in plane of scaption, internal rotation, and external rotation while seated in chair unsupported requiring continuous verbal and tactile cueing for technique.    Functional Mobility:  Bed Mobility:   Supine to sit: Minimal Assistance   Sit to supine: Standby Assistance   Rolling: Standby Assistance   Scooting: Standby Assistance    Transfer Training:   Sit to stand:Minimal Assistance with Rolling Walker .  Bed <> Chair:  Step Transfer with Minimal Assistance with Rolling Walker .  Toilet Transfer:  Pt Step Transfer with Minimal Assistance with Grab bars .    Balance:   Static Sit: GOOD-: Takes MODERATE challenges from all directions but inconsistently  Dynamic Sit:  FAIR: Cannot move trunk without losing balance  Static Stand: FAIR: Maintains without assist but unable to take challenges  Dynamic stand: POOR+: Needs MIN (minimal ) assist during gait    Patient left supine with all lines intact, call button in reach and nurse notified    ASSESSMENT:  Pt demonstrated good effort and motivation despite confusion allowing him to initiate progression towards functional goals in order to regain independence while decreasing burden of care to return home safely.     Rehab potential is good    Activity tolerance: Fair    Discharge recommendations: home with home health     Equipment recommendations: other (see comments) (To be further determined based on progress prior to discharge.)     GOALS:   Multidisciplinary Problems     Occupational Therapy Goals        Problem:  Occupational Therapy    Goal Priority Disciplines Outcome Interventions   Occupational Therapy Goal     OT, PT/OT     Description: Long Term Goals to be met by: 05/02/22     Patient will increase functional independence with ADLs by performing:    Feeding with [place].  UE Dressing with Set-up Assistance.  LE Dressing with Minimal Assistance.  Grooming while seated at sink with Modified [place].  Toileting from toilet with Supervision for hygiene and clothing management.   Bathing from  shower chair/bench with Stand-by Assistance.  Step transfer with Modified [place]  Toilet transfer to toilet with Modified [place].  Increased functional strength to 4 to 4+/5 for bilateral UE's.                     Plan:  Patient to be seen 5 x/week (for 90 min per day for 14 days) to address the above listed problems via self-care/home management, community/work re-entry, therapeutic activities, therapeutic exercises, cognitive retraining  Plan of Care expires: 05/02/22  Plan of Care reviewed with: patient         04/19/2022

## 2022-04-19 NOTE — ASSESSMENT & PLAN NOTE
Patient is a good candidate for inpatient rehab recovery.  Patient's prognosis is still fair to good.  Patient's family and is encouraging and supportive.  We would like to see the patient improved to at least a supervision functional level.  The patient's cognition is improving with treatment as his acute infection.  The patient should have close lab monitoring close renal and electrolyte monitoring as well as he will need multi modality therapeutic interventions for recovery.  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    ESTIMATED LENGTH OF STAY:  The patient's estimated length of stay is 10-14 days and she is expected to be able to be discharged to home with family .    REHABILITATION PLAN/GOALS  The patient is being admitted to a comprehensive acute inpatient rehabilitation program consisting of at least 3 hours of combined physical ( 1.5hrs./day, 5 days a week), and occupational therapy (1.5 hrs. A day, 5 days a week),speech therapy services if necessary, 24-hour skilled rehabilitation nursing care, and close supervision of a physician with special training and experience in rehabilitation medicine. Patient's prognosis for significant practical improvement within a reasonable period of time appears fair . Given the patient's complex medical condition and risk of further medical complications, rehabilitation services could not be safely provided at a lower level of care such as a skilled nursing facility.                    1. Physical Therapy to Evaluate and Treat, Work on bed mobility, Assist with transfers, Strength, Gait, Fall Prevention, Balance and Modalities as Needed   2. Occupational Therapy to ADL Retraining, Functional Transfer Training, Therapeutic Activity and Exercises, Neuromuscular Re-education, Manual Therapy, Use of Adaptive Equipment and Retraining,  Safety Awareness and Fall Prevention and Home Modification   3. Speech Pathology to Evaluate and assist with dysphagia, Make recommendations for a safe diet, Evaluate and Treat Cognitive linguistic deficits, Evaluate and Treat expressive and receptive language deficits and Evaluate and treat motor speech deficits   4. Specialized 24-hour Rehabilitation Nursing Care to Protection of Skin and Soft Tissue, Assisting with continence, Bowel and bladder training, Neurological checks, Medicine administration and medicine and management, Wound care and Fall protection and general encouragement   5. Dietary to educuate and assist with nutrition.   6. Social Work/Case management to assist with discharge planning activities, acquisition of durable medical equipment, and ordering of follow up services as necessary.    The services provided in the acute medical rehab setting are under my supervision 24 hours a day. These services are necessary for this patient to achieve the most appropriate functional outcome and to determine the most appropriate discharge disposition.  I have reviewed the treatment plan with the patient and answered all of her questions, discussed goals and expectation.    REVIEW OF PRE-SCREEN ASSESSMENT  I have reviewed the patient's preadmission screen including her medical and functional status and compared it with her status upon arrival to the rehab unit and see no change . Acute rehab services are still necessary and appropriate for this patient to achieve the best functional outcome while determining the most appropriate discharge disposition and services. The patient will require close medical management of multiple medical co-morbidities including as noted above .

## 2022-04-19 NOTE — PLAN OF CARE
Attempted to complete CM assessment with pt, but pt was too confused to get an accurate assessment. Attempted to reach pt's son Rachid and was unable to reach or have the option to leave a message.

## 2022-04-19 NOTE — PT/OT/SLP PROGRESS
Physical Therapy         Treatment        Jamey Lei   MRN: [id number]                 Billable Minutes:  Gait [id number] minutes, Therapeutic Activity 30 minutes and Therapeutic Exercise 30 minutes  Total Minutes: 90 minutes individual treatment                     General Precautions: Standard,  fall         Subjective:  Communicated with nurse prior to session.     Pain: 0/10    Objective:  Patient found sitting up in recliner in dining area; pleasant and willing to participate      Functional Mobility:  Bed Mobility:  NA this session    Balance:   Static Sit: GOOD: Takes MODERATE challenges from all directions  Dynamic Sit:  FAIR: Cannot move trunk without losing balance  Static Stand: FAIR: Maintains without assist but unable to take challenges  Dynamic stand: FAIR: Needs CONTACT GUARD during gait    Transfer Training:  Sit to stand:Minimal Assistance with Rolling Walker vc's for hand placement  Bed <> Chair:  Stand Pivot and Step Transfer with Minimal Assistance with Rolling Walker vc's for safely backing to seat and hand placement    Wheelchair Training:  NA this date    Gait Training:  Performed gait training using RW x 4 trials of 50'-70' with contact guard assist; short step to pattern; decreased step height and fatigues. Pt on 2 L NC O2    Stair Training:  NA      Additional Treatment:  Performed sit to/from stand transfers from armed chair 5 reps x 3 to practice technique; Pt also performed B LE there ex in sitting using 3# cuff weights 3 trials of 15 reps f/b standing marching in place and mini dips 10 reps x 3 sets with seated rest break in between due to fatigue in standing.    Activity Tolerance:  Patient tolerated treatment well    Patient left reclined in dining area with nurse notified.    Assessment:  Jamey Lei is a 81 y.o. male with a medical diagnosis of Encephalopathy, toxic. He presents with slight improvement in activity tolerance; good pt participation early on.    Rehab potential is  good.    Activity tolerance: Good    Discharge recommendations:       Equipment recommendations:       GOALS:   Multidisciplinary Problems     Physical Therapy Goals     Not on file                PLAN:    Patient to be seen 5 x/week  to address the above listed problems via therapeutic activities, gait training, wheelchair management/training, therapeutic exercises, therapeutic groups, neuromuscular re-education  Plan of Care expires: 05/02/22  Plan of Care reviewed with: patient         4/19/2022

## 2022-04-19 NOTE — PT/OT/SLP EVAL
Occupational Therapy  Evaluation    Jamey Lei   MRN: [id number]   Admitting Diagnosis: Toxic Encephalopathy    OT Date of Treatment: 04/18/22   OT Start Time: 1730  OT Stop Time: 1900  OT Total Time (min): 90 min    Billable Minutes:  Evaluation 90  Total Minutes: 90    Diagnosis: Toxic Encephalopathy      Past Medical History:   Diagnosis Date    Anticoagulant long-term use     Arthritis     CHF (congestive heart failure)     COPD (chronic obstructive pulmonary disease)     Hypertension     Renal disorder     Seizures     Sleep apnea       Past Surgical History:   Procedure Laterality Date    APPENDECTOMY      CORONARY ARTERY BYPASS GRAFT      FOOT SURGERY Left     HIP REPLACEMENT ARTHROPLASTY Right     JOINT REPLACEMENT      SHOULDER SURGERY Left     TOTAL KNEE ARTHROPLASTY Right        Referring physician: Dr. Navi Domínguez  Date referred to OT: 04/18/22    General Precautions: Standard, fall  Orthopedic Precautions: N/A  Braces: N/A    Spiritual, Cultural Beliefs, Zoroastrianism Practices, Values that Affect Care: no     Patient History:  Living Environment  Lives With: other (see comments) (Pt lives with his family including granddaughter, her , and their children.)  Living Arrangements: house  Home Accessibility: other (see comments) ([place] to enter / exit home.)  Home Layout: Able to live on 1st floor  Stair Railings at Home: none  Transportation Anticipated: family or friend will provide  Equipment Currently Used at Home: bedside commode, rollator    Prior level of function:    Bed Mobility/Transfers: needs device  Grooming: independent  Bathing: needs device and assist  Upper Body Dressing: independent  Lower Body Dressing: needs assist  Toileting: needs device  Homemaking Responsibilities: Yes  Meal Prep Responsibility: Secondary  Laundry Responsibility: No  Cleaning Responsibility: No  Bill Paying/Finance Responsibility: Secondary  Shopping Responsibility: No    Responsibility: Secondary  Driving License: No  Mode of Transportation: Family  Education: 12th grade  Occupation: Retired  Type of Occupation:   Leisure and Hobbies: Hunting     Dominant hand: right    Subjective:  Communicated with nurse prior to session.    Chief Complaint: Weakness and SOB  Patient/Family stated goals: Pt would like to regain independence with ADL's including functional mobility in order to return home with his family.    Pain/Comfort  Pain Rating 1: 0/10  Pain Rating Post-Intervention 1: 0/10    Objective:  Patient found with: peripheral IV, espinal catheter    Cognitive Exam:  Oriented to: Person and Situation  Follows Commands/attention: Follows two-step commands  Communication: anomia  Memory:  Impaired STM and Poor immediate recall  Safety awareness/insight to disability: impaired  Coping skills/emotional control: Appropriate to situation    Visual/perceptual:  Impaired  acuity    Physical Exam:  Postural examination/scapula alignment: -       Rounded shoulders  -       Forward head  Skin integrity: Bruising to bilateral arms, brownish discoloration to (B) LE's, several scabs to BUE, abrasion to (L) forearm, pinkness with moisture to perineal area and wound   Edema: Moderate bilateral lower legs    Sensation:   -       Intact  light/touch bilateral UE's    Upper Extremity Range of Motion:  Right Upper Extremity: WFL  Left Upper Extremity: WFL    Upper Extremity Strength:  Right Upper Extremity: Deficits: 3 to 3+ / 5 shoulder, 3+ to 4- / 5 elbow, forearm, and wrist  Left Upper Extremity: Deficits: 3 to 3+ / 5 shoulder, 3+ to 4- / 5 elbow, forearm, and wrist   Strength: (R) 41 lbs; (L) 40 lbs    Fine motor coordination:   -       Impaired  Left hand, finger to nose mild, Right hand, finger to nose mild, Left hand, manipulation of objects mild and Right hand, manipulation of objects mild    Gross motor coordination: Impaired bilateral UE hand-eye coordination    Functional  Mobility:  Bed Mobility:   Supine to sit: Minimal Assistance   Sit to supine: Standby Assistance   Rolling: Standby Assistance   Scooting: Minimal Assistance    Transfers:   Sit to stand:Minimal Assistance with Rolling Walker .  Bed <> Chair:  Step Transfer with Minimal Assistance with Rolling Walker .  Toilet Transfer:  Pt Step Transfer with Minimal Assistance with Rolling Walker .  Patient performed shower transfer Step Transfer with Minimal Assistance with Grab bars and shower chair.    Feeding:  Patient performed feeding with Supervision or Set-up Assistance with extra time.    Grooming:  Patient peformed hand washing with Supervision or Set-up Assistance at sitting at sink.  Patient performed face washing with Supervision or Set-up Assistance at sitting at sink.  Patient performed oral hygeine with Minimal Assistance at sitting at sink.    Bathing:  Patient performed bathing with Maximum Assistance with grab bar, Handheld shower head and shower chair at Shower.    UE Dressing:  Patient performed UE Dressing with Moderate Assistance with extra time at Edge of bed.    LE Dressing:  Patient don/doffed socks with Total Assistance, Patient don/doffed shoes with Total Assistance, Patient performed don/doffed adult brief with Maximum Assistance and Patient performed don/doffed pants with Maximum Assistance    Toileting:  Pt performed toileting with Maximum Assistance with Grab  bar at Commode.    Balance:   Static Sit: GOOD-: Takes MODERATE challenges from all directions but inconsistently  Dynamic Sit:  FAIR: Cannot move trunk without losing balance  Static Stand: FAIR: Maintains without assist but unable to take challenges  Dynamic stand: POOR+: Needs MIN (minimal ) assist during gait      Patient left up in chair with nurse notified and CNA present    Assessment:  Jamey Lei is a 81 y.o. male with a medical diagnosis of Toxic Encephalopathy and presents with weakness, impaired endurance, impaired self care  skills, impaired functional mobilty, gait instability, impaired balance, impaired cognition, decreased coordination, decreased safety awareness, impaired skin, edema, and impaired cardiopulmonary response to activity.    Rehab potential is good    Activity tolerance: Fair    Discharge recommendations: home with home health     Equipment recommendations: other (see comments) (To be further determined based on progress prior to discharge.)     GOALS:   Short Term Goals to be met by: 04/25/22     Patient will increase functional independence with ADLs by performing:    Feeding with Set-up Assistance.  UE Dressing with Minimal Assistance.  LE Dressing with Moderate Assistance.  Grooming while standing at sink with Supervision.  Toileting from toilet with Moderate Assistance for hygiene and clothing management.   Bathing from  shower chair/bench with Moderate Assistance.  Step transfer with Stand-by Assistance  Toilet transfer to toilet with Stand-by Assistance.  Increased functional strength to 4-/5 for bilateral UE's.    Long Term Goals to be met by: 05/02/22     Patient will increase functional independence with ADLs by performing:    Feeding with [place].  UE Dressing with Set-up Assistance.  LE Dressing with Minimal Assistance.  Grooming while seated at sink with Modified [place].  Toileting from toilet with Supervision for hygiene and clothing management.   Bathing from  shower chair/bench with Stand-by Assistance.  Step transfer with Modified [place]  Toilet transfer to toilet with Modified [place].  Increased functional strength to 4 to 4+/5 for bilateral UE's.    PLAN: Patient to be seen 5 x/week (for 90 min per day for 14 days) to address the above listed problems via self-care/home management, community/work re-entry, therapeutic activities, therapeutic exercises, cognitive retraining  Plan of Care expires: 05/02/22  Plan of Care reviewed with: patient         04/18/2022

## 2022-04-19 NOTE — HPI
Patient is a 81-year-old male with a past medical history significant for right-sided lung mass who presented to the emergency department with decreased appetite for last few days.  The patient had reportedly stopped taking his medications and was agitated.  Patient had a witnessed seizure with no head trauma.  Patient has a history of seizure disorder meningioma and chronic kidney disease.  The patient about 2-3 months ago had a admission for pneumonia and a lung mass and was ultimately transferred to an outside facility and had lung biopsies.  They were negative for malignancy the patient had an inpatient rehab stay and had a significant improvement in his level of function.  The patient over the last few months has had a progressive decline when we now have this repeated admission.  The patient was started on antibiotics after admission his oral medications re-initiated and he was placed on anti epileptics.  The patient has improved and recovered and the patient and family are wanting therapy to get him back to his previous level of function and hopefully return home.  The patient is having intermittent confusion he has progressively improved since his seizure and getting back on medicines.  Overall the patient seems slightly depressed over his current diagnoses and deterioration of condition will continually trying to motivate and encourage the patient.

## 2022-04-19 NOTE — SUBJECTIVE & OBJECTIVE
Past Medical History:   Diagnosis Date    Anticoagulant long-term use     Arthritis     CHF (congestive heart failure)     COPD (chronic obstructive pulmonary disease)     Hypertension     Renal disorder     Seizures     Sleep apnea        Past Surgical History:   Procedure Laterality Date    APPENDECTOMY      CORONARY ARTERY BYPASS GRAFT      FOOT SURGERY Left     HIP REPLACEMENT ARTHROPLASTY Right     JOINT REPLACEMENT      SHOULDER SURGERY Left     TOTAL KNEE ARTHROPLASTY Right        Review of patient's allergies indicates:  No Known Allergies    Current Facility-Administered Medications on File Prior to Encounter   Medication    [DISCONTINUED] acetaminophen tablet 650 mg    [DISCONTINUED] acetaminophen tablet 650 mg    [DISCONTINUED] acetylcysteine 200 mg/ml (20%) solution 4 mL    [DISCONTINUED] albuterol-ipratropium 2.5 mg-0.5 mg/3 mL nebulizer solution 3 mL    [DISCONTINUED] aspirin EC tablet 81 mg    [DISCONTINUED] atorvastatin tablet 20 mg    [DISCONTINUED] docusate sodium capsule 100 mg    [DISCONTINUED] enoxaparin injection 40 mg    [DISCONTINUED] ezetimibe tablet 10 mg    [DISCONTINUED] guaiFENesin 12 hr tablet 1,200 mg    [DISCONTINUED] melatonin tablet 6 mg    [DISCONTINUED] metoprolol tartrate (LOPRESSOR) tablet 25 mg    [DISCONTINUED] morphine injection 2 mg    [DISCONTINUED] ondansetron injection 4 mg    [DISCONTINUED] OXcarbazepine tablet 300 mg    [DISCONTINUED] piperacillin-tazobactam 4.5 g in dextrose 5 % 100 mL IVPB (ready to mix system)    [DISCONTINUED] potassium bicarbonate disintegrating tablet 40 mEq    [DISCONTINUED] torsemide tablet 100 mg     Current Outpatient Medications on File Prior to Encounter   Medication Sig    aspirin (ECOTRIN) 81 MG EC tablet Take 81 mg by mouth once daily.    docusate sodium (COLACE) 100 MG capsule Take 100 mg by mouth 2 (two) times daily.    ezetimibe (ZETIA) 10 mg tablet Take 10 mg by mouth once daily.    guaiFENesin (MUCINEX) 600 mg 12 hr tablet Take 1,200  mg by mouth 2 (two) times daily.    metoprolol tartrate (LOPRESSOR) 25 MG tablet Take 25 mg by mouth 2 (two) times daily.    miconazole NITRATE 2 % (MICOTIN) 2 % top powder Apply topically as needed for Itching.    OXcarbazepine (TRILEPTAL) 600 MG Tab Take 150 mg by mouth 2 (two) times daily.    potassium chloride (MICRO-K) 10 MEQ CpSR Take 20 mEq by mouth once. Every other day    torsemide (DEMADEX) 10 MG Tab Take 20 mg by mouth 2 (two) times daily.    calcium carbonate (OS-DAVID) 600 mg calcium (1,500 mg) Tab Take 600 mg by mouth once.    HYDROcodone-acetaminophen (NORCO) 5-325 mg per tablet Take 1 tablet by mouth every 6 (six) hours as needed for Pain.    lovastatin (MEVACOR) 10 MG tablet Take 10 mg by mouth every evening.    multivitamin capsule Take 1 capsule by mouth once daily.    vitamin D (VITAMIN D3) 1000 units Tab Take 1,000 Units by mouth once daily.     Family History    None       Tobacco Use    Smoking status: Current Every Day Smoker     Types: Cigarettes    Smokeless tobacco: Not on file    Tobacco comment: quit 30 years ago   Substance and Sexual Activity    Alcohol use: Not Currently    Drug use: Never    Sexual activity: Not Currently     Review of Systems   Unable to perform ROS: Mental status change   Objective:     Vital Signs (Most Recent):  Temp: 98.2 °F (36.8 °C) (04/19/22 0456)  Pulse: 109 (04/19/22 1116)  Resp: 20 (04/19/22 1116)  BP: 116/61 (04/19/22 0837)  SpO2: 97 % (04/19/22 1116) Vital Signs (24h Range):  Temp:  [98.2 °F (36.8 °C)-98.7 °F (37.1 °C)] 98.2 °F (36.8 °C)  Pulse:  [] 109  Resp:  [18-36] 20  SpO2:  [93 %-100 %] 97 %  BP: (116-140)/(61-72) 116/61     Weight: 104.3 kg (230 lb)  Body mass index is 32.08 kg/m².    Physical Exam  Constitutional:       General: He is awake. He is not in acute distress.     Appearance: He is obese. He is ill-appearing. He is not toxic-appearing or diaphoretic.   HENT:      Head: Normocephalic and atraumatic.      Nose: Nose normal. No  congestion or rhinorrhea.      Mouth/Throat:      Mouth: Mucous membranes are moist.      Pharynx: Oropharynx is clear. No oropharyngeal exudate or posterior oropharyngeal erythema.   Eyes:      General: No scleral icterus.        Right eye: No discharge.         Left eye: No discharge.      Extraocular Movements: Extraocular movements intact.      Pupils: Pupils are equal, round, and reactive to light.   Neck:      Thyroid: No thyroid mass or thyromegaly.      Vascular: No carotid bruit.      Meningeal: Brudzinski's sign and Kernig's sign absent.   Cardiovascular:      Rate and Rhythm: Normal rate and regular rhythm.      Chest Wall: PMI is not displaced. No thrill.      Pulses: Normal pulses.      Heart sounds: Normal heart sounds. No murmur heard.    No friction rub. No gallop.   Pulmonary:      Effort: Pulmonary effort is normal. No tachypnea, accessory muscle usage, prolonged expiration or respiratory distress.      Breath sounds: No stridor or decreased air movement. Rhonchi present. No wheezing or rales.   Chest:      Chest wall: No tenderness.   Abdominal:      General: Bowel sounds are normal. There is no distension.      Palpations: Abdomen is soft. There is no hepatomegaly, splenomegaly or mass.      Tenderness: There is no abdominal tenderness. There is no right CVA tenderness, left CVA tenderness, guarding or rebound.      Hernia: No hernia is present.   Musculoskeletal:         General: No swelling, tenderness, deformity or signs of injury. Normal range of motion.      Cervical back: Normal range of motion and neck supple. No rigidity. No muscular tenderness.      Right lower leg: Edema present.      Left lower leg: No edema.   Lymphadenopathy:      Cervical: No cervical adenopathy.   Skin:     General: Skin is warm.      Capillary Refill: Capillary refill takes less than 2 seconds.      Coloration: Skin is not cyanotic, jaundiced or pale.      Findings: No erythema, lesion, petechiae or rash.    Neurological:      Mental Status: He is alert. He is disoriented.      Cranial Nerves: No cranial nerve deficit, dysarthria or facial asymmetry.      Motor: Weakness present. No tremor.      Gait: Gait abnormal.   Psychiatric:         Mood and Affect: Mood is depressed. Mood is not anxious. Affect is flat and tearful.         Speech: Speech is not rapid and pressured or slurred.         Behavior: Behavior is withdrawn. Behavior is not agitated, aggressive or combative.         Thought Content: Thought content is not paranoid or delusional.         Cognition and Memory: Memory is impaired.        Significant Labs: All pertinent labs within the past 24 hours have been reviewed.    Significant Imaging: I have reviewed all pertinent imaging results/findings within the past 24 hours.

## 2022-04-19 NOTE — NURSING
"1151 PM- Patient noted to have increase confusion. Patient attempted to get out of bed and bed alarm went off. Patient stated." I need to go the bathroom." Patient redirected per staff and assisted back to bed. Patient also given melatonin 6 mg for insomnia.  "

## 2022-04-19 NOTE — H&P
[facility] Medicine  History & Physical    Patient Name: Jamey Lei  MRN: [id number]  Patient Class: IP- Rehab  Admission Date: 4/18/2022  Attending Physician: Navi Domínguez III, MD  Primary Care Provider: Navi Domínguez III, MD         Patient information was obtained from patient, past medical records and ER records.     Subjective:     Principal Problem:Encephalopathy, toxic    Chief Complaint:   Chief Complaint   Patient presents with    Altered Mental Status        HPI: POST ADMISSION PHYSICIAN ASSESSMENT AND   REHAB HISTORICAL/PHYSICAL        CHIEF COMPLAINT: Confusion    PRIMARY REHAB IMPAIRMENT GROUP: Brain Dysfunction / Non Traumatic    ETIOLOGIC DIAGNOSIS: Toxic Encephalopathy    SECONDARY AND RELATED DIAGNOSES: Anemia, chronic kidney disease, type 2 diabetes, falls, hypokalemia, morbid obesity, osteoarthritis, pneumonia, seizure disorder    CO-MORBIDITIES PRESENT ON ADMISSION:  Meningioma, lung mass, neuropathy involving both lower extremities, seizure    Risk:  Patient is at high risk for progressive decline in function muscle weakness atrophy joint stiffness and contractures.  Patient is at risk for falls and more serious injury.  Patient is at risk for organ dysfunction renal failure hypoglycemia hyperglycemia DVT PE worsening pneumonia hypoxemia brain injury aspiration DVT PE and death.    Patient is a 81-year-old male with a past medical history significant for right-sided lung mass who presented to the emergency department with decreased appetite for last few days.  The patient had reportedly stopped taking his medications and was agitated.  Patient had a witnessed seizure with no head trauma.  Patient has a history of seizure disorder meningioma and chronic kidney disease.  The patient about 2-3 months ago had a admission for pneumonia and a lung mass and was ultimately transferred to an outside facility and had lung biopsies.  They were negative for malignancy the  patient had an inpatient rehab stay and had a significant improvement in his level of function.  The patient over the last few months has had a progressive decline when we now have this repeated admission.  The patient was started on antibiotics after admission his oral medications re-initiated and he was placed on anti epileptics.  The patient has improved and recovered and the patient and family are wanting therapy to get him back to his previous level of function and hopefully return home.  The patient is having intermittent confusion he has progressively improved since his seizure and getting back on medicines.  Overall the patient seems slightly depressed over his current diagnoses and deterioration of condition will continually trying to motivate and encourage the patient.        Past Medical History:   Diagnosis Date    Anticoagulant long-term use     Arthritis     CHF (congestive heart failure)     COPD (chronic obstructive pulmonary disease)     Hypertension     Renal disorder     Seizures     Sleep apnea        Past Surgical History:   Procedure Laterality Date    APPENDECTOMY      CORONARY ARTERY BYPASS GRAFT      FOOT SURGERY Left     HIP REPLACEMENT ARTHROPLASTY Right     JOINT REPLACEMENT      SHOULDER SURGERY Left     TOTAL KNEE ARTHROPLASTY Right        Review of patient's allergies indicates:  No Known Allergies    Current Facility-Administered Medications on File Prior to Encounter   Medication    [DISCONTINUED] acetaminophen tablet 650 mg    [DISCONTINUED] acetaminophen tablet 650 mg    [DISCONTINUED] acetylcysteine 200 mg/ml (20%) solution 4 mL    [DISCONTINUED] albuterol-ipratropium 2.5 mg-0.5 mg/3 mL nebulizer solution 3 mL    [DISCONTINUED] aspirin EC tablet 81 mg    [DISCONTINUED] atorvastatin tablet 20 mg    [DISCONTINUED] docusate sodium capsule 100 mg    [DISCONTINUED] enoxaparin injection 40 mg    [DISCONTINUED] ezetimibe tablet 10 mg    [DISCONTINUED] guaiFENesin  12 hr tablet 1,200 mg    [DISCONTINUED] melatonin tablet 6 mg    [DISCONTINUED] metoprolol tartrate (LOPRESSOR) tablet 25 mg    [DISCONTINUED] morphine injection 2 mg    [DISCONTINUED] ondansetron injection 4 mg    [DISCONTINUED] OXcarbazepine tablet 300 mg    [DISCONTINUED] piperacillin-tazobactam 4.5 g in dextrose 5 % 100 mL IVPB (ready to mix system)    [DISCONTINUED] potassium bicarbonate disintegrating tablet 40 mEq    [DISCONTINUED] torsemide tablet 100 mg     Current Outpatient Medications on File Prior to Encounter   Medication Sig    aspirin (ECOTRIN) 81 MG EC tablet Take 81 mg by mouth once daily.    docusate sodium (COLACE) 100 MG capsule Take 100 mg by mouth 2 (two) times daily.    ezetimibe (ZETIA) 10 mg tablet Take 10 mg by mouth once daily.    guaiFENesin (MUCINEX) 600 mg 12 hr tablet Take 1,200 mg by mouth 2 (two) times daily.    metoprolol tartrate (LOPRESSOR) 25 MG tablet Take 25 mg by mouth 2 (two) times daily.    miconazole NITRATE 2 % (MICOTIN) 2 % top powder Apply topically as needed for Itching.    OXcarbazepine (TRILEPTAL) 600 MG Tab Take 150 mg by mouth 2 (two) times daily.    potassium chloride (MICRO-K) 10 MEQ CpSR Take 20 mEq by mouth once. Every other day    torsemide (DEMADEX) 10 MG Tab Take 20 mg by mouth 2 (two) times daily.    calcium carbonate (OS-DAVID) 600 mg calcium (1,500 mg) Tab Take 600 mg by mouth once.    HYDROcodone-acetaminophen (NORCO) 5-325 mg per tablet Take 1 tablet by mouth every 6 (six) hours as needed for Pain.    lovastatin (MEVACOR) 10 MG tablet Take 10 mg by mouth every evening.    multivitamin capsule Take 1 capsule by mouth once daily.    vitamin D (VITAMIN D3) 1000 units Tab Take 1,000 Units by mouth once daily.     Family History    None       Tobacco Use    Smoking status: Current Every Day Smoker     Types: Cigarettes    Smokeless tobacco: Not on file    Tobacco comment: quit 30 years ago   Substance and Sexual Activity    Alcohol  use: Not Currently    Drug use: Never    Sexual activity: Not Currently     Review of Systems   Unable to perform ROS: Mental status change   Objective:     Vital Signs (Most Recent):  Temp: 98.2 °F (36.8 °C) (04/19/22 0456)  Pulse: 109 (04/19/22 1116)  Resp: 20 (04/19/22 1116)  BP: 116/61 (04/19/22 0837)  SpO2: 97 % (04/19/22 1116) Vital Signs (24h Range):  Temp:  [98.2 °F (36.8 °C)-98.7 °F (37.1 °C)] 98.2 °F (36.8 °C)  Pulse:  [] 109  Resp:  [18-36] 20  SpO2:  [93 %-100 %] 97 %  BP: (116-140)/(61-72) 116/61     Weight: 104.3 kg (230 lb)  Body mass index is 32.08 kg/m².    Physical Exam  Constitutional:       General: He is awake. He is not in acute distress.     Appearance: He is obese. He is ill-appearing. He is not toxic-appearing or diaphoretic.   HENT:      Head: Normocephalic and atraumatic.      Nose: Nose normal. No congestion or rhinorrhea.      Mouth/Throat:      Mouth: Mucous membranes are moist.      Pharynx: Oropharynx is clear. No oropharyngeal exudate or posterior oropharyngeal erythema.   Eyes:      General: No scleral icterus.        Right eye: No discharge.         Left eye: No discharge.      Extraocular Movements: Extraocular movements intact.      Pupils: Pupils are equal, round, and reactive to light.   Neck:      Thyroid: No thyroid mass or thyromegaly.      Vascular: No carotid bruit.      Meningeal: Brudzinski's sign and Kernig's sign absent.   Cardiovascular:      Rate and Rhythm: Normal rate and regular rhythm.      Chest Wall: PMI is not displaced. No thrill.      Pulses: Normal pulses.      Heart sounds: Normal heart sounds. No murmur heard.    No friction rub. No gallop.   Pulmonary:      Effort: Pulmonary effort is normal. No tachypnea, accessory muscle usage, prolonged expiration or respiratory distress.      Breath sounds: No stridor or decreased air movement. Rhonchi present. No wheezing or rales.   Chest:      Chest wall: No tenderness.   Abdominal:      General: Bowel  sounds are normal. There is no distension.      Palpations: Abdomen is soft. There is no hepatomegaly, splenomegaly or mass.      Tenderness: There is no abdominal tenderness. There is no right CVA tenderness, left CVA tenderness, guarding or rebound.      Hernia: No hernia is present.   Musculoskeletal:         General: No swelling, tenderness, deformity or signs of injury. Normal range of motion.      Cervical back: Normal range of motion and neck supple. No rigidity. No muscular tenderness.      Right lower leg: Edema present.      Left lower leg: No edema.   Lymphadenopathy:      Cervical: No cervical adenopathy.   Skin:     General: Skin is warm.      Capillary Refill: Capillary refill takes less than 2 seconds.      Coloration: Skin is not cyanotic, jaundiced or pale.      Findings: No erythema, lesion, petechiae or rash.   Neurological:      Mental Status: He is alert. He is disoriented.      Cranial Nerves: No cranial nerve deficit, dysarthria or facial asymmetry.      Motor: Weakness present. No tremor.      Gait: Gait abnormal.   Psychiatric:         Mood and Affect: Mood is depressed. Mood is not anxious. Affect is flat and tearful.         Speech: Speech is not rapid and pressured or slurred.         Behavior: Behavior is withdrawn. Behavior is not agitated, aggressive or combative.         Thought Content: Thought content is not paranoid or delusional.         Cognition and Memory: Memory is impaired.        Significant Labs: All pertinent labs within the past 24 hours have been reviewed.    Significant Imaging: I have reviewed all pertinent imaging results/findings within the past 24 hours.    Assessment/Plan:     * Encephalopathy, toxic  Patient is a good candidate for inpatient rehab recovery.  Patient's prognosis is still fair to good.  Patient's family and is encouraging and supportive.  We would like to see the patient improved to at least a supervision functional level.  The patient's cognition  is improving with treatment as his acute infection.  The patient should have close lab monitoring close renal and electrolyte monitoring as well as he will need multi modality therapeutic interventions for recovery.  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    ESTIMATED LENGTH OF STAY:  The patient's estimated length of stay is 10-14 days and she is expected to be able to be discharged to home with family .    REHABILITATION PLAN/GOALS  The patient is being admitted to a comprehensive acute inpatient rehabilitation program consisting of at least 3 hours of combined physical ( 1.5hrs./day, 5 days a week), and occupational therapy (1.5 hrs. A day, 5 days a week),speech therapy services if necessary, 24-hour skilled rehabilitation nursing care, and close supervision of a physician with special training and experience in rehabilitation medicine. Patient's prognosis for significant practical improvement within a reasonable period of time appears fair . Given the patient's complex medical condition and risk of further medical complications, rehabilitation services could not be safely provided at a lower level of care such as a skilled nursing facility.                    1. Physical Therapy to Evaluate and Treat, Work on bed mobility, Assist with transfers, Strength, Gait, Fall Prevention, Balance and Modalities as Needed   2. Occupational Therapy to ADL Retraining, Functional Transfer Training, Therapeutic Activity and Exercises, Neuromuscular Re-education, Manual Therapy, Use of Adaptive Equipment and Retraining, Safety Awareness and Fall Prevention and Home Modification   3. Speech Pathology to Evaluate and assist with dysphagia, Make recommendations for a safe diet, Evaluate and Treat Cognitive linguistic deficits, Evaluate and Treat expressive and receptive language deficits  and Evaluate and treat motor speech deficits   4. Specialized 24-hour Rehabilitation Nursing Care to Protection of Skin and Soft Tissue, Assisting with continence, Bowel and bladder training, Neurological checks, Medicine administration and medicine and management, Wound care and Fall protection and general encouragement   5. Dietary to educuate and assist with nutrition.   6. Social Work/Case management to assist with discharge planning activities, acquisition of durable medical equipment, and ordering of follow up services as necessary.    The services provided in the acute medical rehab setting are under my supervision 24 hours a day. These services are necessary for this patient to achieve the most appropriate functional outcome and to determine the most appropriate discharge disposition.  I have reviewed the treatment plan with the patient and answered all of her questions, discussed goals and expectation.    REVIEW OF PRE-SCREEN ASSESSMENT  I have reviewed the patient's preadmission screen including her medical and functional status and compared it with her status upon arrival to the rehab unit and see no change . Acute rehab services are still necessary and appropriate for this patient to achieve the best functional outcome while determining the most appropriate discharge disposition and services. The patient will require close medical management of multiple medical co-morbidities including as noted above .    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

## 2022-04-19 NOTE — PLAN OF CARE
Problem: Adult Inpatient Plan of Care  Goal: Plan of Care Review  Outcome: Ongoing, Not Progressing  Goal: Patient-Specific Goal (Individualized)  Outcome: Ongoing, Not Progressing  Goal: Absence of Hospital-Acquired Illness or Injury  Outcome: Ongoing, Not Progressing  Goal: Optimal Comfort and Wellbeing  Outcome: Ongoing, Not Progressing  Goal: Readiness for Transition of Care  Outcome: Ongoing, Not Progressing     Problem: Fluid Imbalance (Pneumonia)  Goal: Fluid Balance  Outcome: Ongoing, Not Progressing     Problem: Infection (Pneumonia)  Goal: Resolution of Infection Signs and Symptoms  Outcome: Ongoing, Not Progressing     Problem: Respiratory Compromise (Pneumonia)  Goal: Effective Oxygenation and Ventilation  Outcome: Ongoing, Not Progressing     Problem: Infection  Goal: Absence of Infection Signs and Symptoms  Outcome: Ongoing, Not Progressing     Problem: Impaired Wound Healing  Goal: Optimal Wound Healing  Outcome: Ongoing, Not Progressing     Problem: Skin Injury Risk Increased  Goal: Skin Health and Integrity  Outcome: Ongoing, Not Progressing     Problem: Fall Injury Risk  Goal: Absence of Fall and Fall-Related Injury  Outcome: Ongoing, Not Progressing     Problem: Pain Acute  Goal: Acceptable Pain Control and Functional Ability  Outcome: Ongoing, Not Progressing     Problem: Mobility Impairment  Goal: Optimal Mobility  Outcome: Ongoing, Not Progressing     Problem: Gas Exchange Impaired  Goal: Optimal Gas Exchange  Outcome: Ongoing, Not Progressing

## 2022-04-19 NOTE — PLAN OF CARE
[place] - [facility] (Hospital)  Initial Discharge Assessment       Primary Care Provider: Navi Domínguez III, MD    Admission Diagnosis: Metabolic encephalopathy [G93.41]    Admission Date: 4/18/2022  Expected Discharge Date:     Discharge Barriers Identified: None    Payor: MEDICARE / Plan: MEDICARE PART A & B / Product Type: Government /     Extended Emergency Contact Information  Primary Emergency Contact: RACHID Lei  Mobile Phone: [phone]  Relation: Son  Preferred language: English   needed? No  Secondary Emergency Contact: Aleyda Lei  Mobile Phone: [phone]  Relation: Relative    Discharge Plan A: Assisted Living  Discharge Plan B: Home with [facility] - [facility] [address]  [address]  [place] [address]  Phone: [phone] Fax: [phone]      Initial Assessment (most recent)     Adult Discharge Assessment - 04/19/22 0956        Discharge Assessment    Assessment Type Discharge Planning Assessment     Confirmed/corrected address, phone number and insurance Yes     Confirmed Demographics Correct on Facesheet     Source of Information family   Rachid Lei (son)    If unable to respond/provide information was family/caregiver contacted? Yes     Contact Name/Number Rachid Lei [phone]     When was your last doctors appointment? --   No value- son cannot recall    Communicated GLORY with patient/caregiver Date not available/Unable to determine     Lives With grandchild(ibrahima)     Facility Arrived From: [facility]     Do you expect to return to your current living situation? Other (see comments)   Son noted pt would be going to [facility] at discharge.    Do you have help at home or someone to help you manage your care at home? Yes     Who are your caregiver(s) and their phone number(s)? Rachid Lei [phone]     Prior to hospitilization cognitive status: Unable to Assess     Current cognitive status: Not Oriented to Place;Not  Oriented to Time     Walking or Climbing Stairs Difficulty ambulation difficulty, requires equipment     Mobility Management Rolling Walker     Dressing/Bathing Difficulty bathing difficulty, assistance 1 person     Home Accessibility wheelchair accessible     Home Layout Able to live on 1st floor     Equipment Currently Used at Home walker, rolling;bedside commode;oxygen     Readmission within 30 days? No     Patient currently being followed by outpatient case management? Yes     If yes, name of outpatient case management following: other (comments)   Son noted father has home health but unsure through which agency    Do you take prescription medications? Yes     Do you have any problems affording any of your prescribed medications? No     Is the patient taking medications as prescribed? yes     Who is going to help you get home at discharge? Rachid Lei     How do you get to doctors appointments? family or friend will provide     Are you on dialysis? No     Do you take coumadin? No     Discharge Plan A Assisted Living     Discharge Plan B Home with family     DME Needed Upon Discharge  other (see comments)   TBD    Discharge Plan discussed with: Adult children     Discharge Barriers Identified None               CM attempted to complete assessment with patient but patient was confused and oriented to person only. Contacted pt's son Rachid and completed the initial assessment.  Per son, pt lives with his granddaughter and has a sitter for 6 hours a day. Pt requires assistance with ADLs, but was able to ambulate with a RW. Son noted that pt will not be able to return home at discharge d/t family wanting more care for patient as well as someone to manage his medications.     Son noted that he has an appointment today at [facility]  to tour the facility and the discharge plan is for pt to discharge to [facility]. CM inquired if there was another discharge option in case pt could not discharge to [facility]  and son noted there was no plan B and that pt. Is going to discharge to [facility] and that nursing home is not an option. Son also noted pt  has home health services, but he was not sure through which agency. Per medical record, pt does not have services at [facility] or Nursing Care.  Provided son with CM contact information and informed him to keep CM posted on the process with [facility]. CM will continue to follow and assist as needed.

## 2022-04-20 PROCEDURE — 94761 N-INVAS EAR/PLS OXIMETRY MLT: CPT

## 2022-04-20 PROCEDURE — 27000221 HC OXYGEN, UP TO 24 HOURS

## 2022-04-20 PROCEDURE — 99900035 HC TECH TIME PER 15 MIN (STAT)

## 2022-04-20 PROCEDURE — 97110 THERAPEUTIC EXERCISES: CPT

## 2022-04-20 PROCEDURE — 99900031 HC PATIENT EDUCATION (STAT)

## 2022-04-20 PROCEDURE — 94640 AIRWAY INHALATION TREATMENT: CPT

## 2022-04-20 PROCEDURE — 92523 SPEECH SOUND LANG COMPREHEN: CPT

## 2022-04-20 PROCEDURE — 11000001 HC ACUTE MED/SURG PRIVATE ROOM

## 2022-04-20 PROCEDURE — 25000003 PHARM REV CODE 250: Performed by: NURSE PRACTITIONER

## 2022-04-20 PROCEDURE — 25000003 PHARM REV CODE 250: Performed by: INTERNAL MEDICINE

## 2022-04-20 PROCEDURE — 63600175 PHARM REV CODE 636 W HCPCS: Performed by: INTERNAL MEDICINE

## 2022-04-20 PROCEDURE — 97110 THERAPEUTIC EXERCISES: CPT | Mod: CO

## 2022-04-20 PROCEDURE — 97530 THERAPEUTIC ACTIVITIES: CPT

## 2022-04-20 PROCEDURE — 25000242 PHARM REV CODE 250 ALT 637 W/ HCPCS: Performed by: INTERNAL MEDICINE

## 2022-04-20 PROCEDURE — 97530 THERAPEUTIC ACTIVITIES: CPT | Mod: CO

## 2022-04-20 PROCEDURE — 97116 GAIT TRAINING THERAPY: CPT

## 2022-04-20 RX ORDER — IPRATROPIUM BROMIDE AND ALBUTEROL SULFATE 2.5; .5 MG/3ML; MG/3ML
3 SOLUTION RESPIRATORY (INHALATION)
Status: DISCONTINUED | OUTPATIENT
Start: 2022-04-20 | End: 2022-04-29 | Stop reason: HOSPADM

## 2022-04-20 RX ORDER — POTASSIUM CHLORIDE 20 MEQ/1
40 TABLET, EXTENDED RELEASE ORAL DAILY
Status: DISCONTINUED | OUTPATIENT
Start: 2022-04-20 | End: 2022-04-29 | Stop reason: HOSPADM

## 2022-04-20 RX ADMIN — METOPROLOL TARTRATE 25 MG: 25 TABLET, FILM COATED ORAL at 09:04

## 2022-04-20 RX ADMIN — DOCUSATE SODIUM 100 MG: 100 CAPSULE, LIQUID FILLED ORAL at 08:04

## 2022-04-20 RX ADMIN — EZETIMIBE 10 MG: 10 TABLET ORAL at 08:04

## 2022-04-20 RX ADMIN — IPRATROPIUM BROMIDE AND ALBUTEROL SULFATE 3 ML: 2.5; .5 SOLUTION RESPIRATORY (INHALATION) at 02:04

## 2022-04-20 RX ADMIN — POTASSIUM CHLORIDE 40 MEQ: 20 TABLET, EXTENDED RELEASE ORAL at 10:04

## 2022-04-20 RX ADMIN — POTASSIUM BICARBONATE 40 MEQ: 391 TABLET, EFFERVESCENT ORAL at 08:04

## 2022-04-20 RX ADMIN — METOPROLOL TARTRATE 25 MG: 25 TABLET, FILM COATED ORAL at 08:04

## 2022-04-20 RX ADMIN — GUAIFENESIN 1200 MG: 600 TABLET, EXTENDED RELEASE ORAL at 08:04

## 2022-04-20 RX ADMIN — DOCUSATE SODIUM 100 MG: 100 CAPSULE, LIQUID FILLED ORAL at 09:04

## 2022-04-20 RX ADMIN — PIPERACILLIN AND TAZOBACTAM 4.5 G: 4; .5 INJECTION, POWDER, LYOPHILIZED, FOR SOLUTION INTRAVENOUS; PARENTERAL at 08:04

## 2022-04-20 RX ADMIN — ASPIRIN 81 MG: 81 TABLET, COATED ORAL at 08:04

## 2022-04-20 RX ADMIN — ACETYLCYSTEINE 4 ML: 200 SOLUTION ORAL; RESPIRATORY (INHALATION) at 07:04

## 2022-04-20 RX ADMIN — IPRATROPIUM BROMIDE AND ALBUTEROL SULFATE 3 ML: 2.5; .5 SOLUTION RESPIRATORY (INHALATION) at 07:04

## 2022-04-20 RX ADMIN — HYDROCODONE BITARTRATE AND ACETAMINOPHEN 1 TABLET: 7.5; 325 TABLET ORAL at 09:04

## 2022-04-20 RX ADMIN — TORSEMIDE 100 MG: 20 TABLET ORAL at 08:04

## 2022-04-20 RX ADMIN — MUPIROCIN: 20 OINTMENT TOPICAL at 09:04

## 2022-04-20 RX ADMIN — OXCARBAZEPINE 300 MG: 300 TABLET, FILM COATED ORAL at 08:04

## 2022-04-20 RX ADMIN — Medication 6 MG: at 09:04

## 2022-04-20 RX ADMIN — ATORVASTATIN CALCIUM 20 MG: 20 TABLET, FILM COATED ORAL at 08:04

## 2022-04-20 RX ADMIN — OXCARBAZEPINE 300 MG: 300 TABLET, FILM COATED ORAL at 09:04

## 2022-04-20 RX ADMIN — GUAIFENESIN 1200 MG: 600 TABLET, EXTENDED RELEASE ORAL at 09:04

## 2022-04-20 RX ADMIN — ENOXAPARIN SODIUM 40 MG: 40 INJECTION SUBCUTANEOUS at 04:04

## 2022-04-20 NOTE — PROGRESS NOTES
[facility] Medicine  Progress Note    Patient Name: Jamey Lei  MRN: [id number]  Patient Class: IP- Rehab   Admission Date: 4/18/2022  Length of Stay: 2 days  Attending Physician: Navi Domínguez III, MD  Primary Care Provider: Navi Domínguez III, MD        Subjective:     Principal Problem:Encephalopathy, toxic        HPI:  Patient is a 81-year-old male with a past medical history significant for right-sided lung mass who presented to the emergency department with decreased appetite for last few days.  The patient had reportedly stopped taking his medications and was agitated.  Patient had a witnessed seizure with no head trauma.  Patient has a history of seizure disorder meningioma and chronic kidney disease.  The patient about 2-3 months ago had a admission for pneumonia and a lung mass and was ultimately transferred to an outside facility and had lung biopsies.  They were negative for malignancy the patient had an inpatient rehab stay and had a significant improvement in his level of function.  The patient over the last few months has had a progressive decline when we now have this repeated admission.  The patient was started on antibiotics after admission his oral medications re-initiated and he was placed on anti epileptics.  The patient has improved and recovered and the patient and family are wanting therapy to get him back to his previous level of function and hopefully return home.  The patient is having intermittent confusion he has progressively improved since his seizure and getting back on medicines.  Overall the patient seems slightly depressed over his current diagnoses and deterioration of condition will continually trying to motivate and encourage the patient.        Overview/Hospital Course:  4/20 SD: Pt in wheelchair awaiting therapy.       Interval History: Pt seen and evaluated    Review of Systems   Unable to perform ROS: Mental status change   Objective:     Vital  Signs (Most Recent):  Temp: 97.2 °F (36.2 °C) (04/20/22 0347)  Pulse: 101 (04/20/22 0743)  Resp: 18 (04/20/22 0743)  BP: 138/70 (04/20/22 0347)  SpO2: 97 % (04/20/22 0743) Vital Signs (24h Range):  Temp:  [97.2 °F (36.2 °C)-97.6 °F (36.4 °C)] 97.2 °F (36.2 °C)  Pulse:  [] 101  Resp:  [18-30] 18  SpO2:  [94 %-97 %] 97 %  BP: (108-138)/(53-70) 138/70     Weight: 104.3 kg (230 lb)  Body mass index is 32.08 kg/m².    Intake/Output Summary (Last 24 hours) at 4/20/2022 0938  Last data filed at 4/20/2022 0200  Gross per 24 hour   Intake 2160 ml   Output 2260 ml   Net -100 ml      Physical Exam  Constitutional:       General: He is awake. He is not in acute distress.     Appearance: He is obese. He is ill-appearing. He is not toxic-appearing or diaphoretic.   HENT:      Head: Normocephalic and atraumatic.      Nose: Nose normal. No congestion or rhinorrhea.      Mouth/Throat:      Mouth: Mucous membranes are moist.      Pharynx: Oropharynx is clear. No oropharyngeal exudate or posterior oropharyngeal erythema.   Eyes:      General: No scleral icterus.        Right eye: No discharge.         Left eye: No discharge.      Extraocular Movements: Extraocular movements intact.      Pupils: Pupils are equal, round, and reactive to light.   Neck:      Thyroid: No thyroid mass or thyromegaly.      Vascular: No carotid bruit.      Meningeal: Brudzinski's sign and Kernig's sign absent.   Cardiovascular:      Rate and Rhythm: Normal rate and regular rhythm.      Chest Wall: PMI is not displaced. No thrill.      Pulses: Normal pulses.      Heart sounds: Normal heart sounds. No murmur heard.    No friction rub. No gallop.   Pulmonary:      Effort: Pulmonary effort is normal. No tachypnea, accessory muscle usage, prolonged expiration or respiratory distress.      Breath sounds: No stridor or decreased air movement. Rhonchi present. No wheezing or rales.   Chest:      Chest wall: No tenderness.   Abdominal:      General: Bowel sounds  are normal. There is no distension.      Palpations: Abdomen is soft. There is no hepatomegaly, splenomegaly or mass.      Tenderness: There is no abdominal tenderness. There is no right CVA tenderness, left CVA tenderness, guarding or rebound.      Hernia: No hernia is present.   Musculoskeletal:         General: No swelling, tenderness, deformity or signs of injury. Normal range of motion.      Cervical back: Normal range of motion and neck supple. No rigidity. No muscular tenderness.      Right lower leg: Edema present.      Left lower leg: No edema.   Lymphadenopathy:      Cervical: No cervical adenopathy.   Skin:     General: Skin is warm.      Capillary Refill: Capillary refill takes less than 2 seconds.      Coloration: Skin is not cyanotic, jaundiced or pale.      Findings: No erythema, lesion, petechiae or rash.   Neurological:      Mental Status: He is alert. He is disoriented.      Cranial Nerves: No cranial nerve deficit, dysarthria or facial asymmetry.      Motor: Weakness present. No tremor.      Gait: Gait abnormal.   Psychiatric:         Mood and Affect: Mood is depressed. Mood is not anxious. Affect is flat and tearful.         Speech: Speech is not rapid and pressured or slurred.         Behavior: Behavior is withdrawn. Behavior is not agitated, aggressive or combative.         Thought Content: Thought content is not paranoid or delusional.         Cognition and Memory: Memory is impaired.       Significant Labs: All pertinent labs within the past 24 hours have been reviewed.    CBC:   Recent Labs   Lab 04/19/22  0658   WBC 6.63   HGB 10.0*   HCT 32.0*        CMP:   Recent Labs   Lab 04/19/22  0658      K 3.3*      CO2 30*   GLU 95   BUN 29*   CREATININE 1.8*   CALCIUM 9.4   PROT 6.7   ALBUMIN 2.0*   BILITOT 0.5   ALKPHOS 43*   AST 20   ALT 20   ANIONGAP 7*   EGFRNONAA 35.0*       Significant Imaging: No pertinent imaging within the past 24 hours.      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  Stable        VTE Risk Mitigation (From admission, onward)         Ordered     enoxaparin injection 40 mg  Daily         04/18/22 1529     IP VTE HIGH RISK PATIENT  Once         04/18/22 1529     Place sequential compression device  Until discontinued         04/18/22 1529                Discharge Planning   GLORY:      Code Status: DNR   Is the patient medically ready for discharge?:     Reason for patient still in hospital (select all that apply): Patient trending condition, Treatment and PT / OT recommendations  Discharge Plan A: Assisted Living                  Natalie Crawley NP  Department of Hospital Medicine   [facility] ([facility])

## 2022-04-20 NOTE — PT/OT/SLP PROGRESS
Physical Therapy         Treatment        Jamey Lei   MRN: [id number]     PT Received On: 22  Total Time (min): 90        Billable Minutes:  Gait [id number], Therapeutic Activity 30 and Therapeutic Exercise 30  Total Minutes: 90  AM Start Time:   AM End Time:   PM Start Time: 12  PM End Time: 1330  Treatment Type: Individual 45 and Concurrent 45  Treatment Type: Treatment  PT/PTA: PT             General Precautions: Standard,    Orthopedic Precautions:     Braces:           Subjective:  Communicated with nurse prior to session.         Objective:  Patient found in wc, with      Functional Mobility:  Bed Mobility:   Supine to sit: Minimal Assistance   Sit to supine: Standby Assistance   Rolling: Standby Assistance   Scooting: Minimal Assistance    Balance:   Static Sit: GOOD: Takes MODERATE challenges from all directions  Dynamic Sit:  FAIR: Cannot move trunk without losing balance  Static Stand: FAIR: Maintains without assist but unable to take challenges  Dynamic stand: FAIR: Needs CONTACT GUARD during gait    Transfer Training:  Sit to stand:Minimal Assistance with Rolling Walker    Bed <> Chair:  Step Transfer with Minimal Assistance with Rolling Walker      Wheelchair Traininft withwc 50ft  Gait Training:  Ambulate 4 times for [id number] with rw and o2    Stair Training:  Not safe for steps      Additional Treatment:  Pt performed ble exs 3 sets in sitting of 20 reps with close monitoring  For him to finish his exs.     Activity Tolerance:  Patient tolerated treatment well    Patient left supine with call button in reach.    Assessment:  Jamey Lei is a 81 y.o. male with a medical diagnosis of Encephalopathy, toxic. He presents with increased gait distance.    Rehab potential is good.    Activity tolerance: Good    Discharge recommendations:       Equipment recommendations:       GOALS:   Multidisciplinary Problems     Physical Therapy Goals     Not on file                PLAN:    Patient to  be seen 5 x/week  to address the above listed problems via therapeutic activities, gait training, wheelchair management/training, therapeutic exercises, therapeutic groups, neuromuscular re-education  Plan of Care expires: 05/02/22  Plan of Care reviewed with: patient         4/20/2022

## 2022-04-20 NOTE — HOSPITAL COURSE
4/20 SD: Pt in wheelchair awaiting therapy.     4/21 SD: Pt participating in physical therapy. Bladder training complete, espinal catheter discontinued    4/22 SD: Pt sitting up in wheelchair. Positive attitude.    4/24/2022 FM:  Patient's tolerating therapy and slowly making some functional gains.  Less rhonchi and rattling in the chest today.  Overall encouraged.    4/25 SD: Pt participating in physical therapy. Denies any c/o.    4/27 SD: Pt sitting in wheelchair ready for physical therapy. Positive attitude. Denies any c/o.    4/28/2022 FM:  Patient seen and examined while working with therapy in the gym.  Patient's strength has improved.  Patient's overall attitude and demeanor has improved.  Patient has no lower extremity edema and we have encouraged him.  Patient is discharging tomorrow.    Discharge Note:  Patient was admitted to our inpatient rehab unit after an acute care stay for toxic encephalopathy acute on chronic seizures and a postobstructive pneumonia.  The patient was significantly debilitated edematous and throughout this inpatient rehab stay on lower doses of his and but anti epileptics the patient had a significant improvement.  He had improved strength stamina endurance mood and overall motivation to continue to work at therapy.  The patient has had much less shortness of breath and dyspnea and has lost significant amounts of edema.  We have encouraged the patient to keep it up and at follow-up we will refer him back to general surgery for further biopsy and testing of his lung mass.

## 2022-04-20 NOTE — SUBJECTIVE & OBJECTIVE
Interval History: Pt seen and evaluated    Review of Systems   Unable to perform ROS: Mental status change   Objective:     Vital Signs (Most Recent):  Temp: 97.2 °F (36.2 °C) (04/20/22 0347)  Pulse: 101 (04/20/22 0743)  Resp: 18 (04/20/22 0743)  BP: 138/70 (04/20/22 0347)  SpO2: 97 % (04/20/22 0743) Vital Signs (24h Range):  Temp:  [97.2 °F (36.2 °C)-97.6 °F (36.4 °C)] 97.2 °F (36.2 °C)  Pulse:  [] 101  Resp:  [18-30] 18  SpO2:  [94 %-97 %] 97 %  BP: (108-138)/(53-70) 138/70     Weight: 104.3 kg (230 lb)  Body mass index is 32.08 kg/m².    Intake/Output Summary (Last 24 hours) at 4/20/2022 0938  Last data filed at 4/20/2022 0200  Gross per 24 hour   Intake 2160 ml   Output 2260 ml   Net -100 ml      Physical Exam  Constitutional:       General: He is awake. He is not in acute distress.     Appearance: He is obese. He is ill-appearing. He is not toxic-appearing or diaphoretic.   HENT:      Head: Normocephalic and atraumatic.      Nose: Nose normal. No congestion or rhinorrhea.      Mouth/Throat:      Mouth: Mucous membranes are moist.      Pharynx: Oropharynx is clear. No oropharyngeal exudate or posterior oropharyngeal erythema.   Eyes:      General: No scleral icterus.        Right eye: No discharge.         Left eye: No discharge.      Extraocular Movements: Extraocular movements intact.      Pupils: Pupils are equal, round, and reactive to light.   Neck:      Thyroid: No thyroid mass or thyromegaly.      Vascular: No carotid bruit.      Meningeal: Brudzinski's sign and Kernig's sign absent.   Cardiovascular:      Rate and Rhythm: Normal rate and regular rhythm.      Chest Wall: PMI is not displaced. No thrill.      Pulses: Normal pulses.      Heart sounds: Normal heart sounds. No murmur heard.    No friction rub. No gallop.   Pulmonary:      Effort: Pulmonary effort is normal. No tachypnea, accessory muscle usage, prolonged expiration or respiratory distress.      Breath sounds: No stridor or decreased air  movement. Rhonchi present. No wheezing or rales.   Chest:      Chest wall: No tenderness.   Abdominal:      General: Bowel sounds are normal. There is no distension.      Palpations: Abdomen is soft. There is no hepatomegaly, splenomegaly or mass.      Tenderness: There is no abdominal tenderness. There is no right CVA tenderness, left CVA tenderness, guarding or rebound.      Hernia: No hernia is present.   Musculoskeletal:         General: No swelling, tenderness, deformity or signs of injury. Normal range of motion.      Cervical back: Normal range of motion and neck supple. No rigidity. No muscular tenderness.      Right lower leg: Edema present.      Left lower leg: No edema.   Lymphadenopathy:      Cervical: No cervical adenopathy.   Skin:     General: Skin is warm.      Capillary Refill: Capillary refill takes less than 2 seconds.      Coloration: Skin is not cyanotic, jaundiced or pale.      Findings: No erythema, lesion, petechiae or rash.   Neurological:      Mental Status: He is alert. He is disoriented.      Cranial Nerves: No cranial nerve deficit, dysarthria or facial asymmetry.      Motor: Weakness present. No tremor.      Gait: Gait abnormal.   Psychiatric:         Mood and Affect: Mood is depressed. Mood is not anxious. Affect is flat and tearful.         Speech: Speech is not rapid and pressured or slurred.         Behavior: Behavior is withdrawn. Behavior is not agitated, aggressive or combative.         Thought Content: Thought content is not paranoid or delusional.         Cognition and Memory: Memory is impaired.       Significant Labs: All pertinent labs within the past 24 hours have been reviewed.    CBC:   Recent Labs   Lab 04/19/22  0658   WBC 6.63   HGB 10.0*   HCT 32.0*        CMP:   Recent Labs   Lab 04/19/22  0658      K 3.3*      CO2 30*   GLU 95   BUN 29*   CREATININE 1.8*   CALCIUM 9.4   PROT 6.7   ALBUMIN 2.0*   BILITOT 0.5   ALKPHOS 43*   AST 20   ALT 20    ANIONGAP 7*   EGFRNONAA 35.0*       Significant Imaging: No pertinent imaging within the past 24 hours.

## 2022-04-20 NOTE — PLAN OF CARE
Problem: Adult Inpatient Plan of Care  Goal: Plan of Care Review  Outcome: Ongoing, Progressing  Goal: Patient-Specific Goal (Individualized)  Outcome: Ongoing, Progressing  Goal: Absence of Hospital-Acquired Illness or Injury  Outcome: Ongoing, Progressing  Goal: Optimal Comfort and Wellbeing  Outcome: Ongoing, Progressing  Goal: Readiness for Transition of Care  Outcome: Ongoing, Progressing     Problem: Fluid Imbalance (Pneumonia)  Goal: Fluid Balance  Outcome: Ongoing, Progressing     Problem: Infection (Pneumonia)  Goal: Resolution of Infection Signs and Symptoms  Outcome: Ongoing, Progressing     Problem: Respiratory Compromise (Pneumonia)  Goal: Effective Oxygenation and Ventilation  Outcome: Ongoing, Progressing     Problem: Infection  Goal: Absence of Infection Signs and Symptoms  Outcome: Ongoing, Progressing     Problem: Impaired Wound Healing  Goal: Optimal Wound Healing  Outcome: Ongoing, Progressing     Problem: Skin Injury Risk Increased  Goal: Skin Health and Integrity  Outcome: Ongoing, Progressing     Problem: Fall Injury Risk  Goal: Absence of Fall and Fall-Related Injury  Outcome: Ongoing, Progressing     Problem: Pain Acute  Goal: Acceptable Pain Control and Functional Ability  Outcome: Ongoing, Progressing     Problem: Mobility Impairment  Goal: Optimal Mobility  Outcome: Ongoing, Progressing     Problem: Gas Exchange Impaired  Goal: Optimal Gas Exchange  Outcome: Ongoing, Progressing

## 2022-04-20 NOTE — PT/OT/SLP PROGRESS
Occupational Therapy  Treatment    Jamey Lei   MRN: [id number]   Admitting Diagnosis: Encephalopathy, toxic    OT Date of Treatment: 04/20/22    Start time: 0900   End time: 1030  Treatment type: Individual 60 and Concurrent 30  Total time (min): 90 min    Billable Minutes:  Therapeutic Activity 45 and Therapeutic Exercise 45  Total Minutes: 90    General Precautions: Standard, fall  Orthopedic Precautions: N/A  Braces: N/A    Spiritual, Cultural Beliefs, Buddhist Practices, Values that Affect Care: no    Subjective:  Communicated with OT, nurse prior to session.    Pain/Comfort  Location - Side 1: Bilateral  Location - Orientation 1: lower  Location 1: leg  Pain Addressed 1: Reposition, Cessation of Activity, Nurse notified  Pain Rating Post-Intervention 1:  (did not rate)    Objective:  Patient found with: peripheral IV, espinal catheter    Functional Mobility:  Transfer Training:   Sit to stand:Contact Guard Assistance with Rolling Walker for balance and safety deficits   Functional mobility: CGA with rolling walker     Balance:   Static Sit: GOOD-: Takes MODERATE challenges from all directions but inconsistently  Dynamic Sit:  FAIR+: Maintains balance through MINIMAL excursions of active trunk motion  Static Stand: FAIR: Maintains without assist but unable to take challenges  Dynamic stand: FAIR: Needs CONTACT GUARD during gait    Additional Treatment:  Pt alert and exhibiting positive affect upon entering room this AM. Pt performed sit to stand and performed functional mobility within room to chair. Pt facilitated and guided through UB exercise with use of 2# therapeutic bar at 2 sets x 20 ea in all planes with scapular retraction, shoulder extension, shoulder adduction, horizontal abduction, shoulder flexion in plane of scaption, internal rotation, and external rotation while seated in chair unsupported requiring continuous verbal and tactile cueing for proper form and alignment. Pt then performed reaches in  sagittal, frontal and transverse planes with use of therapeutic ball to increase trunk control and strength required for ADL independence. Pt then engaged, with verbal cueing throughout for safety and potential balance deficits squats 3 sets x 10 reps with rolling walker for safety at [facility]. Pt tolerated a reaching task with use of therapeutic cones in transverse plane this day to improve trunk mobility and dynamic sitting balance required for safe performance with daily tasks. Pt performed functional mobility with use of RW at [facility] from therapy gym to dining area to increase endurance and dynamic standing balance required for daily mobility tasks.Pt reporting pain in B knees at end of mobility tasks but did not rate.     Patient left up in chair with all lines intact, call button in reach and nurse notified    ASSESSMENT:  Jamey Lei is a 81 y.o. male with a medical diagnosis of Encephalopathy, toxic.    Rehab potential is good    Activity tolerance: Good    GOALS:   Multidisciplinary Problems     Occupational Therapy Goals        Problem: Occupational Therapy    Goal Priority Disciplines Outcome Interventions   Occupational Therapy Goal     OT, PT/OT Ongoing, Progressing    Description: Long Term Goals to be met by: 05/02/22     Patient will increase functional independence with ADLs by performing:    Feeding with [place].  UE Dressing with Set-up Assistance.  LE Dressing with Minimal Assistance.  Grooming while seated at sink with Modified [place].  Toileting from toilet with Supervision for hygiene and clothing management.   Bathing from  shower chair/bench with Stand-by Assistance.  Step transfer with Modified [place]  Toilet transfer to toilet with Modified [place].  Increased functional strength to 4 to 4+/5 for bilateral UE's.                     Plan:  Patient to be seen 5 x/week to address the above listed problems via self-care/home management, community/work re-entry, therapeutic  activities, therapeutic exercises, cognitive retraining  Plan of Care expires: 05/02/22  Plan of Care reviewed with: patient         04/20/2022

## 2022-04-21 PROCEDURE — 25000003 PHARM REV CODE 250: Performed by: INTERNAL MEDICINE

## 2022-04-21 PROCEDURE — 99900035 HC TECH TIME PER 15 MIN (STAT)

## 2022-04-21 PROCEDURE — 97116 GAIT TRAINING THERAPY: CPT

## 2022-04-21 PROCEDURE — 97535 SELF CARE MNGMENT TRAINING: CPT

## 2022-04-21 PROCEDURE — 97530 THERAPEUTIC ACTIVITIES: CPT

## 2022-04-21 PROCEDURE — 97110 THERAPEUTIC EXERCISES: CPT

## 2022-04-21 PROCEDURE — 25000242 PHARM REV CODE 250 ALT 637 W/ HCPCS: Performed by: INTERNAL MEDICINE

## 2022-04-21 PROCEDURE — 27000221 HC OXYGEN, UP TO 24 HOURS

## 2022-04-21 PROCEDURE — 94761 N-INVAS EAR/PLS OXIMETRY MLT: CPT

## 2022-04-21 PROCEDURE — 63600175 PHARM REV CODE 636 W HCPCS: Performed by: INTERNAL MEDICINE

## 2022-04-21 PROCEDURE — 11000001 HC ACUTE MED/SURG PRIVATE ROOM

## 2022-04-21 PROCEDURE — 94640 AIRWAY INHALATION TREATMENT: CPT

## 2022-04-21 PROCEDURE — 99900031 HC PATIENT EDUCATION (STAT)

## 2022-04-21 PROCEDURE — 25000003 PHARM REV CODE 250: Performed by: NURSE PRACTITIONER

## 2022-04-21 RX ADMIN — ASPIRIN 81 MG: 81 TABLET, COATED ORAL at 09:04

## 2022-04-21 RX ADMIN — ACETYLCYSTEINE 4 ML: 200 SOLUTION ORAL; RESPIRATORY (INHALATION) at 07:04

## 2022-04-21 RX ADMIN — EZETIMIBE 10 MG: 10 TABLET ORAL at 09:04

## 2022-04-21 RX ADMIN — GUAIFENESIN 1200 MG: 600 TABLET, EXTENDED RELEASE ORAL at 08:04

## 2022-04-21 RX ADMIN — MUPIROCIN: 20 OINTMENT TOPICAL at 08:04

## 2022-04-21 RX ADMIN — OXCARBAZEPINE 300 MG: 300 TABLET, FILM COATED ORAL at 09:04

## 2022-04-21 RX ADMIN — ATORVASTATIN CALCIUM 20 MG: 20 TABLET, FILM COATED ORAL at 09:04

## 2022-04-21 RX ADMIN — TORSEMIDE 100 MG: 20 TABLET ORAL at 09:04

## 2022-04-21 RX ADMIN — ENOXAPARIN SODIUM 40 MG: 40 INJECTION SUBCUTANEOUS at 04:04

## 2022-04-21 RX ADMIN — OXCARBAZEPINE 300 MG: 300 TABLET, FILM COATED ORAL at 08:04

## 2022-04-21 RX ADMIN — IPRATROPIUM BROMIDE AND ALBUTEROL SULFATE 3 ML: 2.5; .5 SOLUTION RESPIRATORY (INHALATION) at 07:04

## 2022-04-21 RX ADMIN — DOCUSATE SODIUM 100 MG: 100 CAPSULE, LIQUID FILLED ORAL at 09:04

## 2022-04-21 RX ADMIN — Medication 6 MG: at 08:04

## 2022-04-21 RX ADMIN — DOCUSATE SODIUM 100 MG: 100 CAPSULE, LIQUID FILLED ORAL at 08:04

## 2022-04-21 RX ADMIN — GUAIFENESIN 1200 MG: 600 TABLET, EXTENDED RELEASE ORAL at 09:04

## 2022-04-21 RX ADMIN — IPRATROPIUM BROMIDE AND ALBUTEROL SULFATE 3 ML: 2.5; .5 SOLUTION RESPIRATORY (INHALATION) at 01:04

## 2022-04-21 RX ADMIN — HYDROCODONE BITARTRATE AND ACETAMINOPHEN 1 TABLET: 7.5; 325 TABLET ORAL at 08:04

## 2022-04-21 RX ADMIN — MUPIROCIN: 20 OINTMENT TOPICAL at 09:04

## 2022-04-21 RX ADMIN — METOPROLOL TARTRATE 25 MG: 25 TABLET, FILM COATED ORAL at 08:04

## 2022-04-21 RX ADMIN — METOPROLOL TARTRATE 25 MG: 25 TABLET, FILM COATED ORAL at 09:04

## 2022-04-21 RX ADMIN — POTASSIUM CHLORIDE 40 MEQ: 20 TABLET, EXTENDED RELEASE ORAL at 09:04

## 2022-04-21 RX ADMIN — HYDROCODONE BITARTRATE AND ACETAMINOPHEN 1 TABLET: 7.5; 325 TABLET ORAL at 09:04

## 2022-04-21 NOTE — PT/OT/SLP PROGRESS
Physical Therapy         Treatment        Jamey Lei   MRN: [id number]     PT Received On: 04/21/22  Total Time (min): 90        Billable Minutes:  Gait [id number], Therapeutic Activity 30 and Therapeutic Exercise 30  Total Minutes: 90  AM Start Time:   AM End Time:   PM Start Time: 1230  PM End Time: 1400  Treatment Type: Individual 30 and Concurrent 60  Treatment Type: Treatment  PT/PTA: PT             General Precautions: Standard,    Orthopedic Precautions:     Braces:           Subjective:  Communicated with nurse prior to session.         Objective:  Patient found in wc, with      Functional Mobility:  Bed Mobility:   Supine to sit: Minimal Assistance   Sit to supine: Standby Assistance   Rolling: Minimal Assistance   Scooting: Standby Assistance    Balance:   Static Sit: GOOD: Takes MODERATE challenges from all directions  Dynamic Sit:  FAIR: Cannot move trunk without losing balance  Static Stand: FAIR: Maintains without assist but unable to take challenges  Dynamic stand: FAIR: Needs CONTACT GUARD during gait    Transfer Training:  Sit to stand:Supervision or Set-up Assistance and Contact Guard Assistance with Rolling Walker    Bed <> Chair:  Step Transfer with Contact Guard Assistance with Rolling Walker      Wheelchair Training:   mobility 50ft with sba a lots of cues for direction    Gait Training:  amb 4 times 100ft with close sba a cues to keep himself closer to rw as he walked with it    Stair Training:  No steps on staircase today      Additional Treatment:  He performed 4 sets 15 reps of AROM BLE ,but needed guidance to complete each set properly( Without guidance he would do to or three and then stop. Pt performed nu step x 15 min at level 3 with pt taking several rest breaks    Activity Tolerance:  Patient limited by fatigue    Patient left supine with call button in reach.    Assessment:  Jamey Lei is a 81 y.o. male with a medical diagnosis of Encephalopathy, toxic. He presents with  increased gait distance , but required much greater encouragement to complete each task.    Rehab potential is good.    Activity tolerance: Fair    Discharge recommendations:       Equipment recommendations:       GOALS:   Multidisciplinary Problems     Physical Therapy Goals     Not on file                PLAN:    Patient to be seen 5 x/week  to address the above listed problems via therapeutic activities, gait training, wheelchair management/training, therapeutic exercises, therapeutic groups, neuromuscular re-education  Plan of Care expires: 05/02/22  Plan of Care reviewed with: patient         4/21/2022

## 2022-04-21 NOTE — PROGRESS NOTES
[facility] Medicine  Progress Note    Patient Name: Jamey Lei  MRN: [id number]  Patient Class: IP- Rehab   Admission Date: 4/18/2022  Length of Stay: 3 days  Attending Physician: Navi Domínguez III, MD  Primary Care Provider: Navi Domínguez III, MD        Subjective:     Principal Problem:Encephalopathy, toxic        HPI:  Patient is a 81-year-old male with a past medical history significant for right-sided lung mass who presented to the emergency department with decreased appetite for last few days.  The patient had reportedly stopped taking his medications and was agitated.  Patient had a witnessed seizure with no head trauma.  Patient has a history of seizure disorder meningioma and chronic kidney disease.  The patient about 2-3 months ago had a admission for pneumonia and a lung mass and was ultimately transferred to an outside facility and had lung biopsies.  They were negative for malignancy the patient had an inpatient rehab stay and had a significant improvement in his level of function.  The patient over the last few months has had a progressive decline when we now have this repeated admission.  The patient was started on antibiotics after admission his oral medications re-initiated and he was placed on anti epileptics.  The patient has improved and recovered and the patient and family are wanting therapy to get him back to his previous level of function and hopefully return home.  The patient is having intermittent confusion he has progressively improved since his seizure and getting back on medicines.  Overall the patient seems slightly depressed over his current diagnoses and deterioration of condition will continually trying to motivate and encourage the patient.        Overview/Hospital Course:  4/20 SD: Pt in wheelchair awaiting therapy.     4/21 SD: Pt participating in physical therapy. Bladder training complete, espinal catheter discontinued      Interval History: Pt  seen and evaluated    Review of Systems   Unable to perform ROS: Mental status change   Objective:     Vital Signs (Most Recent):  Temp: 97.7 °F (36.5 °C) (04/21/22 0545)  Pulse: 98 (04/21/22 1305)  Resp: 18 (04/21/22 1305)  BP: (!) 145/77 (04/21/22 0933)  SpO2: 96 % (04/21/22 1305) Vital Signs (24h Range):  Temp:  [97.7 °F (36.5 °C)-98.1 °F (36.7 °C)] 97.7 °F (36.5 °C)  Pulse:  [92-98] 98  Resp:  [18-28] 18  SpO2:  [94 %-98 %] 96 %  BP: (127-145)/(60-77) 145/77     Weight: 104.3 kg (230 lb)  Body mass index is 32.08 kg/m².    Intake/Output Summary (Last 24 hours) at 4/21/2022 1434  Last data filed at 4/21/2022 1045  Gross per 24 hour   Intake 1960 ml   Output 3300 ml   Net -1340 ml        Physical Exam  Constitutional:       General: He is awake. He is not in acute distress.     Appearance: He is obese. He is ill-appearing. He is not toxic-appearing or diaphoretic.   HENT:      Head: Normocephalic and atraumatic.      Nose: Nose normal. No congestion or rhinorrhea.      Mouth/Throat:      Mouth: Mucous membranes are moist.      Pharynx: Oropharynx is clear. No oropharyngeal exudate or posterior oropharyngeal erythema.   Eyes:      General: No scleral icterus.        Right eye: No discharge.         Left eye: No discharge.      Extraocular Movements: Extraocular movements intact.      Pupils: Pupils are equal, round, and reactive to light.   Neck:      Thyroid: No thyroid mass or thyromegaly.      Vascular: No carotid bruit.      Meningeal: Brudzinski's sign and Kernig's sign absent.   Cardiovascular:      Rate and Rhythm: Normal rate and regular rhythm.      Chest Wall: PMI is not displaced. No thrill.      Pulses: Normal pulses.      Heart sounds: Normal heart sounds. No murmur heard.    No friction rub. No gallop.   Pulmonary:      Effort: Pulmonary effort is normal. No tachypnea, accessory muscle usage, prolonged expiration or respiratory distress.      Breath sounds: No stridor or decreased air movement.  Rhonchi present. No wheezing or rales.   Chest:      Chest wall: No tenderness.   Abdominal:      General: Bowel sounds are normal. There is no distension.      Palpations: Abdomen is soft. There is no hepatomegaly, splenomegaly or mass.      Tenderness: There is no abdominal tenderness. There is no right CVA tenderness, left CVA tenderness, guarding or rebound.      Hernia: No hernia is present.   Musculoskeletal:         General: No swelling, tenderness, deformity or signs of injury. Normal range of motion.      Cervical back: Normal range of motion and neck supple. No rigidity. No muscular tenderness.      Right lower leg: Edema present.      Left lower leg: No edema.   Lymphadenopathy:      Cervical: No cervical adenopathy.   Skin:     General: Skin is warm.      Capillary Refill: Capillary refill takes less than 2 seconds.      Coloration: Skin is not cyanotic, jaundiced or pale.      Findings: No erythema, lesion, petechiae or rash.   Neurological:      Mental Status: He is alert. He is disoriented.      Cranial Nerves: No cranial nerve deficit, dysarthria or facial asymmetry.      Motor: Weakness present. No tremor.      Gait: Gait abnormal.   Psychiatric:         Mood and Affect: Mood is depressed. Mood is not anxious. Affect is flat and tearful.         Speech: Speech is not rapid and pressured or slurred.         Behavior: Behavior is withdrawn. Behavior is not agitated, aggressive or combative.         Thought Content: Thought content is not paranoid or delusional.         Cognition and Memory: Memory is impaired.       Significant Labs: All pertinent labs within the past 24 hours have been reviewed.    CBC:   No results for input(s): WBC, HGB, HCT, PLT in the last 48 hours.    CMP:   No results for input(s): NA, K, CL, CO2, GLU, BUN, CREATININE, CALCIUM, PROT, ALBUMIN, BILITOT, ALKPHOS, AST, ALT, ANIONGAP, EGFRNONAA in the last 48 hours.    Invalid input(s): [place]      Significant Imaging: No  pertinent imaging within the past 24 hours.      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  Stable        VTE Risk Mitigation (From admission, onward)         Ordered     enoxaparin injection 40 mg  Daily         04/18/22 1529     IP VTE HIGH RISK PATIENT  Once         04/18/22 1529     Place sequential compression device  Until discontinued         04/18/22 1529                Discharge Planning   GLORY:      Code Status: DNR   Is the patient medically ready for discharge?:     Reason for patient still in hospital (select all that apply): Patient trending condition, Treatment and PT / OT recommendations  Discharge Plan A: Assisted Living                  Natalie Crawley NP  Department of Hospital Medicine   [place] [facility] ([facility])

## 2022-04-21 NOTE — NURSING
Sanders catheter was dc around 1045. I encouraged patient to try to urinate around 1415. Patient was able to urinate a little, but I did not have a urinal to measure output. Next time patient goes to bathroom,I will make sure urinal is available.   I saw a small amount of bright red blood in depends. Will continue to monitor.

## 2022-04-21 NOTE — SUBJECTIVE & OBJECTIVE
Interval History: Pt seen and evaluated    Review of Systems   Unable to perform ROS: Mental status change   Objective:     Vital Signs (Most Recent):  Temp: 97.7 °F (36.5 °C) (04/21/22 0545)  Pulse: 98 (04/21/22 1305)  Resp: 18 (04/21/22 1305)  BP: (!) 145/77 (04/21/22 0933)  SpO2: 96 % (04/21/22 1305) Vital Signs (24h Range):  Temp:  [97.7 °F (36.5 °C)-98.1 °F (36.7 °C)] 97.7 °F (36.5 °C)  Pulse:  [92-98] 98  Resp:  [18-28] 18  SpO2:  [94 %-98 %] 96 %  BP: (127-145)/(60-77) 145/77     Weight: 104.3 kg (230 lb)  Body mass index is 32.08 kg/m².    Intake/Output Summary (Last 24 hours) at 4/21/2022 1434  Last data filed at 4/21/2022 1045  Gross per 24 hour   Intake 1960 ml   Output 3300 ml   Net -1340 ml        Physical Exam  Constitutional:       General: He is awake. He is not in acute distress.     Appearance: He is obese. He is ill-appearing. He is not toxic-appearing or diaphoretic.   HENT:      Head: Normocephalic and atraumatic.      Nose: Nose normal. No congestion or rhinorrhea.      Mouth/Throat:      Mouth: Mucous membranes are moist.      Pharynx: Oropharynx is clear. No oropharyngeal exudate or posterior oropharyngeal erythema.   Eyes:      General: No scleral icterus.        Right eye: No discharge.         Left eye: No discharge.      Extraocular Movements: Extraocular movements intact.      Pupils: Pupils are equal, round, and reactive to light.   Neck:      Thyroid: No thyroid mass or thyromegaly.      Vascular: No carotid bruit.      Meningeal: Brudzinski's sign and Kernig's sign absent.   Cardiovascular:      Rate and Rhythm: Normal rate and regular rhythm.      Chest Wall: PMI is not displaced. No thrill.      Pulses: Normal pulses.      Heart sounds: Normal heart sounds. No murmur heard.    No friction rub. No gallop.   Pulmonary:      Effort: Pulmonary effort is normal. No tachypnea, accessory muscle usage, prolonged expiration or respiratory distress.      Breath sounds: No stridor or decreased  air movement. Rhonchi present. No wheezing or rales.   Chest:      Chest wall: No tenderness.   Abdominal:      General: Bowel sounds are normal. There is no distension.      Palpations: Abdomen is soft. There is no hepatomegaly, splenomegaly or mass.      Tenderness: There is no abdominal tenderness. There is no right CVA tenderness, left CVA tenderness, guarding or rebound.      Hernia: No hernia is present.   Musculoskeletal:         General: No swelling, tenderness, deformity or signs of injury. Normal range of motion.      Cervical back: Normal range of motion and neck supple. No rigidity. No muscular tenderness.      Right lower leg: Edema present.      Left lower leg: No edema.   Lymphadenopathy:      Cervical: No cervical adenopathy.   Skin:     General: Skin is warm.      Capillary Refill: Capillary refill takes less than 2 seconds.      Coloration: Skin is not cyanotic, jaundiced or pale.      Findings: No erythema, lesion, petechiae or rash.   Neurological:      Mental Status: He is alert. He is disoriented.      Cranial Nerves: No cranial nerve deficit, dysarthria or facial asymmetry.      Motor: Weakness present. No tremor.      Gait: Gait abnormal.   Psychiatric:         Mood and Affect: Mood is depressed. Mood is not anxious. Affect is flat and tearful.         Speech: Speech is not rapid and pressured or slurred.         Behavior: Behavior is withdrawn. Behavior is not agitated, aggressive or combative.         Thought Content: Thought content is not paranoid or delusional.         Cognition and Memory: Memory is impaired.       Significant Labs: All pertinent labs within the past 24 hours have been reviewed.    CBC:   No results for input(s): WBC, HGB, HCT, PLT in the last 48 hours.    CMP:   No results for input(s): NA, K, CL, CO2, GLU, BUN, CREATININE, CALCIUM, PROT, ALBUMIN, BILITOT, ALKPHOS, AST, ALT, ANIONGAP, EGFRNONAA in the last 48 hours.    Invalid input(s): [place]      Significant  Imaging: No pertinent imaging within the past 24 hours.

## 2022-04-21 NOTE — PROGRESS NOTES
"  [facility])  Adult Nutrition  Consult Note    SUMMARY     Recommendations    Recommendation/Intervention:   1. Modify diet to Low Sodium dietary restrictions given his Cardiac history     2. Reduce Suplena to once per day not that appetite has improved     3. Continue Lino BID for wound healing     4. RD to monitor and make recs accordingly.    Goals: Intake of 75% or greater by RD f/u.  Nutrition Goal Status: new    Assessment and Plan  Nutrition Problem  Increased nutrient needs    Related to (etiology):   Increased nutrient demand for wound healing     Signs and Symptoms (as evidenced by):   Altered skin integrity to R buttock      Interventions/Recommendations (treatment strategy):  Lino BID   Suplena x 1 day    Nutrition Diagnosis Status:   New      Reason for Assessment    Reason For Assessment: identified at risk by screening criteria  Diagnosis: other (see comments) (toxic encephalopathy)  Relevant Medical History: R lung mass, CHF, COPD, HTN, CKD 4  General Information Comments: RD triggered for new rehab admission. PO @ 100% at admission and now 50% avg. He states he was having issues with diarrhea recently and thats why his appetite was decreased. Diarrhea is resolved and appetite is back up today per pt. He is also drinking Suplena TID- will decrease now that intake is improved. Continue Lino BID. Change diet to low sodium with Hx of CHF and HTN.  Nutrition Discharge Planning: Low Sodium    Nutrition Risk Screen    Nutrition Risk Screen: no indicators present    Nutrition/Diet History    Patient Reported Diet/Restrictions/Preferences: general  Spiritual, Cultural Beliefs, Anabaptist Practices, Values that Affect Care: no  Food Allergies: NKFA  Factors Affecting Nutritional Intake: None identified at this time    Anthropometrics    Temp: 97.7 °F (36.5 °C)  Height Method: Stated  Height: 5' 11" (180.3 cm)  Height (inches): 71 in  Weight Method: Standard Scale  Weight: 104.3 kg (230 " lb)  Weight (lb): 230 lb  Ideal Body Weight (IBW), Male: 172 lb  % Ideal Body Weight, Male (lb): 133.72 %  BMI (Calculated): 32.1  BMI Grade: 30 - 34.9- obesity - grade I  Weight Loss: intentional  Usual Body Weight (UBW), k.09 kg  % Usual Body Weight: 95.83  % Weight Change From Usual Weight: -4.37 %       Lab/Procedures/Meds    Pertinent Labs Reviewed: reviewed  Pertinent Medications Reviewed: reviewed  Pertinent Medications Comments: Demadex    Physical Findings/Assessment         Estimated/Assessed Needs    Weight Used For Calorie Calculations: 88 kg (194 lb 0.1 oz)  Energy Calorie Requirements (kcal): 9555-7321 kcal (20-25 kcal/kg AJ IBW)  Energy Need Method: Kcal/kg  Protein Requirements: 88 gm (1 gm/kg AJ IBW- liberalized CKD with wound and lung mass)  Weight Used For Protein Calculations: 88 kg (194 lb 0.1 oz)     Estimated Fluid Requirement Method: RDA Method  RDA Method (mL): 1760         Nutrition Prescription Ordered    Current Diet Order: Low Sodium  Oral Nutrition Supplement: Lino BID, Suplena qd    Evaluation of Received Nutrient/Fluid Intake       % Intake of Estimated Energy Needs: 50 - 75 %  % Meal Intake: 50 - 75 %    Nutrition Risk    Level of Risk/Frequency of Follow-up: low - moderate       Monitor and Evaluation    Food and Nutrient Intake: food and beverage intake  Food and Nutrient Adminstration: diet order  Anthropometric Measurements: weight change, weight  Biochemical Data, Medical Tests and Procedures: electrolyte and renal panel, gastrointestinal profile, glucose/endocrine profile, inflammatory profile, lipid profile  Nutrition-Focused Physical Findings: overall appearance       Nutrition Follow-Up    RD Follow-up?: Yes

## 2022-04-21 NOTE — PT/OT/SLP PROGRESS
Occupational Therapy  Treatment    Jamey Lei   MRN: [id number]   Admitting Diagnosis: Encephalopathy, toxic    OT Date of Treatment: 04/21/22   AM Start Time: 0900  AM End Time: 1030  PM Start Time: na  PM End Time: na  Treatment Type: Individual 60 and Concurrent 30  Total Time (min): 90 min      Billable Minutes:  Self Care/Home Management 30, Therapeutic Activity 30 and Therapeutic Exercise 30  Total Minutes: 90    General Precautions: Standard, fall  Orthopedic Precautions: N/A  Braces:      Spiritual, Cultural Beliefs, Christian Practices, Values that Affect Care: no    Subjective:  Communicated with nurse prior to session.    Pain/Comfort  Pain Rating 1: 6/10  Location - Side 1: Left  Location - Orientation 1: proximal  Location 1: shoulder  Pain Addressed 1: Reposition, Cessation of Activity, Nurse notified  Pain Rating Post-Intervention 1: 2/10    Objective:  Pt was cooperative and motivated with increased verbal encouragement on this date secondary to confusion. He participated in ADL retraining regarding UB and LB dressing emphasizing use of compensatory strategies and adaptive equipment providing extra time with repetition requiring min assist with UB dressing secondary to assist to pull / adjust pullover shirt to waist on back while requiring max assist with LB dressing secondary to assist to pull / adjust pants to waist and to alessandra bilateral socks and shoes with continuous verbal and tactile cueing for technique. Pt then participated in functional transfer retraining throughout session to / from wheelchair, chair, and toilet emphasizing fall prevention providing extra time with repetition requiring min assist secondary to steadying assist with verbal and tactile cueing for safety awareness and technique. Next, he participated in therapeutic activity addressing trunk mobility, trunk control, dynamic sitting balance, and trunk strength utilizing large stability ball challenging her to perform trunk  flexion, extension, and lateral flexion with bilateral hands stabilized on ball requiring verbal and tactile cueing to facilitate optimal movement patterns and increased range per trial in order to improve active ROM impacting performance with functional tasks below waist. Pt then participated in therapeutic exercise performing 5x10 seated pushups WITHOUT lifting assist, but verbal and tactile cueing for technique challenging him to lift buttocks off seated surface to end range elbow extension in order to strengthen triceps brachii to improve performance with sit <> stand transitions particularly from lower surfaces.       Functional Mobility:  Bed Mobility:   Supine to sit: Minimal Assistance   Sit to supine: Standby Assistance   Rolling: Standby Assistance   Scooting: Standby Assistance    Transfer Training:   Sit to stand:Minimal Assistance with Rolling Walker .  Bed <> Chair:  Step Transfer with Minimal Assistance with Rolling Walker .  Toilet Transfer:  Pt Step Transfer with Minimal Assistance with Grab bars .    UE Dressing:  Patient performed UE Dressing with Minimal Assistance with extra time at Wheelchair.    LE Dressing:  Patient don/doffed socks with Maximum Assistance, Patient don/doffed shoes with Maximum Assistance, Patient performed don/doffed adult brief with Moderate Assistance and Patient performed don/doffed pants with Moderate Assistance    Balance:   Static Sit: GOOD-: Takes MODERATE challenges from all directions but inconsistently  Dynamic Sit:  FAIR: Cannot move trunk without losing balance  Static Stand: FAIR: Maintains without assist but unable to take challenges  Dynamic stand: FAIR: Needs CONTACT GUARD during gait    Patient left up in chair with all lines intact and nurse notified    ASSESSMENT:  Pt demonstrated improved functional performance with UB dressing and transfers as noted by min assist in which patient able to thread bilateral UE's and head into pullover shirt while not  requiring lifting or lowering assist on this date with additional verbal and tactile cueing for safety awareness and technique.     Rehab potential is good    Activity tolerance: Fair    Discharge recommendations: home with home health     Equipment recommendations: other (see comments) (To be further determined based on progress prior to discharge.)     GOALS:   Multidisciplinary Problems     Occupational Therapy Goals        Problem: Occupational Therapy    Goal Priority Disciplines Outcome Interventions   Occupational Therapy Goal     OT, PT/OT Ongoing, Progressing    Description: Long Term Goals to be met by: 05/02/22     Patient will increase functional independence with ADLs by performing:    Feeding with [place].  UE Dressing with Set-up Assistance.  LE Dressing with Minimal Assistance.  Grooming while seated at sink with Modified [place].  Toileting from toilet with Supervision for hygiene and clothing management.   Bathing from  shower chair/bench with Stand-by Assistance.  Step transfer with Modified [place]  Toilet transfer to toilet with Modified [place].  Increased functional strength to 4 to 4+/5 for bilateral UE's.                     Plan:  Patient to be seen 5 x/week to address the above listed problems via self-care/home management, community/work re-entry, therapeutic activities, therapeutic exercises, cognitive retraining  Plan of Care expires: 05/02/22  Plan of Care reviewed with: patient         04/21/2022

## 2022-04-21 NOTE — NURSING
Dr. Domínguez notified espinal catheter was pulled per bladder training protocol. No new orders at this time. Will continue to monitor and follow protocol for espinal catheters.

## 2022-04-22 LAB
ALBUMIN SERPL BCP-MCNC: 1.9 G/DL (ref 3.5–5.2)
ALP SERPL-CCNC: 77 U/L (ref 55–135)
ALT SERPL W/O P-5'-P-CCNC: 26 U/L (ref 10–44)
ANION GAP SERPL CALC-SCNC: 6 MMOL/L (ref 8–16)
AST SERPL-CCNC: 28 U/L (ref 10–40)
BASOPHILS # BLD AUTO: 0.03 K/UL (ref 0–0.2)
BASOPHILS NFR BLD: 0.5 % (ref 0–1.9)
BILIRUB SERPL-MCNC: 0.4 MG/DL (ref 0.1–1)
BUN SERPL-MCNC: 41 MG/DL (ref 8–23)
CALCIUM SERPL-MCNC: 9.2 MG/DL (ref 8.7–10.5)
CHLORIDE SERPL-SCNC: 105 MMOL/L (ref 95–110)
CO2 SERPL-SCNC: 30 MMOL/L (ref 23–29)
CREAT SERPL-MCNC: 1.9 MG/DL (ref 0.5–1.4)
DIFFERENTIAL METHOD: ABNORMAL
EOSINOPHIL # BLD AUTO: 0.2 K/UL (ref 0–0.5)
EOSINOPHIL NFR BLD: 2.9 % (ref 0–8)
ERYTHROCYTE [DISTWIDTH] IN BLOOD BY AUTOMATED COUNT: 18.6 % (ref 11.5–14.5)
EST. GFR  (AFRICAN AMERICAN): 37.9 ML/MIN/1.73 M^2
EST. GFR  (NON AFRICAN AMERICAN): 32.8 ML/MIN/1.73 M^2
GLUCOSE SERPL-MCNC: 104 MG/DL (ref 70–110)
HCT VFR BLD AUTO: 29.4 % (ref 40–54)
HGB BLD-MCNC: 9.5 G/DL (ref 14–18)
IMM GRANULOCYTES # BLD AUTO: 0.02 K/UL (ref 0–0.04)
IMM GRANULOCYTES NFR BLD AUTO: 0.3 % (ref 0–0.5)
LYMPHOCYTES # BLD AUTO: 1.3 K/UL (ref 1–4.8)
LYMPHOCYTES NFR BLD: 21.7 % (ref 18–48)
MCH RBC QN AUTO: 30.1 PG (ref 27–31)
MCHC RBC AUTO-ENTMCNC: 32.3 G/DL (ref 32–36)
MCV RBC AUTO: 93 FL (ref 82–98)
MONOCYTES # BLD AUTO: 0.8 K/UL (ref 0.3–1)
MONOCYTES NFR BLD: 12.6 % (ref 4–15)
NEUTROPHILS # BLD AUTO: 3.7 K/UL (ref 1.8–7.7)
NEUTROPHILS NFR BLD: 62 % (ref 38–73)
NRBC BLD-RTO: 0 /100 WBC
PLATELET # BLD AUTO: 151 K/UL (ref 150–450)
PMV BLD AUTO: 10.7 FL (ref 9.2–12.9)
POTASSIUM SERPL-SCNC: 3.7 MMOL/L (ref 3.5–5.1)
PROT SERPL-MCNC: 6.6 G/DL (ref 6–8.4)
RBC # BLD AUTO: 3.16 M/UL (ref 4.6–6.2)
SODIUM SERPL-SCNC: 141 MMOL/L (ref 136–145)
WBC # BLD AUTO: 5.95 K/UL (ref 3.9–12.7)

## 2022-04-22 PROCEDURE — 97110 THERAPEUTIC EXERCISES: CPT

## 2022-04-22 PROCEDURE — 36415 COLL VENOUS BLD VENIPUNCTURE: CPT | Performed by: INTERNAL MEDICINE

## 2022-04-22 PROCEDURE — 94761 N-INVAS EAR/PLS OXIMETRY MLT: CPT

## 2022-04-22 PROCEDURE — 25000003 PHARM REV CODE 250: Performed by: INTERNAL MEDICINE

## 2022-04-22 PROCEDURE — 92523 SPEECH SOUND LANG COMPREHEN: CPT

## 2022-04-22 PROCEDURE — 97530 THERAPEUTIC ACTIVITIES: CPT

## 2022-04-22 PROCEDURE — 97116 GAIT TRAINING THERAPY: CPT

## 2022-04-22 PROCEDURE — 27000221 HC OXYGEN, UP TO 24 HOURS

## 2022-04-22 PROCEDURE — 63600175 PHARM REV CODE 636 W HCPCS: Performed by: INTERNAL MEDICINE

## 2022-04-22 PROCEDURE — 99900031 HC PATIENT EDUCATION (STAT)

## 2022-04-22 PROCEDURE — 94640 AIRWAY INHALATION TREATMENT: CPT

## 2022-04-22 PROCEDURE — 92507 TX SP LANG VOICE COMM INDIV: CPT

## 2022-04-22 PROCEDURE — 97535 SELF CARE MNGMENT TRAINING: CPT

## 2022-04-22 PROCEDURE — 99900035 HC TECH TIME PER 15 MIN (STAT)

## 2022-04-22 PROCEDURE — 25000003 PHARM REV CODE 250: Performed by: NURSE PRACTITIONER

## 2022-04-22 PROCEDURE — 85025 COMPLETE CBC W/AUTO DIFF WBC: CPT | Performed by: INTERNAL MEDICINE

## 2022-04-22 PROCEDURE — 11000001 HC ACUTE MED/SURG PRIVATE ROOM

## 2022-04-22 PROCEDURE — 80053 COMPREHEN METABOLIC PANEL: CPT | Performed by: INTERNAL MEDICINE

## 2022-04-22 PROCEDURE — 25000242 PHARM REV CODE 250 ALT 637 W/ HCPCS: Performed by: INTERNAL MEDICINE

## 2022-04-22 RX ADMIN — HYDROCODONE BITARTRATE AND ACETAMINOPHEN 1 TABLET: 7.5; 325 TABLET ORAL at 08:04

## 2022-04-22 RX ADMIN — DOCUSATE SODIUM 100 MG: 100 CAPSULE, LIQUID FILLED ORAL at 08:04

## 2022-04-22 RX ADMIN — POTASSIUM CHLORIDE 40 MEQ: 20 TABLET, EXTENDED RELEASE ORAL at 09:04

## 2022-04-22 RX ADMIN — METOPROLOL TARTRATE 25 MG: 25 TABLET, FILM COATED ORAL at 09:04

## 2022-04-22 RX ADMIN — MUPIROCIN: 20 OINTMENT TOPICAL at 08:04

## 2022-04-22 RX ADMIN — DOCUSATE SODIUM 100 MG: 100 CAPSULE, LIQUID FILLED ORAL at 09:04

## 2022-04-22 RX ADMIN — ENOXAPARIN SODIUM 40 MG: 40 INJECTION SUBCUTANEOUS at 04:04

## 2022-04-22 RX ADMIN — OXCARBAZEPINE 300 MG: 300 TABLET, FILM COATED ORAL at 09:04

## 2022-04-22 RX ADMIN — METOPROLOL TARTRATE 25 MG: 25 TABLET, FILM COATED ORAL at 08:04

## 2022-04-22 RX ADMIN — EZETIMIBE 10 MG: 10 TABLET ORAL at 09:04

## 2022-04-22 RX ADMIN — IPRATROPIUM BROMIDE AND ALBUTEROL SULFATE 3 ML: 2.5; .5 SOLUTION RESPIRATORY (INHALATION) at 07:04

## 2022-04-22 RX ADMIN — ATORVASTATIN CALCIUM 20 MG: 20 TABLET, FILM COATED ORAL at 09:04

## 2022-04-22 RX ADMIN — TORSEMIDE 100 MG: 20 TABLET ORAL at 09:04

## 2022-04-22 RX ADMIN — ASPIRIN 81 MG: 81 TABLET, COATED ORAL at 09:04

## 2022-04-22 RX ADMIN — GUAIFENESIN 1200 MG: 600 TABLET, EXTENDED RELEASE ORAL at 09:04

## 2022-04-22 RX ADMIN — OXCARBAZEPINE 300 MG: 300 TABLET, FILM COATED ORAL at 08:04

## 2022-04-22 RX ADMIN — ACETYLCYSTEINE 4 ML: 200 SOLUTION ORAL; RESPIRATORY (INHALATION) at 07:04

## 2022-04-22 RX ADMIN — Medication 6 MG: at 08:04

## 2022-04-22 RX ADMIN — IPRATROPIUM BROMIDE AND ALBUTEROL SULFATE 3 ML: 2.5; .5 SOLUTION RESPIRATORY (INHALATION) at 02:04

## 2022-04-22 RX ADMIN — GUAIFENESIN 1200 MG: 600 TABLET, EXTENDED RELEASE ORAL at 08:04

## 2022-04-22 NOTE — PT/OT/SLP PROGRESS
Physical Therapy         Treatment        Jamey Lei   MRN: [id number]     PT Received On: 22  Total Time (min): 90        Billable Minutes:  Gait [id number], Therapeutic Activity 30 and Therapeutic Exercise 30  Total Minutes: 90  AM Start Time: 1030  AM End Time: 1200  PM Start Time:   PM End Time:   Treatment Type: Individual 45,concurrent 45  Treatment Type: Treatment  PT/PTA: PT             General Precautions: Standard,    Orthopedic Precautions:     Braces:           Subjective:  Communicated with nurseprior to session.         Objective:  Patient found in bed, with      Functional Mobility:  Bed Mobility:   Supine to sit: Standby Assistance   Sit to supine: Standby Assistance   Rolling: Standby Assistance   Scooting: Standby Assistance    Balance:   Static Sit: GOOD: Takes MODERATE challenges from all directions  Dynamic Sit:  FAIR+: Maintains balance through MINIMAL excursions of active trunk motion  Static Stand: FAIR: Maintains without assist but unable to take challenges  Dynamic stand: FAIR: Needs CONTACT GUARD during gait    Transfer Training:  Sit to stand:Contact Guard Assistance with Rolling Walker    Bed <> Chair:  Step Transfer with Contact Guard Assistance with Rolling Walker      Wheelchair Trainin slowly sba with wc mobility    Gait Training:  Ambulate with a rw 120ft with o2 and sba for 4 attempts    Stair Training:        Additional Treatment:  Pt performed sit to stand going between sba and cga 3 sets 10 reps. Pt performed arom exs 3 sets 15 reps with both sitting and standing positions. Pt franny canales x 20 min at level 3    Activity Tolerance:  Patient tolerated treatment well    Patient left supine with call button in reach.    Assessment:  Jamey Lei is a 81 y.o. male with a medical diagnosis of Encephalopathy, toxic. He presents with doing better with transfers and better gait distance.    Rehab potential is good.    Activity tolerance: Good    Discharge  recommendations:       Equipment recommendations:       GOALS:   STGS  1. Pt will be sba with all bed mobility-met  2. Pt will be sba with transfers-met  3. Pt will ambulate with a rw 150ft sba-ongoing  4. Pt will go up and down 12 steps with mahamed-ongoing  5. Pt will perform wc mobility 150ft withsba-met  LTGs  1. Pt will be ind with bed mobility-ongoing  2. Pt will be ind with all transfers- ongoing  3. Pt will be ind with gait with a rw 150ft -ongoing  4. Pt will be sba with going up and down steps with use of rails-ongoing  5. Pt ind with wc mobility 150ft-ongoing       PLAN:    Patient to be seen 5 x/week  to address the above listed problems via therapeutic activities, gait training, wheelchair management/training, therapeutic exercises, therapeutic groups, neuromuscular re-education  Plan of Care expires: 05/02/22  Plan of Care reviewed with: patient         4/22/2022

## 2022-04-22 NOTE — PT/OT/SLP PROGRESS
Occupational Therapy  Weekly Progress Note    Jamey Lei   MRN: [id number]   Admitting Diagnosis: Encephalopathy, toxic    OT Date of Treatment: 04/22/22   AM Start Time: na  AM End Time: na  PM Start Time: 1230  PM End Time: 1400  Treatment Type: Individual 90  Total Time (min): 90 min      Billable Minutes:  Self Care/Home Management 60, Therapeutic Activity 15 and Therapeutic Exercise 15  Total Minutes: 90    General Precautions: Standard, fall  Orthopedic Precautions: N/A  Braces:      Spiritual, Cultural Beliefs, Alevism Practices, Values that Affect Care: no    Subjective:  Communicated with nurse prior to session.    Pain/Comfort  Pain Rating 1: 0/10  Pain Rating Post-Intervention 1: 0/10    Objective:  Patient found with: oxygen (2 L O2 per NC). Pt was cooperative and motivated with minimal verbal encouragement while pleasantly confused on this date. He participated in extensive ADL retraining as further noted below emphasizing fall prevention, and use of compensatory strategies, assistive devices, and adaptive equipment including reacher, sock aide, and LH sponge providing extra time with repetition requiring continuous verbal and tactile cueing for safety awareness and technique. Also, he participated in therapeutic activity addressing trunk mobility, trunk control, dynamic sitting balance, and trunk strength utilizing large stability ball challenging her to perform trunk flexion, extension, and lateral flexion with bilateral hands stabilized on ball requiring verbal and tactile cueing to facilitate optimal movement patterns and increased range per trial in order to improve active ROM impacting performance with functional tasks below waist. Pt then participated in therapeutic exercise performing 5x10 seated pushups without lifting assist, but verbal and tactile cueing for technique challenging him to lift buttocks off seated surface to end range elbow extension in order to strengthen triceps brachii to  improve performance with sit <> stand transitions particularly from lower surfaces.     Functional Mobility:  Bed Mobility:   Supine to sit: Standby Assistance   Sit to supine: Standby Assistance   Rolling: Standby Assistance   Scooting: Standby Assistance    Transfer Training:   Sit to stand:Stand-by Assistance with Rolling Walker .  Bed <> Chair:  Step Transfer with Contact Guard Assistance with Rolling Walker .  Toilet Transfer:  Pt Step Transfer with Stand-by Assistance with Grab bars .  Patient performed shower transfer Step Transfer with Contact Guard Assistance with Grab bars and shower chair.    Feeding:  Patient performed feeding with Setup Assistance with extra time.    Grooming:  Patient peformed hand washing with Supervision or Set-up Assistance at sitting at sink.  Patient performed face washing with Supervision or Set-up Assistance at sitting at sink.  Patient performed oral hygeine with Supervision or Set-up Assistance at sitting at sink.  Patient performe hair grooming with Supervision or Set-up Assistance at sitting at sink.    Bathing:  Patient performed bathing with Moderate Assistance with grab bar, Handheld shower head, long-handled sponge and shower chair at Shower.    UE Dressing:  Patient performed UE Dressing with Minimal Assistance with extra time at Edge of bed.    LE Dressing:  Patient don/doffed socks with Stand-by Assistance, Patient don/doffed shoes with Moderate Assistance, Patient performed don/doffed adult brief with Moderate Assistance and Patient performed don/doffed pants with Moderate Assistance    Toilet Training:  Pt performed toileting with Moderate Assistance with Grab  bar at [employer].    Balance:   Static Sit: GOOD: Takes MODERATE challenges from all directions  Dynamic Sit:  FAIR+: Maintains balance through MINIMAL excursions of active trunk motion  Static Stand: FAIR: Maintains without assist but unable to take challenges  Dynamic stand: FAIR: Needs CONTACT GUARD during  gait      Patient left supine with call button in reach and nurse notified    ASSESSMENT:  Pt demonstrated progress with functional short term goals as noted by changes in functional scores in which patient able to achieve 8/9 STG's while progressing towards long term goals. Pt now mod assist to supervision / setup assistance with ADL's including functional mobility with extra time, and use of compensatory strategies and AD / AE. However, cognitive deficits regarding impaired STM and poor immediate recall impact retention and carryover with compensatory strategies.     Rehab potential is good    Activity tolerance: Fair    Discharge recommendations: home with home health     Equipment recommendations: other (see comments) (To be further determined based on progress prior to discharge.)     GOALS:   Short Term Goals to be met by: 04/25/22      Patient will increase functional independence with ADLs by performing:     Feeding with Set-up Assistance. MET  UE Dressing with Minimal Assistance. MET  LE Dressing with Moderate Assistance. MET  Grooming while standing at sink with Supervision. MET  Toileting from toilet with Moderate Assistance for hygiene and clothing management. MET  Bathing from  shower chair/bench with Moderate Assistance. MET  Step transfer with Stand-by Assistance. ONGOING - CGA  Toilet transfer to toilet with Stand-by Assistance. MET  Increased functional strength to 4-/5 for bilateral UE's. MET     Long Term Goals to be met by: 05/02/22      Patient will increase functional independence with ADLs by performing:     Feeding with [place]. ONGOING  UE Dressing with Set-up Assistance. ONGOING  LE Dressing with Minimal Assistance. ONGOING  Grooming while seated at sink with Modified [place]. ONGOING  Toileting from toilet with Supervision for hygiene and clothing management. ONGOING  Bathing from  shower chair/bench with Stand-by Assistance. ONGOING  Step transfer with Modified [place].  ONGOING  Toilet transfer to toilet with Modified [place]. ONGOING  Increased functional strength to 4 to 4+/5 for bilateral UE's. ONGOING    Plan:  Patient to be seen 5 x/week to address the above listed problems via self-care/home management, community/work re-entry, therapeutic activities, therapeutic exercises, cognitive retraining  Plan of Care expires: 05/02/22  Plan of Care reviewed with: patient         04/22/2022

## 2022-04-22 NOTE — PROGRESS NOTES
[facility]  Progress Note    Patient Name: Jamey Lei  MRN: [id number]  Patient Class: IP- Rehab   Admission Date: 4/18/2022  Length of Stay: 4 days  Attending Physician: Navi Domínguez III, MD  Primary Care Provider: Navi Domínguez III, MD        Subjective:     Principal Problem:Encephalopathy, toxic        HPI:  Patient is a 81-year-old male with a past medical history significant for right-sided lung mass who presented to the emergency department with decreased appetite for last few days.  The patient had reportedly stopped taking his medications and was agitated.  Patient had a witnessed seizure with no head trauma.  Patient has a history of seizure disorder meningioma and chronic kidney disease.  The patient about 2-3 months ago had a admission for pneumonia and a lung mass and was ultimately transferred to an outside facility and had lung biopsies.  They were negative for malignancy the patient had an inpatient rehab stay and had a significant improvement in his level of function.  The patient over the last few months has had a progressive decline when we now have this repeated admission.  The patient was started on antibiotics after admission his oral medications re-initiated and he was placed on anti epileptics.  The patient has improved and recovered and the patient and family are wanting therapy to get him back to his previous level of function and hopefully return home.  The patient is having intermittent confusion he has progressively improved since his seizure and getting back on medicines.  Overall the patient seems slightly depressed over his current diagnoses and deterioration of condition will continually trying to motivate and encourage the patient.        Overview/Hospital Course:  4/20 SD: Pt in wheelchair awaiting therapy.     4/21 SD: Pt participating in physical therapy. Bladder training complete, espinal catheter discontinued    4/22 SD: Pt sitting up in  wheelchair. Positive attitude.      Interval History: Pt seen and evaluated    Review of Systems   Constitutional: Negative.    HENT: Negative.     Eyes: Negative.    Respiratory: Negative.     Cardiovascular: Negative.    Gastrointestinal: Negative.    Endocrine: Negative.    Genitourinary: Negative.    Musculoskeletal:  Positive for arthralgias and myalgias.   Neurological:  Positive for seizures and weakness. Negative for dizziness, light-headedness, numbness and headaches.   Psychiatric/Behavioral:  Positive for confusion. Negative for hallucinations and sleep disturbance. The patient is not nervous/anxious.    Objective:     Vital Signs (Most Recent):  Temp: 97.7 °F (36.5 °C) (04/22/22 0608)  Pulse: 94 (04/22/22 0709)  Resp: 20 (04/22/22 0709)  BP: (!) 185/95 (04/22/22 0608)  SpO2: (!) 93 % (04/22/22 0709) Vital Signs (24h Range):  Temp:  [97.7 °F (36.5 °C)-98.5 °F (36.9 °C)] 97.7 °F (36.5 °C)  Pulse:  [87-99] 94  Resp:  [18-20] 20  SpO2:  [93 %-99 %] 93 %  BP: (123-185)/(62-95) 185/95     Weight: 104.3 kg (230 lb)  Body mass index is 32.08 kg/m².    Intake/Output Summary (Last 24 hours) at 4/22/2022 1343  Last data filed at 4/22/2022 0900  Gross per 24 hour   Intake 1220 ml   Output --   Net 1220 ml        Physical Exam  Constitutional:       General: He is awake. He is not in acute distress.     Appearance: He is obese. He is ill-appearing. He is not toxic-appearing or diaphoretic.   HENT:      Head: Normocephalic and atraumatic.      Nose: Nose normal. No congestion or rhinorrhea.      Mouth/Throat:      Mouth: Mucous membranes are moist.      Pharynx: Oropharynx is clear. No oropharyngeal exudate or posterior oropharyngeal erythema.   Eyes:      General: No scleral icterus.        Right eye: No discharge.         Left eye: No discharge.      Extraocular Movements: Extraocular movements intact.      Pupils: Pupils are equal, round, and reactive to light.   Neck:      Thyroid: No thyroid mass or thyromegaly.       Vascular: No carotid bruit.      Meningeal: Brudzinski's sign and Kernig's sign absent.   Cardiovascular:      Rate and Rhythm: Normal rate and regular rhythm.      Chest Wall: PMI is not displaced. No thrill.      Pulses: Normal pulses.      Heart sounds: Normal heart sounds. No murmur heard.    No friction rub. No gallop.   Pulmonary:      Effort: Pulmonary effort is normal. No tachypnea, accessory muscle usage, prolonged expiration or respiratory distress.      Breath sounds: Normal breath sounds. No stridor or decreased air movement. No wheezing, rhonchi or rales.   Chest:      Chest wall: No tenderness.   Abdominal:      General: Bowel sounds are normal. There is no distension.      Palpations: Abdomen is soft. There is no hepatomegaly, splenomegaly or mass.      Tenderness: There is no abdominal tenderness. There is no right CVA tenderness, left CVA tenderness, guarding or rebound.      Hernia: No hernia is present.   Musculoskeletal:         General: No swelling, tenderness, deformity or signs of injury. Normal range of motion.      Cervical back: Normal range of motion and neck supple. No rigidity. No muscular tenderness.      Right lower leg: No edema.      Left lower leg: No edema.   Lymphadenopathy:      Cervical: No cervical adenopathy.   Skin:     General: Skin is warm.      Capillary Refill: Capillary refill takes less than 2 seconds.      Coloration: Skin is not cyanotic, jaundiced or pale.      Findings: No erythema, lesion, petechiae or rash.   Neurological:      Mental Status: He is alert. He is disoriented.      Cranial Nerves: No cranial nerve deficit, dysarthria or facial asymmetry.      Motor: Weakness present. No tremor.      Gait: Gait abnormal.   Psychiatric:         Mood and Affect: Mood is depressed. Mood is not anxious. Affect is flat and tearful.         Speech: Speech is not rapid and pressured or slurred.         Behavior: Behavior is withdrawn. Behavior is not agitated,  aggressive or combative.         Thought Content: Thought content is not paranoid or delusional.         Cognition and Memory: Memory is impaired.       Significant Labs: All pertinent labs within the past 24 hours have been reviewed.    CBC:   Recent Labs   Lab 04/22/22  0538   WBC 5.95   HGB 9.5*   HCT 29.4*          CMP:   Recent Labs   Lab 04/22/22  0537      K 3.7      CO2 30*      BUN 41*   CREATININE 1.9*   CALCIUM 9.2   PROT 6.6   ALBUMIN 1.9*   BILITOT 0.4   ALKPHOS 77   AST 28   ALT 26   ANIONGAP 6*   EGFRNONAA 32.8*         Significant Imaging: No pertinent imaging within the past 24 hours.      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  Stable        VTE Risk Mitigation (From admission, onward)         Ordered     enoxaparin injection 40 mg  Daily         04/18/22 1529     IP VTE HIGH RISK PATIENT  Once         04/18/22 1529     Place sequential compression device  Until discontinued         04/18/22 1529                Discharge Planning   GLORY:      Code Status: DNR   Is the patient medically ready for discharge?:     Reason for patient still in hospital (select all that apply): Patient trending condition, Laboratory test, Treatment and PT / OT recommendations  Discharge Plan A: Assisted Living                  Natalie Crawley NP  Department of Hospital Medicine   [place] [facility])

## 2022-04-22 NOTE — SUBJECTIVE & OBJECTIVE
Interval History: Pt seen and evaluated    Review of Systems   Constitutional: Negative.    HENT: Negative.     Eyes: Negative.    Respiratory: Negative.     Cardiovascular: Negative.    Gastrointestinal: Negative.    Endocrine: Negative.    Genitourinary: Negative.    Musculoskeletal:  Positive for arthralgias and myalgias.   Neurological:  Positive for seizures and weakness. Negative for dizziness, light-headedness, numbness and headaches.   Psychiatric/Behavioral:  Positive for confusion. Negative for hallucinations and sleep disturbance. The patient is not nervous/anxious.    Objective:     Vital Signs (Most Recent):  Temp: 97.7 °F (36.5 °C) (04/22/22 0608)  Pulse: 94 (04/22/22 0709)  Resp: 20 (04/22/22 0709)  BP: (!) 185/95 (04/22/22 0608)  SpO2: (!) 93 % (04/22/22 0709) Vital Signs (24h Range):  Temp:  [97.7 °F (36.5 °C)-98.5 °F (36.9 °C)] 97.7 °F (36.5 °C)  Pulse:  [87-99] 94  Resp:  [18-20] 20  SpO2:  [93 %-99 %] 93 %  BP: (123-185)/(62-95) 185/95     Weight: 104.3 kg (230 lb)  Body mass index is 32.08 kg/m².    Intake/Output Summary (Last 24 hours) at 4/22/2022 1343  Last data filed at 4/22/2022 0900  Gross per 24 hour   Intake 1220 ml   Output --   Net 1220 ml        Physical Exam  Constitutional:       General: He is awake. He is not in acute distress.     Appearance: He is obese. He is ill-appearing. He is not toxic-appearing or diaphoretic.   HENT:      Head: Normocephalic and atraumatic.      Nose: Nose normal. No congestion or rhinorrhea.      Mouth/Throat:      Mouth: Mucous membranes are moist.      Pharynx: Oropharynx is clear. No oropharyngeal exudate or posterior oropharyngeal erythema.   Eyes:      General: No scleral icterus.        Right eye: No discharge.         Left eye: No discharge.      Extraocular Movements: Extraocular movements intact.      Pupils: Pupils are equal, round, and reactive to light.   Neck:      Thyroid: No thyroid mass or thyromegaly.      Vascular: No carotid bruit.       Meningeal: Brudzinski's sign and Kernig's sign absent.   Cardiovascular:      Rate and Rhythm: Normal rate and regular rhythm.      Chest Wall: PMI is not displaced. No thrill.      Pulses: Normal pulses.      Heart sounds: Normal heart sounds. No murmur heard.    No friction rub. No gallop.   Pulmonary:      Effort: Pulmonary effort is normal. No tachypnea, accessory muscle usage, prolonged expiration or respiratory distress.      Breath sounds: Normal breath sounds. No stridor or decreased air movement. No wheezing, rhonchi or rales.   Chest:      Chest wall: No tenderness.   Abdominal:      General: Bowel sounds are normal. There is no distension.      Palpations: Abdomen is soft. There is no hepatomegaly, splenomegaly or mass.      Tenderness: There is no abdominal tenderness. There is no right CVA tenderness, left CVA tenderness, guarding or rebound.      Hernia: No hernia is present.   Musculoskeletal:         General: No swelling, tenderness, deformity or signs of injury. Normal range of motion.      Cervical back: Normal range of motion and neck supple. No rigidity. No muscular tenderness.      Right lower leg: No edema.      Left lower leg: No edema.   Lymphadenopathy:      Cervical: No cervical adenopathy.   Skin:     General: Skin is warm.      Capillary Refill: Capillary refill takes less than 2 seconds.      Coloration: Skin is not cyanotic, jaundiced or pale.      Findings: No erythema, lesion, petechiae or rash.   Neurological:      Mental Status: He is alert. He is disoriented.      Cranial Nerves: No cranial nerve deficit, dysarthria or facial asymmetry.      Motor: Weakness present. No tremor.      Gait: Gait abnormal.   Psychiatric:         Mood and Affect: Mood is depressed. Mood is not anxious. Affect is flat and tearful.         Speech: Speech is not rapid and pressured or slurred.         Behavior: Behavior is withdrawn. Behavior is not agitated, aggressive or combative.         Thought  Content: Thought content is not paranoid or delusional.         Cognition and Memory: Memory is impaired.       Significant Labs: All pertinent labs within the past 24 hours have been reviewed.    CBC:   Recent Labs   Lab 04/22/22  0538   WBC 5.95   HGB 9.5*   HCT 29.4*          CMP:   Recent Labs   Lab 04/22/22  0537      K 3.7      CO2 30*      BUN 41*   CREATININE 1.9*   CALCIUM 9.2   PROT 6.6   ALBUMIN 1.9*   BILITOT 0.4   ALKPHOS 77   AST 28   ALT 26   ANIONGAP 6*   EGFRNONAA 32.8*         Significant Imaging: No pertinent imaging within the past 24 hours.

## 2022-04-22 NOTE — NURSING
Pt states that his iv is bothering him.  I notified ROSS Estrada and she stated that I could d/c  It.

## 2022-04-23 PROCEDURE — 25000003 PHARM REV CODE 250: Performed by: NURSE PRACTITIONER

## 2022-04-23 PROCEDURE — 99900031 HC PATIENT EDUCATION (STAT)

## 2022-04-23 PROCEDURE — 25000242 PHARM REV CODE 250 ALT 637 W/ HCPCS: Performed by: INTERNAL MEDICINE

## 2022-04-23 PROCEDURE — 11000001 HC ACUTE MED/SURG PRIVATE ROOM

## 2022-04-23 PROCEDURE — 94640 AIRWAY INHALATION TREATMENT: CPT

## 2022-04-23 PROCEDURE — 94761 N-INVAS EAR/PLS OXIMETRY MLT: CPT

## 2022-04-23 PROCEDURE — 25000003 PHARM REV CODE 250: Performed by: INTERNAL MEDICINE

## 2022-04-23 PROCEDURE — 99900035 HC TECH TIME PER 15 MIN (STAT)

## 2022-04-23 PROCEDURE — 63600175 PHARM REV CODE 636 W HCPCS: Performed by: INTERNAL MEDICINE

## 2022-04-23 PROCEDURE — 27000221 HC OXYGEN, UP TO 24 HOURS

## 2022-04-23 RX ADMIN — GUAIFENESIN 1200 MG: 600 TABLET, EXTENDED RELEASE ORAL at 09:04

## 2022-04-23 RX ADMIN — DOCUSATE SODIUM 100 MG: 100 CAPSULE, LIQUID FILLED ORAL at 09:04

## 2022-04-23 RX ADMIN — ENOXAPARIN SODIUM 40 MG: 40 INJECTION SUBCUTANEOUS at 04:04

## 2022-04-23 RX ADMIN — METOPROLOL TARTRATE 25 MG: 25 TABLET, FILM COATED ORAL at 09:04

## 2022-04-23 RX ADMIN — IPRATROPIUM BROMIDE AND ALBUTEROL SULFATE 3 ML: 2.5; .5 SOLUTION RESPIRATORY (INHALATION) at 07:04

## 2022-04-23 RX ADMIN — HYDROCODONE BITARTRATE AND ACETAMINOPHEN 1 TABLET: 7.5; 325 TABLET ORAL at 09:04

## 2022-04-23 RX ADMIN — EZETIMIBE 10 MG: 10 TABLET ORAL at 09:04

## 2022-04-23 RX ADMIN — POTASSIUM CHLORIDE 40 MEQ: 20 TABLET, EXTENDED RELEASE ORAL at 09:04

## 2022-04-23 RX ADMIN — IPRATROPIUM BROMIDE AND ALBUTEROL SULFATE 3 ML: 2.5; .5 SOLUTION RESPIRATORY (INHALATION) at 02:04

## 2022-04-23 RX ADMIN — OXCARBAZEPINE 300 MG: 300 TABLET, FILM COATED ORAL at 09:04

## 2022-04-23 RX ADMIN — ACETYLCYSTEINE 4 ML: 200 SOLUTION ORAL; RESPIRATORY (INHALATION) at 07:04

## 2022-04-23 RX ADMIN — MUPIROCIN: 20 OINTMENT TOPICAL at 09:04

## 2022-04-23 RX ADMIN — TORSEMIDE 100 MG: 20 TABLET ORAL at 09:04

## 2022-04-23 RX ADMIN — Medication 6 MG: at 09:04

## 2022-04-23 RX ADMIN — ATORVASTATIN CALCIUM 20 MG: 20 TABLET, FILM COATED ORAL at 09:04

## 2022-04-23 RX ADMIN — ASPIRIN 81 MG: 81 TABLET, COATED ORAL at 09:04

## 2022-04-24 PROCEDURE — 99900035 HC TECH TIME PER 15 MIN (STAT)

## 2022-04-24 PROCEDURE — 25000003 PHARM REV CODE 250: Performed by: INTERNAL MEDICINE

## 2022-04-24 PROCEDURE — 99900031 HC PATIENT EDUCATION (STAT)

## 2022-04-24 PROCEDURE — 25000003 PHARM REV CODE 250: Performed by: NURSE PRACTITIONER

## 2022-04-24 PROCEDURE — 94640 AIRWAY INHALATION TREATMENT: CPT

## 2022-04-24 PROCEDURE — 27000221 HC OXYGEN, UP TO 24 HOURS

## 2022-04-24 PROCEDURE — 25000242 PHARM REV CODE 250 ALT 637 W/ HCPCS: Performed by: INTERNAL MEDICINE

## 2022-04-24 PROCEDURE — 63600175 PHARM REV CODE 636 W HCPCS: Performed by: INTERNAL MEDICINE

## 2022-04-24 PROCEDURE — 94761 N-INVAS EAR/PLS OXIMETRY MLT: CPT

## 2022-04-24 PROCEDURE — 11000001 HC ACUTE MED/SURG PRIVATE ROOM

## 2022-04-24 RX ADMIN — ENOXAPARIN SODIUM 40 MG: 40 INJECTION SUBCUTANEOUS at 04:04

## 2022-04-24 RX ADMIN — OXCARBAZEPINE 300 MG: 300 TABLET, FILM COATED ORAL at 08:04

## 2022-04-24 RX ADMIN — TORSEMIDE 100 MG: 20 TABLET ORAL at 08:04

## 2022-04-24 RX ADMIN — DOCUSATE SODIUM 100 MG: 100 CAPSULE, LIQUID FILLED ORAL at 08:04

## 2022-04-24 RX ADMIN — IPRATROPIUM BROMIDE AND ALBUTEROL SULFATE 3 ML: 2.5; .5 SOLUTION RESPIRATORY (INHALATION) at 02:04

## 2022-04-24 RX ADMIN — METOPROLOL TARTRATE 25 MG: 25 TABLET, FILM COATED ORAL at 08:04

## 2022-04-24 RX ADMIN — ATORVASTATIN CALCIUM 20 MG: 20 TABLET, FILM COATED ORAL at 08:04

## 2022-04-24 RX ADMIN — Medication 6 MG: at 08:04

## 2022-04-24 RX ADMIN — IPRATROPIUM BROMIDE AND ALBUTEROL SULFATE 3 ML: 2.5; .5 SOLUTION RESPIRATORY (INHALATION) at 07:04

## 2022-04-24 RX ADMIN — GUAIFENESIN 1200 MG: 600 TABLET, EXTENDED RELEASE ORAL at 08:04

## 2022-04-24 RX ADMIN — POTASSIUM CHLORIDE 40 MEQ: 20 TABLET, EXTENDED RELEASE ORAL at 08:04

## 2022-04-24 RX ADMIN — EZETIMIBE 10 MG: 10 TABLET ORAL at 08:04

## 2022-04-24 RX ADMIN — ACETYLCYSTEINE 4 ML: 200 SOLUTION ORAL; RESPIRATORY (INHALATION) at 07:04

## 2022-04-24 RX ADMIN — HYDROCODONE BITARTRATE AND ACETAMINOPHEN 1 TABLET: 7.5; 325 TABLET ORAL at 08:04

## 2022-04-24 RX ADMIN — ASPIRIN 81 MG: 81 TABLET, COATED ORAL at 08:04

## 2022-04-24 RX ADMIN — MUPIROCIN: 20 OINTMENT TOPICAL at 09:04

## 2022-04-24 NOTE — PT/OT/SLP EVAL
Speech Language Pathology   Evaluation Lisa Lei   MRN: [id number]   Admitting Diagnosis: Encephalopathy, toxic    Diet recommendations: Solid Diet Level: Regular  Liquid Diet Level: Thin Assistance with meals and Feed only when awake/alert    SLP Treatment Date: 04/20/22  Speech Start Time: 1130     Speech Stop Time: 1200     Speech Total (min): 30 min       TREATMENT BILLABLE MINUTES:  Eval 30 minutes      Diagnosis: Encephalopathy, toxic    Past Medical History:   Diagnosis Date    Anticoagulant long-term use     Arthritis     CHF (congestive heart failure)     COPD (chronic obstructive pulmonary disease)     Hypertension     Renal disorder     Seizures     Sleep apnea      Past Surgical History:   Procedure Laterality Date    APPENDECTOMY      CORONARY ARTERY BYPASS GRAFT      FOOT SURGERY Left     HIP REPLACEMENT ARTHROPLASTY Right     JOINT REPLACEMENT      SHOULDER SURGERY Left     TOTAL KNEE ARTHROPLASTY Right        Has the patient been evaluated by SLP for swallowing? : Yes  Keep patient NPO?: No General Precautions: Standard, fall, respiratory, hearing impaired          Social Hx: Patient states was living alone     Prior diet: Regular .    Occupational/hobbies/homemaking: Retired, limited activit.    Subjective:    Patient goals: to return home with family assistance .         Objective:        Oral Musculature Evaluation  Oral Musculature: general weakness  Secretion Management: adequate  Mucosal Quality: adequate, good  Mandibular Strength and Mobility: WFL  Oral Labial Strength and Mobility: WFL  Lingual Strength and Mobility: impaired strength  Volitional Cough: functional  Volitional Swallow: present  Voice Prior to PO Intake: clear  Oral Musculature Comments: generalized weakness but functional for clearance of all PO consistencies     Cognitive Status:  Behavioral Observations: alert  Memory and Orientation: severely impaired memory - 25% accuracy for short term memory,  total asssit for delayed memory of newly learned information, oriented to person and month of year.     Attention: Moderately impaired .  Problem Solving: Severely impaired  Pragmatics: [facility]  Executive Function: impulsivity, insight/awareness, organization and self-monitoring- impaired     Language: Perseverations, limited initiation and mildly disjointed verbal output     Auditory Comprehension: answers yes/no questions and able to follow commands- 80% accuracy single step level, 2 step commands- 40% acc     Verbal Expression: Naming and defining objects 60% acc with semantic paraphasias and perseverative responses,  Repetition of 3 word phrases 50% accuracy, response to WH questions regarding expanded verbalizations 70% accuracy     Motor Speech: slowed responses     Voice: [facility]     Bedside Swallow Eval:  Consistencies Assessed: Thin liquids no s/s of aspiration  Puree no s/s of aspiration  Mixed consistencies no s/s of aspiration   Solids no s/s of aspiration  Oral Phase: WFL  Pharyngeal Phase: no overt clinical signs/symptoms of aspiration    Additional Treatment:      Assessment:  Jamey Lei is a 81 y.o. male with a medical diagnosis of Encephalopathy, toxic who presents with moderately impaired receptive/expressive language skills characterized by word finding difficutly in conjunction with perservations and semantic paraphasias, difficulty following 2 step directions and processing general questions related to daily activities. He demonstrates significantly impaired cognitive skills including severe deficits in short term / delayed memory and total assist with problem solving skills, responsive to prompting.  Safety is impacted by limited cognitive skills and poor awareness of deficits.         Discharge recommendations:  Home with 24 hour supervision and assistance as needed     Diet recommendations:  Regular textures with thin liquids        Goals:   Multidisciplinary Problems     SLP Goals         Problem: SLP    Goal Priority Disciplines Outcome   SLP Goal     SLP    Description: Goals for Speech Evaluation    Receptive Language:  1.  Pt will follow 2 step commands with 75%  accuracy given 25% cues    Expressive Language:  1. Pt will participate in phrase completion tasks with 85% acc given 10% cues   2. Pt will complete word association tasks with 85% acc given choice of 2 provided      Cognition:  1. Pt will be oriented to environment and situation with 75% acc given minimal prompting   2. Pt will complete short term memory / recall tasks with 70% accuracy given choice of 2 provided   3. Pt will complete functional problem solving tasks with 70% accuracy given choice of 2 provided  4. Pt will demonstrate safety during routine ADLs with 80% acc and min cues provided                       Plan:   Patient to be seen Therapy Frequency: 3 x/week  Planned Interventions: Cognitive-Linguistic Therapy   Plan of Care expires: 05/18/22  Plan of Care reviewed with: patient  SLP Follow-up?: Yes  SLP - Next Visit Date: 04/22/22 04/24/2022

## 2022-04-24 NOTE — PROGRESS NOTES
[facility] Medicine  Progress Note    Patient Name: Jamey Lei  MRN: [id number]  Patient Class: IP- Rehab   Admission Date: 4/18/2022  Length of Stay: 6 days  Attending Physician: Navi Domínguez III, MD  Primary Care Provider: Navi Domínguez III, MD        Subjective:     Principal Problem:Encephalopathy, toxic        HPI:  Patient is a 81-year-old male with a past medical history significant for right-sided lung mass who presented to the emergency department with decreased appetite for last few days.  The patient had reportedly stopped taking his medications and was agitated.  Patient had a witnessed seizure with no head trauma.  Patient has a history of seizure disorder meningioma and chronic kidney disease.  The patient about 2-3 months ago had a admission for pneumonia and a lung mass and was ultimately transferred to an outside facility and had lung biopsies.  They were negative for malignancy the patient had an inpatient rehab stay and had a significant improvement in his level of function.  The patient over the last few months has had a progressive decline when we now have this repeated admission.  The patient was started on antibiotics after admission his oral medications re-initiated and he was placed on anti epileptics.  The patient has improved and recovered and the patient and family are wanting therapy to get him back to his previous level of function and hopefully return home.  The patient is having intermittent confusion he has progressively improved since his seizure and getting back on medicines.  Overall the patient seems slightly depressed over his current diagnoses and deterioration of condition will continually trying to motivate and encourage the patient.        Overview/Hospital Course:  4/20 SD: Pt in wheelchair awaiting therapy.     4/21 SD: Pt participating in physical therapy. Bladder training complete, espinal catheter discontinued    4/22 SD: Pt sitting up in  wheelchair. Positive attitude.    4/24/2022 FM:  Patient's tolerating therapy and slowly making some functional gains.  Less rhonchi and rattling in the chest today.  Overall encouraged.      Interval History: Pt seen and evaluated    Review of Systems   Constitutional: Negative.    HENT: Negative.     Eyes: Negative.    Respiratory: Negative.     Cardiovascular: Negative.    Gastrointestinal: Negative.    Endocrine: Negative.    Genitourinary: Negative.    Musculoskeletal:  Positive for arthralgias and myalgias.   Neurological:  Positive for seizures and weakness. Negative for dizziness, light-headedness, numbness and headaches.   Psychiatric/Behavioral:  Positive for confusion. Negative for hallucinations and sleep disturbance. The patient is not nervous/anxious.    Objective:     Vital Signs (Most Recent):  Temp: 97.6 °F (36.4 °C) (04/24/22 1552)  Pulse: 97 (04/24/22 1552)  Resp: 18 (04/24/22 1552)  BP: 125/62 (04/24/22 1552)  SpO2: 99 % (04/24/22 1552) Vital Signs (24h Range):  Temp:  [96.8 °F (36 °C)-97.7 °F (36.5 °C)] 97.6 °F (36.4 °C)  Pulse:  [] 97  Resp:  [18-20] 18  SpO2:  [98 %-100 %] 99 %  BP: (125-152)/(62-78) 125/62     Weight: 104.3 kg (230 lb)  Body mass index is 32.08 kg/m².    Intake/Output Summary (Last 24 hours) at 4/24/2022 1620  Last data filed at 4/24/2022 1200  Gross per 24 hour   Intake 1200 ml   Output --   Net 1200 ml        Physical Exam  Constitutional:       General: He is awake. He is not in acute distress.     Appearance: He is obese. He is ill-appearing. He is not toxic-appearing or diaphoretic.   HENT:      Head: Normocephalic and atraumatic.      Nose: Nose normal. No congestion or rhinorrhea.      Mouth/Throat:      Mouth: Mucous membranes are moist.      Pharynx: Oropharynx is clear. No oropharyngeal exudate or posterior oropharyngeal erythema.   Eyes:      General: No scleral icterus.        Right eye: No discharge.         Left eye: No discharge.      Extraocular  Movements: Extraocular movements intact.      Pupils: Pupils are equal, round, and reactive to light.   Neck:      Thyroid: No thyroid mass or thyromegaly.      Vascular: No carotid bruit.      Meningeal: Brudzinski's sign and Kernig's sign absent.   Cardiovascular:      Rate and Rhythm: Normal rate and regular rhythm.      Chest Wall: PMI is not displaced. No thrill.      Pulses: Normal pulses.      Heart sounds: Normal heart sounds. No murmur heard.    No friction rub. No gallop.   Pulmonary:      Effort: Pulmonary effort is normal. No tachypnea, accessory muscle usage, prolonged expiration or respiratory distress.      Breath sounds: Normal breath sounds. No stridor or decreased air movement. No wheezing, rhonchi or rales.   Chest:      Chest wall: No tenderness.   Abdominal:      General: Bowel sounds are normal. There is no distension.      Palpations: Abdomen is soft. There is no hepatomegaly, splenomegaly or mass.      Tenderness: There is no abdominal tenderness. There is no right CVA tenderness, left CVA tenderness, guarding or rebound.      Hernia: No hernia is present.   Musculoskeletal:         General: No swelling, tenderness, deformity or signs of injury. Normal range of motion.      Cervical back: Normal range of motion and neck supple. No rigidity. No muscular tenderness.      Right lower leg: No edema.      Left lower leg: No edema.   Lymphadenopathy:      Cervical: No cervical adenopathy.   Skin:     General: Skin is warm.      Capillary Refill: Capillary refill takes less than 2 seconds.      Coloration: Skin is not cyanotic, jaundiced or pale.      Findings: No erythema, lesion, petechiae or rash.   Neurological:      Mental Status: He is alert. He is disoriented.      Cranial Nerves: No cranial nerve deficit, dysarthria or facial asymmetry.      Motor: Weakness present. No tremor.      Gait: Gait abnormal.   Psychiatric:         Mood and Affect: Mood is depressed. Mood is not anxious. Affect is  flat and tearful.         Speech: Speech is not rapid and pressured or slurred.         Behavior: Behavior is withdrawn. Behavior is not agitated, aggressive or combative.         Thought Content: Thought content is not paranoid or delusional.         Cognition and Memory: Memory is impaired.       Significant Labs: All pertinent labs within the past 24 hours have been reviewed.    CBC:   No results for input(s): WBC, HGB, HCT, PLT in the last 48 hours.    CMP:   No results for input(s): NA, K, CL, CO2, GLU, BUN, CREATININE, CALCIUM, PROT, ALBUMIN, BILITOT, ALKPHOS, AST, ALT, ANIONGAP, EGFRNONAA in the last 48 hours.    Invalid input(s): [place]      Significant Imaging: No pertinent imaging within the past 24 hours.      Assessment/Plan:      * Encephalopathy, toxic  Inpatient rehab efforts    Neuropathy involving both lower extremities  Continue current medication      CKD (chronic kidney disease), stage IV        Pneumonia  IV antibiotic therapy complete, s/s improved, cont. oxygen therapy      Lung mass  Continuous oxygen      Seizure disorder  No seizure activity      Meningioma        Hyperparathyroidism due to renal insufficiency        Chronic osteoarthritis        Chronic kidney disease due to hypertension  Stable        VTE Risk Mitigation (From admission, onward)         Ordered     enoxaparin injection 40 mg  Daily         04/18/22 1529     IP VTE HIGH RISK PATIENT  Once         04/18/22 1529     Place sequential compression device  Until discontinued         04/18/22 1529                Discharge Planning   GLORY:      Code Status: DNR   Is the patient medically ready for discharge?:     Reason for patient still in hospital (select all that apply): Patient trending condition  Discharge Plan A: Assisted Living                  Navi Domínguez III, MD  Department of Hospital Medicine   [facility]

## 2022-04-24 NOTE — SUBJECTIVE & OBJECTIVE
Interval History: Pt seen and evaluated    Review of Systems   Constitutional: Negative.    HENT: Negative.     Eyes: Negative.    Respiratory: Negative.     Cardiovascular: Negative.    Gastrointestinal: Negative.    Endocrine: Negative.    Genitourinary: Negative.    Musculoskeletal:  Positive for arthralgias and myalgias.   Neurological:  Positive for seizures and weakness. Negative for dizziness, light-headedness, numbness and headaches.   Psychiatric/Behavioral:  Positive for confusion. Negative for hallucinations and sleep disturbance. The patient is not nervous/anxious.    Objective:     Vital Signs (Most Recent):  Temp: 97.6 °F (36.4 °C) (04/24/22 1552)  Pulse: 97 (04/24/22 1552)  Resp: 18 (04/24/22 1552)  BP: 125/62 (04/24/22 1552)  SpO2: 99 % (04/24/22 1552) Vital Signs (24h Range):  Temp:  [96.8 °F (36 °C)-97.7 °F (36.5 °C)] 97.6 °F (36.4 °C)  Pulse:  [] 97  Resp:  [18-20] 18  SpO2:  [98 %-100 %] 99 %  BP: (125-152)/(62-78) 125/62     Weight: 104.3 kg (230 lb)  Body mass index is 32.08 kg/m².    Intake/Output Summary (Last 24 hours) at 4/24/2022 1620  Last data filed at 4/24/2022 1200  Gross per 24 hour   Intake 1200 ml   Output --   Net 1200 ml        Physical Exam  Constitutional:       General: He is awake. He is not in acute distress.     Appearance: He is obese. He is ill-appearing. He is not toxic-appearing or diaphoretic.   HENT:      Head: Normocephalic and atraumatic.      Nose: Nose normal. No congestion or rhinorrhea.      Mouth/Throat:      Mouth: Mucous membranes are moist.      Pharynx: Oropharynx is clear. No oropharyngeal exudate or posterior oropharyngeal erythema.   Eyes:      General: No scleral icterus.        Right eye: No discharge.         Left eye: No discharge.      Extraocular Movements: Extraocular movements intact.      Pupils: Pupils are equal, round, and reactive to light.   Neck:      Thyroid: No thyroid mass or thyromegaly.      Vascular: No carotid bruit.       Meningeal: Brudzinski's sign and Kernig's sign absent.   Cardiovascular:      Rate and Rhythm: Normal rate and regular rhythm.      Chest Wall: PMI is not displaced. No thrill.      Pulses: Normal pulses.      Heart sounds: Normal heart sounds. No murmur heard.    No friction rub. No gallop.   Pulmonary:      Effort: Pulmonary effort is normal. No tachypnea, accessory muscle usage, prolonged expiration or respiratory distress.      Breath sounds: Normal breath sounds. No stridor or decreased air movement. No wheezing, rhonchi or rales.   Chest:      Chest wall: No tenderness.   Abdominal:      General: Bowel sounds are normal. There is no distension.      Palpations: Abdomen is soft. There is no hepatomegaly, splenomegaly or mass.      Tenderness: There is no abdominal tenderness. There is no right CVA tenderness, left CVA tenderness, guarding or rebound.      Hernia: No hernia is present.   Musculoskeletal:         General: No swelling, tenderness, deformity or signs of injury. Normal range of motion.      Cervical back: Normal range of motion and neck supple. No rigidity. No muscular tenderness.      Right lower leg: No edema.      Left lower leg: No edema.   Lymphadenopathy:      Cervical: No cervical adenopathy.   Skin:     General: Skin is warm.      Capillary Refill: Capillary refill takes less than 2 seconds.      Coloration: Skin is not cyanotic, jaundiced or pale.      Findings: No erythema, lesion, petechiae or rash.   Neurological:      Mental Status: He is alert. He is disoriented.      Cranial Nerves: No cranial nerve deficit, dysarthria or facial asymmetry.      Motor: Weakness present. No tremor.      Gait: Gait abnormal.   Psychiatric:         Mood and Affect: Mood is depressed. Mood is not anxious. Affect is flat and tearful.         Speech: Speech is not rapid and pressured or slurred.         Behavior: Behavior is withdrawn. Behavior is not agitated, aggressive or combative.         Thought  Content: Thought content is not paranoid or delusional.         Cognition and Memory: Memory is impaired.       Significant Labs: All pertinent labs within the past 24 hours have been reviewed.    CBC:   No results for input(s): WBC, HGB, HCT, PLT in the last 48 hours.    CMP:   No results for input(s): NA, K, CL, CO2, GLU, BUN, CREATININE, CALCIUM, PROT, ALBUMIN, BILITOT, ALKPHOS, AST, ALT, ANIONGAP, EGFRNONAA in the last 48 hours.    Invalid input(s): [place]      Significant Imaging: No pertinent imaging within the past 24 hours.

## 2022-04-25 LAB
ALBUMIN SERPL BCP-MCNC: 2.2 G/DL (ref 3.5–5.2)
ALP SERPL-CCNC: 74 U/L (ref 55–135)
ALT SERPL W/O P-5'-P-CCNC: 27 U/L (ref 10–44)
ANION GAP SERPL CALC-SCNC: 7 MMOL/L (ref 8–16)
AST SERPL-CCNC: 21 U/L (ref 10–40)
BASOPHILS # BLD AUTO: 0.05 K/UL (ref 0–0.2)
BASOPHILS NFR BLD: 0.8 % (ref 0–1.9)
BILIRUB SERPL-MCNC: 0.4 MG/DL (ref 0.1–1)
BUN SERPL-MCNC: 47 MG/DL (ref 8–23)
CALCIUM SERPL-MCNC: 9.3 MG/DL (ref 8.7–10.5)
CHLORIDE SERPL-SCNC: 102 MMOL/L (ref 95–110)
CO2 SERPL-SCNC: 32 MMOL/L (ref 23–29)
CREAT SERPL-MCNC: 1.6 MG/DL (ref 0.5–1.4)
DIFFERENTIAL METHOD: ABNORMAL
EOSINOPHIL # BLD AUTO: 0.3 K/UL (ref 0–0.5)
EOSINOPHIL NFR BLD: 4.6 % (ref 0–8)
ERYTHROCYTE [DISTWIDTH] IN BLOOD BY AUTOMATED COUNT: 18.8 % (ref 11.5–14.5)
EST. GFR  (AFRICAN AMERICAN): 46.7 ML/MIN/1.73 M^2
EST. GFR  (NON AFRICAN AMERICAN): 40.4 ML/MIN/1.73 M^2
GLUCOSE SERPL-MCNC: 97 MG/DL (ref 70–110)
HCT VFR BLD AUTO: 30.7 % (ref 40–54)
HGB BLD-MCNC: 9.5 G/DL (ref 14–18)
IMM GRANULOCYTES # BLD AUTO: 0.02 K/UL (ref 0–0.04)
IMM GRANULOCYTES NFR BLD AUTO: 0.3 % (ref 0–0.5)
LYMPHOCYTES # BLD AUTO: 1.5 K/UL (ref 1–4.8)
LYMPHOCYTES NFR BLD: 23.1 % (ref 18–48)
MCH RBC QN AUTO: 29.3 PG (ref 27–31)
MCHC RBC AUTO-ENTMCNC: 30.9 G/DL (ref 32–36)
MCV RBC AUTO: 95 FL (ref 82–98)
MONOCYTES # BLD AUTO: 0.6 K/UL (ref 0.3–1)
MONOCYTES NFR BLD: 10.1 % (ref 4–15)
NEUTROPHILS # BLD AUTO: 3.9 K/UL (ref 1.8–7.7)
NEUTROPHILS NFR BLD: 61.1 % (ref 38–73)
NRBC BLD-RTO: 0 /100 WBC
PLATELET # BLD AUTO: 198 K/UL (ref 150–450)
PMV BLD AUTO: 10.6 FL (ref 9.2–12.9)
POTASSIUM SERPL-SCNC: 4.1 MMOL/L (ref 3.5–5.1)
PROT SERPL-MCNC: 7.4 G/DL (ref 6–8.4)
RBC # BLD AUTO: 3.24 M/UL (ref 4.6–6.2)
SODIUM SERPL-SCNC: 141 MMOL/L (ref 136–145)
WBC # BLD AUTO: 6.32 K/UL (ref 3.9–12.7)

## 2022-04-25 PROCEDURE — 63600175 PHARM REV CODE 636 W HCPCS: Performed by: INTERNAL MEDICINE

## 2022-04-25 PROCEDURE — 92507 TX SP LANG VOICE COMM INDIV: CPT

## 2022-04-25 PROCEDURE — 99900035 HC TECH TIME PER 15 MIN (STAT)

## 2022-04-25 PROCEDURE — 36415 COLL VENOUS BLD VENIPUNCTURE: CPT | Performed by: INTERNAL MEDICINE

## 2022-04-25 PROCEDURE — 11000001 HC ACUTE MED/SURG PRIVATE ROOM

## 2022-04-25 PROCEDURE — 99900031 HC PATIENT EDUCATION (STAT)

## 2022-04-25 PROCEDURE — 25000242 PHARM REV CODE 250 ALT 637 W/ HCPCS: Performed by: INTERNAL MEDICINE

## 2022-04-25 PROCEDURE — 94640 AIRWAY INHALATION TREATMENT: CPT

## 2022-04-25 PROCEDURE — 27000221 HC OXYGEN, UP TO 24 HOURS

## 2022-04-25 PROCEDURE — 94761 N-INVAS EAR/PLS OXIMETRY MLT: CPT

## 2022-04-25 PROCEDURE — 97110 THERAPEUTIC EXERCISES: CPT

## 2022-04-25 PROCEDURE — 25000003 PHARM REV CODE 250: Performed by: INTERNAL MEDICINE

## 2022-04-25 PROCEDURE — 97535 SELF CARE MNGMENT TRAINING: CPT

## 2022-04-25 PROCEDURE — 97530 THERAPEUTIC ACTIVITIES: CPT

## 2022-04-25 PROCEDURE — 85025 COMPLETE CBC W/AUTO DIFF WBC: CPT | Performed by: INTERNAL MEDICINE

## 2022-04-25 PROCEDURE — 80053 COMPREHEN METABOLIC PANEL: CPT | Performed by: INTERNAL MEDICINE

## 2022-04-25 PROCEDURE — 25000003 PHARM REV CODE 250: Performed by: NURSE PRACTITIONER

## 2022-04-25 RX ADMIN — METOPROLOL TARTRATE 25 MG: 25 TABLET, FILM COATED ORAL at 08:04

## 2022-04-25 RX ADMIN — ACETYLCYSTEINE 4 ML: 200 SOLUTION ORAL; RESPIRATORY (INHALATION) at 07:04

## 2022-04-25 RX ADMIN — ENOXAPARIN SODIUM 40 MG: 40 INJECTION SUBCUTANEOUS at 04:04

## 2022-04-25 RX ADMIN — OXCARBAZEPINE 300 MG: 300 TABLET, FILM COATED ORAL at 08:04

## 2022-04-25 RX ADMIN — IPRATROPIUM BROMIDE AND ALBUTEROL SULFATE 3 ML: 2.5; .5 SOLUTION RESPIRATORY (INHALATION) at 07:04

## 2022-04-25 RX ADMIN — ATORVASTATIN CALCIUM 20 MG: 20 TABLET, FILM COATED ORAL at 08:04

## 2022-04-25 RX ADMIN — DOCUSATE SODIUM 100 MG: 100 CAPSULE, LIQUID FILLED ORAL at 08:04

## 2022-04-25 RX ADMIN — TORSEMIDE 100 MG: 20 TABLET ORAL at 08:04

## 2022-04-25 RX ADMIN — ASPIRIN 81 MG: 81 TABLET, COATED ORAL at 08:04

## 2022-04-25 RX ADMIN — GUAIFENESIN 1200 MG: 600 TABLET, EXTENDED RELEASE ORAL at 08:04

## 2022-04-25 RX ADMIN — POTASSIUM CHLORIDE 40 MEQ: 20 TABLET, EXTENDED RELEASE ORAL at 08:04

## 2022-04-25 RX ADMIN — EZETIMIBE 10 MG: 10 TABLET ORAL at 08:04

## 2022-04-25 RX ADMIN — IPRATROPIUM BROMIDE AND ALBUTEROL SULFATE 3 ML: 2.5; .5 SOLUTION RESPIRATORY (INHALATION) at 01:04

## 2022-04-25 NOTE — PT/OT/SLP PROGRESS
Physical Therapy         Treatment        Jamey Lei   MRN: [id number]     PT received on 04-25-22  Total Time (min): 90        Billable Minutes:  Gait [id number], Therapeutic Activity 15 and Therapeutic Exercise 30  Total Minutes: 60  AM Start Time  AM End Time:   PM Start Time: 1530  PM End Time: 1630  Treatment Type: Individual 30 and Concurrent 30  Treatment Type: Treatment  PT/PTA: PT             General Precautions: Standard,    Orthopedic Precautions:     Braces:           Subjective:  Communicated with nurse prior to session.         Objective:  Patient found  In , with      Functional Mobility:  Bed Mobility:   Supine to sit: Standby Assistance   Sit to supine: Standby Assistance   Rolling: Standby Assistance   Scooting: Standby Assistance    Balance:   Static Sit: GOOD: Takes MODERATE challenges from all directions  Dynamic Sit:  FAIR+: Maintains balance through MINIMAL excursions of active trunk motion  Static Stand: FAIR: Maintains without assist but unable to take challenges  Dynamic stand: FAIR: Needs CONTACT GUARD during gait    Transfer Training:  Sit to stand:Contact Guard Assistance with Rolling Walker    Bed <> Chair:  Step Transfer with Contact Guard Assistance with Rolling Walker reminders for safety    Wheelchair Training:  sba with  mobility 150ft    Gait Training:  amb 120ft with a rw with sba    Stair Training:  No steps today      Additional Treatment:  With 2 lbs ,pt performed 4 sets 20 reps BLEexs in supported sitting     Activity Tolerance:  Patient tolerated treatment well    Patient left supine with call button in reach.    Assessment:  Jamey Lei is a 81 y.o. male with a medical diagnosis of Encephalopathy, toxic. He presents with better gait and transfer ability. Endurance is still an issue and he tends to not do as well in the evening    Rehab potential is good.    Activity tolerance: Good    Discharge recommendations:       Equipment recommendations:       GOALS:    Multidisciplinary Problems     Physical Therapy Goals     Not on file                PLAN:    Patient to be seen 5 x/week  to address the above listed problems via therapeutic activities, gait training, wheelchair management/training, therapeutic exercises, therapeutic groups, neuromuscular re-education  Plan of Care expires: 05/02/22  Plan of Care reviewed with: patient         4/25/2022

## 2022-04-25 NOTE — PT/OT/SLP PROGRESS
Occupational Therapy  Treatment    Jamey Lei   MRN: [id number]   Admitting Diagnosis: Encephalopathy, toxic    OT Date of Treatment: 04/25/22   AM Start Time: 0930  AM End Time: 1100  PM Start Time: na  PM End Time: na  Treatment Type: Individual 60 and Concurrent 30  Total Time (min): 90 min      Billable Minutes:  Self Care/Home Management 15, Therapeutic Activity 30 and Therapeutic Exercise 45  Total Minutes: 90    General Precautions: Standard, fall  Orthopedic Precautions: N/A  Braces:      Spiritual, Cultural Beliefs, [employer] Practices, Values that Affect Care: no    Subjective:  Communicated with nurse prior to session.    Pain/Comfort  Pain Rating 1: 5/10  Location - Orientation 1: generalized  Location 1:  (generalized soreness)  Pain Addressed 1: Reposition, Cessation of Activity, Nurse notified  Pain Rating Post-Intervention 1: 3/10    Objective:  Patient found with: oxygen (2 L O2 per NC). Pt was cooperative and motivated with minimal verbal encouragement while pleasantly confused. He participated in ADL retraining regarding toileting emphasizing fall prevention providing extra time requiring min assist secondary to steadying assist for safety during clothing management with mod verbal and tactile cueing for safety and technique. Pt then participated in functional transfer retraining throughout session to / from wheelchair, chair, and toilet emphasizing fall prevention providing extra time with repetition requiring CGA secondary to intermittent steadying assist for safety with verbal and tactile cueing for safety awareness and technique. Next, he participated in therapeutic activity addressing trunk mobility, trunk control, dynamic sitting balance, and trunk strength utilizing large stability ball challenging her to perform trunk flexion, extension, and lateral flexion with bilateral hands stabilized on ball requiring verbal and tactile cueing to facilitate optimal movement patterns and increased  range per trial in order to improve active ROM impacting performance with functional tasks below waist. Pt then participated in therapeutic exercise performing 3x15 bilateral UE proximal strengthening strengthening exercises utilizing moderate to heavy resistance theraband with scapular retraction, shoulder extension, shoulder adduction, horizontal abduction, shoulder flexion in plane of scaption, internal rotation, and external rotation while seated in chair unsupported requiring mod verbal and tactile cueing for technique.    Functional Mobility:  Bed Mobility:   Supine to sit: Standby Assistance   Sit to supine: Standby Assistance   Rolling: Standby Assistance   Scooting: Standby Assistance    Transfer Training:   Sit to stand:Contact Guard Assistance with Rolling Walker .  Bed <> Chair:  Step Transfer with Contact Guard Assistance with Rolling Walker .  Toilet Transfer:  Pt Step Transfer with Contact Guard Assistance with Grab bars .    Toilet Training:  Pt performed toileting with Minimal Assistance with Grab  bar at [employer].    Balance:   Static Sit: GOOD: Takes MODERATE challenges from all directions  Dynamic Sit:  FAIR+: Maintains balance through MINIMAL excursions of active trunk motion  Static Stand: FAIR+: Takes MINIMAL challenges from all directions  Dynamic stand: FAIR: Needs CONTACT GUARD during gait    Patient left up in chair with nurse notified    ASSESSMENT:  Pt demonstrated improved functional performance with toileting on this date as noted by min assist secondary to steadying assist during clothing management with mod verbal cueing for safety awareness and technique with additional use of grab bar and RW.    Rehab potential is good    Activity tolerance: Fair    Discharge recommendations: home with home health     Equipment recommendations: other (see comments) (To be further determined based on progress prior to discharge.)     GOALS:   Multidisciplinary Problems     Occupational Therapy Goals         Problem: Occupational Therapy    Goal Priority Disciplines Outcome Interventions   Occupational Therapy Goal     OT, PT/OT Ongoing, Progressing    Description: Long Term Goals to be met by: 05/02/22     Patient will increase functional independence with ADLs by performing:    Feeding with [place].  UE Dressing with Set-up Assistance.  LE Dressing with Minimal Assistance.  Grooming while seated at sink with Modified [place].  Toileting from toilet with Supervision for hygiene and clothing management.   Bathing from  shower chair/bench with Stand-by Assistance.  Step transfer with Modified [place]  Toilet transfer to toilet with Modified [place].  Increased functional strength to 4 to 4+/5 for bilateral UE's.                     Plan:  Patient to be seen 5 x/week to address the above listed problems via self-care/home management, community/work re-entry, therapeutic activities, therapeutic exercises, cognitive retraining  Plan of Care expires: 05/02/22  Plan of Care reviewed with: patient         04/25/2022

## 2022-04-25 NOTE — SUBJECTIVE & OBJECTIVE
Interval History: Pt seen and evaluated    Review of Systems   Constitutional: Negative.    HENT: Negative.     Eyes: Negative.    Respiratory: Negative.     Cardiovascular: Negative.    Gastrointestinal: Negative.    Endocrine: Negative.    Genitourinary: Negative.    Musculoskeletal:  Positive for arthralgias and myalgias.   Neurological:  Positive for seizures and weakness. Negative for dizziness, light-headedness, numbness and headaches.   Psychiatric/Behavioral:  Positive for confusion. Negative for hallucinations and sleep disturbance. The patient is not nervous/anxious.    Objective:     Vital Signs (Most Recent):  Temp: 97.2 °F (36.2 °C) (04/25/22 0451)  Pulse: 103 (04/25/22 1323)  Resp: 20 (04/25/22 1323)  BP: (!) 153/83 (04/25/22 0451)  SpO2: (S) 95 % (04/25/22 1323) Vital Signs (24h Range):  Temp:  [97.2 °F (36.2 °C)-97.6 °F (36.4 °C)] 97.2 °F (36.2 °C)  Pulse:  [] 103  Resp:  [18-20] 20  SpO2:  [95 %-99 %] 95 %  BP: (125-153)/(62-83) 153/83     Weight: 104.3 kg (230 lb)  Body mass index is 32.08 kg/m².    Intake/Output Summary (Last 24 hours) at 4/25/2022 1533  Last data filed at 4/25/2022 1440  Gross per 24 hour   Intake 3150 ml   Output --   Net 3150 ml        Physical Exam  Constitutional:       General: He is awake. He is not in acute distress.     Appearance: He is obese. He is ill-appearing. He is not toxic-appearing or diaphoretic.   HENT:      Head: Normocephalic and atraumatic.      Nose: Nose normal. No congestion or rhinorrhea.      Mouth/Throat:      Mouth: Mucous membranes are moist.      Pharynx: Oropharynx is clear. No oropharyngeal exudate or posterior oropharyngeal erythema.   Eyes:      General: No scleral icterus.        Right eye: No discharge.         Left eye: No discharge.      Extraocular Movements: Extraocular movements intact.      Pupils: Pupils are equal, round, and reactive to light.   Neck:      Thyroid: No thyroid mass or thyromegaly.      Vascular: No carotid bruit.       Meningeal: Brudzinski's sign and Kernig's sign absent.   Cardiovascular:      Rate and Rhythm: Normal rate and regular rhythm.      Chest Wall: PMI is not displaced. No thrill.      Pulses: Normal pulses.      Heart sounds: Normal heart sounds. No murmur heard.    No friction rub. No gallop.   Pulmonary:      Effort: Pulmonary effort is normal. No tachypnea, accessory muscle usage, prolonged expiration or respiratory distress.      Breath sounds: Normal breath sounds. No stridor or decreased air movement. No wheezing, rhonchi or rales.   Chest:      Chest wall: No tenderness.   Abdominal:      General: Bowel sounds are normal. There is no distension.      Palpations: Abdomen is soft. There is no hepatomegaly, splenomegaly or mass.      Tenderness: There is no abdominal tenderness. There is no right CVA tenderness, left CVA tenderness, guarding or rebound.      Hernia: No hernia is present.   Musculoskeletal:         General: No swelling, tenderness, deformity or signs of injury. Normal range of motion.      Cervical back: Normal range of motion and neck supple. No rigidity. No muscular tenderness.      Right lower leg: No edema.      Left lower leg: No edema.   Lymphadenopathy:      Cervical: No cervical adenopathy.   Skin:     General: Skin is warm.      Capillary Refill: Capillary refill takes less than 2 seconds.      Coloration: Skin is not cyanotic, jaundiced or pale.      Findings: No erythema, lesion, petechiae or rash.   Neurological:      Mental Status: He is alert. He is disoriented.      Cranial Nerves: No cranial nerve deficit, dysarthria or facial asymmetry.      Motor: Weakness present. No tremor.      Gait: Gait abnormal.   Psychiatric:         Mood and Affect: Mood is depressed. Mood is not anxious. Affect is flat and tearful.         Speech: Speech is not rapid and pressured or slurred.         Behavior: Behavior is withdrawn. Behavior is not agitated, aggressive or combative.         Thought  Content: Thought content is not paranoid or delusional.         Cognition and Memory: Memory is impaired.       Significant Labs: All pertinent labs within the past 24 hours have been reviewed.    CBC:   Recent Labs   Lab 04/25/22  0617   WBC 6.32   HGB 9.5*   HCT 30.7*          CMP:   Recent Labs   Lab 04/25/22  0616      K 4.1      CO2 32*   GLU 97   BUN 47*   CREATININE 1.6*   CALCIUM 9.3   PROT 7.4   ALBUMIN 2.2*   BILITOT 0.4   ALKPHOS 74   AST 21   ALT 27   ANIONGAP 7*   EGFRNONAA 40.4*         Significant Imaging: No pertinent imaging within the past 24 hours.

## 2022-04-25 NOTE — DISCHARGE SUMMARY
[facility] Medicine  Discharge Summary      Patient Name: Jamey Lei  MRN: [id number]  Patient Class: IP- Inpatient  Admission Date: 4/10/2022  Hospital Length of Stay: 8 days  Discharge Date and Time: 4/18/2022  3:20 PM  Attending Physician: No att. providers found   Discharging Provider: Navi Domínguez III, MD  Primary Care Provider: Navi Domínguez III, MD      HPI:   Patient is a 81-year-old male with a history of multiple comorbidities including a right-sided lung mass that was biopsied recently within the last few weeks and was negative for malignancy.  The mass is spiculated and enlarging and is very concerning for malignancy.  The patient has had a deterioration in his condition over the last few months with several prolonged hospitalizations.  The patient has type 2 diabetes chronic kidney disease morbid obesity peripheral neuropathy and chronic lower back pain requiring chronic opiate therapy.  The patient is requiring more and more care from caregivers and family to assist him in his ADLs.  Patient reportedly over the last few days has not been active in self stopped taking his medicines and began having seizures.  The patient has a history of a meningioma and seizure disorder.  This morning the patient is snoring he is resting he was given doses of IV Ativan for his seizure last night in the emergency department.  I have spoken with the patient's family and I am recommending do not resuscitate while we care for the patient's medical issues.  They will have a family meeting and discuss.      * No surgery found *      Hospital Course:   4/12/2022 FM:  Patient is awake and alert and family at the bedside.  Family states when he takes his antiepileptic he becomes very groggy and sedated.  We are going to try a lower dose while he is here and we are treating him for pneumonia.  Otherwise the patient is eating his breakfast and seems to have a good appetite and eating well will start  therapy today.  4/13/2022 FM:  Patient ate 100% of breakfast this morning.  Patient's family is at bedside.  We discuss code status and the patient does not want mechanical ventilation and intubation so we will place partial code status on the chart.  Patient otherwise is having increasing coughing and rattling in the chest today.  He did attempt to get out of bed yesterday and is severely debilitated and weak.  The family is asking about inpatient rehab and I have informed them that we need to see effort and some improvements over the next day or so to ensure that the prognosis is fair to good.  4/14/2022 FM:  Patient's having audible rhonchi today.  Patient was up out of bed yesterday and sat in a chair until 2:00 a.m..  We are attempting inpatient rehab therapy.  I am adding nebulized treatments today and Mucomyst.  4/15/2022 CG: Patient is still having some shortness of breath with rhonchi. He says the breathing treatments have helped a little bit.   4/16/2022 CG: Doing well, sitting up in bed and feels less short of breath  4/17/2022 CG:  No new changes, continue with breathing treatments and breath sounds still coarse.  4/18/2022 FM:  Patient is sitting up in the chair and ate 100% of his breakfast.  Patient is eager to begin working with therapy.  His performance on Friday was not significant enough to be able to complete inpatient rehab but we will reassess today and hopefully moved inpatient rehab in home.  I have informed the family if he is not able to moved inpatient rehab will have to make arrangements at home or a skilled nursing facility.  Discharge Note:  Patient's pulmonary symptoms improved but he is still requiring IV antibiotics.  We suspect that the patient has a postobstructive pneumonia.  Patient was accepted to inpatient rehab and we will move up stairs today for aggressive therapy and then follow up with General surgery in the outpatient setting.       Goals of Care Treatment  Preferences:  Code Status: DNR      Consults:   Consults (From admission, onward)        Status Ordering Provider     Inpatient consult to Registered Dietitian/Nutritionist  Once        Provider:  (Not yet assigned)    ROMULO Trinidad III          * Pneumonia  Improved, continue current medical regimen, suspect para-neoplastic pna.      Encephalopathy, toxic  Improved.    Morbid obesity  Body mass index is 31.67 kg/m². Morbid obesity complicates all aspects of disease management from diagnostic modalities to treatment. Weight loss encouraged and health benefits explained to patient.         Neuropathy involving both lower extremities  PT/OT.      CKD (chronic kidney disease), stage IV  Cr noted, still slightly volume overloaded.    Lung mass  Strong suspect for ca, initial biopsy was negative.      Seizure disorder  Try lower dose of tileptal.  More alert, moving to IPR.      Meningioma        Hypokalemia  Improved      Chronic osteoarthritis  Cont. Treatement, PT/OT.        Final Active Diagnoses:    Diagnosis Date Noted POA    PRINCIPAL PROBLEM:  Pneumonia [J18.9] 04/11/2022 Yes    Encephalopathy, toxic [G92.9] 04/12/2022 Yes    Meningioma [D32.9] 04/11/2022 Yes    Seizure disorder [G40.909] 04/11/2022 Yes    Lung mass [R91.8] 04/11/2022 Yes    CKD (chronic kidney disease), stage IV [N18.4] 04/11/2022 Yes    Neuropathy involving both lower extremities [G57.93] 04/11/2022 Yes    Morbid obesity [E66.01] 04/11/2022 Yes    Chronic osteoarthritis [M19.90] 04/11/2022 Yes    Hypokalemia [E87.6] 04/11/2022 Yes      Problems Resolved During this Admission:       Discharged Condition: fair    Disposition: Rehab Facility    Follow Up:    Patient Instructions:   No discharge procedures on file.    Significant Diagnostic Studies: Noted.    Pending Diagnostic Studies:     None         Medications:  Transfer Medications (for Discharge Readmit only):   No current facility-administered medications for this  encounter.     No current outpatient medications on file.     Facility-Administered Medications Ordered in Other Encounters   Medication Dose Route Frequency Provider Last Rate Last Admin    acetaminophen tablet 650 mg  650 mg Oral Q8H PRN Francis H. Catrachita LAWLER MD   650 mg at 04/19/22 1251    acetaminophen tablet 650 mg  650 mg Oral Q8H PRN Francis H. Catrachita LAWLER MD        acetylcysteine 200 mg/ml (20%) solution 4 mL  4 mL Nebulization BID Francis H. Catrachita LAWLER MD   4 mL at 04/24/22 1908    albuterol-ipratropium 2.5 mg-0.5 mg/3 mL nebulizer solution 3 mL  3 mL Nebulization TID WAKE Francis H. Catrachita LAWLER MD   3 mL at 04/24/22 1908    aspirin EC tablet 81 mg  81 mg Oral Daily Francis H. Catrachita LAWLER MD   81 mg at 04/24/22 0832    atorvastatin tablet 20 mg  20 mg Oral Daily Francis H. Catrachita LAWLER MD   20 mg at 04/24/22 0832    docusate sodium capsule 100 mg  100 mg Oral BID Francis H. Catrachita LAWLER MD   100 mg at 04/24/22 2040    enoxaparin injection 40 mg  40 mg Subcutaneous Daily Francis H. Catrachita LAWLER MD   40 mg at 04/24/22 1615    ezetimibe tablet 10 mg  10 mg Oral Daily Francis H. Catrachita LAWLER MD   10 mg at 04/24/22 0833    guaiFENesin 12 hr tablet 1,200 mg  1,200 mg Oral BID Francis H. Catrachita LAWLER MD   1,200 mg at 04/24/22 2040    HYDROcodone-acetaminophen 7.5-325 mg per tablet 1 tablet  1 tablet Oral Q6H PRN Francis H. Catrachita LAWLER MD   1 tablet at 04/24/22 2040    melatonin tablet 6 mg  6 mg Oral Nightly PRN Francis H. Catrachita LAWLER MD   6 mg at 04/24/22 2040    metoprolol tartrate (LOPRESSOR) tablet 25 mg  25 mg Oral BID Francis H. Catrachita LAWLER MD   25 mg at 04/24/22 2040    ondansetron injection 4 mg  4 mg Intravenous Q8H PRN Francis H. Catrachita LAWLER MD        OXcarbazepine tablet 300 mg  300 mg Oral BID Francis H. Catrachita LAWLER MD   300 mg at 04/24/22 2040    potassium chloride SA CR tablet 40 mEq  40 mEq Oral Daily Natalie A. Misbah, NP   40 mEq at 04/24/22 0831    torsemide tablet 100 mg  100 mg Oral Daily Navi H. [place] III,  MD   100 mg at 04/24/22 0832       Indwelling Lines/Drains at time of discharge:   Lines/Drains/Airways     None                 Time spent on the discharge of patient: 45 minutes         Navi Domínguez III, MD  Department of Hospital Medicine  [facility]

## 2022-04-25 NOTE — PROGRESS NOTES
[facility]  Progress Note    Patient Name: Jamey Lei  MRN: [id number]  Patient Class: IP- Rehab   Admission Date: 4/18/2022  Length of Stay: 7 days  Attending Physician: Navi Domínguez III, MD  Primary Care Provider: Navi Domínguez III, MD        Subjective:     Principal Problem:Encephalopathy, toxic        HPI:  Patient is a 81-year-old male with a past medical history significant for right-sided lung mass who presented to the emergency department with decreased appetite for last few days.  The patient had reportedly stopped taking his medications and was agitated.  Patient had a witnessed seizure with no head trauma.  Patient has a history of seizure disorder meningioma and chronic kidney disease.  The patient about 2-3 months ago had a admission for pneumonia and a lung mass and was ultimately transferred to an outside facility and had lung biopsies.  They were negative for malignancy the patient had an inpatient rehab stay and had a significant improvement in his level of function.  The patient over the last few months has had a progressive decline when we now have this repeated admission.  The patient was started on antibiotics after admission his oral medications re-initiated and he was placed on anti epileptics.  The patient has improved and recovered and the patient and family are wanting therapy to get him back to his previous level of function and hopefully return home.  The patient is having intermittent confusion he has progressively improved since his seizure and getting back on medicines.  Overall the patient seems slightly depressed over his current diagnoses and deterioration of condition will continually trying to motivate and encourage the patient.        Overview/Hospital Course:  4/20 SD: Pt in wheelchair awaiting therapy.     4/21 SD: Pt participating in physical therapy. Bladder training complete, espinal catheter discontinued    4/22 SD: Pt sitting up in  wheelchair. Positive attitude.    4/24/2022 FM:  Patient's tolerating therapy and slowly making some functional gains.  Less rhonchi and rattling in the chest today.  Overall encouraged.    4/25 SD: Pt participating in physical therapy. Denies any c/o.      Interval History: Pt seen and evaluated    Review of Systems   Constitutional: Negative.    HENT: Negative.     Eyes: Negative.    Respiratory: Negative.     Cardiovascular: Negative.    Gastrointestinal: Negative.    Endocrine: Negative.    Genitourinary: Negative.    Musculoskeletal:  Positive for arthralgias and myalgias.   Neurological:  Positive for seizures and weakness. Negative for dizziness, light-headedness, numbness and headaches.   Psychiatric/Behavioral:  Positive for confusion. Negative for hallucinations and sleep disturbance. The patient is not nervous/anxious.    Objective:     Vital Signs (Most Recent):  Temp: 97.2 °F (36.2 °C) (04/25/22 0451)  Pulse: 103 (04/25/22 1323)  Resp: 20 (04/25/22 1323)  BP: (!) 153/83 (04/25/22 0451)  SpO2: (S) 95 % (04/25/22 1323) Vital Signs (24h Range):  Temp:  [97.2 °F (36.2 °C)-97.6 °F (36.4 °C)] 97.2 °F (36.2 °C)  Pulse:  [] 103  Resp:  [18-20] 20  SpO2:  [95 %-99 %] 95 %  BP: (125-153)/(62-83) 153/83     Weight: 104.3 kg (230 lb)  Body mass index is 32.08 kg/m².    Intake/Output Summary (Last 24 hours) at 4/25/2022 1533  Last data filed at 4/25/2022 1440  Gross per 24 hour   Intake 3150 ml   Output --   Net 3150 ml        Physical Exam  Constitutional:       General: He is awake. He is not in acute distress.     Appearance: He is obese. He is ill-appearing. He is not toxic-appearing or diaphoretic.   HENT:      Head: Normocephalic and atraumatic.      Nose: Nose normal. No congestion or rhinorrhea.      Mouth/Throat:      Mouth: Mucous membranes are moist.      Pharynx: Oropharynx is clear. No oropharyngeal exudate or posterior oropharyngeal erythema.   Eyes:      General: No scleral icterus.        Right  eye: No discharge.         Left eye: No discharge.      Extraocular Movements: Extraocular movements intact.      Pupils: Pupils are equal, round, and reactive to light.   Neck:      Thyroid: No thyroid mass or thyromegaly.      Vascular: No carotid bruit.      Meningeal: Brudzinski's sign and Kernig's sign absent.   Cardiovascular:      Rate and Rhythm: Normal rate and regular rhythm.      Chest Wall: PMI is not displaced. No thrill.      Pulses: Normal pulses.      Heart sounds: Normal heart sounds. No murmur heard.    No friction rub. No gallop.   Pulmonary:      Effort: Pulmonary effort is normal. No tachypnea, accessory muscle usage, prolonged expiration or respiratory distress.      Breath sounds: Normal breath sounds. No stridor or decreased air movement. No wheezing, rhonchi or rales.   Chest:      Chest wall: No tenderness.   Abdominal:      General: Bowel sounds are normal. There is no distension.      Palpations: Abdomen is soft. There is no hepatomegaly, splenomegaly or mass.      Tenderness: There is no abdominal tenderness. There is no right CVA tenderness, left CVA tenderness, guarding or rebound.      Hernia: No hernia is present.   Musculoskeletal:         General: No swelling, tenderness, deformity or signs of injury. Normal range of motion.      Cervical back: Normal range of motion and neck supple. No rigidity. No muscular tenderness.      Right lower leg: No edema.      Left lower leg: No edema.   Lymphadenopathy:      Cervical: No cervical adenopathy.   Skin:     General: Skin is warm.      Capillary Refill: Capillary refill takes less than 2 seconds.      Coloration: Skin is not cyanotic, jaundiced or pale.      Findings: No erythema, lesion, petechiae or rash.   Neurological:      Mental Status: He is alert. He is disoriented.      Cranial Nerves: No cranial nerve deficit, dysarthria or facial asymmetry.      Motor: Weakness present. No tremor.      Gait: Gait abnormal.   Psychiatric:          Mood and Affect: Mood is depressed. Mood is not anxious. Affect is flat and tearful.         Speech: Speech is not rapid and pressured or slurred.         Behavior: Behavior is withdrawn. Behavior is not agitated, aggressive or combative.         Thought Content: Thought content is not paranoid or delusional.         Cognition and Memory: Memory is impaired.       Significant Labs: All pertinent labs within the past 24 hours have been reviewed.    CBC:   Recent Labs   Lab 04/25/22  0617   WBC 6.32   HGB 9.5*   HCT 30.7*          CMP:   Recent Labs   Lab 04/25/22  0616      K 4.1      CO2 32*   GLU 97   BUN 47*   CREATININE 1.6*   CALCIUM 9.3   PROT 7.4   ALBUMIN 2.2*   BILITOT 0.4   ALKPHOS 74   AST 21   ALT 27   ANIONGAP 7*   EGFRNONAA 40.4*         Significant Imaging: No pertinent imaging within the past 24 hours.      Assessment/Plan:      * Encephalopathy, toxic  Inpatient rehab efforts    Neuropathy involving both lower extremities  Continue current medication      CKD (chronic kidney disease), stage IV        Pneumonia  IV antibiotic therapy complete, s/s improved, cont. oxygen therapy      Lung mass  Continuous oxygen      Seizure disorder  No seizure activity      Meningioma        Hyperparathyroidism due to renal insufficiency        Chronic osteoarthritis        Chronic kidney disease due to hypertension  Stable        VTE Risk Mitigation (From admission, onward)         Ordered     enoxaparin injection 40 mg  Daily         04/18/22 1529     IP VTE HIGH RISK PATIENT  Once         04/18/22 1529     Place sequential compression device  Until discontinued         04/18/22 1529                Discharge Planning   GLORY:      Code Status: DNR   Is the patient medically ready for discharge?:     Reason for patient still in hospital (select all that apply): Patient trending condition, Laboratory test, Treatment and PT / OT recommendations  Discharge Plan A: Assisted Living                   Natalie Crawley NP  Department of Hospital Medicine   [facility])

## 2022-04-25 NOTE — PLAN OF CARE
Problem: Adult Inpatient Plan of Care  Goal: Plan of Care Review  4/25/2022 0625 by Sejal Uribe LPN  Outcome: Ongoing, Progressing  4/25/2022 0624 by Sejal Uribe LPN  Outcome: Ongoing, Progressing  Goal: Patient-Specific Goal (Individualized)  4/25/2022 0625 by Sejal Uribe LPN  Outcome: Ongoing, Progressing  4/25/2022 0624 by Sejal Uribe LPN  Outcome: Ongoing, Progressing  Goal: Absence of Hospital-Acquired Illness or Injury  4/25/2022 0625 by Sejal Uribe LPN  Outcome: Ongoing, Progressing  4/25/2022 0624 by Sejal Uribe LPN  Outcome: Ongoing, Progressing  Goal: Optimal Comfort and Wellbeing  4/25/2022 0625 by Sejal Uribe LPN  Outcome: Ongoing, Progressing  4/25/2022 0624 by Sejal Uribe LPN  Outcome: Ongoing, Progressing  Goal: Readiness for Transition of Care  4/25/2022 0625 by Sejal Uribe LPN  Outcome: Ongoing, Progressing  4/25/2022 0624 by Sejal Uribe LPN  Outcome: Ongoing, Progressing

## 2022-04-25 NOTE — CARE UPDATE
04/25/22 1323   Patient Assessment/Suction   Level of Consciousness (AVPU) alert   Respiratory Effort Unlabored   Expansion/Accessory Muscles/Retractions no use of accessory muscles   All Lung Fields Breath Sounds clear   Rhythm/Pattern, Respiratory pattern regular;unlabored   Cough Frequency no cough   PRE-TX-O2   O2 Device (Oxygen Therapy) (S)  room air   SpO2 (S)  95 %   Pulse Oximetry Type Intermittent   Pulse 103   Resp 20   Positioning Sitting in chair   Aerosol Therapy   $ Aerosol Therapy Charges Aerosol Treatment   Daily Review of Necessity (SVN) completed   Respiratory Treatment Status (SVN) given   Treatment Route (SVN) mask   Patient Position (SVN) sitting in chair   Post Treatment Assessment (SVN) breath sounds unchanged   Signs of Intolerance (SVN) none   Breath Sounds Post-Respiratory Treatment   Throughout All Fields Post-Treatment All Fields   Throughout All Fields Post-Treatment no change   Post-treatment Heart Rate (beats/min) 100   Post-treatment Resp Rate (breaths/min) 20   Respiratory Evaluation   $ Care Plan Tech Time 15 min

## 2022-04-26 PROCEDURE — 94761 N-INVAS EAR/PLS OXIMETRY MLT: CPT

## 2022-04-26 PROCEDURE — 97110 THERAPEUTIC EXERCISES: CPT

## 2022-04-26 PROCEDURE — 99900031 HC PATIENT EDUCATION (STAT)

## 2022-04-26 PROCEDURE — 25000003 PHARM REV CODE 250: Performed by: INTERNAL MEDICINE

## 2022-04-26 PROCEDURE — 11000001 HC ACUTE MED/SURG PRIVATE ROOM

## 2022-04-26 PROCEDURE — 63600175 PHARM REV CODE 636 W HCPCS: Performed by: INTERNAL MEDICINE

## 2022-04-26 PROCEDURE — 97530 THERAPEUTIC ACTIVITIES: CPT

## 2022-04-26 PROCEDURE — 25000242 PHARM REV CODE 250 ALT 637 W/ HCPCS: Performed by: INTERNAL MEDICINE

## 2022-04-26 PROCEDURE — 97116 GAIT TRAINING THERAPY: CPT

## 2022-04-26 PROCEDURE — 99900035 HC TECH TIME PER 15 MIN (STAT)

## 2022-04-26 PROCEDURE — 25000003 PHARM REV CODE 250: Performed by: NURSE PRACTITIONER

## 2022-04-26 PROCEDURE — 94640 AIRWAY INHALATION TREATMENT: CPT

## 2022-04-26 PROCEDURE — 97535 SELF CARE MNGMENT TRAINING: CPT

## 2022-04-26 PROCEDURE — 27000221 HC OXYGEN, UP TO 24 HOURS

## 2022-04-26 RX ADMIN — IPRATROPIUM BROMIDE AND ALBUTEROL SULFATE 3 ML: 2.5; .5 SOLUTION RESPIRATORY (INHALATION) at 01:04

## 2022-04-26 RX ADMIN — IPRATROPIUM BROMIDE AND ALBUTEROL SULFATE 3 ML: 2.5; .5 SOLUTION RESPIRATORY (INHALATION) at 08:04

## 2022-04-26 RX ADMIN — ENOXAPARIN SODIUM 40 MG: 40 INJECTION SUBCUTANEOUS at 05:04

## 2022-04-26 RX ADMIN — POTASSIUM CHLORIDE 40 MEQ: 20 TABLET, EXTENDED RELEASE ORAL at 09:04

## 2022-04-26 RX ADMIN — OXCARBAZEPINE 300 MG: 300 TABLET, FILM COATED ORAL at 09:04

## 2022-04-26 RX ADMIN — ASPIRIN 81 MG: 81 TABLET, COATED ORAL at 09:04

## 2022-04-26 RX ADMIN — ACETYLCYSTEINE 4 ML: 200 SOLUTION ORAL; RESPIRATORY (INHALATION) at 08:04

## 2022-04-26 RX ADMIN — HYDROCODONE BITARTRATE AND ACETAMINOPHEN 1 TABLET: 7.5; 325 TABLET ORAL at 09:04

## 2022-04-26 RX ADMIN — METOPROLOL TARTRATE 25 MG: 25 TABLET, FILM COATED ORAL at 09:04

## 2022-04-26 RX ADMIN — EZETIMIBE 10 MG: 10 TABLET ORAL at 09:04

## 2022-04-26 RX ADMIN — Medication 6 MG: at 09:04

## 2022-04-26 RX ADMIN — DOCUSATE SODIUM 100 MG: 100 CAPSULE, LIQUID FILLED ORAL at 09:04

## 2022-04-26 RX ADMIN — GUAIFENESIN 1200 MG: 600 TABLET, EXTENDED RELEASE ORAL at 09:04

## 2022-04-26 RX ADMIN — ATORVASTATIN CALCIUM 20 MG: 20 TABLET, FILM COATED ORAL at 09:04

## 2022-04-26 RX ADMIN — Medication 6 MG: at 02:04

## 2022-04-26 RX ADMIN — TORSEMIDE 100 MG: 20 TABLET ORAL at 09:04

## 2022-04-26 NOTE — PLAN OF CARE
Problem: Adult Inpatient Plan of Care  Goal: Plan of Care Review  4/26/2022 0620 by Sejal Uribe LPN  Outcome: Ongoing, Progressing  4/26/2022 0619 by Sejal Uribe LPN  Outcome: Ongoing, Progressing  Goal: Patient-Specific Goal (Individualized)  4/26/2022 0620 by Sejal Uribe LPN  Outcome: Ongoing, Progressing  4/26/2022 0619 by Sejal Uribe LPN  Outcome: Ongoing, Progressing  Goal: Absence of Hospital-Acquired Illness or Injury  4/26/2022 0620 by Sejal Uribe LPN  Outcome: Ongoing, Progressing  4/26/2022 0619 by Sejal Uribe LPN  Outcome: Ongoing, Progressing  Goal: Optimal Comfort and Wellbeing  4/26/2022 0620 by Sejal Uribe LPN  Outcome: Ongoing, Progressing  4/26/2022 0619 by Sejal Uribe LPN  Outcome: Ongoing, Progressing  Goal: Readiness for Transition of Care  4/26/2022 0620 by Sejal Uribe LPN  Outcome: Ongoing, Progressing  4/26/2022 0619 by Sejal Uribe LPN  Outcome: Ongoing, Progressing

## 2022-04-26 NOTE — PT/OT/SLP PROGRESS
"Physical Therapy         Treatment        Jamey Lei   MRN: [id number]                 Billable Minutes:  Gait [id number] minutes and Therapeutic Activity 45 minutes  Total Minutes: 75 minutes individual treatment                     General Precautions: Standard,  fall         Subjective:  Communicated with nurse prior to session.     pain: FACES scale 4/10 L great toe    Objective:  Patient found sitting up in dining area; willing to participate. Pt is on RA at time with O2% of 95%.     Functional Mobility:  Bed Mobility:   Not performed this date    Balance:   Static Sit: GOOD: Takes MODERATE challenges from all directions  Dynamic Sit:  FAIR+: Maintains balance through MINIMAL excursions of active trunk motion  Static Stand: FAIR: Maintains without assist but unable to take challenges  Dynamic stand:Fair +; supervision with gait    Transfer Training:  Sit to stand:Stand-by Assistance with Rolling Walker vcc's for hand placement  Bed <> Chair:  Stand Pivot and Step Transfer with Stand-by Assistance with Rolling Walker      Wheelchair Training:  Pt propelled WC using B UE's and LE's  x 150' SBA    Gait Training:  Performed gait training x 2 trials of 150' using RW SBA; brief standing rest break as needed with vc's for postural extension and breathing technique. Pt  C/o SOB with gait; 1st trial performed no O2 with O2% 91% after gait; 2nd trial performed with 2 l NC O2 and pt remained SOB again with gait and O2% 93%    Stair Training:  Pt able to ascend/descend 8 steps using B handrails with step to pattern contact guard assist.      Additional Treatment:  Performed sit to/from stand from armed chair x 10 trials using B UE's SBA; step ups to 5" step with B UE support 10 trials    Activity Tolerance:  Patient tolerated treatment well; remains SOB with activity     Patient left reclined in chair in dining area with nurse notified.    Assessment:  Jamey Lei is a 81 y.o. male with a medical diagnosis of " Encephalopathy, toxic. He presents with progressed to SBA activities with fatigue with gait though able to recover with minimal rest break.    Rehab potential is good.    Activity tolerance: Good    Discharge recommendations:       Equipment recommendations:       GOALS:   Multidisciplinary Problems     Physical Therapy Goals     Not on file                PLAN:    Patient to be seen 5 x/week  to address the above listed problems via therapeutic activities, gait training, wheelchair management/training, therapeutic exercises, therapeutic groups, neuromuscular re-education  Plan of Care expires: 05/02/22  Plan of Care reviewed with: patient         4/26/2022

## 2022-04-26 NOTE — PT/OT/SLP PROGRESS
"Speech Language Pathology Treatment          Jamey Lei   MRN: [id number]     Diet recommendations: Solid Diet Level: Regular  Liquid Diet Level: Thin Assistance with meals    SLP Treatment Date: 04/22/22  Speech Start Time: 1400     Speech Stop Time: 1430     Speech Total (min): 30 min       TREATMENT BILLABLE MINUTES:  Speech Therapy Individual 30 minutes     General Precautions: Standard, fall, respiratory, hearing impaired          Subjective:  Patient attempting to get up to go to bathroom while wearing 02 with tubing attached to wall and states "I can go to the bathroom by myself".     Objective:   Patient found in bed in good spirits. Participates with encouragement.  He completed word association tasks with 40% acc, analogies with 50% acc, repeated 3 word phrases with 70% acc, noted dysfluencies and perseverations, and responded to WH questions with 80% acc and min cues.         Assessment:  Jamey Lei is a 81 y.o. male with a SLP diagnosis of Aphasia and Cognitive-Linguistic Impairment.    Diet recommendations:    Solid Diet Level: Regular     Liquid Diet Level: Thin  Assistance with meals    Discharge recommendations: Discharge Facility/Level of Care Needs: home (care as needed)     Goals:   Multidisciplinary Problems     SLP Goals        Problem: SLP    Goal Priority Disciplines Outcome   SLP Goal     SLP    Description: Goals for Speech Evaluation    Receptive Language:  1.  Pt will follow 2 step commands with 75%  accuracy given 25% cues    Expressive Language:  1. Pt will participate in phrase completion tasks with 85% acc given 10% cues   2. Pt will complete word association tasks with 85% acc given choice of 2 provided      Cognition:  1. Pt will be oriented to environment and situation with 75% acc given minimal prompting   2. Pt will complete short term memory / recall tasks with 70% accuracy given choice of 2 provided   3. Pt will complete functional problem solving tasks with 70% accuracy " given choice of 2 provided  4. Pt will demonstrate safety during routine ADLs with 80% acc and min cues provided                       Plan:   Patient to be seen Therapy Frequency: 3 x/week   Planned Interventions: Cognitive-Linguistic Therapy  Plan of Care Expires: 05/18/22  Plan of Care reviewed with: patient  SLP Follow-up?: Yes  SLP - Next Visit Date: 04/25/22 4/25/2022

## 2022-04-26 NOTE — PT/OT/SLP PROGRESS
Occupational Therapy  Treatment    Jamey Lei   MRN: [id number]   Admitting Diagnosis: Encephalopathy, toxic    OT Date of Treatment: 04/26/22   AM Start Time: 1000  AM End Time: 1200  PM Start Time: na  PM End Time: na  Treatment Type: Individual 90 and Concurrent 30  Total Time (min): 120 min      Billable Minutes:  Self Care/Home Management 30, Therapeutic Activity 30 and Therapeutic Exercise 30  Total Minutes: 120    General Precautions: Standard, fall  Orthopedic Precautions: N/A  Braces:      Spiritual, Cultural Beliefs, Gnosticist Practices, Values that Affect Care: no    Subjective:  Communicated with nurse prior to session.    Pain/Comfort  Pain Rating 1: 0/10  Pain Rating Post-Intervention 1: 0/10    Objective:  Patient found with: oxygen (2 L O2 per NC). Pt was cooperative and motivated with verbal encouragement while pleasantly confused. He participated in functional transfer retraining throughout session to / from wheelchair, chair, and toilet emphasizing fall prevention providing extra time with repetition requiring SBA secondary to safety concerns with mod verbal cueing for safety awareness and technique. Pt then participated in therapeutic activity addressing trunk mobility, trunk control, dynamic sitting balance, and trunk strength utilizing large stability ball challenging him to perform trunk flexion, extension, and lateral flexion with bilateral hands stabilized on ball requiring verbal and tactile cueing to facilitate optimal movement patterns and increased range per trial in order to improve active ROM impacting performance with functional tasks below waist. Next, he participated in ADL retraining regarding toileting emphasizing fall prevention providing extra time requiring SBA secondary to safety concerns during clothing management with verbal cueing for safety awareness and technique. Pt then participated in therapeutic exercise performing 3x15 bilateral UE proximal  strengthening strengthening exercises utilizing moderate to heavy resistance theraband with scapular retraction, shoulder extension, shoulder adduction, horizontal abduction, shoulder flexion in plane of scaption, internal rotation, and external rotation while seated in chair unsupported requiring mod verbal and tactile cueing for technique.    Functional Mobility:  Bed Mobility:   Supine to sit: Supervision or Set-up Assistance   Sit to supine: Supervision or Set-up Assistance   Rolling: Supervision or Set-up Assistance   Scooting: Supervision or Set-up Assistance    Transfer Training:   Sit to stand:Stand-by Assistance with Rolling Walker .  Bed <> Chair:  Step Transfer with Stand-by Assistance with Rolling Walker .  Toilet Transfer:  Pt Step Transfer with Stand-by Assistance with Grab bars .    Toilet Training:  Pt performed toileting with Stand-by Assistance with Grab  bar at [employer].    Balance:   Static Sit: GOOD: Takes MODERATE challenges from all directions  Dynamic Sit:  FAIR+: Maintains balance through MINIMAL excursions of active trunk motion  Static Stand: FAIR+: Takes MINIMAL challenges from all directions  Dynamic stand: FAIR+: Needs CLOSE SUPERVISION during gait and is able to right self with minor LOB    Patient left up in chair with nurse notified    ASSESSMENT:  Pt demonstrated improved functional performance with toileting and transfers as noted by SBA on this date in which he did not require steadying assist, but safety concerns remain with mod verbal cueing for safety awareness and technique.     Rehab potential is good    Activity tolerance: Fair    Discharge recommendations: home with home health     Equipment recommendations: other (see comments) (To be further determined based on progress prior to discharge.)     GOALS:   Multidisciplinary Problems     Occupational Therapy Goals        Problem: Occupational Therapy    Goal Priority Disciplines Outcome Interventions   Occupational Therapy  Goal     OT, PT/OT Ongoing, Progressing    Description: Long Term Goals to be met by: 05/02/22     Patient will increase functional independence with ADLs by performing:    Feeding with [place].  UE Dressing with Set-up Assistance.  LE Dressing with Minimal Assistance.  Grooming while seated at sink with Modified [place].  Toileting from toilet with Supervision for hygiene and clothing management.   Bathing from  shower chair/bench with Stand-by Assistance.  Step transfer with Modified [place]  Toilet transfer to toilet with Modified [place].  Increased functional strength to 4 to 4+/5 for bilateral UE's.                     Plan:  Patient to be seen 5 x/week to address the above listed problems via self-care/home management, community/work re-entry, therapeutic activities, therapeutic exercises, cognitive retraining  Plan of Care expires: 05/02/22  Plan of Care reviewed with: patient         04/26/2022

## 2022-04-26 NOTE — PROGRESS NOTES
"[facility])  Adult Nutrition  Progress Note    SUMMARY       Recommendations    Recommendation/Intervention:   1. Modify diet to Low Sodium dietary restrictions given his Cardiac history     2. Reduce Suplena to once per day not that appetite has improved     3. Continue Lino BID for wound healing     4. RD to monitor and make recs accordingly.    Goals: Intake of 75% or greater by RD f/u.  Nutrition Goal Status: new    Assessment and Plan  Nutrition Problem  Increased nutrient needs     Related to (etiology):   Increased nutrient demand for wound healing      Signs and Symptoms (as evidenced by):   Altered skin integrity to R buttock       Interventions/Recommendations (treatment strategy):  Lino BID   Suplena x 1 day     Nutrition Diagnosis Status:   Continues     Reason for Assessment    Reason For Assessment: RD follow-up  Diagnosis: other (see comments) (toxic encephalopathy)  Relevant Medical History: R lung mass, CHF, COPD, HTN, CKD 4  General Information Comments: PO @ 100% + ONS intake. Plan to d/c friday  Nutrition Discharge Planning: Low Sodium    Nutrition Risk Screen    Nutrition Risk Screen: no indicators present    Nutrition/Diet History    Patient Reported Diet/Restrictions/Preferences: general  Spiritual, Cultural Beliefs, Lutheran Practices, Values that Affect Care: no  Food Allergies: NKFA  Factors Affecting Nutritional Intake: None identified at this time    Anthropometrics    Temp: 97 °F (36.1 °C)  Height Method: Stated  Height: 5' 11" (180.3 cm)  Height (inches): 71 in  Weight Method: Standard Scale  Weight: 101.2 kg (223 lb 3.2 oz)  Weight (lb): 223.2 lb  Ideal Body Weight (IBW), Male: 172 lb  % Ideal Body Weight, Male (lb): 133.72 %  BMI (Calculated): 31.1  BMI Grade: 30 - 34.9- obesity - grade I  Weight Loss: intentional  Usual Body Weight (UBW), k.09 kg  % Usual Body Weight: 95.83  % Weight Change From Usual Weight: -4.37 %       Lab/Procedures/Meds    Pertinent " Labs Reviewed: reviewed  Pertinent Medications Reviewed: reviewed  Pertinent Medications Comments: Demadex    Physical Findings/Assessment         Estimated/Assessed Needs    Weight Used For Calorie Calculations: 88 kg (194 lb 0.1 oz)  Energy Calorie Requirements (kcal): 0462-8086 kcal (20-25 kcal/kg AJ IBW)  Energy Need Method: Kcal/kg  Protein Requirements: 88 gm (1 gm/kg AJ IBW- liberalized CKD with wound and lung mass)  Weight Used For Protein Calculations: 88 kg (194 lb 0.1 oz)     Estimated Fluid Requirement Method: RDA Method  RDA Method (mL): 1760         Nutrition Prescription Ordered    Current Diet Order: Low Sodium  Oral Nutrition Supplement: Lino BID, Suplena qd    Evaluation of Received Nutrient/Fluid Intake       % Intake of Estimated Energy Needs: 75 - 100 %  % Meal Intake: 75 - 100 %    Nutrition Risk    Level of Risk/Frequency of Follow-up: low - moderate     Monitor and Evaluation    Food and Nutrient Intake: food and beverage intake  Food and Nutrient Adminstration: diet order  Anthropometric Measurements: weight change, weight  Biochemical Data, Medical Tests and Procedures: electrolyte and renal panel, gastrointestinal profile, glucose/endocrine profile, inflammatory profile, lipid profile  Nutrition-Focused Physical Findings: overall appearance     Nutrition Follow-Up    RD Follow-up?: Yes

## 2022-04-26 NOTE — NURSING
Last night pt hit his left great toe on the edge of the bed. Today he was complaining because toenail is raise from bed of his toe and still is slight amount of blood. . I cleaned it with normal saline and dressed it 4x4 and secured it with tape. Will inform Dr. Domínguez.

## 2022-04-26 NOTE — PT/OT/SLP PROGRESS
Speech Language Pathology Treatment          Jamey Lei   MRN: [id number]     Diet recommendations: Solid Diet Level: Regular  Liquid Diet Level: Thin Assistance with meals    SLP Treatment Date: 04/25/22  Speech Start Time: 1400     Speech Stop Time: 1430     Speech Total (min): 30 min       TREATMENT BILLABLE MINUTES:  Speech Therapy Individual 30 minutes     General Precautions: Standard, fall, respiratory, hearing impaired          Subjective:  Patient with increased responsiveness to cues and engagement in conversation     Objective:   Patient found sitting up in dining area in wheel chair.  He completed word association tasks with 57% acc and mod cues, completed yes/no question response with 90% acc related to environment and 100% acc for general info.  He named objects described with 80% acc and min cues.  Patient with decreased perseverations and dysfluencies compared to session of 4/22/22.         Assessment:  Jamey Lei is a 81 y.o. male with a SLP diagnosis of Aphasia and Cognitive-Linguistic Impairment. He present with word finding difficulty, perseverations and semantic and phonemic paraphasias.    Diet recommendations:    Solid Diet Level: Regular   Liquid Diet Level: Thin  Assistance with meals    Discharge recommendations: Discharge Facility/Level of Care Needs: home (care as needed)     Goals:   Multidisciplinary Problems     SLP Goals        Problem: SLP    Goal Priority Disciplines Outcome   SLP Goal     SLP    Description: Goals for Speech Evaluation    Receptive Language:  1.  Pt will follow 2 step commands with 75%  accuracy given 25% cues    Expressive Language:  1. Pt will participate in phrase completion tasks with 85% acc given 10% cues   2. Pt will complete word association tasks with 85% acc given choice of 2 provided      Cognition:  1. Pt will be oriented to environment and situation with 75% acc given minimal prompting   2. Pt will complete short term memory / recall tasks with  70% accuracy given choice of 2 provided   3. Pt will complete functional problem solving tasks with 70% accuracy given choice of 2 provided  4. Pt will demonstrate safety during routine ADLs with 80% acc and min cues provided                       Plan:   Patient to be seen Therapy Frequency: 3 x/week   Planned Interventions: Cognitive-Linguistic Therapy  Plan of Care Expires: 05/18/22  Plan of Care reviewed with: patient  SLP Follow-up?: Yes  SLP - Next Visit Date: 04/26/22 4/25/2022

## 2022-04-26 NOTE — PLAN OF CARE
Updated pt's son Rachid on today's team meeting and scheduled discharge date. Inquired if patient was still discharging to [facility] and son noted that his father is not on board with this plan. He wants to speak to his father tonight before making a decision. Per son, [facility] does have a bed available and is ready to accept patient once discharged. Informed Rachid that it's not safe for patient to be left alone and Rachid noted that patient will not be left alone at home and has family that stays with him if the plan should be for pt to discharge home instead of discharging to [facility]. Rachid will have a discharge plan for CM tomorrow. Rachid also confirmed that patient is on home oxygen. Will follow up with Rachid in the morning.     Addendum 0930  Spoke to son Rachid who stated the plan is for patient to return home. Pt currently has sitters for 6 hours a day and lives with family members. He also would like pt to have home health services with [facility]. Referral was sent to [facility] to review.

## 2022-04-27 PROBLEM — S90.212A CONTUSION OF LEFT GREAT TOE WITH DAMAGE TO NAIL: Status: ACTIVE | Noted: 2022-04-27

## 2022-04-27 PROCEDURE — 97530 THERAPEUTIC ACTIVITIES: CPT

## 2022-04-27 PROCEDURE — 25000003 PHARM REV CODE 250: Performed by: INTERNAL MEDICINE

## 2022-04-27 PROCEDURE — 94761 N-INVAS EAR/PLS OXIMETRY MLT: CPT

## 2022-04-27 PROCEDURE — 94640 AIRWAY INHALATION TREATMENT: CPT

## 2022-04-27 PROCEDURE — 99900031 HC PATIENT EDUCATION (STAT)

## 2022-04-27 PROCEDURE — 97110 THERAPEUTIC EXERCISES: CPT

## 2022-04-27 PROCEDURE — 97116 GAIT TRAINING THERAPY: CPT

## 2022-04-27 PROCEDURE — 25000242 PHARM REV CODE 250 ALT 637 W/ HCPCS: Performed by: INTERNAL MEDICINE

## 2022-04-27 PROCEDURE — 99900035 HC TECH TIME PER 15 MIN (STAT)

## 2022-04-27 PROCEDURE — 97130 THER IVNTJ EA ADDL 15 MIN: CPT | Performed by: SPEECH-LANGUAGE PATHOLOGIST

## 2022-04-27 PROCEDURE — 11000001 HC ACUTE MED/SURG PRIVATE ROOM

## 2022-04-27 PROCEDURE — 63600175 PHARM REV CODE 636 W HCPCS: Performed by: INTERNAL MEDICINE

## 2022-04-27 PROCEDURE — 97129 THER IVNTJ 1ST 15 MIN: CPT | Performed by: SPEECH-LANGUAGE PATHOLOGIST

## 2022-04-27 PROCEDURE — 25000003 PHARM REV CODE 250: Performed by: NURSE PRACTITIONER

## 2022-04-27 PROCEDURE — 27000221 HC OXYGEN, UP TO 24 HOURS

## 2022-04-27 RX ADMIN — Medication 6 MG: at 08:04

## 2022-04-27 RX ADMIN — HYDROCODONE BITARTRATE AND ACETAMINOPHEN 1 TABLET: 7.5; 325 TABLET ORAL at 08:04

## 2022-04-27 RX ADMIN — POTASSIUM CHLORIDE 40 MEQ: 20 TABLET, EXTENDED RELEASE ORAL at 08:04

## 2022-04-27 RX ADMIN — EZETIMIBE 10 MG: 10 TABLET ORAL at 08:04

## 2022-04-27 RX ADMIN — ATORVASTATIN CALCIUM 20 MG: 20 TABLET, FILM COATED ORAL at 08:04

## 2022-04-27 RX ADMIN — GUAIFENESIN 1200 MG: 600 TABLET, EXTENDED RELEASE ORAL at 08:04

## 2022-04-27 RX ADMIN — TORSEMIDE 100 MG: 20 TABLET ORAL at 08:04

## 2022-04-27 RX ADMIN — ACETYLCYSTEINE 4 ML: 200 SOLUTION ORAL; RESPIRATORY (INHALATION) at 07:04

## 2022-04-27 RX ADMIN — IPRATROPIUM BROMIDE AND ALBUTEROL SULFATE 3 ML: 2.5; .5 SOLUTION RESPIRATORY (INHALATION) at 07:04

## 2022-04-27 RX ADMIN — ENOXAPARIN SODIUM 40 MG: 40 INJECTION SUBCUTANEOUS at 04:04

## 2022-04-27 RX ADMIN — DOCUSATE SODIUM 100 MG: 100 CAPSULE, LIQUID FILLED ORAL at 08:04

## 2022-04-27 RX ADMIN — METOPROLOL TARTRATE 25 MG: 25 TABLET, FILM COATED ORAL at 08:04

## 2022-04-27 RX ADMIN — ASPIRIN 81 MG: 81 TABLET, COATED ORAL at 08:04

## 2022-04-27 RX ADMIN — OXCARBAZEPINE 300 MG: 300 TABLET, FILM COATED ORAL at 08:04

## 2022-04-27 RX ADMIN — IPRATROPIUM BROMIDE AND ALBUTEROL SULFATE 3 ML: 2.5; .5 SOLUTION RESPIRATORY (INHALATION) at 01:04

## 2022-04-27 NOTE — NURSING
Received order from Dr. Hahn to clean left great toe with wound cleanser or normal saline, pat dry, apply petroleum dressing  then wrap with gauze and secure with tape daily. See new order in computer.

## 2022-04-27 NOTE — CARE UPDATE
Lung Mass    Due to pt's above diagnosis the patient requires a semi-electric hospital bed for positioning of the body not feasible with an ordinary bed. Elevation of the head/upper body by more than 30 degrees is necessary for aid in the associated problems/conditions with the above diagnosis. Pt also requires frequent changes in body positions.

## 2022-04-27 NOTE — PLAN OF CARE
Pt's son reached out to CM concerning wanting to increase the amount of hours a week that pt receives through [facility] for sitters. Son is wanting to increase sitter care to 40 hours a week. Contacted Zoila with [facility] who noted that [facility]  to [facility]. She noted a referral would need to be sent to [facility] by the  from [facility].    [facility] [place] to inquire about increasing the amount of hours pt receives a week through [facility]. Irene noted that the max amount of hours a pt can receive is 16 hours a week and that she would review pt's chart to confirm how many hours pt is currently receiving. Will await a call back from Irene.

## 2022-04-27 NOTE — NURSING
Respiratory asked me to talk with doctor about dc mucomyst due to no longer needing at this time.  I spoke with Natalie Crawley NP with order to dc mucomyst.

## 2022-04-27 NOTE — PT/OT/SLP PROGRESS
Physical Therapy         Treatment        Jamey Lei   MRN: [id number]     PT Received On: 22  PT Start Time: 1245     PT Stop Time: 1415    PT Total Time (min): 90 min       Billable Minutes:  Gait [id number], Therapeutic Activity 30 and Therapeutic Exercise 30  Total Minutes: 90    Treatment Type: Treatment  PT/PTA: PT             General Precautions: Standard,    Orthopedic Precautions:     Braces:           Subjective:  Communicated with nurse  prior to session.         Objective:  Patient found in bed, with      Functional Mobility:  Bed Mobility:   Supine to sit: Standby Assistance   Sit to supine: Standby Assistance   Rolling: Standby Assistance   Scooting: Standby Assistance    Balance:   Static Sit: GOOD: Takes MODERATE challenges from all directions  Dynamic Sit:  FAIR+: Maintains balance through MINIMAL excursions of active trunk motion  Static Stand: FAIR+: Takes MINIMAL challenges from all directions  Dynamic stand: FAIR+: Needs CLOSE SUPERVISION during gait and is able to right self with minor LOB    Transfer Training:  Sit to stand:Stand-by Assistance with Rolling Walker    Bed <> Chair:  Step Transfer with Stand-by Assistance with Rolling Walker      Wheelchair Traininft with wc sba    Gait Training:  Ambulates 100-130ft with a rw 3 attempts with close sba    Stair Training:  No steps done today      Additional Treatment:    Activity Tolerance:  Patient tolerated treatment well    Patient left supine with call button in reach.    Assessment:  Jamey Lei is a 81 y.o. male with a medical diagnosis of Encephalopathy, toxic. He presents with better transfers today.    Rehab potential is good.    Activity tolerance: Good    Discharge recommendations:       Equipment recommendations:       GOALS:   Multidisciplinary Problems     Physical Therapy Goals     Not on file                PLAN:    Patient to be seen 5 x/week  to address the above listed problems via therapeutic activities, gait  training, wheelchair management/training, therapeutic exercises, therapeutic groups, neuromuscular re-education  Plan of Care expires: 05/02/22  Plan of Care reviewed with: patient         4/27/2022

## 2022-04-27 NOTE — PT/OT/SLP PROGRESS
Occupational Therapy  Treatment    Jamey Lei   MRN: [id number]   Admitting Diagnosis: Encephalopathy, toxic    OT Date of Treatment: 04/27/22   OT Start Time: 1000  OT Stop Time: 1130  OT Total Time (min): 90 min     Treatment Type: Individual 90     Billable Minutes:  Therapeutic Activity 60 and Therapeutic Exercise 30  Total Minutes: 90     OT/DENISHA: OT          General Precautions: Standard, fall  Orthopedic Precautions: N/A  Braces:      Spiritual, Cultural Beliefs, Jainism Practices, Values that Affect Care: no    Subjective:  Communicated with nurse prior to session.    Pain/Comfort  Pain Rating 1: 5/10  Location - Side 1: Left  Location 1: first toe  Pain Addressed 1: Distraction, Cessation of Activity  Pain Rating Post-Intervention 1: 5/10    Objective:  Patient found with: oxygen (2 L O2 per NC). Pt was cooperative and motivated with verbal encouragement while pleasantly confused. He participated in functional transfer retraining throughout session to / from wheelchair, chair, and toilet emphasizing fall prevention providing extra time with repetition requiring SBA secondary to safety concerns with mod verbal cueing for safety awareness and technique. Pt then participated in therapeutic activity addressing trunk mobility, trunk control, dynamic sitting balance, and trunk strength utilizing large stability ball challenging him to perform trunk flexion, extension, and lateral flexion with bilateral hands stabilized on ball requiring verbal and tactile cueing to facilitate optimal movement patterns and increased range per trial in order to improve active ROM impacting performance with functional tasks below waist. Next, he participated in therapeutic activity addressing functional reaching, gross motor coordination, static / dynamic standing balance, standing tolerance, and energy for task / endurance challenging him to reach in multiple planes utilizing bilateral UE's in order to retrieve, lift, stabilize,  manipulate, and place various items needed to perform functional tasks of ADL's requiring continuous verbal and tactile cueing to facilitate optimal movement patterns and positioning within RW while utilizing forward, lateral, and diagonal steps with CGA for safety when reaching with additional tactile cueing. Pt then participated in therapeutic exercise performing 5x15 seated requiring verbal and tactile cueing for technique challenging him to lift buttocks off seated surface to end range elbow extension in order to strengthen triceps brachii to improve performance with sit <> stand transitions particularly from lower surfaces.       Functional Mobility:  Bed Mobility:   Supine to sit: Supervision or Set-up Assistance   Sit to supine: Supervision or Set-up Assistance   Rolling: Supervision or Set-up Assistance   Scooting: Supervision or Set-up Assistance    Transfer Training:   Sit to stand:Supervision or Set-up Assistance with Rolling Walker .  Bed <> Chair:  Step Transfer with Supervision or Set-up Assistance with Rolling Walker .  Toilet Transfer:  Pt Step Transfer with Supervision or Set-up Assistance with Grab bars .    Balance:   Static Sit: GOOD: Takes MODERATE challenges from all directions  Dynamic Sit:  FAIR+: Maintains balance through MINIMAL excursions of active trunk motion  Static Stand: FAIR+: Takes MINIMAL challenges from all directions  Dynamic stand: FAIR+: Needs CLOSE SUPERVISION during gait and is able to right self with minor LOB      Patient left up in chair with nurse notified    ASSESSMENT:  Pt demonstrated improved static and dynamic standing balance as noted by FAIR+ allowing for improved functional performance with ADL's while standing requiring decreasing steadying assist, but safety concerns due cognitive deficits continue to impact function the most.       Rehab potential is good    Activity tolerance: Good    Discharge recommendations: home with home health     Equipment  recommendations: other (see comments) (To be further determined based on progress prior to discharge.)     GOALS:   Multidisciplinary Problems     Occupational Therapy Goals        Problem: Occupational Therapy    Goal Priority Disciplines Outcome Interventions   Occupational Therapy Goal     OT, PT/OT Ongoing, Progressing    Description: Long Term Goals to be met by: 05/02/22     Patient will increase functional independence with ADLs by performing:    Feeding with [place].  UE Dressing with Set-up Assistance.  LE Dressing with Minimal Assistance.  Grooming while seated at sink with Modified [place].  Toileting from toilet with Supervision for hygiene and clothing management.   Bathing from  shower chair/bench with Stand-by Assistance.  Step transfer with Modified [place]  Toilet transfer to toilet with Modified [place].  Increased functional strength to 4 to 4+/5 for bilateral UE's.                     Plan:  Patient to be seen 5 x/week to address the above listed problems via self-care/home management, community/work re-entry, therapeutic activities, therapeutic exercises, cognitive retraining  Plan of Care expires: 05/02/22  Plan of Care reviewed with: patient         04/27/2022

## 2022-04-27 NOTE — SUBJECTIVE & OBJECTIVE
Interval History: Pt seen and evaluated    Review of Systems   Constitutional: Negative.    HENT: Negative.     Eyes: Negative.    Respiratory: Negative.     Cardiovascular: Negative.    Gastrointestinal: Negative.    Endocrine: Negative.    Genitourinary: Negative.    Musculoskeletal:  Positive for arthralgias and myalgias.   Neurological:  Positive for seizures and weakness. Negative for dizziness, light-headedness, numbness and headaches.   Psychiatric/Behavioral:  Positive for confusion. Negative for hallucinations and sleep disturbance. The patient is not nervous/anxious.    Objective:     Vital Signs (Most Recent):  Temp: 97.2 °F (36.2 °C) (04/27/22 0428)  Pulse: 93 (04/27/22 0825)  Resp: (!) 24 (04/27/22 0826)  BP: (!) 165/82 (04/27/22 0825)  SpO2: 100 % (04/27/22 0735) Vital Signs (24h Range):  Temp:  [97.2 °F (36.2 °C)-97.9 °F (36.6 °C)] 97.2 °F (36.2 °C)  Pulse:  [80-97] 93  Resp:  [18-24] 24  SpO2:  [91 %-100 %] 100 %  BP: (134-165)/(73-82) 165/82     Weight: 101.2 kg (223 lb 3.2 oz)  Body mass index is 31.13 kg/m².    Intake/Output Summary (Last 24 hours) at 4/27/2022 1315  Last data filed at 4/27/2022 0830  Gross per 24 hour   Intake 2030 ml   Output --   Net 2030 ml        Physical Exam  Constitutional:       General: He is awake. He is not in acute distress.     Appearance: He is obese. He is ill-appearing. He is not toxic-appearing or diaphoretic.   HENT:      Head: Normocephalic and atraumatic.      Nose: Nose normal. No congestion or rhinorrhea.      Mouth/Throat:      Mouth: Mucous membranes are moist.      Pharynx: Oropharynx is clear. No oropharyngeal exudate or posterior oropharyngeal erythema.   Eyes:      General: No scleral icterus.        Right eye: No discharge.         Left eye: No discharge.      Extraocular Movements: Extraocular movements intact.      Pupils: Pupils are equal, round, and reactive to light.   Neck:      Thyroid: No thyroid mass or thyromegaly.      Vascular: No carotid  bruit.      Meningeal: Brudzinski's sign and Kernig's sign absent.   Cardiovascular:      Rate and Rhythm: Normal rate and regular rhythm.      Chest Wall: PMI is not displaced. No thrill.      Pulses: Normal pulses.      Heart sounds: Normal heart sounds. No murmur heard.    No friction rub. No gallop.   Pulmonary:      Effort: Pulmonary effort is normal. No tachypnea, accessory muscle usage, prolonged expiration or respiratory distress.      Breath sounds: Normal breath sounds. No stridor or decreased air movement. No wheezing, rhonchi or rales.   Chest:      Chest wall: No tenderness.   Abdominal:      General: Bowel sounds are normal. There is no distension.      Palpations: Abdomen is soft. There is no hepatomegaly, splenomegaly or mass.      Tenderness: There is no abdominal tenderness. There is no right CVA tenderness, left CVA tenderness, guarding or rebound.      Hernia: No hernia is present.   Musculoskeletal:         General: No swelling, tenderness, deformity or signs of injury. Normal range of motion.      Cervical back: Normal range of motion and neck supple. No rigidity. No muscular tenderness.      Right lower leg: No edema.      Left lower leg: No edema.   Lymphadenopathy:      Cervical: No cervical adenopathy.   Skin:     General: Skin is warm.      Capillary Refill: Capillary refill takes less than 2 seconds.      Coloration: Skin is not cyanotic, jaundiced or pale.      Findings: No erythema, lesion, petechiae or rash.   Neurological:      Mental Status: He is alert. He is disoriented.      Cranial Nerves: No cranial nerve deficit, dysarthria or facial asymmetry.      Motor: Weakness present. No tremor.      Gait: Gait abnormal.   Psychiatric:         Mood and Affect: Mood is depressed. Mood is not anxious. Affect is flat and tearful.         Speech: Speech is not rapid and pressured or slurred.         Behavior: Behavior is withdrawn. Behavior is not agitated, aggressive or combative.          Thought Content: Thought content is not paranoid or delusional.         Cognition and Memory: Memory is impaired.       Significant Labs: No pertinent labs within the past 24 hours.      Significant Imaging: No pertinent imaging within the past 24 hours.

## 2022-04-27 NOTE — PROGRESS NOTES
[facility] Medicine  Progress Note    Patient Name: Jamey Lei  MRN: [id number]  Patient Class: IP- Rehab   Admission Date: 4/18/2022  Length of Stay: 9 days  Attending Physician: Navi Domínguez III, MD  Primary Care Provider: Navi Domínguez III, MD        Subjective:     Principal Problem:Encephalopathy, toxic        HPI:  Patient is a 81-year-old male with a past medical history significant for right-sided lung mass who presented to the emergency department with decreased appetite for last few days.  The patient had reportedly stopped taking his medications and was agitated.  Patient had a witnessed seizure with no head trauma.  Patient has a history of seizure disorder meningioma and chronic kidney disease.  The patient about 2-3 months ago had a admission for pneumonia and a lung mass and was ultimately transferred to an outside facility and had lung biopsies.  They were negative for malignancy the patient had an inpatient rehab stay and had a significant improvement in his level of function.  The patient over the last few months has had a progressive decline when we now have this repeated admission.  The patient was started on antibiotics after admission his oral medications re-initiated and he was placed on anti epileptics.  The patient has improved and recovered and the patient and family are wanting therapy to get him back to his previous level of function and hopefully return home.  The patient is having intermittent confusion he has progressively improved since his seizure and getting back on medicines.  Overall the patient seems slightly depressed over his current diagnoses and deterioration of condition will continually trying to motivate and encourage the patient.        Overview/Hospital Course:  4/20 SD: Pt in wheelchair awaiting therapy.     4/21 SD: Pt participating in physical therapy. Bladder training complete, espinal catheter discontinued    4/22 SD: Pt sitting up in  wheelchair. Positive attitude.    4/24/2022 FM:  Patient's tolerating therapy and slowly making some functional gains.  Less rhonchi and rattling in the chest today.  Overall encouraged.    4/25 SD: Pt participating in physical therapy. Denies any c/o.    4/27 SD: Pt sitting in wheelchair ready for physical therapy. Positive attitude. Denies any c/o.      Interval History: Pt seen and evaluated    Review of Systems   Constitutional: Negative.    HENT: Negative.     Eyes: Negative.    Respiratory: Negative.     Cardiovascular: Negative.    Gastrointestinal: Negative.    Endocrine: Negative.    Genitourinary: Negative.    Musculoskeletal:  Positive for arthralgias and myalgias.   Neurological:  Positive for seizures and weakness. Negative for dizziness, light-headedness, numbness and headaches.   Psychiatric/Behavioral:  Positive for confusion. Negative for hallucinations and sleep disturbance. The patient is not nervous/anxious.    Objective:     Vital Signs (Most Recent):  Temp: 97.2 °F (36.2 °C) (04/27/22 0428)  Pulse: 93 (04/27/22 0825)  Resp: (!) 24 (04/27/22 0826)  BP: (!) 165/82 (04/27/22 0825)  SpO2: 100 % (04/27/22 0735) Vital Signs (24h Range):  Temp:  [97.2 °F (36.2 °C)-97.9 °F (36.6 °C)] 97.2 °F (36.2 °C)  Pulse:  [80-97] 93  Resp:  [18-24] 24  SpO2:  [91 %-100 %] 100 %  BP: (134-165)/(73-82) 165/82     Weight: 101.2 kg (223 lb 3.2 oz)  Body mass index is 31.13 kg/m².    Intake/Output Summary (Last 24 hours) at 4/27/2022 1315  Last data filed at 4/27/2022 0830  Gross per 24 hour   Intake 2030 ml   Output --   Net 2030 ml        Physical Exam  Constitutional:       General: He is awake. He is not in acute distress.     Appearance: He is obese. He is ill-appearing. He is not toxic-appearing or diaphoretic.   HENT:      Head: Normocephalic and atraumatic.      Nose: Nose normal. No congestion or rhinorrhea.      Mouth/Throat:      Mouth: Mucous membranes are moist.      Pharynx: Oropharynx is clear. No  oropharyngeal exudate or posterior oropharyngeal erythema.   Eyes:      General: No scleral icterus.        Right eye: No discharge.         Left eye: No discharge.      Extraocular Movements: Extraocular movements intact.      Pupils: Pupils are equal, round, and reactive to light.   Neck:      Thyroid: No thyroid mass or thyromegaly.      Vascular: No carotid bruit.      Meningeal: Brudzinski's sign and Kernig's sign absent.   Cardiovascular:      Rate and Rhythm: Normal rate and regular rhythm.      Chest Wall: PMI is not displaced. No thrill.      Pulses: Normal pulses.      Heart sounds: Normal heart sounds. No murmur heard.    No friction rub. No gallop.   Pulmonary:      Effort: Pulmonary effort is normal. No tachypnea, accessory muscle usage, prolonged expiration or respiratory distress.      Breath sounds: Normal breath sounds. No stridor or decreased air movement. No wheezing, rhonchi or rales.   Chest:      Chest wall: No tenderness.   Abdominal:      General: Bowel sounds are normal. There is no distension.      Palpations: Abdomen is soft. There is no hepatomegaly, splenomegaly or mass.      Tenderness: There is no abdominal tenderness. There is no right CVA tenderness, left CVA tenderness, guarding or rebound.      Hernia: No hernia is present.   Musculoskeletal:         General: No swelling, tenderness, deformity or signs of injury. Normal range of motion.      Cervical back: Normal range of motion and neck supple. No rigidity. No muscular tenderness.      Right lower leg: No edema.      Left lower leg: No edema.   Lymphadenopathy:      Cervical: No cervical adenopathy.   Skin:     General: Skin is warm.      Capillary Refill: Capillary refill takes less than 2 seconds.      Coloration: Skin is not cyanotic, jaundiced or pale.      Findings: No erythema, lesion, petechiae or rash.   Neurological:      Mental Status: He is alert. He is disoriented.      Cranial Nerves: No cranial nerve deficit,  dysarthria or facial asymmetry.      Motor: Weakness present. No tremor.      Gait: Gait abnormal.   Psychiatric:         Mood and Affect: Mood is depressed. Mood is not anxious. Affect is flat and tearful.         Speech: Speech is not rapid and pressured or slurred.         Behavior: Behavior is withdrawn. Behavior is not agitated, aggressive or combative.         Thought Content: Thought content is not paranoid or delusional.         Cognition and Memory: Memory is impaired.       Significant Labs: No pertinent labs within the past 24 hours.      Significant Imaging: No pertinent imaging within the past 24 hours.      Assessment/Plan:      * Encephalopathy, toxic  Inpatient rehab efforts    Neuropathy involving both lower extremities  Continue current medication      CKD (chronic kidney disease), stage IV        Pneumonia  IV antibiotic therapy complete, s/s improved, cont. oxygen therapy      Lung mass  Continuous oxygen      Seizure disorder  No seizure activity      Meningioma        Hyperparathyroidism due to renal insufficiency        Chronic osteoarthritis        Chronic kidney disease due to hypertension  Stable        VTE Risk Mitigation (From admission, onward)         Ordered     enoxaparin injection 40 mg  Daily         04/18/22 1529     IP VTE HIGH RISK PATIENT  Once         04/18/22 1529     Place sequential compression device  Until discontinued         04/18/22 1529                Discharge Planning   GLORY:      Code Status: DNR   Is the patient medically ready for discharge?:     Reason for patient still in hospital (select all that apply): Patient trending condition, Treatment and PT / OT recommendations  Discharge Plan A: Assisted Living                  Natalie Crawley NP  Department of Hospital Medicine   [place] [facility] ([facility]

## 2022-04-28 LAB
ALBUMIN SERPL BCP-MCNC: 2.2 G/DL (ref 3.5–5.2)
ALP SERPL-CCNC: 59 U/L (ref 55–135)
ALT SERPL W/O P-5'-P-CCNC: 21 U/L (ref 10–44)
ANION GAP SERPL CALC-SCNC: 5 MMOL/L (ref 8–16)
AST SERPL-CCNC: 18 U/L (ref 10–40)
BASOPHILS # BLD AUTO: 0.05 K/UL (ref 0–0.2)
BASOPHILS NFR BLD: 0.8 % (ref 0–1.9)
BILIRUB SERPL-MCNC: 0.4 MG/DL (ref 0.1–1)
BUN SERPL-MCNC: 44 MG/DL (ref 8–23)
CALCIUM SERPL-MCNC: 9.4 MG/DL (ref 8.7–10.5)
CHLORIDE SERPL-SCNC: 106 MMOL/L (ref 95–110)
CO2 SERPL-SCNC: 31 MMOL/L (ref 23–29)
CREAT SERPL-MCNC: 1.6 MG/DL (ref 0.5–1.4)
DIFFERENTIAL METHOD: ABNORMAL
EOSINOPHIL # BLD AUTO: 0.4 K/UL (ref 0–0.5)
EOSINOPHIL NFR BLD: 6.1 % (ref 0–8)
ERYTHROCYTE [DISTWIDTH] IN BLOOD BY AUTOMATED COUNT: 19.2 % (ref 11.5–14.5)
EST. GFR  (AFRICAN AMERICAN): 46.7 ML/MIN/1.73 M^2
EST. GFR  (NON AFRICAN AMERICAN): 40.4 ML/MIN/1.73 M^2
GLUCOSE SERPL-MCNC: 106 MG/DL (ref 70–110)
HCT VFR BLD AUTO: 31.1 % (ref 40–54)
HGB BLD-MCNC: 9.5 G/DL (ref 14–18)
IMM GRANULOCYTES # BLD AUTO: 0.03 K/UL (ref 0–0.04)
IMM GRANULOCYTES NFR BLD AUTO: 0.5 % (ref 0–0.5)
LYMPHOCYTES # BLD AUTO: 1.4 K/UL (ref 1–4.8)
LYMPHOCYTES NFR BLD: 21.4 % (ref 18–48)
MCH RBC QN AUTO: 28.8 PG (ref 27–31)
MCHC RBC AUTO-ENTMCNC: 30.5 G/DL (ref 32–36)
MCV RBC AUTO: 94 FL (ref 82–98)
MONOCYTES # BLD AUTO: 0.8 K/UL (ref 0.3–1)
MONOCYTES NFR BLD: 12.2 % (ref 4–15)
NEUTROPHILS # BLD AUTO: 3.8 K/UL (ref 1.8–7.7)
NEUTROPHILS NFR BLD: 59 % (ref 38–73)
NRBC BLD-RTO: 0 /100 WBC
PLATELET # BLD AUTO: 213 K/UL (ref 150–450)
PMV BLD AUTO: 10.3 FL (ref 9.2–12.9)
POTASSIUM SERPL-SCNC: 4.2 MMOL/L (ref 3.5–5.1)
PROT SERPL-MCNC: 7.1 G/DL (ref 6–8.4)
RBC # BLD AUTO: 3.3 M/UL (ref 4.6–6.2)
SODIUM SERPL-SCNC: 142 MMOL/L (ref 136–145)
WBC # BLD AUTO: 6.37 K/UL (ref 3.9–12.7)

## 2022-04-28 PROCEDURE — 85025 COMPLETE CBC W/AUTO DIFF WBC: CPT | Performed by: INTERNAL MEDICINE

## 2022-04-28 PROCEDURE — 94761 N-INVAS EAR/PLS OXIMETRY MLT: CPT

## 2022-04-28 PROCEDURE — 97535 SELF CARE MNGMENT TRAINING: CPT

## 2022-04-28 PROCEDURE — 99900035 HC TECH TIME PER 15 MIN (STAT)

## 2022-04-28 PROCEDURE — 25000003 PHARM REV CODE 250: Performed by: INTERNAL MEDICINE

## 2022-04-28 PROCEDURE — 99900031 HC PATIENT EDUCATION (STAT)

## 2022-04-28 PROCEDURE — 97530 THERAPEUTIC ACTIVITIES: CPT

## 2022-04-28 PROCEDURE — 25000242 PHARM REV CODE 250 ALT 637 W/ HCPCS: Performed by: INTERNAL MEDICINE

## 2022-04-28 PROCEDURE — 36415 COLL VENOUS BLD VENIPUNCTURE: CPT | Performed by: INTERNAL MEDICINE

## 2022-04-28 PROCEDURE — 80053 COMPREHEN METABOLIC PANEL: CPT | Performed by: INTERNAL MEDICINE

## 2022-04-28 PROCEDURE — 11000001 HC ACUTE MED/SURG PRIVATE ROOM

## 2022-04-28 PROCEDURE — 27000221 HC OXYGEN, UP TO 24 HOURS

## 2022-04-28 PROCEDURE — 97116 GAIT TRAINING THERAPY: CPT

## 2022-04-28 PROCEDURE — 94640 AIRWAY INHALATION TREATMENT: CPT

## 2022-04-28 PROCEDURE — 63600175 PHARM REV CODE 636 W HCPCS: Performed by: INTERNAL MEDICINE

## 2022-04-28 PROCEDURE — 25000003 PHARM REV CODE 250: Performed by: NURSE PRACTITIONER

## 2022-04-28 PROCEDURE — 97110 THERAPEUTIC EXERCISES: CPT

## 2022-04-28 RX ADMIN — METOPROLOL TARTRATE 25 MG: 25 TABLET, FILM COATED ORAL at 08:04

## 2022-04-28 RX ADMIN — TORSEMIDE 100 MG: 20 TABLET ORAL at 08:04

## 2022-04-28 RX ADMIN — OXCARBAZEPINE 300 MG: 300 TABLET, FILM COATED ORAL at 08:04

## 2022-04-28 RX ADMIN — IPRATROPIUM BROMIDE AND ALBUTEROL SULFATE 3 ML: 2.5; .5 SOLUTION RESPIRATORY (INHALATION) at 01:04

## 2022-04-28 RX ADMIN — IPRATROPIUM BROMIDE AND ALBUTEROL SULFATE 3 ML: 2.5; .5 SOLUTION RESPIRATORY (INHALATION) at 08:04

## 2022-04-28 RX ADMIN — ENOXAPARIN SODIUM 40 MG: 40 INJECTION SUBCUTANEOUS at 04:04

## 2022-04-28 RX ADMIN — DOCUSATE SODIUM 100 MG: 100 CAPSULE, LIQUID FILLED ORAL at 08:04

## 2022-04-28 RX ADMIN — ATORVASTATIN CALCIUM 20 MG: 20 TABLET, FILM COATED ORAL at 08:04

## 2022-04-28 RX ADMIN — GUAIFENESIN 1200 MG: 600 TABLET, EXTENDED RELEASE ORAL at 08:04

## 2022-04-28 RX ADMIN — EZETIMIBE 10 MG: 10 TABLET ORAL at 08:04

## 2022-04-28 RX ADMIN — IPRATROPIUM BROMIDE AND ALBUTEROL SULFATE 3 ML: 2.5; .5 SOLUTION RESPIRATORY (INHALATION) at 07:04

## 2022-04-28 RX ADMIN — Medication 6 MG: at 08:04

## 2022-04-28 RX ADMIN — POTASSIUM CHLORIDE 40 MEQ: 20 TABLET, EXTENDED RELEASE ORAL at 08:04

## 2022-04-28 RX ADMIN — ASPIRIN 81 MG: 81 TABLET, COATED ORAL at 08:04

## 2022-04-28 NOTE — HOSPITAL COURSE
Patient admitted to [facility] secondary to encephalopathy/debility.  Podiatry consulted for evaluation and management of contusion of left hallux nail.  Patient injured left hallux nail on foot board of hospital bed.  Noticed lifting of nail, with bloody drainage.  Nursing applied topical antibiotic ointment and dressing.  Bleeding has subsided.  Minimal pain in the area because of neuropathy.

## 2022-04-28 NOTE — ASSESSMENT & PLAN NOTE
Hallux nail reduced at the bedside today.  No nail bed involvement.  Tolerated well without the need of anesthesia.  Continue topical antibiotic ointment and Band-Aid.  Discussed care with nursing staff.  Should heal within 10 days.  Call the office with any issues.

## 2022-04-28 NOTE — SUBJECTIVE & OBJECTIVE
Scheduled Meds:   albuterol-ipratropium  3 mL Nebulization TID WAKE    aspirin  81 mg Oral Daily    atorvastatin  20 mg Oral Daily    docusate sodium  100 mg Oral BID    enoxaparin  40 mg Subcutaneous Daily    ezetimibe  10 mg Oral Daily    guaiFENesin  1,200 mg Oral BID    metoprolol tartrate  25 mg Oral BID    OXcarbazepine  300 mg Oral BID    potassium chloride  40 mEq Oral Daily    torsemide  100 mg Oral Daily     Continuous Infusions:  PRN Meds:acetaminophen, acetaminophen, HYDROcodone-acetaminophen, melatonin, ondansetron    Review of patient's allergies indicates:  No Known Allergies     Past Medical History:   Diagnosis Date    Anticoagulant long-term use     Arthritis     CHF (congestive heart failure)     COPD (chronic obstructive pulmonary disease)     Hypertension     Renal disorder     Seizures     Sleep apnea      Past Surgical History:   Procedure Laterality Date    APPENDECTOMY      CORONARY ARTERY BYPASS GRAFT      FOOT SURGERY Left     HIP REPLACEMENT ARTHROPLASTY Right     JOINT REPLACEMENT      SHOULDER SURGERY Left     TOTAL KNEE ARTHROPLASTY Right        Family History    None       Tobacco Use    Smoking status: Current Every Day Smoker     Types: Cigarettes    Smokeless tobacco: Not on file    Tobacco comment: quit 30 years ago   Substance and Sexual Activity    Alcohol use: Not Currently    Drug use: Never    Sexual activity: Not Currently     Review of Systems   Constitutional:  Negative for chills and fever.   Respiratory:  Negative for chest tightness and shortness of breath.    Cardiovascular:  Negative for chest pain.   Gastrointestinal:  Negative for nausea and vomiting.   Objective:     Vital Signs (Most Recent):  Temp: 98.6 °F (37 °C) (04/27/22 1830)  Pulse: 97 (04/27/22 1901)  Resp: 20 (04/27/22 2037)  BP: (!) 163/74 (04/27/22 1830)  SpO2: (!) 94 % (04/27/22 1901)   Vital Signs (24h Range):  Temp:  [97.2 °F (36.2 °C)-98.6 °F (37 °C)] 98.6 °F (37 °C)  Pulse:  [92-98] 97  Resp:   [18-24] 20  SpO2:  [94 %-100 %] 94 %  BP: (163-165)/(74-82) 163/74     Weight: 101.2 kg (223 lb 3.2 oz)  Body mass index is 31.13 kg/m².    Foot Exam    General  Orientation: disoriented (patient is alert)      Right Foot/Ankle     Inspection and Palpation  Tenderness: none   Swelling: (trace edema to the foot and ankle)  Hammertoes: second toe, third toe, fourth toe and fifth toe  Skin Exam: skin not intact     Neurovascular  Dorsalis pedis: 2+  Posterior tibial: 2+  Saphenous nerve sensation: diminished  Tibial nerve sensation: diminished  Superficial peroneal nerve sensation: diminished  Deep peroneal nerve sensation: diminished  Sural nerve sensation: diminished      Left Foot/Ankle      Inspection and Palpation  Tenderness: (mild pain on palpation left hallux nail)  Swelling: (trace edema in the foot and ankle)  Hammertoes: second toe, third toe, fourth toe and fifth toe  Skin Exam: skin not intact (partial lysis of hallux nail.  Minimal attachment near epionychium.  Hallux nail uninvolved, no laceration present)    Neurovascular  Dorsalis pedis: 2+  Posterior tibial: 2+  Saphenous nerve sensation: diminished  Tibial nerve sensation: diminished  Superficial peroneal nerve sensation: diminished  Deep peroneal nerve sensation: diminished  Sural nerve sensation: diminished        Laboratory:  CBC:   Recent Labs   Lab 04/25/22  0617   WBC 6.32   RBC 3.24*   HGB 9.5*   HCT 30.7*      MCV 95   MCH 29.3   MCHC 30.9*       Diagnostic Results:  I have reviewed all pertinent imaging results/findings within the past 24 hours.

## 2022-04-28 NOTE — PLAN OF CARE
04/28/22 0859   Medicare Message   Important Message from Medicare regarding Discharge Appeal Rights Given to patient/caregiver;Explained to patient/caregiver;Signed/date by patient/caregiver   Date IMM was signed 04/28/22   Time IMM was signed 0810

## 2022-04-28 NOTE — CONSULTS
[facility])  Podiatry  Consult Note    Patient Name: Jamey Lei  MRN: [id number]  Admission Date: 4/18/2022  Hospital Length of Stay: 9 days  Attending Physician: Navi Domínguez III, MD  Primary Care Provider: Navi Domínguez III, MD     Consults  Subjective: patient admitted to [facility] rehabilitation unit secondary to encephalopathy/debility.  Podiatry consulted for evaluation management of left hallux nail issues.  Patient jammed left hallux nail on footboard.  Had bloody drainage to area.  Nursing applied topical antibiotic ointment and Band-Aid to area. Drainage has stopped.  No pain to area secondary to neuropathy.  Patient known to my clinic.  Was last seen in office in September 2020     History of Present Illness:  No notes on file    Scheduled Meds:   albuterol-ipratropium  3 mL Nebulization TID WAKE    aspirin  81 mg Oral Daily    atorvastatin  20 mg Oral Daily    docusate sodium  100 mg Oral BID    enoxaparin  40 mg Subcutaneous Daily    ezetimibe  10 mg Oral Daily    guaiFENesin  1,200 mg Oral BID    metoprolol tartrate  25 mg Oral BID    OXcarbazepine  300 mg Oral BID    potassium chloride  40 mEq Oral Daily    torsemide  100 mg Oral Daily     Continuous Infusions:  PRN Meds:acetaminophen, acetaminophen, HYDROcodone-acetaminophen, melatonin, ondansetron    Review of patient's allergies indicates:  No Known Allergies     Past Medical History:   Diagnosis Date    Anticoagulant long-term use     Arthritis     CHF (congestive heart failure)     COPD (chronic obstructive pulmonary disease)     Hypertension     Renal disorder     Seizures     Sleep apnea      Past Surgical History:   Procedure Laterality Date    APPENDECTOMY      CORONARY ARTERY BYPASS GRAFT      FOOT SURGERY Left     HIP REPLACEMENT ARTHROPLASTY Right     JOINT REPLACEMENT      SHOULDER SURGERY Left     TOTAL KNEE ARTHROPLASTY Right        Family History    None       Tobacco Use    Smoking status:  Current Every Day Smoker     Types: Cigarettes    Smokeless tobacco: Not on file    Tobacco comment: quit 30 years ago   Substance and Sexual Activity    Alcohol use: Not Currently    Drug use: Never    Sexual activity: Not Currently     Review of Systems   Constitutional:  Negative for chills and fever.   Respiratory:  Negative for chest tightness and shortness of breath.    Cardiovascular:  Negative for chest pain.   Gastrointestinal:  Negative for nausea and vomiting.   Objective:     Vital Signs (Most Recent):  Temp: 98.6 °F (37 °C) (04/27/22 1830)  Pulse: 97 (04/27/22 1901)  Resp: 20 (04/27/22 2037)  BP: (!) 163/74 (04/27/22 1830)  SpO2: (!) 94 % (04/27/22 1901)   Vital Signs (24h Range):  Temp:  [97.2 °F (36.2 °C)-98.6 °F (37 °C)] 98.6 °F (37 °C)  Pulse:  [92-98] 97  Resp:  [18-24] 20  SpO2:  [94 %-100 %] 94 %  BP: (163-165)/(74-82) 163/74     Weight: 101.2 kg (223 lb 3.2 oz)  Body mass index is 31.13 kg/m².    Foot Exam    General  Orientation: disoriented (patient is alert)      Right Foot/Ankle     Inspection and Palpation  Tenderness: none   Swelling: (trace edema to the foot and ankle)  Hammertoes: second toe, third toe, fourth toe and fifth toe  Skin Exam: skin not intact     Neurovascular  Dorsalis pedis: 2+  Posterior tibial: 2+  Saphenous nerve sensation: diminished  Tibial nerve sensation: diminished  Superficial peroneal nerve sensation: diminished  Deep peroneal nerve sensation: diminished  Sural nerve sensation: diminished      Left Foot/Ankle      Inspection and Palpation  Tenderness: (mild pain on palpation left hallux nail)  Swelling: (trace edema in the foot and ankle)  Hammertoes: second toe, third toe, fourth toe and fifth toe  Skin Exam: skin not intact (partial lysis of hallux nail.  Minimal attachment near epionychium.  Hallux nail uninvolved, no laceration present)    Neurovascular  Dorsalis pedis: 2+  Posterior tibial: 2+  Saphenous nerve sensation: diminished  Tibial nerve  sensation: diminished  Superficial peroneal nerve sensation: diminished  Deep peroneal nerve sensation: diminished  Sural nerve sensation: diminished        Laboratory:  CBC:   Recent Labs   Lab 04/25/22  0617   WBC 6.32   RBC 3.24*   HGB 9.5*   HCT 30.7*      MCV 95   MCH 29.3   MCHC 30.9*       Diagnostic Results:  I have reviewed all pertinent imaging results/findings within the past 24 hours.        Assessment/Plan:     Contusion of left great toe with damage to nail  Hallux nail reduced at the bedside today.  No nail bed involvement.  Tolerated well without the need of anesthesia.  Continue topical antibiotic ointment and Band-Aid.  Discussed care with nursing staff.  Should heal within 10 days.  Call the office with any issues.        Thank you for your consult. I will sign off. Please contact us if you have any additional questions.    Trent Hahn DPM  Podiatry  [facility] ([facility])

## 2022-04-28 NOTE — PT/OT/SLP PROGRESS
Occupational Therapy  Treatment    Jamey Lei   MRN: [id number]   Admitting Diagnosis: Encephalopathy, toxic    OT Date of Treatment: 04/28/22   OT Start Time: 1200  OT Stop Time: 1330  OT Total Time (min): 90 min     Treatment Type: Individual 90     Billable Minutes:  Self Care/Home Management 60 and Therapeutic Activity 30  Total Minutes: 90     OT/DENISHA: OT          General Precautions: Standard, fall  Orthopedic Precautions: N/A  Braces:      Spiritual, Cultural Beliefs, Protestant Practices, Values that Affect Care: no    Subjective:  Communicated with nurse prior to session.    Pain/Comfort  Pain Rating 1: 3/10  Location - Side 1: Left  Location 1: first toe  Pain Addressed 1: Reposition, Cessation of Activity, Nurse notified  Pain Rating Post-Intervention 1: 3/10    Objective:  Patient found with:  (room air). Pt was cooperative and motivated with minimal verbal encouragement while pleasantly confused on this date. He participated in extensive ADL retraining as further noted below emphasizing fall prevention, and use of compensatory strategies, assistive devices, and adaptive equipment including reacher, sock aide, and LH sponge providing extra time with repetition requiring continuous verbal and tactile cueing for safety awareness and technique. Also, he participated in therapeutic activity addressing functional reaching, gross motor coordination, static / dynamic standing balance, standing tolerance, and energy for task / endurance challenging him to reach in multiple planes utilizing bilateral UE's in order to retrieve, lift, stabilize, manipulate, and place various items needed to perform functional tasks of ADL's requiring mod verbal and tactile cueing to facilitate optimal movement patterns and positioning within RW while utilizing forward, lateral, and diagonal steps with SBA-CGA for safety when reaching with additional tactile cueing.     Functional Mobility:  Bed Mobility:   Supine to sit: Modified  Independent   Sit to supine: Modified Independent   Rolling: Modified Independent   Scooting: Modified Independent    Transfer Training:   Sit to stand:Supervision or Set-up Assistance with Rolling Walker .  Bed <> Chair:  Step Transfer with Supervision or Set-up Assistance with Rolling Walker .  Toilet Transfer:  Pt Step Transfer with Supervision or Set-up Assistance with Grab bars .  Patient performed shower transfer Step Transfer with Supervision or Set-up Assistance with Grab bars and shower chair.    Feeding:  Patient performed feeding with Independent.    Grooming:  Patient peformed hand washing with Modified Independent at sitting at sink.  Patient performed face washing with Modified Independent at sitting at sink.  Patient performed oral hygeine with Modified Independent at sitting at sink.  Patient performe hair grooming with Modified Independent at sitting at sink.    Bathing:  Patient performed bathing with Supervision or Set-up Assistance with grab bar, Handheld shower head and shower chair at Shower.    UE Dressing:  Patient performed UE Dressing with Supervision or Set-up Assistance with extra time at Edge of bed.    LE Dressing:  Patient don/doffed socks with Stand-by Assistance, Patient performed don/doffed adult brief with Stand-by Assistance and Patient performed don/doffed pants with Stand-by Assistance    Toilet Training:  Pt performed toileting with Stand-by Assistance with Grab  bar at Commode.    Balance:   Static Sit: GOOD: Takes MODERATE challenges from all directions  Dynamic Sit:  GOOD-: Incosistently Maintains balance through MODERATE excursions of active trunk movement,     Static Stand: FAIR+: Takes MINIMAL challenges from all directions  Dynamic stand: FAIR+: Needs CLOSE SUPERVISION during gait and is able to right self with minor LOB    Patient left up in chair with nurse notified    ASSESSMENT:  Pt demonstrated progress with functional long term goals as noted by changes in  functional scores in which he achieved 9/9 STG's and 7/9 LTG's. Pt now supervision to setup assistance with ADL's including functional mobility with extra time, use of assistive devices, and verbal cueing for safety awareness and technique. However, patient now independent with eating and grooming.    Rehab potential is good    Activity tolerance: Good    Discharge recommendations: home with home health     Equipment recommendations: other (see comments) (To be further determined based on progress prior to discharge.)     GOALS:   Short Term Goals to be met by: 04/25/22      Patient will increase functional independence with ADLs by performing:     Feeding with Set-up Assistance. MET  UE Dressing with Minimal Assistance. MET  LE Dressing with Moderate Assistance. MET  Grooming while standing at sink with Supervision. MET  Toileting from toilet with Moderate Assistance for hygiene and clothing management. MET  Bathing from  shower chair/bench with Moderate Assistance. MET  Step transfer with Stand-by Assistance. MET  Toilet transfer to toilet with Stand-by Assistance. MET  Increased functional strength to 4-/5 for bilateral UE's. MET     Long Term Goals to be met by: 05/02/22      Patient will increase functional independence with ADLs by performing:     Feeding with [place]. MET  UE Dressing with Set-up Assistance. MET  LE Dressing with Minimal Assistance. MET  Grooming while seated at sink with Modified [place]. MET  Toileting from toilet with Supervision for hygiene and clothing management. MET  Bathing from  shower chair/bench with Stand-by Assistance. MET  Step transfer with Modified [place]. NOT MET - Supervision  Toilet transfer to toilet with Modified [place]. NOT MET - Supervision  Increased functional strength to 4 to 4+/5 for bilateral UE's. MET    Plan:  Patient to be seen 5 x/week to address the above listed problems via self-care/home management, community/work re-entry, therapeutic  activities, therapeutic exercises, cognitive retraining  Plan of Care expires: 05/02/22  Plan of Care reviewed with: patient         04/28/2022

## 2022-04-28 NOTE — PT/OT/SLP PROGRESS
"Speech Language Pathology Treatment                                                Jamey Lei   MRN: [id number]      Diet recommendations: Solid Diet Level: Regular  Liquid Diet Level: Thin Assistance with meals     SLP Treatment Date: 04/27/22  Speech Start Time: 1800   Speech Stop Time: 1830     Speech Total (min): 30 min        TREATMENT BILLABLE MINUTES:  Speech Therapy Individual 30 minutes      General Precautions: Standard, fall, respiratory, hearing impaired           Subjective:  Patient found standing at bedside, out of w/c w/o assistance, attending to his cell phone setup; Pt  Was instructed re: fall precautions, w/ questionable compliance/ agreement.   Nursing advised.   Pt fatigued soon after completing a few tasks, and by end of tx was requesting to "wrap it up for the day".   Pt was anxiously awaiting the arrival of his son, Gulshan, for a visit.      Objective:   Cognitive tx;      Assessment:  Completed Selective attention ex's x 90% w/ occasional v. Cues & redirect.   Divided attention ex's x 35% w/ repetitions & v. Cues; pt had difficulty understanding the strategy of the task.   Completed divergent naming tasks x 50% w/ v. Cues & repetitions for different qualities of targeted items.   Patient with increased perseverations and dysfluencies, & decreased attention to task, by end of session, due to fatigue.            Jamey Lei is a 81 y.o. male with a SLP diagnosis of Aphasia and Cognitive-Linguistic Impairment. He present with word finding difficulty, perseverations and semantic and phonemic paraphasias.     Diet recommendations:    Solid Diet Level: Regular   Liquid Diet Level: Thin  Assistance with meals     Discharge recommendations: Discharge Facility/Level of Care Needs: home (care as needed)      Goals:       Multidisciplinary Problems           SLP Goals                 Problem: SLP      Goal Priority Disciplines Outcome   SLP Goal      SLP     Description: Goals for Speech " Evaluation     Receptive Language:  1.  Pt will follow 2 step commands with 75%  accuracy given 25% cues     Expressive Language:  1. Pt will participate in phrase completion tasks with 85% acc given 10% cues   2. Pt will complete word association tasks with 85% acc given choice of 2 provided       Cognition:  1. Pt will be oriented to environment and situation with 75% acc given minimal prompting   2. Pt will complete short term memory / recall tasks with 70% accuracy given choice of 2 provided   3. Pt will complete functional problem solving tasks with 70% accuracy given choice of 2 provided  4. Pt will demonstrate safety during routine ADLs with 80% acc and min cues provided                            Plan:   Patient to be seen Therapy Frequency: 3 x/week   Planned Interventions: Cognitive-Linguistic Therapy  Plan of Care Expires: 05/18/22         Leigh King CCC-SLP  4/27/2022

## 2022-04-29 VITALS
TEMPERATURE: 98 F | SYSTOLIC BLOOD PRESSURE: 142 MMHG | HEART RATE: 93 BPM | OXYGEN SATURATION: 100 % | RESPIRATION RATE: 18 BRPM | HEIGHT: 71 IN | BODY MASS INDEX: 31.25 KG/M2 | DIASTOLIC BLOOD PRESSURE: 86 MMHG | WEIGHT: 223.19 LBS

## 2022-04-29 PROCEDURE — 94761 N-INVAS EAR/PLS OXIMETRY MLT: CPT

## 2022-04-29 PROCEDURE — 99900035 HC TECH TIME PER 15 MIN (STAT)

## 2022-04-29 PROCEDURE — 25000003 PHARM REV CODE 250: Performed by: INTERNAL MEDICINE

## 2022-04-29 PROCEDURE — 25000003 PHARM REV CODE 250: Performed by: NURSE PRACTITIONER

## 2022-04-29 PROCEDURE — 94640 AIRWAY INHALATION TREATMENT: CPT

## 2022-04-29 PROCEDURE — 25000242 PHARM REV CODE 250 ALT 637 W/ HCPCS: Performed by: INTERNAL MEDICINE

## 2022-04-29 PROCEDURE — 99900031 HC PATIENT EDUCATION (STAT)

## 2022-04-29 PROCEDURE — 27000221 HC OXYGEN, UP TO 24 HOURS

## 2022-04-29 RX ORDER — TORSEMIDE 100 MG/1
100 TABLET ORAL DAILY
Qty: 90 TABLET | Refills: 1 | OUTPATIENT
Start: 2022-04-29 | End: 2022-08-25

## 2022-04-29 RX ORDER — ATORVASTATIN CALCIUM 20 MG/1
20 TABLET, FILM COATED ORAL DAILY
Qty: 90 TABLET | Refills: 3 | Status: SHIPPED | OUTPATIENT
Start: 2022-04-30 | End: 2023-04-30

## 2022-04-29 RX ORDER — OXCARBAZEPINE 300 MG/1
300 TABLET, FILM COATED ORAL 2 TIMES DAILY
Qty: 60 TABLET | Refills: 11 | Status: SHIPPED | OUTPATIENT
Start: 2022-04-29 | End: 2023-04-29

## 2022-04-29 RX ORDER — IPRATROPIUM BROMIDE AND ALBUTEROL SULFATE 2.5; .5 MG/3ML; MG/3ML
3 SOLUTION RESPIRATORY (INHALATION) EVERY 6 HOURS PRN
Qty: 180 ML | Refills: 5 | Status: SHIPPED | OUTPATIENT
Start: 2022-04-29 | End: 2022-05-29

## 2022-04-29 RX ADMIN — GUAIFENESIN 1200 MG: 600 TABLET, EXTENDED RELEASE ORAL at 08:04

## 2022-04-29 RX ADMIN — OXCARBAZEPINE 300 MG: 300 TABLET, FILM COATED ORAL at 08:04

## 2022-04-29 RX ADMIN — IPRATROPIUM BROMIDE AND ALBUTEROL SULFATE 3 ML: 2.5; .5 SOLUTION RESPIRATORY (INHALATION) at 07:04

## 2022-04-29 RX ADMIN — TORSEMIDE 100 MG: 20 TABLET ORAL at 08:04

## 2022-04-29 RX ADMIN — ASPIRIN 81 MG: 81 TABLET, COATED ORAL at 08:04

## 2022-04-29 RX ADMIN — EZETIMIBE 10 MG: 10 TABLET ORAL at 08:04

## 2022-04-29 RX ADMIN — ATORVASTATIN CALCIUM 20 MG: 20 TABLET, FILM COATED ORAL at 08:04

## 2022-04-29 RX ADMIN — POTASSIUM CHLORIDE 40 MEQ: 20 TABLET, EXTENDED RELEASE ORAL at 08:04

## 2022-04-29 RX ADMIN — METOPROLOL TARTRATE 25 MG: 25 TABLET, FILM COATED ORAL at 08:04

## 2022-04-29 RX ADMIN — DOCUSATE SODIUM 100 MG: 100 CAPSULE, LIQUID FILLED ORAL at 08:04

## 2022-04-29 NOTE — PT/OT/SLP DISCHARGE
Occupational Therapy Discharge Summary    Jamey Lei  MRN: [id number]   Principal Problem: Encephalopathy, toxic      Patient Discharged from inpatient Occupational Therapy on 04/29/22.  Please refer to prior OT note dated 04/28/22 for functional status.    Assessment:     Pt was cooperative and motivated with verbal encouragement while mostly pleasantly confused during skilled OT treatment sessions including ADL retraining, functional transfer retraining, assistive device training, therapeutic activity, therapeutic exercise, and patient / family education including discharge planning and home exercise program allowing him to progress with functional goals in which he achieved /9 STG's and 7/9 LTG's. Pt now supervision to setup assistance with ADL's including functional mobility with extra time, use of assistive devices, and verbal cueing for safety awareness and technique. However, patient now independent with eating and grooming.    Objective:     GOALS:   Short Term Goals to be met by: 04/25/22      Patient will increase functional independence with ADLs by performing:     Feeding with Set-up Assistance. MET  UE Dressing with Minimal Assistance. MET  LE Dressing with Moderate Assistance. MET  Grooming while standing at sink with Supervision. MET  Toileting from toilet with Moderate Assistance for hygiene and clothing management. MET  Bathing from  shower chair/bench with Moderate Assistance. MET  Step transfer with Stand-by Assistance. MET  Toilet transfer to toilet with Stand-by Assistance. MET  Increased functional strength to 4-/5 for bilateral UE's. MET     Long Term Goals to be met by: 05/02/22      Patient will increase functional independence with ADLs by performing:     Feeding with [place]. MET  UE Dressing with Set-up Assistance. MET  LE Dressing with Minimal Assistance. MET  Grooming while seated at sink with Modified [place]. MET  Toileting from toilet with Supervision for hygiene and  clothing management. MET  Bathing from  shower chair/bench with Stand-by Assistance. MET  Step transfer with Modified [place]. NOT MET - Supervision  Toilet transfer to toilet with Modified [place]. NOT MET - Supervision  Increased functional strength to 4 to 4+/5 for bilateral UE's. MET    Reasons for Discontinuation of Therapy Services  Satisfactory goal achievement.      Plan:     Patient Discharged to: Home with Home Health Service    4/29/2022

## 2022-04-29 NOTE — NURSING
Rachid called to see if patient should be on Eliquis. I contacted Dr. Domínguez and he stated to call Rachid and  tell him that Mr. Concepcion needs to start taking his Eliquis 5 mg BID.

## 2022-04-29 NOTE — SUBJECTIVE & OBJECTIVE
Interval History: Pt seen and evaluated    Review of Systems   Constitutional: Negative.    HENT: Negative.     Eyes: Negative.    Respiratory: Negative.     Cardiovascular: Negative.    Gastrointestinal: Negative.    Endocrine: Negative.    Genitourinary: Negative.    Musculoskeletal:  Positive for arthralgias and myalgias.   Neurological:  Positive for seizures and weakness. Negative for dizziness, light-headedness, numbness and headaches.   Psychiatric/Behavioral:  Positive for confusion. Negative for hallucinations and sleep disturbance. The patient is not nervous/anxious.    Objective:     Vital Signs (Most Recent):  Temp: 97.8 °F (36.6 °C) (04/28/22 2112)  Pulse: 82 (04/28/22 2000)  Resp: 18 (04/28/22 2000)  BP: (!) 154/72 (04/28/22 2112)  SpO2: 99 % (04/28/22 2000) Vital Signs (24h Range):  Temp:  [97.6 °F (36.4 °C)-97.8 °F (36.6 °C)] 97.8 °F (36.6 °C)  Pulse:  [82-97] 82  Resp:  [18-20] 18  SpO2:  [95 %-100 %] 99 %  BP: (146-154)/(72) 154/72     Weight: 101.2 kg (223 lb 3.2 oz)  Body mass index is 31.13 kg/m².    Intake/Output Summary (Last 24 hours) at 4/28/2022 2146  Last data filed at 4/28/2022 1734  Gross per 24 hour   Intake 2040 ml   Output --   Net 2040 ml        Physical Exam  Constitutional:       General: He is awake. He is not in acute distress.     Appearance: He is obese. He is ill-appearing. He is not toxic-appearing or diaphoretic.   HENT:      Head: Normocephalic and atraumatic.      Nose: Nose normal. No congestion or rhinorrhea.      Mouth/Throat:      Mouth: Mucous membranes are moist.      Pharynx: Oropharynx is clear. No oropharyngeal exudate or posterior oropharyngeal erythema.   Eyes:      General: No scleral icterus.        Right eye: No discharge.         Left eye: No discharge.      Extraocular Movements: Extraocular movements intact.      Pupils: Pupils are equal, round, and reactive to light.   Neck:      Thyroid: No thyroid mass or thyromegaly.      Vascular: No carotid bruit.       Meningeal: Brudzinski's sign and Kernig's sign absent.   Cardiovascular:      Rate and Rhythm: Normal rate and regular rhythm.      Chest Wall: PMI is not displaced. No thrill.      Pulses: Normal pulses.      Heart sounds: Normal heart sounds. No murmur heard.    No friction rub. No gallop.   Pulmonary:      Effort: Pulmonary effort is normal. No tachypnea, accessory muscle usage, prolonged expiration or respiratory distress.      Breath sounds: Normal breath sounds. No stridor or decreased air movement. No wheezing, rhonchi or rales.   Chest:      Chest wall: No tenderness.   Abdominal:      General: Bowel sounds are normal. There is no distension.      Palpations: Abdomen is soft. There is no hepatomegaly, splenomegaly or mass.      Tenderness: There is no abdominal tenderness. There is no right CVA tenderness, left CVA tenderness, guarding or rebound.      Hernia: No hernia is present.   Musculoskeletal:         General: No swelling, tenderness, deformity or signs of injury. Normal range of motion.      Cervical back: Normal range of motion and neck supple. No rigidity. No muscular tenderness.      Right lower leg: No edema.      Left lower leg: No edema.   Lymphadenopathy:      Cervical: No cervical adenopathy.   Skin:     General: Skin is warm.      Capillary Refill: Capillary refill takes less than 2 seconds.      Coloration: Skin is not cyanotic, jaundiced or pale.      Findings: No erythema, lesion, petechiae or rash.   Neurological:      Mental Status: He is alert. He is disoriented.      Cranial Nerves: No cranial nerve deficit, dysarthria or facial asymmetry.      Motor: Weakness present. No tremor.      Gait: Gait abnormal.   Psychiatric:         Mood and Affect: Mood is depressed. Mood is not anxious. Affect is flat and tearful.         Speech: Speech is not rapid and pressured or slurred.         Behavior: Behavior is withdrawn. Behavior is not agitated, aggressive or combative.         Thought  Content: Thought content is not paranoid or delusional.         Cognition and Memory: Memory is impaired.       Significant Labs: No pertinent labs within the past 24 hours.      Significant Imaging: No pertinent imaging within the past 24 hours.

## 2022-04-29 NOTE — PT/OT/SLP EVAL
Speech Language Pathology Treatment          Jamey Lei   MRN: [id number]     Diet recommendations: Solid Diet Level: Regular  Liquid Diet Level: Thin     SLP Treatment Date: 04/26/22  Speech Start Time: 1430     Speech Stop Time: 1500     Speech Total (min): 30 min       TREATMENT BILLABLE MINUTES:  Speech Therapy Individual x30 minutes     General Precautions: Standard, fall, respiratory, hearing impaired          Subjective:  Patient with increased attention and engagement during therapy session     Objective:   Patient found sitting up in wheelchair in dining area, now on room air.  Patient completed 2 step commands with 80% acc and min cues, improved from previous sessions, responded to phrase completions with 80% acc and cue x1, Word associations with 70% acc single response, oriented to environment with 70% acc, short term memory improved with choice of 2 provided also at 70% acc with increased responsiveness.  Problem solving situations related to safety completed with 40% acc. Pt continues with decreased awareness of deficits, however, his attention to task is progressing, increasing performance overall.  Patient met 2 short term goals including following 2 step commands and completing recall tasks when given choice of 2 provided.       Assessment:  Jamey Lei is a 81 y.o. male with a SLP diagnosis of Aphasia and Cognitive-Linguistic Impairment. He present with word finding difficulty, perseverations and semantic and phonemic paraphasias.         Discharge recommendations: Discharge Facility/Level of Care Needs: home (care as needed)     Goals:   Multidisciplinary Problems     SLP Goals        Problem: SLP    Goal Priority Disciplines Outcome   SLP Goal     SLP    Description: Goals for Speech Evaluation    Receptive Language:  1.  Pt will follow 2 step commands with 75%  accuracy given 25% cues    Expressive Language:  1. Pt will participate in phrase completion tasks with 85% acc given 10% cues   2.  Pt will complete word association tasks with 85% acc given choice of 2 provided      Cognition:  1. Pt will be oriented to environment and situation with 75% acc given minimal prompting   2. Pt will complete short term memory / recall tasks with 70% accuracy given choice of 2 provided   3. Pt will complete functional problem solving tasks with 70% accuracy given choice of 2 provided  4. Pt will demonstrate safety during routine ADLs with 80% acc and min cues provided                       Plan:   Patient to be seen Therapy Frequency: 3 x/week   Planned Interventions: Cognitive-Linguistic Therapy  Plan of Care Expires: 05/18/22  Plan of Care reviewed with: patient  SLP Follow-up?: Yes  SLP - Next Visit Date: 04/27/22 4/28/2022

## 2022-04-29 NOTE — DISCHARGE SUMMARY
[facility]  Discharge Summary      Patient Name: Jamey Lei  MRN: [id number]  Patient Class: IP- Rehab  Admission Date: 4/18/2022  Hospital Length of Stay: 11 days  Discharge Date and Time: 4/29/2022     Attending Physician: Navi Domínguez III, MD   Discharging Provider: Navi Domínguez III, MD  Primary Care Provider: Navi Domínguez III, MD      HPI:   Patient is a 81-year-old male with a past medical history significant for right-sided lung mass who presented to the emergency department with decreased appetite for last few days.  The patient had reportedly stopped taking his medications and was agitated.  Patient had a witnessed seizure with no head trauma.  Patient has a history of seizure disorder meningioma and chronic kidney disease.  The patient about 2-3 months ago had a admission for pneumonia and a lung mass and was ultimately transferred to an outside facility and had lung biopsies.  They were negative for malignancy the patient had an inpatient rehab stay and had a significant improvement in his level of function.  The patient over the last few months has had a progressive decline when we now have this repeated admission.  The patient was started on antibiotics after admission his oral medications re-initiated and he was placed on anti epileptics.  The patient has improved and recovered and the patient and family are wanting therapy to get him back to his previous level of function and hopefully return home.  The patient is having intermittent confusion he has progressively improved since his seizure and getting back on medicines.  Overall the patient seems slightly depressed over his current diagnoses and deterioration of condition will continually trying to motivate and encourage the patient.        * No surgery found *      Hospital Course:   4/20 SD: Pt in wheelchair awaiting therapy.     4/21 SD: Pt participating in physical therapy. Bladder training complete,  espinal catheter discontinued    4/22 SD: Pt sitting up in wheelchair. Positive attitude.    4/24/2022 FM:  Patient's tolerating therapy and slowly making some functional gains.  Less rhonchi and rattling in the chest today.  Overall encouraged.    4/25 SD: Pt participating in physical therapy. Denies any c/o.    4/27 SD: Pt sitting in wheelchair ready for physical therapy. Positive attitude. Denies any c/o.    4/28/2022 FM:  Patient seen and examined while working with therapy in the gym.  Patient's strength has improved.  Patient's overall attitude and demeanor has improved.  Patient has no lower extremity edema and we have encouraged him.  Patient is discharging tomorrow.    Discharge Note:  Patient was admitted to our inpatient rehab unit after an acute care stay for toxic encephalopathy acute on chronic seizures and a postobstructive pneumonia.  The patient was significantly debilitated edematous and throughout this inpatient rehab stay on lower doses of his and but anti epileptics the patient had a significant improvement.  He had improved strength stamina endurance mood and overall motivation to continue to work at therapy.  The patient has had much less shortness of breath and dyspnea and has lost significant amounts of edema.  We have encouraged the patient to keep it up and at follow-up we will refer him back to general surgery for further biopsy and testing of his lung mass.       Goals of Care Treatment Preferences:  Code Status: DNR      Consults:   Consults (From admission, onward)        Status Ordering Provider     Inpatient consult to Podiatry  Once        Provider:  Trent Hahn DPM    Acknowledged ROMULO FLORENTINO III     IP consult to case management  Once        Provider:  (Not yet assigned)    Acknowledged ROMULO FLORENTINO III     Inpatient consult to Social Work/Case Management  Once        Provider:  (Not yet assigned)    Acknowledged ROMULO FLORENTINO III          * Encephalopathy,  toxic  Continue outpatient/home health rehab efforts    Contusion of left great toe with damage to nail        Neuropathy involving both lower extremities  Continue current medication      CKD (chronic kidney disease), stage IV  Patient's creatinine has remained stable      Pneumonia  IV antibiotic therapy complete, s/s improved, cont. oxygen therapy      Lung mass  Continuous oxygen      Seizure disorder  No seizure activity while on unit      Meningioma        Hyperparathyroidism due to renal insufficiency        Chronic osteoarthritis  Continue outpatient therapy and pain meds      Chronic kidney disease due to hypertension  Stable        Final Active Diagnoses:    Diagnosis Date Noted POA    PRINCIPAL PROBLEM:  Encephalopathy, toxic [G92.9] 04/12/2022 Yes    Contusion of left great toe with damage to nail [S90.212A] 04/27/2022 No    Chronic kidney disease due to hypertension [I12.9] 04/11/2022 Yes    Chronic osteoarthritis [M19.90] 04/11/2022 Yes    CKD (chronic kidney disease), stage IV [N18.4] 04/11/2022 Yes    Hyperparathyroidism due to renal insufficiency [N25.81] 04/11/2022 Yes    Lung mass [R91.8] 04/11/2022 Yes    Meningioma [D32.9] 04/11/2022 Yes    Neuropathy involving both lower extremities [G57.93] 04/11/2022 Yes    Pneumonia [J18.9] 04/11/2022 Yes    Seizure disorder [G40.909] 04/11/2022 Yes      Problems Resolved During this Admission:       Discharged Condition: fair    Disposition: Home-Health Care [facility]    Follow Up:   Contact information for follow-up providers     Navi Domínguez III, MD Follow up on 5/9/2022.    Specialty: Internal Medicine  Why: Follow up with Dr. Domínguez on 5/09/2022 at 2:20  Contact information:  [address] [place] [id number]  [phone]                   Contact information for after-discharge care     Durable Medical Equipment     [facility] .    Service: Durable Medical Equipment  Contact information:  [email or web address] [facility]  "[id number]  [phone]                           Patient Instructions:      HOSPITAL BED FOR HOME USE     Order Specific Question Answer Comments   Type: Semi-electric    Length of need (1-99 months): 99    Height: 5' 11" (1.803 m)    Weight: 101.2 kg (223 lb 3.2 oz)    Please check all that apply: Patient requires positioning of the body in ways not feasible in an ordinary bed due to a medical condition which is expected to last at least one month.    Please check all that apply: Patient requires frequent changes in body position and/or has an immediate need for a change in body position.    Please check all that apply: Patient requires a bed height different than a fixed height hospital bed to permit transfers to chair, wheelchair, or standing.      Diet renal     Activity as tolerated       Significant Diagnostic Studies: Noted.    Pending Diagnostic Studies:     None         Medications:  Reconciled Home Medications:      Medication List      START taking these medications    albuterol-ipratropium 2.5 mg-0.5 mg/3 mL nebulizer solution  Commonly known as: DUO-NEB  Take 3 mLs by nebulization every 6 (six) hours as needed for Wheezing. Rescue     atorvastatin 20 MG tablet  Commonly known as: LIPITOR  Take 1 tablet (20 mg total) by mouth once daily.  Start taking on: April 30, 2022  Replaces: lovastatin 10 MG tablet        CHANGE how you take these medications    OXcarbazepine 300 MG Tab  Commonly known as: TRILEPTAL  Take 1 tablet (300 mg total) by mouth 2 (two) times daily.  What changed:   · medication strength  · how much to take     torsemide 100 MG Tab  Commonly known as: DEMADEX  Take 1 tablet (100 mg total) by mouth once daily.  What changed:   · medication strength  · how much to take  · when to take this        CONTINUE taking these medications    aspirin 81 MG EC tablet  Commonly known as: ECOTRIN  Take 81 mg by mouth once daily.     docusate sodium 100 MG capsule  Commonly known as: COLACE  Take 100 mg by " mouth 2 (two) times daily.     ezetimibe 10 mg tablet  Commonly known as: ZETIA  Take 10 mg by mouth once daily.     guaiFENesin 600 mg 12 hr tablet  Commonly known as: MUCINEX  Take 1,200 mg by mouth 2 (two) times daily.     HYDROcodone-acetaminophen 5-325 mg per tablet  Commonly known as: NORCO  Take 1 tablet by mouth every 6 (six) hours as needed for Pain.     metoprolol tartrate 25 MG tablet  Commonly known as: LOPRESSOR  Take 25 mg by mouth 2 (two) times daily.     miconazole NITRATE 2 % 2 % top powder  Commonly known as: MICOTIN  Apply topically as needed for Itching.     multivitamin capsule  Take 1 capsule by mouth once daily.     potassium chloride 10 MEQ Cpsr  Commonly known as: MICRO-K  Take 20 mEq by mouth once. Every other day     vitamin D 1000 units Tab  Commonly known as: VITAMIN D3  Take 1,000 Units by mouth once daily.        STOP taking these medications    calcium carbonate 600 mg calcium (1,500 mg) Tab  Commonly known as: OS-DAVID     lovastatin 10 MG tablet  Commonly known as: MEVACOR  Replaced by: atorvastatin 20 MG tablet            Indwelling Lines/Drains at time of discharge:   Lines/Drains/Airways     None                 Time spent on the discharge of patient: 45 minutes         Navi Domínguez III, MD  Department of Hospital Medicine  [facility])

## 2022-04-29 NOTE — PLAN OF CARE
Problem: Adult Inpatient Plan of Care  Goal: Plan of Care Review  Outcome: Ongoing, Progressing  Goal: Patient-Specific Goal (Individualized)  Outcome: Ongoing, Progressing  Goal: Absence of Hospital-Acquired Illness or Injury  Outcome: Ongoing, Progressing  Goal: Optimal Comfort and Wellbeing  Outcome: Ongoing, Progressing  Goal: Readiness for Transition of Care  Outcome: Ongoing, Progressing     Problem: Fluid Imbalance (Pneumonia)  Goal: Fluid Balance  Outcome: Ongoing, Progressing     Problem: Infection (Pneumonia)  Goal: Resolution of Infection Signs and Symptoms  Outcome: Ongoing, Progressing     Problem: Respiratory Compromise (Pneumonia)  Goal: Effective Oxygenation and Ventilation  Outcome: Ongoing, Progressing     Problem: Infection  Goal: Absence of Infection Signs and Symptoms  Outcome: Ongoing, Progressing     Problem: Impaired Wound Healing  Goal: Optimal Wound Healing  Outcome: Ongoing, Progressing     Problem: Skin Injury Risk Increased  Goal: Skin Health and Integrity  Outcome: Ongoing, Progressing     Problem: Fall Injury Risk  Goal: Absence of Fall and Fall-Related Injury  Outcome: Ongoing, Progressing     Problem: Pain Acute  Goal: Acceptable Pain Control and Functional Ability  Outcome: Ongoing, Progressing     Problem: Mobility Impairment  Goal: Optimal Mobility  Outcome: Ongoing, Progressing     Problem: Gas Exchange Impaired  Goal: Optimal Gas Exchange  Outcome: Ongoing, Progressing      "Emily (staff at [facility]). Emily Rose HAMMER RN    "

## 2022-04-29 NOTE — PT/OT/SLP DISCHARGE
Physical Therapy Discharge Summary    Name: Jamey Lei  MRN: [id number]   Principal Problem: Encephalopathy, toxic     Patient Discharged from acute Physical Therapy on 04-29-22.  Please refer to prior PT noted date on 04-28-22 for functional status.     Assessment:     Pt was fully participative with all therapy sessions and was seen for gait,transfer ,bed mobility, and strength training    Objective:     GOALS:    STGS  1. Pt will be sba with all bed mobility-met  2. Pt will be sba with transfers-met  3. Pt will ambulate with a rw 150ft sba-not met  4. Pt will go up and down 12 steps with mahamed-not met  5. Pt will perform wc mobility 150ft withsba-met  LTGs  1. Pt will be ind with bed mobility-met  2. Pt will be ind with all transfers- onto met  3. Pt will be ind with gait with a rw 150ft -not met  4. Pt will be sba with going up and down steps with use of rails-not met  5. Pt ind with wc mobility 150ft-met       Reasons for Discontinuation of Therapy Services  Satisfactory goal achievement.      Plan:     Patient Discharged to: Home with Home Health Service.      4/29/2022

## 2022-04-29 NOTE — PT/OT/SLP PROGRESS
Physical Therapy         Treatment        Jamey Lei   MRN: [id number]     PT Received On: 04/28/22  PT Start Time: 1345     PT Stop Time: 1515    PT Total Time (min): 90 min       Billable Minutes:  Gait [id number], Therapeutic Activity 30 and Therapeutic Exercise 30  Total Minutes: 90    Treatment Type: Treatment  PT/PTA: PT             General Precautions: Standard,    Orthopedic Precautions:     Braces:           Subjective:  Communicated with nurse prior to session.         Objective:  Patient found nurse, with      Functional Mobility:  Bed Mobility:   Supine to sit: mod ind   Sit to supine: Modified Independent   Rolling: Modified Independent   Scooting: Modified Independent    Balance:   Static Sit: GOOD: Takes MODERATE challenges from all directions  Dynamic Sit:  GOOD: Maintains balance through MODERATE excursions of active trunk movement  Static Stand: FAIR+: Takes MINIMAL challenges from all directions  Dynamic stand: FAIR+: Needs CLOSE SUPERVISION during gait and is able to right self with minor LOB    Transfer Training:  Sit to stand:Stand-by Assistance with Rolling Walker    Bed <> Chair:  Step Transfer with Stand-by Assistance with Rolling Walker      Wheelchair Training:  sba with wc mobility 150ft    Gait Training:  Ambulates 150ft with several standing rest breaks with sba    Stair Training:  Pt went up and down 8 steps with rail with sba      Additional Treatment:  Pt rode nustep x 15 min at level3. Pt performed sitting AROM exs BLEs 3 sets 15 reps  Activity Tolerance:  Patient tolerated treatment well    Patient left supine with call button in reach.    Assessment:  Jamey Lei is a 81 y.o. male with a medical diagnosis of Encephalopathy, toxic. He presents with sba for most activities     Rehab potential is good.    Activity tolerance: Good    Discharge recommendations:       Equipment recommendations:       GOALS:   Multidisciplinary Problems     Physical Therapy Goals     Not on file                 PLAN:    Patient to be seen 5 x/week  to address the above listed problems via therapeutic activities, gait training, wheelchair management/training, therapeutic exercises, therapeutic groups, neuromuscular re-education  Plan of Care expires: 05/02/22  Plan of Care reviewed with: patient         4/29/2022

## 2022-04-29 NOTE — PROGRESS NOTES
[facility] Medicine  Progress Note    Patient Name: Jamey Lei  MRN: [id number]  Patient Class: IP- Rehab   Admission Date: 4/18/2022  Length of Stay: 10 days  Attending Physician: Navi Domínguez III, MD  Primary Care Provider: Navi Domínguez III, MD        Subjective:     Principal Problem:Encephalopathy, toxic        HPI:  Patient is a 81-year-old male with a past medical history significant for right-sided lung mass who presented to the emergency department with decreased appetite for last few days.  The patient had reportedly stopped taking his medications and was agitated.  Patient had a witnessed seizure with no head trauma.  Patient has a history of seizure disorder meningioma and chronic kidney disease.  The patient about 2-3 months ago had a admission for pneumonia and a lung mass and was ultimately transferred to an outside facility and had lung biopsies.  They were negative for malignancy the patient had an inpatient rehab stay and had a significant improvement in his level of function.  The patient over the last few months has had a progressive decline when we now have this repeated admission.  The patient was started on antibiotics after admission his oral medications re-initiated and he was placed on anti epileptics.  The patient has improved and recovered and the patient and family are wanting therapy to get him back to his previous level of function and hopefully return home.  The patient is having intermittent confusion he has progressively improved since his seizure and getting back on medicines.  Overall the patient seems slightly depressed over his current diagnoses and deterioration of condition will continually trying to motivate and encourage the patient.        Overview/Hospital Course:  4/20 SD: Pt in wheelchair awaiting therapy.     4/21 SD: Pt participating in physical therapy. Bladder training complete, espinal catheter discontinued    4/22 SD: Pt sitting up in  wheelchair. Positive attitude.    4/24/2022 FM:  Patient's tolerating therapy and slowly making some functional gains.  Less rhonchi and rattling in the chest today.  Overall encouraged.    4/25 SD: Pt participating in physical therapy. Denies any c/o.    4/27 SD: Pt sitting in wheelchair ready for physical therapy. Positive attitude. Denies any c/o.    4/28/2022 FM:  Patient seen and examined while working with therapy in the gym.  Patient's strength has improved.  Patient's overall attitude and demeanor has improved.  Patient has no lower extremity edema and we have encouraged him.  Patient is discharging tomorrow.      Interval History: Pt seen and evaluated    Review of Systems   Constitutional: Negative.    HENT: Negative.     Eyes: Negative.    Respiratory: Negative.     Cardiovascular: Negative.    Gastrointestinal: Negative.    Endocrine: Negative.    Genitourinary: Negative.    Musculoskeletal:  Positive for arthralgias and myalgias.   Neurological:  Positive for seizures and weakness. Negative for dizziness, light-headedness, numbness and headaches.   Psychiatric/Behavioral:  Positive for confusion. Negative for hallucinations and sleep disturbance. The patient is not nervous/anxious.    Objective:     Vital Signs (Most Recent):  Temp: 97.8 °F (36.6 °C) (04/28/22 2112)  Pulse: 82 (04/28/22 2000)  Resp: 18 (04/28/22 2000)  BP: (!) 154/72 (04/28/22 2112)  SpO2: 99 % (04/28/22 2000) Vital Signs (24h Range):  Temp:  [97.6 °F (36.4 °C)-97.8 °F (36.6 °C)] 97.8 °F (36.6 °C)  Pulse:  [82-97] 82  Resp:  [18-20] 18  SpO2:  [95 %-100 %] 99 %  BP: (146-154)/(72) 154/72     Weight: 101.2 kg (223 lb 3.2 oz)  Body mass index is 31.13 kg/m².    Intake/Output Summary (Last 24 hours) at 4/28/2022 2146  Last data filed at 4/28/2022 1734  Gross per 24 hour   Intake 2040 ml   Output --   Net 2040 ml        Physical Exam  Constitutional:       General: He is awake. He is not in acute distress.     Appearance: He is obese. He is  ill-appearing. He is not toxic-appearing or diaphoretic.   HENT:      Head: Normocephalic and atraumatic.      Nose: Nose normal. No congestion or rhinorrhea.      Mouth/Throat:      Mouth: Mucous membranes are moist.      Pharynx: Oropharynx is clear. No oropharyngeal exudate or posterior oropharyngeal erythema.   Eyes:      General: No scleral icterus.        Right eye: No discharge.         Left eye: No discharge.      Extraocular Movements: Extraocular movements intact.      Pupils: Pupils are equal, round, and reactive to light.   Neck:      Thyroid: No thyroid mass or thyromegaly.      Vascular: No carotid bruit.      Meningeal: Brudzinski's sign and Kernig's sign absent.   Cardiovascular:      Rate and Rhythm: Normal rate and regular rhythm.      Chest Wall: PMI is not displaced. No thrill.      Pulses: Normal pulses.      Heart sounds: Normal heart sounds. No murmur heard.    No friction rub. No gallop.   Pulmonary:      Effort: Pulmonary effort is normal. No tachypnea, accessory muscle usage, prolonged expiration or respiratory distress.      Breath sounds: Normal breath sounds. No stridor or decreased air movement. No wheezing, rhonchi or rales.   Chest:      Chest wall: No tenderness.   Abdominal:      General: Bowel sounds are normal. There is no distension.      Palpations: Abdomen is soft. There is no hepatomegaly, splenomegaly or mass.      Tenderness: There is no abdominal tenderness. There is no right CVA tenderness, left CVA tenderness, guarding or rebound.      Hernia: No hernia is present.   Musculoskeletal:         General: No swelling, tenderness, deformity or signs of injury. Normal range of motion.      Cervical back: Normal range of motion and neck supple. No rigidity. No muscular tenderness.      Right lower leg: No edema.      Left lower leg: No edema.   Lymphadenopathy:      Cervical: No cervical adenopathy.   Skin:     General: Skin is warm.      Capillary Refill: Capillary refill takes  less than 2 seconds.      Coloration: Skin is not cyanotic, jaundiced or pale.      Findings: No erythema, lesion, petechiae or rash.   Neurological:      Mental Status: He is alert. He is disoriented.      Cranial Nerves: No cranial nerve deficit, dysarthria or facial asymmetry.      Motor: Weakness present. No tremor.      Gait: Gait abnormal.   Psychiatric:         Mood and Affect: Mood is depressed. Mood is not anxious. Affect is flat and tearful.         Speech: Speech is not rapid and pressured or slurred.         Behavior: Behavior is withdrawn. Behavior is not agitated, aggressive or combative.         Thought Content: Thought content is not paranoid or delusional.         Cognition and Memory: Memory is impaired.       Significant Labs: No pertinent labs within the past 24 hours.      Significant Imaging: No pertinent imaging within the past 24 hours.      Assessment/Plan:      * Encephalopathy, toxic  Inpatient rehab efforts    Contusion of left great toe with damage to nail        Neuropathy involving both lower extremities  Continue current medication      CKD (chronic kidney disease), stage IV        Pneumonia  IV antibiotic therapy complete, s/s improved, cont. oxygen therapy      Lung mass  Continuous oxygen      Seizure disorder  No seizure activity      Meningioma        Hyperparathyroidism due to renal insufficiency        Chronic osteoarthritis        Chronic kidney disease due to hypertension  Stable        VTE Risk Mitigation (From admission, onward)         Ordered     enoxaparin injection 40 mg  Daily         04/18/22 1529     IP VTE HIGH RISK PATIENT  Once         04/18/22 1529     Place sequential compression device  Until discontinued         04/18/22 1529                Discharge Planning   GLORY:      Code Status: DNR   Is the patient medically ready for discharge?:     Reason for patient still in hospital (select all that apply): Patient trending condition  Discharge Plan A: Assisted  Malachi Domínguez III, MD  Department of Hospital Medicine   [facility])

## 2022-06-09 ENCOUNTER — HOSPITAL ENCOUNTER (EMERGENCY)
Facility: HOSPITAL | Age: 82
Discharge: HOME | End: 2022-06-09
Attending: EMERGENCY MEDICINE
Payer: MEDICARE

## 2022-06-09 VITALS
BODY MASS INDEX: 33.21 KG/M2 | OXYGEN SATURATION: 97 % | WEIGHT: 232 LBS | HEART RATE: 73 BPM | HEIGHT: 70 IN | TEMPERATURE: 98 F | RESPIRATION RATE: 22 BRPM | SYSTOLIC BLOOD PRESSURE: 166 MMHG | DIASTOLIC BLOOD PRESSURE: 79 MMHG

## 2022-06-09 DIAGNOSIS — R04.2 HEMOPTYSIS: Primary | ICD-10-CM

## 2022-06-09 DIAGNOSIS — R05.9 COUGH: ICD-10-CM

## 2022-06-09 DIAGNOSIS — R91.8 RIGHT LOWER LOBE LUNG MASS: ICD-10-CM

## 2022-06-09 DIAGNOSIS — J90 PLEURAL EFFUSION: ICD-10-CM

## 2022-06-09 PROCEDURE — 99900035 HC TECH TIME PER 15 MIN (STAT)

## 2022-06-09 PROCEDURE — 25000242 PHARM REV CODE 250 ALT 637 W/ HCPCS: Performed by: EMERGENCY MEDICINE

## 2022-06-09 PROCEDURE — 99900031 HC PATIENT EDUCATION (STAT)

## 2022-06-09 PROCEDURE — 99285 EMERGENCY DEPT VISIT HI MDM: CPT | Mod: 25

## 2022-06-09 PROCEDURE — 94761 N-INVAS EAR/PLS OXIMETRY MLT: CPT

## 2022-06-09 PROCEDURE — 94640 AIRWAY INHALATION TREATMENT: CPT | Mod: XB

## 2022-06-09 RX ORDER — ALBUTEROL SULFATE 90 UG/1
1-2 AEROSOL, METERED RESPIRATORY (INHALATION) EVERY 6 HOURS PRN
Qty: 8 G | Refills: 1 | Status: SHIPPED | OUTPATIENT
Start: 2022-06-09

## 2022-06-09 RX ORDER — IPRATROPIUM BROMIDE AND ALBUTEROL SULFATE 2.5; .5 MG/3ML; MG/3ML
3 SOLUTION RESPIRATORY (INHALATION)
Status: COMPLETED | OUTPATIENT
Start: 2022-06-09 | End: 2022-06-09

## 2022-06-09 RX ORDER — PREDNISONE 50 MG/1
50 TABLET ORAL DAILY
Qty: 5 TABLET | Refills: 0 | Status: SHIPPED | OUTPATIENT
Start: 2022-06-09 | End: 2022-06-14 | Stop reason: ALTCHOICE

## 2022-06-09 RX ADMIN — IPRATROPIUM BROMIDE AND ALBUTEROL SULFATE 3 ML: 2.5; .5 SOLUTION RESPIRATORY (INHALATION) at 02:06

## 2022-06-09 NOTE — ED PROVIDER NOTES
EMERGENCY DEPARTMENT HISTORY AND PHYSICAL EXAM          Date: 6/9/2022   Patient Name: Jamey Lei       History of Presenting Illness           Chief Complaint   Patient presents with    Epistaxis     Pt stated that for the past couple days he has been experiencing frequent nosebleeds.          History Provided By: Patient and Family Member       Jamey Lei is a 81 y.o. male with PMHX of  CHF, COPD, AFib on Eliquis who presents to the emergency department C/O hemoptysis.    Patient reports chronic cough for months.  Attributes this to episode of pneumonia that was treated earlier this year.  Patient reports he has not tried anything for this cough.  Reports blood specks sputum on his cough and abdominal pain from coughing.  Symptoms have been ongoing for several weeks and unchanged despite using cough syrup.      He reports seen a pulmonologist in [facility].    PCP: Navi Domínguez III, MD        No current facility-administered medications for this encounter.     Current Outpatient Medications   Medication Sig Dispense Refill    albuterol (PROVENTIL/VENTOLIN HFA) 90 mcg/actuation inhaler Inhale 1-2 puffs into the lungs every 6 (six) hours as needed for Wheezing or Shortness of Breath. Rescue 8 g 1    apixaban (ELIQUIS) 5 mg Tab Take 5 mg by mouth 2 (two) times daily.      aspirin (ECOTRIN) 81 MG EC tablet Take 81 mg by mouth once daily.      atorvastatin (LIPITOR) 20 MG tablet Take 20 mg by mouth once daily.      ezetimibe (ZETIA) 10 mg tablet Take 10 mg by mouth once daily.      FLUoxetine 20 MG capsule Take 20 mg by mouth every evening.      guaiFENesin (MUCINEX) 600 mg 12 hr tablet Take 1,200 mg by mouth 2 (two) times daily.      ibuprofen (MOTRIN ORAL) Take by mouth.      metoprolol tartrate (LOPRESSOR) 25 MG tablet Take 1 tablet (25 mg total) by mouth 2 (two) times daily. 180 tablet 3    miconazole NITRATE 2 % (REMEDY PHYTOPLEX ANTIFUNGAL) 2 % top powder Apply topically 2 (two) times a  day.      multivitamin (THERAGRAN) per tablet Take 1 tablet by mouth once daily.      OXcarbazepine (TRILEPTAL) 300 MG Tab Take 1 tablet (300 mg total) by mouth 2 (two) times daily. 60 tablet 5    potassium chloride (MICRO-K) 10 MEQ CpSR 1 capsule.      risperiDONE (RISPERDAL) 0.5 MG Tab Take 1 tablet (0.5 mg total) by mouth nightly as needed (agitation and halluctions). 30 tablet 5    tamsulosin (FLOMAX) 0.4 mg Cap Take 0.4 mg by mouth once daily.      tiotropium-olodateroL (STIOLTO RESPIMAT) 2.5-2.5 mcg/actuation Mist Inhale 2 puffs into the lungs once daily. Controller 4 g 5    torsemide (DEMADEX) 100 MG Tab Take 100 mg by mouth once daily.      vitamin D (VITAMIN D3) 1000 units Tab Take 1,000 Units by mouth once daily.             Past History     Past Medical History:   Past Medical History:   Diagnosis Date    A-fib     Abdominal pain, epigastric     Abdominal pain, generalized     Arthritis     Asteatotic eczema     Benign essential HTN     Benign prostatic hyperplasia     Brain tumor     CAD (coronary artery disease)     Cardiovascular accident     CKD (chronic kidney disease), stage IV     Constipation     COPD (chronic obstructive pulmonary disease)     Depressed     DM (diabetes mellitus), secondary     Drug eruption     Eczema     Edema     Encounter for blood transfusion     Fever     H. pylori infection     Hearing loss     History of tobacco use     Hypercholesterolemia     Hypertension     Hypertensive renal disease, benign     Hypokalemia     Hyponatremia     Impaired fasting glucose     Lumbar pain     Lung mass     Malaise and fatigue     Malignant neoplasm of prostate     Meningioma     Mixed hyperlipidemia     Nasal bleeding     Neuropathy     Obesity, unspecified     SHAYNE (obstructive sleep apnea)     Osteoarthritis of knee     Renal disorder     Unspecified chronic bronchitis     Unspecified osteoarthritis, unspecified site         Past  Surgical History:   Past Surgical History:   Procedure Laterality Date    APPENDECTOMY      HIP SURGERY      KNEE SURGERY      LUNG BIOPSY Right 03/03/2022    benign    SHOULDER SURGERY      triple bypass          Family History:   No family history on file.     Social History:   Social History     Tobacco Use    Smoking status: Former Smoker    Smokeless tobacco: Never Used   Substance Use Topics    Alcohol use: Not Currently    Drug use: Never        Allergies:   Review of patient's allergies indicates:   Allergen Reactions    Zolpidem           Review of Systems   Review of Systems   Unable to perform ROS: Age   Constitutional: Negative for chills and fever.   HENT: Positive for hearing loss.    Respiratory: Positive for cough. Negative for shortness of breath.    Cardiovascular: Positive for leg swelling. Negative for chest pain.   Gastrointestinal: Negative for nausea and vomiting.                Physical Exam     Vitals:    06/09/22 1433 06/09/22 1439 06/09/22 1445 06/09/22 1545   BP:       Pulse: 80 79 73    Resp: 20 20 (!) 28 (!) 22   Temp:       SpO2: 99% 100% 100% 97%   Weight:       Height:          Physical Exam  Vitals and nursing note reviewed.   Constitutional:       General: He is not in acute distress.     Appearance: Normal appearance. He is well-developed and overweight. He is not ill-appearing.   HENT:      Head: Normocephalic and atraumatic.      Nose: No congestion or rhinorrhea.   Eyes:      Conjunctiva/sclera: Conjunctivae normal.      Pupils: Pupils are equal, round, and reactive to light.   Cardiovascular:      Rate and Rhythm: Normal rate. Rhythm irregular.   Pulmonary:      Effort: Pulmonary effort is normal. No respiratory distress.      Breath sounds: Wheezing present.   Abdominal:      General: There is no distension.      Palpations: Abdomen is soft.      Tenderness: There is no abdominal tenderness. There is no rebound.   Musculoskeletal:         General: No deformity or  signs of injury. Normal range of motion.      Cervical back: Normal range of motion and neck supple.      Right lower leg: Edema present.      Left lower leg: Edema present.   Skin:     General: Skin is warm and dry.      Coloration: Skin is not pale.      Findings: No lesion.   Neurological:      General: No focal deficit present.      Mental Status: He is alert and oriented to person, place, and time.      Cranial Nerves: No cranial nerve deficit.      Sensory: No sensory deficit.      Motor: No weakness.   Psychiatric:         Mood and Affect: Mood normal.         Behavior: Behavior normal.              Diagnostic Study Results      Labs -   No results found for this or any previous visit (from the past 12 hour(s)).     Radiologic Studies -    X-Ray Chest 1 View   Final Result      1. Heterogeneous opacity right lower lung zone, suspicious for pneumonia.  A follow-up chest radiograph is recommended following a course of therapy to ensure complete resolution.   2. Small left pleural effusion with associated left basilar atelectasis.         Electronically signed by: Jamey Gannon MD   Date:    06/09/2022   Time:    14:42           Medications given in the ED-   Medications   albuterol-ipratropium 2.5 mg-0.5 mg/3 mL nebulizer solution 3 mL (3 mLs Nebulization Given 6/9/22 1445)           Medical Decision Making    I am the first provider for this patient.     I reviewed the vital signs, available nursing notes, past medical history, past surgical history, family history and social history.     Vital Signs:  Reviewed the patient's vital signs.     Pulse Oximetry Analysis and Interpretation:    96% on Room Air, normal      CXR  interpretation: (Preliminary)   CXR read by Dr. Jonathan Betts at   1436  Rotated film, right lower lung opacity similar to prior study,    Records Reviewed: Old medical records.  Nursing notes.        Provider Notes (Medical Decision Making): Jamey Lei is a 81 y.o. male here with  chronic cough and hemoptysis.  Scant blood per patient and his wife.  Patient with known right lung mass.  Poor historian and he is not quite sure what he is being evaluated for by pulmonology.  Wife believes patient symptoms are secondary to his past pneumonia which may be case however suspect more likely due to this lung mass which is being noted to be suspicious for malignancy.  I have advised he follow up with his pulmonologist who is following him for these issues.  Final signs note hypertension and mild tachypnea.  History is not suspicious for acute pulmonary embolism.  Suspect etiology is lung lesion as patient reports chronic cough can not on changing symptoms for several weeks      Procedures:   Procedures      ED Course:      CT Chest 12/21  Impression:     1. Irregular spiculated mass in the right lower lobe the more focal nodular component 2.8 cm together with the adjacent linear areas 4.4 cm increased in size from 1.3 cm highly suggestive of malignancy recommend pulmonary consult and biopsy  2. Left lower lobe opacity suggesting pneumonia or atelectasis has resolved  3. A linear bandlike opacity in the right upper lobe which is new suggesting probable infiltrate or discoid atelectasis.  4. mildly prominent mediastinal nodes unchanged                Diagnosis and Disposition          CLINICAL IMPRESSION:         1. Hemoptysis    2. Cough    3. Right lower lobe lung mass    4. Pleural effusion              PLAN:   1. Discharge Home  2.      Medication List      START taking these medications    albuterol 90 mcg/actuation inhaler  Commonly known as: PROVENTIL/VENTOLIN HFA  Inhale 1-2 puffs into the lungs every 6 (six) hours as needed for Wheezing or Shortness of Breath. Rescue        ASK your doctor about these medications    apixaban 5 mg Tab  Commonly known as: ELIQUIS     aspirin 81 MG EC tablet  Commonly known as: ECOTRIN     atorvastatin 20 MG tablet  Commonly known as: LIPITOR     BELSOMRA 10 mg  Tab  Generic drug: suvorexant  Take 30 tablets by mouth every evening.  Ask about: Should I take this medication?     ezetimibe 10 mg tablet  Commonly known as: ZETIA     FLUoxetine 20 MG capsule     guaiFENesin 600 mg 12 hr tablet  Commonly known as: MUCINEX     HYDROcodone-acetaminophen 5-325 mg per tablet  Commonly known as: NORCO  Take 1 tablet by mouth every 4 (four) hours as needed for Pain.  Ask about: Should I take this medication?     metOLazone 5 MG tablet  Commonly known as: ZAROXOLYN  Take 1 tablet (5 mg total) by mouth once daily.     metoprolol tartrate 25 MG tablet  Commonly known as: LOPRESSOR  Take 1 tablet (25 mg total) by mouth 2 (two) times daily.     MOTRIN ORAL     multivitamin per tablet  Commonly known as: THERAGRAN     mupirocin 2 % ointment  Commonly known as: BACTROBAN  Apply topically 2 (two) times daily as needed.  Ask about: Should I take this medication?     potassium chloride 10 MEQ Cpsr  Commonly known as: MICRO-K     Remedy Phytoplex AntifungaL 2 % top powder  Generic drug: miconazole NITRATE 2 %     tamsulosin 0.4 mg Cap  Commonly known as: FLOMAX     torsemide 100 MG Tab  Commonly known as: DEMADEX     vitamin D 1000 units Tab  Commonly known as: VITAMIN D3           Where to Get Your Medications      These medications were sent to [facility] [address]  [facility] [address], [facility] [address]    Phone: [phone]   · albuterol 90 mcg/actuation inhaler        3. Navi Domí[place]uez [place]  [address] [place] [id number]  [phone]    Schedule an appointment as soon as possible for a visit   Primary care follow up    [facility] Emergency Department  [address] [place] [id number] [phone]  Go to   If symptoms worsen    Dr Morley    Schedule an appointment as soon as possible for a visit          _______________________________     Please note that this dictation was completed with M*Modal, the computer voice  recognition software.  Quite often unanticipated grammatical, syntax, homophones, and other interpretive errors are inadvertently transcribed by the computer software.  Please disregard these errors.  Please excuse any errors that have escaped final proofreading.             Jonathan Betts MD  06/16/22 0188

## 2022-06-17 ENCOUNTER — APPOINTMENT (OUTPATIENT)
Dept: LAB | Facility: HOSPITAL | Age: 82
End: 2022-06-17
Attending: INTERNAL MEDICINE
Payer: MEDICARE

## 2022-06-17 ENCOUNTER — OUTPATIENT CASE MANAGEMENT (OUTPATIENT)
Dept: ADMINISTRATIVE | Facility: OTHER | Age: 82
End: 2022-06-17
Payer: MEDICARE

## 2022-06-17 DIAGNOSIS — R84.5: ICD-10-CM

## 2022-06-17 DIAGNOSIS — R09.3 ABNORMAL SPUTUM: Primary | ICD-10-CM

## 2022-06-17 PROCEDURE — 87015 SPECIMEN INFECT AGNT CONCNTJ: CPT | Performed by: INTERNAL MEDICINE

## 2022-06-17 PROCEDURE — 87206 SMEAR FLUORESCENT/ACID STAI: CPT | Performed by: INTERNAL MEDICINE

## 2022-06-17 PROCEDURE — 87205 SMEAR GRAM STAIN: CPT | Performed by: INTERNAL MEDICINE

## 2022-06-17 PROCEDURE — 87116 MYCOBACTERIA CULTURE: CPT | Performed by: INTERNAL MEDICINE

## 2022-06-17 NOTE — LETTER
"June 29, 2022    Jamey Lei  [facility] [place] [address]             [facility] [address] [place] [address] Dear Mr. Concepcion "Saurabh Lang"Quique:    Welcome to [facility]s Complex Care Management Program.  It was a pleasure talking with you today.  My name is Patsy Person RN . I look forward to working with you as your Care Manager.  My goal is to help you function at the healthiest and highest level possible.  You can contact me directly at [phone].    As an [facility] patient, some of the services we may be able to provide include:      Development of an individualized care plan with a Registered Nurse    Connection with a    Connection with available resources and services     Coordinate communication among your care team members    Provide coaching and education    Help you understand your doctors treatment plan   Help you obtain information about your insurance coverage.     All services provided by [facility]s Complex Care Managers and other care team members are coordinated with and communicated to your primary care team.    As part of your enrollment, you will be receiving education materials and more information about these services in your [facility] account, by phone or through the mail.  If you do not wish to participate or receive information, please contact our office at [phone].      Sincerely,      Patys Person RN   [facility]   Out-patient RN Complex Care Manager      "

## 2022-06-17 NOTE — LETTER
June 23, 2022    Jamey Lei  [address] [place] [address] [place] [address]             [facility] [address] [place] [address] Dear . Jamey Lei:     I am a nurse  with [facility]'s Outpatient Case Management program.  The program is free and is made up of nurses and social workers who help connect patients to resources and education that can help them in managing their health.     We received a referral through the [facility] system  to offer assistance if needed. If you receive this letter and have questions or concerns, please don't hesitate to call. I will be glad to speak with you.     Sincerely,      Patsy Person RN  [facility]  TEL:  [phone]

## 2022-06-18 LAB — GRAM STN SPEC: NORMAL

## 2022-06-28 ENCOUNTER — APPOINTMENT (OUTPATIENT)
Dept: LAB | Facility: HOSPITAL | Age: 82
End: 2022-06-28
Attending: INTERNAL MEDICINE
Payer: MEDICARE

## 2022-06-28 DIAGNOSIS — N18.4 CHRONIC KIDNEY DISEASE, STAGE IV (SEVERE): ICD-10-CM

## 2022-06-28 DIAGNOSIS — I50.9 HEART FAILURE, UNSPECIFIED: ICD-10-CM

## 2022-06-28 DIAGNOSIS — G93.40 ENCEPHALOPATHY, UNSPECIFIED: Primary | ICD-10-CM

## 2022-06-28 DIAGNOSIS — J18.9 UNRESOLVED PNEUMONIA: ICD-10-CM

## 2022-06-28 PROCEDURE — 87206 SMEAR FLUORESCENT/ACID STAI: CPT | Performed by: INTERNAL MEDICINE

## 2022-06-28 PROCEDURE — 87070 CULTURE OTHR SPECIMN AEROBIC: CPT | Performed by: INTERNAL MEDICINE

## 2022-06-28 PROCEDURE — 87116 MYCOBACTERIA CULTURE: CPT | Performed by: INTERNAL MEDICINE

## 2022-06-28 PROCEDURE — 87015 SPECIMEN INFECT AGNT CONCNTJ: CPT | Performed by: INTERNAL MEDICINE

## 2022-06-28 PROCEDURE — 87205 SMEAR GRAM STAIN: CPT | Performed by: INTERNAL MEDICINE

## 2022-06-28 PROCEDURE — 87102 FUNGUS ISOLATION CULTURE: CPT | Performed by: INTERNAL MEDICINE

## 2022-06-29 NOTE — PROGRESS NOTES
Outpatient Care Management  Initial Patient Assessment    Patient: Jamey Lei  MRN: [id number]  Date of Service: 06/17/2022  Completed by: Patsy Person RN  Referral Date: 06/09/2022  Program: High Risk  Status: Ongoing  Effective Dates: 6/29/2022 - present  Responsible Staff: Patsy Person RN        Reason for Visit   Patient presents with    OPCM Chart Review    OPCM Enrollment Call    Initial Assessment    OPCM RN First Assessment Attempt     LM    OPCM RN Second Assessment Attempt     LM    Plan Of Care    PHQ-9       Brief Summary:  Jamey Lei was referred by Dr. Jonathan Betts for cough. Patient qualifies for program based on risk score of 81.7%.   Active problem list, medical, surgical and social history reviewed. Active comorbidities include CHF. Areas of need identified by patient include Cg reports patient it reluctant to engage in continued medical care (does not to perform exercises taught  through PT/OT at home, use O2, use appropriate DME). Cg disclosed patient has had multiple falls within the last year (more than 3+).   Complex care plan created with patient/caregiver input. By next encounter, patient/ Cg agreed to read/review educational information by next encounter with OPCM RN. Cg will continue to assist patient with performing weight checks over the next 1-2 weeks. Will check/track weight checks. Cg will contact My chart tech support to complete patient's Mychart. Cg will continue to ensure patient is taking his prescribed medications daily. Cg will communicate any changes or developments in patient's s/s correlated to CHF. Continue to add low salt food choices in patient's meals, primarily dinner for next 1-2 weeks. Cg will continue to encourage patient to use walker to support his mobility and movements. Cg will support patient with engaging in PT/OT exercises at least 2-3 days a week, more if able to. Consider action plan should patient experience seizure episodes.      Assessment Documentation     OPCM Initial Assessment    Involvement of Care  Do I have permission to speak with other family members about your care?: Yes  Assessment completed by: Patient  Identified Areas of Need  Advanced Care Planning: No  Housing: no  Medication Adherence: Yes  *Active medication list was reviewed and reconciled with patient and/or caregiver:   Nutrition: no  Lab Adherence: no  Depression: No (Comment: score 2)  Cognitive/Behavioral Health: yes (Comment: can be mean not all the time/ not aggressive or combative)  Communication: no  Health Literacy: no  Fall risk?: Yes  Equipment/Supplies/Services: no          Problem List and History     Problems Addressed This Visit    Atrial Fibrillation: Not identified by patient as current problem  COPD: Not identified by patient as current problem  Depression: Not identified by patient as current problem  Heart Failure: Identified as current problem  Hypertension: Not identified by patient as current problem  Diabetes: Not identified by patient as current problem  Chronic Kidney Disease: Not identified by patient as current problem  Hyperlipidemia: Not identified by patient as current problem  Cancer: Not identified by patient as current problem  Seizures: Not identified by patient as current problem  Heart Disease: Not identified by patient as current problem         Reviewed medical and social history with patient and/or caregiver. A complex care plan was discussed and completed today, with input from patient and/or caregiver.    Patient Instructions     Instructions were provided via the [employer] patient resources and are available for the patient to view on the patient portal, if active.      Clinical References    Patient Education        Heart Failure, Adult   About this topic   Heart failure is a condition where your heart does not pump well. Your heart has trouble pumping the right amount of blood throughout your body. Because of this, fluid can  back up in your body and your organs may not get as much blood as they need.  Heart failure is a long-term problem and will get worse over time. Your doctor will work hard to treat your heart failure and to keep you as healthy as possible.     What are the causes?   Heart failure is most often caused by coronary artery disease or a heart attack. It may also be caused by problems with the heart's valves. You may have heart failure because you had an infection in your heart muscle. Heart failure may be due to high blood pressure or an abnormal heart rhythm. Sleep apnea or high blood sugar may also cause your heart not to work as well as it should. These causes result in a weak or damaged heart muscle. When your heart is weak or damaged, it has trouble pumping the right amount of blood throughout your body.  What can make this more likely to happen?   You are more likely to have heart failure based on:  · If your blood vessels that supply blood to the heart are narrow or blocked.  · If you have a problem with your heart valves.  · If you smoke.  · If you have high blood pressure.  · If you have diabetes.  · Your age. Your risk goes up as you get older.  · Your weight. Your risk goes up when you are overweight.  What are the main signs?   · Breathing problems like:  ? Shortness of breath  ? Cough that won't go away  ? Wheezing  ? More trouble breathing at night or when you lay down flat  · Extra fluid that causes:  ? Swelling of feet, ankles, legs, or belly  ? Gaining weight and you don't know why  ? Need to pass urine more often, especially at night  · Problems sleeping like:  ? Need to sleep sitting up or on many pillows  ? Waking up often during sleep times  ? Feeling tired, weak, or have no energy  · General signs like:  ? Increased heart rate or irregular heartbeat  ? Loss of appetite and nausea  ? Feeling lightheaded or dizzy, especially right when you stand up  ? Confusion and impaired thinking     How does the  doctor diagnose this health problem?   The doctor will take your history and will do an exam. The doctor will listen to your heart and lungs, and may also feel your belly for liver swelling. The doctor will check your feet, ankles, and legs for swelling.  The doctor may order:  · Lab tests  · Chest x-ray  · Electrocardiogram (ECG or EKG)     · Echocardiogram  · Exercise stress test  · Radioactive imaging. This is a radionuclide scan.  · Dye injected into the heart's arteries. This is coronary angiography.  How does the doctor treat this health problem?   Your doctor will treat you to help your heart work better. The doctor will also work to control your signs. Since other health problems may cause or make heart failure worse, it is important to also treat these. Your doctor may suggest:  · Diet and lifestyle changes to slow down progress of the heart failure  · Drugs to help your heart work better, get rid of the extra fluid, and control your heart rate and blood pressure  · Exercise and cardiac rehab  · Bypass surgery or a heart stent to open blocked vessels to the muscle of your heart  · Devices like a heart pump  · Heart transplant  What follow-up care is needed?   Your doctor may ask you to come back to the office to check on your progress. Be sure to keep these visits.  What lifestyle changes are needed?   · Limit how much beer, wine, and mixed drinks (alcohol) you drink.  · Your doctor may ask you to limit the amount of salt in your diet. You may also be asked to limit how much fluid you drink.  · Try to lose weight if you are overweight. Your doctor or nurse can help.  · If you smoke, try to quit. Your doctor or nurse can help.  · Unless your doctor tells you to limit your activities, try to be active. Walk, garden, or do something active for 30 minutes or more on most days of the week. Stop activity if you have symptoms like chest pain, shortness of breath, or feel dizzy.  · Let your doctor know if you get  more tired while you do an activity or you are not able to do as much activity as you did before.  · Be careful that you take your drugs each day as ordered by your doctor.  ? If you cannot afford your drugs, talk to your doctor.  ? Do not stop your drugs if you have side effects. Talk to your doctor about them.  ? Take your drugs even if you feel well.  · Check your weight each morning and write down your weight in a notebook. This will tell you if you are building up too much fluid. Weigh in the morning after you have passed urine. Weigh yourself with clothes on or off, but do it the same way each day. Make sure your scale is on a hard surface, not on carpet. Your doctor will tell you when you should call based on how much weight you gain in a day or over a week. Take your notebook to your doctor on your next visit.  What drugs may be needed?   Often patients will need to take more than one drug. Together, they will help the heart work as well as it can. Do not take any other prescription drugs, over-the-counter (OTC) drugs, herbals, or diet aids without asking your doctor.  The doctor may order drugs to:  · Help relax blood vessels. This makes it easier for the heart to work and may also lower your blood pressure. These are ACE inhibitors, ARBs, and calcium channel blockers.  · Slow down the heart rate so that it doesn't have to work as hard. These are beta-blockers and calcium channel blockers.  · Help the heart beat stronger and better. This is digoxin.  · Get rid of extra salt and water in the body. These are water pills or diuretics.  What changes to diet are needed?   · Ask your doctor or dietician what kind of diet is best for you. The doctor may tell you to limit your salt and fat or how much fluid you drink.  · The DASH diet may be helpful. The DASH diet helps to lower blood pressure. This diet includes lots of fruits, vegetables, low-fat dairy foods, and foods that are low in saturated fat, total fat,  and cholesterol. Using the DASH diet with a low salt diet may lower blood pressure even more.  · Learn to read labels to see how much salt or sodium is in foods. Lowering your salt intake will help you control some of your symptoms.  When do I need to call the doctor?   Activate the emergency medical system right away if you have signs of a heart attack. Call 911 in the [place] or [place]. The sooner treatment begins, the better your chances for recovery. Call for emergency help right away if you have:  · Signs of heart attack, which may include:  ? Severe chest pain, pressure, or discomfort with:  § Breathing trouble; sweating; upset stomach; or cold, clammy skin.  § Pain in your arms, back, or jaw.  § Worse pain with activity like walking up stairs.  ? Fast or irregular heartbeat.  ? Feeling dizzy, faint, or weak.  Call your doctor if:  · You have so much trouble breathing that you can only say one or two words at a time.  · You need to sit upright at all times to be able to breathe and/or cannot lie down.  · You are very tired from working to catch your breath or you are sweating from trying to breathe.  · You have trouble breathing when talking, sitting still, or with activity.  · You have wheezing or chest tightness when you are resting.  · You wake up at night because you cant breathe well.  · You become very confused or you faint.  · You have a very fast or irregular heartbeat.  · You cough more than normal or cough up frothy or pink saliva.  · You feel very weak, dizzy, or more tired than normal.  · You need more pillows than normal to sleep.  · You gain 2 to 3 pounds (0.9 to 1.4 kg) overnight or more than 5 pounds (2.3 kg) in 1 week.  · You have more swelling than usual, especially in your feet and ankles or in your belly.  · You feel like your heart is beating very fast.  Where can I learn more?   American Heart  Association  http://www.heart.org/HEARTORG/Conditions/HeartFailure/AboutHeartFailure/About-Heart-Failure_[facility]_002044_Article.jsp   Better Health Channel  https://www.betterhealth.[place].gov.[place]/health/conditionsandtreatments/congestive-heart-failure-chf   NHS Choices  http://www.nhs.uk/conditions/heart-failure/pages/introduction.aspx   Last Reviewed Date   2021-06-03  Consumer Information Use and Disclaimer   This information is not specific medical advice and does not replace information you receive from your health care provider. This is only a brief summary of general information. It does NOT include all information about conditions, illnesses, injuries, tests, procedures, treatments, therapies, discharge instructions or life-style choices that may apply to you. You must talk with your health care provider for complete information about your health and treatment options. This information should not be used to decide whether or not to accept your health care providers advice, instructions or recommendations. Only your health care provider has the knowledge and training to provide advice that is right for you.  Copyright   Copyright © 2021 UpToDate, Inc. and its affiliates and/or licensors. All rights reserved.     Patient Education        Preventing Falls in the Older Adult   About this topic   A fall is the sudden loss of balance that causes a person to drop to the ground or floor. Falls are a serious health risk and they happen more often as we get older. Many things may increase your risk of falling, like:  · Problems that come with getting older  ? Muscle weakness  ? Balance problems  ? More trouble seeing  · Personal health factors  ? Health conditions such as arthritis, Parkinson's disease, low blood pressure, or stroke  ? Medicines you take  ? Loss of feeling in your feet  ? Being less active  ? Taking drugs that makes you dizzy or drowsy  ? Habits like alcohol use  · Things around your house  ? Slippery  floors  ? Unsecured rugs  ? Stairs  ? Wearing improper fitting shoes  ? Areas where it is dark and difficult to see  ? Incorrect size or type of assistive devices  ? Clutter and items on the floor that block your walkway     What will the results be?   · Prevent future falls  · Avoid injuries and disabilities  · Improve overall health  Will there be any other care needed?   · Ask your doctor if you need to take vitamin D to help keep your bones strong.  · Make your home safer. Get rid of things that might make you trip or slip. These are things like loose rugs, electrical cords, or clutter. Add grab bars, a shower seat, and handrails.  · Wear sturdy shoes that fit well. Shoes should fit well, have a low heel, and the soles of the shoe should not be slippery. Walking in socks or with bare feet can raise your chance for falling.  · Stay active. Walk, garden, swim, or do something active on a regular basis. These activities may prevent you from getting hurt if you do fall. They also help with your strength and balance.  · Use a cane, walker, or other safety device. Be sure it is the right size for you and that you know how to use it safely. Be sure to wear your eyeglasses if they have been ordered for you.  · Get up slowly after you sit or lie down. Try to change positions slowly.  · What to do if you fall:  ? Stay calm and do not panic.  ? Look for signs to decide if you have been hurt or have an injury.  ? If you think you can get up safely, try to get up.  ? If you are hurt or cannot get up on your own, try to get help.  ? If no one is available to help, try to get comfortable and wait for someone to arrive who can help you.  ? Stay warm and move regularly as you are able. Avoid putting too much pressure on any one area.  ? After a fall, tell family and friends that you have fallen. It is also important to talk to your doctor about your fall right away.  What problems could happen?   A fall can lead to broken bones  and other serious injuries in older adults. Problems that happen because of a fall may even lead to death in older adults. Many people are not able to return to their former level of activity after a fall.  What can be done to prevent this health problem?   Lower Your Risk of Falling  · Wear your eyeglasses. Have regular eye checkups. Do not use reading glasses when you walk around.  · Quit smoking and limit alcohol intake. Smoking and too much alcohol can decrease bone mass and increase the chance of broken bones.  · Know the side effects of the drugs you are taking. Some drugs may affect your balance and cause confusion or sleepiness.  · Get up slowly after you sit or lie down. Do not change positions quickly. Do not rush when you need to go to the bathroom or to answer the phone.  Stay Physically Active  · Be physically active. This will help to improve your strength and balance.  · Fear of falling may lead you to avoid activities. Talk to your doctor. You may be sent to a physical therapist. This person can help you improve balance and build your confidence. Getting rid of your fear of falling can help you stay active and prevent future falls.  · Join an exercise program. Ask your doctor what exercise is safe for you. Be sure to ask before you do any exercises, especially if you have illnesses like arthritis. Exercise can help you keep muscles strong and help with your balance. It is also a good way to learn proper ways to do each activity or exercise.  Safety Tips at Home  · Keep your floors and walking areas clear from clutter. Remove furniture that blocks your way. Secure cords and wires near the wall to avoid tripping over them. Get rid of throw rugs.  · Be sure the lights in your house are working well and provide good lighting throughout your home. Make sure you can reach switches and lamps easily. Place a lamp close to your bed that is easy to reach.  · Fix all steps and sidewalks to make them smooth and  even. Put handrails and lights on stairs.  · Keep all the things you use often on low shelves or in cabinets that are at about waist level. Ask for help to move items off of high shelves. Do not use a chair as a step stool.  · Keep your bathroom area safe. Use nonslip rubber mats on the floor and in the tub or shower.  · Keep a phone near you in case of emergency. Keep a list of your emergency contact numbers in large print near your phone. Carry a phone with you when you go for a walk. Consider using a personal alarm device that could call for help in case you fall and cannot get up.  · Think about protecting your hip. Hip protectors may be needed if you have a higher chance for falling. Ask your doctor about this.  Where can I learn more?   American Academy of Family Physicians  https://familydoctor.org/blrpe-sgc-tk-lower-your-risk/   NHS  https://www.nhs.uk/conditions/falls/prevention/   Last Reviewed Date   2021-06-07  Consumer Information Use and Disclaimer   This information is not specific medical advice and does not replace information you receive from your health care provider. This is only a brief summary of general information. It does NOT include all information about conditions, illnesses, injuries, tests, procedures, treatments, therapies, discharge instructions or life-style choices that may apply to you. You must talk with your health care provider for complete information about your health and treatment options. This information should not be used to decide whether or not to accept your health care providers advice, instructions or recommendations. Only your health care provider has the knowledge and training to provide advice that is right for you.  Copyright   Copyright © 2021 UpToDate, Inc. and its affiliates and/or licensors. All rights reserved.      Next steps:  OPCM will discuss educational information with Cg, answer questions or concerns cg/ patient may have.  Evaluate patient's weight  checks for past 1-2 weeks, note any pertinent fluctuations.   Ensure cg was able to set up patient's Mychart, assist as needed.  Inquired if discussed interventions were implemented in the family's residence.   Encourage performing exercising taught through PT/OT      Follow up in about 25 days (around 7/12/2022) for RN follow up.    [facility] OPCM Self-Management Care Plan was developed with the patients/caregivers input and was based on identified barriers from todays assessment.  Goals were written today with the patient/caregiver and the patient has agreed to work towards these goals to improve his/her overall well-being. Patient verbalized understanding of the care plan, goals, and all of today's instructions. Encouraged patient/caregiver to communicate with his/her physician and health care team about health conditions and the treatment plan.  Provided my contact information today and encouraged patient/caregiver to call me with any questions as needed.

## 2022-07-01 LAB
BACTERIA SPEC AEROBE CULT: ABNORMAL
BACTERIA SPEC AEROBE CULT: ABNORMAL
GRAM STN SPEC: ABNORMAL

## 2022-07-18 PROBLEM — I50.9 ACUTE ON CHRONIC CONGESTIVE HEART FAILURE: Status: RESOLVED | Noted: 2021-12-29 | Resolved: 2022-07-18

## 2022-07-19 ENCOUNTER — OUTPATIENT CASE MANAGEMENT (OUTPATIENT)
Dept: ADMINISTRATIVE | Facility: OTHER | Age: 82
End: 2022-07-19

## 2022-07-26 LAB — FUNGUS SPEC CULT: NORMAL

## 2022-08-06 LAB
ACID FAST MOD KINY STN SPEC: NORMAL
MYCOBACTERIUM SPEC QL CULT: NORMAL

## 2022-08-17 LAB
ACID FAST MOD KINY STN SPEC: NORMAL
MYCOBACTERIUM SPEC QL CULT: NORMAL

## 2022-08-20 ENCOUNTER — HOSPITAL ENCOUNTER (INPATIENT)
Facility: HOSPITAL | Age: 82
LOS: 4 days | Discharge: HOME HEALTH | DRG: 683 | End: 2022-08-25
Attending: STUDENT IN AN ORGANIZED HEALTH CARE EDUCATION/TRAINING PROGRAM | Admitting: INTERNAL MEDICINE
Payer: MEDICARE

## 2022-08-20 DIAGNOSIS — R53.1 WEAKNESS: ICD-10-CM

## 2022-08-20 DIAGNOSIS — E87.6 HYPOKALEMIA: ICD-10-CM

## 2022-08-20 DIAGNOSIS — I47.20 V-TACH: ICD-10-CM

## 2022-08-20 DIAGNOSIS — I50.42 CHRONIC COMBINED SYSTOLIC AND DIASTOLIC CONGESTIVE HEART FAILURE: ICD-10-CM

## 2022-08-20 DIAGNOSIS — I47.29 NON-SUSTAINED VENTRICULAR TACHYCARDIA: ICD-10-CM

## 2022-08-20 DIAGNOSIS — E86.0 DEHYDRATION: ICD-10-CM

## 2022-08-20 DIAGNOSIS — I12.9 CHRONIC KIDNEY DISEASE DUE TO HYPERTENSION: ICD-10-CM

## 2022-08-20 DIAGNOSIS — N28.9 RENAL INSUFFICIENCY: Primary | ICD-10-CM

## 2022-08-20 LAB
ALBUMIN SERPL BCP-MCNC: 3.3 G/DL (ref 3.5–5.2)
ALP SERPL-CCNC: 73 U/L (ref 55–135)
ALT SERPL W/O P-5'-P-CCNC: 19 U/L (ref 10–44)
ANION GAP SERPL CALC-SCNC: 11 MMOL/L (ref 8–16)
AST SERPL-CCNC: 16 U/L (ref 10–40)
BASOPHILS # BLD AUTO: 0.03 K/UL (ref 0–0.2)
BASOPHILS NFR BLD: 0.3 % (ref 0–1.9)
BILIRUB SERPL-MCNC: 0.4 MG/DL (ref 0.1–1)
BILIRUB UR QL STRIP: NEGATIVE
BUN SERPL-MCNC: 171 MG/DL (ref 8–23)
CALCIUM SERPL-MCNC: 9.5 MG/DL (ref 8.7–10.5)
CHLORIDE SERPL-SCNC: 89 MMOL/L (ref 95–110)
CLARITY UR: CLEAR
CO2 SERPL-SCNC: 35 MMOL/L (ref 23–29)
COLOR UR: YELLOW
CREAT SERPL-MCNC: 4.2 MG/DL (ref 0.5–1.4)
CTP QC/QA: YES
DIFFERENTIAL METHOD: ABNORMAL
EOSINOPHIL # BLD AUTO: 0.2 K/UL (ref 0–0.5)
EOSINOPHIL NFR BLD: 2.2 % (ref 0–8)
ERYTHROCYTE [DISTWIDTH] IN BLOOD BY AUTOMATED COUNT: 16 % (ref 11.5–14.5)
EST. GFR  (NO RACE VARIABLE): 13.7 ML/MIN/1.73 M^2
GLUCOSE SERPL-MCNC: 152 MG/DL (ref 70–110)
GLUCOSE UR QL STRIP: NEGATIVE
HCT VFR BLD AUTO: 32.9 % (ref 40–54)
HGB BLD-MCNC: 10.5 G/DL (ref 14–18)
HGB UR QL STRIP: NEGATIVE
IMM GRANULOCYTES # BLD AUTO: 0.03 K/UL (ref 0–0.04)
IMM GRANULOCYTES NFR BLD AUTO: 0.3 % (ref 0–0.5)
KETONES UR QL STRIP: NEGATIVE
LEUKOCYTE ESTERASE UR QL STRIP: NEGATIVE
LYMPHOCYTES # BLD AUTO: 1.2 K/UL (ref 1–4.8)
LYMPHOCYTES NFR BLD: 10.5 % (ref 18–48)
MAGNESIUM SERPL-MCNC: 3.3 MG/DL (ref 1.6–2.6)
MCH RBC QN AUTO: 25.7 PG (ref 27–31)
MCHC RBC AUTO-ENTMCNC: 31.9 G/DL (ref 32–36)
MCV RBC AUTO: 81 FL (ref 82–98)
MONOCYTES # BLD AUTO: 1.2 K/UL (ref 0.3–1)
MONOCYTES NFR BLD: 11.1 % (ref 4–15)
NEUTROPHILS # BLD AUTO: 8.3 K/UL (ref 1.8–7.7)
NEUTROPHILS NFR BLD: 75.6 % (ref 38–73)
NITRITE UR QL STRIP: NEGATIVE
NRBC BLD-RTO: 0 /100 WBC
NT-PROBNP SERPL-MCNC: 978 PG/ML (ref 5–1800)
PH UR STRIP: 6 [PH] (ref 5–8)
PLATELET # BLD AUTO: 305 K/UL (ref 150–450)
PMV BLD AUTO: 9.9 FL (ref 9.2–12.9)
POTASSIUM SERPL-SCNC: 2.8 MMOL/L (ref 3.5–5.1)
PROT SERPL-MCNC: 7.9 G/DL (ref 6–8.4)
PROT UR QL STRIP: NEGATIVE
RBC # BLD AUTO: 4.08 M/UL (ref 4.6–6.2)
SARS-COV-2 RDRP RESP QL NAA+PROBE: NEGATIVE
SODIUM SERPL-SCNC: 135 MMOL/L (ref 136–145)
SP GR UR STRIP: 1.01 (ref 1–1.03)
TROPONIN I SERPL DL<=0.01 NG/ML-MCNC: 73 PG/ML (ref 0–60)
URN SPEC COLLECT METH UR: NORMAL
UROBILINOGEN UR STRIP-ACNC: 1 EU/DL
WBC # BLD AUTO: 10.93 K/UL (ref 3.9–12.7)

## 2022-08-20 PROCEDURE — 85025 COMPLETE CBC W/AUTO DIFF WBC: CPT | Performed by: STUDENT IN AN ORGANIZED HEALTH CARE EDUCATION/TRAINING PROGRAM

## 2022-08-20 PROCEDURE — 80053 COMPREHEN METABOLIC PANEL: CPT | Performed by: STUDENT IN AN ORGANIZED HEALTH CARE EDUCATION/TRAINING PROGRAM

## 2022-08-20 PROCEDURE — 93005 ELECTROCARDIOGRAM TRACING: CPT

## 2022-08-20 PROCEDURE — 99285 EMERGENCY DEPT VISIT HI MDM: CPT | Mod: 25

## 2022-08-20 PROCEDURE — 83880 ASSAY OF NATRIURETIC PEPTIDE: CPT | Performed by: STUDENT IN AN ORGANIZED HEALTH CARE EDUCATION/TRAINING PROGRAM

## 2022-08-20 PROCEDURE — 83735 ASSAY OF MAGNESIUM: CPT | Performed by: STUDENT IN AN ORGANIZED HEALTH CARE EDUCATION/TRAINING PROGRAM

## 2022-08-20 PROCEDURE — 36415 COLL VENOUS BLD VENIPUNCTURE: CPT | Performed by: STUDENT IN AN ORGANIZED HEALTH CARE EDUCATION/TRAINING PROGRAM

## 2022-08-20 PROCEDURE — G0378 HOSPITAL OBSERVATION PER HR: HCPCS

## 2022-08-20 PROCEDURE — 25000003 PHARM REV CODE 250: Performed by: STUDENT IN AN ORGANIZED HEALTH CARE EDUCATION/TRAINING PROGRAM

## 2022-08-20 PROCEDURE — 25000003 PHARM REV CODE 250: Performed by: INTERNAL MEDICINE

## 2022-08-20 PROCEDURE — 63600175 PHARM REV CODE 636 W HCPCS: Performed by: STUDENT IN AN ORGANIZED HEALTH CARE EDUCATION/TRAINING PROGRAM

## 2022-08-20 PROCEDURE — 93010 EKG 12-LEAD: ICD-10-PCS | Mod: ,,, | Performed by: INTERNAL MEDICINE

## 2022-08-20 PROCEDURE — G0378 HOSPITAL OBSERVATION PER HR: HCPCS | Mod: OBSCO

## 2022-08-20 PROCEDURE — 93010 ELECTROCARDIOGRAM REPORT: CPT | Mod: ,,, | Performed by: INTERNAL MEDICINE

## 2022-08-20 PROCEDURE — U0002 COVID-19 LAB TEST NON-CDC: HCPCS | Performed by: STUDENT IN AN ORGANIZED HEALTH CARE EDUCATION/TRAINING PROGRAM

## 2022-08-20 PROCEDURE — 84484 ASSAY OF TROPONIN QUANT: CPT | Performed by: STUDENT IN AN ORGANIZED HEALTH CARE EDUCATION/TRAINING PROGRAM

## 2022-08-20 PROCEDURE — 81003 URINALYSIS AUTO W/O SCOPE: CPT | Performed by: STUDENT IN AN ORGANIZED HEALTH CARE EDUCATION/TRAINING PROGRAM

## 2022-08-20 RX ORDER — POTASSIUM CHLORIDE 20 MEQ/1
40 TABLET, EXTENDED RELEASE ORAL 2 TIMES DAILY
Status: DISCONTINUED | OUTPATIENT
Start: 2022-08-20 | End: 2022-08-21

## 2022-08-20 RX ORDER — SODIUM CHLORIDE 0.9 % (FLUSH) 0.9 %
10 SYRINGE (ML) INJECTION
Status: DISCONTINUED | OUTPATIENT
Start: 2022-08-20 | End: 2022-08-25 | Stop reason: HOSPADM

## 2022-08-20 RX ORDER — TALC
6 POWDER (GRAM) TOPICAL NIGHTLY PRN
Status: DISCONTINUED | OUTPATIENT
Start: 2022-08-20 | End: 2022-08-25 | Stop reason: HOSPADM

## 2022-08-20 RX ORDER — ACETAMINOPHEN 325 MG/1
650 TABLET ORAL EVERY 8 HOURS PRN
Status: DISCONTINUED | OUTPATIENT
Start: 2022-08-20 | End: 2022-08-25 | Stop reason: HOSPADM

## 2022-08-20 RX ORDER — MICONAZOLE NITRATE 2 %
POWDER (GRAM) TOPICAL
Status: ON HOLD | COMMUNITY
End: 2022-08-21

## 2022-08-20 RX ORDER — ONDANSETRON 2 MG/ML
4 INJECTION INTRAMUSCULAR; INTRAVENOUS EVERY 8 HOURS PRN
Status: DISCONTINUED | OUTPATIENT
Start: 2022-08-20 | End: 2022-08-25 | Stop reason: HOSPADM

## 2022-08-20 RX ORDER — TORSEMIDE 100 MG/1
20 TABLET ORAL DAILY
Status: ON HOLD | COMMUNITY
End: 2022-08-25 | Stop reason: HOSPADM

## 2022-08-20 RX ORDER — SODIUM CHLORIDE, SODIUM LACTATE, POTASSIUM CHLORIDE, CALCIUM CHLORIDE 600; 310; 30; 20 MG/100ML; MG/100ML; MG/100ML; MG/100ML
1000 INJECTION, SOLUTION INTRAVENOUS
Status: COMPLETED | OUTPATIENT
Start: 2022-08-20 | End: 2022-08-20

## 2022-08-20 RX ORDER — SODIUM CHLORIDE 9 MG/ML
INJECTION, SOLUTION INTRAVENOUS CONTINUOUS
Status: DISCONTINUED | OUTPATIENT
Start: 2022-08-20 | End: 2022-08-22

## 2022-08-20 RX ORDER — SODIUM CHLORIDE, SODIUM LACTATE, POTASSIUM CHLORIDE, CALCIUM CHLORIDE 600; 310; 30; 20 MG/100ML; MG/100ML; MG/100ML; MG/100ML
1000 INJECTION, SOLUTION INTRAVENOUS
Status: DISCONTINUED | OUTPATIENT
Start: 2022-08-20 | End: 2022-08-20

## 2022-08-20 RX ADMIN — POTASSIUM CHLORIDE 40 MEQ: 1500 TABLET, EXTENDED RELEASE ORAL at 08:08

## 2022-08-20 RX ADMIN — POTASSIUM BICARBONATE 25 MEQ: 978 TABLET, EFFERVESCENT ORAL at 01:08

## 2022-08-20 RX ADMIN — SODIUM CHLORIDE: 0.9 INJECTION, SOLUTION INTRAVENOUS at 08:08

## 2022-08-20 RX ADMIN — SODIUM CHLORIDE, SODIUM LACTATE, POTASSIUM CHLORIDE, AND CALCIUM CHLORIDE 1000 ML: .6; .31; .03; .02 INJECTION, SOLUTION INTRAVENOUS at 02:08

## 2022-08-20 NOTE — ED PROVIDER NOTES
Encounter Date: 8/20/2022       History     Chief Complaint   Patient presents with    Weakness     Patient arrived through [facility], patient fell at house. Patient is weak and reports lower back pain.     81 y.o. male with PMHX of  CHF, COPD, AFib on Eliquis presents with generalized weakness and frequent falls for the past week.  Patient says he fell multiple times but did not hit his head or have loss of consciousness.  Last fall was earlier today onto his right side.  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  Patient has not been eating well.  Patient continues to take his torsemide which he notes has been making his lower extremity swelling much improved.  Patient denies any chest pain, vomiting, diarrhea, black tarry stool, headache, dizziness, changes in mentation        Review of patient's allergies indicates:   Allergen Reactions    Zolpidem      Past Medical History:   Diagnosis Date    A-fib     Abdominal pain, epigastric     Abdominal pain, generalized     Acute on chronic congestive heart failure 12/29/2021    Anticoagulant long-term use     Arthritis     Asteatotic eczema     Benign essential HTN     Benign prostatic hyperplasia     Brain tumor     Cardiovascular accident     Carotid artery stenosis     CHF (congestive heart failure)     CKD (chronic kidney disease), stage IV     Constipation     COPD (chronic obstructive pulmonary disease)     Depressed     DM (diabetes mellitus), secondary     Drug eruption     Eczema     Edema     Encounter for blood transfusion     Fever     H. pylori infection     Hearing loss     History of tobacco use     Hypercholesterolemia     Hypertensive renal disease, benign     Hypokalemia     Hyponatremia     Impaired fasting glucose     Lumbar pain     Lung mass     Malaise and fatigue     Malignant neoplasm of prostate     Meningioma     Mixed hyperlipidemia     Nasal  bleeding     Neuropathy     Obesity, unspecified     SHAYNE (obstructive sleep apnea)     Osteoarthritis of knee     Osteoarthritis of multiple joints     Peripheral vascular disease, unspecified     Psychosis     Renal disorder     Seizure disorder     Seizures     Sleep apnea     Unspecified cataract     Unspecified chronic bronchitis     Unspecified osteoarthritis, unspecified site     Vertigo     Vitamin D deficiency     Wheezing      Past Surgical History:   Procedure Laterality Date    APPENDECTOMY      COLONOSCOPY      CORONARY ARTERY BYPASS GRAFT      FOOT SURGERY Left     HIP REPLACEMENT ARTHROPLASTY Right     HIP SURGERY      JOINT REPLACEMENT      KNEE SURGERY      LUNG BIOPSY Right 03/03/2022    benign    SHOULDER SURGERY Left     SHOULDER SURGERY      TOTAL KNEE ARTHROPLASTY Right     triple bypass       Family History   Problem Relation Age of Onset    Hyperlipidemia Mother     Hypertension Mother     Arthritis Mother     Hypertension Father     Arthritis Father     Heart disease Father     Asthma Sister     Hyperlipidemia Sister     Hypertension Sister     Hypertension Brother      Social History     Tobacco Use    Smoking status: Current Every Day Smoker    Smokeless tobacco: Never Used    Tobacco comment: quit 30 years ago   Substance Use Topics    Alcohol use: Not Currently    Drug use: Never     Review of Systems   Constitutional: Positive for fatigue.   HENT: Negative.    Respiratory: Negative.    Cardiovascular: Negative.    Gastrointestinal: Negative.    Genitourinary: Negative.    Musculoskeletal: Positive for back pain.   Skin: Negative.    Neurological: Positive for dizziness, weakness and light-headedness.   Psychiatric/Behavioral: Negative.    All other systems reviewed and are negative.      Physical Exam     Initial Vitals   BP Pulse Resp Temp SpO2   08/20/22 1144 08/20/22 1144 08/20/22 1144 08/20/22 1153 08/20/22 1137   (!) 147/65 68 18 97.9 °F  (36.6 °C) 97 %      MAP       --                Physical Exam    Nursing note and vitals reviewed.  Constitutional: Vital signs are normal. He appears well-developed and well-nourished.   HENT:   Head: Normocephalic and atraumatic.   Mouth/Throat: No oropharyngeal exudate.   Dry mucous membrane   Eyes: Conjunctivae, EOM and lids are normal. Pupils are equal, round, and reactive to light.   Neck: Trachea normal. Neck supple.   Normal range of motion.  Cardiovascular: Normal rate, regular rhythm, normal heart sounds and normal pulses.   Pulmonary/Chest: Breath sounds normal. He has no wheezes. He has no rhonchi.   Abdominal: Abdomen is soft. Bowel sounds are normal. He exhibits no distension. There is no abdominal tenderness. There is no rebound and no guarding.   Musculoskeletal:         General: Normal range of motion.      Cervical back: Normal range of motion and neck supple.      Comments: Tenderness to palpation of bella lumbar region no midline tenderness.  No step-offs.     Neurological: He is alert and oriented to person, place, and time. He has normal strength. No cranial nerve deficit or sensory deficit. GCS score is 15. GCS eye subscore is 4. GCS verbal subscore is 5. GCS motor subscore is 6.   Antalgic gait but able to walk with assistance   Skin: Skin is warm. Capillary refill takes less than 2 seconds.   Psychiatric: He has a normal mood and affect. His speech is normal. Thought content normal.         ED Course   Critical Care    Date/Time: 8/21/2022 7:47 AM  Performed by: Tristian Cortes MD  Authorized by: Trent Ramachandran MD   Direct patient critical care time: 10 minutes  Additional history critical care time: 5 minutes  Ordering / reviewing critical care time: 5 minutes  Documentation critical care time: 5 minutes  Consulting other physicians critical care time: 5 minutes  Other critical care time: 5 minutes  Total critical care time (exclusive of procedural time) : 35 minutes  Critical care time was  exclusive of separately billable procedures and treating other patients.  Critical care was necessary to treat or prevent imminent or life-threatening deterioration of the following conditions: renal failure, dehydration and cardiac failure.  Critical care was time spent personally by me on the following activities: evaluation of patient's response to treatment, ordering and performing treatments and interventions, examination of patient, obtaining history from patient or surrogate, ordering and review of radiographic studies, ordering and review of laboratory studies, pulse oximetry and re-evaluation of patient's condition.        Labs Reviewed   CBC W/ AUTO DIFFERENTIAL - Abnormal; Notable for the following components:       Result Value    RBC 4.08 (*)     Hemoglobin 10.5 (*)     Hematocrit 32.9 (*)     MCV 81 (*)     MCH 25.7 (*)     MCHC 31.9 (*)     RDW 16.0 (*)     Gran # (ANC) 8.3 (*)     Mono # 1.2 (*)     Gran % 75.6 (*)     Lymph % 10.5 (*)     All other components within normal limits   COMPREHENSIVE METABOLIC PANEL - Abnormal; Notable for the following components:    Sodium 135 (*)     Potassium 2.8 (*)     Chloride 89 (*)     CO2 35 (*)     Glucose 152 (*)      (*)     Creatinine 4.2 (*)     Albumin 3.3 (*)     eGFR 13.7 (*)     All other components within normal limits   TROPONIN I HIGH SENSITIVITY - Abnormal; Notable for the following components:    Troponin I High Sensitivity 73.0 (*)     All other components within normal limits   MAGNESIUM - Abnormal; Notable for the following components:    Magnesium 3.3 (*)     All other components within normal limits   URINALYSIS, REFLEX TO URINE CULTURE    Narrative:     Preferred Collection Type->Urine, Clean Catch  Specimen Source->Urine   NT-PRO NATRIURETIC PEPTIDE   SARS-COV-2 RDRP GENE    Narrative:     This test utilizes isothermal nucleic acid amplification   technology to detect the SARS-CoV-2 RdRp nucleic acid segment.   The analytical  sensitivity (limit of detection) is 125 genome   equivalents/mL.   A POSITIVE result implies infection with the SARS-CoV-2 virus;   the patient is presumed to be contagious.     A NEGATIVE result means that SARS-CoV-2 nucleic acids are not   present above the limit of detection. A NEGATIVE result should be   treated as presumptive. It does not rule out the possibility of   COVID-19 and should not be the sole basis for treatment decisions.   If COVID-19 is strongly suspected based on clinical and exposure   history, re-testing using an alternate molecular assay should be   considered.   This test is only for use under the Food and Drug   Administration s Emergency Use Authorization (EUA).   Commercial kits are provided by [employer].   Performance characteristics of the EUA have been independently   verified by [facility] Department of   Pathology and Laboratory Medicine.   _________________________________________________________________   The authorized Fact Sheet for Healthcare Providers and the authorized Fact   Sheet for Patients of the ID NOW COVID-19 are available on the FDA   website:     https://www.fda.gov/media/485566/download  https://www.fda.gov/media/434092/download             EKG Readings: (Independently Interpreted)   Initial Reading: No STEMI.   Rate controlled AFib with PVCs.  Left axis deviation.  Right bundle-branch block     ECG Results          EKG 12-lead (Final result)  Result time 08/20/22 17:29:46    Final result by Interface, Lab [facility] (08/20/22 17:29:46)                 Narrative:    Test Reason : R53.1,    Vent. Rate : 064 BPM     Atrial Rate : 072 BPM     P-R Int : 000 ms          QRS Dur : 134 ms      QT Int : 432 ms       P-R-T Axes : 073 -46 086 degrees     QTc Int : 445 ms    Probably AF with PVCs and artifact  Left axis deviation  Right bundle branch block  LVH with repolarization abnormality  Abnormal ECG  When compared with ECG of 28-DEC-2021 12:29,  No  change    Confirmed by Bert VINCENT MD ([id number]) on 8/20/2022 5:29:35 PM    Referred By: [email or web address]   SELF           Confirmed By:Bert VINCENT MD                            Imaging Results          X-Ray Chest 1 View (Final result)  Result time 08/20/22 12:57:54    Final result by Jamey Gannon MD (08/20/22 12:57:54)                 Impression:      Mild bibasilar opacities, could reflect edema, atelectasis or pneumonia.      Electronically signed by: Jamey Gannon MD  Date:    08/20/2022  Time:    12:57             Narrative:    EXAMINATION:  XR CHEST 1 VIEW    CLINICAL HISTORY:  Weakness    COMPARISON:  Portable chest 06/09/2022.    FINDINGS:  Frontal portable chest demonstrates mildly hypoinflated lungs.  Mild bibasilar opacities.  No pleural fluid.  Mild prominence of cardiac silhouette with prior cardiac surgery.  Left shoulder arthroplasty device, as noted on prior exam.                                 Medications   sodium chloride 0.9% flush 10 mL (has no administration in time range)   melatonin tablet 6 mg (has no administration in time range)   acetaminophen tablet 650 mg (has no administration in time range)   ondansetron injection 4 mg (has no administration in time range)   acetaminophen tablet 650 mg (has no administration in time range)   0.9%  NaCl infusion ( Intravenous New Bag 8/20/22 2005)   potassium chloride 10 mEq in 100 mL IVPB (10 mEq Intravenous New Bag 8/21/22 0732)   potassium chloride SA CR tablet 40 mEq (has no administration in time range)   potassium bicarbonate disintegrating tablet 25 mEq (25 mEq Oral Given 8/20/22 1338)   lactated ringers infusion (1,000 mLs Intravenous New Bag 8/20/22 1416)     Medical Decision Making:   Initial Assessment:   81 y.o. male with PMHX of  CHF, COPD, AFib on Eliquis presents with generalized weakness and frequent falls for the past week.  Afebrile vitals stable.  Physical noted.  Labs shows BRIGETTE..  Patient most likely dehydrated.  Has been not eating  with increase in diuretics.  Will slowly hydrate patient.  Patient has minimal bump in troponin but denies any chest pain.  Will replete potassium.  Clinical Tests:   Lab Tests: Ordered and Reviewed  The following lab test(s) were unremarkable: CBC, CMP, Troponin and BNP  Radiological Study: Ordered and Reviewed  Medical Tests: Reviewed and Ordered                      Clinical Impression:   Final diagnoses:  [R53.1] Weakness  [N28.9] Renal insufficiency (Primary)  [E87.6] Hypokalemia  [E86.0] Dehydration          ED Disposition Condition    Observation               Tristian Cortes MD  08/21/22 [id number]

## 2022-08-21 PROBLEM — N17.9 AKI (ACUTE KIDNEY INJURY): Status: ACTIVE | Noted: 2022-08-21

## 2022-08-21 PROBLEM — E86.0 DEHYDRATION: Status: ACTIVE | Noted: 2022-08-21

## 2022-08-21 PROBLEM — N28.9 RENAL INSUFFICIENCY: Status: ACTIVE | Noted: 2022-08-21

## 2022-08-21 LAB
ALBUMIN SERPL BCP-MCNC: 3 G/DL (ref 3.5–5.2)
ALP SERPL-CCNC: 67 U/L (ref 55–135)
ALT SERPL W/O P-5'-P-CCNC: 17 U/L (ref 10–44)
ANION GAP SERPL CALC-SCNC: 9 MMOL/L (ref 8–16)
AST SERPL-CCNC: 15 U/L (ref 10–40)
BASOPHILS # BLD AUTO: 0.05 K/UL (ref 0–0.2)
BASOPHILS NFR BLD: 0.5 % (ref 0–1.9)
BILIRUB SERPL-MCNC: 0.4 MG/DL (ref 0.1–1)
BUN SERPL-MCNC: 163 MG/DL (ref 8–23)
CALCIUM SERPL-MCNC: 9.2 MG/DL (ref 8.7–10.5)
CHLORIDE SERPL-SCNC: 92 MMOL/L (ref 95–110)
CO2 SERPL-SCNC: 34 MMOL/L (ref 23–29)
CREAT SERPL-MCNC: 3.4 MG/DL (ref 0.5–1.4)
DIFFERENTIAL METHOD: ABNORMAL
EOSINOPHIL # BLD AUTO: 0.4 K/UL (ref 0–0.5)
EOSINOPHIL NFR BLD: 4.3 % (ref 0–8)
ERYTHROCYTE [DISTWIDTH] IN BLOOD BY AUTOMATED COUNT: 16 % (ref 11.5–14.5)
EST. GFR  (NO RACE VARIABLE): 17.6 ML/MIN/1.73 M^2
GLUCOSE SERPL-MCNC: 123 MG/DL (ref 70–110)
HCT VFR BLD AUTO: 31.4 % (ref 40–54)
HGB BLD-MCNC: 9.8 G/DL (ref 14–18)
IMM GRANULOCYTES # BLD AUTO: 0.03 K/UL (ref 0–0.04)
IMM GRANULOCYTES NFR BLD AUTO: 0.3 % (ref 0–0.5)
LYMPHOCYTES # BLD AUTO: 1.6 K/UL (ref 1–4.8)
LYMPHOCYTES NFR BLD: 15.9 % (ref 18–48)
MAGNESIUM SERPL-MCNC: 2.9 MG/DL (ref 1.6–2.6)
MCH RBC QN AUTO: 25.1 PG (ref 27–31)
MCHC RBC AUTO-ENTMCNC: 31.2 G/DL (ref 32–36)
MCV RBC AUTO: 80 FL (ref 82–98)
MONOCYTES # BLD AUTO: 1.2 K/UL (ref 0.3–1)
MONOCYTES NFR BLD: 12 % (ref 4–15)
NEUTROPHILS # BLD AUTO: 6.7 K/UL (ref 1.8–7.7)
NEUTROPHILS NFR BLD: 67 % (ref 38–73)
NRBC BLD-RTO: 0 /100 WBC
PLATELET # BLD AUTO: 294 K/UL (ref 150–450)
PMV BLD AUTO: 11.1 FL (ref 9.2–12.9)
POTASSIUM SERPL-SCNC: 2.6 MMOL/L (ref 3.5–5.1)
POTASSIUM SERPL-SCNC: 3.6 MMOL/L (ref 3.5–5.1)
PROT SERPL-MCNC: 7.3 G/DL (ref 6–8.4)
RBC # BLD AUTO: 3.91 M/UL (ref 4.6–6.2)
SODIUM SERPL-SCNC: 135 MMOL/L (ref 136–145)
TROPONIN I SERPL DL<=0.01 NG/ML-MCNC: 54.4 PG/ML (ref 0–60)
WBC # BLD AUTO: 10.02 K/UL (ref 3.9–12.7)

## 2022-08-21 PROCEDURE — 25000003 PHARM REV CODE 250: Performed by: INTERNAL MEDICINE

## 2022-08-21 PROCEDURE — 80053 COMPREHEN METABOLIC PANEL: CPT | Performed by: STUDENT IN AN ORGANIZED HEALTH CARE EDUCATION/TRAINING PROGRAM

## 2022-08-21 PROCEDURE — 11000001 HC ACUTE MED/SURG PRIVATE ROOM

## 2022-08-21 PROCEDURE — 36415 COLL VENOUS BLD VENIPUNCTURE: CPT | Performed by: INTERNAL MEDICINE

## 2022-08-21 PROCEDURE — 85025 COMPLETE CBC W/AUTO DIFF WBC: CPT | Performed by: STUDENT IN AN ORGANIZED HEALTH CARE EDUCATION/TRAINING PROGRAM

## 2022-08-21 PROCEDURE — 84484 ASSAY OF TROPONIN QUANT: CPT | Performed by: INTERNAL MEDICINE

## 2022-08-21 PROCEDURE — 83735 ASSAY OF MAGNESIUM: CPT | Performed by: INTERNAL MEDICINE

## 2022-08-21 PROCEDURE — 84132 ASSAY OF SERUM POTASSIUM: CPT | Performed by: INTERNAL MEDICINE

## 2022-08-21 PROCEDURE — 63600175 PHARM REV CODE 636 W HCPCS: Performed by: INTERNAL MEDICINE

## 2022-08-21 RX ORDER — MUPIROCIN 20 MG/G
OINTMENT TOPICAL 2 TIMES DAILY
Status: DISCONTINUED | OUTPATIENT
Start: 2022-08-21 | End: 2022-08-25 | Stop reason: HOSPADM

## 2022-08-21 RX ORDER — ATORVASTATIN CALCIUM 20 MG/1
20 TABLET, FILM COATED ORAL DAILY
Status: DISCONTINUED | OUTPATIENT
Start: 2022-08-21 | End: 2022-08-25 | Stop reason: HOSPADM

## 2022-08-21 RX ORDER — ASPIRIN 81 MG/1
81 TABLET ORAL DAILY
Status: DISCONTINUED | OUTPATIENT
Start: 2022-08-21 | End: 2022-08-25 | Stop reason: HOSPADM

## 2022-08-21 RX ORDER — POTASSIUM CHLORIDE 7.45 MG/ML
10 INJECTION INTRAVENOUS
Status: COMPLETED | OUTPATIENT
Start: 2022-08-21 | End: 2022-08-21

## 2022-08-21 RX ORDER — POTASSIUM CHLORIDE 20 MEQ/1
40 TABLET, EXTENDED RELEASE ORAL 4 TIMES DAILY
Status: DISCONTINUED | OUTPATIENT
Start: 2022-08-21 | End: 2022-08-23

## 2022-08-21 RX ORDER — OXCARBAZEPINE 150 MG/1
150 TABLET, FILM COATED ORAL 2 TIMES DAILY
Status: DISCONTINUED | OUTPATIENT
Start: 2022-08-21 | End: 2022-08-25 | Stop reason: HOSPADM

## 2022-08-21 RX ORDER — TAMSULOSIN HYDROCHLORIDE 0.4 MG/1
0.4 CAPSULE ORAL DAILY
Status: DISCONTINUED | OUTPATIENT
Start: 2022-08-21 | End: 2022-08-25 | Stop reason: HOSPADM

## 2022-08-21 RX ADMIN — APIXABAN 2.5 MG: 2.5 TABLET, FILM COATED ORAL at 11:08

## 2022-08-21 RX ADMIN — POTASSIUM CHLORIDE 40 MEQ: 1500 TABLET, EXTENDED RELEASE ORAL at 09:08

## 2022-08-21 RX ADMIN — POTASSIUM CHLORIDE 40 MEQ: 1500 TABLET, EXTENDED RELEASE ORAL at 01:08

## 2022-08-21 RX ADMIN — ATORVASTATIN CALCIUM 20 MG: 20 TABLET, FILM COATED ORAL at 10:08

## 2022-08-21 RX ADMIN — POTASSIUM CHLORIDE 10 MEQ: 10 INJECTION, SOLUTION INTRAVENOUS at 07:08

## 2022-08-21 RX ADMIN — ACETAMINOPHEN 650 MG: 325 TABLET ORAL at 10:08

## 2022-08-21 RX ADMIN — ACETAMINOPHEN 650 MG: 325 TABLET ORAL at 03:08

## 2022-08-21 RX ADMIN — MUPIROCIN: 20 OINTMENT TOPICAL at 10:08

## 2022-08-21 RX ADMIN — POTASSIUM CHLORIDE 10 MEQ: 10 INJECTION, SOLUTION INTRAVENOUS at 11:08

## 2022-08-21 RX ADMIN — ACETAMINOPHEN 650 MG: 325 TABLET ORAL at 08:08

## 2022-08-21 RX ADMIN — POTASSIUM CHLORIDE 10 MEQ: 10 INJECTION, SOLUTION INTRAVENOUS at 09:08

## 2022-08-21 RX ADMIN — POTASSIUM CHLORIDE 40 MEQ: 1500 TABLET, EXTENDED RELEASE ORAL at 05:08

## 2022-08-21 RX ADMIN — OXCARBAZEPINE 150 MG: 150 TABLET, FILM COATED ORAL at 09:08

## 2022-08-21 RX ADMIN — APIXABAN 2.5 MG: 2.5 TABLET, FILM COATED ORAL at 09:08

## 2022-08-21 RX ADMIN — POTASSIUM CHLORIDE 10 MEQ: 10 INJECTION, SOLUTION INTRAVENOUS at 10:08

## 2022-08-21 RX ADMIN — ASPIRIN 81 MG: 81 TABLET, COATED ORAL at 10:08

## 2022-08-21 RX ADMIN — TAMSULOSIN HYDROCHLORIDE 0.4 MG: 0.4 CAPSULE ORAL at 10:08

## 2022-08-21 RX ADMIN — MUPIROCIN: 20 OINTMENT TOPICAL at 09:08

## 2022-08-21 RX ADMIN — OXCARBAZEPINE 150 MG: 150 TABLET, FILM COATED ORAL at 10:08

## 2022-08-21 NOTE — PLAN OF CARE
Admit plans d/w ED- chart / labs and ED course reviewed.  Will cont slow IV fluids overnight and repeat labs in a.m.  Cont oral KCL supp to replace K.  Full consult to follow.

## 2022-08-21 NOTE — HPI
81 y.o. male with PMHX of  CHF, COPD, AFib on Eliquis presents with generalized weakness and frequent falls for the past week.  Patient says he fell multiple times but did not hit his head or have loss of consciousness.  Last fall was earlier today onto his right side.  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  Patient has not been eating well.  Patient continues to take his torsemide which he notes has been making his lower extremity swelling much improved.  Patient denies any chest pain, vomiting, diarrhea, black tarry stool, headache, dizziness, changes in mentation

## 2022-08-21 NOTE — H&P
[facility] Medicine  History & Physical    Patient Name: Jamey Lei  MRN: [id number]  Patient Class: IP- Inpatient  Admission Date: 8/20/2022  Attending Physician: Jamey Liu Jr., MD   Primary Care Provider: Navi Domínguez III, MD         Patient information was obtained from patient and ER records.     Subjective:     Principal Problem:<principal problem not specified>    Chief Complaint:   Chief Complaint   Patient presents with    Weakness     Patient arrived through [facility], patient fell at house. Patient is weak and reports lower back pain.        HPI: 81 y.o. male with PMHX of  CHF, COPD, AFib on Eliquis presents with generalized weakness and frequent falls for the past week.  Patient says he fell multiple times but did not hit his head or have loss of consciousness.  Last fall was earlier today onto his right side.  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  Patient has not been eating well.  Patient continues to take his torsemide which he notes has been making his lower extremity swelling much improved.  Patient denies any chest pain, vomiting, diarrhea, black tarry stool, headache, dizziness, changes in mentation      Past Medical History:   Diagnosis Date    A-fib     Abdominal pain, epigastric     Abdominal pain, generalized     Acute on chronic congestive heart failure 12/29/2021    Anticoagulant long-term use     Arthritis     Asteatotic eczema     Benign essential HTN     Benign prostatic hyperplasia     Brain tumor     Cardiovascular accident     Carotid artery stenosis     CHF (congestive heart failure)     CKD (chronic kidney disease), stage IV     Constipation     COPD (chronic obstructive pulmonary disease)     Depressed     DM (diabetes mellitus), secondary     Drug eruption     Eczema     Edema     Encounter for blood transfusion     Fever     H. pylori infection     Hearing loss      History of tobacco use     Hypercholesterolemia     Hypertensive renal disease, benign     Hypokalemia     Hyponatremia     Impaired fasting glucose     Lumbar pain     Lung mass     Malaise and fatigue     Malignant neoplasm of prostate     Meningioma     Mixed hyperlipidemia     Nasal bleeding     Neuropathy     Obesity, unspecified     SHAYNE (obstructive sleep apnea)     Osteoarthritis of knee     Osteoarthritis of multiple joints     Peripheral vascular disease, unspecified     Psychosis     Renal disorder     Seizure disorder     Seizures     Sleep apnea     Unspecified cataract     Unspecified chronic bronchitis     Unspecified osteoarthritis, unspecified site     Vertigo     Vitamin D deficiency     Wheezing        Past Surgical History:   Procedure Laterality Date    APPENDECTOMY      COLONOSCOPY      CORONARY ARTERY BYPASS GRAFT      FOOT SURGERY Left     HIP REPLACEMENT ARTHROPLASTY Right     HIP SURGERY      JOINT REPLACEMENT      KNEE SURGERY      LUNG BIOPSY Right 03/03/2022    benign    SHOULDER SURGERY Left     SHOULDER SURGERY      TOTAL KNEE ARTHROPLASTY Right     triple bypass         Review of patient's allergies indicates:   Allergen Reactions    Zolpidem        No current facility-administered medications on file prior to encounter.     Current Outpatient Medications on File Prior to Encounter   Medication Sig    apixaban (ELIQUIS) 5 mg Tab Take 5 mg by mouth 2 (two) times daily.    aspirin (ECOTRIN) 81 MG EC tablet Take 81 mg by mouth once daily.    atorvastatin (LIPITOR) 20 MG tablet Take 1 tablet (20 mg total) by mouth once daily.    docusate sodium (COLACE) 100 MG capsule Take 100 mg by mouth 2 (two) times daily.    ezetimibe (ZETIA) 10 mg tablet Take 10 mg by mouth once daily.    guaiFENesin (MUCINEX) 600 mg 12 hr tablet Take 1,200 mg by mouth 2 (two) times daily.    HYDROcodone-acetaminophen (NORCO) 5-325 mg per tablet Take 1 tablet by  mouth every 6 (six) hours as needed for Pain.    metOLazone (ZAROXOLYN) 5 MG tablet TAKE ONE TABLET BY MOUTH ONCE DAILY THANK YOU    metoprolol tartrate (LOPRESSOR) 25 MG tablet Take 25 mg by mouth 2 (two) times daily.    multivitamin capsule Take 1 capsule by mouth once daily.    OXcarbazepine (TRILEPTAL) 300 MG Tab Take 1 tablet (300 mg total) by mouth 2 (two) times daily.    potassium chloride SA (K-DUR,KLOR-CON) 20 MEQ tablet TAKE TWO TABLETS BY MOUTH TWICE A DAY. THANK YOU    senna (SENOKOT) 8.6 mg tablet 2 tablets at bedtime as needed    torsemide (DEMADEX) 100 MG Tab Take 20 mg by mouth once daily.    vitamin D (VITAMIN D3) 1000 units Tab Take 1,000 Units by mouth once daily.    [DISCONTINUED] miconazole NITRATE 2 % (MICOTIN) 2 % top powder Apply topically as needed for Itching.    albuterol (PROVENTIL/VENTOLIN HFA) 90 mcg/actuation inhaler Inhale 1-2 puffs into the lungs every 6 (six) hours as needed for Wheezing or Shortness of Breath. Rescue    albuterol-ipratropium (DUO-NEB) 2.5 mg-0.5 mg/3 mL nebulizer solution inhale 3 milliliters by nebulization route 4 times per day and as needed, up to 6 doses per day for 30 days    carvediloL (COREG) 6.25 MG tablet TAKE ONE TABLET BY MOUTH TWICE DAILY (EVERY 12 HOURS) FOR BLOOD PRESSURE. THANKS !!    ezetimibe (ZETIA) 10 mg tablet Take 10 mg by mouth once daily.    FLUoxetine 20 MG capsule     levoFLOXacin (LEVAQUIN) 500 MG tablet TAKE ONE TABLET BY MOUTH ONCE SALINAS FOR IINFECTION. THANKS!!    lovastatin (MEVACOR) 10 MG tablet 1 tablet with a meal    mirtazapine (REMERON) 15 MG tablet Take 15 mg by mouth nightly.    risperiDONE (RISPERDAL) 0.5 MG Tab Take 1 tablet (0.5 mg total) by mouth nightly as needed (agitation and halluctions).    tamsulosin (FLOMAX) 0.4 mg Cap Take 0.4 mg by mouth once daily.    [DISCONTINUED] calcium carbonate (OS-DAVID) 600 mg calcium (1,500 mg) Tab Take 600 mg by mouth once.    [DISCONTINUED] gabapentin (NEURONTIN) 300 MG  capsule 1 capsule    [DISCONTINUED] ibuprofen (MOTRIN ORAL) Take by mouth.    [DISCONTINUED] miconazole NITRATE 2 % (MICOTIN) 2 % top powder Apply topically as needed for Itching.    [DISCONTINUED] promethazine-dextromethorphan (PROMETHAZINE-DM) 6.25-15 mg/5 mL Syrp TAKE 1 TEASPOONFUL BY MOUTH EVERY 6 HOURS AS NEEDED WILL MAKE DROWSY     Family History       Problem Relation (Age of Onset)    Arthritis Mother, Father    Asthma Sister    Heart disease Father    Hyperlipidemia Mother, Sister    Hypertension Mother, Father, Sister, Brother          Tobacco Use    Smoking status: Current Every Day Smoker    Smokeless tobacco: Never Used    Tobacco comment: quit 30 years ago   Substance and Sexual Activity    Alcohol use: Not Currently    Drug use: Never    Sexual activity: Not Currently     Review of Systems   Constitutional:  Positive for fatigue.   Eyes:  Negative for visual disturbance.   Respiratory:  Positive for shortness of breath. Negative for wheezing.    Cardiovascular:  Positive for leg swelling. Negative for chest pain and palpitations.   Gastrointestinal:  Negative for abdominal distention and abdominal pain.   Endocrine: Negative for polyuria.   Genitourinary:  Negative for difficulty urinating.   Musculoskeletal:  Positive for arthralgias and back pain.   Neurological:  Positive for dizziness and light-headedness. Negative for syncope.   Psychiatric/Behavioral:  Negative for hallucinations. The patient is not nervous/anxious.    Objective:     Vital Signs (Most Recent):  Temp: 97.7 °F (36.5 °C) (08/21/22 0741)  Pulse: 67 (08/21/22 0741)  Resp: 20 (08/21/22 0741)  BP: 130/64 (08/21/22 0741)  SpO2: 95 % (08/21/22 0741) Vital Signs (24h Range):  Temp:  [96.7 °F (35.9 °C)-97.9 °F (36.6 °C)] 97.7 °F (36.5 °C)  Pulse:  [54-68] 67  Resp:  [16-20] 20  SpO2:  [95 %-99 %] 95 %  BP: (114-203)/(57-78) 130/64     Weight: 108.9 kg (240 lb)  Body mass index is 34.44 kg/m².    Physical Exam  Vitals and nursing  note reviewed.   Constitutional:       General: He is not in acute distress.     Appearance: He is well-developed. He is not diaphoretic.   HENT:      Head: Normocephalic and atraumatic.      Nose: No congestion or rhinorrhea.      Mouth/Throat:      Mouth: Mucous membranes are moist.      Pharynx: Oropharynx is clear. No oropharyngeal exudate or posterior oropharyngeal erythema.   Eyes:      General: No scleral icterus.        Right eye: No discharge.         Left eye: No discharge.      Extraocular Movements: Extraocular movements intact.      Pupils: Pupils are equal, round, and reactive to light.   Neck:      Thyroid: No thyromegaly.      Vascular: No JVD.   Cardiovascular:      Rate and Rhythm: Normal rate and regular rhythm.      Heart sounds: No murmur heard.  Pulmonary:      Effort: Pulmonary effort is normal. No respiratory distress.      Breath sounds: Normal breath sounds. No wheezing.   Abdominal:      General: Bowel sounds are normal. There is no distension.      Palpations: Abdomen is soft.      Tenderness: There is no abdominal tenderness.   Musculoskeletal:         General: No swelling or tenderness.   Skin:     General: Skin is warm and dry.   Neurological:      General: No focal deficit present.      Mental Status: He is alert and oriented to person, place, and time. Mental status is at baseline.   Psychiatric:         Mood and Affect: Mood normal.         Behavior: Behavior normal.         Thought Content: Thought content normal.         CRANIAL NERVES     CN III, IV, VI   Pupils are equal, round, and reactive to light.     Significant Labs: All pertinent labs within the past 24 hours have been reviewed.  Recent Lab Results         08/21/22  0419   08/20/22  1501   08/20/22  1412   08/20/22  1223        Albumin 3.0       3.3       Alkaline Phosphatase 67       73       ALT 17       19       Anion Gap 9       11       Appearance, UA     Clear         AST 15       16       Baso # 0.05       0.03        Basophil % 0.5       0.3       Bilirubin (UA)     Negative         BILIRUBIN TOTAL 0.4  Comment: For infants and newborns, interpretation of results should be based  on gestational age, weight and in agreement with clinical  observations.    Premature Infant recommended reference ranges:  Up to 24 hours.............<8.0 mg/dL  Up to 48 hours............<12.0 mg/dL  3-5 days..................<15.0 mg/dL  6-29 days.................<15.0 mg/dL    For patients on Eltrombopag therapy, use of Dimension Walker TBIL is   not   recommended.         0.4  Comment: For infants and newborns, interpretation of results should be based  on gestational age, weight and in agreement with clinical  observations.    Premature Infant recommended reference ranges:  Up to 24 hours.............<8.0 mg/dL  Up to 48 hours............<12.0 mg/dL  3-5 days..................<15.0 mg/dL  6-29 days.................<15.0 mg/dL    For patients on Eltrombopag therapy, use of Dimension Walker TBIL is   not   recommended.                171       Calcium 9.2       9.5       Chloride 92       89       CO2 34       35       Color, UA     Yellow         Creatinine 3.4       4.2       Differential Method Automated       Automated       eGFR 17.6       13.7       Eos # 0.4       0.2       Eosinophil % 4.3       2.2       Glucose 123       152       Glucose, UA     Negative         Gran # (ANC) 6.7       8.3       Gran % 67.0       75.6       Hematocrit 31.4       32.9       Hemoglobin 9.8       10.5       Immature Grans (Abs) 0.03  Comment: Mild elevation in immature granulocytes is non specific and   can be seen in a variety of conditions including stress response,   acute inflammation, trauma and pregnancy. Correlation with other   laboratory and clinical findings is essential.         0.03  Comment: Mild elevation in immature granulocytes is non specific and   can be seen in a variety of conditions including stress response,   acute inflammation,  trauma and pregnancy. Correlation with other   laboratory and clinical findings is essential.         Immature Granulocytes 0.3       0.3       Ketones, UA     Negative         Leukocytes, UA     Negative         Lymph # 1.6       1.2       Lymph % 15.9       10.5       Magnesium 2.9       3.3       MCH 25.1       25.7       MCHC 31.2       31.9       MCV 80       81       Mono # 1.2       1.2       Mono % 12.0       11.1       MPV 11.1       9.9       NITRITE UA     Negative         nRBC 0       0       NT-proBNP       978       Occult Blood UA     Negative         pH, UA     6.0         Platelets 294       305       Potassium 2.6  Comment: Potassium critical result(s) called and verbal readback obtained from   [facility] on Med Surg by [facility] 08/21/2022 05:55         2.8       PROTEIN TOTAL 7.3       7.9       Protein, UA     Negative  Comment: Recommend a 24 hour urine protein or a urine   protein/creatinine ratio if globulin induced proteinuria is  clinically suspected.            Acceptable   Yes           RBC 3.91       4.08       RDW 16.0       16.0       SARS-CoV-2 RNA, Amplification, Qual   Negative           Sodium 135       135       Specific [place], UA     1.010         Specimen UA     Urine, Catheterized         Troponin I High Sensitivity       73.0       UROBILINOGEN UA     1.0         WBC 10.02       10.93               Significant Imaging: I have reviewed all pertinent imaging results/findings within the past 24 hours.    Assessment/Plan:     Dehydration  IV fluids on board, electrolyte repletion      BRIGETTE (acute kidney injury)  See above      Hypokalemia  Repletion in progress      Stage 3 chronic kidney disease  Acute on chronic BRIGETTE, likely prerenal, renal consult in progress, gentle fluids, creatinine improved, adjust home med dosing       Seizure disorder  Continue old meds, dose adjusted for renal function      SHAYNE (obstructive sleep apnea)  CPAP nightly      A-fib  Patient with  Persistent (7 days or more) atrial fibrillation which is controlled currently with Beta Blocker. Patient is currently in sinus rhythm.[id number] Score: 4. HASBLED Score: Unable to calculate. Anticoagulation indicated. Anticoagulation done with Eliquis.        COPD (chronic obstructive pulmonary disease)  No wheezing noted on exam, nebs p.r.n., wean oxygen as tolerated        VTE Risk Mitigation (From admission, onward)         Ordered     apixaban tablet 2.5 mg  2 times daily         08/21/22 1006     IP VTE HIGH RISK PATIENT  Once         08/20/22 1621     Place sequential compression device  Until discontinued         08/20/22 1621                   Trent Ramachandran MD  Department of Hospital Medicine   [facility]

## 2022-08-21 NOTE — ASSESSMENT & PLAN NOTE
Patient with Persistent (7 days or more) atrial fibrillation which is controlled currently with Beta Blocker. Patient is currently in sinus rhythm.[id number] Score: 4. HASBLED Score: Unable to calculate. Anticoagulation indicated. Anticoagulation done with Eliquis.

## 2022-08-21 NOTE — SUBJECTIVE & OBJECTIVE
Past Medical History:   Diagnosis Date    A-fib     Abdominal pain, epigastric     Abdominal pain, generalized     Acute on chronic congestive heart failure 12/29/2021    Anticoagulant long-term use     Arthritis     Asteatotic eczema     Benign essential HTN     Benign prostatic hyperplasia     Brain tumor     Cardiovascular accident     Carotid artery stenosis     CHF (congestive heart failure)     CKD (chronic kidney disease), stage IV     Constipation     COPD (chronic obstructive pulmonary disease)     Depressed     DM (diabetes mellitus), secondary     Drug eruption     Eczema     Edema     Encounter for blood transfusion     Fever     H. pylori infection     Hearing loss     History of tobacco use     Hypercholesterolemia     Hypertensive renal disease, benign     Hypokalemia     Hyponatremia     Impaired fasting glucose     Lumbar pain     Lung mass     Malaise and fatigue     Malignant neoplasm of prostate     Meningioma     Mixed hyperlipidemia     Nasal bleeding     Neuropathy     Obesity, unspecified     SHAYNE (obstructive sleep apnea)     Osteoarthritis of knee     Osteoarthritis of multiple joints     Peripheral vascular disease, unspecified     Psychosis     Renal disorder     Seizure disorder     Seizures     Sleep apnea     Unspecified cataract     Unspecified chronic bronchitis     Unspecified osteoarthritis, unspecified site     Vertigo     Vitamin D deficiency     Wheezing        Past Surgical History:   Procedure Laterality Date    APPENDECTOMY      COLONOSCOPY      CORONARY ARTERY BYPASS GRAFT      FOOT SURGERY Left     HIP REPLACEMENT ARTHROPLASTY Right     HIP SURGERY      JOINT REPLACEMENT      KNEE SURGERY      LUNG BIOPSY Right 03/03/2022    benign    SHOULDER SURGERY Left     SHOULDER SURGERY      TOTAL KNEE ARTHROPLASTY Right     triple bypass         Review of patient's allergies indicates:   Allergen Reactions    Zolpidem        No current facility-administered medications on file prior  to encounter.     Current Outpatient Medications on File Prior to Encounter   Medication Sig    apixaban (ELIQUIS) 5 mg Tab Take 5 mg by mouth 2 (two) times daily.    aspirin (ECOTRIN) 81 MG EC tablet Take 81 mg by mouth once daily.    atorvastatin (LIPITOR) 20 MG tablet Take 1 tablet (20 mg total) by mouth once daily.    docusate sodium (COLACE) 100 MG capsule Take 100 mg by mouth 2 (two) times daily.    ezetimibe (ZETIA) 10 mg tablet Take 10 mg by mouth once daily.    guaiFENesin (MUCINEX) 600 mg 12 hr tablet Take 1,200 mg by mouth 2 (two) times daily.    HYDROcodone-acetaminophen (NORCO) 5-325 mg per tablet Take 1 tablet by mouth every 6 (six) hours as needed for Pain.    metOLazone (ZAROXOLYN) 5 MG tablet TAKE ONE TABLET BY MOUTH ONCE DAILY THANK YOU    metoprolol tartrate (LOPRESSOR) 25 MG tablet Take 25 mg by mouth 2 (two) times daily.    multivitamin capsule Take 1 capsule by mouth once daily.    OXcarbazepine (TRILEPTAL) 300 MG Tab Take 1 tablet (300 mg total) by mouth 2 (two) times daily.    potassium chloride SA (K-DUR,KLOR-CON) 20 MEQ tablet TAKE TWO TABLETS BY MOUTH TWICE A DAY. THANK YOU    senna (SENOKOT) 8.6 mg tablet 2 tablets at bedtime as needed    torsemide (DEMADEX) 100 MG Tab Take 20 mg by mouth once daily.    vitamin D (VITAMIN D3) 1000 units Tab Take 1,000 Units by mouth once daily.    [DISCONTINUED] miconazole NITRATE 2 % (MICOTIN) 2 % top powder Apply topically as needed for Itching.    albuterol (PROVENTIL/VENTOLIN HFA) 90 mcg/actuation inhaler Inhale 1-2 puffs into the lungs every 6 (six) hours as needed for Wheezing or Shortness of Breath. Rescue    albuterol-ipratropium (DUO-NEB) 2.5 mg-0.5 mg/3 mL nebulizer solution inhale 3 milliliters by nebulization route 4 times per day and as needed, up to 6 doses per day for 30 days    carvediloL (COREG) 6.25 MG tablet TAKE ONE TABLET BY MOUTH TWICE DAILY (EVERY 12 HOURS) FOR BLOOD PRESSURE. THANKS !!    ezetimibe (ZETIA) 10 mg tablet Take 10 mg  by mouth once daily.    FLUoxetine 20 MG capsule     levoFLOXacin (LEVAQUIN) 500 MG tablet TAKE ONE TABLET BY MOUTH ONCE SALINAS FOR IINFECTION. THANKS!!    lovastatin (MEVACOR) 10 MG tablet 1 tablet with a meal    mirtazapine (REMERON) 15 MG tablet Take 15 mg by mouth nightly.    risperiDONE (RISPERDAL) 0.5 MG Tab Take 1 tablet (0.5 mg total) by mouth nightly as needed (agitation and halluctions).    tamsulosin (FLOMAX) 0.4 mg Cap Take 0.4 mg by mouth once daily.    [DISCONTINUED] calcium carbonate (OS-DAVID) 600 mg calcium (1,500 mg) Tab Take 600 mg by mouth once.    [DISCONTINUED] gabapentin (NEURONTIN) 300 MG capsule 1 capsule    [DISCONTINUED] ibuprofen (MOTRIN ORAL) Take by mouth.    [DISCONTINUED] miconazole NITRATE 2 % (MICOTIN) 2 % top powder Apply topically as needed for Itching.    [DISCONTINUED] promethazine-dextromethorphan (PROMETHAZINE-DM) 6.25-15 mg/5 mL Syrp TAKE 1 TEASPOONFUL BY MOUTH EVERY 6 HOURS AS NEEDED WILL MAKE DROWSY     Family History       Problem Relation (Age of Onset)    Arthritis Mother, Father    Asthma Sister    Heart disease Father    Hyperlipidemia Mother, Sister    Hypertension Mother, Father, Sister, Brother          Tobacco Use    Smoking status: Current Every Day Smoker    Smokeless tobacco: Never Used    Tobacco comment: quit 30 years ago   Substance and Sexual Activity    Alcohol use: Not Currently    Drug use: Never    Sexual activity: Not Currently     Review of Systems   Constitutional:  Positive for fatigue.   Eyes:  Negative for visual disturbance.   Respiratory:  Positive for shortness of breath. Negative for wheezing.    Cardiovascular:  Positive for leg swelling. Negative for chest pain and palpitations.   Gastrointestinal:  Negative for abdominal distention and abdominal pain.   Endocrine: Negative for polyuria.   Genitourinary:  Negative for difficulty urinating.   Musculoskeletal:  Positive for arthralgias and back pain.   Neurological:  Positive for dizziness and  light-headedness. Negative for syncope.   Psychiatric/Behavioral:  Negative for hallucinations. The patient is not nervous/anxious.    Objective:     Vital Signs (Most Recent):  Temp: 97.7 °F (36.5 °C) (08/21/22 0741)  Pulse: 67 (08/21/22 0741)  Resp: 20 (08/21/22 0741)  BP: 130/64 (08/21/22 0741)  SpO2: 95 % (08/21/22 0741) Vital Signs (24h Range):  Temp:  [96.7 °F (35.9 °C)-97.9 °F (36.6 °C)] 97.7 °F (36.5 °C)  Pulse:  [54-68] 67  Resp:  [16-20] 20  SpO2:  [95 %-99 %] 95 %  BP: (114-203)/(57-78) 130/64     Weight: 108.9 kg (240 lb)  Body mass index is 34.44 kg/m².    Physical Exam  Vitals and nursing note reviewed.   Constitutional:       General: He is not in acute distress.     Appearance: He is well-developed. He is not diaphoretic.   HENT:      Head: Normocephalic and atraumatic.      Nose: No congestion or rhinorrhea.      Mouth/Throat:      Mouth: Mucous membranes are moist.      Pharynx: Oropharynx is clear. No oropharyngeal exudate or posterior oropharyngeal erythema.   Eyes:      General: No scleral icterus.        Right eye: No discharge.         Left eye: No discharge.      Extraocular Movements: Extraocular movements intact.      Pupils: Pupils are equal, round, and reactive to light.   Neck:      Thyroid: No thyromegaly.      Vascular: No JVD.   Cardiovascular:      Rate and Rhythm: Normal rate and regular rhythm.      Heart sounds: No murmur heard.  Pulmonary:      Effort: Pulmonary effort is normal. No respiratory distress.      Breath sounds: Normal breath sounds. No wheezing.   Abdominal:      General: Bowel sounds are normal. There is no distension.      Palpations: Abdomen is soft.      Tenderness: There is no abdominal tenderness.   Musculoskeletal:         General: No swelling or tenderness.   Skin:     General: Skin is warm and dry.   Neurological:      General: No focal deficit present.      Mental Status: He is alert and oriented to person, place, and time. Mental status is at baseline.    Psychiatric:         Mood and Affect: Mood normal.         Behavior: Behavior normal.         Thought Content: Thought content normal.         CRANIAL NERVES     CN III, IV, VI   Pupils are equal, round, and reactive to light.     Significant Labs: All pertinent labs within the past 24 hours have been reviewed.  Recent Lab Results         08/21/22  0419   08/20/22  1501   08/20/22  1412   08/20/22  1223        Albumin 3.0       3.3       Alkaline Phosphatase 67       73       ALT 17       19       Anion Gap 9       11       Appearance, UA     Clear         AST 15       16       Baso # 0.05       0.03       Basophil % 0.5       0.3       Bilirubin (UA)     Negative         BILIRUBIN TOTAL 0.4  Comment: For infants and newborns, interpretation of results should be based  on gestational age, weight and in agreement with clinical  observations.    Premature Infant recommended reference ranges:  Up to 24 hours.............<8.0 mg/dL  Up to 48 hours............<12.0 mg/dL  3-5 days..................<15.0 mg/dL  6-29 days.................<15.0 mg/dL    For patients on Eltrombopag therapy, use of Dimension [place] TBIL is   not   recommended.         0.4  Comment: For infants and newborns, interpretation of results should be based  on gestational age, weight and in agreement with clinical  observations.    Premature Infant recommended reference ranges:  Up to 24 hours.............<8.0 mg/dL  Up to 48 hours............<12.0 mg/dL  3-5 days..................<15.0 mg/dL  6-29 days.................<15.0 mg/dL    For patients on Eltrombopag therapy, use of Dimension [place] TBIL is   not   recommended.                171       Calcium 9.2       9.5       Chloride 92       89       CO2 34       35       Color, UA     Yellow         Creatinine 3.4       4.2       Differential Method Automated       Automated       eGFR 17.6       13.7       Eos # 0.4       0.2       Eosinophil % 4.3       2.2       Glucose 123       152        Glucose, UA     Negative         Gran # (ANC) 6.7       8.3       Gran % 67.0       75.6       Hematocrit 31.4       32.9       Hemoglobin 9.8       10.5       Immature Grans (Abs) 0.03  Comment: Mild elevation in immature granulocytes is non specific and   can be seen in a variety of conditions including stress response,   acute inflammation, trauma and pregnancy. Correlation with other   laboratory and clinical findings is essential.         0.03  Comment: Mild elevation in immature granulocytes is non specific and   can be seen in a variety of conditions including stress response,   acute inflammation, trauma and pregnancy. Correlation with other   laboratory and clinical findings is essential.         Immature Granulocytes 0.3       0.3       Ketones, UA     Negative         Leukocytes, UA     Negative         Lymph # 1.6       1.2       Lymph % 15.9       10.5       Magnesium 2.9       3.3       MCH 25.1       25.7       MCHC 31.2       31.9       MCV 80       81       Mono # 1.2       1.2       Mono % 12.0       11.1       MPV 11.1       9.9       NITRITE UA     Negative         nRBC 0       0       NT-proBNP       978       Occult Blood UA     Negative         pH, UA     6.0         Platelets 294       305       Potassium 2.6  Comment: Potassium critical result(s) called and verbal readback obtained from   [facility] on Med Surg by HLL 08/21/2022 05:55         2.8       PROTEIN TOTAL 7.3       7.9       Protein, UA     Negative  Comment: Recommend a 24 hour urine protein or a urine   protein/creatinine ratio if globulin induced proteinuria is  clinically suspected.            Acceptable   Yes           RBC 3.91       4.08       RDW 16.0       16.0       SARS-CoV-2 RNA, Amplification, Qual   Negative           Sodium 135       135       Specific [place], UA     1.010         Specimen UA     Urine, Catheterized         Troponin I High Sensitivity       73.0       UROBILINOGEN UA     1.0         WBC  10.02       10.93               Significant Imaging: I have reviewed all pertinent imaging results/findings within the past 24 hours.

## 2022-08-21 NOTE — ASSESSMENT & PLAN NOTE
Acute on chronic BRIGETTE, likely prerenal, renal consult in progress, gentle fluids, creatinine improved, adjust home med dosing

## 2022-08-22 PROBLEM — E87.3 METABOLIC ALKALOSIS: Status: ACTIVE | Noted: 2022-08-22

## 2022-08-22 LAB
ALBUMIN SERPL BCP-MCNC: 2.6 G/DL (ref 3.5–5.2)
ALP SERPL-CCNC: 52 U/L (ref 55–135)
ALT SERPL W/O P-5'-P-CCNC: 15 U/L (ref 10–44)
ANION GAP SERPL CALC-SCNC: 7 MMOL/L (ref 8–16)
AST SERPL-CCNC: 14 U/L (ref 10–40)
BASOPHILS # BLD AUTO: 0.04 K/UL (ref 0–0.2)
BASOPHILS NFR BLD: 0.5 % (ref 0–1.9)
BILIRUB SERPL-MCNC: 0.3 MG/DL (ref 0.1–1)
BUN SERPL-MCNC: 130 MG/DL (ref 8–23)
CALCIUM SERPL-MCNC: 9.3 MG/DL (ref 8.7–10.5)
CHLORIDE SERPL-SCNC: 102 MMOL/L (ref 95–110)
CO2 SERPL-SCNC: 30 MMOL/L (ref 23–29)
CREAT SERPL-MCNC: 2.8 MG/DL (ref 0.5–1.4)
DIFFERENTIAL METHOD: ABNORMAL
EOSINOPHIL # BLD AUTO: 0.3 K/UL (ref 0–0.5)
EOSINOPHIL NFR BLD: 2.8 % (ref 0–8)
ERYTHROCYTE [DISTWIDTH] IN BLOOD BY AUTOMATED COUNT: 16 % (ref 11.5–14.5)
EST. GFR  (NO RACE VARIABLE): 22.3 ML/MIN/1.73 M^2
GLUCOSE SERPL-MCNC: 151 MG/DL (ref 70–110)
HCT VFR BLD AUTO: 29.7 % (ref 40–54)
HGB BLD-MCNC: 9.2 G/DL (ref 14–18)
IMM GRANULOCYTES # BLD AUTO: 0.03 K/UL (ref 0–0.04)
IMM GRANULOCYTES NFR BLD AUTO: 0.3 % (ref 0–0.5)
LYMPHOCYTES # BLD AUTO: 0.9 K/UL (ref 1–4.8)
LYMPHOCYTES NFR BLD: 10.6 % (ref 18–48)
MAGNESIUM SERPL-MCNC: 2.8 MG/DL (ref 1.6–2.6)
MCH RBC QN AUTO: 25.3 PG (ref 27–31)
MCHC RBC AUTO-ENTMCNC: 31 G/DL (ref 32–36)
MCV RBC AUTO: 82 FL (ref 82–98)
MONOCYTES # BLD AUTO: 1 K/UL (ref 0.3–1)
MONOCYTES NFR BLD: 11.6 % (ref 4–15)
NEUTROPHILS # BLD AUTO: 6.5 K/UL (ref 1.8–7.7)
NEUTROPHILS NFR BLD: 74.2 % (ref 38–73)
NRBC BLD-RTO: 0 /100 WBC
PLATELET # BLD AUTO: 240 K/UL (ref 150–450)
PMV BLD AUTO: 10.5 FL (ref 9.2–12.9)
POTASSIUM SERPL-SCNC: 3.7 MMOL/L (ref 3.5–5.1)
PROT SERPL-MCNC: 6.7 G/DL (ref 6–8.4)
RBC # BLD AUTO: 3.63 M/UL (ref 4.6–6.2)
SODIUM SERPL-SCNC: 139 MMOL/L (ref 136–145)
WBC # BLD AUTO: 8.8 K/UL (ref 3.9–12.7)

## 2022-08-22 PROCEDURE — 25000003 PHARM REV CODE 250: Performed by: INTERNAL MEDICINE

## 2022-08-22 PROCEDURE — 36415 COLL VENOUS BLD VENIPUNCTURE: CPT | Performed by: NURSE PRACTITIONER

## 2022-08-22 PROCEDURE — 80053 COMPREHEN METABOLIC PANEL: CPT | Performed by: NURSE PRACTITIONER

## 2022-08-22 PROCEDURE — 85025 COMPLETE CBC W/AUTO DIFF WBC: CPT | Performed by: NURSE PRACTITIONER

## 2022-08-22 PROCEDURE — 63600175 PHARM REV CODE 636 W HCPCS: Performed by: INTERNAL MEDICINE

## 2022-08-22 PROCEDURE — 11000001 HC ACUTE MED/SURG PRIVATE ROOM

## 2022-08-22 PROCEDURE — 83735 ASSAY OF MAGNESIUM: CPT | Performed by: NURSE PRACTITIONER

## 2022-08-22 RX ORDER — SODIUM CHLORIDE AND POTASSIUM CHLORIDE 150; 900 MG/100ML; MG/100ML
INJECTION, SOLUTION INTRAVENOUS CONTINUOUS
Status: DISCONTINUED | OUTPATIENT
Start: 2022-08-22 | End: 2022-08-23

## 2022-08-22 RX ADMIN — POTASSIUM CHLORIDE 40 MEQ: 1500 TABLET, EXTENDED RELEASE ORAL at 09:08

## 2022-08-22 RX ADMIN — MUPIROCIN: 20 OINTMENT TOPICAL at 09:08

## 2022-08-22 RX ADMIN — POTASSIUM CHLORIDE 40 MEQ: 1500 TABLET, EXTENDED RELEASE ORAL at 06:08

## 2022-08-22 RX ADMIN — TAMSULOSIN HYDROCHLORIDE 0.4 MG: 0.4 CAPSULE ORAL at 09:08

## 2022-08-22 RX ADMIN — OXCARBAZEPINE 150 MG: 150 TABLET, FILM COATED ORAL at 09:08

## 2022-08-22 RX ADMIN — APIXABAN 2.5 MG: 2.5 TABLET, FILM COATED ORAL at 09:08

## 2022-08-22 RX ADMIN — ASPIRIN 81 MG: 81 TABLET, COATED ORAL at 09:08

## 2022-08-22 RX ADMIN — POTASSIUM CHLORIDE 40 MEQ: 1500 TABLET, EXTENDED RELEASE ORAL at 01:08

## 2022-08-22 RX ADMIN — POTASSIUM CHLORIDE AND SODIUM CHLORIDE: 900; 150 INJECTION, SOLUTION INTRAVENOUS at 09:08

## 2022-08-22 RX ADMIN — ATORVASTATIN CALCIUM 20 MG: 20 TABLET, FILM COATED ORAL at 09:08

## 2022-08-22 NOTE — SUBJECTIVE & OBJECTIVE
Past Medical History:   Diagnosis Date    A-fib     Abdominal pain, epigastric     Abdominal pain, generalized     Acute on chronic congestive heart failure 12/29/2021    Anticoagulant long-term use     Arthritis     Asteatotic eczema     Benign essential HTN     Benign prostatic hyperplasia     Brain tumor     Cardiovascular accident     Carotid artery stenosis     CHF (congestive heart failure)     CKD (chronic kidney disease), stage IV     Constipation     COPD (chronic obstructive pulmonary disease)     Depressed     DM (diabetes mellitus), secondary     Drug eruption     Eczema     Edema     Encounter for blood transfusion     Fever     H. pylori infection     Hearing loss     History of tobacco use     Hypercholesterolemia     Hypertensive renal disease, benign     Hypokalemia     Hyponatremia     Impaired fasting glucose     Lumbar pain     Lung mass     Malaise and fatigue     Malignant neoplasm of prostate     Meningioma     Mixed hyperlipidemia     Nasal bleeding     Neuropathy     Obesity, unspecified     SHAYNE (obstructive sleep apnea)     Osteoarthritis of knee     Osteoarthritis of multiple joints     Peripheral vascular disease, unspecified     Psychosis     Renal disorder     Seizure disorder     Seizures     Sleep apnea     Unspecified cataract     Unspecified chronic bronchitis     Unspecified osteoarthritis, unspecified site     Vertigo     Vitamin D deficiency     Wheezing        Past Surgical History:   Procedure Laterality Date    APPENDECTOMY      COLONOSCOPY      CORONARY ARTERY BYPASS GRAFT      FOOT SURGERY Left     HIP REPLACEMENT ARTHROPLASTY Right     HIP SURGERY      JOINT REPLACEMENT      KNEE SURGERY      LUNG BIOPSY Right 03/03/2022    benign    SHOULDER SURGERY Left     SHOULDER SURGERY      TOTAL KNEE ARTHROPLASTY Right     triple bypass         Review of patient's allergies indicates:   Allergen Reactions    Zolpidem      Current Facility-Administered Medications   Medication  S: Magali Hannah is a 63 y.o. yo female who presents for diabetes follow up. Assessment/Plan:    1. Prediabetes  -current therapy: metformin 500mg daily  -A1c = 5.3 (12/2018); 5.1% (9/2018)  -A1c today to consider d/c metformin    2. High cholesterol  -current therapy: none  - LIPID PANEL    3. Hypertension  -current therapy: losartan 50mg - hold if BP < 115  -BP today: 126/82; running at home: usually well controlled in 140/80 (high readings)    4.  Weight loss  -current therapy: levothyroxine 125mcg daily (reduced by Dr. 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 Belle Arias)  Still has some aches and pains - Dr. Eran Solomon manages  Getting cortisone inj in left knee, had one in left shoulder     Diabetes Maintenance:  A1c = 5.3 (12/2018); 5.1% (9/2018)  Statin: none - LDL =61; TC = 111 (10/2017)  BP at goal: takes losartan if BP >115; running 147/92 at a high at home   ASA: 81mg daily  PPSV23: 2016  Last Eye exam: scheduled for March   Flu: UTD    Dr. Marquis Singh does psych - but needs refills bc dose was increased - advised will refill but Dr. Marquis Singh should be doing refills since he manages these meds  cymbalta 120mg (increase from 60mg)  mirtazapine 30mg (increase from 15mg)     Last AIC:   Lab Results   Component Value Date/Time    Hemoglobin A1c 6.4 (H) 09/09/2017 02:40 AM    Hemoglobin A1c (POC) 5.3 12/28/2018 09:15 AM     Last lipids:   Lab Results   Component Value Date/Time    LDL, calculated 61.8 09/09/2017 02:40 AM     Last Cr:   Lab Results   Component Value Date/Time    Creatinine (POC) 0.9 02/22/2016 10:57 AM    Creatinine 0.85 11/01/2018 02:15 PM     Social history:   Nutrition: overall healthy Frequency    0.9 % NaCl with KCl 20 mEq infusion Continuous    acetaminophen tablet 650 mg Q8H PRN    acetaminophen tablet 650 mg Q8H PRN    apixaban tablet 2.5 mg BID    aspirin EC tablet 81 mg Daily    atorvastatin tablet 20 mg Daily    melatonin tablet 6 mg Nightly PRN    mupirocin 2 % ointment BID    ondansetron injection 4 mg Q8H PRN    OXcarbazepine tablet 150 mg BID    potassium chloride SA CR tablet 40 mEq QID    sodium chloride 0.9% flush 10 mL PRN    tamsulosin 24 hr capsule 0.4 mg Daily     Family History       Problem Relation (Age of Onset)    Arthritis Mother, Father    Asthma Sister    Heart disease Father    Hyperlipidemia Mother, Sister    Hypertension Mother, Father, Sister, Brother          Tobacco Use    Smoking status: Current Every Day Smoker    Smokeless tobacco: Never Used    Tobacco comment: quit 30 years ago   Substance and Sexual Activity    Alcohol use: Not Currently    Drug use: Never    Sexual activity: Not Currently     Review of Systems   Constitutional:  Positive for activity change and appetite change.   Cardiovascular:  Positive for leg swelling.   Gastrointestinal:  Positive for diarrhea.   Genitourinary:  Positive for difficulty urinating.   All other systems reviewed and are negative.  Objective:     Vital Signs (Most Recent):  Temp: 97.8 °F (36.6 °C) (08/22/22 1713)  Pulse: 66 (08/22/22 1713)  Resp: 20 (08/22/22 1713)  BP: (!) 152/70 (08/22/22 1713)  SpO2: (!) 92 % (08/22/22 1713)   Vital Signs (24h Range):  Temp:  [97.8 °F (36.6 °C)-98.6 °F (37 °C)] 97.8 °F (36.6 °C)  Pulse:  [56-76] 66  Resp:  [16-20] 20  SpO2:  [87 %-96 %] 92 %  BP: (132-152)/(61-70) 152/70     Weight: 108.9 kg (240 lb) (08/20/22 1843)  Body mass index is 34.44 kg/m².  Body surface area is 2.32 meters squared.    I/O last 3 completed shifts:  In: 5283.5 [P.O.:2265; I.V.:2618.5; IV Piggyback:400]  Out: 3600 [Urine:3600]    Physical Exam  Vitals reviewed.   Constitutional:       Appearance: Normal appearance.  Physical: more energy   Social: lives with sister     Social History     Tobacco Use   Smoking Status Former Smoker    Packs/day: 0.25    Years: 15.00    Pack years: 3.75    Types: Cigarettes    Last attempt to quit: 2007    Years since quittin.7   Smokeless Tobacco Never Used     Social History     Substance and Sexual Activity   Alcohol Use No     Social History     Substance and Sexual Activity   Drug Use No      Review of Systems:  - Constitutional Symptoms: no fevers or chills  - Cardiovascular: no chest pain  - Respiratory: no shortness of breath  ROS is negative otherwise. I reviewed the following:  Past Medical History:   Diagnosis Date    Advanced care planning/counseling discussion 3/4/16    On File    Bronchitis 2014    Cervicalgia 8/18/15    Dr. Rustam Crotes    Chest pain 5/25/15    Hospitalized at [facility] 5/25/15 (lab work negative)    Chronic indwelling Dunn catheter 2018    Initially placed 2018. Mx by Dr. Bonita Pruitt.  Congestive heart failure, unspecified     Last Echo 2/8/15: EF 55-60%    Constipation 2017    Enthesopathy, spinal ([address] 8/18/15    Dr. Rustam Cortes    Essential hypertension     Foot drop 8/18/15    Dr. Marianne Lao Heart attack [facility]) 2013    Was supposed to See Cardiology for possible pacemaker in 2014- After Cardiology consult locally, no need, EF greatly improved.  Established with Dr. Herschel Moritz Hiatal hernia 2015    3 cm hiatal hernia     Hip pain 8/18/15    Dr. Rustam Cortes    Hypercholesterolemia     Hyperglycemia 2015    A1c 5.9     Hyperlipidemia 2015    NMR lipoprofile- LDL P 997, LDL-c 71, HLD-C-39, TG-60, HLD-P (25.2), Small LDL-P -541, LDL size 20.6    Hypothyroid     Insomnia 2017    Lower extremity edema     Lumbar spondylosis 8/18/15    Dr. Marybeth Breaux 2015    EGD/Colonscopy 6/15- Gastritis, internal hemorrhoids and 3 polyps    Murmur     EDDIE on CPAP     Was referred to Pulmonology - Uses CPAP      HENT:      Head: Normocephalic and atraumatic.   Cardiovascular:      Rate and Rhythm: Normal rate and regular rhythm.      Heart sounds: Normal heart sounds.   Pulmonary:      Effort: Pulmonary effort is normal.      Breath sounds: Normal breath sounds.   Abdominal:      General: Bowel sounds are normal.      Palpations: Abdomen is soft.   Musculoskeletal:         General: No swelling.      Cervical back: Neck supple.   Skin:     General: Skin is warm and dry.   Neurological:      General: No focal deficit present.      Mental Status: He is alert. Mental status is at baseline.       Significant Labs:  Cardiac Markers: No results for input(s): CKMB, TROPONINT, MYOGLOBIN in the last 168 hours.  CBC:   Recent Labs   Lab 08/22/22  0958   WBC 8.80   RBC 3.63*   HGB 9.2*   HCT 29.7*      MCV 82   MCH 25.3*   MCHC 31.0*     CMP:   Recent Labs   Lab 08/22/22  0958   *   CALCIUM 9.3   ALBUMIN 2.6*   PROT 6.7      K 3.7   CO2 30*      *   CREATININE 2.8*   ALKPHOS 52*   ALT 15   AST 14   BILITOT 0.3     Recent Labs   Lab 08/20/22  1412   COLORU Yellow   SPECGRAV 1.010   PHUR 6.0   PROTEINUA Negative   NITRITE Negative   LEUKOCYTESUR Negative   UROBILINOGEN 1.0       Significant Imaging:  Labs: Reviewed  X-Ray: Reviewed   Osteoarthritis of hip 8/18/15    Dr. Tony Bergman    Osteoarthritis, shoulder 8/18/15    Dr. Tony Bergman    Radiculopathy, cervical 8/18/15    Dr. Tony Bergman    Shoulder pain 8/18/15    Dr. Devin Alvarez Spinal stenosis of cervical region 8/18/15    Dr. Devin Alvarez Spinal stenosis, lumbar 8/18/15    Dr. Devin Alvarez Stroke [facility]) 2/25/2014    Established with Neurology, Denice Fonseca NP-Just hospitalized at [facility] 2/7/15-2/10/15. CT negative, but Late effect CV accident with increased tone described on discharge summary, Carotid dopplers showed 50% stenosis bilaterally.  Vitamin D deficiency 7/2015        Current Outpatient Medications   Medication Sig Dispense Refill    topiramate (TOPAMAX) 100 mg tablet Take 1 Tab by mouth two (2) times a day. 180 Tab 2    mirtazapine (REMERON) 15 mg tablet       metFORMIN (GLUCOPHAGE) 500 mg tablet Take 1 Tab by mouth daily. 90 Tab 1    furosemide (LASIX) 40 mg tablet Take 1 Tab by mouth daily. 90 Tab 1    baclofen (LIORESAL) 10 mg tablet TAKE 1 TABLET BY MOUTH THREE TIMES DAILY 270 Tab 2    carvedilol (COREG) 6.25 mg tablet Take 1 Tab by mouth two (2) times daily (with meals). 60 Tab 11    levothyroxine (SYNTHROID) 125 mcg tablet Take 1 Tab by mouth Daily (before breakfast). 90 Tab 3    ergocalciferol (ERGOCALCIFEROL) 50,000 unit capsule Take 1 Cap by mouth every seven (7) days. 12 Cap 1    cyanocobalamin (VITAMIN B12) 1,000 mcg/mL injection INJECT 1 ML UNDER THE SKIN EVERY 14 DAYS FOR B12 DEFICIENCY 1 mL 1    linaclotide (LINZESS) 145 mcg cap capsule Take 1 Cap by mouth Daily (before breakfast). For constipation 90 Cap 3    Blood Glucose Control, High (ONETOUCH VERIO HIGH CONTROL) soln For use with glucometer 1 Each 3    alcohol swabs (BD SINGLE USE SWABS REGULAR) padm To use when checking blood sugar 100 Pad 3    calcipotriene (DOVONEX) 0.005 % topical cream Apply  to affected area daily as needed.  60 g 11    Lancets misc For use with glucometer to check blood sugar once a day in morning before eating. Please dispense brand covered by insurance. 100 Each 3    ROZEREM 8 mg tablet TAKE 1 TABLET BY MOUTH NIGHTLY. TAKE 30 MINUTES BEFORE BEDTIME 90 Tab 2    lidocaine (LIDODERM) 5 % Apply patch to the affected area for 12 hours a day and remove for 12 hours a day. 90 Each 3    glucose blood VI test strips (BLOOD GLUCOSE TEST) strip For use with glucometer to check blood sugar once a day in morning before eating. Please dispense VERIO FLEX STRIPS 100 Strip 11    Insulin Syringe-Needle U-100 (INSULIN SYRINGE) 1 mL 30 gauge x 5/16 syrg For use to administer b12 injections 12 Syringe 1    onabotulinumtoxinA (BOTOX) 200 unit injection 200 Units by IntraMUSCular route every three (3) months. Inject to selected muscles head and neck bilaterally every 3 months for migraine  Indications: MIGRAINE PREVENTION 200 Units 3    DULoxetine (CYMBALTA) 60 mg capsule Take 1 Cap by mouth daily. 90 Cap 3    pantoprazole (PROTONIX) 40 mg tablet Take 1 Tab by mouth daily. 90 Tab 3    morphine CR (MS CONTIN) 15 mg CR tablet Take 1 Tab by mouth every twelve (12) hours. Max Daily Amount: 30 mg. (Patient taking differently: Take 15 mg by mouth daily.) 60 Tab 0    HYDROcodone-acetaminophen (NORCO) 5-325 mg per tablet Take 1 Tab by mouth every six (6) hours as needed for Pain. Max Daily Amount: 4 Tabs. 20 Tab 0    albuterol-ipratropium (DUO-NEB) 2.5 mg-0.5 mg/3 ml nebu 3 mL by Nebulization route every six (6) hours as needed. 30 Nebule 0    losartan (COZAAR) 50 mg tablet Take 1 Tab by mouth nightly. Hold for SBP<115 30 Tab 0    cyclobenzaprine (FLEXERIL) 5 mg tablet Take 5 mg by mouth two (2) times a day.  EPINEPHrine (EPIPEN 2-LAURA) 0.3 mg/0.3 mL injection 0.3 mL by IntraMUSCular route once as needed for up to 1 dose. 2 Syringe 3    albuterol (PROVENTIL HFA, VENTOLIN HFA, PROAIR HFA) 90 mcg/actuation inhaler Take 2 Puffs by inhalation every four (4) hours as needed for Wheezing or Shortness of Breath.  3 Inhaler 3    ferrous sulfate 325 mg (65 mg iron) tablet Take 325 mg by mouth Daily (before breakfast).  aspirin delayed-release 81 mg tablet Take 81 mg by mouth daily. Allergies   Allergen Reactions    Bees [Hymenoptera Allergenic Extract] Shortness of Breath and Swelling    Strawberry Shortness of Breath and Swelling    Lisinopril Cough       O:   Visit Vitals  /82 (BP 1 Location: Right arm, BP Patient Position: Sitting)   Pulse 76   Temp 95.8 °F (35.4 °C) (Oral)   Resp 18   Ht 5' 5\" (1.651 m)   Wt 207 lb 8 oz (94.1 kg)   SpO2 97%   BMI 34.53 kg/m²        GENERAL: [place]  is in no acute distress. Non-toxic. EYE: PERRLA. RESP: Unlabored without SOB. Speaking in full sentences. Breath sounds are symmetrical bilaterally. Clear to auscultation to all fields. No wheezes. No rales or rhonchi. CV: normal rate. Regular rhythm. S1, S2 audible. No murmur noted. No rubs, clicks or gallops noted. NEURO:  awake, alert and oriented to person, place, and time and event. Clear speech. HEME/LYMPH: pedal pulses palpable 2+. No peripheral edema is noted. SKIN: Skin is warm and dry. Turgor is normal. No petechiae, no purpura, no rash. No cyanosis. No mottling, jaundice or pallor. _______________________________________________________________  Patient education was done. Advised on nutrition, physical activity, weight management, tobacco, alcohol and safety. Medication risks/benefits,  interactions and alternatives discussed with patient. Advised of frequent feet checks. Advised yearly eye exam. Reviewed warning signs of hypertension, stroke and heart attack      Patient verbalized understanding and agreed with plan of care. Patient was given an after visit summary which included current diagnoses, medications and vital signs. Discussed BMI and healthy weight.  Encouraged patient to work to implement changes including diet high in fruits, vegetables, lean protein and good fats. Limit refined, processed carbohydrates and sugar. Encouraged regular exercise.

## 2022-08-22 NOTE — SUBJECTIVE & OBJECTIVE
Interval History: Pt seen and evaluated    Review of Systems   Constitutional:  Positive for fatigue.   Eyes:  Negative for visual disturbance.   Respiratory:  Negative for chest tightness, shortness of breath and wheezing.    Cardiovascular:  Positive for leg swelling. Negative for chest pain and palpitations.   Gastrointestinal:  Negative for abdominal distention and abdominal pain.   Endocrine: Negative for polyuria.   Genitourinary:  Negative for difficulty urinating.   Musculoskeletal:  Positive for arthralgias and back pain.   Neurological:  Positive for dizziness and light-headedness. Negative for syncope.   Psychiatric/Behavioral:  Negative for hallucinations. The patient is not nervous/anxious.    Objective:     Vital Signs (Most Recent):  Temp: 98.2 °F (36.8 °C) (08/22/22 0738)  Pulse: 68 (08/22/22 0738)  Resp: 18 (08/22/22 0738)  BP: 137/61 (08/22/22 0738)  SpO2: 95 % (08/22/22 0738) Vital Signs (24h Range):  Temp:  [97.9 °F (36.6 °C)-98.6 °F (37 °C)] 98.2 °F (36.8 °C)  Pulse:  [56-76] 68  Resp:  [16-20] 18  SpO2:  [87 %-96 %] 95 %  BP: (125-140)/(58-67) 137/61     Weight: 108.9 kg (240 lb)  Body mass index is 34.44 kg/m².    Intake/Output Summary (Last 24 hours) at 8/22/2022 0924  Last data filed at 8/22/2022 0700  Gross per 24 hour   Intake 3639.75 ml   Output 2100 ml   Net 1539.75 ml      Physical Exam  Vitals and nursing note reviewed.   Constitutional:       General: He is not in acute distress.     Appearance: He is well-developed. He is not diaphoretic.   HENT:      Head: Normocephalic and atraumatic.      Nose: No congestion or rhinorrhea.      Mouth/Throat:      Mouth: Mucous membranes are moist.      Pharynx: Oropharynx is clear. No oropharyngeal exudate or posterior oropharyngeal erythema.   Eyes:      General: No scleral icterus.        Right eye: No discharge.         Left eye: No discharge.      Extraocular Movements: Extraocular movements intact.      Pupils: Pupils are equal, round, and  reactive to light.   Neck:      Thyroid: No thyromegaly.      Vascular: No JVD.   Cardiovascular:      Rate and Rhythm: Normal rate and regular rhythm.      Heart sounds: No murmur heard.  Pulmonary:      Effort: Pulmonary effort is normal. No respiratory distress.      Breath sounds: Normal breath sounds. No wheezing.   Abdominal:      General: Bowel sounds are normal. There is no distension.      Palpations: Abdomen is soft.      Tenderness: There is no abdominal tenderness.   Musculoskeletal:         General: No swelling or tenderness.   Skin:     General: Skin is warm and dry.      Findings: Bruising present.   Neurological:      General: No focal deficit present.      Mental Status: He is alert and oriented to person, place, and time. Mental status is at baseline.   Psychiatric:         Mood and Affect: Mood normal.         Behavior: Behavior normal.         Thought Content: Thought content normal.       Significant Labs: All pertinent labs within the past 24 hours have been reviewed.  CBC:   Recent Labs   Lab 08/21/22  0419 08/22/22  0958   WBC 10.02 8.80   HGB 9.8* 9.2*   HCT 31.4* 29.7*    240     CMP:   Recent Labs   Lab 08/21/22  0419 08/21/22  1600 08/22/22  0958   *  --  139   K 2.6* 3.6 3.7   CL 92*  --  102   CO2 34*  --  30*   *  --  151*   *  --  130*   CREATININE 3.4*  --  2.8*   CALCIUM 9.2  --  9.3   PROT 7.3  --  6.7   ALBUMIN 3.0*  --  2.6*   BILITOT 0.4  --  0.3   ALKPHOS 67  --  52*   AST 15  --  14   ALT 17  --  15   ANIONGAP 9  --  7*     Magnesium:   Recent Labs   Lab 08/21/22  0419 08/22/22  0958   MG 2.9* 2.8*       Significant Imaging: I have reviewed all pertinent imaging results/findings within the past 24 hours.

## 2022-08-22 NOTE — HPI
Weakness       Patient arrived through [facility], patient fell at house. Patient is weak and reports lower back pain.      81 y.o. male with PMHX of  CHF, COPD, AFib on Eliquis presents with generalized weakness and frequent falls for the past week.  Patient says he fell multiple times but did not hit his head or have loss of consciousness.  Last fall was earlier today onto his right side.  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  Patient has not been eating well.  Patient continues to take his torsemide which he notes has been making his lower extremity swelling much improved.  Patient denies any chest pain, vomiting, diarrhea, black tarry stool, headache, dizziness, changes in mentation                Review of patient's allergies indicates:   Allergen Reactions    Zolpidem             Past Medical History:   Diagnosis Date    A-fib      Abdominal pain, epigastric      Abdominal pain, generalized      Acute on chronic congestive heart failure 12/29/2021    Anticoagulant long-term use      Arthritis      Asteatotic eczema      Benign essential HTN      Benign prostatic hyperplasia      Brain tumor      Cardiovascular accident      Carotid artery stenosis      CHF (congestive heart failure)      CKD (chronic kidney disease), stage IV      Constipation      COPD (chronic obstructive pulmonary disease)      Depressed      DM (diabetes mellitus), secondary      Drug eruption      Eczema      Edema      Encounter for blood transfusion      Fever      H. pylori infection      Hearing loss      History of tobacco use      Hypercholesterolemia      Hypertensive renal disease, benign      Hypokalemia      Hyponatremia      Impaired fasting glucose      Lumbar pain      Lung mass      Malaise and fatigue      Malignant neoplasm of prostate      Meningioma      Mixed hyperlipidemia      Nasal bleeding      Neuropathy      Obesity, unspecified      SHAYNE (obstructive  sleep apnea)      Osteoarthritis of knee      Osteoarthritis of multiple joints      Peripheral vascular disease, unspecified      Psychosis      Renal disorder      Seizure disorder      Seizures      Sleep apnea      Unspecified cataract      Unspecified chronic bronchitis      Unspecified osteoarthritis, unspecified site      Vertigo      Vitamin D deficiency      Wheezing              Past Surgical History:   Procedure Laterality Date    APPENDECTOMY        COLONOSCOPY        CORONARY ARTERY BYPASS GRAFT        FOOT SURGERY Left      HIP REPLACEMENT ARTHROPLASTY Right      HIP SURGERY        JOINT REPLACEMENT        KNEE SURGERY        LUNG BIOPSY Right 03/03/2022     benign    SHOULDER SURGERY Left      SHOULDER SURGERY        TOTAL KNEE ARTHROPLASTY Right      triple bypass                Family History   Problem Relation Age of Onset    Hyperlipidemia Mother      Hypertension Mother      Arthritis Mother      Hypertension Father      Arthritis Father      Heart disease Father      Asthma Sister      Hyperlipidemia Sister      Hypertension Sister      Hypertension Brother        Social History           Tobacco Use    Smoking status: Current Every Day Smoker    Smokeless tobacco: Never Used    Tobacco comment: quit 30 years ago   Substance Use Topics    Alcohol use: Not Currently    Drug use: Never      Review of Systems   Constitutional: Positive for fatigue.   HENT: Negative.    Respiratory: Negative.    Cardiovascular: Negative.    Gastrointestinal: Negative.    Genitourinary: Negative.    Musculoskeletal: Positive for back pain.   Skin: Negative.    Neurological: Positive for dizziness, weakness and light-headedness.   Psychiatric/Behavioral: Negative.    All other systems reviewed and are negative        Above per ED record - pt seen this am with wife @ [facility]- denies h/o CKD or kidney problems but did have h/o prostate cancer in past that was treated.

## 2022-08-22 NOTE — PLAN OF CARE
[facility]  Initial Discharge Assessment       Primary Care Provider: Navi Domínguez III, MD    Admission Diagnosis: Dehydration [E86.0]  Hypokalemia [E87.6]  Weakness [R53.1]  Renal insufficiency [N28.9]    Admission Date: 8/20/2022  Expected Discharge Date:     Discharge Barriers Identified: None    Payor: MEDICARE / Plan: MEDICARE PART A & B / Product Type: Government /     Extended Emergency Contact Information  Primary Emergency Contact: KARY Lei  Mobile Phone: [phone]  Relation: Son  Preferred language: English   needed? No  Secondary Emergency Contact: Aleyda Lei  Mobile Phone: [phone]  Relation: Relative    Discharge Plan A: Home with family, Home Health  Discharge Plan B: Rehab      [facility] [place] [address] [address]  [facility] [address]  Psychiatric [address]  Phone: [phone] Fax: [phone]    [facility] - [facility] [address]   [address] [facility] [address]  Phone: [phone] Fax: [phone]      Initial Assessment (most recent)     Adult Discharge Assessment - 08/22/22 0850        Discharge Assessment    Assessment Type Discharge Planning Assessment     Confirmed/corrected address, phone number and insurance Yes     Source of Information patient;other (see comments)   Patients ex wife at bedside, he and she confirm she is caregiver    Communicated GLORY with patient/caregiver No     Reason For Admission dehydration, states fell through shower door.     Lives With other (see comments)   Patients ex wife confirms someone is always with patient, he has not been able to stay by himself.  She brings him to her house when patient's son is working on boat.  Getting her home remodled with bathroom downstairs.    Facility Arrived From: home     Do you expect to return to your current living situation? Yes     Do you have help at home or someone to help you manage your care at home? --   ex wife Aleyda Lei    Prior to  hospitilization cognitive status: Alert/Oriented     Current cognitive status: Alert/Oriented     Walking or Climbing Stairs Difficulty ambulation difficulty, requires equipment     Dressing/Bathing Difficulty bathing difficulty, assistance 1 person     Dressing/Bathing Management bathes with supervision, toilets with supervision     Equipment Currently Used at Home bedside commode;shower chair;oxygen;nebulizer;other (see comments)   does not have walker or wheelchair. Patient stating his son took the walker.    Readmission within 30 days? No     Do you currently have service(s) that help you manage your care at home? Yes   [facility]confirmed per phone    Name and Contact number of agency [facility] [phone]     Is the pt/caregiver preference to resume services with current agency Yes     Do you take prescription medications? Yes     Do you have any problems affording any of your prescribed medications? No     Is the patient taking medications as prescribed? --   not known    Who is going to help you get home at discharge? exwife     How do you get to doctors appointments? family or friend will provide     Do you take coumadin? No     Discharge Plan A Home with family;Home Health     Discharge Plan B Rehab     DME Needed Upon Discharge  wheelchair   Patient has walker, states son took it, has been navigating without it, ex-wife feels he might need wheelchair.    Discharge Plan discussed with: Patient   exwife    Name(s) and Number(s) ex wife idnetifies as main care taker, patient states to call her with any questions, if he is unable to speak for himself. Aleyda Lei,[phone]     Discharge Barriers Identified None                  Spoke with patient and ex wife, Aleyda Lei.  Patient states to call Aleyda if any emergency.  She confirms phone number.  She states he lives in his own home, and she stays with him while his son is working offshore,  Sometimes she brings patient to her house, and  she states she is currently remodeling for a downstairs bathroom for him, so he will not have to go up steps at her house to use bathroom.    She confirms to me that someone is always with patient.  She identifies as his caregiver.     He uses [facility], and ex-wife states the son sets up his meds one month at a time in weekly containers.    Is a current patient of [facility].  Reached out to [facility] [place] to confirm this.  .  Patient has oxygen at home concentrator and portable.  Has shower chair, BSC, nebulizer.  Patient states had a walker, son took it, has been ambulating with assistance to bathroom for toileting, caregiver states he is week and may need a wheel chair.  Caregiver states was walking to bathroom with a cane up until Sunday.

## 2022-08-22 NOTE — HOSPITAL COURSE
8/22 SD: Pt laying in bed, visitor at bedside. Potassium trending up; CR and Magnesium trending down - continue current IV fluids, Nephrology following.    8/23 SD: Potassium improved, adjust repletion dosing. CR trending down. Continue current IV fluids and follow Nephrology recommendations.    8/24 SD: Participating in therapy. H&H fluctuating/trending down. CR improved.    8/25 SD: CR close to baseline. H&H stabilizing. Plan to discharge home today with repeat labs and close outpatient follow-up.

## 2022-08-22 NOTE — ASSESSMENT & PLAN NOTE
CKD- @ baseline- with BRIGETTE due to volume depletion- cont IV fluids and monitor intake/ urine output.  Cont to hold diuretics for now.

## 2022-08-22 NOTE — ASSESSMENT & PLAN NOTE
Acute on chronic BRIGETTE, likely prerenal, gentle fluids, creatinine improving, Nephrology following

## 2022-08-22 NOTE — PROGRESS NOTES
[facility] Medicine  Progress Note    Patient Name: Jamey Lei  MRN: [id number]  Patient Class: IP- Inpatient   Admission Date: 8/20/2022  Length of Stay: 1 days  Attending Physician: Navi Domínguez III, MD  Primary Care Provider: Navi Domínguez III, MD        Subjective:     Principal Problem:BRIGETTE (acute kidney injury)        HPI:  81 y.o. male with PMHX of  CHF, COPD, AFib on Eliquis presents with generalized weakness and frequent falls for the past week.  Patient says he fell multiple times but did not hit his head or have loss of consciousness.  Last fall was earlier today onto his right side.  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  Patient has not been eating well.  Patient continues to take his torsemide which he notes has been making his lower extremity swelling much improved.  Patient denies any chest pain, vomiting, diarrhea, black tarry stool, headache, dizziness, changes in mentation      Overview/Hospital Course:  8/22 SD: Pt laying in bed, spouse at bedside. Potassium trending up; CR and Magnesium trending down - continue current IV fluids, Nephrology following.      Interval History: Pt seen and evaluated    Review of Systems   Constitutional:  Positive for fatigue.   Eyes:  Negative for visual disturbance.   Respiratory:  Negative for chest tightness, shortness of breath and wheezing.    Cardiovascular:  Positive for leg swelling. Negative for chest pain and palpitations.   Gastrointestinal:  Negative for abdominal distention and abdominal pain.   Endocrine: Negative for polyuria.   Genitourinary:  Negative for difficulty urinating.   Musculoskeletal:  Positive for arthralgias and back pain.   Neurological:  Positive for dizziness and light-headedness. Negative for syncope.   Psychiatric/Behavioral:  Negative for hallucinations. The patient is not nervous/anxious.    Objective:     Vital Signs (Most  Recent):  Temp: 98.2 °F (36.8 °C) (08/22/22 0738)  Pulse: 68 (08/22/22 0738)  Resp: 18 (08/22/22 0738)  BP: 137/61 (08/22/22 0738)  SpO2: 95 % (08/22/22 0738) Vital Signs (24h Range):  Temp:  [97.9 °F (36.6 °C)-98.6 °F (37 °C)] 98.2 °F (36.8 °C)  Pulse:  [56-76] 68  Resp:  [16-20] 18  SpO2:  [87 %-96 %] 95 %  BP: (125-140)/(58-67) 137/61     Weight: 108.9 kg (240 lb)  Body mass index is 34.44 kg/m².    Intake/Output Summary (Last 24 hours) at 8/22/2022 0924  Last data filed at 8/22/2022 0700  Gross per 24 hour   Intake 3639.75 ml   Output 2100 ml   Net 1539.75 ml      Physical Exam  Vitals and nursing note reviewed.   Constitutional:       General: He is not in acute distress.     Appearance: He is well-developed. He is not diaphoretic.   HENT:      Head: Normocephalic and atraumatic.      Nose: No congestion or rhinorrhea.      Mouth/Throat:      Mouth: Mucous membranes are moist.      Pharynx: Oropharynx is clear. No oropharyngeal exudate or posterior oropharyngeal erythema.   Eyes:      General: No scleral icterus.        Right eye: No discharge.         Left eye: No discharge.      Extraocular Movements: Extraocular movements intact.      Pupils: Pupils are equal, round, and reactive to light.   Neck:      Thyroid: No thyromegaly.      Vascular: No JVD.   Cardiovascular:      Rate and Rhythm: Normal rate and regular rhythm.      Heart sounds: No murmur heard.  Pulmonary:      Effort: Pulmonary effort is normal. No respiratory distress.      Breath sounds: Normal breath sounds. No wheezing.   Abdominal:      General: Bowel sounds are normal. There is no distension.      Palpations: Abdomen is soft.      Tenderness: There is no abdominal tenderness.   Musculoskeletal:         General: No swelling or tenderness.   Skin:     General: Skin is warm and dry.      Findings: Bruising present.   Neurological:      General: No focal deficit present.      Mental Status: He is alert and oriented to person, place, and time.  Mental status is at baseline.   Psychiatric:         Mood and Affect: Mood normal.         Behavior: Behavior normal.         Thought Content: Thought content normal.       Significant Labs: All pertinent labs within the past 24 hours have been reviewed.  CBC:   Recent Labs   Lab 08/21/22  0419 08/22/22  0958   WBC 10.02 8.80   HGB 9.8* 9.2*   HCT 31.4* 29.7*    240     CMP:   Recent Labs   Lab 08/21/22  0419 08/21/22  1600 08/22/22  0958   *  --  139   K 2.6* 3.6 3.7   CL 92*  --  102   CO2 34*  --  30*   *  --  151*   *  --  130*   CREATININE 3.4*  --  2.8*   CALCIUM 9.2  --  9.3   PROT 7.3  --  6.7   ALBUMIN 3.0*  --  2.6*   BILITOT 0.4  --  0.3   ALKPHOS 67  --  52*   AST 15  --  14   ALT 17  --  15   ANIONGAP 9  --  7*     Magnesium:   Recent Labs   Lab 08/21/22  0419 08/22/22  0958   MG 2.9* 2.8*       Significant Imaging: I have reviewed all pertinent imaging results/findings within the past 24 hours.      Assessment/Plan:      * BRIGETTE (acute kidney injury)  Acute on chronic BRIGETTE, likely prerenal, gentle fluids, creatinine improving, Nephrology following      Dehydration  IV fluids on board, electrolyte repletion      Hypokalemia  Repletion in progress      Stage 3 chronic kidney disease  Acute on chronic BRIGETTE, likely prerenal, gentle fluids, creatinine improving, Nephrology following      Seizure disorder  Continue old meds, dose adjusted for renal function      SHAYNE (obstructive sleep apnea)  CPAP nightly      A-fib  Patient with Persistent (7 days or more) atrial fibrillation which is controlled currently with Beta Blocker. Patient is currently in sinus rhythm.[id number] Score: 4. HASBLED Score: Unable to calculate. Anticoagulation indicated. Anticoagulation done with Eliquis.        COPD (chronic obstructive pulmonary disease)  No wheezing noted on exam, nebs p.r.n., wean oxygen as tolerated      DVT prophylaxis  On Eliquis        VTE Risk Mitigation (From admission, onward)          Ordered     apixaban tablet 2.5 mg  2 times daily         08/21/22 1006     IP VTE HIGH RISK PATIENT  Once         08/20/22 1621     Place sequential compression device  Until discontinued         08/20/22 1621                Discharge Planning   GLORY:      Code Status: Full Code   Is the patient medically ready for discharge?:     Reason for patient still in hospital (select all that apply): Patient trending condition, Laboratory test and Treatment  Discharge Plan A: Home with family, Home Health                  Natalie Crawley NP  Department of Hospital Medicine   [facility]

## 2022-08-22 NOTE — ASSESSMENT & PLAN NOTE
Patient with Persistent (7 days or more) atrial fibrillation which is controlled currently with Beta Blocker. Patient is currently in sinus rhythm.[id number] Score: 4. HASBLED Score: Unable to calculate. Anticoagulation indicated. Anticoagulation done with Eliquis.

## 2022-08-22 NOTE — CONSULTS
[facility]  Nephrology  Consult Note    Patient Name: Jamey Lei  MRN: [id number]  Admission Date: 8/20/2022  Hospital Length of Stay: 1 days  Attending Provider: Navi Domínguez III, MD   Primary Care Physician: Navi Domínguez III, MD  Principal Problem:BRIGETTE (acute kidney injury)    Consults  Subjective:     HPI:    Weakness       Patient arrived through [facility], patient fell at house. Patient is weak and reports lower back pain.      81 y.o. male with PMHX of  CHF, COPD, AFib on Eliquis presents with generalized weakness and frequent falls for the past week.  Patient says he fell multiple times but did not hit his head or have loss of consciousness.  Last fall was earlier today onto his right side.  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  Patient has not been eating well.  Patient continues to take his torsemide which he notes has been making his lower extremity swelling much improved.  Patient denies any chest pain, vomiting, diarrhea, black tarry stool, headache, dizziness, changes in mentation                Review of patient's allergies indicates:   Allergen Reactions    Zolpidem             Past Medical History:   Diagnosis Date    A-fib      Abdominal pain, epigastric      Abdominal pain, generalized      Acute on chronic congestive heart failure 12/29/2021    Anticoagulant long-term use      Arthritis      Asteatotic eczema      Benign essential HTN      Benign prostatic hyperplasia      Brain tumor      Cardiovascular accident      Carotid artery stenosis      CHF (congestive heart failure)      CKD (chronic kidney disease), stage IV      Constipation      COPD (chronic obstructive pulmonary disease)      Depressed      DM (diabetes mellitus), secondary      Drug eruption      Eczema      Edema      Encounter for blood transfusion      Fever      H. pylori infection      Hearing loss      History of tobacco  use      Hypercholesterolemia      Hypertensive renal disease, benign      Hypokalemia      Hyponatremia      Impaired fasting glucose      Lumbar pain      Lung mass      Malaise and fatigue      Malignant neoplasm of prostate      Meningioma      Mixed hyperlipidemia      Nasal bleeding      Neuropathy      Obesity, unspecified      SHAYNE (obstructive sleep apnea)      Osteoarthritis of knee      Osteoarthritis of multiple joints      Peripheral vascular disease, unspecified      Psychosis      Renal disorder      Seizure disorder      Seizures      Sleep apnea      Unspecified cataract      Unspecified chronic bronchitis      Unspecified osteoarthritis, unspecified site      Vertigo      Vitamin D deficiency      Wheezing              Past Surgical History:   Procedure Laterality Date    APPENDECTOMY        COLONOSCOPY        CORONARY ARTERY BYPASS GRAFT        FOOT SURGERY Left      HIP REPLACEMENT ARTHROPLASTY Right      HIP SURGERY        JOINT REPLACEMENT        KNEE SURGERY        LUNG BIOPSY Right 03/03/2022     benign    SHOULDER SURGERY Left      SHOULDER SURGERY        TOTAL KNEE ARTHROPLASTY Right      triple bypass                Family History   Problem Relation Age of Onset    Hyperlipidemia Mother      Hypertension Mother      Arthritis Mother      Hypertension Father      Arthritis Father      Heart disease Father      Asthma Sister      Hyperlipidemia Sister      Hypertension Sister      Hypertension Brother        Social History           Tobacco Use    Smoking status: Current Every Day Smoker    Smokeless tobacco: Never Used    Tobacco comment: quit 30 years ago   Substance Use Topics    Alcohol use: Not Currently    Drug use: Never      Review of Systems   Constitutional: Positive for fatigue.   HENT: Negative.    Respiratory: Negative.    Cardiovascular: Negative.    Gastrointestinal: Negative.    Genitourinary: Negative.     Musculoskeletal: Positive for back pain.   Skin: Negative.    Neurological: Positive for dizziness, weakness and light-headedness.   Psychiatric/Behavioral: Negative.    All other systems reviewed and are negative        Above per ED record - pt seen this am with wife @ [facility]- denies h/o CKD or kidney problems but did have h/o prostate cancer in past that was treated.        Past Medical History:   Diagnosis Date    A-fib     Abdominal pain, epigastric     Abdominal pain, generalized     Acute on chronic congestive heart failure 12/29/2021    Anticoagulant long-term use     Arthritis     Asteatotic eczema     Benign essential HTN     Benign prostatic hyperplasia     Brain tumor     Cardiovascular accident     Carotid artery stenosis     CHF (congestive heart failure)     CKD (chronic kidney disease), stage IV     Constipation     COPD (chronic obstructive pulmonary disease)     Depressed     DM (diabetes mellitus), secondary     Drug eruption     Eczema     Edema     Encounter for blood transfusion     Fever     H. pylori infection     Hearing loss     History of tobacco use     Hypercholesterolemia     Hypertensive renal disease, benign     Hypokalemia     Hyponatremia     Impaired fasting glucose     Lumbar pain     Lung mass     Malaise and fatigue     Malignant neoplasm of prostate     Meningioma     Mixed hyperlipidemia     Nasal bleeding     Neuropathy     Obesity, unspecified     SHAYNE (obstructive sleep apnea)     Osteoarthritis of knee     Osteoarthritis of multiple joints     Peripheral vascular disease, unspecified     Psychosis     Renal disorder     Seizure disorder     Seizures     Sleep apnea     Unspecified cataract     Unspecified chronic bronchitis     Unspecified osteoarthritis, unspecified site     Vertigo     Vitamin D deficiency     Wheezing        Past Surgical History:   Procedure Laterality Date    APPENDECTOMY      COLONOSCOPY       CORONARY ARTERY BYPASS GRAFT      FOOT SURGERY Left     HIP REPLACEMENT ARTHROPLASTY Right     HIP SURGERY      JOINT REPLACEMENT      KNEE SURGERY      LUNG BIOPSY Right 03/03/2022    benign    SHOULDER SURGERY Left     SHOULDER SURGERY      TOTAL KNEE ARTHROPLASTY Right     triple bypass         Review of patient's allergies indicates:   Allergen Reactions    Zolpidem      Current Facility-Administered Medications   Medication Frequency    0.9 % NaCl with KCl 20 mEq infusion Continuous    acetaminophen tablet 650 mg Q8H PRN    acetaminophen tablet 650 mg Q8H PRN    apixaban tablet 2.5 mg BID    aspirin EC tablet 81 mg Daily    atorvastatin tablet 20 mg Daily    melatonin tablet 6 mg Nightly PRN    mupirocin 2 % ointment BID    ondansetron injection 4 mg Q8H PRN    OXcarbazepine tablet 150 mg BID    potassium chloride SA CR tablet 40 mEq QID    sodium chloride 0.9% flush 10 mL PRN    tamsulosin 24 hr capsule 0.4 mg Daily     Family History       Problem Relation (Age of Onset)    Arthritis Mother, Father    Asthma Sister    Heart disease Father    Hyperlipidemia Mother, Sister    Hypertension Mother, Father, Sister, Brother          Tobacco Use    Smoking status: Current Every Day Smoker    Smokeless tobacco: Never Used    Tobacco comment: quit 30 years ago   Substance and Sexual Activity    Alcohol use: Not Currently    Drug use: Never    Sexual activity: Not Currently     Review of Systems   Constitutional:  Positive for activity change and appetite change.   Cardiovascular:  Positive for leg swelling.   Gastrointestinal:  Positive for diarrhea.   Genitourinary:  Positive for difficulty urinating.   All other systems reviewed and are negative.  Objective:     Vital Signs (Most Recent):  Temp: 97.8 °F (36.6 °C) (08/22/22 1713)  Pulse: 66 (08/22/22 1713)  Resp: 20 (08/22/22 1713)  BP: (!) 152/70 (08/22/22 1713)  SpO2: (!) 92 % (08/22/22 1713)   Vital Signs (24h Range):  Temp:  [97.8 °F  (36.6 °C)-98.6 °F (37 °C)] 97.8 °F (36.6 °C)  Pulse:  [56-76] 66  Resp:  [16-20] 20  SpO2:  [87 %-96 %] 92 %  BP: (132-152)/(61-70) 152/70     Weight: 108.9 kg (240 lb) (08/20/22 1843)  Body mass index is 34.44 kg/m².  Body surface area is 2.32 meters squared.    I/O last 3 completed shifts:  In: 5283.5 [P.O.:2265; I.V.:2618.5; IV Piggyback:400]  Out: 3600 [Urine:3600]    Physical Exam  Vitals reviewed.   Constitutional:       Appearance: Normal appearance.   HENT:      Head: Normocephalic and atraumatic.   Cardiovascular:      Rate and Rhythm: Normal rate and regular rhythm.      Heart sounds: Normal heart sounds.   Pulmonary:      Effort: Pulmonary effort is normal.      Breath sounds: Normal breath sounds.   Abdominal:      General: Bowel sounds are normal.      Palpations: Abdomen is soft.   Musculoskeletal:         General: No swelling.      Cervical back: Neck supple.   Skin:     General: Skin is warm and dry.   Neurological:      General: No focal deficit present.      Mental Status: He is alert. Mental status is at baseline.       Significant Labs:  Cardiac Markers: No results for input(s): CKMB, TROPONINT, MYOGLOBIN in the last 168 hours.  CBC:   Recent Labs   Lab 08/22/22  0958   WBC 8.80   RBC 3.63*   HGB 9.2*   HCT 29.7*      MCV 82   MCH 25.3*   MCHC 31.0*     CMP:   Recent Labs   Lab 08/22/22  0958   *   CALCIUM 9.3   ALBUMIN 2.6*   PROT 6.7      K 3.7   CO2 30*      *   CREATININE 2.8*   ALKPHOS 52*   ALT 15   AST 14   BILITOT 0.3     Recent Labs   Lab 08/20/22  1412   COLORU Yellow   SPECGRAV 1.010   PHUR 6.0   PROTEINUA Negative   NITRITE Negative   LEUKOCYTESUR Negative   UROBILINOGEN 1.0       Significant Imaging:  Labs: Reviewed  X-Ray: Reviewed    Assessment/Plan:     * BRIGETTE (acute kidney injury)  BRIGETTE due to dehydration - cont IV fluids and monitor clinical volume status.    Metabolic alkalosis  Metabolic alkalosis prob due to volume contraction  And diuretics-  cont to hold diuretics and give slow IV fluids.    Hyponatremia  Hyponatremia- improved- cont to monitor labs and free water restriction.    Stage 3 chronic kidney disease  CKD- @ baseline- with BRIGETTE due to volume depletion- cont IV fluids and monitor intake/ urine output.  Cont to hold diuretics for now.        Thank you for your consult. I will follow-up with patient. Please contact us if you have any additional questions.    Evelyn Meza MD  Nephrology  [facility]

## 2022-08-23 LAB
ALBUMIN SERPL BCP-MCNC: 2.8 G/DL (ref 3.5–5.2)
ALP SERPL-CCNC: 53 U/L (ref 55–135)
ALT SERPL W/O P-5'-P-CCNC: 17 U/L (ref 10–44)
ANION GAP SERPL CALC-SCNC: 4 MMOL/L (ref 8–16)
AST SERPL-CCNC: 18 U/L (ref 10–40)
BASOPHILS # BLD AUTO: 0.03 K/UL (ref 0–0.2)
BASOPHILS NFR BLD: 0.4 % (ref 0–1.9)
BILIRUB SERPL-MCNC: 0.3 MG/DL (ref 0.1–1)
BUN SERPL-MCNC: 100 MG/DL (ref 8–23)
CALCIUM SERPL-MCNC: 9.6 MG/DL (ref 8.7–10.5)
CHLORIDE SERPL-SCNC: 112 MMOL/L (ref 95–110)
CO2 SERPL-SCNC: 28 MMOL/L (ref 23–29)
CREAT SERPL-MCNC: 2.3 MG/DL (ref 0.5–1.4)
DIFFERENTIAL METHOD: ABNORMAL
EOSINOPHIL # BLD AUTO: 0.4 K/UL (ref 0–0.5)
EOSINOPHIL NFR BLD: 4.4 % (ref 0–8)
ERYTHROCYTE [DISTWIDTH] IN BLOOD BY AUTOMATED COUNT: 16 % (ref 11.5–14.5)
EST. GFR  (NO RACE VARIABLE): 28.2 ML/MIN/1.73 M^2
GLUCOSE SERPL-MCNC: 114 MG/DL (ref 70–110)
HCT VFR BLD AUTO: 31 % (ref 40–54)
HGB BLD-MCNC: 9.4 G/DL (ref 14–18)
IMM GRANULOCYTES # BLD AUTO: 0.02 K/UL (ref 0–0.04)
IMM GRANULOCYTES NFR BLD AUTO: 0.3 % (ref 0–0.5)
LYMPHOCYTES # BLD AUTO: 0.9 K/UL (ref 1–4.8)
LYMPHOCYTES NFR BLD: 11.1 % (ref 18–48)
MAGNESIUM SERPL-MCNC: 2.6 MG/DL (ref 1.6–2.6)
MCH RBC QN AUTO: 25 PG (ref 27–31)
MCHC RBC AUTO-ENTMCNC: 30.3 G/DL (ref 32–36)
MCV RBC AUTO: 82 FL (ref 82–98)
MONOCYTES # BLD AUTO: 1.1 K/UL (ref 0.3–1)
MONOCYTES NFR BLD: 13.8 % (ref 4–15)
NEUTROPHILS # BLD AUTO: 5.6 K/UL (ref 1.8–7.7)
NEUTROPHILS NFR BLD: 70 % (ref 38–73)
NRBC BLD-RTO: 0 /100 WBC
PLATELET # BLD AUTO: 238 K/UL (ref 150–450)
PMV BLD AUTO: 10.5 FL (ref 9.2–12.9)
POTASSIUM SERPL-SCNC: 4.4 MMOL/L (ref 3.5–5.1)
PROT SERPL-MCNC: 7 G/DL (ref 6–8.4)
RBC # BLD AUTO: 3.76 M/UL (ref 4.6–6.2)
SODIUM SERPL-SCNC: 144 MMOL/L (ref 136–145)
WBC # BLD AUTO: 7.96 K/UL (ref 3.9–12.7)

## 2022-08-23 PROCEDURE — 85025 COMPLETE CBC W/AUTO DIFF WBC: CPT | Performed by: NURSE PRACTITIONER

## 2022-08-23 PROCEDURE — 36415 COLL VENOUS BLD VENIPUNCTURE: CPT | Performed by: NURSE PRACTITIONER

## 2022-08-23 PROCEDURE — 25000003 PHARM REV CODE 250: Performed by: NURSE PRACTITIONER

## 2022-08-23 PROCEDURE — 25000003 PHARM REV CODE 250: Performed by: INTERNAL MEDICINE

## 2022-08-23 PROCEDURE — 83735 ASSAY OF MAGNESIUM: CPT | Performed by: NURSE PRACTITIONER

## 2022-08-23 PROCEDURE — 97165 OT EVAL LOW COMPLEX 30 MIN: CPT

## 2022-08-23 PROCEDURE — 11000001 HC ACUTE MED/SURG PRIVATE ROOM

## 2022-08-23 PROCEDURE — 80053 COMPREHEN METABOLIC PANEL: CPT | Performed by: NURSE PRACTITIONER

## 2022-08-23 PROCEDURE — 97161 PT EVAL LOW COMPLEX 20 MIN: CPT

## 2022-08-23 RX ORDER — POTASSIUM CHLORIDE 20 MEQ/1
40 TABLET, EXTENDED RELEASE ORAL 3 TIMES DAILY
Status: DISCONTINUED | OUTPATIENT
Start: 2022-08-23 | End: 2022-08-24

## 2022-08-23 RX ORDER — SODIUM CHLORIDE 9 MG/ML
INJECTION, SOLUTION INTRAVENOUS CONTINUOUS
Status: DISCONTINUED | OUTPATIENT
Start: 2022-08-23 | End: 2022-08-25 | Stop reason: HOSPADM

## 2022-08-23 RX ADMIN — MUPIROCIN: 20 OINTMENT TOPICAL at 09:08

## 2022-08-23 RX ADMIN — APIXABAN 2.5 MG: 2.5 TABLET, FILM COATED ORAL at 09:08

## 2022-08-23 RX ADMIN — ASPIRIN 81 MG: 81 TABLET, COATED ORAL at 09:08

## 2022-08-23 RX ADMIN — OXCARBAZEPINE 150 MG: 150 TABLET, FILM COATED ORAL at 09:08

## 2022-08-23 RX ADMIN — ATORVASTATIN CALCIUM 20 MG: 20 TABLET, FILM COATED ORAL at 09:08

## 2022-08-23 RX ADMIN — TAMSULOSIN HYDROCHLORIDE 0.4 MG: 0.4 CAPSULE ORAL at 09:08

## 2022-08-23 RX ADMIN — POTASSIUM CHLORIDE 40 MEQ: 1500 TABLET, EXTENDED RELEASE ORAL at 04:08

## 2022-08-23 RX ADMIN — POTASSIUM CHLORIDE 40 MEQ: 1500 TABLET, EXTENDED RELEASE ORAL at 09:08

## 2022-08-23 RX ADMIN — SODIUM CHLORIDE: 0.9 INJECTION, SOLUTION INTRAVENOUS at 11:08

## 2022-08-23 RX ADMIN — ACETAMINOPHEN 650 MG: 325 TABLET ORAL at 12:08

## 2022-08-23 NOTE — PT/OT/SLP EVAL
Occupational Therapy   Evaluation    Name: Jamey Lei  MRN: [id number]  Admitting Diagnosis:  BRIGETTE (acute kidney injury)  Recent Surgery: * No surgery found *      Recommendations:     Discharge Recommendations: home with home health  Discharge Equipment Recommendations:  none  Barriers to discharge:  Decreased caregiver support    Assessment:     Jamey Lei is a 81 y.o. male with a medical diagnosis of BRIGETTE (acute kidney injury).  He presents with deficits that impact his ability to safely perform ADLs at [facility]. Performance deficits affecting function: weakness, impaired endurance, impaired self care skills, impaired functional mobility, gait instability, impaired balance, decreased upper extremity function, decreased ROM, impaired coordination, impaired cardiopulmonary response to activity.      Rehab Prognosis: Good; patient would benefit from acute skilled OT services to address these deficits and reach maximum level of function.       Plan:     Patient to be seen 6 x/week to address the above listed problems via self-care/home management, therapeutic activities, therapeutic exercises  · Plan of Care Expires: 09/02/22  · Plan of Care Reviewed with: patient    Subjective     Chief Complaint: weakness  Patient/Family Comments/goals: To return home at [facility]    Occupational Profile:  Living Environment: Lives in a single story home with 2 steps to enter  Previous level of function: Modified independent  Roles and Routines: ADLs, IADLs, leisure/hobbies  Equipment Used at Home:  bedside commode, shower chair, walker, rolling  Assistance upon Discharge: Pt has limited assistance from ex-wife and his son    Pain/Comfort:  · Pain Rating 1: 0/10    Patients cultural, spiritual, Congregational conflicts given the current situation: no    Objective:     Communicated with: nursing prior to session.  Patient found supine with espinal catheter, peripheral IV, telemetry upon OT entry to room.    General Precautions: Standard,  fall   Orthopedic Precautions:    Braces:    Respiratory Status: Room air    Occupational Performance:    Bed Mobility:    · Patient completed Rolling/Turning to Left with  supervision  · Patient completed Rolling/Turning to Right with supervision  · Patient completed Scooting/Bridging with minimum assistance  · Patient completed Supine to Sit with minimum assistance  · Patient completed Sit to Supine with minimum assistance    Functional Mobility/Transfers:  · Patient completed Sit <> Stand Transfer with contact guard assistance  with  rolling walker       Activities of Daily Living:  · Feeding:  modified independence .  · Grooming: setup .  · Bathing: minimum assistance .  · Upper Body Dressing: minimum assistance .  · Lower Body Dressing: moderate assistance .  · Toileting: moderate assistance .    Cognitive/Visual Perceptual:  Cognitive/Psychosocial Skills:     -       Oriented to: Person, Place, Time and Situation   -       Follows Commands/attention:Follows multistep  commands  -       Safety awareness/insight to disability: intact   -       Mood/Affect/Coping skills/emotional control: Appropriate to situation  Visual/Perceptual:      -Intact  -wears glasses, but currently does not have them .    Physical Exam:  Upper Extremity Range of Motion:     -       Right Upper Extremity: Deficits: moderatley limited shoulder flexion  -       Left Upper Extremity: Deficits: moderately limited shoulder flexion  Upper Extremity Strength:    -       Right Upper Extremity: Deficits: 3+/5  -       Left Upper Extremity: Deficits: 3+/5   Strength:    -       Right Upper Extremity: Deficits: 3+/5  -       Left Upper Extremity: Deficits: 3+/5  Fine Motor Coordination:    -       Intact  Gross motor coordination:   WFL    FAIR+: Able to take MINIMAL challenges from all directions  GOOD-: Incosistently Maintains balance through MODERATE excursions of active trunk movement,     FAIR: Maintains without assist but unable to take  challenges  FAIR: Needs CONTACT GUARD during gait      AMPAC 6 Click ADL:  AMPA Total Score: 16    Treatment & Education:  Pt presents supine in bed and is agreeable to evaluation. Pt educated on roles and goals of OT POC as well as fall reduction strategies and use of call bell for assistance. Pt verbalizes understanding.   Education:    Patient left supine with all lines intact and call button in reach    GOALS:   Multidisciplinary Problems     Occupational Therapy Goals        Problem: Occupational Therapy    Goal Priority Disciplines Outcome Interventions   Occupational Therapy Goal     OT, PT/OT     Description: Goals to be met by: 8/23/2022     Patient will increase functional independence with ADLs by performing:    Feeding with [place].  UE Dressing with Modified [place].  LE Dressing with Modified [place].  Grooming while EOB with Modified [place].  Toileting from toilet with Modified [place] for hygiene and clothing management.   Bathing from  edge of bed with Modified [place].  Toilet transfer to toilet with Modified [place].                     History:     Past Medical History:   Diagnosis Date    A-fib     Abdominal pain, epigastric     Abdominal pain, generalized     Acute on chronic congestive heart failure 12/29/2021    Anticoagulant long-term use     Arthritis     Asteatotic eczema     Benign essential HTN     Benign prostatic hyperplasia     Brain tumor     Cardiovascular accident     Carotid artery stenosis     CHF (congestive heart failure)     CKD (chronic kidney disease), stage IV     Constipation     COPD (chronic obstructive pulmonary disease)     Depressed     DM (diabetes mellitus), secondary     Drug eruption     Eczema     Edema     Encounter for blood transfusion     Fever     H. pylori infection     Hearing loss     History of tobacco use     Hypercholesterolemia     Hypertensive renal disease, benign     Hypokalemia      Hyponatremia     Impaired fasting glucose     Lumbar pain     Lung mass     Malaise and fatigue     Malignant neoplasm of prostate     Meningioma     Mixed hyperlipidemia     Nasal bleeding     Neuropathy     Obesity, unspecified     SHAYNE (obstructive sleep apnea)     Osteoarthritis of knee     Osteoarthritis of multiple joints     Peripheral vascular disease, unspecified     Psychosis     Renal disorder     Seizure disorder     Seizures     Sleep apnea     Unspecified cataract     Unspecified chronic bronchitis     Unspecified osteoarthritis, unspecified site     Vertigo     Vitamin D deficiency     Wheezing        Past Surgical History:   Procedure Laterality Date    APPENDECTOMY      COLONOSCOPY      CORONARY ARTERY BYPASS GRAFT      FOOT SURGERY Left     HIP REPLACEMENT ARTHROPLASTY Right     HIP SURGERY      JOINT REPLACEMENT      KNEE SURGERY      LUNG BIOPSY Right 03/03/2022    benign    SHOULDER SURGERY Left     SHOULDER SURGERY      TOTAL KNEE ARTHROPLASTY Right     triple bypass         Time Tracking:     OT Date of Treatment: 08/23/22  OT Start Time: 0900  OT Stop Time: 0930  OT Total Time (min): 30 min    Billable Minutes:Evaluation 30    8/23/2022

## 2022-08-23 NOTE — PLAN OF CARE
Plan of care reviewed with the patient. Call bell in reach, instructed to call staff for mobility.

## 2022-08-23 NOTE — SUBJECTIVE & OBJECTIVE
Interval History: Pt seen and evaluated    Review of Systems   Constitutional:  Positive for fatigue.   Eyes:  Negative for visual disturbance.   Respiratory:  Negative for chest tightness, shortness of breath and wheezing.    Cardiovascular:  Negative for chest pain and palpitations.   Gastrointestinal:  Negative for abdominal distention and abdominal pain.   Endocrine: Negative for polyuria.   Genitourinary:  Negative for difficulty urinating.   Musculoskeletal:  Positive for arthralgias and back pain.   Neurological:  Negative for syncope.   Psychiatric/Behavioral:  Negative for hallucinations. The patient is not nervous/anxious.    Objective:     Vital Signs (Most Recent):  Temp: 98.1 °F (36.7 °C) (08/23/22 1102)  Pulse: 88 (08/23/22 1102)  Resp: (!) 22 (08/23/22 1102)  BP: (!) 173/77 (08/23/22 1102)  SpO2: 96 % (08/23/22 1102) Vital Signs (24h Range):  Temp:  [96.6 °F (35.9 °C)-98.1 °F (36.7 °C)] 98.1 °F (36.7 °C)  Pulse:  [] 88  Resp:  [20-22] 22  SpO2:  [92 %-98 %] 96 %  BP: (152-188)/(70-84) 173/77     Weight: 108.9 kg (240 lb)  Body mass index is 34.44 kg/m².    Intake/Output Summary (Last 24 hours) at 8/23/2022 1124  Last data filed at 8/23/2022 0600  Gross per 24 hour   Intake 480 ml   Output 2500 ml   Net -2020 ml        Physical Exam  Vitals and nursing note reviewed.   Constitutional:       General: He is not in acute distress.     Appearance: He is well-developed. He is not diaphoretic.   HENT:      Head: Normocephalic and atraumatic.      Nose: No congestion or rhinorrhea.      Mouth/Throat:      Mouth: Mucous membranes are moist.      Pharynx: Oropharynx is clear. No oropharyngeal exudate or posterior oropharyngeal erythema.   Eyes:      General: No scleral icterus.        Right eye: No discharge.         Left eye: No discharge.      Extraocular Movements: Extraocular movements intact.      Pupils: Pupils are equal, round, and reactive to light.   Neck:      Thyroid: No thyromegaly.       Vascular: No JVD.   Cardiovascular:      Rate and Rhythm: Normal rate and regular rhythm.      Heart sounds: No murmur heard.  Pulmonary:      Effort: Pulmonary effort is normal. No respiratory distress.      Breath sounds: Normal breath sounds. No wheezing.   Abdominal:      General: Bowel sounds are normal. There is no distension.      Palpations: Abdomen is soft.      Tenderness: There is no abdominal tenderness.   Musculoskeletal:         General: No swelling or tenderness.   Skin:     General: Skin is warm and dry.      Findings: Bruising present.   Neurological:      General: No focal deficit present.      Mental Status: He is alert and oriented to person, place, and time. Mental status is at baseline.   Psychiatric:         Mood and Affect: Mood normal.         Behavior: Behavior normal.         Thought Content: Thought content normal.       Significant Labs: All pertinent labs within the past 24 hours have been reviewed.  CBC:   Recent Labs   Lab 08/22/22  0958 08/23/22  0634   WBC 8.80 7.96   HGB 9.2* 9.4*   HCT 29.7* 31.0*    238       CMP:   Recent Labs   Lab 08/21/22  1600 08/22/22  0958 08/23/22  0634   NA  --  139 144   K 3.6 3.7 4.4   CL  --  102 112*   CO2  --  30* 28   GLU  --  151* 114*   BUN  --  130* 100*   CREATININE  --  2.8* 2.3*   CALCIUM  --  9.3 9.6   PROT  --  6.7 7.0   ALBUMIN  --  2.6* 2.8*   BILITOT  --  0.3 0.3   ALKPHOS  --  52* 53*   AST  --  14 18   ALT  --  15 17   ANIONGAP  --  7* 4*       Magnesium:   Recent Labs   Lab 08/22/22  0958 08/23/22  0634   MG 2.8* 2.6         Significant Imaging: I have reviewed all pertinent imaging results/findings within the past 24 hours.

## 2022-08-23 NOTE — PROGRESS NOTES
[facility] Medicine  Progress Note    Patient Name: Jamey Lei  MRN: [id number]  Patient Class: IP- Inpatient   Admission Date: 8/20/2022  Length of Stay: 2 days  Attending Physician: Navi Domínguez III, MD  Primary Care Provider: Navi Domínguez III, MD        Subjective:     Principal Problem:BRIGETTE (acute kidney injury)        HPI:  81 y.o. male with PMHX of  CHF, COPD, AFib on Eliquis presents with generalized weakness and frequent falls for the past week.  Patient says he fell multiple times but did not hit his head or have loss of consciousness.  Last fall was earlier today onto his right side.  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  Patient has not been eating well.  Patient continues to take his torsemide which he notes has been making his lower extremity swelling much improved.  Patient denies any chest pain, vomiting, diarrhea, black tarry stool, headache, dizziness, changes in mentation      Overview/Hospital Course:  8/22 SD: Pt laying in bed, visitor at bedside. Potassium trending up; CR and Magnesium trending down - continue current IV fluids, Nephrology following.    8/23 SD: Potassium improved, adjust repletion dosing. CR trending down. Continue current IV fluids and follow Nephrology recommendations.      Interval History: Pt seen and evaluated    Review of Systems   Constitutional:  Positive for fatigue.   Eyes:  Negative for visual disturbance.   Respiratory:  Negative for chest tightness, shortness of breath and wheezing.    Cardiovascular:  Negative for chest pain and palpitations.   Gastrointestinal:  Negative for abdominal distention and abdominal pain.   Endocrine: Negative for polyuria.   Genitourinary:  Negative for difficulty urinating.   Musculoskeletal:  Positive for arthralgias and back pain.   Neurological:  Negative for syncope.   Psychiatric/Behavioral:  Negative for hallucinations. The patient is  not nervous/anxious.    Objective:     Vital Signs (Most Recent):  Temp: 98.1 °F (36.7 °C) (08/23/22 1102)  Pulse: 88 (08/23/22 1102)  Resp: (!) 22 (08/23/22 1102)  BP: (!) 173/77 (08/23/22 1102)  SpO2: 96 % (08/23/22 1102) Vital Signs (24h Range):  Temp:  [96.6 °F (35.9 °C)-98.1 °F (36.7 °C)] 98.1 °F (36.7 °C)  Pulse:  [] 88  Resp:  [20-22] 22  SpO2:  [92 %-98 %] 96 %  BP: (152-188)/(70-84) 173/77     Weight: 108.9 kg (240 lb)  Body mass index is 34.44 kg/m².    Intake/Output Summary (Last 24 hours) at 8/23/2022 1124  Last data filed at 8/23/2022 0600  Gross per 24 hour   Intake 480 ml   Output 2500 ml   Net -2020 ml        Physical Exam  Vitals and nursing note reviewed.   Constitutional:       General: He is not in acute distress.     Appearance: He is well-developed. He is not diaphoretic.   HENT:      Head: Normocephalic and atraumatic.      Nose: No congestion or rhinorrhea.      Mouth/Throat:      Mouth: Mucous membranes are moist.      Pharynx: Oropharynx is clear. No oropharyngeal exudate or posterior oropharyngeal erythema.   Eyes:      General: No scleral icterus.        Right eye: No discharge.         Left eye: No discharge.      Extraocular Movements: Extraocular movements intact.      Pupils: Pupils are equal, round, and reactive to light.   Neck:      Thyroid: No thyromegaly.      Vascular: No JVD.   Cardiovascular:      Rate and Rhythm: Normal rate and regular rhythm.      Heart sounds: No murmur heard.  Pulmonary:      Effort: Pulmonary effort is normal. No respiratory distress.      Breath sounds: Normal breath sounds. No wheezing.   Abdominal:      General: Bowel sounds are normal. There is no distension.      Palpations: Abdomen is soft.      Tenderness: There is no abdominal tenderness.   Musculoskeletal:         General: No swelling or tenderness.   Skin:     General: Skin is warm and dry.      Findings: Bruising present.   Neurological:      General: No focal deficit present.       Mental Status: He is alert and oriented to person, place, and time. Mental status is at baseline.   Psychiatric:         Mood and Affect: Mood normal.         Behavior: Behavior normal.         Thought Content: Thought content normal.       Significant Labs: All pertinent labs within the past 24 hours have been reviewed.  CBC:   Recent Labs   Lab 08/22/22  0958 08/23/22  0634   WBC 8.80 7.96   HGB 9.2* 9.4*   HCT 29.7* 31.0*    238       CMP:   Recent Labs   Lab 08/21/22  1600 08/22/22  0958 08/23/22  0634   NA  --  139 144   K 3.6 3.7 4.4   CL  --  102 112*   CO2  --  30* 28   GLU  --  151* 114*   BUN  --  130* 100*   CREATININE  --  2.8* 2.3*   CALCIUM  --  9.3 9.6   PROT  --  6.7 7.0   ALBUMIN  --  2.6* 2.8*   BILITOT  --  0.3 0.3   ALKPHOS  --  52* 53*   AST  --  14 18   ALT  --  15 17   ANIONGAP  --  7* 4*       Magnesium:   Recent Labs   Lab 08/22/22  0958 08/23/22  0634   MG 2.8* 2.6         Significant Imaging: I have reviewed all pertinent imaging results/findings within the past 24 hours.      Assessment/Plan:      * BRIGETTE (acute kidney injury)  Acute on chronic BRIGETTE, likely prerenal, gentle fluids, creatinine improving, Nephrology following      Metabolic alkalosis  Follow Nephrology recommendations - continue to hold diuretics and give slow IV fluids      Dehydration  IV fluids on board, electrolyte repletion      Hyponatremia  Improved      Hypokalemia  Improved; decrease repletion from QID to TID and continue to monitor.      Stage 3 chronic kidney disease  Acute on chronic BRIGETTE, likely prerenal, gentle fluids, creatinine improving, Nephrology following      Seizure disorder  Continue old meds, dose adjusted for renal function  No seizure activity      SHAYNE (obstructive sleep apnea)  CPAP nightly      A-fib  Patient with Persistent (7 days or more) atrial fibrillation which is controlled currently with Beta Blocker. Patient is currently in sinus rhythm.[id number] Score: 4. HASBLED Score: Unable to  calculate. Anticoagulation indicated. Anticoagulation done with Eliquis.        COPD (chronic obstructive pulmonary disease)  No wheezing noted on exam, nebs p.r.n., wean oxygen as tolerated      DVT prophylaxis  On Eliquis        VTE Risk Mitigation (From admission, onward)         Ordered     apixaban tablet 2.5 mg  2 times daily         08/21/22 1006     IP VTE HIGH RISK PATIENT  Once         08/20/22 1621     Place sequential compression device  Until discontinued         08/20/22 1621                Discharge Planning   GLORY:      Code Status: Full Code   Is the patient medically ready for discharge?:     Reason for patient still in hospital (select all that apply): Patient trending condition, Laboratory test and Treatment  Discharge Plan A: Home with family, Home Health                  Natalie Crawley NP  Department of Hospital Medicine   [facility]

## 2022-08-23 NOTE — ASSESSMENT & PLAN NOTE
Patient with Persistent (7 days or more) atrial fibrillation which is controlled currently with Beta Blocker. Patient is currently in sinus rhythm.[id number] Score: 4. HASBLED Score: Unable to calculate. Anticoagulation indicated. Anticoagulation done with Eliquis.

## 2022-08-23 NOTE — PT/OT/SLP EVAL
Physical Therapy  Evaluation    Jamey Lei   MRN: [id number]   Admitting Diagnosis: BRIGETTE (acute kidney injury)                          Billable Minutes:  Evaluation 20 minutes    Diagnosis: BRIGETTE (acute kidney injury); all at home     HPI: 81 y.o. male with PMHX of  CHF, COPD, AFib on Eliquis presents with generalized weakness and frequent falls for the past week.  Patient says he fell multiple times but did not hit his head or have loss of consciousness.  Last fall was earlier today onto his right side.  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  Patient has not been eating well.     Past Medical History:   Diagnosis Date    A-fib     Abdominal pain, epigastric     Abdominal pain, generalized     Acute on chronic congestive heart failure 12/29/2021    Anticoagulant long-term use     Arthritis     Asteatotic eczema     Benign essential HTN     Benign prostatic hyperplasia     Brain tumor     Cardiovascular accident     Carotid artery stenosis     CHF (congestive heart failure)     CKD (chronic kidney disease), stage IV     Constipation     COPD (chronic obstructive pulmonary disease)     Depressed     DM (diabetes mellitus), secondary     Drug eruption     Eczema     Edema     Encounter for blood transfusion     Fever     H. pylori infection     Hearing loss     History of tobacco use     Hypercholesterolemia     Hypertensive renal disease, benign     Hypokalemia     Hyponatremia     Impaired fasting glucose     Lumbar pain     Lung mass     Malaise and fatigue     Malignant neoplasm of prostate     Meningioma     Mixed hyperlipidemia     Nasal bleeding     Neuropathy     Obesity, unspecified     SHAYNE (obstructive sleep apnea)     Osteoarthritis of knee     Osteoarthritis of multiple joints     Peripheral vascular disease, unspecified     Psychosis     Renal disorder     Seizure disorder     Seizures      "Sleep apnea     Unspecified cataract     Unspecified chronic bronchitis     Unspecified osteoarthritis, unspecified site     Vertigo     Vitamin D deficiency     Wheezing       Past Surgical History:   Procedure Laterality Date    APPENDECTOMY      COLONOSCOPY      CORONARY ARTERY BYPASS GRAFT      FOOT SURGERY Left     HIP REPLACEMENT ARTHROPLASTY Right     HIP SURGERY      JOINT REPLACEMENT      KNEE SURGERY      LUNG BIOPSY Right 03/03/2022    benign    SHOULDER SURGERY Left     SHOULDER SURGERY      TOTAL KNEE ARTHROPLASTY Right     triple bypass         Referring physician: Catrachita  Date referred to PT: 8/22/22    General Precautions: Standard,  fall           Patient History:     DME owned (not currently used): rolling walker, bedside commode, shower chair and home O2    Previous Level of Function:   Pt states he lives alone and ambulates with a RW or his cane as needed. He reports 2 steps into home with a rail. Chart indicates that someone is always with pt and that pt's son brings him to his ex wife's home when he is working [place]    Subjective:  Communicated with nurse prior to session.    Pain: back 4/10 FACES scale, bruise noted from fall, mid lower back.  Chief Complaint: weakness/soreness "all over"  Patient goals: improve mobility           Objective:         Cognitive Exam:  Oriented to: Person, Place     Follows Commands/attention: Follows multistep  commands  Communication: clear/fluent  Safety awareness/insight to disability: impaired    Physical Exam:  Postural examination/scapula alignment:    -       Rounded shoulders  -       Forward head    Skin integrity: Bruising of mid lower back  Edema: None noted      Sensation:   Neuropathy B LE's; spotty  To light touch      Lower Extremity Range of Motion:  Right Lower Extremity: WFL  Left Lower Extremity: WFL    Lower Extremity Strength:  Right Lower Extremity: 4-/5  Left Lower Extremity: 4-/5      Functional Mobility:  Bed " Mobility:   -sit to supine mod assist for B LE elevation    Transfers:   -sit to stand from toilet (pt on toilet with CNA upon arrival) contact guard using grab bar  -turn and back to bed  Using RW contact guard assist    Gait:     -pt ambulated 25' using RW contact guard assist, slower step to pattern      Balance:   Static Sit: GOOD-: Takes MODERATE challenges from all directions but inconsistently  Dynamic Sit: FAIR+: Maintains balance through MINIMAL excursions of active trunk motion  Static Stand: FAIR: Maintains without assist but unable to take challenges  Dynamic stand: FAIR: Needs CONTACT GUARD during gait      Patient left HOB elevated with all lines intact, call button in reach and nruse notified.    Assessment:   Jamey Lei is a 81 y.o. male with a medical diagnosis of BRIGETTE (acute kidney injury) and presents with generalized soreness from fall at home, mobilizing well early on but will benefit from continued PT to address bed mobility, transfer and gait deficits.    Rehab identified problem list/impairments:      Rehab potential is good.    Activity tolerance: Good    Discharge recommendations:   24 hour supervision, HHPT    Barriers to discharge:      Equipment recommendations:       GOALS:   Multidisciplinary Problems     Physical Therapy Goals        Problem: Physical Therapy    Goal Priority Disciplines Outcome Goal Variances Interventions   Physical Therapy Goal     PT, PT/OT      Description: Goals to be met by: 22    Patient will increase functional independence with mobility by performin. Supine to sit with Modified [place]  2. Sit to supine with Modified [place]  3. Sit to stand transfer with Modified [place]  4. Gait  x 150 feet with Modified [place] using Rolling Walker.                      PLAN:    Patient to be seen 5-6 days/week   to address the above listed problems via  strengthening, transfer and gait training  Plan of Care expires:  22  Plan of  Care reviewed with:  patient          08/23/2022

## 2022-08-24 ENCOUNTER — CLINICAL SUPPORT (OUTPATIENT)
Dept: CARDIOLOGY | Facility: HOSPITAL | Age: 82
DRG: 683 | End: 2022-08-24
Attending: STUDENT IN AN ORGANIZED HEALTH CARE EDUCATION/TRAINING PROGRAM
Payer: MEDICARE

## 2022-08-24 VITALS
HEIGHT: 70 IN | BODY MASS INDEX: 34.36 KG/M2 | DIASTOLIC BLOOD PRESSURE: 65 MMHG | WEIGHT: 240 LBS | SYSTOLIC BLOOD PRESSURE: 141 MMHG

## 2022-08-24 LAB
ALBUMIN SERPL BCP-MCNC: 2.5 G/DL (ref 3.5–5.2)
ALP SERPL-CCNC: 47 U/L (ref 55–135)
ALT SERPL W/O P-5'-P-CCNC: 17 U/L (ref 10–44)
ANION GAP SERPL CALC-SCNC: 5 MMOL/L (ref 8–16)
AORTIC ROOT ANNULUS: 3.63 CM
AORTIC VALVE CUSP SEPERATION: 1.91 CM
AST SERPL-CCNC: 16 U/L (ref 10–40)
AV INDEX (PROSTH): 0.7
AV MEAN GRADIENT: 6 MMHG
AV PEAK GRADIENT: 10 MMHG
AV VALVE AREA: 2.54 CM2
AV VELOCITY RATIO: 0.69
BASOPHILS # BLD AUTO: 0.02 K/UL (ref 0–0.2)
BASOPHILS NFR BLD: 0.3 % (ref 0–1.9)
BILIRUB SERPL-MCNC: 0.3 MG/DL (ref 0.1–1)
BSA FOR ECHO PROCEDURE: 2.32 M2
BUN SERPL-MCNC: 66 MG/DL (ref 8–23)
CALCIUM SERPL-MCNC: 9.5 MG/DL (ref 8.7–10.5)
CHLORIDE SERPL-SCNC: 112 MMOL/L (ref 95–110)
CO2 SERPL-SCNC: 25 MMOL/L (ref 23–29)
CREAT SERPL-MCNC: 2 MG/DL (ref 0.5–1.4)
CV ECHO LV RWT: 0.5 CM
DIFFERENTIAL METHOD: ABNORMAL
DOP CALC AO PEAK VEL: 1.59 M/S
DOP CALC AO VTI: 35.43 CM
DOP CALC LVOT AREA: 3.6 CM2
DOP CALC LVOT DIAMETER: 2.15 CM
DOP CALC LVOT PEAK VEL: 1.1 M/S
DOP CALC LVOT STROKE VOLUME: 89.85 CM3
DOP CALCLVOT PEAK VEL VTI: 24.76 CM
E WAVE DECELERATION TIME: 308.25 MSEC
E/A RATIO: 1.43
E/E' RATIO: 8.48 M/S
ECHO LV POSTERIOR WALL: 1.17 CM (ref 0.6–1.1)
EJECTION FRACTION: 67 %
EOSINOPHIL # BLD AUTO: 0.3 K/UL (ref 0–0.5)
EOSINOPHIL NFR BLD: 4 % (ref 0–8)
ERYTHROCYTE [DISTWIDTH] IN BLOOD BY AUTOMATED COUNT: 16.1 % (ref 11.5–14.5)
EST. GFR  (NO RACE VARIABLE): 33.3 ML/MIN/1.73 M^2
FRACTIONAL SHORTENING: 37 % (ref 28–44)
GLUCOSE SERPL-MCNC: 100 MG/DL (ref 70–110)
HCT VFR BLD AUTO: 28.5 % (ref 40–54)
HGB BLD-MCNC: 8.6 G/DL (ref 14–18)
IMM GRANULOCYTES # BLD AUTO: 0.03 K/UL (ref 0–0.04)
IMM GRANULOCYTES NFR BLD AUTO: 0.4 % (ref 0–0.5)
INTERVENTRICULAR SEPTUM: 1.16 CM (ref 0.6–1.1)
IVRT: 55.36 MSEC
LA MAJOR: 7.34 CM
LA WIDTH: 4.79 CM
LEFT ATRIUM SIZE: 4.45 CM
LEFT INTERNAL DIMENSION IN SYSTOLE: 2.95 CM (ref 2.1–4)
LEFT VENTRICLE DIASTOLIC VOLUME INDEX: 45.01 ML/M2
LEFT VENTRICLE DIASTOLIC VOLUME: 101.73 ML
LEFT VENTRICLE MASS INDEX: 90 G/M2
LEFT VENTRICLE SYSTOLIC VOLUME INDEX: 14.9 ML/M2
LEFT VENTRICLE SYSTOLIC VOLUME: 33.73 ML
LEFT VENTRICULAR INTERNAL DIMENSION IN DIASTOLE: 4.69 CM (ref 3.5–6)
LEFT VENTRICULAR MASS: 202.6 G
LV LATERAL E/E' RATIO: 7.07 M/S
LV SEPTAL E/E' RATIO: 10.6 M/S
LYMPHOCYTES # BLD AUTO: 0.9 K/UL (ref 1–4.8)
LYMPHOCYTES NFR BLD: 13.7 % (ref 18–48)
MAGNESIUM SERPL-MCNC: 2.2 MG/DL (ref 1.6–2.6)
MCH RBC QN AUTO: 25.2 PG (ref 27–31)
MCHC RBC AUTO-ENTMCNC: 30.2 G/DL (ref 32–36)
MCV RBC AUTO: 84 FL (ref 82–98)
MONOCYTES # BLD AUTO: 1.2 K/UL (ref 0.3–1)
MONOCYTES NFR BLD: 17.7 % (ref 4–15)
MV PEAK A VEL: 0.74 M/S
MV PEAK E VEL: 1.06 M/S
MV STENOSIS PRESSURE HALF TIME: 89.39 MS
MV VALVE AREA P 1/2 METHOD: 2.46 CM2
NEUTROPHILS # BLD AUTO: 4.3 K/UL (ref 1.8–7.7)
NEUTROPHILS NFR BLD: 63.9 % (ref 38–73)
NRBC BLD-RTO: 0 /100 WBC
PISA TR MAX VEL: 3.33 M/S
PLATELET # BLD AUTO: 221 K/UL (ref 150–450)
PMV BLD AUTO: 10.9 FL (ref 9.2–12.9)
POTASSIUM SERPL-SCNC: 4.7 MMOL/L (ref 3.5–5.1)
PROT SERPL-MCNC: 6.5 G/DL (ref 6–8.4)
PV PEAK VELOCITY: 1.11 CM/S
RA MAJOR: 6.06 CM
RA PRESSURE: 8 MMHG
RA WIDTH: 4.69 CM
RBC # BLD AUTO: 3.41 M/UL (ref 4.6–6.2)
RIGHT VENTRICULAR END-DIASTOLIC DIMENSION: 3.79 CM
SODIUM SERPL-SCNC: 142 MMOL/L (ref 136–145)
TDI LATERAL: 0.15 M/S
TDI SEPTAL: 0.1 M/S
TDI: 0.13 M/S
TR MAX PG: 44 MMHG
TRICUSPID ANNULAR PLANE SYSTOLIC EXCURSION: 2.81 CM
TV REST PULMONARY ARTERY PRESSURE: 52 MMHG
WBC # BLD AUTO: 6.73 K/UL (ref 3.9–12.7)

## 2022-08-24 PROCEDURE — 97110 THERAPEUTIC EXERCISES: CPT

## 2022-08-24 PROCEDURE — 97530 THERAPEUTIC ACTIVITIES: CPT

## 2022-08-24 PROCEDURE — 93010 EKG 12-LEAD: ICD-10-PCS | Mod: ,,, | Performed by: INTERNAL MEDICINE

## 2022-08-24 PROCEDURE — 11000001 HC ACUTE MED/SURG PRIVATE ROOM

## 2022-08-24 PROCEDURE — 97535 SELF CARE MNGMENT TRAINING: CPT

## 2022-08-24 PROCEDURE — 83735 ASSAY OF MAGNESIUM: CPT | Performed by: NURSE PRACTITIONER

## 2022-08-24 PROCEDURE — 97116 GAIT TRAINING THERAPY: CPT

## 2022-08-24 PROCEDURE — 36415 COLL VENOUS BLD VENIPUNCTURE: CPT | Performed by: NURSE PRACTITIONER

## 2022-08-24 PROCEDURE — 93005 ELECTROCARDIOGRAM TRACING: CPT

## 2022-08-24 PROCEDURE — 93010 ELECTROCARDIOGRAM REPORT: CPT | Mod: ,,, | Performed by: INTERNAL MEDICINE

## 2022-08-24 PROCEDURE — 93306 TTE W/DOPPLER COMPLETE: CPT

## 2022-08-24 PROCEDURE — 85025 COMPLETE CBC W/AUTO DIFF WBC: CPT | Performed by: NURSE PRACTITIONER

## 2022-08-24 PROCEDURE — 80053 COMPREHEN METABOLIC PANEL: CPT | Performed by: NURSE PRACTITIONER

## 2022-08-24 PROCEDURE — 25000003 PHARM REV CODE 250: Performed by: INTERNAL MEDICINE

## 2022-08-24 PROCEDURE — 25000003 PHARM REV CODE 250: Performed by: NURSE PRACTITIONER

## 2022-08-24 RX ORDER — METOPROLOL TARTRATE 25 MG/1
25 TABLET, FILM COATED ORAL 2 TIMES DAILY
Status: DISCONTINUED | OUTPATIENT
Start: 2022-08-24 | End: 2022-08-25 | Stop reason: HOSPADM

## 2022-08-24 RX ADMIN — METOPROLOL TARTRATE 25 MG: 25 TABLET, FILM COATED ORAL at 08:08

## 2022-08-24 RX ADMIN — OXCARBAZEPINE 150 MG: 150 TABLET, FILM COATED ORAL at 08:08

## 2022-08-24 RX ADMIN — TAMSULOSIN HYDROCHLORIDE 0.4 MG: 0.4 CAPSULE ORAL at 08:08

## 2022-08-24 RX ADMIN — MUPIROCIN: 20 OINTMENT TOPICAL at 08:08

## 2022-08-24 RX ADMIN — APIXABAN 2.5 MG: 2.5 TABLET, FILM COATED ORAL at 08:08

## 2022-08-24 RX ADMIN — ATORVASTATIN CALCIUM 20 MG: 20 TABLET, FILM COATED ORAL at 08:08

## 2022-08-24 RX ADMIN — ASPIRIN 81 MG: 81 TABLET, COATED ORAL at 08:08

## 2022-08-24 NOTE — SUBJECTIVE & OBJECTIVE
Interval History: Pt seen and evaluated    Review of Systems   Constitutional:  Positive for fatigue.   Eyes:  Negative for visual disturbance.   Respiratory:  Negative for chest tightness, shortness of breath and wheezing.    Cardiovascular:  Negative for chest pain and palpitations.   Gastrointestinal:  Negative for abdominal distention and abdominal pain.   Endocrine: Negative for polyuria.   Genitourinary:  Negative for difficulty urinating.   Musculoskeletal:  Positive for arthralgias and back pain.   Neurological:  Negative for syncope.   Psychiatric/Behavioral:  Negative for hallucinations. The patient is not nervous/anxious.    Objective:     Vital Signs (Most Recent):  Temp: 98.2 °F (36.8 °C) (08/24/22 0717)  Pulse: 66 (08/24/22 0717)  Resp: 20 (08/24/22 0717)  BP: 138/65 (08/24/22 0717)  SpO2: (!) 94 % (08/24/22 0717) Vital Signs (24h Range):  Temp:  [98.1 °F (36.7 °C)-98.8 °F (37.1 °C)] 98.2 °F (36.8 °C)  Pulse:  [62-88] 66  Resp:  [18-22] 20  SpO2:  [94 %-98 %] 94 %  BP: (123-173)/(52-77) 138/65     Weight: 108.9 kg (240 lb)  Body mass index is 34.44 kg/m².    Intake/Output Summary (Last 24 hours) at 8/24/2022 0958  Last data filed at 8/24/2022 0700  Gross per 24 hour   Intake 1920 ml   Output 3200 ml   Net -1280 ml        Physical Exam  Vitals and nursing note reviewed.   Constitutional:       General: He is not in acute distress.     Appearance: He is well-developed. He is not diaphoretic.   HENT:      Head: Normocephalic and atraumatic.      Nose: No congestion or rhinorrhea.      Mouth/Throat:      Mouth: Mucous membranes are moist.      Pharynx: Oropharynx is clear. No oropharyngeal exudate or posterior oropharyngeal erythema.   Eyes:      General: No scleral icterus.        Right eye: No discharge.         Left eye: No discharge.      Extraocular Movements: Extraocular movements intact.      Pupils: Pupils are equal, round, and reactive to light.   Neck:      Thyroid: No thyromegaly.      Vascular:  No JVD.   Cardiovascular:      Rate and Rhythm: Normal rate and regular rhythm.      Heart sounds: No murmur heard.  Pulmonary:      Effort: Pulmonary effort is normal. No respiratory distress.      Breath sounds: Normal breath sounds. No wheezing.   Abdominal:      General: Bowel sounds are normal. There is no distension.      Palpations: Abdomen is soft.      Tenderness: There is no abdominal tenderness.   Musculoskeletal:         General: No swelling or tenderness.   Skin:     General: Skin is warm and dry.      Findings: Bruising present.   Neurological:      General: No focal deficit present.      Mental Status: He is alert and oriented to person, place, and time. Mental status is at baseline.   Psychiatric:         Mood and Affect: Mood normal.         Behavior: Behavior normal.         Thought Content: Thought content normal.       Significant Labs: All pertinent labs within the past 24 hours have been reviewed.  CBC:   Recent Labs   Lab 08/23/22  0634 08/24/22  0558   WBC 7.96 6.73   HGB 9.4* 8.6*   HCT 31.0* 28.5*    221       CMP:   Recent Labs   Lab 08/23/22  0634 08/24/22  0558    142   K 4.4 4.7   * 112*   CO2 28 25   * 100   * 66*   CREATININE 2.3* 2.0*   CALCIUM 9.6 9.5   PROT 7.0 6.5   ALBUMIN 2.8* 2.5*   BILITOT 0.3 0.3   ALKPHOS 53* 47*   AST 18 16   ALT 17 17   ANIONGAP 4* 5*       Magnesium:   Recent Labs   Lab 08/23/22  0634 08/24/22  0558   MG 2.6 2.2         Significant Imaging: I have reviewed all pertinent imaging results/findings within the past 24 hours.

## 2022-08-24 NOTE — ASSESSMENT & PLAN NOTE
Patient with Persistent (7 days or more) atrial fibrillation which is controlled currently with Beta Blocker. Patient is currently in sinus rhythm.[id number] Score: 4. HASBLED Score: Unable to calculate. Anticoagulation indicated. Anticoagulation done with Eliquis.

## 2022-08-24 NOTE — NURSING
Notified Dr Domínguez of full page run of [employer]. Awaiting order      1244 orders for ekg  And cardiology consult. Notified Lola Johnson NP

## 2022-08-24 NOTE — PT/OT/SLP PROGRESS
Occupational Therapy   Treatment    Name: Jamey Lei  MRN: [id number]  Admitting Diagnosis:  BRIGETTE (acute kidney injury)       Recommendations:     Discharge Recommendations: home with home health  Discharge Equipment Recommendations:  walker, rolling  Barriers to discharge:  Other (Comment) (current medical status)    Assessment:     Jamey Lei is a 81 y.o. male with a medical diagnosis of BRIGETTE (acute kidney injury).  He presents with weakness. Performance deficits affecting function are  .     Rehab Prognosis:  Good; patient would benefit from acute skilled OT services to address these deficits and reach maximum level of function.       Plan:     Patient to be seen 6 x/week to address the above listed problems via self-care/home management, therapeutic activities, therapeutic exercises  · Plan of Care Expires: 09/02/22  · Plan of Care Reviewed with: patient    Subjective     Pain/Comfort:  · Pain Rating 1: 0/10  · Pain Rating Post-Intervention 1: 0/10    Objective:     Communicated with: nurse prior to session.  Patient found supine with espinal catheter, peripheral IV, telemetry upon OT entry to room.    General Precautions: Standard, fall   Orthopedic Precautions:N/A   Braces: N/A  Respiratory Status: Room air     Occupational Performance:     Bed Mobility:    · Patient completed Rolling/Turning to Left with  contact guard assistance  · Patient completed Rolling/Turning to Right with contact guard assistance  · Patient completed Scooting/Bridging with contact guard assistance  · Patient completed Supine to Sit with contact guard assistance  · Patient completed Sit to Supine with minimum assistance       Activities of Daily Living:  · Grooming: minimum assistance    · Bathing: minimum assistance    · Upper Body Dressing: minimum assistance        [facility] 6 Click ADL: 16    Treatment & Education:  Pt was agreeable for therapy. Pt was able to use washcloth to wash face, apply Deoderant to underarms, and brush  "teeth at EOB with min assistance. Pt stated that "I feel like a new man" after treatment. Pt was able to complete self care ax with min v/c for motivation.    Patient left supine with all lines intact and call button in reachEducation:      GOALS:   Multidisciplinary Problems     Occupational Therapy Goals        Problem: Occupational Therapy    Goal Priority Disciplines Outcome Interventions   Occupational Therapy Goal     OT, PT/OT Ongoing, Progressing    Description: Goals to be met by: 8/23/2022     Patient will increase functional independence with ADLs by performing:    Feeding with [place].  UE Dressing with Modified [place].  LE Dressing with Modified [place].  Grooming while EOB with Modified [place].  Toileting from toilet with Modified [place] for hygiene and clothing management.   Bathing from  edge of bed with Modified [place].  Toilet transfer to toilet with Modified [place].                     Time Tracking:     OT Date of Treatment: 08/24/22  OT Start Time: 0936  OT Stop Time: 0954  OT Total Time (min): 18 min    Billable Minutes:Self Care/Home Management 18    OT/DENISHA: OT          8/24/2022    "

## 2022-08-24 NOTE — PROGRESS NOTES
[facility]  Progress Note    Patient Name: Jamey Lei  MRN: [id number]  Patient Class: IP- Inpatient   Admission Date: 8/20/2022  Length of Stay: 3 days  Attending Physician: Navi Domínguez III, MD  Primary Care Provider: Navi Domínguez III, MD        Subjective:     Principal Problem:BRIGETTE (acute kidney injury)        HPI:  81 y.o. male with PMHX of  CHF, COPD, AFib on Eliquis presents with generalized weakness and frequent falls for the past week.  Patient says he fell multiple times but did not hit his head or have loss of consciousness.  Last fall was earlier today onto his right side.  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  Patient has not been eating well.  Patient continues to take his torsemide which he notes has been making his lower extremity swelling much improved.  Patient denies any chest pain, vomiting, diarrhea, black tarry stool, headache, dizziness, changes in mentation      Overview/Hospital Course:  8/22 SD: Pt laying in bed, visitor at bedside. Potassium trending up; CR and Magnesium trending down - continue current IV fluids, Nephrology following.    8/23 SD: Potassium improved, adjust repletion dosing. CR trending down. Continue current IV fluids and follow Nephrology recommendations.    8/24 SD: Participating in therapy. H&H fluctuating/trending down. CR improved.          Interval History: Pt seen and evaluated    Review of Systems   Constitutional:  Positive for fatigue.   Eyes:  Negative for visual disturbance.   Respiratory:  Negative for chest tightness, shortness of breath and wheezing.    Cardiovascular:  Negative for chest pain and palpitations.   Gastrointestinal:  Negative for abdominal distention and abdominal pain.   Endocrine: Negative for polyuria.   Genitourinary:  Negative for difficulty urinating.   Musculoskeletal:  Positive for arthralgias and back pain.   Neurological:  Negative  for syncope.   Psychiatric/Behavioral:  Negative for hallucinations. The patient is not nervous/anxious.    Objective:     Vital Signs (Most Recent):  Temp: 98.2 °F (36.8 °C) (08/24/22 0717)  Pulse: 66 (08/24/22 0717)  Resp: 20 (08/24/22 0717)  BP: 138/65 (08/24/22 0717)  SpO2: (!) 94 % (08/24/22 0717) Vital Signs (24h Range):  Temp:  [98.1 °F (36.7 °C)-98.8 °F (37.1 °C)] 98.2 °F (36.8 °C)  Pulse:  [62-88] 66  Resp:  [18-22] 20  SpO2:  [94 %-98 %] 94 %  BP: (123-173)/(52-77) 138/65     Weight: 108.9 kg (240 lb)  Body mass index is 34.44 kg/m².    Intake/Output Summary (Last 24 hours) at 8/24/2022 0958  Last data filed at 8/24/2022 0700  Gross per 24 hour   Intake 1920 ml   Output 3200 ml   Net -1280 ml        Physical Exam  Vitals and nursing note reviewed.   Constitutional:       General: He is not in acute distress.     Appearance: He is well-developed. He is not diaphoretic.   HENT:      Head: Normocephalic and atraumatic.      Nose: No congestion or rhinorrhea.      Mouth/Throat:      Mouth: Mucous membranes are moist.      Pharynx: Oropharynx is clear. No oropharyngeal exudate or posterior oropharyngeal erythema.   Eyes:      General: No scleral icterus.        Right eye: No discharge.         Left eye: No discharge.      Extraocular Movements: Extraocular movements intact.      Pupils: Pupils are equal, round, and reactive to light.   Neck:      Thyroid: No thyromegaly.      Vascular: No JVD.   Cardiovascular:      Rate and Rhythm: Normal rate and regular rhythm.      Heart sounds: No murmur heard.  Pulmonary:      Effort: Pulmonary effort is normal. No respiratory distress.      Breath sounds: Normal breath sounds. No wheezing.   Abdominal:      General: Bowel sounds are normal. There is no distension.      Palpations: Abdomen is soft.      Tenderness: There is no abdominal tenderness.   Musculoskeletal:         General: No swelling or tenderness.   Skin:     General: Skin is warm and dry.      Findings:  Bruising present.   Neurological:      General: No focal deficit present.      Mental Status: He is alert and oriented to person, place, and time. Mental status is at baseline.   Psychiatric:         Mood and Affect: Mood normal.         Behavior: Behavior normal.         Thought Content: Thought content normal.       Significant Labs: All pertinent labs within the past 24 hours have been reviewed.  CBC:   Recent Labs   Lab 08/23/22  0634 08/24/22  0558   WBC 7.96 6.73   HGB 9.4* 8.6*   HCT 31.0* 28.5*    221       CMP:   Recent Labs   Lab 08/23/22  0634 08/24/22  0558    142   K 4.4 4.7   * 112*   CO2 28 25   * 100   * 66*   CREATININE 2.3* 2.0*   CALCIUM 9.6 9.5   PROT 7.0 6.5   ALBUMIN 2.8* 2.5*   BILITOT 0.3 0.3   ALKPHOS 53* 47*   AST 18 16   ALT 17 17   ANIONGAP 4* 5*       Magnesium:   Recent Labs   Lab 08/23/22 0634 08/24/22  0558   MG 2.6 2.2         Significant Imaging: I have reviewed all pertinent imaging results/findings within the past 24 hours.      Assessment/Plan:      * BRIGETTE (acute kidney injury)  Acute on chronic BRIGETTE, likely prerenal, gentle fluids, creatinine improving, Nephrology following      Metabolic alkalosis  Follow Nephrology recommendations - continue to hold diuretics and give slow IV fluids      Dehydration  IV fluids on board, electrolyte repletion      Hyponatremia  Improved      Hypokalemia  Improved      Stage 3 chronic kidney disease  Acute on chronic BRIGETTE, likely prerenal, gentle fluids, creatinine improving, Nephrology following      Seizure disorder  Continue old meds, dose adjusted for renal function  No seizure activity      SHAYNE (obstructive sleep apnea)  CPAP nightly      A-fib  Patient with Persistent (7 days or more) atrial fibrillation which is controlled currently with Beta Blocker. Patient is currently in sinus rhythm.[id number] Score: 4. HASBLED Score: Unable to calculate. Anticoagulation indicated. Anticoagulation done with  Eliquis.        COPD (chronic obstructive pulmonary disease)  No wheezing noted on exam, nebs p.r.n., wean oxygen as tolerated      DVT prophylaxis  On Eliquis        VTE Risk Mitigation (From admission, onward)         Ordered     apixaban tablet 2.5 mg  2 times daily         08/21/22 1006     IP VTE HIGH RISK PATIENT  Once         08/20/22 1621     Place sequential compression device  Until discontinued         08/20/22 1621                Discharge Planning   GLORY:      Code Status: Full Code   Is the patient medically ready for discharge?:     Reason for patient still in hospital (select all that apply): Patient trending condition, Laboratory test and Treatment  Discharge Plan A: Home with family, Home Health                  Natalie Crawley NP  Department of Hospital Medicine   [facility]

## 2022-08-24 NOTE — CONSULTS
[facility] Surg  Cardiology  Consult Note    Patient Name: Jamey Lei  Patient : 1940  MRN: [id number]  Admission Date: 2022  Hospital Length of Stay: 4 days  Code Status: Full Code   Attending Provider: Navi Domínguez III, MD   Consulting Provider: Christian Webster MD  Primary Care Physician: Navi Domínguez III, MD  Principal Problem:BRIGETTE (acute kidney injury)      Patient information was obtained from patient, past medical records and ER records.     Consults  Subjective:     Chief Complaint:  Acute kidney injury    HPI: Patient is a 81-year-old male with CAD s/p 3V CABG in , permanent A. fib on Eliquis, moderate to severe bilateral CVD, hypertension, hyperlipidemia, stage III chronic kidney disease and lung mass.    Per primary care note:    HPI:  81 y.o. male with PMHX of  CHF, COPD, AFib on Eliquis presents with generalized weakness and frequent falls for the past week.  Patient says he fell multiple times but did not hit his head or have loss of consciousness.  Last fall was earlier today onto his right side.  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  Patient has not been eating well.  Patient continues to take his torsemide which he notes has been making his lower extremity swelling much improved.  Patient denies any chest pain, vomiting, diarrhea, black tarry stool, headache, dizziness, changes in mentation        Overview/Hospital Course:   SD: Pt laying in bed, visitor at bedside. Potassium trending up; CR and Magnesium trending down - continue current IV fluids, Nephrology following.      SD: Potassium improved, adjust repletion dosing. CR trending down. Continue current IV fluids and follow Nephrology recommendations.      SD: Participating in therapy. H&H fluctuating/trending down. CR improved.      Cardiology consult requested for ventricular tachycardia.   Past Medical History:   Diagnosis Date    A-fib      Abdominal pain, epigastric     Abdominal pain, generalized     Acute on chronic congestive heart failure 12/29/2021    Anticoagulant long-term use     Arthritis     Asteatotic eczema     Benign essential HTN     Benign prostatic hyperplasia     Brain tumor     Cardiovascular accident     Carotid artery stenosis     CHF (congestive heart failure)     CKD (chronic kidney disease), stage IV     Constipation     COPD (chronic obstructive pulmonary disease)     Depressed     DM (diabetes mellitus), secondary     Drug eruption     Eczema     Edema     Encounter for blood transfusion     Fever     H. pylori infection     Hearing loss     History of tobacco use     Hypercholesterolemia     Hypertensive renal disease, benign     Hypokalemia     Hyponatremia     Impaired fasting glucose     Lumbar pain     Lung mass     Malaise and fatigue     Malignant neoplasm of prostate     Meningioma     Mixed hyperlipidemia     Nasal bleeding     Neuropathy     Obesity, unspecified     SHAYNE (obstructive sleep apnea)     Osteoarthritis of knee     Osteoarthritis of multiple joints     Peripheral vascular disease, unspecified     Psychosis     Renal disorder     Seizure disorder     Seizures     Sleep apnea     Unspecified cataract     Unspecified chronic bronchitis     Unspecified osteoarthritis, unspecified site     Vertigo     Vitamin D deficiency     Wheezing        Past Surgical History:   Procedure Laterality Date    APPENDECTOMY      COLONOSCOPY      CORONARY ARTERY BYPASS GRAFT      FOOT SURGERY Left     HIP REPLACEMENT ARTHROPLASTY Right     HIP SURGERY      JOINT REPLACEMENT      KNEE SURGERY      LUNG BIOPSY Right 03/03/2022    benign    SHOULDER SURGERY Left     SHOULDER SURGERY      TOTAL KNEE ARTHROPLASTY Right     triple bypass         Review of patient's allergies indicates:   Allergen Reactions    Zolpidem        No current facility-administered medications on file prior to encounter.     Current Outpatient Medications  on File Prior to Encounter   Medication Sig    apixaban (ELIQUIS) 5 mg Tab Take 5 mg by mouth 2 (two) times daily.    aspirin (ECOTRIN) 81 MG EC tablet Take 81 mg by mouth once daily.    atorvastatin (LIPITOR) 20 MG tablet Take 1 tablet (20 mg total) by mouth once daily.    docusate sodium (COLACE) 100 MG capsule Take 100 mg by mouth 2 (two) times daily.    ezetimibe (ZETIA) 10 mg tablet Take 10 mg by mouth once daily.    guaiFENesin (MUCINEX) 600 mg 12 hr tablet Take 1,200 mg by mouth 2 (two) times daily.    HYDROcodone-acetaminophen (NORCO) 5-325 mg per tablet Take 1 tablet by mouth every 6 (six) hours as needed for Pain.    metOLazone (ZAROXOLYN) 5 MG tablet TAKE ONE TABLET BY MOUTH ONCE DAILY THANK YOU    metoprolol tartrate (LOPRESSOR) 25 MG tablet Take 25 mg by mouth 2 (two) times daily.    multivitamin capsule Take 1 capsule by mouth once daily.    OXcarbazepine (TRILEPTAL) 300 MG Tab Take 1 tablet (300 mg total) by mouth 2 (two) times daily.    potassium chloride SA (K-DUR,KLOR-CON) 20 MEQ tablet TAKE TWO TABLETS BY MOUTH TWICE A DAY. THANK YOU    senna (SENOKOT) 8.6 mg tablet 2 tablets at bedtime as needed    torsemide (DEMADEX) 100 MG Tab Take 20 mg by mouth once daily.    vitamin D (VITAMIN D3) 1000 units Tab Take 1,000 Units by mouth once daily.    [DISCONTINUED] miconazole NITRATE 2 % (MICOTIN) 2 % top powder Apply topically as needed for Itching.    albuterol (PROVENTIL/VENTOLIN HFA) 90 mcg/actuation inhaler Inhale 1-2 puffs into the lungs every 6 (six) hours as needed for Wheezing or Shortness of Breath. Rescue    albuterol-ipratropium (DUO-NEB) 2.5 mg-0.5 mg/3 mL nebulizer solution inhale 3 milliliters by nebulization route 4 times per day and as needed, up to 6 doses per day for 30 days    carvediloL (COREG) 6.25 MG tablet TAKE ONE TABLET BY MOUTH TWICE DAILY (EVERY 12 HOURS) FOR BLOOD PRESSURE. THANKS !!    ezetimibe (ZETIA) 10 mg tablet Take 10 mg by mouth once daily.    FLUoxetine 20 MG capsule      levoFLOXacin (LEVAQUIN) 500 MG tablet TAKE ONE TABLET BY MOUTH ONCE SALINAS FOR IINFECTION. THANKS!!    lovastatin (MEVACOR) 10 MG tablet 1 tablet with a meal    mirtazapine (REMERON) 15 MG tablet Take 15 mg by mouth nightly.    risperiDONE (RISPERDAL) 0.5 MG Tab Take 1 tablet (0.5 mg total) by mouth nightly as needed (agitation and halluctions).    tamsulosin (FLOMAX) 0.4 mg Cap Take 0.4 mg by mouth once daily.    [DISCONTINUED] calcium carbonate (OS-DAVID) 600 mg calcium (1,500 mg) Tab Take 600 mg by mouth once.    [DISCONTINUED] gabapentin (NEURONTIN) 300 MG capsule 1 capsule     Family History       Problem Relation (Age of Onset)    Arthritis Mother, Father    Asthma Sister    Heart disease Father    Hyperlipidemia Mother, Sister    Hypertension Mother, Father, Sister, Brother          Tobacco Use    Smoking status: Current Every Day Smoker    Smokeless tobacco: Never Used    Tobacco comment: quit 30 years ago   Substance and Sexual Activity    Alcohol use: Not Currently    Drug use: Never    Sexual activity: Not Currently     Review of Systems   Constitutional: Positive for fatigue.   HENT: Negative.    Eyes: Negative.    Respiratory: Positive for shortness of breath.    Cardiovascular: Positive for leg swelling.   Gastrointestinal: Negative.    Genitourinary: Negative.    Musculoskeletal: Negative.    Skin: Negative.    Allergic/Immunologic: Negative.    Neurological: Negative.    Hematological: Negative.    Psychiatric/Behavioral: Negative.      Objective:     Vital Signs (Most Recent):  Temp: 99.2 °F (37.3 °C) (08/25/22 0713)  Pulse: 64 (08/25/22 0713)  Resp: 20 (08/25/22 0713)  BP: (!) 163/69 (08/25/22 0713)  SpO2: 99 % (08/25/22 0713) Vital Signs (24h Range):  Temp:  [97.3 °F (36.3 °C)-99.2 °F (37.3 °C)] 99.2 °F (37.3 °C)  Pulse:  [54-72] 64  Resp:  [20-22] 20  SpO2:  [95 %-99 %] 99 %  BP: (134-165)/(60-80) 163/69     Weight: 108.9 kg (240 lb)  Body mass index is 34.44 kg/m².    SpO2: 99 %          Intake/Output Summary (Last 24 hours) at 8/25/2022 0738  Last data filed at 8/25/2022 0500  Gross per 24 hour   Intake 1420 ml   Output 1300 ml   Net 120 ml       Lines/Drains/Airways       Drain  Duration                  Urethral Catheter 08/20/22 1407 Silicone 16 Fr. 4 days              Peripheral Intravenous Line  Duration                  Peripheral IV - Single Lumen 08/23/22 2100 20 G Right Antecubital 1 day         Peripheral IV - Single Lumen 08/23/22 2100 22 G Anterior;Distal;Left Wrist 1 day                    Physical Exam  Vitals and nursing note reviewed.   Constitutional:       Appearance: He is ill-appearing.   HENT:      Head: Normocephalic.      Mouth/Throat:      Mouth: Mucous membranes are moist.   Cardiovascular:      Rate and Rhythm: Normal rate. Rhythm irregular.   Pulmonary:      Effort: Pulmonary effort is normal.      Breath sounds: Normal breath sounds.   Abdominal:      General: Abdomen is flat.   Musculoskeletal:      Right lower leg: Edema present.      Left lower leg: Edema present.   Skin:     General: Skin is warm.      Capillary Refill: Capillary refill takes less than 2 seconds.   Neurological:      General: No focal deficit present.      Mental Status: He is alert.         Significant Labs:  All pertinent lab results from the last 24 hours have been reviewed.   Recent Lab Results  (Last 5 results in the past 72 hours)        08/25/22  0607   08/24/22  1634   08/24/22  0558   08/23/22  0634   08/22/22  0958        Albumin     2.5   2.8   2.6       Alkaline Phosphatase     47   53   52       ALT     17   17   15       Anion Gap     5   4   7       Ao root annulus   3.63             Ao peak pia   1.59             Ao VTI   35.43             AST     16   18   14       AV valve area   2.54             AORTIC VALVE CUSP SEPERATION   1.91             AV mean gradient   6             AV index (prosthetic)   0.70             AV peak gradient   10             AV Velocity Ratio   0.69              Baso # 0.03     0.02   0.03   0.04       Basophil % 0.5     0.3   0.4   0.5       BILIRUBIN TOTAL     0.3  Comment: For infants and newborns, interpretation of results should be based  on gestational age, weight and in agreement with clinical  observations.    Premature Infant recommended reference ranges:  Up to 24 hours.............<8.0 mg/dL  Up to 48 hours............<12.0 mg/dL  3-5 days..................<15.0 mg/dL  6-29 days.................<15.0 mg/dL    For patients on Eltrombopag therapy, use of Dimension [facility] TBIL is   not   recommended.     0.3  Comment: For infants and newborns, interpretation of results should be based  on gestational age, weight and in agreement with clinical  observations.    Premature Infant recommended reference ranges:  Up to 24 hours.............<8.0 mg/dL  Up to 48 hours............<12.0 mg/dL  3-5 days..................<15.0 mg/dL  6-29 days.................<15.0 mg/dL    For patients on Eltrombopag therapy, use of Dimension [place] TBIL is   not   recommended.     0.3  Comment: For infants and newborns, interpretation of results should be based  on gestational age, weight and in agreement with clinical  observations.    Premature Infant recommended reference ranges:  Up to 24 hours.............<8.0 mg/dL  Up to 48 hours............<12.0 mg/dL  3-5 days..................<15.0 mg/dL  6-29 days.................<15.0 mg/dL    For patients on Eltrombopag therapy, use of Dimension [facility] TBIL is   not   recommended.         BSA   2.32             BUN     66   100   130       Calcium     9.5   9.6   9.3       Chloride     112   112   102       CO2     25   28   30       Creatinine     2.0   2.3   2.8       Left Ventricle Relative Wall Thickness   0.50             Differential Method Automated     Automated   Automated   Automated       E/A ratio   1.43             E/E' ratio   8.48             eGFR     33.3   28.2   22.3       EF   67             Eos # 0.2     0.3   0.4    0.3       Eosinophil % 3.5     4.0   4.4   2.8       E wave deceleration time   308.25             FS   37             Glucose     100   114   151       Gran # (ANC) 3.8     4.3   5.6   6.5       Gran % 57.7     63.9   70.0   74.2       Hematocrit 28.7     28.5   31.0   29.7       Hemoglobin 8.5     8.6   9.4   9.2       Immature Grans (Abs) 0.02  Comment: Mild elevation in immature granulocytes is non specific and   can be seen in a variety of conditions including stress response,   acute inflammation, trauma and pregnancy. Correlation with other   laboratory and clinical findings is essential.       0.03  Comment: Mild elevation in immature granulocytes is non specific and   can be seen in a variety of conditions including stress response,   acute inflammation, trauma and pregnancy. Correlation with other   laboratory and clinical findings is essential.     0.02  Comment: Mild elevation in immature granulocytes is non specific and   can be seen in a variety of conditions including stress response,   acute inflammation, trauma and pregnancy. Correlation with other   laboratory and clinical findings is essential.     0.03  Comment: Mild elevation in immature granulocytes is non specific and   can be seen in a variety of conditions including stress response,   acute inflammation, trauma and pregnancy. Correlation with other   laboratory and clinical findings is essential.         Immature Granulocytes 0.3     0.4   0.3   0.3       IVRT   55.36             IVSd   1.16             LA WIDTH   4.79             Left Atrium Major Axis   7.34             LA size   4.45             LVOT area   3.6             LV LATERAL E/E' RATIO   7.07             LV SEPTAL E/E' RATIO   10.60             LV EDV BP   101.73             LV Diastolic Volume Index   45.01             LVIDd   4.69             LVIDs   2.95             LV mass   202.60             LV Mass Index   90             LVOT diameter   2.15             LVOT peak pia    1.10             LVOT stroke volume   89.85             LVOT peak VTI   24.76             LV ESV BP   33.73             LV Systolic Volume Index   14.9             Lymph # 1.5     0.9   0.9   0.9       Lymph % 22.2     13.7   11.1   10.6       Magnesium     2.2   2.6   2.8       MCH 24.9     25.2   25.0   25.3       MCHC 29.6     30.2   30.3   31.0       MCV 84     84   82   82       Mean e'   0.13             Mono # 1.1     1.2   1.1   1.0       Mono % 15.8     17.7   13.8   11.6       MPV 11.0     10.9   10.5   10.5       MV valve area p 1/2 method   2.46             MV Peak A John   0.74             MV Peak E John   1.06             MV stenosis pressure 1/2 time   89.39             nRBC 0     0   0   0       Platelets 220     221   238   240       Potassium     4.7   4.4   3.7       PROTEIN TOTAL     6.5   7.0   6.7       PV PEAK VELOCITY   1.11             Posterior Wall   1.17             Right Atrial Pressure (from IVC)   8             RA Major Axis   6.06             RA Width   4.69             RBC 3.42     3.41   3.76   3.63       RDW 16.1     16.1   16.0   16.0       RVDD   3.79             Sodium     142   144   139       TAPSE   2.81             TDI SEPTAL   0.10             TDI LATERAL   0.15             Triscuspid Valve Regurgitation Peak Gradient   44             TR Max John   3.33             TV rest pulmonary artery pressure   52             WBC 6.63     6.73   7.96   8.80                              Significant Imaging:  Imaging Results              X-Ray Chest 1 View (Final result)  Result time 08/20/22 12:57:54      Final result by Jamey Gannon MD (08/20/22 12:57:54)                   Impression:      Mild bibasilar opacities, could reflect edema, atelectasis or pneumonia.      Electronically signed by: Jamey Gannon MD  Date:    08/20/2022  Time:    12:57               Narrative:    EXAMINATION:  XR CHEST 1 VIEW    CLINICAL HISTORY:  Weakness    COMPARISON:  Portable chest  06/09/2022.    FINDINGS:  Frontal portable chest demonstrates mildly hypoinflated lungs.  Mild bibasilar opacities.  No pleural fluid.  Mild prominence of cardiac silhouette with prior cardiac surgery.  Left shoulder arthroplasty device, as noted on prior exam.                                                ECHO: Pending         TELEMETRY: Nonsustained ventricular tachycardia       MEDICATIONS:     Current Facility-Administered Medications:     0.9%  NaCl infusion, , Intravenous, Continuous, Evelyn Meza MD, Last Rate: 75 mL/hr at 08/23/22 1101, New Bag at 08/23/22 1101    acetaminophen tablet 650 mg, 650 mg, Oral, Q8H PRN, Trent Ramachandran MD, 650 mg at 08/23/22 0029    acetaminophen tablet 650 mg, 650 mg, Oral, Q8H PRN, Trent Ramachandran MD    apixaban tablet 2.5 mg, 2.5 mg, Oral, BID, Trent Ramachandran MD, 2.5 mg at 08/24/22 2051    aspirin EC tablet 81 mg, 81 mg, Oral, Daily, Trent Ramachandran MD, 81 mg at 08/24/22 0841    atorvastatin tablet 20 mg, 20 mg, Oral, Daily, Trent Ramachandran MD, 20 mg at 08/24/22 0841    melatonin tablet 6 mg, 6 mg, Oral, Nightly PRN, Trent Ramachandran MD    metoprolol tartrate (LOPRESSOR) tablet 25 mg, 25 mg, Oral, BID, SOPHIE Flor, 25 mg at 08/24/22 2051    mupirocin 2 % ointment, , Nasal, BID, Jamey Liu Jr., MD, Given at 08/24/22 2053    ondansetron injection 4 mg, 4 mg, Intravenous, Q8H PRN, Trent Ramachandran MD    OXcarbazepine tablet 150 mg, 150 mg, Oral, BID, Trent Ramachandran MD, 150 mg at 08/24/22 2051    sodium chloride 0.9% flush 10 mL, 10 mL, Intravenous, PRN, Trent Ramachandran MD    tamsulosin 24 hr capsule 0.4 mg, 0.4 mg, Oral, Daily, Trent Ramachandran MD, 0.4 mg at 08/24/22 0841      Assessment and Plan:     Active Diagnoses:    Diagnosis Date Noted POA    PRINCIPAL PROBLEM:  BRIGETTE (acute kidney injury) [N17.9] 08/21/2022 Yes    Metabolic alkalosis [E87.3] 08/22/2022 Unknown    Dehydration [E86.0] 08/21/2022 Yes    Stage 3 chronic kidney disease [N18.30] 04/11/2022 Yes     Hypokalemia [E87.6] 04/11/2022 Yes    Hyponatremia [E87.1] 04/11/2022 Yes    Seizure disorder [G40.909] 03/30/2022 Yes    SHAYNE (obstructive sleep apnea) [G47.33]  Yes    COPD (chronic obstructive pulmonary disease) [J44.9]  Yes    A-fib [I48.91]  Yes    DVT prophylaxis [Z29.9] 12/29/2021 Not Applicable      Problems Resolved During this Admission:       VTE Risk Mitigation (From admission, onward)           Ordered     apixaban tablet 2.5 mg  2 times daily         08/21/22 1006     IP VTE HIGH RISK PATIENT  Once         08/20/22 1621     Place sequential compression device  Until discontinued         08/20/22 1621                    1.  Acute Kidney Injury:  Nephrology following.     2.  Non-sustained ventricular tachycardia:  Resume beta blocker and monitor on telemetry.  Electrolyte abnormalities improved. No indication for device therapy at this time.    3.  Permanent atrial fibrillation:  Rate overall stable.  Continue eliquis. Hemodynamics stable. No need for DCCV at this timer.    4.  CAD s/p CABG:  No complaints of chest pain at present.  Continue to monitor.  Continue home medications.     Thank you for your consult. O further recommendations. Reconsult as needed.          Christian Webster MD  Cardiology  [place] [facility]  08/25/2022

## 2022-08-24 NOTE — PT/OT/SLP PROGRESS
Physical Therapy Treatment    Patient Name:  Jamey Lei   MRN:  [id number]    Recommendations:     Discharge Recommendations:  home health PT   Discharge Equipment Recommendations: walker, rolling   Barriers to discharge: None    Assessment:     Jamey Lei is a 81 y.o. male admitted with a medical diagnosis of BRIGETTE (acute kidney injury).  He presents with the following impairments/functional limitations:  impaired endurance, weakness, gait instability, impaired cognition impairing functional independence. Patient performs bed mobility with SBA-Emma. Patient performing transfers and ambulating with RW and CGA.     Rehab Prognosis: Good; patient would benefit from acute skilled PT services to address these deficits and reach maximum level of function.    Recent Surgery: * No surgery found *      Plan:     During this hospitalization, patient to be seen 6 x/week to address the identified rehab impairments via gait training, therapeutic activities, therapeutic exercises and progress toward the following goals:    · Plan of Care Expires:  09/09/22    Subjective     Chief Complaint: weakness, coughing  Patient/Family Comments/goals: patient having difficultly remembering facts about home life and current health condition. Patient is pleasant. Son is present and reports that patient has been having a cough for months now.  Pain/Comfort:  · Pain Rating 1: 0/10      Objective:     Communicated with RN prior to session.  Patient found HOB elevated with espinal catheter, peripheral IV upon PT entry to room.     General Precautions: Standard, fall   Orthopedic Precautions:N/A   Braces: N/A  Respiratory Status: Room air     Functional Mobility:  · Bed Mobility:     · Rolling Right: contact guard assistance  · Scooting: SBA to EOB  · Supine to Sit: minimum assistance  · Sit to Supine: stand by assistance  · Transfers:     · Sit to Stand:  contact guard assistance with rolling walker  · Gait: Patient ambulates 64 feet in  room using RW with CGA  · Balance: Patient with good static standing balance while using RW.      AM-PAC 6 CLICK MOBILITY  Turning over in bed (including adjusting bedclothes, sheets and blankets)?: 3  Sitting down on and standing up from a chair with arms (e.g., wheelchair, bedside commode, etc.): 3  Moving from lying on back to sitting on the side of the bed?: 3  Moving to and from a bed to a chair (including a wheelchair)?: 3  Need to walk in hospital room?: 3  Climbing 3-5 steps with a railing?: 2  Basic Mobility Total Score: 17       Therapeutic Activities and Exercises:   Not Performed    Patient left HOB elevated with all lines intact, call button in reach and son present..    GOALS:   Multidisciplinary Problems     Physical Therapy Goals        Problem: Physical Therapy    Goal Priority Disciplines Outcome Goal Variances Interventions   Physical Therapy Goal     PT, PT/OT Ongoing, Progressing     Description: Goals to be met by: 22    Patient will increase functional independence with mobility by performin. Supine to sit with Modified [place]  2. Sit to supine with Modified [place]  3. Sit to stand transfer with Modified [place]  4. Gait  x 150 feet with Modified [place] using Rolling Walker.                      Time Tracking:     PT Received On: 22  PT Start Time: 839     PT Stop Time: 902  PT Total Time (min): 23 min     Billable Minutes: Gait Training 10 minutes and Therapeutic Activity 13 minutes    Treatment Type: Treatment  PT/PTA: PT           2022

## 2022-08-25 VITALS
RESPIRATION RATE: 20 BRPM | WEIGHT: 240 LBS | SYSTOLIC BLOOD PRESSURE: 163 MMHG | TEMPERATURE: 99 F | BODY MASS INDEX: 34.36 KG/M2 | HEIGHT: 70 IN | DIASTOLIC BLOOD PRESSURE: 69 MMHG | HEART RATE: 64 BPM | OXYGEN SATURATION: 99 %

## 2022-08-25 LAB
ALBUMIN SERPL BCP-MCNC: 2.5 G/DL (ref 3.5–5.2)
ALP SERPL-CCNC: 47 U/L (ref 55–135)
ALT SERPL W/O P-5'-P-CCNC: 19 U/L (ref 10–44)
ANION GAP SERPL CALC-SCNC: 5 MMOL/L (ref 8–16)
AST SERPL-CCNC: 22 U/L (ref 10–40)
BASOPHILS # BLD AUTO: 0.03 K/UL (ref 0–0.2)
BASOPHILS NFR BLD: 0.5 % (ref 0–1.9)
BILIRUB SERPL-MCNC: 0.3 MG/DL (ref 0.1–1)
BUN SERPL-MCNC: 53 MG/DL (ref 8–23)
CALCIUM SERPL-MCNC: 9.2 MG/DL (ref 8.7–10.5)
CHLORIDE SERPL-SCNC: 114 MMOL/L (ref 95–110)
CO2 SERPL-SCNC: 23 MMOL/L (ref 23–29)
CREAT SERPL-MCNC: 1.8 MG/DL (ref 0.5–1.4)
DIFFERENTIAL METHOD: ABNORMAL
EOSINOPHIL # BLD AUTO: 0.2 K/UL (ref 0–0.5)
EOSINOPHIL NFR BLD: 3.5 % (ref 0–8)
ERYTHROCYTE [DISTWIDTH] IN BLOOD BY AUTOMATED COUNT: 16.1 % (ref 11.5–14.5)
EST. GFR  (NO RACE VARIABLE): 37.8 ML/MIN/1.73 M^2
GLUCOSE SERPL-MCNC: 95 MG/DL (ref 70–110)
HCT VFR BLD AUTO: 28.7 % (ref 40–54)
HGB BLD-MCNC: 8.5 G/DL (ref 14–18)
IMM GRANULOCYTES # BLD AUTO: 0.02 K/UL (ref 0–0.04)
IMM GRANULOCYTES NFR BLD AUTO: 0.3 % (ref 0–0.5)
LYMPHOCYTES # BLD AUTO: 1.5 K/UL (ref 1–4.8)
LYMPHOCYTES NFR BLD: 22.2 % (ref 18–48)
MAGNESIUM SERPL-MCNC: 2 MG/DL (ref 1.6–2.6)
MCH RBC QN AUTO: 24.9 PG (ref 27–31)
MCHC RBC AUTO-ENTMCNC: 29.6 G/DL (ref 32–36)
MCV RBC AUTO: 84 FL (ref 82–98)
MONOCYTES # BLD AUTO: 1.1 K/UL (ref 0.3–1)
MONOCYTES NFR BLD: 15.8 % (ref 4–15)
NEUTROPHILS # BLD AUTO: 3.8 K/UL (ref 1.8–7.7)
NEUTROPHILS NFR BLD: 57.7 % (ref 38–73)
NRBC BLD-RTO: 0 /100 WBC
PLATELET # BLD AUTO: 220 K/UL (ref 150–450)
PMV BLD AUTO: 11 FL (ref 9.2–12.9)
POTASSIUM SERPL-SCNC: 4.5 MMOL/L (ref 3.5–5.1)
PROT SERPL-MCNC: 6.7 G/DL (ref 6–8.4)
RBC # BLD AUTO: 3.42 M/UL (ref 4.6–6.2)
SODIUM SERPL-SCNC: 142 MMOL/L (ref 136–145)
WBC # BLD AUTO: 6.63 K/UL (ref 3.9–12.7)

## 2022-08-25 PROCEDURE — 80053 COMPREHEN METABOLIC PANEL: CPT | Performed by: NURSE PRACTITIONER

## 2022-08-25 PROCEDURE — 25000003 PHARM REV CODE 250: Performed by: INTERNAL MEDICINE

## 2022-08-25 PROCEDURE — 83735 ASSAY OF MAGNESIUM: CPT | Performed by: NURSE PRACTITIONER

## 2022-08-25 PROCEDURE — 85025 COMPLETE CBC W/AUTO DIFF WBC: CPT | Performed by: NURSE PRACTITIONER

## 2022-08-25 PROCEDURE — 36415 COLL VENOUS BLD VENIPUNCTURE: CPT | Performed by: NURSE PRACTITIONER

## 2022-08-25 PROCEDURE — 25000003 PHARM REV CODE 250: Performed by: NURSE PRACTITIONER

## 2022-08-25 RX ORDER — TORSEMIDE 20 MG/1
20 TABLET ORAL DAILY PRN
Qty: 30 TABLET | Refills: 3 | Status: ON HOLD | OUTPATIENT
Start: 2022-08-25 | End: 2022-09-19 | Stop reason: SDUPTHER

## 2022-08-25 RX ADMIN — ASPIRIN 81 MG: 81 TABLET, COATED ORAL at 08:08

## 2022-08-25 RX ADMIN — OXCARBAZEPINE 150 MG: 150 TABLET, FILM COATED ORAL at 08:08

## 2022-08-25 RX ADMIN — ATORVASTATIN CALCIUM 20 MG: 20 TABLET, FILM COATED ORAL at 08:08

## 2022-08-25 RX ADMIN — APIXABAN 2.5 MG: 2.5 TABLET, FILM COATED ORAL at 08:08

## 2022-08-25 RX ADMIN — TAMSULOSIN HYDROCHLORIDE 0.4 MG: 0.4 CAPSULE ORAL at 08:08

## 2022-08-25 RX ADMIN — METOPROLOL TARTRATE 25 MG: 25 TABLET, FILM COATED ORAL at 08:08

## 2022-08-25 NOTE — ASSESSMENT & PLAN NOTE
Patient with Persistent (7 days or more) atrial fibrillation which is controlled currently with Beta Blocker. Patient is currently in sinus rhythm.[id number] Score: 4. HASBLED Score: Unable to calculate. Anticoagulation indicated. Anticoagulation done with Eliquis.

## 2022-08-25 NOTE — PLAN OF CARE
08/25/22 0934   Medicare Message   Important Message from Medicare regarding Discharge Appeal Rights Given to patient/caregiver;Explained to patient/caregiver;Signed/date by patient/caregiver   Date IMM was signed 08/25/22   Time IMM was signed 0929

## 2022-08-25 NOTE — DISCHARGE INSTRUCTIONS
"Weigh yourself every morning, naked, after you urinate. Use your current weight as your "dry weight". When you gain 5 lbs above your "dry weight", take one Torsemide tablet. Home health can assist you with your weights and when to take the Torsemide.    Repeat labs have been ordered one week after hospital discharge. These labs can be drawn at either our clinic or the hospital.    Our office will contact you to schedule a follow-up appointment one week after hospital discharge.    If signs and symptoms worsen or new signs and symptoms develop, call your doctor or go to the ED.    "

## 2022-08-25 NOTE — PLAN OF CARE
Problem: Adult Inpatient Plan of Care  Goal: Plan of Care Review  Outcome: Met  Goal: Patient-Specific Goal (Individualized)  Outcome: Met  Goal: Absence of Hospital-Acquired Illness or Injury  Outcome: Met  Goal: Optimal Comfort and Wellbeing  Outcome: Met  Goal: Readiness for Transition of Care  Outcome: Met     Problem: Infection  Goal: Absence of Infection Signs and Symptoms  Outcome: Met     Problem: Fluid Imbalance (Pneumonia)  Goal: Fluid Balance  Outcome: Met     Problem: Infection (Pneumonia)  Goal: Resolution of Infection Signs and Symptoms  Outcome: Met     Problem: Respiratory Compromise (Pneumonia)  Goal: Effective Oxygenation and Ventilation  Outcome: Met     Problem: Impaired Wound Healing  Goal: Optimal Wound Healing  Outcome: Met     Problem: Diabetes Comorbidity  Goal: Blood Glucose Level Within Targeted Range  Outcome: Met     Problem: Skin Injury Risk Increased  Goal: Skin Health and Integrity  Outcome: Met     Problem: Fall Injury Risk  Goal: Absence of Fall and Fall-Related Injury  Outcome: Met     Problem: Fluid and Electrolyte Imbalance (Acute Kidney Injury/Impairment)  Goal: Fluid and Electrolyte Balance  Outcome: Met     Problem: Oral Intake Inadequate (Acute Kidney Injury/Impairment)  Goal: Optimal Nutrition Intake  Outcome: Met     Problem: Renal Function Impairment (Acute Kidney Injury/Impairment)  Goal: Effective Renal Function  Outcome: Met

## 2022-08-25 NOTE — DISCHARGE SUMMARY
[facility] Hospital Medicine  Discharge Summary      Patient Name: Jamey Lei  MRN: [id number]  Patient Class: IP- Inpatient  Admission Date: 8/20/2022  Hospital Length of Stay: 4 days  Discharge Date and Time:  08/25/2022 9:57 AM  Attending Physician: Navi Domínguez III, MD   Discharging Provider: Natalie Gallardo NP  Primary Care Provider: Navi Domínguez III, MD      HPI:   81 y.o. male with PMHX of  CHF, COPD, AFib on Eliquis presents with generalized weakness and frequent falls for the past week.  Patient says he fell multiple times but did not hit his head or have loss of consciousness.  Last fall was earlier today onto his right side.  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  Patient has not been eating well.  Patient continues to take his torsemide which he notes has been making his lower extremity swelling much improved.  Patient denies any chest pain, vomiting, diarrhea, black tarry stool, headache, dizziness, changes in mentation      * No surgery found *      Hospital Course:   8/22 SD: Pt laying in bed, visitor at bedside. Potassium trending up; CR and Magnesium trending down - continue current IV fluids, Nephrology following.    8/23 SD: Potassium improved, adjust repletion dosing. CR trending down. Continue current IV fluids and follow Nephrology recommendations.    8/24 SD: Participating in therapy. H&H fluctuating/trending down. CR improved.    8/25 SD: CR close to baseline. H&H stabilizing. Plan to discharge home today with repeat labs and close outpatient follow-up.           Goals of Care Treatment Preferences:  Code Status: Full Code      Consults:   Consults (From admission, onward)        Status Ordering Provider     Inpatient consult to Social Work/Case Management  Once        Provider:  (Not yet assigned)    Acknowledged NATALIE GALLARDO     Inpatient consult to Cardiology  Once        Provider:  Christian HOPKINS  MD Darin    Completed NAVI FLORENTINO III     Inpatient consult to Nephrology  Once        Provider:  Evelyn Meza MD    Acknowledged CARLOS WOLFF          * BRIGETTE (acute kidney injury)  Acute on chronic BRIGETTE, likely prerenal, gentle fluids, creatinine improving, Nephrology following      Metabolic alkalosis  Resolved    Dehydration  Resolved    Hyponatremia  Resolved    Hypokalemia  Resolved    Stage 3 chronic kidney disease  Acute on chronic BRIGETTE, likely prerenal, gentle fluids, creatinine improving, Nephrology following      Seizure disorder  Continue old meds, dose adjusted for renal function  No seizure activity      SHAYNE (obstructive sleep apnea)  CPAP nightly      A-fib  Patient with Persistent (7 days or more) atrial fibrillation which is controlled currently with Beta Blocker. Patient is currently in sinus rhythm.[id number] Score: 4. HASBLED Score: Unable to calculate. Anticoagulation indicated. Anticoagulation done with Eliquis.        COPD (chronic obstructive pulmonary disease)  No wheezing noted on exam, nebs p.r.n., wean oxygen as tolerated      DVT prophylaxis  On Eliquis        Final Active Diagnoses:    Diagnosis Date Noted POA    PRINCIPAL PROBLEM:  BRIGETTE (acute kidney injury) [N17.9] 08/21/2022 Yes    Metabolic alkalosis [E87.3] 08/22/2022 Unknown    Dehydration [E86.0] 08/21/2022 Yes    Stage 3 chronic kidney disease [N18.30] 04/11/2022 Yes    Hypokalemia [E87.6] 04/11/2022 Yes    Hyponatremia [E87.1] 04/11/2022 Yes    Seizure disorder [G40.909] 03/30/2022 Yes    SHAYNE (obstructive sleep apnea) [G47.33]  Yes    COPD (chronic obstructive pulmonary disease) [J44.9]  Yes    A-fib [I48.91]  Yes    DVT prophylaxis [Z29.9] 12/29/2021 Not Applicable      Problems Resolved During this Admission:       Discharged Condition: fair    Disposition: Home-Health Care [facility]    Follow Up:   Follow-up Information     Navi Florentino III, MD Follow up in 1 week(s).    Specialty: Internal Medicine  Contact  information:  [address] [facility] [address]  [phone]                       Patient Instructions:      Comprehensive metabolic panel   Standing Status: Future Standing Exp. Date: 09/25/22     CBC auto differential   Standing Status: Future Standing Exp. Date: 09/25/22     Magnesium   Standing Status: Future Standing Exp. Date: 09/25/22     Ambulatory referral/consult to Home Health   Standing Status: Future   Referral Priority: Routine Referral Type: Home Health   Referral Reason: Specialty Services Required   Requested Specialty: Home Health Services   Number of Visits Requested: 1     Diet Cardiac     Diet renal     Activity as tolerated       Significant Diagnostic Studies: Labs: All labs within the past 24 hours have been reviewed    Pending Diagnostic Studies:     None         Medications:  Reconciled Home Medications:      Medication List      CHANGE how you take these medications    torsemide 20 MG Tab  Commonly known as: DEMADEX  Take 1 tablet (20 mg total) by mouth daily as needed (weight gain greater than 5 lbs).  What changed:   · medication strength  · how much to take  · when to take this  · reasons to take this  · Another medication with the same name was removed. Continue taking this medication, and follow the directions you see here.        CONTINUE taking these medications    albuterol 90 mcg/actuation inhaler  Commonly known as: PROVENTIL/VENTOLIN HFA  Inhale 1-2 puffs into the lungs every 6 (six) hours as needed for Wheezing or Shortness of Breath. Rescue     albuterol-ipratropium 2.5 mg-0.5 mg/3 mL nebulizer solution  Commonly known as: DUO-NEB  inhale 3 milliliters by nebulization route 4 times per day and as needed, up to 6 doses per day for 30 days     apixaban 5 mg Tab  Commonly known as: ELIQUIS  Take 5 mg by mouth 2 (two) times daily.     aspirin 81 MG EC tablet  Commonly known as: ECOTRIN  Take 81 mg by mouth once daily.     atorvastatin 20 MG tablet  Commonly known as:  LIPITOR  Take 1 tablet (20 mg total) by mouth once daily.     metoprolol tartrate 25 MG tablet  Commonly known as: LOPRESSOR  Take 25 mg by mouth 2 (two) times daily.     multivitamin capsule  Take 1 capsule by mouth once daily.     OXcarbazepine 300 MG Tab  Commonly known as: TRILEPTAL  Take 1 tablet (300 mg total) by mouth 2 (two) times daily.     senna 8.6 mg tablet  Commonly known as: SENOKOT  2 tablets at bedtime as needed     tamsulosin 0.4 mg Cap  Commonly known as: FLOMAX  Take 0.4 mg by mouth once daily.     vitamin D 1000 units Tab  Commonly known as: VITAMIN D3  Take 1,000 Units by mouth once daily.        STOP taking these medications    calcium carbonate 600 mg calcium (1,500 mg) Tab  Commonly known as: OS-DAVID     carvediloL 6.25 MG tablet  Commonly known as: COREG     docusate sodium 100 MG capsule  Commonly known as: COLACE     ezetimibe 10 mg tablet  Commonly known as: ZETIA     FLUoxetine 20 MG capsule     gabapentin 300 MG capsule  Commonly known as: NEURONTIN     guaiFENesin 600 mg 12 hr tablet  Commonly known as: MUCINEX     HYDROcodone-acetaminophen 5-325 mg per tablet  Commonly known as: NORCO     levoFLOXacin 500 MG tablet  Commonly known as: LEVAQUIN     lovastatin 10 MG tablet  Commonly known as: MEVACOR     metOLazone 5 MG tablet  Commonly known as: ZAROXOLYN     miconazole NITRATE 2 % 2 % top powder  Commonly known as: MICOTIN     mirtazapine 15 MG tablet  Commonly known as: REMERON     MOTRIN ORAL     potassium chloride SA 20 MEQ tablet  Commonly known as: K-DUR,KLOR-CON     promethazine-dextromethorphan 6.25-15 mg/5 mL Syrp  Commonly known as: PROMETHAZINE-DM     risperiDONE 0.5 MG Tab  Commonly known as: RISPERDAL            Indwelling Lines/Drains at time of discharge:   Lines/Drains/Airways     Drain  Duration                Urethral Catheter 08/20/22 1407 Silicone 16 Fr. 4 days                Time spent on the discharge of patient: >30 minutes         Natalie Crawley,  NP  Department of Hospital Medicine  [facility]

## 2022-08-26 ENCOUNTER — PATIENT OUTREACH (OUTPATIENT)
Dept: ADMINISTRATIVE | Facility: CLINIC | Age: 82
End: 2022-08-26
Payer: MEDICARE

## 2022-08-26 NOTE — PROGRESS NOTES
C3 nurse attempted to contact Jamey Lei for a TCC post hospital discharge follow up call.  Spoke with Kia, the patient's granddaughter who is unable to conduct the call @ this time.  She requested a callback.    The patient does not have a scheduled HOSFU appointment within 5-7 days post hospital discharge date 8/25/2022. Message sent to Physician staff to assist with HOSFU appointment scheduling.

## 2022-08-26 NOTE — PROGRESS NOTES
C3 nurse spoke with Jamey Lei's granddaughter Lola for a TCC post hospital discharge follow up call. The patient has a scheduled HOSFU appointment with Navi Domínguez III, MD on 9/1/2022 @ 1000.

## 2022-09-02 ENCOUNTER — HOSPITAL ENCOUNTER (INPATIENT)
Facility: HOSPITAL | Age: 82
LOS: 17 days | Discharge: HOSPICE | DRG: 207 | End: 2022-09-19
Attending: INTERNAL MEDICINE | Admitting: STUDENT IN AN ORGANIZED HEALTH CARE EDUCATION/TRAINING PROGRAM
Payer: MEDICARE

## 2022-09-02 DIAGNOSIS — R06.82 TACHYPNEA: ICD-10-CM

## 2022-09-02 DIAGNOSIS — E87.1 HYPONATREMIA: ICD-10-CM

## 2022-09-02 DIAGNOSIS — E86.0 DEHYDRATION: ICD-10-CM

## 2022-09-02 DIAGNOSIS — I50.9 CHF (CONGESTIVE HEART FAILURE): Primary | ICD-10-CM

## 2022-09-02 DIAGNOSIS — I63.9 CEREBROVASCULAR ACCIDENT (CVA), UNSPECIFIED MECHANISM: ICD-10-CM

## 2022-09-02 DIAGNOSIS — U07.1 COVID-19: ICD-10-CM

## 2022-09-02 DIAGNOSIS — E87.6 HYPOKALEMIA: ICD-10-CM

## 2022-09-02 DIAGNOSIS — R00.1 BRADYCARDIA: ICD-10-CM

## 2022-09-02 DIAGNOSIS — N18.4 CKD (CHRONIC KIDNEY DISEASE), STAGE IV: ICD-10-CM

## 2022-09-02 DIAGNOSIS — J11.1 INFLUENZA: ICD-10-CM

## 2022-09-02 DIAGNOSIS — J18.9 PNEUMONIA: ICD-10-CM

## 2022-09-02 DIAGNOSIS — R06.02 SOB (SHORTNESS OF BREATH): ICD-10-CM

## 2022-09-02 DIAGNOSIS — N17.9 AKI (ACUTE KIDNEY INJURY): ICD-10-CM

## 2022-09-02 DIAGNOSIS — C34.90 MALIGNANT NEOPLASM OF LUNG, UNSPECIFIED LATERALITY, UNSPECIFIED PART OF LUNG: ICD-10-CM

## 2022-09-02 DIAGNOSIS — R60.0 LOCALIZED EDEMA: ICD-10-CM

## 2022-09-02 LAB
ANION GAP SERPL CALC-SCNC: 6 MMOL/L (ref 8–16)
BASOPHILS # BLD AUTO: 0.01 K/UL (ref 0–0.2)
BASOPHILS NFR BLD: 0.3 % (ref 0–1.9)
BILIRUB UR QL STRIP: NEGATIVE
BILIRUB UR QL STRIP: NEGATIVE
BUN SERPL-MCNC: 33 MG/DL (ref 8–23)
CALCIUM SERPL-MCNC: 8.9 MG/DL (ref 8.7–10.5)
CHLORIDE SERPL-SCNC: 113 MMOL/L (ref 95–110)
CLARITY UR: CLEAR
CLARITY UR: CLEAR
CO2 SERPL-SCNC: 23 MMOL/L (ref 23–29)
COLOR UR: YELLOW
COLOR UR: YELLOW
CREAT SERPL-MCNC: 1.8 MG/DL (ref 0.5–1.4)
CTP QC/QA: YES
CTP QC/QA: YES
DIFFERENTIAL METHOD: ABNORMAL
EOSINOPHIL # BLD AUTO: 0 K/UL (ref 0–0.5)
EOSINOPHIL NFR BLD: 0.6 % (ref 0–8)
ERYTHROCYTE [DISTWIDTH] IN BLOOD BY AUTOMATED COUNT: 17.2 % (ref 11.5–14.5)
EST. GFR  (NO RACE VARIABLE): 37.8 ML/MIN/1.73 M^2
GLUCOSE SERPL-MCNC: 110 MG/DL (ref 70–110)
GLUCOSE UR QL STRIP: NEGATIVE
GLUCOSE UR QL STRIP: NEGATIVE
HCT VFR BLD AUTO: 27.3 % (ref 40–54)
HGB BLD-MCNC: 8.2 G/DL (ref 14–18)
HGB UR QL STRIP: ABNORMAL
HGB UR QL STRIP: NEGATIVE
IMM GRANULOCYTES # BLD AUTO: 0.01 K/UL (ref 0–0.04)
IMM GRANULOCYTES NFR BLD AUTO: 0.3 % (ref 0–0.5)
KETONES UR QL STRIP: NEGATIVE
KETONES UR QL STRIP: NEGATIVE
LACTATE SERPL-SCNC: 0.7 MMOL/L (ref 0.5–2.2)
LACTATE SERPL-SCNC: 1.1 MMOL/L (ref 0.5–2.2)
LEUKOCYTE ESTERASE UR QL STRIP: NEGATIVE
LEUKOCYTE ESTERASE UR QL STRIP: NEGATIVE
LYMPHOCYTES # BLD AUTO: 0.6 K/UL (ref 1–4.8)
LYMPHOCYTES NFR BLD: 18.3 % (ref 18–48)
MCH RBC QN AUTO: 25 PG (ref 27–31)
MCHC RBC AUTO-ENTMCNC: 30 G/DL (ref 32–36)
MCV RBC AUTO: 83 FL (ref 82–98)
MONOCYTES # BLD AUTO: 0.5 K/UL (ref 0.3–1)
MONOCYTES NFR BLD: 15 % (ref 4–15)
NEUTROPHILS # BLD AUTO: 2.2 K/UL (ref 1.8–7.7)
NEUTROPHILS NFR BLD: 65.5 % (ref 38–73)
NITRITE UR QL STRIP: NEGATIVE
NITRITE UR QL STRIP: NEGATIVE
NRBC BLD-RTO: 0 /100 WBC
NT-PROBNP SERPL-MCNC: 6039 PG/ML (ref 5–1800)
PH UR STRIP: 5 [PH] (ref 5–8)
PH UR STRIP: 6 [PH] (ref 5–8)
PLATELET # BLD AUTO: 151 K/UL (ref 150–450)
PMV BLD AUTO: 10.7 FL (ref 9.2–12.9)
POC MOLECULAR INFLUENZA A AGN: POSITIVE
POC MOLECULAR INFLUENZA B AGN: NEGATIVE
POTASSIUM SERPL-SCNC: 4 MMOL/L (ref 3.5–5.1)
PROT UR QL STRIP: NEGATIVE
PROT UR QL STRIP: NEGATIVE
RBC # BLD AUTO: 3.28 M/UL (ref 4.6–6.2)
SARS-COV-2 RDRP RESP QL NAA+PROBE: POSITIVE
SODIUM SERPL-SCNC: 142 MMOL/L (ref 136–145)
SP GR UR STRIP: 1.01 (ref 1–1.03)
SP GR UR STRIP: <=1.005 (ref 1–1.03)
URN SPEC COLLECT METH UR: ABNORMAL
URN SPEC COLLECT METH UR: NORMAL
UROBILINOGEN UR STRIP-ACNC: 1 EU/DL
UROBILINOGEN UR STRIP-ACNC: NEGATIVE EU/DL
WBC # BLD AUTO: 3.33 K/UL (ref 3.9–12.7)

## 2022-09-02 PROCEDURE — 81003 URINALYSIS AUTO W/O SCOPE: CPT | Performed by: EMERGENCY MEDICINE

## 2022-09-02 PROCEDURE — 36415 COLL VENOUS BLD VENIPUNCTURE: CPT | Performed by: EMERGENCY MEDICINE

## 2022-09-02 PROCEDURE — 93010 EKG 12-LEAD: ICD-10-PCS | Mod: ,,, | Performed by: INTERNAL MEDICINE

## 2022-09-02 PROCEDURE — 25000003 PHARM REV CODE 250: Performed by: EMERGENCY MEDICINE

## 2022-09-02 PROCEDURE — 83880 ASSAY OF NATRIURETIC PEPTIDE: CPT | Performed by: EMERGENCY MEDICINE

## 2022-09-02 PROCEDURE — 85025 COMPLETE CBC W/AUTO DIFF WBC: CPT | Performed by: EMERGENCY MEDICINE

## 2022-09-02 PROCEDURE — 99223 1ST HOSP IP/OBS HIGH 75: CPT | Mod: AI,,, | Performed by: STUDENT IN AN ORGANIZED HEALTH CARE EDUCATION/TRAINING PROGRAM

## 2022-09-02 PROCEDURE — 94664 DEMO&/EVAL PT USE INHALER: CPT

## 2022-09-02 PROCEDURE — 99223 PR INITIAL HOSPITAL CARE,LEVL III: ICD-10-PCS | Mod: AI,,, | Performed by: STUDENT IN AN ORGANIZED HEALTH CARE EDUCATION/TRAINING PROGRAM

## 2022-09-02 PROCEDURE — 83605 ASSAY OF LACTIC ACID: CPT | Performed by: EMERGENCY MEDICINE

## 2022-09-02 PROCEDURE — 63600175 PHARM REV CODE 636 W HCPCS: Performed by: EMERGENCY MEDICINE

## 2022-09-02 PROCEDURE — 27000646 HC AEROBIKA DEVICE

## 2022-09-02 PROCEDURE — 99285 EMERGENCY DEPT VISIT HI MDM: CPT | Mod: 25

## 2022-09-02 PROCEDURE — 99900035 HC TECH TIME PER 15 MIN (STAT)

## 2022-09-02 PROCEDURE — 25000003 PHARM REV CODE 250: Performed by: STUDENT IN AN ORGANIZED HEALTH CARE EDUCATION/TRAINING PROGRAM

## 2022-09-02 PROCEDURE — 96374 THER/PROPH/DIAG INJ IV PUSH: CPT

## 2022-09-02 PROCEDURE — 87040 BLOOD CULTURE FOR BACTERIA: CPT | Mod: 59 | Performed by: EMERGENCY MEDICINE

## 2022-09-02 PROCEDURE — 80048 BASIC METABOLIC PNL TOTAL CA: CPT | Performed by: EMERGENCY MEDICINE

## 2022-09-02 PROCEDURE — 11000001 HC ACUTE MED/SURG PRIVATE ROOM

## 2022-09-02 PROCEDURE — 51702 INSERT TEMP BLADDER CATH: CPT

## 2022-09-02 PROCEDURE — 93010 ELECTROCARDIOGRAM REPORT: CPT | Mod: ,,, | Performed by: INTERNAL MEDICINE

## 2022-09-02 PROCEDURE — U0002 COVID-19 LAB TEST NON-CDC: HCPCS | Performed by: EMERGENCY MEDICINE

## 2022-09-02 PROCEDURE — 87502 INFLUENZA DNA AMP PROBE: CPT

## 2022-09-02 PROCEDURE — 93005 ELECTROCARDIOGRAM TRACING: CPT

## 2022-09-02 PROCEDURE — 27000207 HC ISOLATION

## 2022-09-02 RX ORDER — ONDANSETRON 2 MG/ML
4 INJECTION INTRAMUSCULAR; INTRAVENOUS EVERY 8 HOURS PRN
Status: DISCONTINUED | OUTPATIENT
Start: 2022-09-02 | End: 2022-09-19 | Stop reason: HOSPADM

## 2022-09-02 RX ORDER — ACETAMINOPHEN 325 MG/1
650 TABLET ORAL EVERY 8 HOURS PRN
Status: DISCONTINUED | OUTPATIENT
Start: 2022-09-02 | End: 2022-09-07

## 2022-09-02 RX ORDER — FAMOTIDINE 20 MG/1
20 TABLET, FILM COATED ORAL 2 TIMES DAILY
Status: DISCONTINUED | OUTPATIENT
Start: 2022-09-02 | End: 2022-09-06

## 2022-09-02 RX ORDER — MUPIROCIN 20 MG/G
OINTMENT TOPICAL 2 TIMES DAILY
Status: COMPLETED | OUTPATIENT
Start: 2022-09-02 | End: 2022-09-07

## 2022-09-02 RX ORDER — FUROSEMIDE 10 MG/ML
40 INJECTION INTRAMUSCULAR; INTRAVENOUS
Status: DISCONTINUED | OUTPATIENT
Start: 2022-09-03 | End: 2022-09-06

## 2022-09-02 RX ORDER — TALC
6 POWDER (GRAM) TOPICAL NIGHTLY PRN
Status: DISCONTINUED | OUTPATIENT
Start: 2022-09-02 | End: 2022-09-19 | Stop reason: HOSPADM

## 2022-09-02 RX ORDER — OSELTAMIVIR PHOSPHATE 75 MG/1
75 CAPSULE ORAL 2 TIMES DAILY
Status: COMPLETED | OUTPATIENT
Start: 2022-09-02 | End: 2022-09-06

## 2022-09-02 RX ORDER — SODIUM CHLORIDE 0.9 % (FLUSH) 0.9 %
10 SYRINGE (ML) INJECTION
Status: DISCONTINUED | OUTPATIENT
Start: 2022-09-02 | End: 2022-09-19 | Stop reason: HOSPADM

## 2022-09-02 RX ORDER — ATORVASTATIN CALCIUM 20 MG/1
20 TABLET, FILM COATED ORAL DAILY
Status: DISCONTINUED | OUTPATIENT
Start: 2022-09-03 | End: 2022-09-06

## 2022-09-02 RX ORDER — FUROSEMIDE 10 MG/ML
80 INJECTION INTRAMUSCULAR; INTRAVENOUS
Status: COMPLETED | OUTPATIENT
Start: 2022-09-02 | End: 2022-09-02

## 2022-09-02 RX ORDER — ACETAMINOPHEN 325 MG/1
650 TABLET ORAL EVERY 8 HOURS PRN
Status: DISCONTINUED | OUTPATIENT
Start: 2022-09-02 | End: 2022-09-13

## 2022-09-02 RX ORDER — ASPIRIN 81 MG/1
81 TABLET ORAL DAILY
Status: DISCONTINUED | OUTPATIENT
Start: 2022-09-03 | End: 2022-09-07

## 2022-09-02 RX ADMIN — FUROSEMIDE 40 MG: 10 INJECTION, SOLUTION INTRAMUSCULAR; INTRAVENOUS at 11:09

## 2022-09-02 RX ADMIN — APIXABAN 5 MG: 5 TABLET, FILM COATED ORAL at 09:09

## 2022-09-02 RX ADMIN — CEFTRIAXONE 1 G: 1 INJECTION, SOLUTION INTRAVENOUS at 01:09

## 2022-09-02 RX ADMIN — FUROSEMIDE 80 MG: 10 INJECTION, SOLUTION INTRAMUSCULAR; INTRAVENOUS at 11:09

## 2022-09-02 RX ADMIN — OSELTAMIVIR PHOSPHATE 75 MG: 75 CAPSULE ORAL at 01:09

## 2022-09-02 RX ADMIN — AZITHROMYCIN MONOHYDRATE 500 MG: 500 INJECTION, POWDER, LYOPHILIZED, FOR SOLUTION INTRAVENOUS at 02:09

## 2022-09-02 RX ADMIN — FAMOTIDINE 20 MG: 20 TABLET ORAL at 09:09

## 2022-09-02 RX ADMIN — OSELTAMIVIR PHOSPHATE 75 MG: 75 CAPSULE ORAL at 09:09

## 2022-09-02 RX ADMIN — MUPIROCIN: 20 OINTMENT TOPICAL at 09:09

## 2022-09-02 NOTE — ASSESSMENT & PLAN NOTE
Patient with Permanent atrial fibrillation which is controlled currently with No rate controlling home medications at this time. . Patient is currently in atrial fibrillation.[id number] Score: 4. HASBLED Score: Unable to calculate. Anticoagulation indicated. Anticoagulation done with eliquis 5 mg BID.    Hemodynamically stable at this time. Will continue with home regimen Eliquis BID.   - continuous telemetry  - f/u primary cardiologist consult

## 2022-09-02 NOTE — ED PROVIDER NOTES
EMERGENCY DEPARTMENT HISTORY AND PHYSICAL EXAM          Date: 9/2/2022   Patient Name: Jamey Lei       History of Presenting Illness           Chief Complaint   Patient presents with    Leg Swelling     Bilateral leg swelling x 3 days, hx of CHF         History Provided By: Patient and Family Member    1100   Jamey Lei is a 81 y.o. male with PMHX of CHF, CKD, HTN, HLD, A-Fib, COPD, Edema, Obesity, DM, On Eliquis who presents to the emergency department C/O SOB.    Patient presents to ED due to worsening shortness of breath and labored breathing as well as increased lower extremity edema.      Patient was admitted to hospital on August 20th for renal insufficiency.  Subsequently discharged on August 25th.    He says since then his diuretic dose was cut in half.  He reports increasing edema and labored breathing x3 days.  No fever.  Minimal sputum production.  No chest pain.    PCP: Navi Domínguez III, MD        Current Facility-Administered Medications   Medication Dose Route Frequency Provider Last Rate Last Admin    azithromycin 500 mg in dextrose 5 % 250 mL IVPB (ready to mix system)  500 mg Intravenous Q24H Jonathan Betts MD        cefTRIAXone (ROCEPHIN) 1 g/50 mL D5W IVPB  1 g Intravenous Q24H Jonathan Betts MD        [START ON 9/3/2022] furosemide injection 40 mg  40 mg Intravenous Q12H Jonathan Betts MD        oseltamivir capsule 75 mg  75 mg Oral BID Jonathan Betts MD         Current Outpatient Medications   Medication Sig Dispense Refill    albuterol (PROVENTIL/VENTOLIN HFA) 90 mcg/actuation inhaler Inhale 1-2 puffs into the lungs every 6 (six) hours as needed for Wheezing or Shortness of Breath. Rescue 8 g 1    albuterol-ipratropium (DUO-NEB) 2.5 mg-0.5 mg/3 mL nebulizer solution inhale 3 milliliters by nebulization route 4 times per day and as needed, up to 6 doses per day for 30 days      apixaban (ELIQUIS) 5 mg Tab Take 5 mg by mouth 2 (two) times daily.      aspirin  (ECOTRIN) 81 MG EC tablet Take 81 mg by mouth once daily.      atorvastatin (LIPITOR) 20 MG tablet Take 1 tablet (20 mg total) by mouth once daily. 90 tablet 3    multivitamin capsule Take 1 capsule by mouth once daily.      OXcarbazepine (TRILEPTAL) 300 MG Tab Take 1 tablet (300 mg total) by mouth 2 (two) times daily. 60 tablet 11    senna (SENOKOT) 8.6 mg tablet 2 tablets at bedtime as needed      tamsulosin (FLOMAX) 0.4 mg Cap Take 0.4 mg by mouth once daily.      torsemide (DEMADEX) 20 MG Tab Take 1 tablet (20 mg total) by mouth daily as needed (weight gain greater than 5 lbs). 30 tablet 3    vitamin D (VITAMIN D3) 1000 units Tab Take 1,000 Units by mouth once daily.             Past History     Past Medical History:   Past Medical History:   Diagnosis Date    A-fib     Abdominal pain, epigastric     Abdominal pain, generalized     Acute on chronic congestive heart failure 12/29/2021    Anticoagulant long-term use     Arthritis     Asteatotic eczema     Benign essential HTN     Benign prostatic hyperplasia     Brain tumor     Cardiovascular accident     Carotid artery stenosis     CHF (congestive heart failure)     CKD (chronic kidney disease), stage IV     Constipation     COPD (chronic obstructive pulmonary disease)     Depressed     DM (diabetes mellitus), secondary     Drug eruption     Eczema     Edema     Encounter for blood transfusion     Fever     H. pylori infection     Hearing loss     History of tobacco use     Hypercholesterolemia     Hypertensive renal disease, benign     Hypokalemia     Hyponatremia     Impaired fasting glucose     Lumbar pain     Lung mass     Malaise and fatigue     Malignant neoplasm of prostate     Meningioma     Mixed hyperlipidemia     Nasal bleeding     Neuropathy     Obesity, unspecified     SHAYNE (obstructive sleep apnea)     Osteoarthritis of knee     Osteoarthritis of multiple joints     Peripheral vascular disease, unspecified     Psychosis     Renal disorder     Seizure  "disorder     Seizures     Sleep apnea     Unspecified cataract     Unspecified chronic bronchitis     Unspecified osteoarthritis, unspecified site     Vertigo     Vitamin D deficiency     Wheezing         Past Surgical History:   Past Surgical History:   Procedure Laterality Date    APPENDECTOMY      COLONOSCOPY      CORONARY ARTERY BYPASS GRAFT      FOOT SURGERY Left     HIP REPLACEMENT ARTHROPLASTY Right     HIP SURGERY      JOINT REPLACEMENT      KNEE SURGERY      LUNG BIOPSY Right 03/03/2022    benign    SHOULDER SURGERY Left     SHOULDER SURGERY      TOTAL KNEE ARTHROPLASTY Right     triple bypass          Family History:   Family History   Problem Relation Age of Onset    Hyperlipidemia Mother     Hypertension Mother     Arthritis Mother     Hypertension Father     Arthritis Father     Heart disease Father     Asthma Sister     Hyperlipidemia Sister     Hypertension Sister     Hypertension Brother         Social History:   Social History     Tobacco Use    Smoking status: Every Day    Smokeless tobacco: Never    Tobacco comments:     quit 30 years ago   Substance Use Topics    Alcohol use: Not Currently    Drug use: Never        Allergies:   Review of patient's allergies indicates:   Allergen Reactions    Zolpidem           Review of Systems   Review of Systems   Constitutional:  Positive for activity change and fatigue. Negative for chills and fever.   Respiratory:  Positive for cough and shortness of breath.    Cardiovascular:  Positive for leg swelling. Negative for chest pain and palpitations.   Gastrointestinal:  Negative for abdominal pain, nausea and vomiting.   Skin:  Negative for color change, rash and wound.   All other systems reviewed and are negative.             Physical Exam     Vitals:    09/02/22 1006 09/02/22 1019   BP:  (!) 181/77   Pulse:  82   Resp:  (!) 30   Temp: 98.4 °F (36.9 °C)    SpO2:  95%   Height:  5' 10" (1.778 m)      Physical Exam  Vitals and nursing note reviewed. "   Constitutional:       General: He is not in acute distress.     Appearance: Normal appearance. He is well-developed. He is obese. He is ill-appearing.   HENT:      Head: Normocephalic and atraumatic.   Eyes:      Extraocular Movements: Extraocular movements intact.      Conjunctiva/sclera: Conjunctivae normal.      Pupils: Pupils are equal, round, and reactive to light.   Cardiovascular:      Rate and Rhythm: Normal rate. Rhythm irregular.      Heart sounds: Normal heart sounds.   Pulmonary:      Effort: Tachypnea present. No respiratory distress.      Breath sounds: Decreased air movement present. Rhonchi and rales present.   Chest:      Chest wall: No tenderness.   Abdominal:      Tenderness: There is no abdominal tenderness. There is no guarding or rebound.   Musculoskeletal:         General: No deformity or signs of injury. Normal range of motion.      Cervical back: Normal range of motion. No rigidity.      Right lower leg: 3+ Pitting Edema present.      Left lower leg: 3+ Pitting Edema present.   Skin:     General: Skin is warm and dry.      Coloration: Skin is pale.      Findings: No rash.   Neurological:      General: No focal deficit present.      Mental Status: He is alert and oriented to person, place, and time. Mental status is at baseline.      Cranial Nerves: No cranial nerve deficit.      Motor: No weakness.      Coordination: Coordination normal.   Psychiatric:         Mood and Affect: Mood normal.         Behavior: Behavior normal.            Diagnostic Study Results      Labs -   Recent Results (from the past 12 hour(s))   Basic Metabolic Panel    Collection Time: 09/02/22 10:20 AM   Result Value Ref Range    Sodium 142 136 - 145 mmol/L    Potassium 4.0 3.5 - 5.1 mmol/L    Chloride 113 (H) 95 - 110 mmol/L    CO2 23 23 - 29 mmol/L    Glucose 110 70 - 110 mg/dL    BUN 33 (H) 8 - 23 mg/dL    Creatinine 1.8 (H) 0.5 - 1.4 mg/dL    Calcium 8.9 8.7 - 10.5 mg/dL    Anion Gap 6 (L) 8 - 16 mmol/L    eGFR  37.8 (A) >60 mL/min/1.73 m^2   CBC Auto Differential    Collection Time: 09/02/22 10:20 AM   Result Value Ref Range    WBC 3.33 (L) 3.90 - 12.70 K/uL    RBC 3.28 (L) 4.60 - 6.20 M/uL    Hemoglobin 8.2 (L) 14.0 - 18.0 g/dL    Hematocrit 27.3 (L) 40.0 - 54.0 %    MCV 83 82 - 98 fL    MCH 25.0 (L) 27.0 - 31.0 pg    MCHC 30.0 (L) 32.0 - 36.0 g/dL    RDW 17.2 (H) 11.5 - 14.5 %    Platelets 151 150 - 450 K/uL    MPV 10.7 9.2 - 12.9 fL    Immature Granulocytes 0.3 0.0 - 0.5 %    Gran # (ANC) 2.2 1.8 - 7.7 K/uL    Immature Grans (Abs) 0.01 0.00 - 0.04 K/uL    Lymph # 0.6 (L) 1.0 - 4.8 K/uL    Mono # 0.5 0.3 - 1.0 K/uL    Eos # 0.0 0.0 - 0.5 K/uL    Baso # 0.01 0.00 - 0.20 K/uL    nRBC 0 0 /100 WBC    Gran % 65.5 38.0 - 73.0 %    Lymph % 18.3 18.0 - 48.0 %    Mono % 15.0 4.0 - 15.0 %    Eosinophil % 0.6 0.0 - 8.0 %    Basophil % 0.3 0.0 - 1.9 %    Differential Method Automated    NT-Pro Natriuretic Peptide    Collection Time: 09/02/22 10:24 AM   Result Value Ref Range    NT-proBNP 6039 (H) 5 - 1800 pg/mL   Urinalysis, Reflex to Urine Culture Urine, Clean Catch    Collection Time: 09/02/22 11:00 AM    Specimen: Urine   Result Value Ref Range    Specimen UA Urine, Clean Catch     Color, UA Yellow Yellow, Straw, Alana    Appearance, UA Clear Clear    pH, UA 5.0 5.0 - 8.0    Specific Gravity, UA 1.010 1.005 - 1.030    Protein, UA Negative Negative    Glucose, UA Negative Negative    Ketones, UA Negative Negative    Bilirubin (UA) Negative Negative    Occult Blood UA Negative Negative    Nitrite, UA Negative Negative    Urobilinogen, UA 1.0 <2.0 EU/dL    Leukocytes, UA Negative Negative   Lactic acid, plasma #1    Collection Time: 09/02/22 11:34 AM   Result Value Ref Range    Lactate (Lactic Acid) 1.1 0.5 - 2.2 mmol/L   POCT COVID-19 Rapid Screening    Collection Time: 09/02/22 12:23 PM   Result Value Ref Range    POC Rapid COVID Positive (A) Negative     Acceptable Yes    Urinalysis, Reflex to Urine Culture Urine,  Clean Catch    Collection Time: 09/02/22 12:29 PM    Specimen: Urine   Result Value Ref Range    Specimen UA Urine, Clean Catch     Color, UA Yellow Yellow, Straw, Alana    Appearance, UA Clear Clear    pH, UA 6.0 5.0 - 8.0    Specific Gravity, UA <=1.005 (A) 1.005 - 1.030    Protein, UA Negative Negative    Glucose, UA Negative Negative    Ketones, UA Negative Negative    Bilirubin (UA) Negative Negative    Occult Blood UA Trace (A) Negative    Nitrite, UA Negative Negative    Urobilinogen, UA Negative <2.0 EU/dL    Leukocytes, UA Negative Negative   POCT Influenza A/B Molecular    Collection Time: 09/02/22 12:29 PM   Result Value Ref Range    POC Molecular Influenza A Ag Positive (A) Negative, Not Reported    POC Molecular Influenza B Ag Negative Negative, Not Reported     Acceptable Yes         Radiologic Studies -    X-Ray Chest 1 View   Final Result      New significant right lower lobe infiltrate consistent with pneumonia and a left hilar infiltrates suggesting pneumonia recommend follow-up         Electronically signed by: Anita Romero MD   Date:    09/02/2022   Time:    11:04           Medications given in the ED-   Medications   oseltamivir capsule 75 mg (has no administration in time range)   cefTRIAXone (ROCEPHIN) 1 g/50 mL D5W IVPB (has no administration in time range)   azithromycin 500 mg in dextrose 5 % 250 mL IVPB (ready to mix system) (has no administration in time range)   furosemide injection 40 mg (has no administration in time range)   furosemide injection 80 mg (80 mg Intravenous Given 9/2/22 1118)           Medical Decision Making    I am the first provider for this patient.     I reviewed the vital signs, available nursing notes, past medical history, past surgical history, family history and social history.     Vital Signs:  Reviewed the patient's vital signs.     Pulse Oximetry Analysis and Interpretation:    95% on Room Air, normal      EKG interpretation: (Preliminary)    Interpreted by Jonathan Betts MD at 1125   AFib rate of 60, left axis deviation, no ST elevation,     CXR  interpretation: (Preliminary)   CXR read by Dr. Jonathan Betts at 1100    Cardiac silhouette enlarged and appears unchanged, bilateral patchy opacities     Records Reviewed: Old medical records.  Nursing notes.  Previous radiology studies.        Provider Notes (Medical Decision Making): Jamey Lei is a 81 y.o. male witch increasing SOB and LE edema.        Procedures:   Procedures      ED Course:    11:31 AM  Patient with mild leukopenia and with tachypnea will start on sepsis pathway.  Chest x-ray concerning for pulmonary edema/pneumonia.  Hemoglobin stable 8.2.  Creatinine stable at 1.8.  ProBNP greater than 6000, significant increase from prior value.  Patient tachypneic, speaking short complete sentences.  Remains in mild respiratory distress.  Placed on nasal cannula for comfort.  Will give diuresis.  Start sepsis workup and treat for pneumonia       CONSULT NOTE:    1:07 PM      Dr. Betts discussed care with?Dr Bagley, Hospitalist   It was a standard discussion,?including history of patients chief complaint, available diagnostic results, and treatment course.?Discussed case. Agrees with admission      1:11 PM  Patient found to be flu and COVID positive.  Will start on Tamiflu.  Will discuss with pharmacy about starting Paxlovid.  Patient to continue with diuresis.  Tolerating nasal cannula for supplemental oxygen.  Patient with bilateral infiltrates on radiograph his suspect this is more along the lines of pulmonary edema than bacterial pneumonia however given patient's low reserve and chronic medical problems will start coverage for community-acquired pneumonia.  Patient lactate not elevated.      Diagnosis and Disposition          CLINICAL IMPRESSION:         1. CHF (congestive heart failure)    2. SOB (shortness of breath)    3. Influenza    4. COVID-19    5. Localized edema    6.  Tachypnea              PLAN:   1. Admit to Hospital  2.      Medication List        ASK your doctor about these medications      albuterol 90 mcg/actuation inhaler  Commonly known as: PROVENTIL/VENTOLIN HFA  Inhale 1-2 puffs into the lungs every 6 (six) hours as needed for Wheezing or Shortness of Breath. Rescue     albuterol-ipratropium 2.5 mg-0.5 mg/3 mL nebulizer solution  Commonly known as: DUO-NEB     apixaban 5 mg Tab  Commonly known as: ELIQUIS     aspirin 81 MG EC tablet  Commonly known as: ECOTRIN     atorvastatin 20 MG tablet  Commonly known as: LIPITOR  Take 1 tablet (20 mg total) by mouth once daily.     calcium carbonate 600 mg calcium (1,500 mg) Tab  Commonly known as: OS-DAVID     carvediloL 6.25 MG tablet  Commonly known as: COREG     ezetimibe 10 mg tablet  Commonly known as: ZETIA     FLUoxetine 20 MG capsule     gabapentin 300 MG capsule  Commonly known as: NEURONTIN     miconazole NITRATE 2 % 2 % top powder  Commonly known as: MICOTIN     multivitamin capsule     OXcarbazepine 300 MG Tab  Commonly known as: TRILEPTAL  Take 1 tablet (300 mg total) by mouth 2 (two) times daily.     risperiDONE 0.5 MG Tab  Commonly known as: RISPERDAL  Take 1 tablet (0.5 mg total) by mouth nightly as needed (agitation and halluctions).     senna 8.6 mg tablet  Commonly known as: SENOKOT     tamsulosin 0.4 mg Cap  Commonly known as: FLOMAX     torsemide 20 MG Tab  Commonly known as: DEMADEX  Take 1 tablet (20 mg total) by mouth daily as needed (weight gain greater than 5 lbs).     vitamin D 1000 units Tab  Commonly known as: VITAMIN D3             3. No follow-up provider specified.     _______________________________     Please note that this dictation was completed with [email or web address], the computer voice recognition software.  Quite often unanticipated grammatical, syntax, homophones, and other interpretive errors are inadvertently transcribed by the computer software.  Please disregard these errors.  Please excuse any  errors that have escaped final proofreading.             Jonathan Betts MD  09/02/22 [id number]

## 2022-09-02 NOTE — ASSESSMENT & PLAN NOTE
CXR findings with new right lower lobe pneumonia. Bilateral infiltrates likely secondary to pulmonary edema from infectious.  - supplement oxygen as needed SpO2 >92-94%  - daily pulmonary hygiene  - continue coverage for community acquired PNA, azithrmoycin and rocephin  - f/u blood cultures x2 pending  - trend CBC

## 2022-09-02 NOTE — ASSESSMENT & PLAN NOTE
Stable BUN/Cr 33/1.8 likely at new baseline with recovering eGFR of >40 from <20 5 weeks ago. Continue to monitor as patient on IV diuretics.

## 2022-09-02 NOTE — ASSESSMENT & PLAN NOTE
Multifactorial, cardiopulmonary (CHF, atrial fibrillation, COPD) and infectious processes (COVID19 and fluA).  SpO2 maintained >95% on 2L NC at this time.  - see above management.

## 2022-09-02 NOTE — SUBJECTIVE & OBJECTIVE
Past Medical History:   Diagnosis Date    A-fib     Abdominal pain, epigastric     Abdominal pain, generalized     Acute on chronic congestive heart failure 12/29/2021    Anticoagulant long-term use     Arthritis     Asteatotic eczema     Benign essential HTN     Benign prostatic hyperplasia     Brain tumor     Cardiovascular accident     Carotid artery stenosis     CHF (congestive heart failure)     CKD (chronic kidney disease), stage IV     Constipation     COPD (chronic obstructive pulmonary disease)     Depressed     DM (diabetes mellitus), secondary     Drug eruption     Eczema     Edema     Encounter for blood transfusion     Fever     H. pylori infection     Hearing loss     History of tobacco use     Hypercholesterolemia     Hypertensive renal disease, benign     Hypokalemia     Hyponatremia     Impaired fasting glucose     Lumbar pain     Lung mass     Malaise and fatigue     Malignant neoplasm of prostate     Meningioma     Mixed hyperlipidemia     Nasal bleeding     Neuropathy     Obesity, unspecified     SHAYNE (obstructive sleep apnea)     Osteoarthritis of knee     Osteoarthritis of multiple joints     Peripheral vascular disease, unspecified     Psychosis     Renal disorder     Seizure disorder     Seizures     Sleep apnea     Unspecified cataract     Unspecified chronic bronchitis     Unspecified osteoarthritis, unspecified site     Vertigo     Vitamin D deficiency     Wheezing        Past Surgical History:   Procedure Laterality Date    APPENDECTOMY      COLONOSCOPY      CORONARY ARTERY BYPASS GRAFT      FOOT SURGERY Left     HIP REPLACEMENT ARTHROPLASTY Right     HIP SURGERY      JOINT REPLACEMENT      KNEE SURGERY      LUNG BIOPSY Right 03/03/2022    benign    SHOULDER SURGERY Left     SHOULDER SURGERY      TOTAL KNEE ARTHROPLASTY Right     triple bypass         Review of patient's allergies indicates:   Allergen Reactions    Zolpidem        No current facility-administered medications on file prior  to encounter.     Current Outpatient Medications on File Prior to Encounter   Medication Sig    albuterol (PROVENTIL/VENTOLIN HFA) 90 mcg/actuation inhaler Inhale 1-2 puffs into the lungs every 6 (six) hours as needed for Wheezing or Shortness of Breath. Rescue    albuterol-ipratropium (DUO-NEB) 2.5 mg-0.5 mg/3 mL nebulizer solution inhale 3 milliliters by nebulization route 4 times per day and as needed, up to 6 doses per day for 30 days    apixaban (ELIQUIS) 5 mg Tab Take 5 mg by mouth 2 (two) times daily.    aspirin (ECOTRIN) 81 MG EC tablet Take 81 mg by mouth once daily.    atorvastatin (LIPITOR) 20 MG tablet Take 1 tablet (20 mg total) by mouth once daily.    multivitamin capsule Take 1 capsule by mouth once daily.    OXcarbazepine (TRILEPTAL) 300 MG Tab Take 1 tablet (300 mg total) by mouth 2 (two) times daily.    senna (SENOKOT) 8.6 mg tablet 2 tablets at bedtime as needed    tamsulosin (FLOMAX) 0.4 mg Cap Take 0.4 mg by mouth once daily.    torsemide (DEMADEX) 20 MG Tab Take 1 tablet (20 mg total) by mouth daily as needed (weight gain greater than 5 lbs).    vitamin D (VITAMIN D3) 1000 units Tab Take 1,000 Units by mouth once daily.    [DISCONTINUED] calcium carbonate (OS-DAVID) 600 mg calcium (1,500 mg) Tab Take 600 mg by mouth once.    [DISCONTINUED] carvediloL (COREG) 6.25 MG tablet TAKE ONE TABLET BY MOUTH TWICE DAILY (EVERY 12 HOURS) FOR BLOOD PRESSURE. THANKS !!    [DISCONTINUED] ezetimibe (ZETIA) 10 mg tablet Take 10 mg by mouth once daily.    [DISCONTINUED] FLUoxetine 20 MG capsule     [DISCONTINUED] gabapentin (NEURONTIN) 300 MG capsule 1 capsule    [DISCONTINUED] miconazole NITRATE 2 % (MICOTIN) 2 % top powder Apply topically as needed for Itching.    [DISCONTINUED] risperiDONE (RISPERDAL) 0.5 MG Tab Take 1 tablet (0.5 mg total) by mouth nightly as needed (agitation and halluctions).     Family History       Problem Relation (Age of Onset)    Arthritis Mother, Father    Asthma Sister    Heart  disease Father    Hyperlipidemia Mother, Sister    Hypertension Mother, Father, Sister, Brother          Tobacco Use    Smoking status: Every Day    Smokeless tobacco: Never    Tobacco comments:     quit 30 years ago   Substance and Sexual Activity    Alcohol use: Not Currently    Drug use: Never    Sexual activity: Not Currently     Review of Systems   Constitutional:  Positive for activity change and fatigue. Negative for appetite change, chills, diaphoresis and fever.   Eyes:  Negative for pain, discharge and redness.   Respiratory:  Positive for cough and shortness of breath. Negative for stridor.    Cardiovascular:  Positive for leg swelling. Negative for chest pain and palpitations.   Gastrointestinal:  Negative for abdominal pain, blood in stool, constipation, diarrhea and nausea.   Genitourinary:  Negative for dysuria.   Musculoskeletal:  Positive for gait problem (baseline requires walker). Negative for arthralgias and back pain.   Skin:  Negative for color change, pallor, rash and wound.   Neurological:  Negative for dizziness, light-headedness and headaches.   Psychiatric/Behavioral:  Negative for agitation, behavioral problems and confusion.    Objective:     Vital Signs (Most Recent):  Temp: 98.3 °F (36.8 °C) (09/02/22 1648)  Pulse: (!) 55 (09/02/22 1648)  Resp: 20 (09/02/22 1648)  BP: (!) 176/74 (09/02/22 1648)  SpO2: 100 % (09/02/22 1648)   Vital Signs (24h Range):  Temp:  [98.3 °F (36.8 °C)-98.5 °F (36.9 °C)] 98.3 °F (36.8 °C)  Pulse:  [53-82] 55  Resp:  [18-30] 20  SpO2:  [95 %-100 %] 100 %  BP: (151-181)/(64-77) 176/74     Weight: 106 kg (233 lb 11 oz)  Body mass index is 34.51 kg/m².    Physical Exam  Constitutional:       General: He is not in acute distress.     Appearance: Normal appearance. He is not ill-appearing or toxic-appearing.   HENT:      Head: Normocephalic.      Right Ear: External ear normal.      Left Ear: External ear normal.      Nose: No congestion or rhinorrhea.       Mouth/Throat:      Mouth: Mucous membranes are dry.      Pharynx: Oropharynx is clear.   Eyes:      Extraocular Movements: Extraocular movements intact.      Conjunctiva/sclera: Conjunctivae normal.   Cardiovascular:      Rate and Rhythm: Bradycardia present. Rhythm irregular.      Heart sounds: No murmur heard.  Pulmonary:      Effort: No respiratory distress.      Breath sounds: Rhonchi and rales present.      Comments: Reduced breath sounds to lung bases bilaterally  Chest:      Chest wall: No tenderness.   Abdominal:      General: Abdomen is flat. There is no distension.      Palpations: Abdomen is soft.      Tenderness: There is no abdominal tenderness. There is no right CVA tenderness, left CVA tenderness or guarding.   Musculoskeletal:         General: Swelling (lower extremities bilaterally +3 pitting) present. No tenderness.      Cervical back: Normal range of motion and neck supple. No rigidity or tenderness.      Right lower leg: Edema present.      Left lower leg: Edema present.   Lymphadenopathy:      Cervical: No cervical adenopathy.   Skin:     General: Skin is warm and dry.   Neurological:      Mental Status: He is alert and oriented to person, place, and time. Mental status is at baseline.      Motor: No weakness.   Psychiatric:         Mood and Affect: Mood normal.         Behavior: Behavior normal.         Thought Content: Thought content normal.         Judgment: Judgment normal.           Significant Labs: All pertinent labs within the past 24 hours have been reviewed.  A1C: No results for input(s): HGBA1C in the last 4320 hours.  ABGs: No results for input(s): PH, PCO2, HCO3, POCSATURATED, BE, TOTALHB, COHB, METHB, O2HB, POCFIO2, PO2 in the last 48 hours.  Blood Culture: No results for input(s): LABBLOO in the last 48 hours.  BMP:   Recent Labs   Lab 09/02/22  1020         K 4.0   *   CO2 23   BUN 33*   CREATININE 1.8*   CALCIUM 8.9     CBC:   Recent Labs   Lab 09/02/22  1020    WBC 3.33*   HGB 8.2*   HCT 27.3*        CMP:   Recent Labs   Lab 09/02/22  1020      K 4.0   *   CO2 23      BUN 33*   CREATININE 1.8*   CALCIUM 8.9   ANIONGAP 6*     Recent Labs   Lab 09/02/22  1134 09/02/22  1609   LACTATE 1.1 0.7     Recent Labs   Lab 09/02/22  1229   COLORU Yellow   APPEARANCEUA Clear   PHUR 6.0   SPECGRAV <=1.005*   PROTEINUA Negative   GLUCUA Negative   KETONESU Negative   BILIRUBINUA Negative   OCCULTUA Trace*   NITRITE Negative   UROBILINOGEN Negative   LEUKOCYTESUR Negative     X-Ray Chest 1 View  Narrative: EXAMINATION:  XR CHEST 1 VIEW    CLINICAL HISTORY:  Shortness of breath    TECHNIQUE:  portable chest x-ray    COMPARISON:  08/20/2022.    FINDINGS:  There is a new patchy infiltrate left hilar region and right lower lobe consistent with pneumonia.  Impression: New significant right lower lobe infiltrate consistent with pneumonia and a left hilar infiltrates suggesting pneumonia recommend follow-up    Electronically signed by: Anita Romero MD  Date:    09/02/2022  Time:    11:04   Significant Imaging: I have reviewed all pertinent imaging results/findings within the past 24 hours.

## 2022-09-02 NOTE — ASSESSMENT & PLAN NOTE
Patient is identified as mild moderate severity.   Initiate standard COVID protocols; COVID-19 testing ,Infection Control notification  and isolation- respiratory, contact and droplet per protocol    Diagnostics: (leukopenia, hyponatremia, hyperferritinemia, elevated troponin, elevated d-dimer, age, and comorbidities are significant predictors of poor clinical outcome)  CBC, CMP and Portable CXR    Management: Initiate targeted therapy with possible use of Paxlovid. Maintain oxygen saturations 92-96% via Nasal Cannula  LPM and monitor with continuous/intermittent pulse oximetry. Inhaled bronchodilators as needed for shortness of breath., Continuous cardiac monitoring. and Manage respiratory failure (O2 requirement >10LPM or needing NIPPV/Mechanical ventilation) and/or Pneumonia (active chest infiltrates) separately as described below.  Empirically started on IV antibiotics for right lower lobe pneumonia on chest xray.   Follow up blood cultures and adjust antibiotics accordingly.

## 2022-09-02 NOTE — ASSESSMENT & PLAN NOTE
Encourage ambulation to baseline with assistance. Denies appetite decline. Add supportive nutrition.   - f/u PT/OT eval and treatment

## 2022-09-02 NOTE — H&P
[facility] Medicine  History & Physical    Patient Name: Jamey Lei  MRN: [id number]  Patient Class: IP- Inpatient  Admission Date: 9/2/2022  Attending Physician:   Primary Care Provider: Navi Domínguez III, MD    Patient information was obtained from patient and ER records.     Subjective:     Principal Problem:<principal problem not specified>    Chief Complaint:   Chief Complaint   Patient presents with    Leg Swelling     Bilateral leg swelling x 3 days, hx of CHF    Shortness of Breath      HPI: 81 year old male with hypertension, hypoerlipidemia, CHF, permanent atrial fibriallation on Eliquis, CAD s/p CABGx3, CKD stage 3, recent lung and brain mass findings presents to ED with worsening shortness of breath that started about a week ago with bilateral leg swelling with difficulty ambulating with his walker.     Patient hard of hearing endorsed since treatment in the ED, he has been feeling better. At home he does not use oxygen, breathing at SpO2 >95% on continuous O2 2L via nasal cannula on exam. There is no change in appetite, nausea, vomiting, diarrhea, constipation. Denies headaches, fever, chills, vision changes, chest pain, abdominal pain. Denies joint pain and loss of sensation in toes and foot bilaterally with increasing leg swelling.    ED course:  Tachypnea rate >30, CXR right lower lobe pneumonia, positive influenza A and COVID19 (fully vaccinated), NTproBNT >6000, lactate <0.2 x2, blood cultures x2 pending,  BUN/Cr 33/1.8 with improved eGFR from last hospitalization and discharge one week ago. Patient started on IV lasix 80 mg, responding well with clear urine collecting in Sanders catheter and IV antibiotics empirically started for right lobe pneumonia.       Past Medical History:   Diagnosis Date    A-fib     Abdominal pain, epigastric     Abdominal pain, generalized     Acute on chronic congestive heart failure 12/29/2021    Anticoagulant long-term use     Arthritis      Asteatotic eczema     Benign essential HTN     Benign prostatic hyperplasia     Brain tumor     Cardiovascular accident     Carotid artery stenosis     CHF (congestive heart failure)     CKD (chronic kidney disease), stage IV     Constipation     COPD (chronic obstructive pulmonary disease)     Depressed     DM (diabetes mellitus), secondary     Drug eruption     Eczema     Edema     Encounter for blood transfusion     Fever     H. pylori infection     Hearing loss     History of tobacco use     Hypercholesterolemia     Hypertensive renal disease, benign     Hypokalemia     Hyponatremia     Impaired fasting glucose     Lumbar pain     Lung mass     Malaise and fatigue     Malignant neoplasm of prostate     Meningioma     Mixed hyperlipidemia     Nasal bleeding     Neuropathy     Obesity, unspecified     SHAYNE (obstructive sleep apnea)     Osteoarthritis of knee     Osteoarthritis of multiple joints     Peripheral vascular disease, unspecified     Psychosis     Renal disorder     Seizure disorder     Seizures     Sleep apnea     Unspecified cataract     Unspecified chronic bronchitis     Unspecified osteoarthritis, unspecified site     Vertigo     Vitamin D deficiency     Wheezing        Past Surgical History:   Procedure Laterality Date    APPENDECTOMY      COLONOSCOPY      CORONARY ARTERY BYPASS GRAFT      FOOT SURGERY Left     HIP REPLACEMENT ARTHROPLASTY Right     HIP SURGERY      JOINT REPLACEMENT      KNEE SURGERY      LUNG BIOPSY Right 03/03/2022    benign    SHOULDER SURGERY Left     SHOULDER SURGERY      TOTAL KNEE ARTHROPLASTY Right     triple bypass         Review of patient's allergies indicates:   Allergen Reactions    Zolpidem        No current facility-administered medications on file prior to encounter.     Current Outpatient Medications on File Prior to Encounter   Medication Sig    albuterol (PROVENTIL/VENTOLIN HFA) 90 mcg/actuation  inhaler Inhale 1-2 puffs into the lungs every 6 (six) hours as needed for Wheezing or Shortness of Breath. Rescue    albuterol-ipratropium (DUO-NEB) 2.5 mg-0.5 mg/3 mL nebulizer solution inhale 3 milliliters by nebulization route 4 times per day and as needed, up to 6 doses per day for 30 days    apixaban (ELIQUIS) 5 mg Tab Take 5 mg by mouth 2 (two) times daily.    aspirin (ECOTRIN) 81 MG EC tablet Take 81 mg by mouth once daily.    atorvastatin (LIPITOR) 20 MG tablet Take 1 tablet (20 mg total) by mouth once daily.    multivitamin capsule Take 1 capsule by mouth once daily.    OXcarbazepine (TRILEPTAL) 300 MG Tab Take 1 tablet (300 mg total) by mouth 2 (two) times daily.    senna (SENOKOT) 8.6 mg tablet 2 tablets at bedtime as needed    tamsulosin (FLOMAX) 0.4 mg Cap Take 0.4 mg by mouth once daily.    torsemide (DEMADEX) 20 MG Tab Take 1 tablet (20 mg total) by mouth daily as needed (weight gain greater than 5 lbs).    vitamin D (VITAMIN D3) 1000 units Tab Take 1,000 Units by mouth once daily.    [DISCONTINUED] calcium carbonate (OS-DAVID) 600 mg calcium (1,500 mg) Tab Take 600 mg by mouth once.    [DISCONTINUED] carvediloL (COREG) 6.25 MG tablet TAKE ONE TABLET BY MOUTH TWICE DAILY (EVERY 12 HOURS) FOR BLOOD PRESSURE. THANKS !!    [DISCONTINUED] ezetimibe (ZETIA) 10 mg tablet Take 10 mg by mouth once daily.    [DISCONTINUED] FLUoxetine 20 MG capsule     [DISCONTINUED] gabapentin (NEURONTIN) 300 MG capsule 1 capsule    [DISCONTINUED] miconazole NITRATE 2 % (MICOTIN) 2 % top powder Apply topically as needed for Itching.    [DISCONTINUED] risperiDONE (RISPERDAL) 0.5 MG Tab Take 1 tablet (0.5 mg total) by mouth nightly as needed (agitation and halluctions).     Family History       Problem Relation (Age of Onset)    Arthritis Mother, Father    Asthma Sister    Heart disease Father    Hyperlipidemia Mother, Sister    Hypertension Mother, Father, Sister, Brother          Tobacco Use    Smoking status:  Every Day    Smokeless tobacco: Never    Tobacco comments:     quit 30 years ago   Substance and Sexual Activity    Alcohol use: Not Currently    Drug use: Never    Sexual activity: Not Currently     Review of Systems   Constitutional:  Positive for activity change and fatigue. Negative for appetite change, chills, diaphoresis and fever.   Eyes:  Negative for pain, discharge and redness.   Respiratory:  Positive for cough and shortness of breath. Negative for stridor.    Cardiovascular:  Positive for leg swelling. Negative for chest pain and palpitations.   Gastrointestinal:  Negative for abdominal pain, blood in stool, constipation, diarrhea and nausea.   Genitourinary:  Negative for dysuria.   Musculoskeletal:  Positive for gait problem (baseline requires walker). Negative for arthralgias and back pain.   Skin:  Negative for color change, pallor, rash and wound.   Neurological:  Negative for dizziness, light-headedness and headaches.   Psychiatric/Behavioral:  Negative for agitation, behavioral problems and confusion.    Objective:     Vital Signs (Most Recent):  Temp: 98.3 °F (36.8 °C) (09/02/22 1648)  Pulse: (!) 55 (09/02/22 1648)  Resp: 20 (09/02/22 1648)  BP: (!) 176/74 (09/02/22 1648)  SpO2: 100 % (09/02/22 1648)   Vital Signs (24h Range):  Temp:  [98.3 °F (36.8 °C)-98.5 °F (36.9 °C)] 98.3 °F (36.8 °C)  Pulse:  [53-82] 55  Resp:  [18-30] 20  SpO2:  [95 %-100 %] 100 %  BP: (151-181)/(64-77) 176/74     Weight: 106 kg (233 lb 11 oz)  Body mass index is 34.51 kg/m².    Physical Exam  Constitutional:       General: He is not in acute distress.     Appearance: Normal appearance. He is not ill-appearing or toxic-appearing.   HENT:      Head: Normocephalic.      Right Ear: External ear normal.      Left Ear: External ear normal.      Nose: No congestion or rhinorrhea.      Mouth/Throat:      Mouth: Mucous membranes are dry.      Pharynx: Oropharynx is clear.   Eyes:      Extraocular Movements: Extraocular  movements intact.      Conjunctiva/sclera: Conjunctivae normal.   Cardiovascular:      Rate and Rhythm: Bradycardia present. Rhythm irregular.      Heart sounds: No murmur heard.  Pulmonary:      Effort: No respiratory distress.      Breath sounds: Rhonchi and rales present.      Comments: Reduced breath sounds to lung bases bilaterally  Chest:      Chest wall: No tenderness.   Abdominal:      General: Abdomen is flat. There is no distension.      Palpations: Abdomen is soft.      Tenderness: There is no abdominal tenderness. There is no right CVA tenderness, left CVA tenderness or guarding.   Musculoskeletal:         General: Swelling (lower extremities bilaterally +3 pitting) present. No tenderness.      Cervical back: Normal range of motion and neck supple. No rigidity or tenderness.      Right lower leg: Edema present.      Left lower leg: Edema present.   Lymphadenopathy:      Cervical: No cervical adenopathy.   Skin:     General: Skin is warm and dry.   Neurological:      Mental Status: He is alert and oriented to person, place, and time. Mental status is at baseline.      Motor: No weakness.   Psychiatric:         Mood and Affect: Mood normal.         Behavior: Behavior normal.         Thought Content: Thought content normal.         Judgment: Judgment normal.           Significant Labs: All pertinent labs within the past 24 hours have been reviewed.  A1C: No results for input(s): HGBA1C in the last 4320 hours.  ABGs: No results for input(s): PH, PCO2, HCO3, POCSATURATED, BE, TOTALHB, COHB, METHB, O2HB, POCFIO2, PO2 in the last 48 hours.  Blood Culture: No results for input(s): LABBLOO in the last 48 hours.  BMP:   Recent Labs   Lab 09/02/22  1020         K 4.0   *   CO2 23   BUN 33*   CREATININE 1.8*   CALCIUM 8.9     CBC:   Recent Labs   Lab 09/02/22  1020   WBC 3.33*   HGB 8.2*   HCT 27.3*        CMP:   Recent Labs   Lab 09/02/22  1020      K 4.0   *   CO2 23       BUN 33*   CREATININE 1.8*   CALCIUM 8.9   ANIONGAP 6*     Recent Labs   Lab 09/02/22  1134 09/02/22  1609   LACTATE 1.1 0.7     Recent Labs   Lab 09/02/22  1229   COLORU Yellow   APPEARANCEUA Clear   PHUR 6.0   SPECGRAV <=1.005*   PROTEINUA Negative   GLUCUA Negative   KETONESU Negative   BILIRUBINUA Negative   OCCULTUA Trace*   NITRITE Negative   UROBILINOGEN Negative   LEUKOCYTESUR Negative     X-Ray Chest 1 View  Narrative: EXAMINATION:  XR CHEST 1 VIEW    CLINICAL HISTORY:  Shortness of breath    TECHNIQUE:  portable chest x-ray    COMPARISON:  08/20/2022.    FINDINGS:  There is a new patchy infiltrate left hilar region and right lower lobe consistent with pneumonia.  Impression: New significant right lower lobe infiltrate consistent with pneumonia and a left hilar infiltrates suggesting pneumonia recommend follow-up    Electronically signed by: Anita Romero MD  Date:    09/02/2022  Time:    11:04   Significant Imaging: I have reviewed all pertinent imaging results/findings within the past 24 hours.    Assessment/Plan:     COVID-19  Patient is identified as mild moderate severity.   Initiate standard COVID protocols; COVID-19 testing ,Infection Control notification  and isolation- respiratory, contact and droplet per protocol    Diagnostics: (leukopenia, hyponatremia, hyperferritinemia, elevated troponin, elevated d-dimer, age, and comorbidities are significant predictors of poor clinical outcome)  CBC, CMP and Portable CXR    Management: Initiate targeted therapy with possible use of Paxlovid. Maintain oxygen saturations 92-96% via Nasal Cannula  LPM and monitor with continuous/intermittent pulse oximetry. Inhaled bronchodilators as needed for shortness of breath., Continuous cardiac monitoring. and Manage respiratory failure (O2 requirement >10LPM or needing NIPPV/Mechanical ventilation) and/or Pneumonia (active chest infiltrates) separately as described below.  Empirically started on IV antibiotics for  right lower lobe pneumonia on chest xray.   Follow up blood cultures and adjust antibiotics accordingly.     Influenza  Positive flu A. Continue tamiflu. Supplemental oxygen as needed to maintain >94%.      SOB (shortness of breath)  Multifactorial, cardiopulmonary (CHF, atrial fibrillation, COPD) and infectious processes (COVID19 and fluA).  SpO2 maintained >95% on 2L NC at this time.       Stage 3 chronic kidney disease  Stable BUN/Cr 33/1.8 likely at new baseline with recovering eGFR of >40 from <20 5 weeks ago. Continue to monitor as patient on IV diuretics.       A-fib  Patient with Permanent atrial fibrillation which is controlled currently with No rate controlling home medications at this time. . Patient is currently in atrial fibrillation.[id number] Score: 4. HASBLED Score: Unable to calculate. Anticoagulation indicated. Anticoagulation done with eliquis 5 mg BID.    Hemodynamically stable at this time. Will continue with home regimen Eliquis BID.   - continuous telemetry  - f/u primary cardiologist consult    Localized edema  Home med diuretic torsemide 20 mg PRN. +3 pitting on exam. Patient responding to IV lasix 80 mg in ED.   Continue with IV diuretics and taper with progress, patient endorses breathing a little better.   - IV lasix 40 mg BID  - restrict PO intake to 1.5 L fluid ounces  - monitor I/Os      Pneumonia  CXR findings with new right lower lobe pneumonia. Bilateral infiltrates likely secondary to pulmonary edema from infectious.  - supplement oxygen as needed SpO2 >92-94%  - daily pulmonary hygiene  - continue coverage for community acquired PNA, azithrmoycin and rocephin  - f/u blood cultures x2 pending  - trend CBC        Weakness  Encourage ambulation to baseline with assistance. Denies appetite decline. Add supportive nutrition.   - f/u PT/OT eval and treatment        VTE Risk Mitigation (From admission, onward)         Ordered     apixaban tablet 5 mg  2 times daily         09/02/22 9103      IP VTE HIGH RISK PATIENT  Once         09/02/22 1513     Place sequential compression device  Until discontinued         09/02/22 1513                   Lia Bagley DO  Department of Hospital Medicine   [facility]

## 2022-09-02 NOTE — HPI
81 year old male with hypertension, hypoerlipidemia, CHF, permanent atrial fibriallation on Eliquis, CAD s/p CABGx3, CKD stage 3, recent lung and brain mass findings presents to ED with worsening shortness of breath that started about a week ago with bilateral leg swelling with difficulty ambulating with his walker.     Patient hard of hearing endorsed since treatment in the ED, he has been feeling better. At home he does not use oxygen, breathing at SpO2 >95% on continuous O2 2L via nasal cannula on exam. There is no change in appetite, nausea, vomiting, diarrhea, constipation. Denies headaches, fever, chills, vision changes, chest pain, abdominal pain. Denies joint pain and loss of sensation in toes and foot bilaterally with increasing leg swelling.    ED course:  Tachypnea rate >30, CXR right lower lobe pneumonia, positive influenza A and COVID19 (fully vaccinated), NTproBNT >6000, lactate <0.2 x2, blood cultures x2 pending,  BUN/Cr 33/1.8 with improved eGFR from last hospitalization and discharge one week ago. Patient started on IV lasix 80 mg, responding well with clear urine collecting in Sanders catheter and IV antibiotics empirically started for right lobe pneumonia.

## 2022-09-02 NOTE — ASSESSMENT & PLAN NOTE
Home med diuretic torsemide 20 mg PRN. +3 pitting on exam. Patient responding to IV lasix 80 mg in ED.   Continue with IV diuretics and taper with progress, patient endorses breathing a little better.   - IV lasix 40 mg BID  - restrict PO intake to 1.5 L fluid ounces  - monitor I/Os

## 2022-09-03 LAB
ANION GAP SERPL CALC-SCNC: 4 MMOL/L (ref 8–16)
BASOPHILS # BLD AUTO: 0.01 K/UL (ref 0–0.2)
BASOPHILS NFR BLD: 0.3 % (ref 0–1.9)
BUN SERPL-MCNC: 32 MG/DL (ref 8–23)
CALCIUM SERPL-MCNC: 8.4 MG/DL (ref 8.7–10.5)
CHLORIDE SERPL-SCNC: 110 MMOL/L (ref 95–110)
CO2 SERPL-SCNC: 28 MMOL/L (ref 23–29)
CREAT SERPL-MCNC: 1.7 MG/DL (ref 0.5–1.4)
DIFFERENTIAL METHOD: ABNORMAL
EOSINOPHIL # BLD AUTO: 0.1 K/UL (ref 0–0.5)
EOSINOPHIL NFR BLD: 2 % (ref 0–8)
ERYTHROCYTE [DISTWIDTH] IN BLOOD BY AUTOMATED COUNT: 16.9 % (ref 11.5–14.5)
EST. GFR  (NO RACE VARIABLE): 40.5 ML/MIN/1.73 M^2
GLUCOSE SERPL-MCNC: 88 MG/DL (ref 70–110)
HCT VFR BLD AUTO: 24.4 % (ref 40–54)
HGB BLD-MCNC: 7.3 G/DL (ref 14–18)
IMM GRANULOCYTES # BLD AUTO: 0.01 K/UL (ref 0–0.04)
IMM GRANULOCYTES NFR BLD AUTO: 0.3 % (ref 0–0.5)
LYMPHOCYTES # BLD AUTO: 0.9 K/UL (ref 1–4.8)
LYMPHOCYTES NFR BLD: 29.2 % (ref 18–48)
MAGNESIUM SERPL-MCNC: 2.1 MG/DL (ref 1.6–2.6)
MCH RBC QN AUTO: 25 PG (ref 27–31)
MCHC RBC AUTO-ENTMCNC: 29.9 G/DL (ref 32–36)
MCV RBC AUTO: 84 FL (ref 82–98)
MONOCYTES # BLD AUTO: 0.5 K/UL (ref 0.3–1)
MONOCYTES NFR BLD: 15.1 % (ref 4–15)
NEUTROPHILS # BLD AUTO: 1.6 K/UL (ref 1.8–7.7)
NEUTROPHILS NFR BLD: 53.1 % (ref 38–73)
NRBC BLD-RTO: 0 /100 WBC
PLATELET # BLD AUTO: 156 K/UL (ref 150–450)
PMV BLD AUTO: 10.6 FL (ref 9.2–12.9)
POTASSIUM SERPL-SCNC: 3.8 MMOL/L (ref 3.5–5.1)
RBC # BLD AUTO: 2.92 M/UL (ref 4.6–6.2)
SODIUM SERPL-SCNC: 142 MMOL/L (ref 136–145)
WBC # BLD AUTO: 2.98 K/UL (ref 3.9–12.7)

## 2022-09-03 PROCEDURE — 25000003 PHARM REV CODE 250: Performed by: STUDENT IN AN ORGANIZED HEALTH CARE EDUCATION/TRAINING PROGRAM

## 2022-09-03 PROCEDURE — 80048 BASIC METABOLIC PNL TOTAL CA: CPT | Performed by: STUDENT IN AN ORGANIZED HEALTH CARE EDUCATION/TRAINING PROGRAM

## 2022-09-03 PROCEDURE — 36415 COLL VENOUS BLD VENIPUNCTURE: CPT | Performed by: STUDENT IN AN ORGANIZED HEALTH CARE EDUCATION/TRAINING PROGRAM

## 2022-09-03 PROCEDURE — 83735 ASSAY OF MAGNESIUM: CPT | Performed by: STUDENT IN AN ORGANIZED HEALTH CARE EDUCATION/TRAINING PROGRAM

## 2022-09-03 PROCEDURE — 99900035 HC TECH TIME PER 15 MIN (STAT)

## 2022-09-03 PROCEDURE — 63600175 PHARM REV CODE 636 W HCPCS: Performed by: STUDENT IN AN ORGANIZED HEALTH CARE EDUCATION/TRAINING PROGRAM

## 2022-09-03 PROCEDURE — 94761 N-INVAS EAR/PLS OXIMETRY MLT: CPT

## 2022-09-03 PROCEDURE — 94664 DEMO&/EVAL PT USE INHALER: CPT

## 2022-09-03 PROCEDURE — 11000001 HC ACUTE MED/SURG PRIVATE ROOM

## 2022-09-03 PROCEDURE — 27000221 HC OXYGEN, UP TO 24 HOURS

## 2022-09-03 PROCEDURE — 99900031 HC PATIENT EDUCATION (STAT)

## 2022-09-03 PROCEDURE — 27000207 HC ISOLATION

## 2022-09-03 PROCEDURE — 85025 COMPLETE CBC W/AUTO DIFF WBC: CPT | Performed by: STUDENT IN AN ORGANIZED HEALTH CARE EDUCATION/TRAINING PROGRAM

## 2022-09-03 RX ADMIN — ASPIRIN 81 MG: 81 TABLET, COATED ORAL at 08:09

## 2022-09-03 RX ADMIN — CEFTRIAXONE 1 G: 1 INJECTION, SOLUTION INTRAVENOUS at 01:09

## 2022-09-03 RX ADMIN — APIXABAN 5 MG: 5 TABLET, FILM COATED ORAL at 09:09

## 2022-09-03 RX ADMIN — ATORVASTATIN CALCIUM 20 MG: 20 TABLET, FILM COATED ORAL at 08:09

## 2022-09-03 RX ADMIN — FAMOTIDINE 20 MG: 20 TABLET ORAL at 09:09

## 2022-09-03 RX ADMIN — AZITHROMYCIN MONOHYDRATE 500 MG: 500 INJECTION, POWDER, LYOPHILIZED, FOR SOLUTION INTRAVENOUS at 01:09

## 2022-09-03 RX ADMIN — MUPIROCIN: 20 OINTMENT TOPICAL at 09:09

## 2022-09-03 RX ADMIN — MUPIROCIN: 20 OINTMENT TOPICAL at 08:09

## 2022-09-03 RX ADMIN — APIXABAN 5 MG: 5 TABLET, FILM COATED ORAL at 08:09

## 2022-09-03 RX ADMIN — FUROSEMIDE 40 MG: 10 INJECTION, SOLUTION INTRAMUSCULAR; INTRAVENOUS at 12:09

## 2022-09-03 RX ADMIN — FAMOTIDINE 20 MG: 20 TABLET ORAL at 08:09

## 2022-09-03 RX ADMIN — OSELTAMIVIR PHOSPHATE 75 MG: 75 CAPSULE ORAL at 08:09

## 2022-09-03 RX ADMIN — OSELTAMIVIR PHOSPHATE 75 MG: 75 CAPSULE ORAL at 09:09

## 2022-09-03 NOTE — SUBJECTIVE & OBJECTIVE
Past Medical History:   Diagnosis Date    A-fib     Abdominal pain, epigastric     Abdominal pain, generalized     Acute on chronic congestive heart failure 12/29/2021    Anticoagulant long-term use     Arthritis     Asteatotic eczema     Benign essential HTN     Benign prostatic hyperplasia     Brain tumor     Cardiovascular accident     Carotid artery stenosis     CHF (congestive heart failure)     CKD (chronic kidney disease), stage IV     Constipation     COPD (chronic obstructive pulmonary disease)     Depressed     DM (diabetes mellitus), secondary     Drug eruption     Eczema     Edema     Encounter for blood transfusion     Fever     H. pylori infection     Hearing loss     History of tobacco use     Hypercholesterolemia     Hypertensive renal disease, benign     Hypokalemia     Hyponatremia     Impaired fasting glucose     Lumbar pain     Lung mass     Malaise and fatigue     Malignant neoplasm of prostate     Meningioma     Mixed hyperlipidemia     Nasal bleeding     Neuropathy     Obesity, unspecified     SHAYNE (obstructive sleep apnea)     Osteoarthritis of knee     Osteoarthritis of multiple joints     Peripheral vascular disease, unspecified     Psychosis     Renal disorder     Seizure disorder     Seizures     Sleep apnea     Unspecified cataract     Unspecified chronic bronchitis     Unspecified osteoarthritis, unspecified site     Vertigo     Vitamin D deficiency     Wheezing        Past Surgical History:   Procedure Laterality Date    APPENDECTOMY      COLONOSCOPY      CORONARY ARTERY BYPASS GRAFT      FOOT SURGERY Left     HIP REPLACEMENT ARTHROPLASTY Right     HIP SURGERY      JOINT REPLACEMENT      KNEE SURGERY      LUNG BIOPSY Right 03/03/2022    benign    SHOULDER SURGERY Left     SHOULDER SURGERY      TOTAL KNEE ARTHROPLASTY Right     triple bypass         Review of patient's allergies indicates:   Allergen Reactions    Zolpidem        No current facility-administered medications on file prior  to encounter.     Current Outpatient Medications on File Prior to Encounter   Medication Sig    albuterol (PROVENTIL/VENTOLIN HFA) 90 mcg/actuation inhaler Inhale 1-2 puffs into the lungs every 6 (six) hours as needed for Wheezing or Shortness of Breath. Rescue    albuterol-ipratropium (DUO-NEB) 2.5 mg-0.5 mg/3 mL nebulizer solution inhale 3 milliliters by nebulization route 4 times per day and as needed, up to 6 doses per day for 30 days    apixaban (ELIQUIS) 5 mg Tab Take 5 mg by mouth 2 (two) times daily.    aspirin (ECOTRIN) 81 MG EC tablet Take 81 mg by mouth once daily.    atorvastatin (LIPITOR) 20 MG tablet Take 1 tablet (20 mg total) by mouth once daily.    multivitamin capsule Take 1 capsule by mouth once daily.    OXcarbazepine (TRILEPTAL) 300 MG Tab Take 1 tablet (300 mg total) by mouth 2 (two) times daily.    senna (SENOKOT) 8.6 mg tablet 2 tablets at bedtime as needed    tamsulosin (FLOMAX) 0.4 mg Cap Take 0.4 mg by mouth once daily.    torsemide (DEMADEX) 20 MG Tab Take 1 tablet (20 mg total) by mouth daily as needed (weight gain greater than 5 lbs).    vitamin D (VITAMIN D3) 1000 units Tab Take 1,000 Units by mouth once daily.    [DISCONTINUED] calcium carbonate (OS-DAVID) 600 mg calcium (1,500 mg) Tab Take 600 mg by mouth once.    [DISCONTINUED] carvediloL (COREG) 6.25 MG tablet TAKE ONE TABLET BY MOUTH TWICE DAILY (EVERY 12 HOURS) FOR BLOOD PRESSURE. THANKS !!    [DISCONTINUED] ezetimibe (ZETIA) 10 mg tablet Take 10 mg by mouth once daily.    [DISCONTINUED] FLUoxetine 20 MG capsule     [DISCONTINUED] gabapentin (NEURONTIN) 300 MG capsule 1 capsule    [DISCONTINUED] miconazole NITRATE 2 % (MICOTIN) 2 % top powder Apply topically as needed for Itching.    [DISCONTINUED] risperiDONE (RISPERDAL) 0.5 MG Tab Take 1 tablet (0.5 mg total) by mouth nightly as needed (agitation and halluctions).     Family History       Problem Relation (Age of Onset)    Arthritis Mother, Father    Asthma Sister    Heart  disease Father    Hyperlipidemia Mother, Sister    Hypertension Mother, Father, Sister, Brother          Tobacco Use    Smoking status: Every Day    Smokeless tobacco: Never    Tobacco comments:     quit 30 years ago   Substance and Sexual Activity    Alcohol use: Not Currently    Drug use: Never    Sexual activity: Not Currently     Review of Systems   Constitutional:  Positive for activity change and fatigue. Negative for appetite change, chills, diaphoresis and fever.   Eyes:  Negative for pain, discharge and redness.   Respiratory:  Positive for cough and shortness of breath. Negative for stridor.    Cardiovascular:  Positive for leg swelling. Negative for chest pain and palpitations.   Gastrointestinal:  Negative for abdominal pain, blood in stool, constipation, diarrhea and nausea.   Genitourinary:  Negative for dysuria.   Musculoskeletal:  Positive for gait problem (baseline requires walker). Negative for arthralgias and back pain.   Skin:  Negative for color change, pallor, rash and wound.   Neurological:  Negative for dizziness, light-headedness and headaches.   Psychiatric/Behavioral:  Negative for agitation, behavioral problems and confusion.    Objective:     Vital Signs (Most Recent):  Temp: 98.4 °F (36.9 °C) (09/03/22 1200)  Pulse: 61 (09/03/22 1408)  Resp: (!) 22 (09/03/22 1408)  BP: (!) 167/70 (09/03/22 1200)  SpO2: 95 % (09/03/22 1408)   Vital Signs (24h Range):  Temp:  [98 °F (36.7 °C)-99.1 °F (37.3 °C)] 98.4 °F (36.9 °C)  Pulse:  [53-66] 61  Resp:  [20-24] 22  SpO2:  [95 %-100 %] 95 %  BP: (136-176)/(62-74) 167/70     Weight: 106 kg (233 lb 11 oz)  Body mass index is 34.51 kg/m².    Physical Exam  Constitutional:       General: He is not in acute distress.     Appearance: Normal appearance. He is not ill-appearing or toxic-appearing.   HENT:      Head: Normocephalic.      Right Ear: External ear normal.      Left Ear: External ear normal.      Nose: No congestion or rhinorrhea.      Mouth/Throat:       Mouth: Mucous membranes are dry.      Pharynx: Oropharynx is clear.   Eyes:      Extraocular Movements: Extraocular movements intact.      Conjunctiva/sclera: Conjunctivae normal.   Cardiovascular:      Rate and Rhythm: Bradycardia present. Rhythm irregular.      Heart sounds: No murmur heard.  Pulmonary:      Effort: No respiratory distress.      Breath sounds: Rhonchi and rales present.      Comments: Reduced breath sounds to lung bases bilaterally  Chest:      Chest wall: No tenderness.   Abdominal:      General: Abdomen is flat. There is no distension.      Palpations: Abdomen is soft.      Tenderness: There is no abdominal tenderness. There is no right CVA tenderness, left CVA tenderness or guarding.   Musculoskeletal:         General: Swelling (lower extremities bilaterally +3 pitting) present. No tenderness.      Cervical back: Normal range of motion and neck supple. No rigidity or tenderness.      Right lower leg: Edema present.      Left lower leg: Edema present.   Lymphadenopathy:      Cervical: No cervical adenopathy.   Skin:     General: Skin is warm and dry.   Neurological:      Mental Status: He is alert and oriented to person, place, and time. Mental status is at baseline.      Motor: No weakness.   Psychiatric:         Mood and Affect: Mood normal.         Behavior: Behavior normal.         Thought Content: Thought content normal.         Judgment: Judgment normal.           Significant Labs: All pertinent labs within the past 24 hours have been reviewed.  A1C: No results for input(s): HGBA1C in the last 4320 hours.  ABGs: No results for input(s): PH, PCO2, HCO3, POCSATURATED, BE, TOTALHB, COHB, METHB, O2HB, POCFIO2, PO2 in the last 48 hours.  Blood Culture:   Recent Labs   Lab 09/02/22  1134 09/02/22  1141   LABBLOO No Growth to date No Growth to date     BMP:   Recent Labs   Lab 09/03/22  0624   GLU 88      K 3.8      CO2 28   BUN 32*   CREATININE 1.7*   CALCIUM 8.4*   MG 2.1        CBC:   Recent Labs   Lab 09/02/22  1020 09/03/22  0624   WBC 3.33* 2.98*   HGB 8.2* 7.3*   HCT 27.3* 24.4*    156       CMP:   Recent Labs   Lab 09/02/22  1020 09/03/22  0624    142   K 4.0 3.8   * 110   CO2 23 28    88   BUN 33* 32*   CREATININE 1.8* 1.7*   CALCIUM 8.9 8.4*   ANIONGAP 6* 4*       Recent Labs   Lab 09/02/22  1134 09/02/22  1609   LACTATE 1.1 0.7       Recent Labs   Lab 09/02/22  1229   COLORU Yellow   APPEARANCEUA Clear   PHUR 6.0   SPECGRAV <=1.005*   PROTEINUA Negative   GLUCUA Negative   KETONESU Negative   BILIRUBINUA Negative   OCCULTUA Trace*   NITRITE Negative   UROBILINOGEN Negative   LEUKOCYTESUR Negative       X-Ray Chest 1 View  Narrative: EXAMINATION:  XR CHEST 1 VIEW    CLINICAL HISTORY:  Shortness of breath    TECHNIQUE:  portable chest x-ray    COMPARISON:  08/20/2022.    FINDINGS:  There is a new patchy infiltrate left hilar region and right lower lobe consistent with pneumonia.  Impression: New significant right lower lobe infiltrate consistent with pneumonia and a left hilar infiltrates suggesting pneumonia recommend follow-up    Electronically signed by: Anita Romero MD  Date:    09/02/2022  Time:    11:04   Significant Imaging: I have reviewed all pertinent imaging results/findings within the past 24 hours.

## 2022-09-03 NOTE — PROGRESS NOTES
[facility]  Progress Note    Patient Name: Jamey Lei  MRN: [id number]  Patient Class: IP- Inpatient   Admission Date: 9/2/2022  Length of Stay: 1 days  Attending Physician: Lia Bagley DO  Primary Care Provider: Navi Domínguez III, MD        Subjective:     Principal Problem:SOB (shortness of breath)        HPI:  81 year old male with hypertension, hypoerlipidemia, CHF, permanent atrial fibriallation on Eliquis, CAD s/p CABGx3, CKD stage 3, recent lung and brain mass findings presents to ED with worsening shortness of breath that started about a week ago with bilateral leg swelling with difficulty ambulating with his walker.     Patient hard of hearing endorsed since treatment in the ED, he has been feeling better. At home he does not use oxygen, breathing at SpO2 >95% on continuous O2 2L via nasal cannula on exam. There is no change in appetite, nausea, vomiting, diarrhea, constipation. Denies headaches, fever, chills, vision changes, chest pain, abdominal pain. Denies joint pain and loss of sensation in toes and foot bilaterally with increasing leg swelling.    ED course:  Tachypnea rate >30, CXR right lower lobe pneumonia, positive influenza A and COVID19 (fully vaccinated), NTproBNT >6000, lactate <0.2 x2, blood cultures x2 pending,  BUN/Cr 33/1.8 with improved eGFR from last hospitalization and discharge one week ago. Patient started on IV lasix 80 mg, responding well with clear urine collecting in Sanders catheter and IV antibiotics empirically started for right lobe pneumonia.       Overview/Hospital Course:  9/3/22 CG:  Says it feels much better and does complain of a cough.  He has had to go to the bathroom and is waiting for help and assistance getting up from the bed to go to the restroom.  No other acute overnight events.      Past Medical History:   Diagnosis Date    A-fib     Abdominal pain, epigastric     Abdominal pain, generalized     Acute on chronic  congestive heart failure 12/29/2021    Anticoagulant long-term use     Arthritis     Asteatotic eczema     Benign essential HTN     Benign prostatic hyperplasia     Brain tumor     Cardiovascular accident     Carotid artery stenosis     CHF (congestive heart failure)     CKD (chronic kidney disease), stage IV     Constipation     COPD (chronic obstructive pulmonary disease)     Depressed     DM (diabetes mellitus), secondary     Drug eruption     Eczema     Edema     Encounter for blood transfusion     Fever     H. pylori infection     Hearing loss     History of tobacco use     Hypercholesterolemia     Hypertensive renal disease, benign     Hypokalemia     Hyponatremia     Impaired fasting glucose     Lumbar pain     Lung mass     Malaise and fatigue     Malignant neoplasm of prostate     Meningioma     Mixed hyperlipidemia     Nasal bleeding     Neuropathy     Obesity, unspecified     SHAYNE (obstructive sleep apnea)     Osteoarthritis of knee     Osteoarthritis of multiple joints     Peripheral vascular disease, unspecified     Psychosis     Renal disorder     Seizure disorder     Seizures     Sleep apnea     Unspecified cataract     Unspecified chronic bronchitis     Unspecified osteoarthritis, unspecified site     Vertigo     Vitamin D deficiency     Wheezing        Past Surgical History:   Procedure Laterality Date    APPENDECTOMY      COLONOSCOPY      CORONARY ARTERY BYPASS GRAFT      FOOT SURGERY Left     HIP REPLACEMENT ARTHROPLASTY Right     HIP SURGERY      JOINT REPLACEMENT      KNEE SURGERY      LUNG BIOPSY Right 03/03/2022    benign    SHOULDER SURGERY Left     SHOULDER SURGERY      TOTAL KNEE ARTHROPLASTY Right     triple bypass         Review of patient's allergies indicates:   Allergen Reactions    Zolpidem        No current facility-administered medications on file prior to encounter.     Current Outpatient Medications on File Prior to  Encounter   Medication Sig    albuterol (PROVENTIL/VENTOLIN HFA) 90 mcg/actuation inhaler Inhale 1-2 puffs into the lungs every 6 (six) hours as needed for Wheezing or Shortness of Breath. Rescue    albuterol-ipratropium (DUO-NEB) 2.5 mg-0.5 mg/3 mL nebulizer solution inhale 3 milliliters by nebulization route 4 times per day and as needed, up to 6 doses per day for 30 days    apixaban (ELIQUIS) 5 mg Tab Take 5 mg by mouth 2 (two) times daily.    aspirin (ECOTRIN) 81 MG EC tablet Take 81 mg by mouth once daily.    atorvastatin (LIPITOR) 20 MG tablet Take 1 tablet (20 mg total) by mouth once daily.    multivitamin capsule Take 1 capsule by mouth once daily.    OXcarbazepine (TRILEPTAL) 300 MG Tab Take 1 tablet (300 mg total) by mouth 2 (two) times daily.    senna (SENOKOT) 8.6 mg tablet 2 tablets at bedtime as needed    tamsulosin (FLOMAX) 0.4 mg Cap Take 0.4 mg by mouth once daily.    torsemide (DEMADEX) 20 MG Tab Take 1 tablet (20 mg total) by mouth daily as needed (weight gain greater than 5 lbs).    vitamin D (VITAMIN D3) 1000 units Tab Take 1,000 Units by mouth once daily.    [DISCONTINUED] calcium carbonate (OS-DAVID) 600 mg calcium (1,500 mg) Tab Take 600 mg by mouth once.    [DISCONTINUED] carvediloL (COREG) 6.25 MG tablet TAKE ONE TABLET BY MOUTH TWICE DAILY (EVERY 12 HOURS) FOR BLOOD PRESSURE. THANKS !!    [DISCONTINUED] ezetimibe (ZETIA) 10 mg tablet Take 10 mg by mouth once daily.    [DISCONTINUED] FLUoxetine 20 MG capsule     [DISCONTINUED] gabapentin (NEURONTIN) 300 MG capsule 1 capsule    [DISCONTINUED] miconazole NITRATE 2 % (MICOTIN) 2 % top powder Apply topically as needed for Itching.    [DISCONTINUED] risperiDONE (RISPERDAL) 0.5 MG Tab Take 1 tablet (0.5 mg total) by mouth nightly as needed (agitation and halluctions).     Family History       Problem Relation (Age of Onset)    Arthritis Mother, Father    Asthma Sister    Heart disease Father    Hyperlipidemia Mother, Sister     Hypertension Mother, Father, Sister, Brother          Tobacco Use    Smoking status: Every Day    Smokeless tobacco: Never    Tobacco comments:     quit 30 years ago   Substance and Sexual Activity    Alcohol use: Not Currently    Drug use: Never    Sexual activity: Not Currently     Review of Systems   Constitutional:  Positive for activity change and fatigue. Negative for appetite change, chills, diaphoresis and fever.   Eyes:  Negative for pain, discharge and redness.   Respiratory:  Positive for cough and shortness of breath. Negative for stridor.    Cardiovascular:  Positive for leg swelling. Negative for chest pain and palpitations.   Gastrointestinal:  Negative for abdominal pain, blood in stool, constipation, diarrhea and nausea.   Genitourinary:  Negative for dysuria.   Musculoskeletal:  Positive for gait problem (baseline requires walker). Negative for arthralgias and back pain.   Skin:  Negative for color change, pallor, rash and wound.   Neurological:  Negative for dizziness, light-headedness and headaches.   Psychiatric/Behavioral:  Negative for agitation, behavioral problems and confusion.    Objective:     Vital Signs (Most Recent):  Temp: 98.4 °F (36.9 °C) (09/03/22 1200)  Pulse: 61 (09/03/22 1408)  Resp: (!) 22 (09/03/22 1408)  BP: (!) 167/70 (09/03/22 1200)  SpO2: 95 % (09/03/22 1408)   Vital Signs (24h Range):  Temp:  [98 °F (36.7 °C)-99.1 °F (37.3 °C)] 98.4 °F (36.9 °C)  Pulse:  [53-66] 61  Resp:  [20-24] 22  SpO2:  [95 %-100 %] 95 %  BP: (136-176)/(62-74) 167/70     Weight: 106 kg (233 lb 11 oz)  Body mass index is 34.51 kg/m².    Physical Exam  Constitutional:       General: He is not in acute distress.     Appearance: Normal appearance. He is not ill-appearing or toxic-appearing.   HENT:      Head: Normocephalic.      Right Ear: External ear normal.      Left Ear: External ear normal.      Nose: No congestion or rhinorrhea.      Mouth/Throat:      Mouth: Mucous membranes are dry.       Pharynx: Oropharynx is clear.   Eyes:      Extraocular Movements: Extraocular movements intact.      Conjunctiva/sclera: Conjunctivae normal.   Cardiovascular:      Rate and Rhythm: Bradycardia present. Rhythm irregular.      Heart sounds: No murmur heard.  Pulmonary:      Effort: No respiratory distress.      Breath sounds: Rhonchi and rales present.      Comments: Reduced breath sounds to lung bases bilaterally  Chest:      Chest wall: No tenderness.   Abdominal:      General: Abdomen is flat. There is no distension.      Palpations: Abdomen is soft.      Tenderness: There is no abdominal tenderness. There is no right CVA tenderness, left CVA tenderness or guarding.   Musculoskeletal:         General: Swelling (lower extremities bilaterally +3 pitting) present. No tenderness.      Cervical back: Normal range of motion and neck supple. No rigidity or tenderness.      Right lower leg: Edema present.      Left lower leg: Edema present.   Lymphadenopathy:      Cervical: No cervical adenopathy.   Skin:     General: Skin is warm and dry.   Neurological:      Mental Status: He is alert and oriented to person, place, and time. Mental status is at baseline.      Motor: No weakness.   Psychiatric:         Mood and Affect: Mood normal.         Behavior: Behavior normal.         Thought Content: Thought content normal.         Judgment: Judgment normal.           Significant Labs: All pertinent labs within the past 24 hours have been reviewed.  A1C: No results for input(s): HGBA1C in the last 4320 hours.  ABGs: No results for input(s): PH, PCO2, HCO3, POCSATURATED, BE, TOTALHB, COHB, METHB, O2HB, POCFIO2, PO2 in the last 48 hours.  Blood Culture:   Recent Labs   Lab 09/02/22  1134 09/02/22  1141   LABBLOO No Growth to date No Growth to date     BMP:   Recent Labs   Lab 09/03/22  0624   GLU 88      K 3.8      CO2 28   BUN 32*   CREATININE 1.7*   CALCIUM 8.4*   MG 2.1       CBC:   Recent Labs   Lab 09/02/22  1020  09/03/22  0624   WBC 3.33* 2.98*   HGB 8.2* 7.3*   HCT 27.3* 24.4*    156       CMP:   Recent Labs   Lab 09/02/22  1020 09/03/22  0624    142   K 4.0 3.8   * 110   CO2 23 28    88   BUN 33* 32*   CREATININE 1.8* 1.7*   CALCIUM 8.9 8.4*   ANIONGAP 6* 4*       Recent Labs   Lab 09/02/22  1134 09/02/22  1609   LACTATE 1.1 0.7       Recent Labs   Lab 09/02/22  1229   COLORU Yellow   APPEARANCEUA Clear   PHUR 6.0   SPECGRAV <=1.005*   PROTEINUA Negative   GLUCUA Negative   KETONESU Negative   BILIRUBINUA Negative   OCCULTUA Trace*   NITRITE Negative   UROBILINOGEN Negative   LEUKOCYTESUR Negative       X-Ray Chest 1 View  Narrative: EXAMINATION:  XR CHEST 1 VIEW    CLINICAL HISTORY:  Shortness of breath    TECHNIQUE:  portable chest x-ray    COMPARISON:  08/20/2022.    FINDINGS:  There is a new patchy infiltrate left hilar region and right lower lobe consistent with pneumonia.  Impression: New significant right lower lobe infiltrate consistent with pneumonia and a left hilar infiltrates suggesting pneumonia recommend follow-up    Electronically signed by: Anita Romero MD  Date:    09/02/2022  Time:    11:04   Significant Imaging: I have reviewed all pertinent imaging results/findings within the past 24 hours.      Assessment/Plan:      * SOB (shortness of breath)  Multifactorial, cardiopulmonary (CHF, atrial fibrillation, COPD) and infectious processes (COVID19 and fluA).  SpO2 maintained >95% on 2L NC at this time.  - see above management.        COVID-19  Patient is identified as mild moderate severity.   Initiate standard COVID protocols; COVID-19 testing ,Infection Control notification  and isolation- respiratory, contact and droplet per protocol    Diagnostics: (leukopenia, hyponatremia, hyperferritinemia, elevated troponin, elevated d-dimer, age, and comorbidities are significant predictors of poor clinical outcome)  CBC, CMP and Portable CXR    Management: Initiate targeted therapy with  possible use of Paxlovid. Maintain oxygen saturations 92-96% via Nasal Cannula  LPM and monitor with continuous/intermittent pulse oximetry. Inhaled bronchodilators as needed for shortness of breath., Continuous cardiac monitoring. and Manage respiratory failure (O2 requirement >10LPM or needing NIPPV/Mechanical ventilation) and/or Pneumonia (active chest infiltrates) separately as described below.  Empirically started on IV antibiotics for right lower lobe pneumonia on chest xray.   Follow up blood cultures and adjust antibiotics accordingly.     Influenza  Positive flu A. Continue tamiflu. Supplemental oxygen as needed to maintain >94%.      Stage 3 chronic kidney disease  Stable BUN/Cr 33/1.8 likely at new baseline with recovering eGFR of >40 from <20 5 weeks ago. Continue to monitor as patient on IV diuretics.       A-fib  Patient with Permanent atrial fibrillation which is controlled currently with No rate controlling home medications at this time. . Patient is currently in atrial fibrillation.[id number] Score: 4. HASBLED Score: Unable to calculate. Anticoagulation indicated. Anticoagulation done with eliquis 5 mg BID.    Hemodynamically stable at this time. Will continue with home regimen Eliquis BID.   - continuous telemetry  - f/u primary cardiologist consult    Localized edema  Home med diuretic torsemide 20 mg PRN. +3 pitting on exam. Patient responding to IV lasix 80 mg in ED.   Continue with IV diuretics and taper with progress, patient endorses breathing a little better.   - IV lasix 40 mg BID  - restrict PO intake to 1.5 L fluid ounces  - monitor I/Os      Pneumonia  CXR findings with new right lower lobe pneumonia. Bilateral infiltrates likely secondary to pulmonary edema from infectious.  - supplement oxygen as needed SpO2 >92-94%  - daily pulmonary hygiene  - continue coverage for community acquired PNA, azithrmoycin and rocephin  - f/u blood cultures x2 pending  - trend  CBC        Weakness  Encourage ambulation to baseline with assistance. Denies appetite decline. Add supportive nutrition.   - f/u PT/OT eval and treatment        VTE Risk Mitigation (From admission, onward)         Ordered     apixaban tablet 5 mg  2 times daily         09/02/22 1719     IP VTE HIGH RISK PATIENT  Once         09/02/22 1513     Place sequential compression device  Until discontinued         09/02/22 1513                Discharge Planning   GLORY:      Code Status: Full Code   Is the patient medically ready for discharge?:     Reason for patient still in hospital (select all that apply): Treatment  Discharge Plan A: Home with family, Home Health                  Eddie Newton DO  Department of Hospital Medicine   [facility]

## 2022-09-03 NOTE — HOSPITAL COURSE
9/3/22 CG:  Says it feels much better and does complain of a cough.  He has had to go to the bathroom and is waiting for help and assistance getting up from the bed to go to the restroom.  No other acute overnight events.  9/4/22 CG: Doing better, still has a cough and feels weak.   9/5/22 CG:  Became more agitated last night and was trying to rip out his Sanders catheter.  Had to be given Seroquel to help him sleep and to calm him down.  9/6/22 FM:  Events of the weekend are noted.  Patient was admitted with COVID and influenza and a deterioration in his condition.  Patient has a history of lung cancer and is seeing an oncologist at an outside facility.  Patient has a history of chronic kidney disease as well as obesity diabetes neuropathy atrial fibrillation and his general health has come line significantly over the last year.  The patient has been in and out of the hospital and nursing facilities.  Last night the patient had a rapid response and ended up on the ventilator.  I reviewed the events and I am unsure if this was related to Seroquel will and I also see that he was becoming hypotensive.  Patient's chest x-ray is abnormal I will discuss with the family this morning.  9/7/22 FM:  Met with family today.  They were unable to make a decision on do not resuscitate.  They will consult with other family members.  Patient's volume status is improved as he was dry yesterday.  Patient's renal numbers all reviewed and noted.  Patient is able to open eyes on the ventilator and follow simple commands.  Will continue ventilatory support today as well as tube feedings.  9/8/22 FM:  Patient had a positive stool for blood as well as a persistent drop in his hemoglobin and was transfused 2 units of packed red blood cells yesterday.  Patient is admitted with COVID pneumonia influenza pneumonia and possible sepsis.  Patient has a history of lung cancer as well as multiple other comorbidities and I have met with the family  yesterday and they are unable to come to a code status or long-term status decision.  I am encouraging hospice care as the patient has multiple irreversible illnesses and a decline in his quality of life.  9/9/22 FM:  Patient is relatively unchanged today.  Yesterday the patient did well with his CPAP challenges and we will continue today.  I suspect he can be taken off the ventilator over the weekend some time.  Labs this morning are pending he has received 4 units of blood.  9/10 ND patient tolerating CPAP trials again today attempt to new trial.  Continue to wean sedation completely off.    9/11 ND pt with nv overnight - tfs beign held.  Has had bms.   Has been toleratign cpap trials. Wean sedation   9/12 FM:  Patient opens eyes to verbal stimulation this morning.  Patient's Diprivan is off family is at bedside.  Patient's tidal volumes are 650 on a pressure support of 10.  Tube feeds have been off and we may try extubation later today.  Encouraged family to place the patient do not resuscitate and they state they are not ready for this they would like to talk to patient once he is extubated.  9/13 FM:  Patient's CPAP trials yesterday ended up lasting 4-5 hours and the patient fatigued.  I suspect we will be able to extubate today.  9/14 FM:  Patient tolerated extubation yesterday and this morning is on nasal cannula with his ex-wife at bedside.  Patient has intermittent confusion but when questioned about wanting to be back on the ventilator he states he does not want this.  I will reach out to the son who is the primary decision maker.  The patient's tachypneic he is taking p.o. will re-ask therapy to begin slowly working with the patient.  Patient with significant edema/3rd spacing, will start lasix.  9/15 FM:  Patient's family is at the bedside today.  Patient's vital signs are stable and his oxygen saturation is 97% on room air.  Patient has confusion and altered mental status.  He also seems to have some  right-sided neglect and right-sided weakness.  Patient is off of all anticoagulants and antiplatelets as he has had a gastrointestinal bleed during this admission.  I have been continue to encourage do not resuscitate orders in the family is having difficulty making his decision.  Patient is unable to make this decision when questioned.  Transitioning to the floor today.  9/16/22 FM:  Patient has been transition to the floor.  Patient's family is at bedside.  I have tried to encourage do not resuscitate orders and they state that there is a nephew they would like to visit and then they would feel comfortable placing that order.  Also I have recommended hospice care and we will have a informational visit.  9/17 AA, weekend crosscover, patient with lung cancer, admission complicated with pneumonia, patient had been advised to do thickened liquid diet, nurse reported that patient was given solid food to eat, required BiPAP therapy overnight, chest x-ray ordered, concern for aspiration, no elevated white count, renal function appears at baseline, patient continued to decline, hospice evaluated, son who has power of  wishes to wait till Monday see take patient home, one patient to have BiPAP, patient currently full code, prognosis poor  9/18 AA, weekend crosscover, mentation overall improved, patient advised to stay away from solid food previously, thickened liquids, replete electrolytes, planning to discharge home tomorrow with hospice.      Discharge Note:  Patient has waxed and waned in complications related to his comorbidities and lung cancer over the last year.  Had multiple hospitalizations and ultimately was admitted to our acute care facility with delirium and sepsis.  Patient ended up needing mechanical ventilation and ICU care and we were able to wean the ventilator and extubate the patient after a short ICU stay.  I have continued to encourage the patient's son Rachid to place the take patient do not  resuscitate and to begin comfort care.  It has taken them some time and there are some complicated family dynamics but they have agreed to hospice care at home but still are not ready to sign a do not resuscitate order.  The son is waiting for other family members to be able to visit the patient.  We have encouraged him to keep him at home and care for him with hospice as we are only prolonging the patient's pain and suffering with repeat admissions and hospitalizations.  The patient's prognosis is terminal.

## 2022-09-04 LAB
ALBUMIN SERPL BCP-MCNC: 2.5 G/DL (ref 3.5–5.2)
ALP SERPL-CCNC: 53 U/L (ref 55–135)
ALT SERPL W/O P-5'-P-CCNC: 18 U/L (ref 10–44)
ANION GAP SERPL CALC-SCNC: 5 MMOL/L (ref 8–16)
AST SERPL-CCNC: 21 U/L (ref 10–40)
BASOPHILS # BLD AUTO: 0.02 K/UL (ref 0–0.2)
BASOPHILS NFR BLD: 0.5 % (ref 0–1.9)
BILIRUB SERPL-MCNC: 0.2 MG/DL (ref 0.1–1)
BUN SERPL-MCNC: 33 MG/DL (ref 8–23)
CALCIUM SERPL-MCNC: 8.7 MG/DL (ref 8.7–10.5)
CHLORIDE SERPL-SCNC: 109 MMOL/L (ref 95–110)
CO2 SERPL-SCNC: 28 MMOL/L (ref 23–29)
CREAT SERPL-MCNC: 1.7 MG/DL (ref 0.5–1.4)
DIFFERENTIAL METHOD: ABNORMAL
EOSINOPHIL # BLD AUTO: 0 K/UL (ref 0–0.5)
EOSINOPHIL NFR BLD: 0.3 % (ref 0–8)
ERYTHROCYTE [DISTWIDTH] IN BLOOD BY AUTOMATED COUNT: 17.1 % (ref 11.5–14.5)
EST. GFR  (NO RACE VARIABLE): 40.5 ML/MIN/1.73 M^2
GLUCOSE SERPL-MCNC: 136 MG/DL (ref 70–110)
HCT VFR BLD AUTO: 26.7 % (ref 40–54)
HGB BLD-MCNC: 8 G/DL (ref 14–18)
IMM GRANULOCYTES # BLD AUTO: 0.01 K/UL (ref 0–0.04)
IMM GRANULOCYTES NFR BLD AUTO: 0.3 % (ref 0–0.5)
LYMPHOCYTES # BLD AUTO: 0.6 K/UL (ref 1–4.8)
LYMPHOCYTES NFR BLD: 17.1 % (ref 18–48)
MCH RBC QN AUTO: 24.8 PG (ref 27–31)
MCHC RBC AUTO-ENTMCNC: 30 G/DL (ref 32–36)
MCV RBC AUTO: 83 FL (ref 82–98)
MONOCYTES # BLD AUTO: 0.4 K/UL (ref 0.3–1)
MONOCYTES NFR BLD: 9.6 % (ref 4–15)
NEUTROPHILS # BLD AUTO: 2.7 K/UL (ref 1.8–7.7)
NEUTROPHILS NFR BLD: 72.2 % (ref 38–73)
NRBC BLD-RTO: 0 /100 WBC
PLATELET # BLD AUTO: 170 K/UL (ref 150–450)
PMV BLD AUTO: 10.4 FL (ref 9.2–12.9)
POTASSIUM SERPL-SCNC: 4.2 MMOL/L (ref 3.5–5.1)
PROT SERPL-MCNC: 6.5 G/DL (ref 6–8.4)
RBC # BLD AUTO: 3.23 M/UL (ref 4.6–6.2)
SODIUM SERPL-SCNC: 142 MMOL/L (ref 136–145)
WBC # BLD AUTO: 3.75 K/UL (ref 3.9–12.7)

## 2022-09-04 PROCEDURE — 25000003 PHARM REV CODE 250: Performed by: STUDENT IN AN ORGANIZED HEALTH CARE EDUCATION/TRAINING PROGRAM

## 2022-09-04 PROCEDURE — 63600175 PHARM REV CODE 636 W HCPCS: Performed by: STUDENT IN AN ORGANIZED HEALTH CARE EDUCATION/TRAINING PROGRAM

## 2022-09-04 PROCEDURE — 94664 DEMO&/EVAL PT USE INHALER: CPT

## 2022-09-04 PROCEDURE — 99900031 HC PATIENT EDUCATION (STAT)

## 2022-09-04 PROCEDURE — 27000207 HC ISOLATION

## 2022-09-04 PROCEDURE — 36415 COLL VENOUS BLD VENIPUNCTURE: CPT | Performed by: INTERNAL MEDICINE

## 2022-09-04 PROCEDURE — 94761 N-INVAS EAR/PLS OXIMETRY MLT: CPT

## 2022-09-04 PROCEDURE — 80053 COMPREHEN METABOLIC PANEL: CPT | Performed by: INTERNAL MEDICINE

## 2022-09-04 PROCEDURE — 85025 COMPLETE CBC W/AUTO DIFF WBC: CPT | Performed by: INTERNAL MEDICINE

## 2022-09-04 PROCEDURE — 27000221 HC OXYGEN, UP TO 24 HOURS

## 2022-09-04 PROCEDURE — 11000001 HC ACUTE MED/SURG PRIVATE ROOM

## 2022-09-04 PROCEDURE — 25000003 PHARM REV CODE 250: Performed by: INTERNAL MEDICINE

## 2022-09-04 PROCEDURE — 99900035 HC TECH TIME PER 15 MIN (STAT)

## 2022-09-04 RX ORDER — QUETIAPINE FUMARATE 25 MG/1
50 TABLET, FILM COATED ORAL 2 TIMES DAILY
Status: DISCONTINUED | OUTPATIENT
Start: 2022-09-04 | End: 2022-09-06

## 2022-09-04 RX ADMIN — FUROSEMIDE 40 MG: 10 INJECTION, SOLUTION INTRAMUSCULAR; INTRAVENOUS at 11:09

## 2022-09-04 RX ADMIN — MUPIROCIN: 20 OINTMENT TOPICAL at 08:09

## 2022-09-04 RX ADMIN — CEFTRIAXONE 1 G: 1 INJECTION, SOLUTION INTRAVENOUS at 01:09

## 2022-09-04 RX ADMIN — FUROSEMIDE 40 MG: 10 INJECTION, SOLUTION INTRAMUSCULAR; INTRAVENOUS at 12:09

## 2022-09-04 RX ADMIN — ATORVASTATIN CALCIUM 20 MG: 20 TABLET, FILM COATED ORAL at 08:09

## 2022-09-04 RX ADMIN — QUETIAPINE FUMARATE 50 MG: 50 TABLET ORAL at 09:09

## 2022-09-04 RX ADMIN — APIXABAN 5 MG: 5 TABLET, FILM COATED ORAL at 08:09

## 2022-09-04 RX ADMIN — FAMOTIDINE 20 MG: 20 TABLET ORAL at 08:09

## 2022-09-04 RX ADMIN — APIXABAN 5 MG: 5 TABLET, FILM COATED ORAL at 09:09

## 2022-09-04 RX ADMIN — OSELTAMIVIR PHOSPHATE 75 MG: 75 CAPSULE ORAL at 08:09

## 2022-09-04 RX ADMIN — AZITHROMYCIN MONOHYDRATE 500 MG: 500 INJECTION, POWDER, LYOPHILIZED, FOR SOLUTION INTRAVENOUS at 01:09

## 2022-09-04 RX ADMIN — ASPIRIN 81 MG: 81 TABLET, COATED ORAL at 08:09

## 2022-09-04 RX ADMIN — FAMOTIDINE 20 MG: 20 TABLET ORAL at 09:09

## 2022-09-04 RX ADMIN — OSELTAMIVIR PHOSPHATE 75 MG: 75 CAPSULE ORAL at 09:09

## 2022-09-04 RX ADMIN — MUPIROCIN: 20 OINTMENT TOPICAL at 09:09

## 2022-09-04 NOTE — RESPIRATORY THERAPY
09/04/22 1447   PRE-TX-O2   O2 Device (Oxygen Therapy) (S)  nasal cannula   $ Is the patient on Low Flow Oxygen? Yes   Flow (L/min) (S)  2   Oxygen Concentration (%) 28   SpO2 98 %   Pulse Oximetry Type Intermittent   $ Pulse Oximetry - Multiple Charge Pulse Oximetry - Multiple   Pulse 60   Resp 20   Positioning HOB elevated 30 degrees       Weaned patient's Oxygen from 3lpm nasal cannula to 2lpm nasal cannula at this time. No distress noted. Briseida RADER at bedside and aware

## 2022-09-04 NOTE — SUBJECTIVE & OBJECTIVE
Past Medical History:   Diagnosis Date    A-fib     Abdominal pain, epigastric     Abdominal pain, generalized     Acute on chronic congestive heart failure 12/29/2021    Anticoagulant long-term use     Arthritis     Asteatotic eczema     Benign essential HTN     Benign prostatic hyperplasia     Brain tumor     Cardiovascular accident     Carotid artery stenosis     CHF (congestive heart failure)     CKD (chronic kidney disease), stage IV     Constipation     COPD (chronic obstructive pulmonary disease)     Depressed     DM (diabetes mellitus), secondary     Drug eruption     Eczema     Edema     Encounter for blood transfusion     Fever     H. pylori infection     Hearing loss     History of tobacco use     Hypercholesterolemia     Hypertensive renal disease, benign     Hypokalemia     Hyponatremia     Impaired fasting glucose     Lumbar pain     Lung mass     Malaise and fatigue     Malignant neoplasm of prostate     Meningioma     Mixed hyperlipidemia     Nasal bleeding     Neuropathy     Obesity, unspecified     SHAYNE (obstructive sleep apnea)     Osteoarthritis of knee     Osteoarthritis of multiple joints     Peripheral vascular disease, unspecified     Psychosis     Renal disorder     Seizure disorder     Seizures     Sleep apnea     Unspecified cataract     Unspecified chronic bronchitis     Unspecified osteoarthritis, unspecified site     Vertigo     Vitamin D deficiency     Wheezing        Past Surgical History:   Procedure Laterality Date    APPENDECTOMY      COLONOSCOPY      CORONARY ARTERY BYPASS GRAFT      FOOT SURGERY Left     HIP REPLACEMENT ARTHROPLASTY Right     HIP SURGERY      JOINT REPLACEMENT      KNEE SURGERY      LUNG BIOPSY Right 03/03/2022    benign    SHOULDER SURGERY Left     SHOULDER SURGERY      TOTAL KNEE ARTHROPLASTY Right     triple bypass         Review of patient's allergies indicates:   Allergen Reactions     Zolpidem        No current facility-administered medications on file prior to encounter.     Current Outpatient Medications on File Prior to Encounter   Medication Sig    albuterol (PROVENTIL/VENTOLIN HFA) 90 mcg/actuation inhaler Inhale 1-2 puffs into the lungs every 6 (six) hours as needed for Wheezing or Shortness of Breath. Rescue    albuterol-ipratropium (DUO-NEB) 2.5 mg-0.5 mg/3 mL nebulizer solution inhale 3 milliliters by nebulization route 4 times per day and as needed, up to 6 doses per day for 30 days    apixaban (ELIQUIS) 5 mg Tab Take 5 mg by mouth 2 (two) times daily.    aspirin (ECOTRIN) 81 MG EC tablet Take 81 mg by mouth once daily.    atorvastatin (LIPITOR) 20 MG tablet Take 1 tablet (20 mg total) by mouth once daily.    multivitamin capsule Take 1 capsule by mouth once daily.    OXcarbazepine (TRILEPTAL) 300 MG Tab Take 1 tablet (300 mg total) by mouth 2 (two) times daily.    senna (SENOKOT) 8.6 mg tablet 2 tablets at bedtime as needed    tamsulosin (FLOMAX) 0.4 mg Cap Take 0.4 mg by mouth once daily.    torsemide (DEMADEX) 20 MG Tab Take 1 tablet (20 mg total) by mouth daily as needed (weight gain greater than 5 lbs).    vitamin D (VITAMIN D3) 1000 units Tab Take 1,000 Units by mouth once daily.    [DISCONTINUED] calcium carbonate (OS-DAVID) 600 mg calcium (1,500 mg) Tab Take 600 mg by mouth once.    [DISCONTINUED] carvediloL (COREG) 6.25 MG tablet TAKE ONE TABLET BY MOUTH TWICE DAILY (EVERY 12 HOURS) FOR BLOOD PRESSURE. THANKS !!    [DISCONTINUED] ezetimibe (ZETIA) 10 mg tablet Take 10 mg by mouth once daily.    [DISCONTINUED] FLUoxetine 20 MG capsule     [DISCONTINUED] gabapentin (NEURONTIN) 300 MG capsule 1 capsule    [DISCONTINUED] miconazole NITRATE 2 % (MICOTIN) 2 % top powder Apply topically as needed for Itching.    [DISCONTINUED] risperiDONE (RISPERDAL) 0.5 MG Tab Take 1 tablet (0.5 mg total) by mouth nightly as needed (agitation and halluctions).     Family History        Problem Relation (Age of Onset)    Arthritis Mother, Father    Asthma Sister    Heart disease Father    Hyperlipidemia Mother, Sister    Hypertension Mother, Father, Sister, Brother          Tobacco Use    Smoking status: Every Day    Smokeless tobacco: Never    Tobacco comments:     quit 30 years ago   Substance and Sexual Activity    Alcohol use: Not Currently    Drug use: Never    Sexual activity: Not Currently     Review of Systems   Constitutional:  Positive for activity change and fatigue. Negative for appetite change, chills, diaphoresis and fever.   Eyes:  Negative for pain, discharge and redness.   Respiratory:  Positive for cough and shortness of breath. Negative for stridor.    Cardiovascular:  Positive for leg swelling. Negative for chest pain and palpitations.   Gastrointestinal:  Negative for abdominal pain, blood in stool, constipation, diarrhea and nausea.   Genitourinary:  Negative for dysuria.   Musculoskeletal:  Positive for gait problem (baseline requires walker). Negative for arthralgias and back pain.   Skin:  Negative for color change, pallor, rash and wound.   Neurological:  Negative for dizziness, light-headedness and headaches.   Psychiatric/Behavioral:  Negative for agitation, behavioral problems and confusion.    Objective:     Vital Signs (Most Recent):  Temp: 98.3 °F (36.8 °C) (09/04/22 1151)  Pulse: 60 (09/04/22 1447)  Resp: 20 (09/04/22 1447)  BP: (!) 168/77 (09/04/22 1151)  SpO2: 98 % (09/04/22 1447)   Vital Signs (24h Range):  Temp:  [97.8 °F (36.6 °C)-98.3 °F (36.8 °C)] 98.3 °F (36.8 °C)  Pulse:  [59-67] 60  Resp:  [16-24] 20  SpO2:  [91 %-100 %] 98 %  BP: (147-196)/(67-89) 168/77     Weight: 106 kg (233 lb 11 oz)  Body mass index is 34.51 kg/m².    Physical Exam  Constitutional:       General: He is not in acute distress.     Appearance: Normal appearance. He is not ill-appearing or toxic-appearing.   HENT:      Head: Normocephalic.      Right Ear: External ear normal.       Left Ear: External ear normal.      Nose: No congestion or rhinorrhea.      Mouth/Throat:      Mouth: Mucous membranes are dry.      Pharynx: Oropharynx is clear.   Eyes:      Extraocular Movements: Extraocular movements intact.      Conjunctiva/sclera: Conjunctivae normal.   Cardiovascular:      Rate and Rhythm: Bradycardia present. Rhythm irregular.      Heart sounds: No murmur heard.  Pulmonary:      Effort: No respiratory distress.      Breath sounds: Rhonchi and rales present.      Comments: Reduced breath sounds to lung bases bilaterally  Chest:      Chest wall: No tenderness.   Abdominal:      General: Abdomen is flat. There is no distension.      Palpations: Abdomen is soft.      Tenderness: There is no abdominal tenderness. There is no right CVA tenderness, left CVA tenderness or guarding.   Musculoskeletal:         General: Swelling (lower extremities bilaterally +3 pitting) present. No tenderness.      Cervical back: Normal range of motion and neck supple. No rigidity or tenderness.      Right lower leg: Edema present.      Left lower leg: Edema present.   Lymphadenopathy:      Cervical: No cervical adenopathy.   Skin:     General: Skin is warm and dry.   Neurological:      Mental Status: He is alert and oriented to person, place, and time. Mental status is at baseline.      Motor: No weakness.   Psychiatric:         Mood and Affect: Mood normal.         Behavior: Behavior normal.         Thought Content: Thought content normal.         Judgment: Judgment normal.           Significant Labs: All pertinent labs within the past 24 hours have been reviewed.  A1C: No results for input(s): HGBA1C in the last 4320 hours.  ABGs: No results for input(s): PH, PCO2, HCO3, POCSATURATED, BE, TOTALHB, COHB, METHB, O2HB, POCFIO2, PO2 in the last 48 hours.  Blood Culture: No results for input(s): LABBLOO in the last 48 hours.    BMP:   Recent Labs   Lab 09/03/22  0624 09/04/22  1231   GLU 88 136*    142   K 3.8 4.2     109   CO2 28 28   BUN 32* 33*   CREATININE 1.7* 1.7*   CALCIUM 8.4* 8.7   MG 2.1  --        CBC:   Recent Labs   Lab 09/03/22  0624 09/04/22  1231   WBC 2.98* 3.75*   HGB 7.3* 8.0*   HCT 24.4* 26.7*    170       CMP:   Recent Labs   Lab 09/03/22  0624 09/04/22  1231    142   K 3.8 4.2    109   CO2 28 28   GLU 88 136*   BUN 32* 33*   CREATININE 1.7* 1.7*   CALCIUM 8.4* 8.7   PROT  --  6.5   ALBUMIN  --  2.5*   BILITOT  --  0.2   ALKPHOS  --  53*   AST  --  21   ALT  --  18   ANIONGAP 4* 5*       Recent Labs   Lab 09/02/22  1609   LACTATE 0.7       No results for input(s): COLORU, APPEARANCEUA, PHUR, SPECGRAV, PROTEINUA, GLUCUA, KETONESU, BILIRUBINUA, OCCULTUA, NITRITE, UROBILINOGEN, LEUKOCYTESUR, RBCUA, WBCUA, BACTERIA, SQUAMEPITHEL, HYALINECASTS in the last 48 hours.    Invalid input(s): WRIGHTSUR    X-Ray Chest 1 View  Narrative: EXAMINATION:  XR CHEST 1 VIEW    CLINICAL HISTORY:  Shortness of breath    TECHNIQUE:  portable chest x-ray    COMPARISON:  08/20/2022.    FINDINGS:  There is a new patchy infiltrate left hilar region and right lower lobe consistent with pneumonia.  Impression: New significant right lower lobe infiltrate consistent with pneumonia and a left hilar infiltrates suggesting pneumonia recommend follow-up    Electronically signed by: Anita Romero MD  Date:    09/02/2022  Time:    11:04   Significant Imaging: I have reviewed all pertinent imaging results/findings within the past 24 hours.

## 2022-09-04 NOTE — RESPIRATORY THERAPY
09/04/22 1442   Patient Assessment/Suction   Level of Consciousness (AVPU) alert   Respiratory Effort Unlabored   Expansion/Accessory Muscles/Retractions expansion symmetric;no retractions   All Lung Fields Breath Sounds coarse;wheezes, expiratory   Rhythm/Pattern, Respiratory unlabored   Cough Frequency infrequent   Cough Type nonproductive   PRE-TX-O2   O2 Device (Oxygen Therapy) (S)  nasal cannula   $ Is the patient on Low Flow Oxygen? Yes   Flow (L/min) (S)  3   Oxygen Concentration (%) 32   SpO2 100 %   Pulse Oximetry Type Intermittent   $ Pulse Oximetry - Multiple Charge Pulse Oximetry - Multiple   Pulse 60   Resp 20   Positioning HOB elevated 30 degrees   Vibratory PEP Therapy   $ Vibratory PEP Charges Aerobika Therapy   Daily Review of Necessity (PEP Therapy) completed   Type (PEP Therapy) vibratory/oscillatory   Device (PEP Therapy) flutter   Route (PEP Therapy) mouthpiece;proper technique demonstrated   Breaths per Cycle (PEP Therapy) 10   Cycles (PEP Therapy) 1   Settings (PEP Therapy) PEP 3   Patient Position (PEP Therapy) HOB elevated   Post Treatment Assessment (PEP) breath sounds unchanged   Signs of Intolerance (PEP Therapy) none   Respiratory Evaluation   $ Care Plan Tech Time 15 min       Found pt on 3lpm nasal cannula at this time. Patient placed on oxygen per Julienne ARIAS

## 2022-09-05 LAB
ALBUMIN SERPL BCP-MCNC: 2.5 G/DL (ref 3.5–5.2)
ALP SERPL-CCNC: 48 U/L (ref 55–135)
ALT SERPL W/O P-5'-P-CCNC: 16 U/L (ref 10–44)
ANION GAP SERPL CALC-SCNC: 6 MMOL/L (ref 8–16)
AST SERPL-CCNC: 19 U/L (ref 10–40)
BASOPHILS # BLD AUTO: 0.01 K/UL (ref 0–0.2)
BASOPHILS NFR BLD: 0.3 % (ref 0–1.9)
BILIRUB SERPL-MCNC: 0.3 MG/DL (ref 0.1–1)
BUN SERPL-MCNC: 26 MG/DL (ref 8–23)
CALCIUM SERPL-MCNC: 8.7 MG/DL (ref 8.7–10.5)
CHLORIDE SERPL-SCNC: 108 MMOL/L (ref 95–110)
CO2 SERPL-SCNC: 29 MMOL/L (ref 23–29)
CREAT SERPL-MCNC: 1.4 MG/DL (ref 0.5–1.4)
DIFFERENTIAL METHOD: ABNORMAL
EOSINOPHIL # BLD AUTO: 0.1 K/UL (ref 0–0.5)
EOSINOPHIL NFR BLD: 2.7 % (ref 0–8)
ERYTHROCYTE [DISTWIDTH] IN BLOOD BY AUTOMATED COUNT: 16.9 % (ref 11.5–14.5)
EST. GFR  (NO RACE VARIABLE): 51.1 ML/MIN/1.73 M^2
GLUCOSE SERPL-MCNC: 86 MG/DL (ref 70–110)
HCT VFR BLD AUTO: 26.1 % (ref 40–54)
HGB BLD-MCNC: 7.9 G/DL (ref 14–18)
IMM GRANULOCYTES # BLD AUTO: 0 K/UL (ref 0–0.04)
IMM GRANULOCYTES NFR BLD AUTO: 0 % (ref 0–0.5)
LYMPHOCYTES # BLD AUTO: 1.2 K/UL (ref 1–4.8)
LYMPHOCYTES NFR BLD: 35.9 % (ref 18–48)
MCH RBC QN AUTO: 24.9 PG (ref 27–31)
MCHC RBC AUTO-ENTMCNC: 30.3 G/DL (ref 32–36)
MCV RBC AUTO: 82 FL (ref 82–98)
MONOCYTES # BLD AUTO: 0.4 K/UL (ref 0.3–1)
MONOCYTES NFR BLD: 11.2 % (ref 4–15)
NEUTROPHILS # BLD AUTO: 1.6 K/UL (ref 1.8–7.7)
NEUTROPHILS NFR BLD: 49.9 % (ref 38–73)
NRBC BLD-RTO: 0 /100 WBC
PLATELET # BLD AUTO: 160 K/UL (ref 150–450)
PMV BLD AUTO: 10.5 FL (ref 9.2–12.9)
POTASSIUM SERPL-SCNC: 3.6 MMOL/L (ref 3.5–5.1)
PROT SERPL-MCNC: 6.3 G/DL (ref 6–8.4)
RBC # BLD AUTO: 3.17 M/UL (ref 4.6–6.2)
SODIUM SERPL-SCNC: 143 MMOL/L (ref 136–145)
WBC # BLD AUTO: 3.29 K/UL (ref 3.9–12.7)

## 2022-09-05 PROCEDURE — 27000207 HC ISOLATION

## 2022-09-05 PROCEDURE — 85025 COMPLETE CBC W/AUTO DIFF WBC: CPT | Performed by: INTERNAL MEDICINE

## 2022-09-05 PROCEDURE — 25000003 PHARM REV CODE 250: Performed by: STUDENT IN AN ORGANIZED HEALTH CARE EDUCATION/TRAINING PROGRAM

## 2022-09-05 PROCEDURE — 63600175 PHARM REV CODE 636 W HCPCS: Performed by: STUDENT IN AN ORGANIZED HEALTH CARE EDUCATION/TRAINING PROGRAM

## 2022-09-05 PROCEDURE — 94761 N-INVAS EAR/PLS OXIMETRY MLT: CPT

## 2022-09-05 PROCEDURE — 99900031 HC PATIENT EDUCATION (STAT)

## 2022-09-05 PROCEDURE — 94664 DEMO&/EVAL PT USE INHALER: CPT

## 2022-09-05 PROCEDURE — 80053 COMPREHEN METABOLIC PANEL: CPT | Performed by: INTERNAL MEDICINE

## 2022-09-05 PROCEDURE — 25000003 PHARM REV CODE 250: Performed by: INTERNAL MEDICINE

## 2022-09-05 PROCEDURE — 11000001 HC ACUTE MED/SURG PRIVATE ROOM

## 2022-09-05 PROCEDURE — 99900035 HC TECH TIME PER 15 MIN (STAT)

## 2022-09-05 PROCEDURE — 27000221 HC OXYGEN, UP TO 24 HOURS

## 2022-09-05 PROCEDURE — 36415 COLL VENOUS BLD VENIPUNCTURE: CPT | Performed by: INTERNAL MEDICINE

## 2022-09-05 RX ADMIN — QUETIAPINE FUMARATE 50 MG: 50 TABLET ORAL at 09:09

## 2022-09-05 RX ADMIN — MUPIROCIN: 20 OINTMENT TOPICAL at 09:09

## 2022-09-05 RX ADMIN — APIXABAN 5 MG: 5 TABLET, FILM COATED ORAL at 09:09

## 2022-09-05 RX ADMIN — OSELTAMIVIR PHOSPHATE 75 MG: 75 CAPSULE ORAL at 09:09

## 2022-09-05 RX ADMIN — FUROSEMIDE 40 MG: 10 INJECTION, SOLUTION INTRAMUSCULAR; INTRAVENOUS at 12:09

## 2022-09-05 RX ADMIN — CEFTRIAXONE 1 G: 1 INJECTION, SOLUTION INTRAVENOUS at 01:09

## 2022-09-05 RX ADMIN — FUROSEMIDE 40 MG: 10 INJECTION, SOLUTION INTRAMUSCULAR; INTRAVENOUS at 11:09

## 2022-09-05 RX ADMIN — ATORVASTATIN CALCIUM 20 MG: 20 TABLET, FILM COATED ORAL at 09:09

## 2022-09-05 RX ADMIN — ASPIRIN 81 MG: 81 TABLET, COATED ORAL at 09:09

## 2022-09-05 RX ADMIN — AZITHROMYCIN MONOHYDRATE 500 MG: 500 INJECTION, POWDER, LYOPHILIZED, FOR SOLUTION INTRAVENOUS at 01:09

## 2022-09-05 RX ADMIN — FAMOTIDINE 20 MG: 20 TABLET ORAL at 09:09

## 2022-09-05 RX ADMIN — Medication 6 MG: at 09:09

## 2022-09-05 NOTE — SUBJECTIVE & OBJECTIVE
Past Medical History:   Diagnosis Date    A-fib     Abdominal pain, epigastric     Abdominal pain, generalized     Acute on chronic congestive heart failure 12/29/2021    Anticoagulant long-term use     Arthritis     Asteatotic eczema     Benign essential HTN     Benign prostatic hyperplasia     Brain tumor     Cardiovascular accident     Carotid artery stenosis     CHF (congestive heart failure)     CKD (chronic kidney disease), stage IV     Constipation     COPD (chronic obstructive pulmonary disease)     Depressed     DM (diabetes mellitus), secondary     Drug eruption     Eczema     Edema     Encounter for blood transfusion     Fever     H. pylori infection     Hearing loss     History of tobacco use     Hypercholesterolemia     Hypertensive renal disease, benign     Hypokalemia     Hyponatremia     Impaired fasting glucose     Lumbar pain     Lung mass     Malaise and fatigue     Malignant neoplasm of prostate     Meningioma     Mixed hyperlipidemia     Nasal bleeding     Neuropathy     Obesity, unspecified     SHAYNE (obstructive sleep apnea)     Osteoarthritis of knee     Osteoarthritis of multiple joints     Peripheral vascular disease, unspecified     Psychosis     Renal disorder     Seizure disorder     Seizures     Sleep apnea     Unspecified cataract     Unspecified chronic bronchitis     Unspecified osteoarthritis, unspecified site     Vertigo     Vitamin D deficiency     Wheezing        Past Surgical History:   Procedure Laterality Date    APPENDECTOMY      COLONOSCOPY      CORONARY ARTERY BYPASS GRAFT      FOOT SURGERY Left     HIP REPLACEMENT ARTHROPLASTY Right     HIP SURGERY      JOINT REPLACEMENT      KNEE SURGERY      LUNG BIOPSY Right 03/03/2022    benign    SHOULDER SURGERY Left     SHOULDER SURGERY      TOTAL KNEE ARTHROPLASTY Right     triple bypass         Review of patient's allergies indicates:   Allergen Reactions    Zolpidem        No current facility-administered medications on file prior  to encounter.     Current Outpatient Medications on File Prior to Encounter   Medication Sig    albuterol (PROVENTIL/VENTOLIN HFA) 90 mcg/actuation inhaler Inhale 1-2 puffs into the lungs every 6 (six) hours as needed for Wheezing or Shortness of Breath. Rescue    albuterol-ipratropium (DUO-NEB) 2.5 mg-0.5 mg/3 mL nebulizer solution inhale 3 milliliters by nebulization route 4 times per day and as needed, up to 6 doses per day for 30 days    apixaban (ELIQUIS) 5 mg Tab Take 5 mg by mouth 2 (two) times daily.    aspirin (ECOTRIN) 81 MG EC tablet Take 81 mg by mouth once daily.    atorvastatin (LIPITOR) 20 MG tablet Take 1 tablet (20 mg total) by mouth once daily.    multivitamin capsule Take 1 capsule by mouth once daily.    OXcarbazepine (TRILEPTAL) 300 MG Tab Take 1 tablet (300 mg total) by mouth 2 (two) times daily.    senna (SENOKOT) 8.6 mg tablet 2 tablets at bedtime as needed    tamsulosin (FLOMAX) 0.4 mg Cap Take 0.4 mg by mouth once daily.    torsemide (DEMADEX) 20 MG Tab Take 1 tablet (20 mg total) by mouth daily as needed (weight gain greater than 5 lbs).    vitamin D (VITAMIN D3) 1000 units Tab Take 1,000 Units by mouth once daily.    [DISCONTINUED] calcium carbonate (OS-DAVID) 600 mg calcium (1,500 mg) Tab Take 600 mg by mouth once.    [DISCONTINUED] carvediloL (COREG) 6.25 MG tablet TAKE ONE TABLET BY MOUTH TWICE DAILY (EVERY 12 HOURS) FOR BLOOD PRESSURE. THANKS !!    [DISCONTINUED] ezetimibe (ZETIA) 10 mg tablet Take 10 mg by mouth once daily.    [DISCONTINUED] FLUoxetine 20 MG capsule     [DISCONTINUED] gabapentin (NEURONTIN) 300 MG capsule 1 capsule    [DISCONTINUED] miconazole NITRATE 2 % (MICOTIN) 2 % top powder Apply topically as needed for Itching.    [DISCONTINUED] risperiDONE (RISPERDAL) 0.5 MG Tab Take 1 tablet (0.5 mg total) by mouth nightly as needed (agitation and halluctions).     Family History       Problem Relation (Age of Onset)    Arthritis Mother, Father    Asthma Sister    Heart  disease Father    Hyperlipidemia Mother, Sister    Hypertension Mother, Father, Sister, Brother          Tobacco Use    Smoking status: Every Day    Smokeless tobacco: Never    Tobacco comments:     quit 30 years ago   Substance and Sexual Activity    Alcohol use: Not Currently    Drug use: Never    Sexual activity: Not Currently     Review of Systems   Constitutional:  Positive for activity change and fatigue. Negative for appetite change, chills, diaphoresis and fever.   Eyes:  Negative for pain, discharge and redness.   Respiratory:  Positive for cough and shortness of breath. Negative for stridor.    Cardiovascular:  Positive for leg swelling. Negative for chest pain and palpitations.   Gastrointestinal:  Negative for abdominal pain, blood in stool, constipation, diarrhea and nausea.   Genitourinary:  Negative for dysuria.   Musculoskeletal:  Positive for gait problem (baseline requires walker). Negative for arthralgias and back pain.   Skin:  Negative for color change, pallor, rash and wound.   Neurological:  Negative for dizziness, light-headedness and headaches.   Psychiatric/Behavioral:  Negative for agitation, behavioral problems and confusion.    Objective:     Vital Signs (Most Recent):  Temp: 98 °F (36.7 °C) (09/05/22 1138)  Pulse: 60 (09/05/22 1138)  Resp: (!) 22 (09/05/22 1138)  BP: (!) 190/76 (09/05/22 1138)  SpO2: 97 % (09/05/22 1138)   Vital Signs (24h Range):  Temp:  [97.7 °F (36.5 °C)-98.3 °F (36.8 °C)] 98 °F (36.7 °C)  Pulse:  [60-72] 60  Resp:  [16-22] 22  SpO2:  [95 %-100 %] 97 %  BP: (139-190)/(62-81) 190/76     Weight: 103 kg (227 lb 1.2 oz)  Body mass index is 33.53 kg/m².    Physical Exam  Constitutional:       General: He is not in acute distress.     Appearance: Normal appearance. He is not ill-appearing or toxic-appearing.   HENT:      Head: Normocephalic.      Right Ear: External ear normal.      Left Ear: External ear normal.      Nose: No congestion or rhinorrhea.      Mouth/Throat:       Mouth: Mucous membranes are dry.      Pharynx: Oropharynx is clear.   Eyes:      Extraocular Movements: Extraocular movements intact.      Conjunctiva/sclera: Conjunctivae normal.   Cardiovascular:      Rate and Rhythm: Bradycardia present. Rhythm irregular.      Heart sounds: No murmur heard.  Pulmonary:      Effort: No respiratory distress.      Breath sounds: Rhonchi and rales present.      Comments: Reduced breath sounds to lung bases bilaterally  Chest:      Chest wall: No tenderness.   Abdominal:      General: Abdomen is flat. There is no distension.      Palpations: Abdomen is soft.      Tenderness: There is no abdominal tenderness. There is no right CVA tenderness, left CVA tenderness or guarding.   Musculoskeletal:         General: Swelling (lower extremities bilaterally +3 pitting) present. No tenderness.      Cervical back: Normal range of motion and neck supple. No rigidity or tenderness.      Right lower leg: Edema present.      Left lower leg: Edema present.   Lymphadenopathy:      Cervical: No cervical adenopathy.   Skin:     General: Skin is warm and dry.   Neurological:      Mental Status: He is alert and oriented to person, place, and time. Mental status is at baseline.      Motor: No weakness.   Psychiatric:         Mood and Affect: Mood normal.         Behavior: Behavior normal.         Thought Content: Thought content normal.         Judgment: Judgment normal.           Significant Labs: All pertinent labs within the past 24 hours have been reviewed.  A1C: No results for input(s): HGBA1C in the last 4320 hours.  ABGs: No results for input(s): PH, PCO2, HCO3, POCSATURATED, BE, TOTALHB, COHB, METHB, O2HB, POCFIO2, PO2 in the last 48 hours.  Blood Culture: No results for input(s): LABBLOO in the last 48 hours.    BMP:   Recent Labs   Lab 09/05/22  0633   GLU 86      K 3.6      CO2 29   BUN 26*   CREATININE 1.4   CALCIUM 8.7       CBC:   Recent Labs   Lab 09/04/22  1231  09/05/22  0633   WBC 3.75* 3.29*   HGB 8.0* 7.9*   HCT 26.7* 26.1*    160       CMP:   Recent Labs   Lab 09/04/22  1231 09/05/22  0633    143   K 4.2 3.6    108   CO2 28 29   * 86   BUN 33* 26*   CREATININE 1.7* 1.4   CALCIUM 8.7 8.7   PROT 6.5 6.3   ALBUMIN 2.5* 2.5*   BILITOT 0.2 0.3   ALKPHOS 53* 48*   AST 21 19   ALT 18 16   ANIONGAP 5* 6*       No results for input(s): LACTATE in the last 48 hours.    No results for input(s): COLORU, APPEARANCEUA, PHUR, SPECGRAV, PROTEINUA, GLUCUA, KETONESU, BILIRUBINUA, OCCULTUA, NITRITE, UROBILINOGEN, LEUKOCYTESUR, RBCUA, WBCUA, BACTERIA, SQUAMEPITHEL, HYALINECASTS in the last 48 hours.    Invalid input(s): WRIGHTSUR    X-Ray Chest 1 View  Narrative: EXAMINATION:  XR CHEST 1 VIEW    CLINICAL HISTORY:  Shortness of breath    TECHNIQUE:  portable chest x-ray    COMPARISON:  08/20/2022.    FINDINGS:  There is a new patchy infiltrate left hilar region and right lower lobe consistent with pneumonia.  Impression: New significant right lower lobe infiltrate consistent with pneumonia and a left hilar infiltrates suggesting pneumonia recommend follow-up    Electronically signed by: Anita Romero MD  Date:    09/02/2022  Time:    11:04   Significant Imaging: I have reviewed all pertinent imaging results/findings within the past 24 hours.

## 2022-09-05 NOTE — PROGRESS NOTES
[facility] Medicine  Progress Note    Patient Name: Jamey Lie  MRN: [id number]  Patient Class: IP- Inpatient   Admission Date: 9/2/2022  Length of Stay: 3 days  Attending Physician: Lia Bagley DO  Primary Care Provider: Navi Domínguez III, MD        Subjective:     Principal Problem:SOB (shortness of breath)        HPI:  81 year old male with hypertension, hypoerlipidemia, CHF, permanent atrial fibriallation on Eliquis, CAD s/p CABGx3, CKD stage 3, recent lung and brain mass findings presents to ED with worsening shortness of breath that started about a week ago with bilateral leg swelling with difficulty ambulating with his walker.     Patient hard of hearing endorsed since treatment in the ED, he has been feeling better. At home he does not use oxygen, breathing at SpO2 >95% on continuous O2 2L via nasal cannula on exam. There is no change in appetite, nausea, vomiting, diarrhea, constipation. Denies headaches, fever, chills, vision changes, chest pain, abdominal pain. Denies joint pain and loss of sensation in toes and foot bilaterally with increasing leg swelling.    ED course:  Tachypnea rate >30, CXR right lower lobe pneumonia, positive influenza A and COVID19 (fully vaccinated), NTproBNT >6000, lactate <0.2 x2, blood cultures x2 pending,  BUN/Cr 33/1.8 with improved eGFR from last hospitalization and discharge one week ago. Patient started on IV lasix 80 mg, responding well with clear urine collecting in Sanders catheter and IV antibiotics empirically started for right lobe pneumonia.       Overview/Hospital Course:  9/3/22 CG:  Says it feels much better and does complain of a cough.  He has had to go to the bathroom and is waiting for help and assistance getting up from the bed to go to the restroom.  No other acute overnight events.  9/4/22 CG: Doing better, still has a cough and feels weak.   9/5/22 CG:  Became more agitated last night and was trying to rip out his Sanders  catheter.  Had to be given Seroquel to help him sleep and to calm him down.      Past Medical History:   Diagnosis Date    A-fib     Abdominal pain, epigastric     Abdominal pain, generalized     Acute on chronic congestive heart failure 12/29/2021    Anticoagulant long-term use     Arthritis     Asteatotic eczema     Benign essential HTN     Benign prostatic hyperplasia     Brain tumor     Cardiovascular accident     Carotid artery stenosis     CHF (congestive heart failure)     CKD (chronic kidney disease), stage IV     Constipation     COPD (chronic obstructive pulmonary disease)     Depressed     DM (diabetes mellitus), secondary     Drug eruption     Eczema     Edema     Encounter for blood transfusion     Fever     H. pylori infection     Hearing loss     History of tobacco use     Hypercholesterolemia     Hypertensive renal disease, benign     Hypokalemia     Hyponatremia     Impaired fasting glucose     Lumbar pain     Lung mass     Malaise and fatigue     Malignant neoplasm of prostate     Meningioma     Mixed hyperlipidemia     Nasal bleeding     Neuropathy     Obesity, unspecified     SHAYNE (obstructive sleep apnea)     Osteoarthritis of knee     Osteoarthritis of multiple joints     Peripheral vascular disease, unspecified     Psychosis     Renal disorder     Seizure disorder     Seizures     Sleep apnea     Unspecified cataract     Unspecified chronic bronchitis     Unspecified osteoarthritis, unspecified site     Vertigo     Vitamin D deficiency     Wheezing        Past Surgical History:   Procedure Laterality Date    APPENDECTOMY      COLONOSCOPY      CORONARY ARTERY BYPASS GRAFT      FOOT SURGERY Left     HIP REPLACEMENT ARTHROPLASTY Right     HIP SURGERY      JOINT REPLACEMENT      KNEE SURGERY      LUNG BIOPSY Right 03/03/2022    benign    SHOULDER SURGERY Left     SHOULDER SURGERY      TOTAL KNEE ARTHROPLASTY Right     triple bypass          Review of patient's allergies indicates:   Allergen Reactions    Zolpidem        No current facility-administered medications on file prior to encounter.     Current Outpatient Medications on File Prior to Encounter   Medication Sig    albuterol (PROVENTIL/VENTOLIN HFA) 90 mcg/actuation inhaler Inhale 1-2 puffs into the lungs every 6 (six) hours as needed for Wheezing or Shortness of Breath. Rescue    albuterol-ipratropium (DUO-NEB) 2.5 mg-0.5 mg/3 mL nebulizer solution inhale 3 milliliters by nebulization route 4 times per day and as needed, up to 6 doses per day for 30 days    apixaban (ELIQUIS) 5 mg Tab Take 5 mg by mouth 2 (two) times daily.    aspirin (ECOTRIN) 81 MG EC tablet Take 81 mg by mouth once daily.    atorvastatin (LIPITOR) 20 MG tablet Take 1 tablet (20 mg total) by mouth once daily.    multivitamin capsule Take 1 capsule by mouth once daily.    OXcarbazepine (TRILEPTAL) 300 MG Tab Take 1 tablet (300 mg total) by mouth 2 (two) times daily.    senna (SENOKOT) 8.6 mg tablet 2 tablets at bedtime as needed    tamsulosin (FLOMAX) 0.4 mg Cap Take 0.4 mg by mouth once daily.    torsemide (DEMADEX) 20 MG Tab Take 1 tablet (20 mg total) by mouth daily as needed (weight gain greater than 5 lbs).    vitamin D (VITAMIN D3) 1000 units Tab Take 1,000 Units by mouth once daily.    [DISCONTINUED] calcium carbonate (OS-DAVID) 600 mg calcium (1,500 mg) Tab Take 600 mg by mouth once.    [DISCONTINUED] carvediloL (COREG) 6.25 MG tablet TAKE ONE TABLET BY MOUTH TWICE DAILY (EVERY 12 HOURS) FOR BLOOD PRESSURE. THANKS !!    [DISCONTINUED] ezetimibe (ZETIA) 10 mg tablet Take 10 mg by mouth once daily.    [DISCONTINUED] FLUoxetine 20 MG capsule     [DISCONTINUED] gabapentin (NEURONTIN) 300 MG capsule 1 capsule    [DISCONTINUED] miconazole NITRATE 2 % (MICOTIN) 2 % top powder Apply topically as needed for Itching.    [DISCONTINUED] risperiDONE (RISPERDAL) 0.5 MG Tab Take 1 tablet (0.5 mg total) by  mouth nightly as needed (agitation and halluctions).     Family History       Problem Relation (Age of Onset)    Arthritis Mother, Father    Asthma Sister    Heart disease Father    Hyperlipidemia Mother, Sister    Hypertension Mother, Father, Sister, Brother          Tobacco Use    Smoking status: Every Day    Smokeless tobacco: Never    Tobacco comments:     quit 30 years ago   Substance and Sexual Activity    Alcohol use: Not Currently    Drug use: Never    Sexual activity: Not Currently     Review of Systems   Constitutional:  Positive for activity change and fatigue. Negative for appetite change, chills, diaphoresis and fever.   Eyes:  Negative for pain, discharge and redness.   Respiratory:  Positive for cough and shortness of breath. Negative for stridor.    Cardiovascular:  Positive for leg swelling. Negative for chest pain and palpitations.   Gastrointestinal:  Negative for abdominal pain, blood in stool, constipation, diarrhea and nausea.   Genitourinary:  Negative for dysuria.   Musculoskeletal:  Positive for gait problem (baseline requires walker). Negative for arthralgias and back pain.   Skin:  Negative for color change, pallor, rash and wound.   Neurological:  Negative for dizziness, light-headedness and headaches.   Psychiatric/Behavioral:  Negative for agitation, behavioral problems and confusion.    Objective:     Vital Signs (Most Recent):  Temp: 98 °F (36.7 °C) (09/05/22 1138)  Pulse: 60 (09/05/22 1138)  Resp: (!) 22 (09/05/22 1138)  BP: (!) 190/76 (09/05/22 1138)  SpO2: 97 % (09/05/22 1138)   Vital Signs (24h Range):  Temp:  [97.7 °F (36.5 °C)-98.3 °F (36.8 °C)] 98 °F (36.7 °C)  Pulse:  [60-72] 60  Resp:  [16-22] 22  SpO2:  [95 %-100 %] 97 %  BP: (139-190)/(62-81) 190/76     Weight: 103 kg (227 lb 1.2 oz)  Body mass index is 33.53 kg/m².    Physical Exam  Constitutional:       General: He is not in acute distress.     Appearance: Normal appearance. He is not ill-appearing or  toxic-appearing.   HENT:      Head: Normocephalic.      Right Ear: External ear normal.      Left Ear: External ear normal.      Nose: No congestion or rhinorrhea.      Mouth/Throat:      Mouth: Mucous membranes are dry.      Pharynx: Oropharynx is clear.   Eyes:      Extraocular Movements: Extraocular movements intact.      Conjunctiva/sclera: Conjunctivae normal.   Cardiovascular:      Rate and Rhythm: Bradycardia present. Rhythm irregular.      Heart sounds: No murmur heard.  Pulmonary:      Effort: No respiratory distress.      Breath sounds: Rhonchi and rales present.      Comments: Reduced breath sounds to lung bases bilaterally  Chest:      Chest wall: No tenderness.   Abdominal:      General: Abdomen is flat. There is no distension.      Palpations: Abdomen is soft.      Tenderness: There is no abdominal tenderness. There is no right CVA tenderness, left CVA tenderness or guarding.   Musculoskeletal:         General: Swelling (lower extremities bilaterally +3 pitting) present. No tenderness.      Cervical back: Normal range of motion and neck supple. No rigidity or tenderness.      Right lower leg: Edema present.      Left lower leg: Edema present.   Lymphadenopathy:      Cervical: No cervical adenopathy.   Skin:     General: Skin is warm and dry.   Neurological:      Mental Status: He is alert and oriented to person, place, and time. Mental status is at baseline.      Motor: No weakness.   Psychiatric:         Mood and Affect: Mood normal.         Behavior: Behavior normal.         Thought Content: Thought content normal.         Judgment: Judgment normal.           Significant Labs: All pertinent labs within the past 24 hours have been reviewed.  A1C: No results for input(s): HGBA1C in the last 4320 hours.  ABGs: No results for input(s): PH, PCO2, HCO3, POCSATURATED, BE, TOTALHB, COHB, METHB, O2HB, POCFIO2, PO2 in the last 48 hours.  Blood Culture: No results for input(s): LABBLOO in the last 48  hours.    BMP:   Recent Labs   Lab 09/05/22  0633   GLU 86      K 3.6      CO2 29   BUN 26*   CREATININE 1.4   CALCIUM 8.7       CBC:   Recent Labs   Lab 09/04/22  1231 09/05/22  0633   WBC 3.75* 3.29*   HGB 8.0* 7.9*   HCT 26.7* 26.1*    160       CMP:   Recent Labs   Lab 09/04/22  1231 09/05/22  0633    143   K 4.2 3.6    108   CO2 28 29   * 86   BUN 33* 26*   CREATININE 1.7* 1.4   CALCIUM 8.7 8.7   PROT 6.5 6.3   ALBUMIN 2.5* 2.5*   BILITOT 0.2 0.3   ALKPHOS 53* 48*   AST 21 19   ALT 18 16   ANIONGAP 5* 6*       No results for input(s): LACTATE in the last 48 hours.    No results for input(s): COLORU, APPEARANCEUA, PHUR, SPECGRAV, PROTEINUA, GLUCUA, KETONESU, BILIRUBINUA, OCCULTUA, NITRITE, UROBILINOGEN, LEUKOCYTESUR, RBCUA, WBCUA, BACTERIA, SQUAMEPITHEL, HYALINECASTS in the last 48 hours.    Invalid input(s): WRIGHTSUR    X-Ray Chest 1 View  Narrative: EXAMINATION:  XR CHEST 1 VIEW    CLINICAL HISTORY:  Shortness of breath    TECHNIQUE:  portable chest x-ray    COMPARISON:  08/20/2022.    FINDINGS:  There is a new patchy infiltrate left hilar region and right lower lobe consistent with pneumonia.  Impression: New significant right lower lobe infiltrate consistent with pneumonia and a left hilar infiltrates suggesting pneumonia recommend follow-up    Electronically signed by: Anita Romero MD  Date:    09/02/2022  Time:    11:04   Significant Imaging: I have reviewed all pertinent imaging results/findings within the past 24 hours.      Assessment/Plan:      * SOB (shortness of breath)  Multifactorial, cardiopulmonary (CHF, atrial fibrillation, COPD) and infectious processes (COVID19 and fluA).  SpO2 maintained >95% on 2L NC at this time.  - see above management.        COVID-19  Patient is identified as mild moderate severity.   Initiate standard COVID protocols; COVID-19 testing ,Infection Control notification  and isolation- respiratory, contact and droplet per  protocol    Diagnostics: (leukopenia, hyponatremia, hyperferritinemia, elevated troponin, elevated d-dimer, age, and comorbidities are significant predictors of poor clinical outcome)  CBC, CMP and Portable CXR    Management: Initiate targeted therapy with possible use of Paxlovid. Maintain oxygen saturations 92-96% via Nasal Cannula  LPM and monitor with continuous/intermittent pulse oximetry. Inhaled bronchodilators as needed for shortness of breath., Continuous cardiac monitoring. and Manage respiratory failure (O2 requirement >10LPM or needing NIPPV/Mechanical ventilation) and/or Pneumonia (active chest infiltrates) separately as described below.  Empirically started on IV antibiotics for right lower lobe pneumonia on chest xray.   Follow up blood cultures and adjust antibiotics accordingly.     Influenza  Positive flu A. Continue tamiflu. Supplemental oxygen as needed to maintain >94%.      Stage 3 chronic kidney disease  Stable BUN/Cr 33/1.8 likely at new baseline with recovering eGFR of >40 from <20 5 weeks ago. Continue to monitor as patient on IV diuretics.       A-fib  Patient with Permanent atrial fibrillation which is controlled currently with No rate controlling home medications at this time. . Patient is currently in atrial fibrillation.[id number] Score: 4. HASBLED Score: Unable to calculate. Anticoagulation indicated. Anticoagulation done with eliquis 5 mg BID.    Hemodynamically stable at this time. Will continue with home regimen Eliquis BID.   - continuous telemetry  - f/u primary cardiologist consult    Localized edema  Home med diuretic torsemide 20 mg PRN. +3 pitting on exam. Patient responding to IV lasix 80 mg in ED.   Continue with IV diuretics and taper with progress, patient endorses breathing a little better.   - IV lasix 40 mg BID  - restrict PO intake to 1.5 L fluid ounces  - monitor I/Os      Pneumonia  CXR findings with new right lower lobe pneumonia. Bilateral infiltrates likely  secondary to pulmonary edema from infectious.  - supplement oxygen as needed SpO2 >92-94%  - daily pulmonary hygiene  - continue coverage for community acquired PNA, azithrmoycin and rocephin  - f/u blood cultures x2 pending  - trend CBC        Weakness  Encourage ambulation to baseline with assistance. Denies appetite decline. Add supportive nutrition.   - f/u PT/OT eval and treatment        VTE Risk Mitigation (From admission, onward)         Ordered     apixaban tablet 5 mg  2 times daily         09/02/22 1719     IP VTE HIGH RISK PATIENT  Once         09/02/22 1513     Place sequential compression device  Until discontinued         09/02/22 1513                Discharge Planning   GLORY:      Code Status: Full Code   Is the patient medically ready for discharge?:     Reason for patient still in hospital (select all that apply): Treatment  Discharge Plan A: Home with family, Home Health                  Eddie Newton DO  Department of Hospital Medicine   [facility] Surg

## 2022-09-05 NOTE — NURSING
Restraints removed this am, patient sat on side of bed to eat then back to bed, alert, very thank for what was being done for him, order remains just in case they are need, spoke with caregiver/ex wife Aleyda

## 2022-09-05 NOTE — PLAN OF CARE
09/05/22 1259   Medicare Message   Important Message from Medicare regarding Discharge Appeal Rights Explained to patient/caregiver;Other (comments)  (Reached out to son per phone. patient not answering phone)   Date IMM was signed 09/05/22   Time IMM was signed 1300   Covid isolation maintained.  Reached out tp patient per phone,  no answer, Reached out to patient's son, brooke Lei per phone.  Explained Medicare IM appeal rights to son,  He verbalizes understanding, gives permission to document receipt of medicare IM rights.

## 2022-09-05 NOTE — NURSING
"Pt called his ex wife Aleyda and son Rachid  stating "I am locked out of my house and no one will let me in." The pt has been confused and not acting like himself all day. Notified on call provider, new orders noted per Dr. Newton Seroquel 50 mg BID, give one now. RBVTO   "

## 2022-09-05 NOTE — PLAN OF CARE
Care plan reviewed with patient on isolation, oxygen and infection and isolation with verbal response

## 2022-09-05 NOTE — NURSING
"1430  UPON ENTERING PATIENT'S ROOM, PATIENT FOUND SITTING ON THE SOFA WITH BLOOD AND STOOL ON THE PATIENT, THE FLOOR THE BED AND THE AIR PURIFIER ALSO HAD STOOL ON IT AS WELL.  ALSO, PATIENT HAD PULLED HIS IV OUT WITH CATHETER INTACT.  PATIENT STATES, "I WAS STUCK"  PATIENT'S OREILLY CATHETER WAS INDEED STUCK BETWEEN THE BED RAIL AND THE BED ITSELF.  THE OREILLY BAG HAD COMPLETELY CAME APART, BUT THE CATHETER WAS STILL INSERTED.  PATIENT WAS BLEEDING FROM HIS PENIS FROM THE TRAUMA OF PULLING ON THE CATHETER.   IRRIGATED THE OREILLY WITH 120 ML STERILE WATER, BUT NOTHING WOULD RETURN.  REMOVED THE OREILLY CATHETER AND RE-INSERTED NEW 16 FR. OREILLY CATHETER PER ASEPTIC TECHNIQUE AND A BLOOD CLOT RELEASED A FLOW OF BLOODY URINE APPROX. 600 ML OF BLOODY URINE INTO G. U. BAG.  PATIENT TOLERATED PROCEDURE WELL.   CONTINUE TO MONITOR.  PATIENT NOW HAS A VIDEO CAMERA TO MONITOR HIS ACTIONS.   ZEHRA ARRIAGA, PRASANTH  "

## 2022-09-06 LAB
AC: 16
ALBUMIN SERPL BCP-MCNC: 2.5 G/DL (ref 3.5–5.2)
ALP SERPL-CCNC: 50 U/L (ref 55–135)
ALT SERPL W/O P-5'-P-CCNC: 16 U/L (ref 10–44)
ANION GAP SERPL CALC-SCNC: 5 MMOL/L (ref 8–16)
AST SERPL-CCNC: 16 U/L (ref 10–40)
BASOPHILS # BLD AUTO: 0.01 K/UL (ref 0–0.2)
BASOPHILS NFR BLD: 0.2 % (ref 0–1.9)
BILIRUB SERPL-MCNC: 0.3 MG/DL (ref 0.1–1)
BUN SERPL-MCNC: 30 MG/DL (ref 8–23)
CALCIUM SERPL-MCNC: 8.5 MG/DL (ref 8.7–10.5)
CHLORIDE SERPL-SCNC: 110 MMOL/L (ref 95–110)
CO2 SERPL-SCNC: 27 MMOL/L (ref 23–29)
CORRECTED TEMPERATURE (PCO2): 40.9 MMHG
CORRECTED TEMPERATURE (PH): 7.4
CORRECTED TEMPERATURE (PO2): 87.1 MMHG
CREAT SERPL-MCNC: 1.9 MG/DL (ref 0.5–1.4)
DIFFERENTIAL METHOD: ABNORMAL
EOSINOPHIL # BLD AUTO: 0.1 K/UL (ref 0–0.5)
EOSINOPHIL NFR BLD: 1.5 % (ref 0–8)
ERYTHROCYTE [DISTWIDTH] IN BLOOD BY AUTOMATED COUNT: 17.4 % (ref 11.5–14.5)
EST. GFR  (NO RACE VARIABLE): 35.4 ML/MIN/1.73 M^2
FIO2: 40 %
GLUCOSE SERPL-MCNC: 157 MG/DL (ref 70–110)
HCT VFR BLD AUTO: 25.5 % (ref 40–54)
HGB BLD-MCNC: 7.6 G/DL (ref 14–18)
IMM GRANULOCYTES # BLD AUTO: 0.01 K/UL (ref 0–0.04)
IMM GRANULOCYTES NFR BLD AUTO: 0.2 % (ref 0–0.5)
LYMPHOCYTES # BLD AUTO: 0.6 K/UL (ref 1–4.8)
LYMPHOCYTES NFR BLD: 12.4 % (ref 18–48)
MCH RBC QN AUTO: 24.4 PG (ref 27–31)
MCHC RBC AUTO-ENTMCNC: 29.8 G/DL (ref 32–36)
MCV RBC AUTO: 82 FL (ref 82–98)
MODIFIED ALLEN'S TEST: NORMAL
MONOCYTES # BLD AUTO: 0.5 K/UL (ref 0.3–1)
MONOCYTES NFR BLD: 9.8 % (ref 4–15)
NEUTROPHILS # BLD AUTO: 3.5 K/UL (ref 1.8–7.7)
NEUTROPHILS NFR BLD: 75.9 % (ref 38–73)
NOTIFIED BY: NORMAL
NRBC BLD-RTO: 0 /100 WBC
O2DEVICE: NORMAL
PCO2 BLDA: 40.9 MMHG (ref 35–45)
PEEP: 5
PH SMN: 7.4 [PH] (ref 7.34–7.45)
PLATELET # BLD AUTO: 177 K/UL (ref 150–450)
PMV BLD AUTO: 10.4 FL (ref 9.2–12.9)
PO2 BLDA: 87.1 MMHG (ref 80–100)
POC BASE DEFICIT: 0.7 MMOL/L
POC HCO3: 25 MMOL/L
POC NOTIFIED NOTE: NORMAL
POC PERFORMED BY: NORMAL
POC SATURATED O2: 96.7 %
POC TCO2: 54.6 ML/DL
POC TEMPERATURE: 37 C
POTASSIUM SERPL-SCNC: 3.2 MMOL/L (ref 3.5–5.1)
PROT SERPL-MCNC: 6.3 G/DL (ref 6–8.4)
PROVIDER NOTIFIED: NORMAL
RBC # BLD AUTO: 3.11 M/UL (ref 4.6–6.2)
SODIUM SERPL-SCNC: 142 MMOL/L (ref 136–145)
SPECIMEN SOURCE: NORMAL
VT: 500
WBC # BLD AUTO: 4.61 K/UL (ref 3.9–12.7)

## 2022-09-06 PROCEDURE — 93010 EKG 12-LEAD: ICD-10-PCS | Mod: ,,, | Performed by: INTERNAL MEDICINE

## 2022-09-06 PROCEDURE — 27100108

## 2022-09-06 PROCEDURE — 36415 COLL VENOUS BLD VENIPUNCTURE: CPT | Performed by: INTERNAL MEDICINE

## 2022-09-06 PROCEDURE — 25000003 PHARM REV CODE 250: Performed by: STUDENT IN AN ORGANIZED HEALTH CARE EDUCATION/TRAINING PROGRAM

## 2022-09-06 PROCEDURE — 63600175 PHARM REV CODE 636 W HCPCS: Performed by: INTERNAL MEDICINE

## 2022-09-06 PROCEDURE — 25000003 PHARM REV CODE 250: Performed by: INTERNAL MEDICINE

## 2022-09-06 PROCEDURE — 27200966 HC CLOSED SUCTION SYSTEM

## 2022-09-06 PROCEDURE — 94761 N-INVAS EAR/PLS OXIMETRY MLT: CPT

## 2022-09-06 PROCEDURE — 94002 VENT MGMT INPAT INIT DAY: CPT

## 2022-09-06 PROCEDURE — 94640 AIRWAY INHALATION TREATMENT: CPT

## 2022-09-06 PROCEDURE — 27000221 HC OXYGEN, UP TO 24 HOURS

## 2022-09-06 PROCEDURE — 80053 COMPREHEN METABOLIC PANEL: CPT | Performed by: INTERNAL MEDICINE

## 2022-09-06 PROCEDURE — 27100080 HC AIRWAY ADAPTER-END TIDAL CO2

## 2022-09-06 PROCEDURE — 20000000 HC ICU ROOM

## 2022-09-06 PROCEDURE — 99900026 HC AIRWAY MAINTENANCE (STAT)

## 2022-09-06 PROCEDURE — 93010 ELECTROCARDIOGRAM REPORT: CPT | Mod: ,,, | Performed by: INTERNAL MEDICINE

## 2022-09-06 PROCEDURE — 87205 SMEAR GRAM STAIN: CPT | Performed by: EMERGENCY MEDICINE

## 2022-09-06 PROCEDURE — 99900031 HC PATIENT EDUCATION (STAT)

## 2022-09-06 PROCEDURE — 27000207 HC ISOLATION

## 2022-09-06 PROCEDURE — 99900035 HC TECH TIME PER 15 MIN (STAT)

## 2022-09-06 PROCEDURE — 87070 CULTURE OTHR SPECIMN AEROBIC: CPT | Performed by: EMERGENCY MEDICINE

## 2022-09-06 PROCEDURE — 25000242 PHARM REV CODE 250 ALT 637 W/ HCPCS: Performed by: INTERNAL MEDICINE

## 2022-09-06 PROCEDURE — 93005 ELECTROCARDIOGRAM TRACING: CPT

## 2022-09-06 PROCEDURE — 85025 COMPLETE CBC W/AUTO DIFF WBC: CPT | Performed by: INTERNAL MEDICINE

## 2022-09-06 RX ORDER — OXCARBAZEPINE 300 MG/1
300 TABLET, FILM COATED ORAL 2 TIMES DAILY
Status: DISCONTINUED | OUTPATIENT
Start: 2022-09-06 | End: 2022-09-07

## 2022-09-06 RX ORDER — TAMSULOSIN HYDROCHLORIDE 0.4 MG/1
0.4 CAPSULE ORAL DAILY
Status: DISCONTINUED | OUTPATIENT
Start: 2022-09-06 | End: 2022-09-19 | Stop reason: HOSPADM

## 2022-09-06 RX ORDER — IPRATROPIUM BROMIDE AND ALBUTEROL SULFATE 2.5; .5 MG/3ML; MG/3ML
3 SOLUTION RESPIRATORY (INHALATION) EVERY 6 HOURS
Status: DISCONTINUED | OUTPATIENT
Start: 2022-09-06 | End: 2022-09-16

## 2022-09-06 RX ORDER — ATORVASTATIN CALCIUM 40 MG/1
40 TABLET, FILM COATED ORAL DAILY
Status: DISCONTINUED | OUTPATIENT
Start: 2022-09-06 | End: 2022-09-07

## 2022-09-06 RX ORDER — ACETAMINOPHEN 650 MG/1
SUPPOSITORY RECTAL
Status: DISCONTINUED
Start: 2022-09-06 | End: 2022-09-06 | Stop reason: WASHOUT

## 2022-09-06 RX ORDER — DEXTROSE MONOHYDRATE, SODIUM CHLORIDE, AND POTASSIUM CHLORIDE 50; 1.49; 4.5 G/1000ML; G/1000ML; G/1000ML
INJECTION, SOLUTION INTRAVENOUS CONTINUOUS
Status: DISCONTINUED | OUTPATIENT
Start: 2022-09-06 | End: 2022-09-12

## 2022-09-06 RX ORDER — FAMOTIDINE 20 MG/1
20 TABLET, FILM COATED ORAL DAILY
Status: DISCONTINUED | OUTPATIENT
Start: 2022-09-06 | End: 2022-09-07

## 2022-09-06 RX ORDER — VANCOMYCIN 2 GRAM/500 ML IN 0.9 % SODIUM CHLORIDE INTRAVENOUS
20 ONCE
Status: COMPLETED | OUTPATIENT
Start: 2022-09-06 | End: 2022-09-06

## 2022-09-06 RX ORDER — FAMOTIDINE 20 MG/1
20 TABLET, FILM COATED ORAL DAILY
Status: DISCONTINUED | OUTPATIENT
Start: 2022-09-07 | End: 2022-09-06

## 2022-09-06 RX ORDER — SODIUM CHLORIDE 9 MG/ML
INJECTION, SOLUTION INTRAVENOUS ONCE
Status: COMPLETED | OUTPATIENT
Start: 2022-09-06 | End: 2022-09-06

## 2022-09-06 RX ORDER — PROPOFOL 10 MG/ML
0-50 INJECTION, EMULSION INTRAVENOUS CONTINUOUS
Status: DISCONTINUED | OUTPATIENT
Start: 2022-09-06 | End: 2022-09-14

## 2022-09-06 RX ORDER — RISPERIDONE 0.5 MG/1
0.5 TABLET ORAL 2 TIMES DAILY
Status: DISCONTINUED | OUTPATIENT
Start: 2022-09-06 | End: 2022-09-13

## 2022-09-06 RX ADMIN — TAMSULOSIN HYDROCHLORIDE 0.4 MG: 0.4 CAPSULE ORAL at 10:09

## 2022-09-06 RX ADMIN — PIPERACILLIN AND TAZOBACTAM 4.5 G: 4; .5 INJECTION, POWDER, LYOPHILIZED, FOR SOLUTION INTRAVENOUS; PARENTERAL at 10:09

## 2022-09-06 RX ADMIN — PROPOFOL 5 MCG/KG/MIN: 10 INJECTION, EMULSION INTRAVENOUS at 08:09

## 2022-09-06 RX ADMIN — OSELTAMIVIR PHOSPHATE 75 MG: 75 CAPSULE ORAL at 10:09

## 2022-09-06 RX ADMIN — MUPIROCIN: 20 OINTMENT TOPICAL at 08:09

## 2022-09-06 RX ADMIN — FAMOTIDINE 20 MG: 20 TABLET ORAL at 10:09

## 2022-09-06 RX ADMIN — IPRATROPIUM BROMIDE AND ALBUTEROL SULFATE 3 ML: 2.5; .5 SOLUTION RESPIRATORY (INHALATION) at 07:09

## 2022-09-06 RX ADMIN — PIPERACILLIN AND TAZOBACTAM 4.5 G: 4; .5 INJECTION, POWDER, LYOPHILIZED, FOR SOLUTION INTRAVENOUS; PARENTERAL at 05:09

## 2022-09-06 RX ADMIN — APIXABAN 5 MG: 5 TABLET, FILM COATED ORAL at 08:09

## 2022-09-06 RX ADMIN — ATORVASTATIN CALCIUM 40 MG: 40 TABLET, FILM COATED ORAL at 10:09

## 2022-09-06 RX ADMIN — VANCOMYCIN 2 GRAM/500 ML IN 0.9 % SODIUM CHLORIDE INTRAVENOUS 2000 MG: at 12:09

## 2022-09-06 RX ADMIN — RISPERIDONE 0.5 MG: 0.5 TABLET ORAL at 08:09

## 2022-09-06 RX ADMIN — MUPIROCIN: 20 OINTMENT TOPICAL at 09:09

## 2022-09-06 RX ADMIN — RISPERIDONE 0.5 MG: 0.5 TABLET ORAL at 10:09

## 2022-09-06 RX ADMIN — IPRATROPIUM BROMIDE AND ALBUTEROL SULFATE 3 ML: 2.5; .5 SOLUTION RESPIRATORY (INHALATION) at 01:09

## 2022-09-06 RX ADMIN — ASPIRIN 81 MG: 81 TABLET, COATED ORAL at 10:09

## 2022-09-06 RX ADMIN — OSELTAMIVIR PHOSPHATE 75 MG: 75 CAPSULE ORAL at 08:09

## 2022-09-06 RX ADMIN — DEXTROSE, SODIUM CHLORIDE, AND POTASSIUM CHLORIDE: 5; .45; .15 INJECTION INTRAVENOUS at 07:09

## 2022-09-06 RX ADMIN — OXCARBAZEPINE 300 MG: 300 TABLET, FILM COATED ORAL at 08:09

## 2022-09-06 RX ADMIN — DEXTROSE, SODIUM CHLORIDE, AND POTASSIUM CHLORIDE: 5; .45; .15 INJECTION INTRAVENOUS at 10:09

## 2022-09-06 RX ADMIN — APIXABAN 5 MG: 5 TABLET, FILM COATED ORAL at 10:09

## 2022-09-06 RX ADMIN — OXCARBAZEPINE 300 MG: 300 TABLET, FILM COATED ORAL at 10:09

## 2022-09-06 RX ADMIN — SODIUM CHLORIDE: 0.9 INJECTION, SOLUTION INTRAVENOUS at 06:09

## 2022-09-06 RX ADMIN — PROPOFOL 10 MCG/KG/MIN: 10 INJECTION, EMULSION INTRAVENOUS at 10:09

## 2022-09-06 NOTE — CODE/ RAPID DOCUMENTATION
04:04 pt's HR in the 30's per telemetry tech  04:05 pt unresponsive, not breathing  04:06 Compressions started, called for code blue and crash cart  04:12 pt placed on zoll, compressions held pulse/rhythm check  04:12 Vitals /58, , O2 sat 88%   04:12 code team here  04:14 MD here  04:14 NRB mask placed, SPO2 100%  04:15 preparing to intubate  04:17 pt suctioned, tube inserted 8FR 24@lip per Delaney, RT  04:17 Rm 210 given per [facility] Supervisor  04:18 Called for CXR  04:20 RT bagging continuously  04:21 ED MD left  04:24 To ICU   04:25 In ICU rm 210, transferred pt over to ICU monitors   04:27 Report given to nurse resuming care of pt

## 2022-09-06 NOTE — SUBJECTIVE & OBJECTIVE
Past Medical History:   Diagnosis Date    A-fib     Abdominal pain, epigastric     Abdominal pain, generalized     Acute on chronic congestive heart failure 12/29/2021    Anticoagulant long-term use     Arthritis     Asteatotic eczema     Benign essential HTN     Benign prostatic hyperplasia     Brain tumor     Cardiovascular accident     Carotid artery stenosis     CHF (congestive heart failure)     CKD (chronic kidney disease), stage IV     Constipation     COPD (chronic obstructive pulmonary disease)     Depressed     DM (diabetes mellitus), secondary     Drug eruption     Eczema     Edema     Encounter for blood transfusion     Fever     H. pylori infection     Hearing loss     History of tobacco use     Hypercholesterolemia     Hypertensive renal disease, benign     Hypokalemia     Hyponatremia     Impaired fasting glucose     Lumbar pain     Lung mass     Malaise and fatigue     Malignant neoplasm of prostate     Meningioma     Mixed hyperlipidemia     Nasal bleeding     Neuropathy     Obesity, unspecified     SHAYNE (obstructive sleep apnea)     Osteoarthritis of knee     Osteoarthritis of multiple joints     Peripheral vascular disease, unspecified     Psychosis     Renal disorder     Seizure disorder     Seizures     Sleep apnea     Unspecified cataract     Unspecified chronic bronchitis     Unspecified osteoarthritis, unspecified site     Vertigo     Vitamin D deficiency     Wheezing        Past Surgical History:   Procedure Laterality Date    APPENDECTOMY      COLONOSCOPY      CORONARY ARTERY BYPASS GRAFT      FOOT SURGERY Left     HIP REPLACEMENT ARTHROPLASTY Right     HIP SURGERY      JOINT REPLACEMENT      KNEE SURGERY      LUNG BIOPSY Right 03/03/2022    benign    SHOULDER SURGERY Left     SHOULDER SURGERY      TOTAL KNEE ARTHROPLASTY Right     triple bypass         Review of patient's allergies indicates:   Allergen Reactions    Zolpidem        No current facility-administered medications on file prior  to encounter.     Current Outpatient Medications on File Prior to Encounter   Medication Sig    albuterol (PROVENTIL/VENTOLIN HFA) 90 mcg/actuation inhaler Inhale 1-2 puffs into the lungs every 6 (six) hours as needed for Wheezing or Shortness of Breath. Rescue    albuterol-ipratropium (DUO-NEB) 2.5 mg-0.5 mg/3 mL nebulizer solution inhale 3 milliliters by nebulization route 4 times per day and as needed, up to 6 doses per day for 30 days    apixaban (ELIQUIS) 5 mg Tab Take 5 mg by mouth 2 (two) times daily.    aspirin (ECOTRIN) 81 MG EC tablet Take 81 mg by mouth once daily.    atorvastatin (LIPITOR) 20 MG tablet Take 1 tablet (20 mg total) by mouth once daily.    multivitamin capsule Take 1 capsule by mouth once daily.    OXcarbazepine (TRILEPTAL) 300 MG Tab Take 1 tablet (300 mg total) by mouth 2 (two) times daily.    senna (SENOKOT) 8.6 mg tablet 2 tablets at bedtime as needed    tamsulosin (FLOMAX) 0.4 mg Cap Take 0.4 mg by mouth once daily.    torsemide (DEMADEX) 20 MG Tab Take 1 tablet (20 mg total) by mouth daily as needed (weight gain greater than 5 lbs).    vitamin D (VITAMIN D3) 1000 units Tab Take 1,000 Units by mouth once daily.    [DISCONTINUED] calcium carbonate (OS-DAVID) 600 mg calcium (1,500 mg) Tab Take 600 mg by mouth once.    [DISCONTINUED] carvediloL (COREG) 6.25 MG tablet TAKE ONE TABLET BY MOUTH TWICE DAILY (EVERY 12 HOURS) FOR BLOOD PRESSURE. THANKS !!    [DISCONTINUED] ezetimibe (ZETIA) 10 mg tablet Take 10 mg by mouth once daily.    [DISCONTINUED] FLUoxetine 20 MG capsule     [DISCONTINUED] gabapentin (NEURONTIN) 300 MG capsule 1 capsule    [DISCONTINUED] miconazole NITRATE 2 % (MICOTIN) 2 % top powder Apply topically as needed for Itching.    [DISCONTINUED] risperiDONE (RISPERDAL) 0.5 MG Tab Take 1 tablet (0.5 mg total) by mouth nightly as needed (agitation and halluctions).     Family History       Problem Relation (Age of Onset)    Arthritis Mother, Father    Asthma Sister    Heart  disease Father    Hyperlipidemia Mother, Sister    Hypertension Mother, Father, Sister, Brother          Tobacco Use    Smoking status: Every Day    Smokeless tobacco: Never    Tobacco comments:     quit 30 years ago   Substance and Sexual Activity    Alcohol use: Not Currently    Drug use: Never    Sexual activity: Not Currently     Review of Systems   Unable to perform ROS: Intubated   Objective:     Vital Signs (Most Recent):  Temp: 98.6 °F (37 °C) (09/06/22 0715)  Pulse: 63 (09/06/22 0800)  Resp: (!) 50 (09/06/22 0800)  BP: (!) 97/54 (09/06/22 0800)  SpO2: (!) 91 % (09/06/22 0800)   Vital Signs (24h Range):  Temp:  [97.7 °F (36.5 °C)-98.6 °F (37 °C)] 98.6 °F (37 °C)  Pulse:  [53-82] 63  Resp:  [18-50] 50  SpO2:  [91 %-100 %] 91 %  BP: ()/(48-77) 97/54     Weight: 103 kg (227 lb 1.2 oz)  Body mass index is 33.53 kg/m².    Physical Exam  Constitutional:       General: He is in acute distress.      Appearance: He is obese. He is ill-appearing. He is not toxic-appearing.   HENT:      Head: Normocephalic.      Right Ear: External ear normal.      Left Ear: External ear normal.      Nose: No congestion or rhinorrhea.      Mouth/Throat:      Mouth: Mucous membranes are dry.      Pharynx: Oropharynx is clear.   Eyes:      General: No scleral icterus.     Extraocular Movements: Extraocular movements intact.      Conjunctiva/sclera: Conjunctivae normal.   Cardiovascular:      Rate and Rhythm: Tachycardia present. Rhythm irregular.      Heart sounds: No murmur heard.  Pulmonary:      Effort: Respiratory distress present.      Breath sounds: Rhonchi and rales present.      Comments: Reduced breath sounds to lung bases bilaterally  Chest:      Chest wall: No tenderness.   Abdominal:      General: There is no distension.      Palpations: Abdomen is soft.      Tenderness: There is no abdominal tenderness. There is no right CVA tenderness, left CVA tenderness or guarding.   Musculoskeletal:         General: Swelling  (lower extremities bilaterally +3 pitting) present. No tenderness.      Cervical back: Neck supple.      Right lower leg: Edema present.      Left lower leg: Edema present.   Lymphadenopathy:      Cervical: No cervical adenopathy.   Skin:     General: Skin is warm and dry.   Neurological:      Mental Status: He is alert.      Comments: Eyes open, alert, ET in place           Significant Labs: All pertinent labs within the past 24 hours have been reviewed.  A1C: No results for input(s): HGBA1C in the last 4320 hours.  ABGs:   Recent Labs   Lab 09/06/22  0514   PH 7.403   PCO2 40.9   HCO3 25.0   POCSATURATED 96.7   PO2 87.1     Blood Culture: No results for input(s): LABBLOO in the last 48 hours.    BMP:   Recent Labs   Lab 09/06/22  0611   *      K 3.2*      CO2 27   BUN 30*   CREATININE 1.9*   CALCIUM 8.5*       CBC:   Recent Labs   Lab 09/04/22  1231 09/05/22  0633 09/06/22  0611   WBC 3.75* 3.29* 4.61   HGB 8.0* 7.9* 7.6*   HCT 26.7* 26.1* 25.5*    160 177       CMP:   Recent Labs   Lab 09/04/22  1231 09/05/22  0633 09/06/22  0611    143 142   K 4.2 3.6 3.2*    108 110   CO2 28 29 27   * 86 157*   BUN 33* 26* 30*   CREATININE 1.7* 1.4 1.9*   CALCIUM 8.7 8.7 8.5*   PROT 6.5 6.3 6.3   ALBUMIN 2.5* 2.5* 2.5*   BILITOT 0.2 0.3 0.3   ALKPHOS 53* 48* 50*   AST 21 19 16   ALT 18 16 16   ANIONGAP 5* 6* 5*       No results for input(s): LACTATE in the last 48 hours.    No results for input(s): COLORU, APPEARANCEUA, PHUR, SPECGRAV, PROTEINUA, GLUCUA, KETONESU, BILIRUBINUA, OCCULTUA, NITRITE, UROBILINOGEN, LEUKOCYTESUR, RBCUA, WBCUA, BACTERIA, SQUAMEPITHEL, HYALINECASTS in the last 48 hours.    Invalid input(s): WRIGHTSUR    X-Ray Chest 1 View  Narrative: EXAMINATION:  XR CHEST 1 VIEW    CLINICAL HISTORY:  Shortness of breath    TECHNIQUE:  portable chest x-ray    COMPARISON:  08/20/2022.    FINDINGS:  There is a new patchy infiltrate left hilar region and right lower lobe  consistent with pneumonia.  Impression: New significant right lower lobe infiltrate consistent with pneumonia and a left hilar infiltrates suggesting pneumonia recommend follow-up    Electronically signed by: Anita Romero MD  Date:    09/02/2022  Time:    11:04   Significant Imaging: I have reviewed all pertinent imaging results/findings within the past 24 hours.

## 2022-09-06 NOTE — ASSESSMENT & PLAN NOTE
CXR findings with new right lower lobe pneumonia. Bilateral infiltrates likely secondary to pulmonary edema from infectious.  - supplement oxygen as needed SpO2 >92-94%  - daily pulmonary hygiene  - continue coverage for community acquired PNA, azithrmoycin and rocephin  - f/u blood cultures x2 pending  - trend CBC  9/6 FM:  Now in ICU, expand coverage, speak with family.

## 2022-09-06 NOTE — CONSULTS
[facility] - Intensive Care  Cardiology  Consult Note    Patient Name: Jamey Lei  Patient : 1940  MRN: [id number]  Admission Date: 2022  Hospital Length of Stay: 4 days  Code Status: Full Code   Attending Provider: Navi Domínguez III, MD   Consulting Provider: SOPHIE Flor  Primary Care Physician: Navi Domínguez III, MD  Principal Problem:Pneumonia      Patient information was obtained from past medical records and ER records.     Inpatient consult to Cardiology  Consult performed by: SOPHIE Flor  Consult ordered by: Navi Domínguez III, MD      Subjective:     Chief Complaint:  Shortness of breath     HPI:   HPI:  81 year old male with hypertension, hypoerlipidemia, CHF, permanent atrial fibriallation on Eliquis, CAD s/p CABGx3, CKD stage 3, recent lung and brain mass findings presents to ED with worsening shortness of breath that started about a week ago with bilateral leg swelling with difficulty ambulating with his walker.      Patient hard of hearing endorsed since treatment in the ED, he has been feeling better. At home he does not use oxygen, breathing at SpO2 >95% on continuous O2 2L via nasal cannula on exam. There is no change in appetite, nausea, vomiting, diarrhea, constipation. Denies headaches, fever, chills, vision changes, chest pain, abdominal pain. Denies joint pain and loss of sensation in toes and foot bilaterally with increasing leg swelling.     ED course:  Tachypnea rate >30, CXR right lower lobe pneumonia, positive influenza A and COVID19 (fully vaccinated), NTproBNT >6000, lactate <0.2 x2, blood cultures x2 pending,  BUN/Cr 33/1.8 with improved eGFR from last hospitalization and discharge one week ago. Patient started on IV lasix 80 mg, responding well with clear urine collecting in Sanders catheter and IV antibiotics empirically started for right lobe pneumonia.         Overview/Hospital Course:  9/3/22 CG:  Says it feels much better and does complain of a cough.   He has had to go to the bathroom and is waiting for help and assistance getting up from the bed to go to the restroom.  No other acute overnight events.  9/4/22 CG: Doing better, still has a cough and feels weak.   9/5/22 CG:  Became more agitated last night and was trying to rip out his Sanders catheter.  Had to be given Seroquel to help him sleep and to calm him down.  9/6/22 FM:  Events of the weekend are noted.  Patient was admitted with COVID and influenza and a deterioration in his condition.  Patient has a history of lung cancer and is seeing an oncologist at an outside facility.  Patient has a history of chronic kidney disease as well as obesity diabetes neuropathy atrial fibrillation and his general health has come line significantly over the last year.  The patient has been in and out of the hospital and nursing facilities.  Last night the patient had a rapid response and ended up on the ventilator.  I reviewed the events and I am unsure if this was related to Seroquel will and I also see that he was becoming hypotensive.  Patient's chest x-ray is abnormal I will discuss with the family this morning.    Past Medical History:   Diagnosis Date    A-fib     Abdominal pain, epigastric     Abdominal pain, generalized     Acute on chronic congestive heart failure 12/29/2021    Anticoagulant long-term use     Arthritis     Asteatotic eczema     Benign essential HTN     Benign prostatic hyperplasia     Brain tumor     Cardiovascular accident     Carotid artery stenosis     CHF (congestive heart failure)     CKD (chronic kidney disease), stage IV     Constipation     COPD (chronic obstructive pulmonary disease)     Depressed     DM (diabetes mellitus), secondary     Drug eruption     Eczema     Edema     Encounter for blood transfusion     Fever     H. pylori infection     Hearing loss     History of tobacco use     Hypercholesterolemia     Hypertensive renal disease, benign     Hypokalemia     Hyponatremia      Impaired fasting glucose     Lumbar pain     Lung mass     Malaise and fatigue     Malignant neoplasm of prostate     Meningioma     Mixed hyperlipidemia     Nasal bleeding     Neuropathy     Obesity, unspecified     SHAYNE (obstructive sleep apnea)     Osteoarthritis of knee     Osteoarthritis of multiple joints     Peripheral vascular disease, unspecified     Psychosis     Renal disorder     Seizure disorder     Seizures     Sleep apnea     Unspecified cataract     Unspecified chronic bronchitis     Unspecified osteoarthritis, unspecified site     Vertigo     Vitamin D deficiency     Wheezing        Past Surgical History:   Procedure Laterality Date    APPENDECTOMY      COLONOSCOPY      CORONARY ARTERY BYPASS GRAFT      FOOT SURGERY Left     HIP REPLACEMENT ARTHROPLASTY Right     HIP SURGERY      JOINT REPLACEMENT      KNEE SURGERY      LUNG BIOPSY Right 03/03/2022    benign    SHOULDER SURGERY Left     SHOULDER SURGERY      TOTAL KNEE ARTHROPLASTY Right     triple bypass         Review of patient's allergies indicates:   Allergen Reactions    Zolpidem        No current facility-administered medications on file prior to encounter.     Current Outpatient Medications on File Prior to Encounter   Medication Sig    albuterol (PROVENTIL/VENTOLIN HFA) 90 mcg/actuation inhaler Inhale 1-2 puffs into the lungs every 6 (six) hours as needed for Wheezing or Shortness of Breath. Rescue    albuterol-ipratropium (DUO-NEB) 2.5 mg-0.5 mg/3 mL nebulizer solution inhale 3 milliliters by nebulization route 4 times per day and as needed, up to 6 doses per day for 30 days    apixaban (ELIQUIS) 5 mg Tab Take 5 mg by mouth 2 (two) times daily.    aspirin (ECOTRIN) 81 MG EC tablet Take 81 mg by mouth once daily.    atorvastatin (LIPITOR) 20 MG tablet Take 1 tablet (20 mg total) by mouth once daily.    multivitamin capsule Take 1 capsule by mouth once daily.    OXcarbazepine (TRILEPTAL) 300 MG Tab Take 1 tablet (300 mg total) by mouth 2  (two) times daily.    senna (SENOKOT) 8.6 mg tablet 2 tablets at bedtime as needed    tamsulosin (FLOMAX) 0.4 mg Cap Take 0.4 mg by mouth once daily.    torsemide (DEMADEX) 20 MG Tab Take 1 tablet (20 mg total) by mouth daily as needed (weight gain greater than 5 lbs).    vitamin D (VITAMIN D3) 1000 units Tab Take 1,000 Units by mouth once daily.    [DISCONTINUED] calcium carbonate (OS-DAVID) 600 mg calcium (1,500 mg) Tab Take 600 mg by mouth once.    [DISCONTINUED] carvediloL (COREG) 6.25 MG tablet TAKE ONE TABLET BY MOUTH TWICE DAILY (EVERY 12 HOURS) FOR BLOOD PRESSURE. THANKS !!    [DISCONTINUED] ezetimibe (ZETIA) 10 mg tablet Take 10 mg by mouth once daily.    [DISCONTINUED] FLUoxetine 20 MG capsule     [DISCONTINUED] gabapentin (NEURONTIN) 300 MG capsule 1 capsule    [DISCONTINUED] miconazole NITRATE 2 % (MICOTIN) 2 % top powder Apply topically as needed for Itching.    [DISCONTINUED] risperiDONE (RISPERDAL) 0.5 MG Tab Take 1 tablet (0.5 mg total) by mouth nightly as needed (agitation and halluctions).     Family History       Problem Relation (Age of Onset)    Arthritis Mother, Father    Asthma Sister    Heart disease Father    Hyperlipidemia Mother, Sister    Hypertension Mother, Father, Sister, Brother          Tobacco Use    Smoking status: Every Day    Smokeless tobacco: Never    Tobacco comments:     quit 30 years ago   Substance and Sexual Activity    Alcohol use: Not Currently    Drug use: Never    Sexual activity: Not Currently     Review of Systems   Unable to perform ROS: Intubated   Objective:     Vital Signs (Most Recent):  Temp: 97.9 °F (36.6 °C) (09/06/22 1515)  Pulse: 61 (09/06/22 1530)  Resp: (!) 30 (09/06/22 1530)  BP: 112/66 (09/06/22 1530)  SpO2: 99 % (09/06/22 1530)   Vital Signs (24h Range):  Temp:  [97.7 °F (36.5 °C)-98.6 °F (37 °C)] 97.9 °F (36.6 °C)  Pulse:  [45-82] 61  Resp:  [18-50] 30  SpO2:  [82 %-100 %] 99 %  BP: ()/(42-77) 112/66     Weight: 103 kg (227 lb 1.2 oz)  Body mass  index is 33.53 kg/m².    SpO2: 99 %  O2 Device (Oxygen Therapy): ventilator      Intake/Output Summary (Last 24 hours) at 9/6/2022 1541  Last data filed at 9/6/2022 0600  Gross per 24 hour   Intake 840 ml   Output 1000 ml   Net -160 ml       Lines/Drains/Airways       Drain  Duration                  Urethral Catheter 09/02/22 1231 Non-latex 16 Fr. 4 days              Airway  Duration                  Airway - Non-Surgical 09/06/22 0445 Endotracheal Tube <1 day              Peripheral Intravenous Line  Duration                  Peripheral IV - Single Lumen 09/05/22 2255 20 G Right Antecubital <1 day                  Below obtained from PCP note due to COVID 19 infection   Physical Exam  Vitals and nursing note reviewed.   Constitutional:       Appearance: He is obese. He is ill-appearing. He is not toxic-appearing.   HENT:      Mouth/Throat:      Mouth: Mucous membranes are dry.   Cardiovascular:      Rate and Rhythm: Rhythm irregular.   Pulmonary:      Breath sounds: Rhonchi and rales present.      Comments: Diminished breath sounds to bases bilaterally   Abdominal:      Palpations: Abdomen is soft.      Tenderness: There is no abdominal tenderness.   Musculoskeletal:      Right lower leg: Edema present.      Left lower leg: Edema present.   Skin:     General: Skin is warm and dry.       Significant Labs:  All pertinent lab results from the last 24 hours have been reviewed.   Recent Lab Results         09/06/22  0611   09/06/22  0514   09/05/22  0633   09/04/22  1231        Base Deficit   0.7           Notified Note   Called and read back by           Performed By:   Nasreen           Correct Temperature (PH)   7.403           Corrected Temperature (pCO2)   40.9           Corrected Temperature (pO2)   87.1           Provider Notified:   MAXINE ARIAS           Specimen source   Arterial           Notified By    RRT           AC   16           Albumin 2.5     2.5   2.5       Alkaline Phosphatase 50     48   53        ALT 16     16   18       Anion Gap 5     6   5       AST 16     19   21       Baso # 0.01     0.01   0.02       Basophil % 0.2     0.3   0.5       BILIRUBIN TOTAL 0.3  Comment: For infants and newborns, interpretation of results should be based  on gestational age, weight and in agreement with clinical  observations.    Premature Infant recommended reference ranges:  Up to 24 hours.............<8.0 mg/dL  Up to 48 hours............<12.0 mg/dL  3-5 days..................<15.0 mg/dL  6-29 days.................<15.0 mg/dL    For patients on Eltrombopag therapy, use of Dimension Hawi TBIL is   not   recommended.       0.3  Comment: For infants and newborns, interpretation of results should be based  on gestational age, weight and in agreement with clinical  observations.    Premature Infant recommended reference ranges:  Up to 24 hours.............<8.0 mg/dL  Up to 48 hours............<12.0 mg/dL  3-5 days..................<15.0 mg/dL  6-29 days.................<15.0 mg/dL    For patients on Eltrombopag therapy, use of Dimension Hawi TBIL is   not   recommended.     0.2  Comment: For infants and newborns, interpretation of results should be based  on gestational age, weight and in agreement with clinical  observations.    Premature Infant recommended reference ranges:  Up to 24 hours.............<8.0 mg/dL  Up to 48 hours............<12.0 mg/dL  3-5 days..................<15.0 mg/dL  6-29 days.................<15.0 mg/dL    For patients on Eltrombopag therapy, use of Dimension Hawi TBIL is   not   recommended.         BUN 30     26   33       Calcium 8.5     8.7   8.7       Chloride 110     108   109       CO2 27     29   28       Creatinine 1.9     1.4   1.7       Differential Method Automated     Automated   Automated       eGFR 35.4     51.1   40.5       Eos # 0.1     0.1   0.0       Eosinophil % 1.5     2.7   0.3       FiO2   40.0           Glucose 157     86   136       Gran # (ANC) 3.5     1.6   2.7       Gran  % 75.9     49.9   72.2       Hematocrit 25.5     26.1   26.7       Hemoglobin 7.6     7.9   8.0       Immature Grans (Abs) 0.01  Comment: Mild elevation in immature granulocytes is non specific and   can be seen in a variety of conditions including stress response,   acute inflammation, trauma and pregnancy. Correlation with other   laboratory and clinical findings is essential.       0.00  Comment: Mild elevation in immature granulocytes is non specific and   can be seen in a variety of conditions including stress response,   acute inflammation, trauma and pregnancy. Correlation with other   laboratory and clinical findings is essential.     0.01  Comment: Mild elevation in immature granulocytes is non specific and   can be seen in a variety of conditions including stress response,   acute inflammation, trauma and pregnancy. Correlation with other   laboratory and clinical findings is essential.         Immature Granulocytes 0.2     0.0   0.3       Lymph # 0.6     1.2   0.6       Lymph % 12.4     35.9   17.1       MCH 24.4     24.9   24.8       MCHC 29.8     30.3   30.0       MCV 82     82   83       MODIFIED GAURI'S TEST   POS           Mono # 0.5     0.4   0.4       Mono % 9.8     11.2   9.6       MPV 10.4     10.5   10.4       nRBC 0     0   0       O2DEVICE   Ventilator           PEEP   5.0           Platelets 177     160   170       POC HCO3   25.0           POC PCO2   40.9           POC PH   7.403           POC PO2   87.1           POC SATURATED O2   96.7           POC TCO2   54.6           POC Temp   37.0           Potassium 3.2     3.6   4.2       PROTEIN TOTAL 6.3     6.3   6.5       RBC 3.11     3.17   3.23       RDW 17.4     16.9   17.1       Sodium 142     143   142       Vt   500           WBC 4.61     3.29   3.75               Significant Imaging:  Imaging Results              X-Ray Chest 1 View (Final result)  Result time 09/02/22 11:04:28      Final result by Anita Romero MD (09/02/22 11:04:28)                    Impression:      New significant right lower lobe infiltrate consistent with pneumonia and a left hilar infiltrates suggesting pneumonia recommend follow-up      Electronically signed by: Anita Romero MD  Date:    09/02/2022  Time:    11:04               Narrative:    EXAMINATION:  XR CHEST 1 VIEW    CLINICAL HISTORY:  Shortness of breath    TECHNIQUE:  portable chest x-ray    COMPARISON:  08/20/2022.    FINDINGS:  There is a new patchy infiltrate left hilar region and right lower lobe consistent with pneumonia.                                      ECHO:   12/2021:  EF 65% with moderate mitral/tricuspid regurgitation        MEDICATIONS:     Current Facility-Administered Medications:     acetaminophen tablet 650 mg, 650 mg, Oral, Q8H PRN, Lia [facility] DO    acetaminophen tablet 650 mg, 650 mg, Oral, Q8H PRN, Lia [facility] DO    albuterol-ipratropium 2.5 mg-0.5 mg/3 mL nebulizer solution 3 mL, 3 mL, Nebulization, Q6H, Navi Domínguez III, MD, 3 mL at 09/06/22 1357    apixaban tablet 5 mg, 5 mg, Oral, BID, Lia [facility] DO, 5 mg at 09/06/22 1015    aspirin EC tablet 81 mg, 81 mg, Oral, Daily, Lia [facility] DO, 81 mg at 09/06/22 1015    atorvastatin tablet 40 mg, 40 mg, Oral, Daily, Navi Domínguez III, MD, 40 mg at 09/06/22 1015    dextrose 5 % and 0.45 % NaCl with KCl 20 mEq infusion, , Intravenous, Continuous, Navi Domínguez III, MD, Last Rate: 100 mL/hr at 09/06/22 1017, New Bag at 09/06/22 1017    famotidine tablet 20 mg, 20 mg, Oral, Daily, Navi Domínguez III, MD, 20 mg at 09/06/22 1038    melatonin tablet 6 mg, 6 mg, Oral, Nightly PRN, Lia [facility]ley DO, 6 mg at 09/05/22 2103    mupirocin 2 % ointment, , Nasal, BID, Lia Bagley DO, Given at 09/06/22 0913    ondansetron injection 4 mg, 4 mg, Intravenous, Q8H PRN, Lia Bagley DO    oseltamivir capsule 75 mg, 75 mg, Oral, BID, Lia Bagley DO, 75 mg at 09/06/22 [facility]    OXcarbazepine  tablet 300 mg, 300 mg, Oral, BID, Navi Domínguez III, MD, 300 mg at 09/06/22 1031    piperacillin-tazobactam 4.5 g in dextrose 5 % 100 mL IVPB (ready to mix system), 4.5 g, Intravenous, Q8H, Navi Domínguez III, MD, Stopped at 09/06/22 1417    propofol (DIPRIVAN) 10 mg/mL infusion, 0-50 mcg/kg/min, Intravenous, Continuous, Navi Domínguez III, MD, Last Rate: 6.2 mL/hr at 09/06/22 1246, 10 mcg/kg/min at 09/06/22 1246    risperiDONE tablet 0.5 mg, 0.5 mg, Per NG tube, BID, Navi Domínguez III, MD, 0.5 mg at 09/06/22 1016    sodium chloride 0.9% flush 10 mL, 10 mL, Intravenous, PRN, Lia Bagley,     tamsulosin 24 hr capsule 0.4 mg, 0.4 mg, Oral, Daily, Navi Domínguez III, MD, 0.4 mg at 09/06/22 1016    Pharmacy to dose Vancomycin consult, , , Once **AND** vancomycin - pharmacy to dose, , Intravenous, pharmacy to manage frequency, Navi Domínguez III, MD      Assessment and Plan:     Active Diagnoses:    Diagnosis Date Noted POA    PRINCIPAL PROBLEM:  Pneumonia [J18.9] 12/29/2021 Yes    SOB (shortness of breath) [R06.02] 09/02/2022 Yes    Influenza [J11.1] 09/02/2022 Yes    COVID-19 [U07.1] 09/02/2022 Yes    Tachypnea [R06.82] 09/02/2022 Yes    CHF (congestive heart failure) [I50.9]  Yes    Stage 3 chronic kidney disease [N18.30] 04/11/2022 Yes    Seizure disorder [G40.909] 03/30/2022 Yes    A-fib [I48.91]  Yes    COPD (chronic obstructive pulmonary disease) [J44.9]  Yes    Localized edema [R60.0] 12/30/2021 Yes    Weakness [R53.1] 12/29/2021 Yes      Problems Resolved During this Admission:       VTE Risk Mitigation (From admission, onward)           Ordered     apixaban tablet 5 mg  2 times daily         09/02/22 1719     IP VTE HIGH RISK PATIENT  Once         09/02/22 1513     Place sequential compression device  Until discontinued         09/02/22 1513                  Assessment and Plan:   Pneumonia:  Continue treatment per IM recommendations.  Respiratory support with ventilator in place  COVID 19  infection:   Per IM services  Atrial fibrillation:  Bradycardic s/p cardiac arrest.  Heart rate not <40 bpm.  Continue to monitor on telemetry.  Avoid moises blocking agents.  Obtain ECG  Bradycardia:   Per above  S/p Cardiac arrest with respiratory failure:  Continue with treatment for other acute disease processes.     Thank you for your consult.          SOPHIE Flor  Cardiology  [facility]  09/06/2022

## 2022-09-06 NOTE — PLAN OF CARE
Pt remains intubated with O2 Sats WNL.  Pt bradycardic and Dr Domínguez aware and [facility] consulted. ROSS Davila from [facility] stated she will come see this pt today.  Pt voided via Sanders 200ml urine today.  Dr Domínguez notified of decreased urine output.  Will continue to monitor.

## 2022-09-06 NOTE — ASSESSMENT & PLAN NOTE
Patient with Permanent atrial fibrillation which is controlled currently with No rate controlling home medications at this time. . Patient is currently in atrial fibrillation.[id number] Score: 4. HASBLED Score: Unable to calculate. Anticoagulation indicated. Anticoagulation done with eliquis 5 mg BID.

## 2022-09-06 NOTE — EICU
Rounding (Video Assessment):  Yes    Chart, labs, lines reviewed    ETT LDA added    Covid +, Flu A +    S/p CODE BLUE --transferred to ICU this am on vent.   Awake-appears very uncomfortable-RR 40s, O2 sat 98%    Currently-NO SEDATION    K+ 3.2, Creat 1.9, BUN 30, H&H 7.6/25.5    SB on cardiac monitor, HR 58, occasional PAC observed. /65(89).     No continuous infusions.

## 2022-09-06 NOTE — PROGRESS NOTES
Pharmacist Renal Dose Adjustment Note    Jamey Lei is a 81 y.o. male being treated with the medication famotidine    Patient Data:    Vital Signs (Most Recent):  Temp: 98.6 °F (37 °C) (09/06/22 0715)  Pulse: 67 (09/06/22 0815)  Resp: (!) 29 (09/06/22 0815)  BP: (!) 157/65 (09/06/22 0815)  SpO2: 100 % (09/06/22 0815)   Vital Signs (72h Range):  Temp:  [97.7 °F (36.5 °C)-98.6 °F (37 °C)]   Pulse:  [53-82]   Resp:  [16-50]   BP: ()/(48-89)   SpO2:  [91 %-100 %]      Recent Labs   Lab 09/04/22  1231 09/05/22  0633 09/06/22  0611   CREATININE 1.7* 1.4 1.9*     Serum creatinine: 1.9 mg/dL (H) 09/06/22 0611  Estimated creatinine clearance: 37.3 mL/min (A)    Medication:famotidine 20 mg BID will be changed to medication:famotidine 20 mg QD     Pharmacist's Name: KAMLESH MACKEY  Pharmacist's Extension: [phone]

## 2022-09-06 NOTE — PROGRESS NOTES
[facility] Medicine  Progress Note    Patient Name: Jamey Lei  MRN: [id number]  Patient Class: IP- Inpatient   Admission Date: 9/2/2022  Length of Stay: 4 days  Attending Physician: Navi Domínguez III, MD  Primary Care Provider: Navi Domínguez III, MD        Subjective:     Principal Problem:Pneumonia        HPI:  81 year old male with hypertension, hypoerlipidemia, CHF, permanent atrial fibriallation on Eliquis, CAD s/p CABGx3, CKD stage 3, recent lung and brain mass findings presents to ED with worsening shortness of breath that started about a week ago with bilateral leg swelling with difficulty ambulating with his walker.     Patient hard of hearing endorsed since treatment in the ED, he has been feeling better. At home he does not use oxygen, breathing at SpO2 >95% on continuous O2 2L via nasal cannula on exam. There is no change in appetite, nausea, vomiting, diarrhea, constipation. Denies headaches, fever, chills, vision changes, chest pain, abdominal pain. Denies joint pain and loss of sensation in toes and foot bilaterally with increasing leg swelling.    ED course:  Tachypnea rate >30, CXR right lower lobe pneumonia, positive influenza A and COVID19 (fully vaccinated), NTproBNT >6000, lactate <0.2 x2, blood cultures x2 pending,  BUN/Cr 33/1.8 with improved eGFR from last hospitalization and discharge one week ago. Patient started on IV lasix 80 mg, responding well with clear urine collecting in Sanders catheter and IV antibiotics empirically started for right lobe pneumonia.       Overview/Hospital Course:  9/3/22 CG:  Says it feels much better and does complain of a cough.  He has had to go to the bathroom and is waiting for help and assistance getting up from the bed to go to the restroom.  No other acute overnight events.  9/4/22 CG: Doing better, still has a cough and feels weak.   9/5/22 CG:  Became more agitated last night and was trying to rip out his Sanders catheter.   Had to be given Seroquel to help him sleep and to calm him down.  9/6/22 FM:  Events of the weekend are noted.  Patient was admitted with COVID and influenza and a deterioration in his condition.  Patient has a history of lung cancer and is seeing an oncologist at an outside facility.  Patient has a history of chronic kidney disease as well as obesity diabetes neuropathy atrial fibrillation and his general health has come line significantly over the last year.  The patient has been in and out of the hospital and nursing facilities.  Last night the patient had a rapid response and ended up on the ventilator.  I reviewed the events and I am unsure if this was related to Seroquel will and I also see that he was becoming hypotensive.  Patient's chest x-ray is abnormal I will discuss with the family this morning.      Past Medical History:   Diagnosis Date    A-fib     Abdominal pain, epigastric     Abdominal pain, generalized     Acute on chronic congestive heart failure 12/29/2021    Anticoagulant long-term use     Arthritis     Asteatotic eczema     Benign essential HTN     Benign prostatic hyperplasia     Brain tumor     Cardiovascular accident     Carotid artery stenosis     CHF (congestive heart failure)     CKD (chronic kidney disease), stage IV     Constipation     COPD (chronic obstructive pulmonary disease)     Depressed     DM (diabetes mellitus), secondary     Drug eruption     Eczema     Edema     Encounter for blood transfusion     Fever     H. pylori infection     Hearing loss     History of tobacco use     Hypercholesterolemia     Hypertensive renal disease, benign     Hypokalemia     Hyponatremia     Impaired fasting glucose     Lumbar pain     Lung mass     Malaise and fatigue     Malignant neoplasm of prostate     Meningioma     Mixed hyperlipidemia     Nasal bleeding     Neuropathy     Obesity, unspecified     SHAYNE (obstructive sleep apnea)     Osteoarthritis  of knee     Osteoarthritis of multiple joints     Peripheral vascular disease, unspecified     Psychosis     Renal disorder     Seizure disorder     Seizures     Sleep apnea     Unspecified cataract     Unspecified chronic bronchitis     Unspecified osteoarthritis, unspecified site     Vertigo     Vitamin D deficiency     Wheezing        Past Surgical History:   Procedure Laterality Date    APPENDECTOMY      COLONOSCOPY      CORONARY ARTERY BYPASS GRAFT      FOOT SURGERY Left     HIP REPLACEMENT ARTHROPLASTY Right     HIP SURGERY      JOINT REPLACEMENT      KNEE SURGERY      LUNG BIOPSY Right 03/03/2022    benign    SHOULDER SURGERY Left     SHOULDER SURGERY      TOTAL KNEE ARTHROPLASTY Right     triple bypass         Review of patient's allergies indicates:   Allergen Reactions    Zolpidem        No current facility-administered medications on file prior to encounter.     Current Outpatient Medications on File Prior to Encounter   Medication Sig    albuterol (PROVENTIL/VENTOLIN HFA) 90 mcg/actuation inhaler Inhale 1-2 puffs into the lungs every 6 (six) hours as needed for Wheezing or Shortness of Breath. Rescue    albuterol-ipratropium (DUO-NEB) 2.5 mg-0.5 mg/3 mL nebulizer solution inhale 3 milliliters by nebulization route 4 times per day and as needed, up to 6 doses per day for 30 days    apixaban (ELIQUIS) 5 mg Tab Take 5 mg by mouth 2 (two) times daily.    aspirin (ECOTRIN) 81 MG EC tablet Take 81 mg by mouth once daily.    atorvastatin (LIPITOR) 20 MG tablet Take 1 tablet (20 mg total) by mouth once daily.    multivitamin capsule Take 1 capsule by mouth once daily.    OXcarbazepine (TRILEPTAL) 300 MG Tab Take 1 tablet (300 mg total) by mouth 2 (two) times daily.    senna (SENOKOT) 8.6 mg tablet 2 tablets at bedtime as needed    tamsulosin (FLOMAX) 0.4 mg Cap Take 0.4 mg by mouth once daily.    torsemide (DEMADEX) 20 MG Tab Take 1 tablet (20 mg total) by mouth daily as  needed (weight gain greater than 5 lbs).    vitamin D (VITAMIN D3) 1000 units Tab Take 1,000 Units by mouth once daily.    [DISCONTINUED] calcium carbonate (OS-DAVID) 600 mg calcium (1,500 mg) Tab Take 600 mg by mouth once.    [DISCONTINUED] carvediloL (COREG) 6.25 MG tablet TAKE ONE TABLET BY MOUTH TWICE DAILY (EVERY 12 HOURS) FOR BLOOD PRESSURE. THANKS !!    [DISCONTINUED] ezetimibe (ZETIA) 10 mg tablet Take 10 mg by mouth once daily.    [DISCONTINUED] FLUoxetine 20 MG capsule     [DISCONTINUED] gabapentin (NEURONTIN) 300 MG capsule 1 capsule    [DISCONTINUED] miconazole NITRATE 2 % (MICOTIN) 2 % top powder Apply topically as needed for Itching.    [DISCONTINUED] risperiDONE (RISPERDAL) 0.5 MG Tab Take 1 tablet (0.5 mg total) by mouth nightly as needed (agitation and halluctions).     Family History       Problem Relation (Age of Onset)    Arthritis Mother, Father    Asthma Sister    Heart disease Father    Hyperlipidemia Mother, Sister    Hypertension Mother, Father, Sister, Brother          Tobacco Use    Smoking status: Every Day    Smokeless tobacco: Never    Tobacco comments:     quit 30 years ago   Substance and Sexual Activity    Alcohol use: Not Currently    Drug use: Never    Sexual activity: Not Currently     Review of Systems   Unable to perform ROS: Intubated   Objective:     Vital Signs (Most Recent):  Temp: 98.6 °F (37 °C) (09/06/22 0715)  Pulse: 63 (09/06/22 0800)  Resp: (!) 50 (09/06/22 0800)  BP: (!) 97/54 (09/06/22 0800)  SpO2: (!) 91 % (09/06/22 0800)   Vital Signs (24h Range):  Temp:  [97.7 °F (36.5 °C)-98.6 °F (37 °C)] 98.6 °F (37 °C)  Pulse:  [53-82] 63  Resp:  [18-50] 50  SpO2:  [91 %-100 %] 91 %  BP: ()/(48-77) 97/54     Weight: 103 kg (227 lb 1.2 oz)  Body mass index is 33.53 kg/m².    Physical Exam  Constitutional:       General: He is in acute distress.      Appearance: He is obese. He is ill-appearing. He is not toxic-appearing.   HENT:      Head: Normocephalic.       Right Ear: External ear normal.      Left Ear: External ear normal.      Nose: No congestion or rhinorrhea.      Mouth/Throat:      Mouth: Mucous membranes are dry.      Pharynx: Oropharynx is clear.   Eyes:      General: No scleral icterus.     Extraocular Movements: Extraocular movements intact.      Conjunctiva/sclera: Conjunctivae normal.   Cardiovascular:      Rate and Rhythm: Tachycardia present. Rhythm irregular.      Heart sounds: No murmur heard.  Pulmonary:      Effort: Respiratory distress present.      Breath sounds: Rhonchi and rales present.      Comments: Reduced breath sounds to lung bases bilaterally  Chest:      Chest wall: No tenderness.   Abdominal:      General: There is no distension.      Palpations: Abdomen is soft.      Tenderness: There is no abdominal tenderness. There is no right CVA tenderness, left CVA tenderness or guarding.   Musculoskeletal:         General: Swelling (lower extremities bilaterally +3 pitting) present. No tenderness.      Cervical back: Neck supple.      Right lower leg: Edema present.      Left lower leg: Edema present.   Lymphadenopathy:      Cervical: No cervical adenopathy.   Skin:     General: Skin is warm and dry.   Neurological:      Mental Status: He is alert.      Comments: Eyes open, alert, ET in place           Significant Labs: All pertinent labs within the past 24 hours have been reviewed.  A1C: No results for input(s): HGBA1C in the last 4320 hours.  ABGs:   Recent Labs   Lab 09/06/22  0514   PH 7.403   PCO2 40.9   HCO3 25.0   POCSATURATED 96.7   PO2 87.1     Blood Culture: No results for input(s): LABBLOO in the last 48 hours.    BMP:   Recent Labs   Lab 09/06/22  0611   *      K 3.2*      CO2 27   BUN 30*   CREATININE 1.9*   CALCIUM 8.5*       CBC:   Recent Labs   Lab 09/04/22  1231 09/05/22  0633 09/06/22  0611   WBC 3.75* 3.29* 4.61   HGB 8.0* 7.9* 7.6*   HCT 26.7* 26.1* 25.5*    160 177       CMP:   Recent Labs   Lab  09/04/22  1231 09/05/22  0633 09/06/22  0611    143 142   K 4.2 3.6 3.2*    108 110   CO2 28 29 27   * 86 157*   BUN 33* 26* 30*   CREATININE 1.7* 1.4 1.9*   CALCIUM 8.7 8.7 8.5*   PROT 6.5 6.3 6.3   ALBUMIN 2.5* 2.5* 2.5*   BILITOT 0.2 0.3 0.3   ALKPHOS 53* 48* 50*   AST 21 19 16   ALT 18 16 16   ANIONGAP 5* 6* 5*       No results for input(s): LACTATE in the last 48 hours.    No results for input(s): COLORU, APPEARANCEUA, PHUR, SPECGRAV, PROTEINUA, GLUCUA, KETONESU, BILIRUBINUA, OCCULTUA, NITRITE, UROBILINOGEN, LEUKOCYTESUR, RBCUA, WBCUA, BACTERIA, SQUAMEPITHEL, HYALINECASTS in the last 48 hours.    Invalid input(s): WRIGHTSUR    X-Ray Chest 1 View  Narrative: EXAMINATION:  XR CHEST 1 VIEW    CLINICAL HISTORY:  Shortness of breath    TECHNIQUE:  portable chest x-ray    COMPARISON:  08/20/2022.    FINDINGS:  There is a new patchy infiltrate left hilar region and right lower lobe consistent with pneumonia.  Impression: New significant right lower lobe infiltrate consistent with pneumonia and a left hilar infiltrates suggesting pneumonia recommend follow-up    Electronically signed by: Anita Romero MD  Date:    09/02/2022  Time:    11:04   Significant Imaging: I have reviewed all pertinent imaging results/findings within the past 24 hours.      Assessment/Plan:      * Pneumonia  CXR findings with new right lower lobe pneumonia. Bilateral infiltrates likely secondary to pulmonary edema from infectious.  - supplement oxygen as needed SpO2 >92-94%  - daily pulmonary hygiene  - continue coverage for community acquired PNA, azithrmoycin and rocephin  - f/u blood cultures x2 pending  - trend CBC  9/6 FM:  Now in ICU, expand coverage, speak with family.        Tachypnea        COVID-19  Patient is identified as mild moderate severity.   Initiate standard COVID protocols; COVID-19 testing ,Infection Control notification  and isolation- respiratory, contact and droplet per protocol    Diagnostics:  (leukopenia, hyponatremia, hyperferritinemia, elevated troponin, elevated d-dimer, age, and comorbidities are significant predictors of poor clinical outcome)  CBC, CMP and Portable CXR    Management: Initiate targeted therapy with possible use of Paxlovid. Maintain oxygen saturations 92-96% via Nasal Cannula  LPM and monitor with continuous/intermittent pulse oximetry. Inhaled bronchodilators as needed for shortness of breath., Continuous cardiac monitoring. and Manage respiratory failure (O2 requirement >10LPM or needing NIPPV/Mechanical ventilation) and/or Pneumonia (active chest infiltrates) separately as described below.  Empirically started on IV antibiotics for right lower lobe pneumonia on chest xray.   Follow up blood cultures and adjust antibiotics accordingly.     Influenza  Positive flu A. Continue tamiflu. Supplemental oxygen as needed to maintain >94%.      SOB (shortness of breath)  Multifactorial, cardiopulmonary (CHF, atrial fibrillation, COPD) and infectious processes (COVID19 and fluA).  SpO2 maintained >95% on 2L NC at this time.  - see above management.        CHF (congestive heart failure)        Stage 3 chronic kidney disease  Stable BUN/Cr 33/1.8 likely at new baseline with recovering eGFR of >40 from <20 5 weeks ago. Continue to monitor as patient on IV diuretics.       Seizure disorder        A-fib  Patient with Permanent atrial fibrillation which is controlled currently with No rate controlling home medications at this time. . Patient is currently in atrial fibrillation.[id number] Score: 4. HASBLED Score: Unable to calculate. Anticoagulation indicated. Anticoagulation done with eliquis 5 mg BID.    COPD (chronic obstructive pulmonary disease)        Localized edema  Home med diuretic torsemide 20 mg PRN. +3 pitting on exam. Patient responding to IV lasix 80 mg in ED.   Continue with IV diuretics and taper with progress, patient endorses breathing a little better.   - IV lasix 40 mg BID  -  restrict PO intake to 1.5 L fluid ounces  - monitor I/Os  9/6 FM:  Now hypotension, low dose IVFs    Weakness  Encourage ambulation to baseline with assistance. Denies appetite decline. Add supportive nutrition.   - f/u PT/OT eval and treatment        VTE Risk Mitigation (From admission, onward)         Ordered     apixaban tablet 5 mg  2 times daily         09/02/22 1719     IP VTE HIGH RISK PATIENT  Once         09/02/22 1513     Place sequential compression device  Until discontinued         09/02/22 1513                Discharge Planning   GLORY:      Code Status: Full Code   Is the patient medically ready for discharge?:     Reason for patient still in hospital (select all that apply): Patient trending condition  Discharge Plan A: Home with family, Home Health                  Navi Domínguez III, MD  Department of Hospital Medicine   [place] - Intensive [facility], [place]

## 2022-09-06 NOTE — NURSING
1350:  Nicole at [facility] in [place] notified of consult for Dr Webster regarding pt's low HR per Dr Domínguez.

## 2022-09-06 NOTE — CODE DOCUMENTATION
ELSA RAOUL     Called to room 634 after patient was found to be cyanotic, unresponsive and pulseless. Nursing staff reports starting CPR and administering respirations via BVM. No meds given. After one round of CPR, patient achieved ROSC just prior to my arrival. Patient was noted to have bilaterally reactive pupils and an intact gag on my initial assessment. However, patient remained unresponsive and appeared to have some difficulty clearing secretions and the decision was made to intubate for airway protection. Mac 4, 8.0 tube, single attempt. Bilateral breath sounds equal, positive color change on CO2 detector. Patient transferred to ICU, vent setting per primary.

## 2022-09-06 NOTE — PLAN OF CARE
Patient to ICU from 6th floor s/p code and intubation. No CPR meds given. Patient arouses easily and agitated. VSS, afebrile. Sanders intact with 1000ml out.

## 2022-09-06 NOTE — ASSESSMENT & PLAN NOTE
Home med diuretic torsemide 20 mg PRN. +3 pitting on exam. Patient responding to IV lasix 80 mg in ED.   Continue with IV diuretics and taper with progress, patient endorses breathing a little better.   - IV lasix 40 mg BID  - restrict PO intake to 1.5 L fluid ounces  - monitor I/Os  9/6 FM:  Now hypotension, low dose IVFs

## 2022-09-06 NOTE — CARE UPDATE
Reported to CODE BLUE call. On arrival pt has pulse and agonal breathing.  Sats 80% Assisted breathing with ambu bag, placed on non-rebreather sats 98%. Pt intubated per Dr. Cook with 8.0 ETT at 24cm lip Tube placement verified via co2 color detector, bilateral breath sounds, and condensation in the tube. Bagged pt with ambu bag to ICU with Pari Nurse and Linnea Nurse at bedside. Pt tolerated transport well.

## 2022-09-06 NOTE — NURSING
Contacted pt's son Rachid Lei [phone] concerning pt's status and transfer. Pt's son asked if he could be updated throughout the day, notified ICU nurse concerning the above request.

## 2022-09-06 NOTE — PT/OT/SLP PROGRESS
Physical Therapy      Patient Name:  Jamey Lei   MRN:  [id number]    Patient not seen today secondary to  chart reviewed and noted Code blue called on pt early this am and he is now intubated in ICU. Will sign off at this time; please reconsult Physical therapy as appropriate.

## 2022-09-06 NOTE — PROGRESS NOTES
Pharmacokinetic Initial Assessment: IV Vancomycin    Assessment/Plan:    Initiate intravenous vancomycin with loading dose of 2000 mg once with subsequent doses when random concentrations are less than 20 mcg/mL  Desired empiric serum trough concentration is 15 to 20 mcg/mL  Draw vancomycin random level on 9/7/22 at 1200.  Pharmacy will continue to follow and monitor vancomycin.      Please contact pharmacy at extension [phone] with any questions regarding this assessment.     Thank you for the consult,   KAMLESH MACKEY       Patient brief summary:  Jamey Lei is a 81 y.o. male initiated on antimicrobial therapy with IV Vancomycin for treatment of suspected bacteremia    Drug Allergies:   Review of patient's allergies indicates:   Allergen Reactions    Zolpidem        Actual Body Weight:   103 kg    Renal Function:   Estimated Creatinine Clearance: 37.3 mL/min (A) (based on SCr of 1.9 mg/dL (H)).,     Dialysis Method (if applicable):  N/A    CBC (last 72 hours):  Recent Labs   Lab Result Units 09/04/22  1231 09/05/22  0633 09/06/22  0611   WBC K/uL 3.75* 3.29* 4.61   Hemoglobin g/dL 8.0* 7.9* 7.6*   Hematocrit % 26.7* 26.1* 25.5*   Platelets K/uL 170 160 177   Gran % % 72.2 49.9 75.9*   Lymph % % 17.1* 35.9 12.4*   Mono % % 9.6 11.2 9.8   Eosinophil % % 0.3 2.7 1.5   Basophil % % 0.5 0.3 0.2   Differential Method  Automated Automated Automated       Metabolic Panel (last 72 hours):  Recent Labs   Lab Result Units 09/04/22  1231 09/05/22  0633 09/06/22  0611   Sodium mmol/L 142 143 142   Potassium mmol/L 4.2 3.6 3.2*   Chloride mmol/L 109 108 110   CO2 mmol/L 28 29 27   Glucose mg/dL 136* 86 157*   BUN mg/dL 33* 26* 30*   Creatinine mg/dL 1.7* 1.4 1.9*   Albumin g/dL 2.5* 2.5* 2.5*   Total Bilirubin mg/dL 0.2 0.3 0.3   Alkaline Phosphatase U/L 53* 48* 50*   AST U/L 21 19 16   ALT U/L 18 16 16       Drug levels (last 3 results):  No results for input(s): VANCOMYCINRA, VANCORANDOM, VANCOMYCINPE, VANCOPEAK,  VANCOMYCINTR, VANCOTROUGH in the last 72 hours.    Microbiologic Results:  Microbiology Results (last 7 days)       Procedure Component Value Units Date/Time    Culture, Respiratory with Gram Stain [[id number]] Collected: 09/06/22 0603    Order Status: Sent Specimen: Respiratory from Sputum Updated: 09/06/22 0658    Blood culture x two cultures. Draw prior to antibiotics. [[id number]] Collected: 09/02/22 1141    Order Status: Completed Specimen: Blood Updated: 09/06/22 0612     Blood Culture, Routine No Growth to date      No Growth to date      No Growth to date      No Growth to date    Narrative:      Aerobic and anaerobic    Blood culture x two cultures. Draw prior to antibiotics. [[id number]] Collected: 09/02/22 1134    Order Status: Completed Specimen: Blood Updated: 09/06/22 0612     Blood Culture, Routine No Growth to date      No Growth to date      No Growth to date      No Growth to date    Narrative:      Aerobic and anaerobic    Blood Culture #1 **CANNOT BE ORDERED STAT** [[id number]]     Order Status: Canceled Specimen: Blood

## 2022-09-06 NOTE — NURSING
0900:  All scheduled 9AM medications are not given on time due to awaiting NGT placement verification from Radiology.

## 2022-09-07 PROBLEM — C34.90 MALIGNANT NEOPLASM OF LUNG: Status: ACTIVE | Noted: 2022-09-07

## 2022-09-07 LAB
ABO + RH BLD: NORMAL
AC: 18
ALBUMIN SERPL BCP-MCNC: 2.2 G/DL (ref 3.5–5.2)
ALP SERPL-CCNC: 43 U/L (ref 55–135)
ALT SERPL W/O P-5'-P-CCNC: 15 U/L (ref 10–44)
ANION GAP SERPL CALC-SCNC: 4 MMOL/L (ref 8–16)
AST SERPL-CCNC: 19 U/L (ref 10–40)
BASOPHILS # BLD AUTO: 0.01 K/UL (ref 0–0.2)
BASOPHILS NFR BLD: 0.2 % (ref 0–1.9)
BILIRUB SERPL-MCNC: 0.4 MG/DL (ref 0.1–1)
BLD GP AB SCN CELLS X3 SERPL QL: NORMAL
BLD PROD TYP BPU: NORMAL
BLOOD UNIT EXPIRATION DATE: NORMAL
BLOOD UNIT TYPE CODE: 9500
BLOOD UNIT TYPE: NORMAL
BUN SERPL-MCNC: 33 MG/DL (ref 8–23)
CALCIUM SERPL-MCNC: 8.2 MG/DL (ref 8.7–10.5)
CHLORIDE SERPL-SCNC: 110 MMOL/L (ref 95–110)
CO2 SERPL-SCNC: 25 MMOL/L (ref 23–29)
CODING SYSTEM: NORMAL
CORRECTED TEMPERATURE (PCO2): 33 MMHG
CORRECTED TEMPERATURE (PH): 7.45
CORRECTED TEMPERATURE (PO2): 149 MMHG
CREAT SERPL-MCNC: 2.4 MG/DL (ref 0.5–1.4)
DIFFERENTIAL METHOD: ABNORMAL
DISPENSE STATUS: NORMAL
EOSINOPHIL # BLD AUTO: 0 K/UL (ref 0–0.5)
EOSINOPHIL NFR BLD: 0.5 % (ref 0–8)
ERYTHROCYTE [DISTWIDTH] IN BLOOD BY AUTOMATED COUNT: 17.5 % (ref 11.5–14.5)
EST. GFR  (NO RACE VARIABLE): 26.8 ML/MIN/1.73 M^2
FIO2: 40 %
GLUCOSE SERPL-MCNC: 130 MG/DL (ref 70–110)
HCT VFR BLD AUTO: 23.1 % (ref 40–54)
HGB BLD-MCNC: 6.9 G/DL (ref 14–18)
IMM GRANULOCYTES # BLD AUTO: 0.02 K/UL (ref 0–0.04)
IMM GRANULOCYTES NFR BLD AUTO: 0.5 % (ref 0–0.5)
LYMPHOCYTES # BLD AUTO: 0.4 K/UL (ref 1–4.8)
LYMPHOCYTES NFR BLD: 9.4 % (ref 18–48)
MAGNESIUM SERPL-MCNC: 1.9 MG/DL (ref 1.6–2.6)
MCH RBC QN AUTO: 24.7 PG (ref 27–31)
MCHC RBC AUTO-ENTMCNC: 29.9 G/DL (ref 32–36)
MCV RBC AUTO: 83 FL (ref 82–98)
MODIFIED ALLEN'S TEST: ABNORMAL
MONOCYTES # BLD AUTO: 0.5 K/UL (ref 0.3–1)
MONOCYTES NFR BLD: 11 % (ref 4–15)
NEUTROPHILS # BLD AUTO: 3.4 K/UL (ref 1.8–7.7)
NEUTROPHILS NFR BLD: 78.4 % (ref 38–73)
NRBC BLD-RTO: 0 /100 WBC
NUM UNITS TRANS PACKED RBC: NORMAL
O2DEVICE: ABNORMAL
OB PNL STL: POSITIVE
PCO2 BLDA: 33 MMHG (ref 35–45)
PEEP: 5
PH SMN: 7.45 [PH] (ref 7.34–7.45)
PLATELET # BLD AUTO: 165 K/UL (ref 150–450)
PMV BLD AUTO: 11.1 FL (ref 9.2–12.9)
PO2 BLDA: 149 MMHG (ref 80–100)
POC BASE DEFICIT: -1 MMOL/L
POC HCO3: 23.8 MMOL/L
POC PERFORMED BY: ABNORMAL
POC SATURATED O2: 99.8 %
POC TCO2: 49.3 ML/DL
POC TEMPERATURE: 37 C
POTASSIUM SERPL-SCNC: 3.5 MMOL/L (ref 3.5–5.1)
PROT SERPL-MCNC: 5.9 G/DL (ref 6–8.4)
RBC # BLD AUTO: 2.79 M/UL (ref 4.6–6.2)
SODIUM SERPL-SCNC: 139 MMOL/L (ref 136–145)
SPECIMEN SOURCE: ABNORMAL
VANCOMYCIN SERPL-MCNC: 18.3 UG/ML
VT: 500
WBC # BLD AUTO: 4.35 K/UL (ref 3.9–12.7)

## 2022-09-07 PROCEDURE — 27000207 HC ISOLATION

## 2022-09-07 PROCEDURE — P9016 RBC LEUKOCYTES REDUCED: HCPCS | Performed by: INTERNAL MEDICINE

## 2022-09-07 PROCEDURE — 25000003 PHARM REV CODE 250: Performed by: INTERNAL MEDICINE

## 2022-09-07 PROCEDURE — 63600175 PHARM REV CODE 636 W HCPCS: Performed by: INTERNAL MEDICINE

## 2022-09-07 PROCEDURE — 94640 AIRWAY INHALATION TREATMENT: CPT

## 2022-09-07 PROCEDURE — 25000003 PHARM REV CODE 250: Performed by: STUDENT IN AN ORGANIZED HEALTH CARE EDUCATION/TRAINING PROGRAM

## 2022-09-07 PROCEDURE — 36430 TRANSFUSION BLD/BLD COMPNT: CPT

## 2022-09-07 PROCEDURE — 99900031 HC PATIENT EDUCATION (STAT)

## 2022-09-07 PROCEDURE — 83735 ASSAY OF MAGNESIUM: CPT | Performed by: INTERNAL MEDICINE

## 2022-09-07 PROCEDURE — 20000000 HC ICU ROOM

## 2022-09-07 PROCEDURE — 80053 COMPREHEN METABOLIC PANEL: CPT | Performed by: INTERNAL MEDICINE

## 2022-09-07 PROCEDURE — 94003 VENT MGMT INPAT SUBQ DAY: CPT

## 2022-09-07 PROCEDURE — 86850 RBC ANTIBODY SCREEN: CPT | Performed by: INTERNAL MEDICINE

## 2022-09-07 PROCEDURE — 99900035 HC TECH TIME PER 15 MIN (STAT)

## 2022-09-07 PROCEDURE — 36600 WITHDRAWAL OF ARTERIAL BLOOD: CPT

## 2022-09-07 PROCEDURE — 85025 COMPLETE CBC W/AUTO DIFF WBC: CPT | Performed by: INTERNAL MEDICINE

## 2022-09-07 PROCEDURE — 25000242 PHARM REV CODE 250 ALT 637 W/ HCPCS: Performed by: INTERNAL MEDICINE

## 2022-09-07 PROCEDURE — 36415 COLL VENOUS BLD VENIPUNCTURE: CPT | Performed by: INTERNAL MEDICINE

## 2022-09-07 PROCEDURE — 80202 ASSAY OF VANCOMYCIN: CPT | Performed by: INTERNAL MEDICINE

## 2022-09-07 PROCEDURE — 82803 BLOOD GASES ANY COMBINATION: CPT

## 2022-09-07 PROCEDURE — 86920 COMPATIBILITY TEST SPIN: CPT | Performed by: INTERNAL MEDICINE

## 2022-09-07 PROCEDURE — 82272 OCCULT BLD FECES 1-3 TESTS: CPT | Performed by: NURSE PRACTITIONER

## 2022-09-07 PROCEDURE — 94761 N-INVAS EAR/PLS OXIMETRY MLT: CPT

## 2022-09-07 PROCEDURE — 99900026 HC AIRWAY MAINTENANCE (STAT)

## 2022-09-07 PROCEDURE — 27000221 HC OXYGEN, UP TO 24 HOURS

## 2022-09-07 RX ORDER — HYDROCODONE BITARTRATE AND ACETAMINOPHEN 500; 5 MG/1; MG/1
TABLET ORAL
Status: DISCONTINUED | OUTPATIENT
Start: 2022-09-07 | End: 2022-09-12

## 2022-09-07 RX ORDER — OXCARBAZEPINE 300 MG/1
300 TABLET, FILM COATED ORAL 2 TIMES DAILY
Status: DISCONTINUED | OUTPATIENT
Start: 2022-09-07 | End: 2022-09-13

## 2022-09-07 RX ORDER — FAMOTIDINE 20 MG/1
20 TABLET, FILM COATED ORAL DAILY
Status: DISCONTINUED | OUTPATIENT
Start: 2022-09-07 | End: 2022-09-13

## 2022-09-07 RX ORDER — ATORVASTATIN CALCIUM 40 MG/1
40 TABLET, FILM COATED ORAL DAILY
Status: DISCONTINUED | OUTPATIENT
Start: 2022-09-07 | End: 2022-09-13

## 2022-09-07 RX ORDER — ACETAMINOPHEN 325 MG/1
650 TABLET ORAL EVERY 8 HOURS PRN
Status: DISCONTINUED | OUTPATIENT
Start: 2022-09-07 | End: 2022-09-13

## 2022-09-07 RX ADMIN — PIPERACILLIN AND TAZOBACTAM 4.5 G: 4; .5 INJECTION, POWDER, LYOPHILIZED, FOR SOLUTION INTRAVENOUS; PARENTERAL at 09:09

## 2022-09-07 RX ADMIN — IPRATROPIUM BROMIDE AND ALBUTEROL SULFATE 3 ML: 2.5; .5 SOLUTION RESPIRATORY (INHALATION) at 12:09

## 2022-09-07 RX ADMIN — DEXTROSE, SODIUM CHLORIDE, AND POTASSIUM CHLORIDE: 5; .45; .15 INJECTION INTRAVENOUS at 05:09

## 2022-09-07 RX ADMIN — ATORVASTATIN CALCIUM 40 MG: 40 TABLET, FILM COATED ORAL at 09:09

## 2022-09-07 RX ADMIN — PIPERACILLIN AND TAZOBACTAM 4.5 G: 4; .5 INJECTION, POWDER, LYOPHILIZED, FOR SOLUTION INTRAVENOUS; PARENTERAL at 07:09

## 2022-09-07 RX ADMIN — MUPIROCIN: 20 OINTMENT TOPICAL at 09:09

## 2022-09-07 RX ADMIN — OXCARBAZEPINE 300 MG: 300 TABLET, FILM COATED ORAL at 09:09

## 2022-09-07 RX ADMIN — IPRATROPIUM BROMIDE AND ALBUTEROL SULFATE 3 ML: 2.5; .5 SOLUTION RESPIRATORY (INHALATION) at 07:09

## 2022-09-07 RX ADMIN — ACETAMINOPHEN 650 MG: 325 TABLET ORAL at 03:09

## 2022-09-07 RX ADMIN — RISPERIDONE 0.5 MG: 0.5 TABLET ORAL at 09:09

## 2022-09-07 RX ADMIN — ACETAMINOPHEN 650 MG: 325 TABLET ORAL at 05:09

## 2022-09-07 RX ADMIN — FAMOTIDINE 20 MG: 20 TABLET ORAL at 09:09

## 2022-09-07 RX ADMIN — PROPOFOL 10 MCG/KG/MIN: 10 INJECTION, EMULSION INTRAVENOUS at 03:09

## 2022-09-07 RX ADMIN — DEXTROSE, SODIUM CHLORIDE, AND POTASSIUM CHLORIDE: 5; .45; .15 INJECTION INTRAVENOUS at 03:09

## 2022-09-07 RX ADMIN — PIPERACILLIN AND TAZOBACTAM 4.5 G: 4; .5 INJECTION, POWDER, LYOPHILIZED, FOR SOLUTION INTRAVENOUS; PARENTERAL at 01:09

## 2022-09-07 RX ADMIN — APIXABAN 5 MG: 5 TABLET, FILM COATED ORAL at 09:09

## 2022-09-07 RX ADMIN — VANCOMYCIN HYDROCHLORIDE 1250 MG: 1.25 INJECTION, POWDER, LYOPHILIZED, FOR SOLUTION INTRAVENOUS at 02:09

## 2022-09-07 NOTE — PROGRESS NOTES
[facility] Medicine  Progress Note    Patient Name: Jamey Lei  MRN: [id number]  Patient Class: IP- Inpatient   Admission Date: 9/2/2022  Length of Stay: 5 days  Attending Physician: Navi Domínguez III, MD  Primary Care Provider: Navi Domínguez III, MD        Subjective:     Principal Problem:Pneumonia        HPI:  81 year old male with hypertension, hypoerlipidemia, CHF, permanent atrial fibriallation on Eliquis, CAD s/p CABGx3, CKD stage 3, recent lung and brain mass findings presents to ED with worsening shortness of breath that started about a week ago with bilateral leg swelling with difficulty ambulating with his walker.     Patient hard of hearing endorsed since treatment in the ED, he has been feeling better. At home he does not use oxygen, breathing at SpO2 >95% on continuous O2 2L via nasal cannula on exam. There is no change in appetite, nausea, vomiting, diarrhea, constipation. Denies headaches, fever, chills, vision changes, chest pain, abdominal pain. Denies joint pain and loss of sensation in toes and foot bilaterally with increasing leg swelling.    ED course:  Tachypnea rate >30, CXR right lower lobe pneumonia, positive influenza A and COVID19 (fully vaccinated), NTproBNT >6000, lactate <0.2 x2, blood cultures x2 pending,  BUN/Cr 33/1.8 with improved eGFR from last hospitalization and discharge one week ago. Patient started on IV lasix 80 mg, responding well with clear urine collecting in Sanders catheter and IV antibiotics empirically started for right lobe pneumonia.       Overview/Hospital Course:  9/3/22 CG:  Says it feels much better and does complain of a cough.  He has had to go to the bathroom and is waiting for help and assistance getting up from the bed to go to the restroom.  No other acute overnight events.  9/4/22 CG: Doing better, still has a cough and feels weak.   9/5/22 CG:  Became more agitated last night and was trying to rip out his Sanders catheter.   Had to be given Seroquel to help him sleep and to calm him down.  9/6/22 FM:  Events of the weekend are noted.  Patient was admitted with COVID and influenza and a deterioration in his condition.  Patient has a history of lung cancer and is seeing an oncologist at an outside facility.  Patient has a history of chronic kidney disease as well as obesity diabetes neuropathy atrial fibrillation and his general health has come line significantly over the last year.  The patient has been in and out of the hospital and nursing facilities.  Last night the patient had a rapid response and ended up on the ventilator.  I reviewed the events and I am unsure if this was related to Seroquel will and I also see that he was becoming hypotensive.  Patient's chest x-ray is abnormal I will discuss with the family this morning.  9/7/22 FM:  Met with family today.  They were unable to make a decision on do not resuscitate.  They will consult with other family members.  Patient's volume status is improved as he was dry yesterday.  Patient's renal numbers all reviewed and noted.  Patient is able to open eyes on the ventilator and follow simple commands.  Will continue ventilatory support today as well as tube feedings.      Past Medical History:   Diagnosis Date    A-fib     Abdominal pain, epigastric     Abdominal pain, generalized     Acute on chronic congestive heart failure 12/29/2021    Anticoagulant long-term use     Arthritis     Asteatotic eczema     Benign essential HTN     Benign prostatic hyperplasia     Brain tumor     Cardiovascular accident     Carotid artery stenosis     CHF (congestive heart failure)     CKD (chronic kidney disease), stage IV     Constipation     COPD (chronic obstructive pulmonary disease)     Depressed     DM (diabetes mellitus), secondary     Drug eruption     Eczema     Edema     Encounter for blood transfusion     Fever     H. pylori infection     Hearing loss     History of  tobacco use     Hypercholesterolemia     Hypertensive renal disease, benign     Hypokalemia     Hyponatremia     Impaired fasting glucose     Lumbar pain     Lung mass     Malaise and fatigue     Malignant neoplasm of prostate     Meningioma     Mixed hyperlipidemia     Nasal bleeding     Neuropathy     Obesity, unspecified     SHAYNE (obstructive sleep apnea)     Osteoarthritis of knee     Osteoarthritis of multiple joints     Peripheral vascular disease, unspecified     Psychosis     Renal disorder     Seizure disorder     Seizures     Sleep apnea     Unspecified cataract     Unspecified chronic bronchitis     Unspecified osteoarthritis, unspecified site     Vertigo     Vitamin D deficiency     Wheezing        Past Surgical History:   Procedure Laterality Date    APPENDECTOMY      COLONOSCOPY      CORONARY ARTERY BYPASS GRAFT      FOOT SURGERY Left     HIP REPLACEMENT ARTHROPLASTY Right     HIP SURGERY      JOINT REPLACEMENT      KNEE SURGERY      LUNG BIOPSY Right 03/03/2022    benign    SHOULDER SURGERY Left     SHOULDER SURGERY      TOTAL KNEE ARTHROPLASTY Right     triple bypass         Review of patient's allergies indicates:   Allergen Reactions    Zolpidem        No current facility-administered medications on file prior to encounter.     Current Outpatient Medications on File Prior to Encounter   Medication Sig    albuterol (PROVENTIL/VENTOLIN HFA) 90 mcg/actuation inhaler Inhale 1-2 puffs into the lungs every 6 (six) hours as needed for Wheezing or Shortness of Breath. Rescue    albuterol-ipratropium (DUO-NEB) 2.5 mg-0.5 mg/3 mL nebulizer solution inhale 3 milliliters by nebulization route 4 times per day and as needed, up to 6 doses per day for 30 days    apixaban (ELIQUIS) 5 mg Tab Take 5 mg by mouth 2 (two) times daily.    aspirin (ECOTRIN) 81 MG EC tablet Take 81 mg by mouth once daily.    atorvastatin (LIPITOR) 20 MG tablet Take 1 tablet (20 mg total) by  mouth once daily.    multivitamin capsule Take 1 capsule by mouth once daily.    OXcarbazepine (TRILEPTAL) 300 MG Tab Take 1 tablet (300 mg total) by mouth 2 (two) times daily.    senna (SENOKOT) 8.6 mg tablet 2 tablets at bedtime as needed    tamsulosin (FLOMAX) 0.4 mg Cap Take 0.4 mg by mouth once daily.    torsemide (DEMADEX) 20 MG Tab Take 1 tablet (20 mg total) by mouth daily as needed (weight gain greater than 5 lbs).    vitamin D (VITAMIN D3) 1000 units Tab Take 1,000 Units by mouth once daily.    [DISCONTINUED] calcium carbonate (OS-DAVID) 600 mg calcium (1,500 mg) Tab Take 600 mg by mouth once.    [DISCONTINUED] carvediloL (COREG) 6.25 MG tablet TAKE ONE TABLET BY MOUTH TWICE DAILY (EVERY 12 HOURS) FOR BLOOD PRESSURE. THANKS !!    [DISCONTINUED] ezetimibe (ZETIA) 10 mg tablet Take 10 mg by mouth once daily.    [DISCONTINUED] FLUoxetine 20 MG capsule     [DISCONTINUED] gabapentin (NEURONTIN) 300 MG capsule 1 capsule    [DISCONTINUED] miconazole NITRATE 2 % (MICOTIN) 2 % top powder Apply topically as needed for Itching.    [DISCONTINUED] risperiDONE (RISPERDAL) 0.5 MG Tab Take 1 tablet (0.5 mg total) by mouth nightly as needed (agitation and halluctions).     Family History       Problem Relation (Age of Onset)    Arthritis Mother, Father    Asthma Sister    Heart disease Father    Hyperlipidemia Mother, Sister    Hypertension Mother, Father, Sister, Brother          Tobacco Use    Smoking status: Every Day    Smokeless tobacco: Never    Tobacco comments:     quit 30 years ago   Substance and Sexual Activity    Alcohol use: Not Currently    Drug use: Never    Sexual activity: Not Currently     Review of Systems   Unable to perform ROS: Intubated   Objective:     Vital Signs (Most Recent):  Temp: (!) 100.9 °F (38.3 °C) (09/07/22 0621)  Pulse: (!) 51 (09/07/22 0756)  Resp: 20 (09/07/22 0756)  BP: (!) 154/68 (09/07/22 0700)  SpO2: 100 % (09/07/22 0756)   Vital Signs (24h Range):  Temp:  [97.8 °F  (36.6 °C)-101.2 °F (38.4 °C)] 100.9 °F (38.3 °C)  Pulse:  [45-69] 51  Resp:  [20-41] 20  SpO2:  [82 %-100 %] 100 %  BP: ()/(42-77) 154/68     Weight: 103 kg (227 lb 1.2 oz)  Body mass index is 33.53 kg/m².    Physical Exam  Constitutional:       General: He is in acute distress.      Appearance: He is obese. He is ill-appearing. He is not toxic-appearing.   HENT:      Head: Normocephalic.      Right Ear: External ear normal.      Left Ear: External ear normal.      Nose: No congestion or rhinorrhea.      Mouth/Throat:      Mouth: Mucous membranes are dry.      Pharynx: Oropharynx is clear.   Eyes:      General: No scleral icterus.     Extraocular Movements: Extraocular movements intact.      Conjunctiva/sclera: Conjunctivae normal.      Pupils: Pupils are equal, round, and reactive to light.   Cardiovascular:      Rate and Rhythm: Tachycardia present. Rhythm irregular.      Heart sounds: No murmur heard.  Pulmonary:      Breath sounds: Rhonchi and rales present.      Comments: Reduced breath sounds to lung bases bilaterally  Chest:      Chest wall: No tenderness.   Abdominal:      General: There is no distension.      Palpations: Abdomen is soft.      Tenderness: There is no abdominal tenderness. There is no right CVA tenderness, left CVA tenderness or guarding.   Musculoskeletal:      Cervical back: Neck supple.   Lymphadenopathy:      Cervical: No cervical adenopathy.   Skin:     General: Skin is warm and dry.      Comments: Hemosiderin staining bilateral LE   Neurological:      Comments: Eyes open, alert, ET in place         CRANIAL NERVES     CN III, IV, VI   Pupils are equal, round, and reactive to light.     Significant Labs: All pertinent labs within the past 24 hours have been reviewed.  A1C: No results for input(s): HGBA1C in the last 4320 hours.  ABGs:   Recent Labs   Lab 09/06/22  0514 09/07/22  0452   PH 7.403 7.452*   PCO2 40.9 33.0*   HCO3 25.0 23.8   POCSATURATED 96.7 99.8   PO2 87.1 149*        Blood Culture: No results for input(s): LABBLOO in the last 48 hours.    BMP:   Recent Labs   Lab 09/07/22  0534   *      K 3.5      CO2 25   BUN 33*   CREATININE 2.4*   CALCIUM 8.2*   MG 1.9       CBC:   Recent Labs   Lab 09/06/22  0611 09/07/22  0534   WBC 4.61 4.35   HGB 7.6* 6.9*   HCT 25.5* 23.1*    165       CMP:   Recent Labs   Lab 09/06/22  0611 09/07/22  0534    139   K 3.2* 3.5    110   CO2 27 25   * 130*   BUN 30* 33*   CREATININE 1.9* 2.4*   CALCIUM 8.5* 8.2*   PROT 6.3 5.9*   ALBUMIN 2.5* 2.2*   BILITOT 0.3 0.4   ALKPHOS 50* 43*   AST 16 19   ALT 16 15   ANIONGAP 5* 4*       No results for input(s): LACTATE in the last 48 hours.    No results for input(s): COLORU, APPEARANCEUA, PHUR, SPECGRAV, PROTEINUA, GLUCUA, KETONESU, BILIRUBINUA, OCCULTUA, NITRITE, UROBILINOGEN, LEUKOCYTESUR, RBCUA, WBCUA, BACTERIA, SQUAMEPITHEL, HYALINECASTS in the last 48 hours.    Invalid input(s): WRIGHTSUR    X-Ray Chest 1 View  Narrative: EXAMINATION:  XR CHEST 1 VIEW    CLINICAL HISTORY:  ng tube placement;    COMPARISON:  Portable chest 09/06/2022 at 04:42 hours.    FINDINGS:  Frontal chest radiograph demonstrates interval placement of a nasogastric tube with tip projecting over the gastric body.  Endotracheal tube remains in place.  Right lower lung zone and left midlung zone opacities again noted.  Impression: Interval placement of a nasogastric tube.    Electronically signed by: Jamey Gannon MD  Date:    09/06/2022  Time:    10:39  X-Ray Chest 1 View  Narrative: EXAMINATION:  XR CHEST 1 VIEW    CLINICAL HISTORY:  coded;    COMPARISON:  Portable chest 09/02/2022 at 10:44 hours.    FINDINGS:  Frontal chest radiograph demonstrates interval intubation with tip of tube located at level of clavicular heads.  Right lower lung zone and left mid lung zone opacities, slightly improved.  No pleural fluid.  Cardiac silhouette is normal in size.  Prior cardiac surgery.  No  osseous abnormality.  Impression: 1. Interval intubation.  2. Bilateral airspace disease, slightly improved.    Electronically signed by: Jaemy Gannon MD  Date:    09/06/2022  Time:    09:42   Significant Imaging: I have reviewed all pertinent imaging results/findings within the past 24 hours.      Assessment/Plan:      * Pneumonia  CXR findings with new right lower lobe pneumonia. Bilateral infiltrates likely secondary to pulmonary edema from infectious.  - supplement oxygen as needed SpO2 >92-94%  - daily pulmonary hygiene  - continue coverage for community acquired PNA, azithrmoycin and rocephin  - f/u blood cultures x2 pending  - trend CBC  9/6 FM:  Now in ICU, expand coverage, speak with family.  9/7 FM:  Rec DNR, hospice care, family to discuss.        Malignant neoplasm of lung  Poor prognosis.      Tachypnea        COVID-19  Patient is identified as mild moderate severity.   Initiate standard COVID protocols; COVID-19 testing ,Infection Control notification  and isolation- respiratory, contact and droplet per protocol    Diagnostics: (leukopenia, hyponatremia, hyperferritinemia, elevated troponin, elevated d-dimer, age, and comorbidities are significant predictors of poor clinical outcome)  CBC, CMP and Portable CXR    Management: Initiate targeted therapy with possible use of Paxlovid. Maintain oxygen saturations 92-96% via Nasal Cannula  LPM and monitor with continuous/intermittent pulse oximetry. Inhaled bronchodilators as needed for shortness of breath., Continuous cardiac monitoring. and Manage respiratory failure (O2 requirement >10LPM or needing NIPPV/Mechanical ventilation) and/or Pneumonia (active chest infiltrates) separately as described below.  Empirically started on IV antibiotics for right lower lobe pneumonia on chest xray.   Follow up blood cultures and adjust antibiotics accordingly.     Influenza  Positive flu A. Continue tamiflu. Supplemental oxygen as needed to maintain >94%.      SOB  (shortness of breath)  Multifactorial, cardiopulmonary (CHF, atrial fibrillation, COPD) and infectious processes (COVID19 and fluA).  SpO2 maintained >95% on 2L NC at this time.  - see above management.        CHF (congestive heart failure)        Stage 3 chronic kidney disease  Stable BUN/Cr 33/1.8 likely at new baseline with recovering eGFR of >40 from <20 5 weeks ago. Continue to monitor as patient on IV diuretics.       Seizure disorder        A-fib  Patient with Permanent atrial fibrillation which is controlled currently with No rate controlling home medications at this time. . Patient is currently in atrial fibrillation.[id number] Score: 4. HASBLED Score: Unable to calculate. Anticoagulation indicated. Anticoagulation done with eliquis 5 mg BID.    COPD (chronic obstructive pulmonary disease)        Localized edema  Home med diuretic torsemide 20 mg PRN. +3 pitting on exam. Patient responding to IV lasix 80 mg in ED.   Continue with IV diuretics and taper with progress, patient endorses breathing a little better.   - IV lasix 40 mg BID  - restrict PO intake to 1.5 L fluid ounces  - monitor I/Os  9/6 FM:  Now hypotension, low dose IVFs  9/7 FM:  Volume improved, was dry.    Weakness          VTE Risk Mitigation (From admission, onward)         Ordered     apixaban tablet 5 mg  2 times daily         09/02/22 1719     IP VTE HIGH RISK PATIENT  Once         09/02/22 1513     Place sequential compression device  Until discontinued         09/02/22 1513                Discharge Planning   GLORY:      Code Status: Full Code   Is the patient medically ready for discharge?:     Reason for patient still in hospital (select all that apply): Patient unstable  Discharge Plan A: Home with family, Home Health                  Navi Domínguez III, MD  Department of Hospital Medicine   [place] [facility]

## 2022-09-07 NOTE — ASSESSMENT & PLAN NOTE
CXR findings with new right lower lobe pneumonia. Bilateral infiltrates likely secondary to pulmonary edema from infectious.  - supplement oxygen as needed SpO2 >92-94%  - daily pulmonary hygiene  - continue coverage for community acquired PNA, azithrmoycin and rocephin  - f/u blood cultures x2 pending  - trend CBC  9/6 FM:  Now in ICU, expand coverage, speak with family.  9/7 FM:  Rec DNR, hospice care, family to discuss.

## 2022-09-07 NOTE — PLAN OF CARE
Patient has been awake all night. Calm, no distress noted. High residual noted at 0400 from tube feeding, stopped for one hour and restarted at 30cc/hr. Urine output was 400cc overnight. He was febrile at 100.1 at midnight the up to 101.2 Tylenol given per peg now 100. 7    Problem: Infection  Goal: Absence of Infection Signs and Symptoms  Outcome: Ongoing, Progressing     Problem: Adult Inpatient Plan of Care  Goal: Plan of Care Review  Outcome: Ongoing, Progressing  Goal: Patient-Specific Goal (Individualized)  Outcome: Ongoing, Progressing  Goal: Absence of Hospital-Acquired Illness or Injury  Outcome: Ongoing, Progressing  Goal: Optimal Comfort and Wellbeing  Outcome: Ongoing, Progressing  Goal: Readiness for Transition of Care  Outcome: Ongoing, Progressing     Problem: Fluid and Electrolyte Imbalance (Acute Kidney Injury/Impairment)  Goal: Fluid and Electrolyte Balance  Outcome: Ongoing, Progressing     Problem: Oral Intake Inadequate (Acute Kidney Injury/Impairment)  Goal: Optimal Nutrition Intake  Outcome: Ongoing, Progressing     Problem: Renal Function Impairment (Acute Kidney Injury/Impairment)  Goal: Effective Renal Function  Outcome: Ongoing, Progressing     Problem: Fluid Imbalance (Pneumonia)  Goal: Fluid Balance  Outcome: Ongoing, Progressing     Problem: Infection (Pneumonia)  Goal: Resolution of Infection Signs and Symptoms  Outcome: Ongoing, Progressing     Problem: Respiratory Compromise (Pneumonia)  Goal: Effective Oxygenation and Ventilation  Outcome: Ongoing, Progressing     Problem: Diabetes Comorbidity  Goal: Blood Glucose Level Within Targeted Range  Outcome: Ongoing, Progressing     Problem: Fall Injury Risk  Goal: Absence of Fall and Fall-Related Injury  Outcome: Ongoing, Progressing     Problem: Restraint, Nonbehavioral (Nonviolent)  Goal: Absence of Harm or Injury  Outcome: Ongoing, Progressing     Problem: Communication Impairment (Mechanical Ventilation, Invasive)  Goal: Effective  Communication  Outcome: Ongoing, Progressing     Problem: Device-Related Complication Risk (Mechanical Ventilation, Invasive)  Goal: Optimal Device Function  Outcome: Ongoing, Progressing     Problem: Inability to Wean (Mechanical Ventilation, Invasive)  Goal: Mechanical Ventilation Liberation  Outcome: Ongoing, Progressing     Problem: Nutrition Impairment (Mechanical Ventilation, Invasive)  Goal: Optimal Nutrition Delivery  Outcome: Ongoing, Progressing     Problem: Skin and Tissue Injury (Mechanical Ventilation, Invasive)  Goal: Absence of Device-Related Skin and Tissue Injury  Outcome: Ongoing, Progressing     Problem: Ventilator-Induced Lung Injury (Mechanical Ventilation, Invasive)  Goal: Absence of Ventilator-Induced Lung Injury  Outcome: Ongoing, Progressing     Problem: Communication Impairment (Artificial Airway)  Goal: Effective Communication  Outcome: Ongoing, Progressing     Problem: Device-Related Complication Risk (Artificial Airway)  Goal: Optimal Device Function  Outcome: Ongoing, Progressing     Problem: Skin and Tissue Injury (Artificial Airway)  Goal: Absence of Device-Related Skin or Tissue Injury  Outcome: Ongoing, Progressing     Problem: Noninvasive Ventilation Acute  Goal: Effective Unassisted Ventilation and Oxygenation  Outcome: Ongoing, Progressing     Problem: Skin Injury Risk Increased  Goal: Skin Health and Integrity  Outcome: Ongoing, Progressing

## 2022-09-07 NOTE — PROGRESS NOTES
Pharmacokinetic Assessment Follow Up: IV Vancomycin    Vancomycin serum concentration assessment(s):    The random level was drawn correctly and can be used to guide therapy at this time. The measurement is within the desired definitive target range of 15 to 20 mcg/mL.    Patient's level returned as 18. Initiated a one time 1250 mg dose with a pending level tomorrow with am labs    Patient at risk of accumulation because of weight. Monitor levels closely    Vancomycin Regimen Plan:    Re-dose when the random level is less than 20 mcg/mL, next level to be drawn at 0430 with am labs  on 9/8/2022    Drug levels (last 3 results):  Recent Labs   Lab Result Units 09/07/22  0535   Vancomycin, Random ug/mL 18.3       Pharmacy will continue to follow and monitor vancomycin.    Please contact pharmacy at extension [phone] for questions regarding this assessment.    Thank you for the consult,   Nolan Huff, Pharm.D.  Inpatient Pharmacist       Patient brief summary:  Jamey Lei is a 81 y.o. male initiated on antimicrobial therapy with IV Vancomycin for treatment of bacteremia    The patient's current regimen is pulse dosing    Drug Allergies:   Review of patient's allergies indicates:   Allergen Reactions    Zolpidem        Actual Body Weight:   103 kg  BMI: 33.53    Renal Function:   Estimated Creatinine Clearance: 29.5 mL/min (A) (based on SCr of 2.4 mg/dL (H)).,     Dialysis Method (if applicable):  N/A    CBC (last 72 hours):  Recent Labs   Lab Result Units 09/04/22  1231 09/05/22  0633 09/06/22  0611 09/07/22  0534   WBC K/uL 3.75* 3.29* 4.61 4.35   Hemoglobin g/dL 8.0* 7.9* 7.6* 6.9*   Hematocrit % 26.7* 26.1* 25.5* 23.1*   Platelets K/uL 170 160 177 165   Gran % % 72.2 49.9 75.9* 78.4*   Lymph % % 17.1* 35.9 12.4* 9.4*   Mono % % 9.6 11.2 9.8 11.0   Eosinophil % % 0.3 2.7 1.5 0.5   Basophil % % 0.5 0.3 0.2 0.2   Differential Method  Automated Automated Automated Automated       Metabolic Panel (last 72  hours):  Recent Labs   Lab Result Units 09/04/22  1231 09/05/22  0633 09/06/22  0611 09/07/22  0534   Sodium mmol/L 142 143 142 139   Potassium mmol/L 4.2 3.6 3.2* 3.5   Chloride mmol/L 109 108 110 110   CO2 mmol/L 28 29 27 25   Glucose mg/dL 136* 86 157* 130*   BUN mg/dL 33* 26* 30* 33*   Creatinine mg/dL 1.7* 1.4 1.9* 2.4*   Albumin g/dL 2.5* 2.5* 2.5* 2.2*   Total Bilirubin mg/dL 0.2 0.3 0.3 0.4   Alkaline Phosphatase U/L 53* 48* 50* 43*   AST U/L 21 19 16 19   ALT U/L 18 16 16 15   Magnesium mg/dL  --   --   --  1.9       Vancomycin Administrations:  vancomycin given in the last 96 hours                     vancomycin 2 g in 0.9% sodium chloride 500 mL IVPB (mg) 2,000 mg New Bag 09/06/22 1245                    Microbiologic Results:  Microbiology Results (last 7 days)       Procedure Component Value Units Date/Time    Blood culture x two cultures. Draw prior to antibiotics. [[id number]] Collected: 09/02/22 1141    Order Status: Completed Specimen: Blood Updated: 09/07/22 0612     Blood Culture, Routine No Growth to date      No Growth to date      No Growth to date      No Growth to date      No Growth to date    Narrative:      Aerobic and anaerobic    Blood culture x two cultures. Draw prior to antibiotics. [[id number]] Collected: 09/02/22 1134    Order Status: Completed Specimen: Blood Updated: 09/07/22 0612     Blood Culture, Routine No Growth to date      No Growth to date      No Growth to date      No Growth to date      No Growth to date    Narrative:      Aerobic and anaerobic    Culture, Respiratory with Gram Stain [[id number]] Collected: 09/06/22 0603    Order Status: Completed Specimen: Respiratory from Sputum Updated: 09/06/22 2153     Gram Stain (Respiratory) <10 epithelial cells per low power field.     Gram Stain (Respiratory) Rare WBC's     Gram Stain (Respiratory) No organisms seen    Blood Culture #1 **CANNOT BE ORDERED STAT** [[id number]]     Order Status: Canceled Specimen: Blood

## 2022-09-07 NOTE — EICU
Rounding (Video Assessment):  Yes    Chart, labs, lines reviewed    NGT LDA added    Worsening creatinine -2.4, up from 1.9 on 9/6    H&H drop: 6.9/23.1, down from 7.6/25.5 on 9/6    Lung CA, Covid PNA, Flu A +  FULL CODE    Pt in bed, intubated via ETT on ventilator. 45% FiO2, O2 sat 100%. Sedated with Propofol-No distress observed.. Other VSS. Family at bedside, role of eICU explained. Questions answered.

## 2022-09-07 NOTE — EICU
Rounding (Video Assessment):  Yes    Intervention Initiated From:  [facility] / EICU    Comments: Pt ventilated. Vent FiO2 at 60%.  VSS.  Pt resting with eyes closed.  Propofol, Zosyn, IV fluids, and Tube feeds are infusing.  NAD observed.

## 2022-09-07 NOTE — CONSULTS
[place] - Intensive Care  Adult Nutrition  Consult Note    SUMMARY     Recommendations  1. Rec'd EN: Suplenna at rate of of 10mL/hr increasing by 10mL every 4-6 hrs to goal rate of 50mL/hr to provide 2160 kcals (95% EEN), 54g of protein (43% EPN), and 206mL water with propofol infusing@ 6.2mL/hr providing an additional 164 kcals.    2. Rec'd Lino BID via NG tube to promote wound healing and provide 190 kcals and 5g of protein.   -Do not mix Lino with formula in a tube-feeding bag.   -Pour one packet of Lino in a clean, small (approximately 6- to 8-fl-oz) container for mixing.   -Add 4 fl oz (120 mL) water at room temperature.   -Mix well with disposable spoon or tongue blade until all particles are completely hydrated.   -Flush feeding tube with 30 mL water. -Administer Lino through feeding tube using a 60-mL or larger syringe. -Flush with an additional 30 mL water.   3. RD to follow and make rec's accordingly  Goals: Pt will reach TF goal rate within 48 hrs of inititation.  Nutrition Goal Status: new  Communication of RD Recs: other (comment) (Documented in POC)    Assessment and Plan    Nutrition Problem  Inadequate oral intake    Related to (etiology):   NPO/intubation status    Signs and Symptoms (as evidenced by):   0% PO intake    Interventions/Recommendations (treatment strategy):    Recommendations  1. Rec'd EN: Suplenna at rate of of 10mL/hr increasing by 10mL every 4-6 hrs to goal rate of 50mL/hr to provide 2160 kcals (95% EEN), 54g of protein (43% EPN), and 206mL water with propofol infusing@ 6.2mL/hr providing an additional 164 kcals.    2. Rec'd Lino BID via NG tube to promote wound healing and provide 190 kcals and 5g of protein.   -Do not mix Lino with formula in a tube-feeding bag.   -Pour one packet of Lino in a clean, small (approximately 6- to 8-fl-oz) container for mixing.   -Add 4 fl oz (120 mL) water at room temperature.   -Mix well with disposable spoon or tongue blade until all  particles are completely hydrated.   -Flush feeding tube with 30 mL water. -Administer Lino through feeding tube using a 60-mL or larger syringe. -Flush with an additional 30 mL water.   3. RD to follow and make rec's accordingly    Nutrition Diagnosis Status:   New         Reason for Assessment    Reason For Assessment: consult, new tube feeding  Diagnosis: other (see comments) (Pneumonia)  Relevant Medical History: A-fib,Abdominal pain, epigastric,bdominal pain,  Acute on chronic congestive heart failure,nticoagulant long-term use,rthritis  ,Asteatotic eczema ,enign essential HTN    , Benign prostatic hyperplasia  ,Brain tumor   ,Cardiovascular accident  ,  Carotid artery stenosis  ,  CHF (congestive heart failure)   ,CKD , stage IV , Constipation ,COPD ,Depressed ,DM ), secondary    ,dug eruption ,Eczema  ,lashanda     Encounter for blood transfusion     Fever     H. pylori infection     Hearing loss     History of tobacco use     Hypercholesterolemia     Hypertensive renal disease, benign     Hypokalemia     Hyponatremia     Impaired fasting glucose     Lumbar pain     Lung mass     Malaise and fatigue     Malignant neoplasm of prostate     Meningioma     Mixed hyperlipidemia     Nasal bleeding     Neuropathy     Obesity, unspecified     SHAYNE (obstructive sleep apnea)     Osteoarthritis of knee     Osteoarthritis of multiple joints     Peripheral vascular disease, unspecified     Psychosis     Renal disorder     Seizure disorder     Seizures     Sleep apnea     Unspecified cataract     Unspecified chronic bronchitis     Unspecified osteoarthritis, unspecified site     Vertigo     Vitamin D deficiency     Wheezing  Interdisciplinary Rounds: did not attend  General Information Comments: RD consulted for  sacral wound.  Nutrition Discharge Planning: TBD as care progresses    Nutrition Risk Screen    Nutrition Risk Screen: tube feeding or parenteral nutrition    Nutrition/Diet History    Spiritual, Cultural Beliefs,  "Restorationism Practices, Values that Affect Care: no  Food Allergies: NKFA    Anthropometrics    Temp: (!) 101 °F (38.3 °C)  Height Method: Stated  Height: 5' 9" (175.3 cm)  Height (inches): 69 in  Weight Method: Bed Scale  Weight: 103 kg (227 lb 1.2 oz)  Weight (lb): 227.08 lb  Ideal Body Weight (IBW), Male: 160 lb  % Ideal Body Weight, Male (lb): 141.93 %  BMI (Calculated): 33.5  BMI Grade: 30 - 34.9- obesity - grade I    Lab/Procedures/Meds    Pertinent Labs Reviewed: reviewed  Pertinent Medications Reviewed: reviewed    Estimated/Assessed Needs    Weight Used For Calorie Calculations: 103 kg (227 lb 1.2 oz)  Energy Calorie Requirements (kcal): 2432  Energy Need Method: [facility]  Protein Requirements: 124-155 (1.2-1.5g/kg)  Weight Used For Protein Calculations: 103 kg (227 lb 1.2 oz)  Fluid Requirements (mL): 2432 (1mL/kcal)  Estimated Fluid Requirement Method: RDA Method  RDA Method (mL): 2432    Nutrition Prescription Ordered    Current Diet Order: NPO  Current Nutrition Support Formula Ordered: Suplena  Current Nutrition Support Rate Ordered: 40 (ml)  Current Nutrition Support Frequency Ordered: Continuous  Oral Nutrition Supplement: None    Evaluation of Received Nutrient/Fluid Intake    Enteral Calories (kcal): 2160  Enteral Protein (gm): 54  Enteral (Free Water) Fluid (mL): 206  Free Water Flush Fluid (mL): 100 (Q 8hrs)  Other Calories (kcal): 164 (Propofol infusing @6.2mL/hr)  Total Calories (kcal): 2324  % Kcal Needs: 102%  % Protein Needs: 43%  I/O: +278.3  Tolerance: tolerating  % Intake of Estimated Energy Needs: 75 - 100 %  % Meal Intake: NPO    Nutrition Risk    Level of Risk/Frequency of Follow-up: moderate       Monitor and Evaluation    Food and Nutrient Intake: enteral nutrition intake  Food and Nutrient Adminstration: enteral and parenteral nutrition administration  Knowledge/Beliefs/Attitudes: beliefs and attitudes, food and nutrition knowledge/skill  Physical Activity and Function: " nutrition-related ADLs and IADLs  Anthropometric Measurements: height/length, weight, weight change, body mass index  Biochemical Data, Medical Tests and Procedures: gastrointestinal profile, electrolyte and renal panel, inflammatory profile, glucose/endocrine profile, lipid profile  Nutrition-Focused Physical Findings: overall appearance       Nutrition Follow-Up    RD Follow-up?: Yes

## 2022-09-07 NOTE — ASSESSMENT & PLAN NOTE
Home med diuretic torsemide 20 mg PRN. +3 pitting on exam. Patient responding to IV lasix 80 mg in ED.   Continue with IV diuretics and taper with progress, patient endorses breathing a little better.   - IV lasix 40 mg BID  - restrict PO intake to 1.5 L fluid ounces  - monitor I/Os  9/6 FM:  Now hypotension, low dose IVFs  9/7 FM:  Volume improved, was dry.

## 2022-09-07 NOTE — ASSESSMENT & PLAN NOTE
Patient with Permanent atrial fibrillation which is controlled currently with No rate controlling home medications at this time. . Patient is currently in atrial fibrillation.[id number] Score: 4. HASBLED Score: Unable to calculate. Anticoagulation indicated. Anticoagulation done with eliquis 5 mg BID.

## 2022-09-07 NOTE — SUBJECTIVE & OBJECTIVE
Past Medical History:   Diagnosis Date    A-fib     Abdominal pain, epigastric     Abdominal pain, generalized     Acute on chronic congestive heart failure 12/29/2021    Anticoagulant long-term use     Arthritis     Asteatotic eczema     Benign essential HTN     Benign prostatic hyperplasia     Brain tumor     Cardiovascular accident     Carotid artery stenosis     CHF (congestive heart failure)     CKD (chronic kidney disease), stage IV     Constipation     COPD (chronic obstructive pulmonary disease)     Depressed     DM (diabetes mellitus), secondary     Drug eruption     Eczema     Edema     Encounter for blood transfusion     Fever     H. pylori infection     Hearing loss     History of tobacco use     Hypercholesterolemia     Hypertensive renal disease, benign     Hypokalemia     Hyponatremia     Impaired fasting glucose     Lumbar pain     Lung mass     Malaise and fatigue     Malignant neoplasm of prostate     Meningioma     Mixed hyperlipidemia     Nasal bleeding     Neuropathy     Obesity, unspecified     SHAYNE (obstructive sleep apnea)     Osteoarthritis of knee     Osteoarthritis of multiple joints     Peripheral vascular disease, unspecified     Psychosis     Renal disorder     Seizure disorder     Seizures     Sleep apnea     Unspecified cataract     Unspecified chronic bronchitis     Unspecified osteoarthritis, unspecified site     Vertigo     Vitamin D deficiency     Wheezing        Past Surgical History:   Procedure Laterality Date    APPENDECTOMY      COLONOSCOPY      CORONARY ARTERY BYPASS GRAFT      FOOT SURGERY Left     HIP REPLACEMENT ARTHROPLASTY Right     HIP SURGERY      JOINT REPLACEMENT      KNEE SURGERY      LUNG BIOPSY Right 03/03/2022    benign    SHOULDER SURGERY Left     SHOULDER SURGERY      TOTAL KNEE ARTHROPLASTY Right     triple bypass         Review of patient's allergies indicates:   Allergen Reactions    Zolpidem        No current facility-administered medications on file prior  to encounter.     Current Outpatient Medications on File Prior to Encounter   Medication Sig    albuterol (PROVENTIL/VENTOLIN HFA) 90 mcg/actuation inhaler Inhale 1-2 puffs into the lungs every 6 (six) hours as needed for Wheezing or Shortness of Breath. Rescue    albuterol-ipratropium (DUO-NEB) 2.5 mg-0.5 mg/3 mL nebulizer solution inhale 3 milliliters by nebulization route 4 times per day and as needed, up to 6 doses per day for 30 days    apixaban (ELIQUIS) 5 mg Tab Take 5 mg by mouth 2 (two) times daily.    aspirin (ECOTRIN) 81 MG EC tablet Take 81 mg by mouth once daily.    atorvastatin (LIPITOR) 20 MG tablet Take 1 tablet (20 mg total) by mouth once daily.    multivitamin capsule Take 1 capsule by mouth once daily.    OXcarbazepine (TRILEPTAL) 300 MG Tab Take 1 tablet (300 mg total) by mouth 2 (two) times daily.    senna (SENOKOT) 8.6 mg tablet 2 tablets at bedtime as needed    tamsulosin (FLOMAX) 0.4 mg Cap Take 0.4 mg by mouth once daily.    torsemide (DEMADEX) 20 MG Tab Take 1 tablet (20 mg total) by mouth daily as needed (weight gain greater than 5 lbs).    vitamin D (VITAMIN D3) 1000 units Tab Take 1,000 Units by mouth once daily.    [DISCONTINUED] calcium carbonate (OS-DAVID) 600 mg calcium (1,500 mg) Tab Take 600 mg by mouth once.    [DISCONTINUED] carvediloL (COREG) 6.25 MG tablet TAKE ONE TABLET BY MOUTH TWICE DAILY (EVERY 12 HOURS) FOR BLOOD PRESSURE. THANKS !!    [DISCONTINUED] ezetimibe (ZETIA) 10 mg tablet Take 10 mg by mouth once daily.    [DISCONTINUED] FLUoxetine 20 MG capsule     [DISCONTINUED] gabapentin (NEURONTIN) 300 MG capsule 1 capsule    [DISCONTINUED] miconazole NITRATE 2 % (MICOTIN) 2 % top powder Apply topically as needed for Itching.    [DISCONTINUED] risperiDONE (RISPERDAL) 0.5 MG Tab Take 1 tablet (0.5 mg total) by mouth nightly as needed (agitation and halluctions).     Family History       Problem Relation (Age of Onset)    Arthritis Mother, Father    Asthma Sister    Heart  disease Father    Hyperlipidemia Mother, Sister    Hypertension Mother, Father, Sister, Brother          Tobacco Use    Smoking status: Every Day    Smokeless tobacco: Never    Tobacco comments:     quit 30 years ago   Substance and Sexual Activity    Alcohol use: Not Currently    Drug use: Never    Sexual activity: Not Currently     Review of Systems   Unable to perform ROS: Intubated   Objective:     Vital Signs (Most Recent):  Temp: (!) 100.9 °F (38.3 °C) (09/07/22 0621)  Pulse: (!) 51 (09/07/22 0756)  Resp: 20 (09/07/22 0756)  BP: (!) 154/68 (09/07/22 0700)  SpO2: 100 % (09/07/22 0756)   Vital Signs (24h Range):  Temp:  [97.8 °F (36.6 °C)-101.2 °F (38.4 °C)] 100.9 °F (38.3 °C)  Pulse:  [45-69] 51  Resp:  [20-41] 20  SpO2:  [82 %-100 %] 100 %  BP: ()/(42-77) 154/68     Weight: 103 kg (227 lb 1.2 oz)  Body mass index is 33.53 kg/m².    Physical Exam  Constitutional:       General: He is in acute distress.      Appearance: He is obese. He is ill-appearing. He is not toxic-appearing.   HENT:      Head: Normocephalic.      Right Ear: External ear normal.      Left Ear: External ear normal.      Nose: No congestion or rhinorrhea.      Mouth/Throat:      Mouth: Mucous membranes are dry.      Pharynx: Oropharynx is clear.   Eyes:      General: No scleral icterus.     Extraocular Movements: Extraocular movements intact.      Conjunctiva/sclera: Conjunctivae normal.      Pupils: Pupils are equal, round, and reactive to light.   Cardiovascular:      Rate and Rhythm: Tachycardia present. Rhythm irregular.      Heart sounds: No murmur heard.  Pulmonary:      Breath sounds: Rhonchi and rales present.      Comments: Reduced breath sounds to lung bases bilaterally  Chest:      Chest wall: No tenderness.   Abdominal:      General: There is no distension.      Palpations: Abdomen is soft.      Tenderness: There is no abdominal tenderness. There is no right CVA tenderness, left CVA tenderness or guarding.   Musculoskeletal:       Cervical back: Neck supple.   Lymphadenopathy:      Cervical: No cervical adenopathy.   Skin:     General: Skin is warm and dry.      Comments: Hemosiderin staining bilateral LE   Neurological:      Comments: Eyes open, alert, ET in place         CRANIAL NERVES     CN III, IV, VI   Pupils are equal, round, and reactive to light.     Significant Labs: All pertinent labs within the past 24 hours have been reviewed.  A1C: No results for input(s): HGBA1C in the last 4320 hours.  ABGs:   Recent Labs   Lab 09/06/22  0514 09/07/22  0452   PH 7.403 7.452*   PCO2 40.9 33.0*   HCO3 25.0 23.8   POCSATURATED 96.7 99.8   PO2 87.1 149*       Blood Culture: No results for input(s): LABBLOO in the last 48 hours.    BMP:   Recent Labs   Lab 09/07/22  0534   *      K 3.5      CO2 25   BUN 33*   CREATININE 2.4*   CALCIUM 8.2*   MG 1.9       CBC:   Recent Labs   Lab 09/06/22  0611 09/07/22  0534   WBC 4.61 4.35   HGB 7.6* 6.9*   HCT 25.5* 23.1*    165       CMP:   Recent Labs   Lab 09/06/22  0611 09/07/22  0534    139   K 3.2* 3.5    110   CO2 27 25   * 130*   BUN 30* 33*   CREATININE 1.9* 2.4*   CALCIUM 8.5* 8.2*   PROT 6.3 5.9*   ALBUMIN 2.5* 2.2*   BILITOT 0.3 0.4   ALKPHOS 50* 43*   AST 16 19   ALT 16 15   ANIONGAP 5* 4*       No results for input(s): LACTATE in the last 48 hours.    No results for input(s): COLORU, APPEARANCEUA, PHUR, SPECGRAV, PROTEINUA, GLUCUA, KETONESU, BILIRUBINUA, OCCULTUA, NITRITE, UROBILINOGEN, LEUKOCYTESUR, RBCUA, WBCUA, BACTERIA, SQUAMEPITHEL, HYALINECASTS in the last 48 hours.    Invalid input(s): WRIGHTSUR    X-Ray Chest 1 View  Narrative: EXAMINATION:  XR CHEST 1 VIEW    CLINICAL HISTORY:  ng tube placement;    COMPARISON:  Portable chest 09/06/2022 at 04:42 hours.    FINDINGS:  Frontal chest radiograph demonstrates interval placement of a nasogastric tube with tip projecting over the gastric body.  Endotracheal tube remains in place.  Right lower lung  zone and left midlung zone opacities again noted.  Impression: Interval placement of a nasogastric tube.    Electronically signed by: Jamey Gannon MD  Date:    09/06/2022  Time:    10:39  X-Ray Chest 1 View  Narrative: EXAMINATION:  XR CHEST 1 VIEW    CLINICAL HISTORY:  coded;    COMPARISON:  Portable chest 09/02/2022 at 10:44 hours.    FINDINGS:  Frontal chest radiograph demonstrates interval intubation with tip of tube located at level of clavicular heads.  Right lower lung zone and left mid lung zone opacities, slightly improved.  No pleural fluid.  Cardiac silhouette is normal in size.  Prior cardiac surgery.  No osseous abnormality.  Impression: 1. Interval intubation.  2. Bilateral airspace disease, slightly improved.    Electronically signed by: Jamey Gannon MD  Date:    09/06/2022  Time:    09:42   Significant Imaging: I have reviewed all pertinent imaging results/findings within the past 24 hours.

## 2022-09-07 NOTE — PROGRESS NOTES
[facility] - Intensive Care  Cardiology  Consult Note    Patient Name: Jamey Lei  Patient : 1940  MRN: [id number]  Admission Date: 2022  Hospital Length of Stay: 5 days  Code Status: Full Code   Attending Provider: Navi Domínguez III, MD   Consulting Provider: Christian Webster MD  Primary Care Physician: Navi Domínguez III, MD  Principal Problem:Pneumonia      Patient information was obtained from past medical records and ER records.     Consults  Subjective:     Chief Complaint:  Shortness of breath     HPI:   HPI:  81 year old male with hypertension, hypoerlipidemia, CHF, permanent atrial fibriallation on Eliquis, CAD s/p CABGx3, CKD stage 3, recent lung and brain mass findings presents to ED with worsening shortness of breath that started about a week ago with bilateral leg swelling with difficulty ambulating with his walker.      Patient hard of hearing endorsed since treatment in the ED, he has been feeling better. At home he does not use oxygen, breathing at SpO2 >95% on continuous O2 2L via nasal cannula on exam. There is no change in appetite, nausea, vomiting, diarrhea, constipation. Denies headaches, fever, chills, vision changes, chest pain, abdominal pain. Denies joint pain and loss of sensation in toes and foot bilaterally with increasing leg swelling.     ED course:  Tachypnea rate >30, CXR right lower lobe pneumonia, positive influenza A and COVID19 (fully vaccinated), NTproBNT >6000, lactate <0.2 x2, blood cultures x2 pending,  BUN/Cr 33/1.8 with improved eGFR from last hospitalization and discharge one week ago. Patient started on IV lasix 80 mg, responding well with clear urine collecting in Sanders catheter and IV antibiotics empirically started for right lobe pneumonia.         Overview/Hospital Course:  9/3/22 CG:  Says it feels much better and does complain of a cough.  He has had to go to the bathroom and is waiting for help and assistance getting up from the bed to go to the  restroom.  No other acute overnight events.  9/4/22 CG: Doing better, still has a cough and feels weak.   9/5/22 CG:  Became more agitated last night and was trying to rip out his Sanders catheter.  Had to be given Seroquel to help him sleep and to calm him down.  9/6/22 FM:  Events of the weekend are noted.  Patient was admitted with COVID and influenza and a deterioration in his condition.  Patient has a history of lung cancer and is seeing an oncologist at an outside facility.  Patient has a history of chronic kidney disease as well as obesity diabetes neuropathy atrial fibrillation and his general health has come line significantly over the last year.  The patient has been in and out of the hospital and nursing facilities.  Last night the patient had a rapid response and ended up on the ventilator.  I reviewed the events and I am unsure if this was related to Seroquel will and I also see that he was becoming hypotensive.  Patient's chest x-ray is abnormal I will discuss with the family this morning.    09/07/2022:  Rate stable overnight.  H/H declining.  Remains on vent support.     Past Medical History:   Diagnosis Date    A-fib     Abdominal pain, epigastric     Abdominal pain, generalized     Acute on chronic congestive heart failure 12/29/2021    Anticoagulant long-term use     Arthritis     Asteatotic eczema     Benign essential HTN     Benign prostatic hyperplasia     Brain tumor     Cardiovascular accident     Carotid artery stenosis     CHF (congestive heart failure)     CKD (chronic kidney disease), stage IV     Constipation     COPD (chronic obstructive pulmonary disease)     Depressed     DM (diabetes mellitus), secondary     Drug eruption     Eczema     Edema     Encounter for blood transfusion     Fever     H. pylori infection     Hearing loss     History of tobacco use     Hypercholesterolemia     Hypertensive renal disease, benign     Hypokalemia     Hyponatremia     Impaired fasting glucose      Lumbar pain     Lung mass     Malaise and fatigue     Malignant neoplasm of prostate     Meningioma     Mixed hyperlipidemia     Nasal bleeding     Neuropathy     Obesity, unspecified     SHAYNE (obstructive sleep apnea)     Osteoarthritis of knee     Osteoarthritis of multiple joints     Peripheral vascular disease, unspecified     Psychosis     Renal disorder     Seizure disorder     Seizures     Sleep apnea     Unspecified cataract     Unspecified chronic bronchitis     Unspecified osteoarthritis, unspecified site     Vertigo     Vitamin D deficiency     Wheezing        Past Surgical History:   Procedure Laterality Date    APPENDECTOMY      COLONOSCOPY      CORONARY ARTERY BYPASS GRAFT      FOOT SURGERY Left     HIP REPLACEMENT ARTHROPLASTY Right     HIP SURGERY      JOINT REPLACEMENT      KNEE SURGERY      LUNG BIOPSY Right 03/03/2022    benign    SHOULDER SURGERY Left     SHOULDER SURGERY      TOTAL KNEE ARTHROPLASTY Right     triple bypass         Review of patient's allergies indicates:   Allergen Reactions    Zolpidem        No current facility-administered medications on file prior to encounter.     Current Outpatient Medications on File Prior to Encounter   Medication Sig    albuterol (PROVENTIL/VENTOLIN HFA) 90 mcg/actuation inhaler Inhale 1-2 puffs into the lungs every 6 (six) hours as needed for Wheezing or Shortness of Breath. Rescue    albuterol-ipratropium (DUO-NEB) 2.5 mg-0.5 mg/3 mL nebulizer solution inhale 3 milliliters by nebulization route 4 times per day and as needed, up to 6 doses per day for 30 days    apixaban (ELIQUIS) 5 mg Tab Take 5 mg by mouth 2 (two) times daily.    aspirin (ECOTRIN) 81 MG EC tablet Take 81 mg by mouth once daily.    atorvastatin (LIPITOR) 20 MG tablet Take 1 tablet (20 mg total) by mouth once daily.    multivitamin capsule Take 1 capsule by mouth once daily.    OXcarbazepine (TRILEPTAL) 300 MG Tab Take 1 tablet (300 mg total) by mouth 2 (two) times daily.    senna  (SENOKOT) 8.6 mg tablet 2 tablets at bedtime as needed    tamsulosin (FLOMAX) 0.4 mg Cap Take 0.4 mg by mouth once daily.    torsemide (DEMADEX) 20 MG Tab Take 1 tablet (20 mg total) by mouth daily as needed (weight gain greater than 5 lbs).    vitamin D (VITAMIN D3) 1000 units Tab Take 1,000 Units by mouth once daily.    [DISCONTINUED] calcium carbonate (OS-DAVID) 600 mg calcium (1,500 mg) Tab Take 600 mg by mouth once.    [DISCONTINUED] carvediloL (COREG) 6.25 MG tablet TAKE ONE TABLET BY MOUTH TWICE DAILY (EVERY 12 HOURS) FOR BLOOD PRESSURE. THANKS !!    [DISCONTINUED] ezetimibe (ZETIA) 10 mg tablet Take 10 mg by mouth once daily.    [DISCONTINUED] FLUoxetine 20 MG capsule     [DISCONTINUED] gabapentin (NEURONTIN) 300 MG capsule 1 capsule    [DISCONTINUED] miconazole NITRATE 2 % (MICOTIN) 2 % top powder Apply topically as needed for Itching.    [DISCONTINUED] risperiDONE (RISPERDAL) 0.5 MG Tab Take 1 tablet (0.5 mg total) by mouth nightly as needed (agitation and halluctions).     Family History       Problem Relation (Age of Onset)    Arthritis Mother, Father    Asthma Sister    Heart disease Father    Hyperlipidemia Mother, Sister    Hypertension Mother, Father, Sister, Brother          Tobacco Use    Smoking status: Every Day    Smokeless tobacco: Never    Tobacco comments:     quit 30 years ago   Substance and Sexual Activity    Alcohol use: Not Currently    Drug use: Never    Sexual activity: Not Currently     Review of Systems   Unable to perform ROS: Intubated   Objective:     Vital Signs (Most Recent):  Temp: 99.6 °F (37.6 °C) (09/07/22 2000)  Pulse: (!) 50 (09/07/22 2100)  Resp: (!) 30 (09/07/22 2100)  BP: (!) 154/70 (09/07/22 2100)  SpO2: 100 % (09/07/22 2100)   Vital Signs (24h Range):  Temp:  [98.5 °F (36.9 °C)-101.2 °F (38.4 °C)] 99.6 °F (37.6 °C)  Pulse:  [47-72] 50  Resp:  [20-43] 30  SpO2:  [99 %-100 %] 100 %  BP: (107-181)/(53-90) 154/70     Weight: 103 kg (227 lb 1.2 oz)  Body mass index is 33.53  kg/m².    SpO2: 100 %  O2 Device (Oxygen Therapy): ventilator      Intake/Output Summary (Last 24 hours) at 9/7/2022 2253  Last data filed at 9/7/2022 1915  Gross per 24 hour   Intake 4394.95 ml   Output 950 ml   Net 3444.95 ml       Lines/Drains/Airways       Drain  Duration                  Urethral Catheter 09/02/22 1231 Non-latex 16 Fr. 5 days         NG/OG Tube 09/06/22 1300 1 day              Airway  Duration                  Airway - Non-Surgical 09/06/22 0445 Endotracheal Tube 1 day              Peripheral Intravenous Line  Duration                  Peripheral IV - Single Lumen 09/05/22 2255 20 G Right Antecubital 1 day         Peripheral IV - Single Lumen 09/06/22 1850 20 G Left;Posterior Forearm 1 day                  Below obtained from PCP note due to COVID 19 infection   Physical Exam  Vitals and nursing note reviewed.   Constitutional:       Appearance: He is obese. He is ill-appearing. He is not toxic-appearing.   HENT:      Mouth/Throat:      Mouth: Mucous membranes are dry.   Cardiovascular:      Rate and Rhythm: Rhythm irregular.   Pulmonary:      Breath sounds: Rhonchi and rales present.      Comments: Diminished breath sounds to bases bilaterally   Abdominal:      Palpations: Abdomen is soft.      Tenderness: There is no abdominal tenderness.   Musculoskeletal:      Right lower leg: Edema present.      Left lower leg: Edema present.   Skin:     General: Skin is warm and dry.       Significant Labs:  All pertinent lab results from the last 24 hours have been reviewed.   Recent Lab Results  (Last 5 results in the past 72 hours)        09/07/22  1035   09/07/22  0937   09/07/22  0850   09/07/22  0535   09/07/22  0534        Unit Blood Type Code   9500  [P]   9500  [P]                9500           Unit Expiration   [id number]  [P]   [id number]  [P]                [id number]           Unit Blood Type   O NEG  [P]   O NEG  [P]                O NEG           Albumin         2.2       Alkaline  Phosphatase         43       ALT         15       Anion Gap         4       AST         19       Baso #         0.01       Basophil %         0.2       BILIRUBIN TOTAL         0.4  Comment: For infants and newborns, interpretation of results should be based  on gestational age, weight and in agreement with clinical  observations.    Premature Infant recommended reference ranges:  Up to 24 hours.............<8.0 mg/dL  Up to 48 hours............<12.0 mg/dL  3-5 days..................<15.0 mg/dL  6-29 days.................<15.0 mg/dL    For patients on Eltrombopag therapy, use of Dimension [place] TBIL is   not   recommended.         BUN         33       Calcium         8.2       Chloride         110       CO2         25       CODING SYSTEM   [id number]  [P]   [id number]  [P]                [id number]           Creatinine         2.4       Differential Method         Automated       DISPENSE STATUS   ISSUED  [P]   ISSUED  [P]                RETURNED           eGFR         26.8       Eos #         0.0       Eosinophil %         0.5       Glucose         130       Gran # (ANC)         3.4       Gran %         78.4       Group & Rh   O NEG             Hematocrit         23.1       Hemoglobin         6.9       Immature Grans (Abs)         0.02  Comment: Mild elevation in immature granulocytes is non specific and   can be seen in a variety of conditions including stress response,   acute inflammation, trauma and pregnancy. Correlation with other   laboratory and clinical findings is essential.         Immature Granulocytes         0.5       INDIRECT IBAN   NEG             Lymph #         0.4       Lymph %         9.4       Magnesium         1.9       MCH         24.7       MCHC         29.9       MCV         83       Mono #         0.5       Mono %         11.0       MPV         11.1       nRBC         0       Occult Blood Positive               Platelets         165       Potassium         3.5       Product Code   [id number]  [P]    [id number]  [P]                [id number]           PROTEIN TOTAL         5.9       RBC         2.79       RDW         17.5       Sodium         139       UNIT NUMBER   [id number]  [P]   [id number]  [P]                [id number]           Vancomycin, Random       18.3         WBC         4.35                               [P] - Preliminary Result               Significant Imaging:  Imaging Results              X-Ray Chest 1 View (Final result)  Result time 09/02/22 11:04:28      Final result by Anita Romero MD (09/02/22 11:04:28)                   Impression:      New significant right lower lobe infiltrate consistent with pneumonia and a left hilar infiltrates suggesting pneumonia recommend follow-up      Electronically signed by: Anita Romero MD  Date:    09/02/2022  Time:    11:04               Narrative:    EXAMINATION:  XR CHEST 1 VIEW    CLINICAL HISTORY:  Shortness of breath    TECHNIQUE:  portable chest x-ray    COMPARISON:  08/20/2022.    FINDINGS:  There is a new patchy infiltrate left hilar region and right lower lobe consistent with pneumonia.                                      ECHO:   12/2021:  EF 65% with moderate mitral/tricuspid regurgitation        MEDICATIONS:     Current Facility-Administered Medications:     0.9%  NaCl infusion (for blood administration), , Intravenous, Q24H PRN, Navi Domínguez III, MD    acetaminophen tablet 650 mg, 650 mg, Oral, Q8H PRN, Lia Bagley,     acetaminophen tablet 650 mg, 650 mg, Per NG tube, Q8H PRN, Navi Domínguez III, MD, 650 mg at 09/07/22 1523    albuterol-ipratropium 2.5 mg-0.5 mg/3 mL nebulizer solution 3 mL, 3 mL, Nebulization, Q6H, Navi Domínguez III, MD, 3 mL at 09/07/22 1943    atorvastatin tablet 40 mg, 40 mg, Per NG tube, Daily, Navi Domínguez III, MD, 40 mg at 09/07/22 0936    dextrose 5 % and 0.45 % NaCl with KCl 20 mEq infusion, , Intravenous, Continuous, Navi Domínguez III, MD, Last Rate: 100 mL/hr at 09/07/22 1915, Restarted  at 09/07/22 1915    famotidine tablet 20 mg, 20 mg, Per NG tube, Daily, Navi Domínguez III, MD, 20 mg at 09/07/22 0936    melatonin tablet 6 mg, 6 mg, Oral, Nightly PRN, Lia Bagley DO, 6 mg at 09/05/22 2103    ondansetron injection 4 mg, 4 mg, Intravenous, Q8H PRN, Lia Bagley DO    OXcarbazepine tablet 300 mg, 300 mg, Per NG tube, BID, Navi Domínguez III, MD, 300 mg at 09/07/22 2108    piperacillin-tazobactam 4.5 g in dextrose 5 % 100 mL IVPB (ready to mix system), 4.5 g, Intravenous, Q8H, Navi Domínguez III, MD, Stopped at 09/07/22 2331    propofol (DIPRIVAN) 10 mg/mL infusion, 0-50 mcg/kg/min, Intravenous, Continuous, Navi Domínguez III, MD, Last Rate: 6.2 mL/hr at 09/07/22 1510, 10 mcg/kg/min at 09/07/22 1510    risperiDONE tablet 0.5 mg, 0.5 mg, Per NG tube, BID, Navi Domínguez III, MD, 0.5 mg at 09/07/22 2107    sodium chloride 0.9% flush 10 mL, 10 mL, Intravenous, PRN, Lia Bagley DO    tamsulosin 24 hr capsule 0.4 mg, 0.4 mg, Oral, Daily, Navi Domínguez III, MD, 0.4 mg at 09/06/22 1016    Pharmacy to dose Vancomycin consult, , , Once **AND** vancomycin - pharmacy to dose, , Intravenous, pharmacy to manage frequency, Navi Domínguez III, MD      Assessment and Plan:     Active Diagnoses:    Diagnosis Date Noted POA    PRINCIPAL PROBLEM:  Pneumonia [J18.9] 12/29/2021 Yes    Malignant neoplasm of lung [C34.90] 09/07/2022 Yes    SOB (shortness of breath) [R06.02] 09/02/2022 Yes    Influenza [J11.1] 09/02/2022 Yes    COVID-19 [U07.1] 09/02/2022 Yes    Tachypnea [R06.82] 09/02/2022 Yes    CHF (congestive heart failure) [I50.9]  Yes    Stage 3 chronic kidney disease [N18.30] 04/11/2022 Yes    Seizure disorder [G40.909] 03/30/2022 Yes    A-fib [I48.91]  Yes    COPD (chronic obstructive pulmonary disease) [J44.9]  Yes    Localized edema [R60.0] 12/30/2021 Yes    Weakness [R53.1] 12/29/2021 Yes      Problems Resolved During this Admission:       VTE Risk Mitigation (From admission,  onward)           Ordered     IP VTE HIGH RISK PATIENT  Once         09/02/22 1513     Place sequential compression device  Until discontinued         09/02/22 1513                  Assessment and Plan:   Pneumonia:  Continue treatment per IM recommendations.  Respiratory support with ventilator in place  COVID 19 infection:   Per IM services  Atrial fibrillation:  Bradycardic s/p cardiac arrest.  Heart rate not <40 bpm.  Continue to monitor on telemetry.  Avoid moises blocking agents.  Obtain ECG. HR has been stable.  09/07/22:  Rate stable.  Stop aspirin.  Check stool for occult blood given declining H/H.  May need to hold eliquis   Bradycardia:   Per above  S/p Cardiac arrest with respiratory failure:  Continue with treatment for other acute disease processes.   6.  Anemia:    H/H declining.  Defer to IM  7.  BRIGETTE:    Consider IVFs vs PRBC transfusion.  May be s/t cardiac arrest also.       CV issues stable. Reconsult as needed.        Christian Webster MD  Cardiology  [facility] Intensive [facility]  09/07/2022

## 2022-09-07 NOTE — PLAN OF CARE
Recommendations  1. Rec'd EN: Suplenna at rate of of 10mL/hr increasing by 10mL every 4-6 hrs to goal rate of 50mL/hr to provide 2160 kcals (95% EEN), 54g of protein (43% EPN), and 206mL water with propofol infusing@ 6.2mL/hr providing an additional 164 kcals.    2. Rec'd Lino BID via NG tube to promote wound healing and provide 190 kcals and 5g of protein.   -Do not mix Lino with formula in a tube-feeding bag.   -Pour one packet of Lino in a clean, small (approximately 6- to 8-fl-oz) container for mixing.   -Add 4 fl oz (120 mL) water at room temperature.   -Mix well with disposable spoon or tongue blade until all particles are completely hydrated.   -Flush feeding tube with 30 mL water. -Administer Lino through feeding tube using a 60-mL or larger syringe. -Flush with an additional 30 mL water.   3. RD to follow and make rec's accordingly  Goals: Pt will reach TF goal rate within 48 hrs of inititation.  Nutrition Goal Status: new

## 2022-09-08 LAB
AC: 18
ALBUMIN SERPL BCP-MCNC: 1.9 G/DL (ref 3.5–5.2)
ALP SERPL-CCNC: 39 U/L (ref 55–135)
ALT SERPL W/O P-5'-P-CCNC: 18 U/L (ref 10–44)
ANION GAP SERPL CALC-SCNC: 4 MMOL/L (ref 8–16)
AST SERPL-CCNC: 28 U/L (ref 10–40)
BACTERIA BLD CULT: NORMAL
BACTERIA BLD CULT: NORMAL
BASOPHILS # BLD AUTO: 0.01 K/UL (ref 0–0.2)
BASOPHILS NFR BLD: 0.3 % (ref 0–1.9)
BILIRUB SERPL-MCNC: 0.4 MG/DL (ref 0.1–1)
BLD PROD TYP BPU: NORMAL
BLD PROD TYP BPU: NORMAL
BLOOD UNIT EXPIRATION DATE: NORMAL
BLOOD UNIT EXPIRATION DATE: NORMAL
BLOOD UNIT TYPE CODE: 9500
BLOOD UNIT TYPE CODE: 9500
BLOOD UNIT TYPE: NORMAL
BLOOD UNIT TYPE: NORMAL
BUN SERPL-MCNC: 31 MG/DL (ref 8–23)
CALCIUM SERPL-MCNC: 8.3 MG/DL (ref 8.7–10.5)
CHLORIDE SERPL-SCNC: 110 MMOL/L (ref 95–110)
CO2 SERPL-SCNC: 25 MMOL/L (ref 23–29)
CODING SYSTEM: NORMAL
CODING SYSTEM: NORMAL
CORRECTED TEMPERATURE (PCO2): 33.7 MMHG
CORRECTED TEMPERATURE (PH): 7.43
CORRECTED TEMPERATURE (PO2): 148 MMHG
CREAT SERPL-MCNC: 2.2 MG/DL (ref 0.5–1.4)
DIFFERENTIAL METHOD: ABNORMAL
DISPENSE STATUS: NORMAL
DISPENSE STATUS: NORMAL
EOSINOPHIL # BLD AUTO: 0.1 K/UL (ref 0–0.5)
EOSINOPHIL NFR BLD: 3.7 % (ref 0–8)
ERYTHROCYTE [DISTWIDTH] IN BLOOD BY AUTOMATED COUNT: 16.8 % (ref 11.5–14.5)
EST. GFR  (NO RACE VARIABLE): 29.7 ML/MIN/1.73 M^2
FIO2: 40 %
GLUCOSE SERPL-MCNC: 111 MG/DL (ref 70–110)
HCT VFR BLD AUTO: 24.5 % (ref 40–54)
HGB BLD-MCNC: 7.5 G/DL (ref 14–18)
IMM GRANULOCYTES # BLD AUTO: 0.01 K/UL (ref 0–0.04)
IMM GRANULOCYTES NFR BLD AUTO: 0.3 % (ref 0–0.5)
LYMPHOCYTES # BLD AUTO: 0.5 K/UL (ref 1–4.8)
LYMPHOCYTES NFR BLD: 13.3 % (ref 18–48)
MAGNESIUM SERPL-MCNC: 1.9 MG/DL (ref 1.6–2.6)
MCH RBC QN AUTO: 25.4 PG (ref 27–31)
MCHC RBC AUTO-ENTMCNC: 30.6 G/DL (ref 32–36)
MCV RBC AUTO: 83 FL (ref 82–98)
MODIFIED ALLEN'S TEST: ABNORMAL
MONOCYTES # BLD AUTO: 0.5 K/UL (ref 0.3–1)
MONOCYTES NFR BLD: 13.6 % (ref 4–15)
NEUTROPHILS # BLD AUTO: 2.4 K/UL (ref 1.8–7.7)
NEUTROPHILS NFR BLD: 68.8 % (ref 38–73)
NRBC BLD-RTO: 0 /100 WBC
NUM UNITS TRANS PACKED RBC: NORMAL
NUM UNITS TRANS PACKED RBC: NORMAL
O2DEVICE: ABNORMAL
PCO2 BLDA: 33.7 MMHG (ref 35–45)
PEEP: 5
PH SMN: 7.43 [PH] (ref 7.34–7.45)
PLATELET # BLD AUTO: 164 K/UL (ref 150–450)
PMV BLD AUTO: 11.1 FL (ref 9.2–12.9)
PO2 BLDA: 148 MMHG (ref 80–100)
POC BASE DEFICIT: -1.9 MMOL/L
POC HCO3: 23.1 MMOL/L
POC PERFORMED BY: ABNORMAL
POC SATURATED O2: 99.5 %
POC TCO2: 47.5 ML/DL
POC TEMPERATURE: 37 C
POTASSIUM SERPL-SCNC: 3.4 MMOL/L (ref 3.5–5.1)
PROT SERPL-MCNC: 5.2 G/DL (ref 6–8.4)
RBC # BLD AUTO: 2.95 M/UL (ref 4.6–6.2)
SODIUM SERPL-SCNC: 139 MMOL/L (ref 136–145)
SPECIMEN SOURCE: ABNORMAL
VANCOMYCIN SERPL-MCNC: 21.6 UG/ML
VT: 500
WBC # BLD AUTO: 3.53 K/UL (ref 3.9–12.7)

## 2022-09-08 PROCEDURE — 99900031 HC PATIENT EDUCATION (STAT)

## 2022-09-08 PROCEDURE — P9016 RBC LEUKOCYTES REDUCED: HCPCS | Performed by: INTERNAL MEDICINE

## 2022-09-08 PROCEDURE — 85025 COMPLETE CBC W/AUTO DIFF WBC: CPT | Performed by: INTERNAL MEDICINE

## 2022-09-08 PROCEDURE — 25000003 PHARM REV CODE 250: Performed by: INTERNAL MEDICINE

## 2022-09-08 PROCEDURE — 82803 BLOOD GASES ANY COMBINATION: CPT

## 2022-09-08 PROCEDURE — 94761 N-INVAS EAR/PLS OXIMETRY MLT: CPT

## 2022-09-08 PROCEDURE — 25000242 PHARM REV CODE 250 ALT 637 W/ HCPCS: Performed by: INTERNAL MEDICINE

## 2022-09-08 PROCEDURE — 27000207 HC ISOLATION

## 2022-09-08 PROCEDURE — 36600 WITHDRAWAL OF ARTERIAL BLOOD: CPT

## 2022-09-08 PROCEDURE — 99900035 HC TECH TIME PER 15 MIN (STAT)

## 2022-09-08 PROCEDURE — 80202 ASSAY OF VANCOMYCIN: CPT | Performed by: INTERNAL MEDICINE

## 2022-09-08 PROCEDURE — 20000000 HC ICU ROOM

## 2022-09-08 PROCEDURE — 83735 ASSAY OF MAGNESIUM: CPT | Performed by: INTERNAL MEDICINE

## 2022-09-08 PROCEDURE — 94640 AIRWAY INHALATION TREATMENT: CPT

## 2022-09-08 PROCEDURE — 99900026 HC AIRWAY MAINTENANCE (STAT)

## 2022-09-08 PROCEDURE — 27000221 HC OXYGEN, UP TO 24 HOURS

## 2022-09-08 PROCEDURE — 63600175 PHARM REV CODE 636 W HCPCS: Performed by: INTERNAL MEDICINE

## 2022-09-08 PROCEDURE — 36415 COLL VENOUS BLD VENIPUNCTURE: CPT | Performed by: INTERNAL MEDICINE

## 2022-09-08 PROCEDURE — 80053 COMPREHEN METABOLIC PANEL: CPT | Performed by: INTERNAL MEDICINE

## 2022-09-08 PROCEDURE — 36430 TRANSFUSION BLD/BLD COMPNT: CPT

## 2022-09-08 PROCEDURE — 94003 VENT MGMT INPAT SUBQ DAY: CPT

## 2022-09-08 RX ORDER — HYDROCODONE BITARTRATE AND ACETAMINOPHEN 500; 5 MG/1; MG/1
TABLET ORAL
Status: DISCONTINUED | OUTPATIENT
Start: 2022-09-08 | End: 2022-09-19 | Stop reason: HOSPADM

## 2022-09-08 RX ADMIN — IPRATROPIUM BROMIDE AND ALBUTEROL SULFATE 3 ML: 2.5; .5 SOLUTION RESPIRATORY (INHALATION) at 12:09

## 2022-09-08 RX ADMIN — VANCOMYCIN HYDROCHLORIDE 750 MG: 750 INJECTION, POWDER, LYOPHILIZED, FOR SOLUTION INTRAVENOUS at 12:09

## 2022-09-08 RX ADMIN — ATORVASTATIN CALCIUM 40 MG: 40 TABLET, FILM COATED ORAL at 09:09

## 2022-09-08 RX ADMIN — IPRATROPIUM BROMIDE AND ALBUTEROL SULFATE 3 ML: 2.5; .5 SOLUTION RESPIRATORY (INHALATION) at 08:09

## 2022-09-08 RX ADMIN — IPRATROPIUM BROMIDE AND ALBUTEROL SULFATE 3 ML: 2.5; .5 SOLUTION RESPIRATORY (INHALATION) at 07:09

## 2022-09-08 RX ADMIN — PROPOFOL 10 MCG/KG/MIN: 10 INJECTION, EMULSION INTRAVENOUS at 03:09

## 2022-09-08 RX ADMIN — PIPERACILLIN AND TAZOBACTAM 4.5 G: 4; .5 INJECTION, POWDER, LYOPHILIZED, FOR SOLUTION INTRAVENOUS; PARENTERAL at 04:09

## 2022-09-08 RX ADMIN — PROPOFOL 30 MCG/KG/MIN: 10 INJECTION, EMULSION INTRAVENOUS at 04:09

## 2022-09-08 RX ADMIN — OXCARBAZEPINE 300 MG: 300 TABLET, FILM COATED ORAL at 09:09

## 2022-09-08 RX ADMIN — PIPERACILLIN AND TAZOBACTAM 4.5 G: 4; .5 INJECTION, POWDER, LYOPHILIZED, FOR SOLUTION INTRAVENOUS; PARENTERAL at 01:09

## 2022-09-08 RX ADMIN — FAMOTIDINE 20 MG: 20 TABLET ORAL at 09:09

## 2022-09-08 RX ADMIN — RISPERIDONE 0.5 MG: 0.5 TABLET ORAL at 09:09

## 2022-09-08 RX ADMIN — PROPOFOL 30 MCG/KG/MIN: 10 INJECTION, EMULSION INTRAVENOUS at 10:09

## 2022-09-08 RX ADMIN — IPRATROPIUM BROMIDE AND ALBUTEROL SULFATE 3 ML: 2.5; .5 SOLUTION RESPIRATORY (INHALATION) at 01:09

## 2022-09-08 RX ADMIN — DEXTROSE, SODIUM CHLORIDE, AND POTASSIUM CHLORIDE: 5; .45; .15 INJECTION INTRAVENOUS at 03:09

## 2022-09-08 RX ADMIN — TAMSULOSIN HYDROCHLORIDE 0.4 MG: 0.4 CAPSULE ORAL at 09:09

## 2022-09-08 RX ADMIN — PIPERACILLIN AND TAZOBACTAM 4.5 G: 4; .5 INJECTION, POWDER, LYOPHILIZED, FOR SOLUTION INTRAVENOUS; PARENTERAL at 09:09

## 2022-09-08 NOTE — PLAN OF CARE
Random Van- 18.3. Vanc X 1 dose given in between units of blood. H/H- 6.9/23.1. 2nd unit infusing at present. Stool Positive for Occult blood. Eliquis and Asprin dc'd. IVF decreased to 100 ml/hr. Temp- 101.3. Tylenol given. Increased TF to 40 ml/hr. Restraints in place due to pulling at ETT and NG tube with left arm. Turn Q 2 hour scheduled maintained.

## 2022-09-08 NOTE — PROGRESS NOTES
[facility] Medicine  Progress Note    Patient Name: Jamey Lei  MRN: [id number]  Patient Class: IP- Inpatient   Admission Date: 9/2/2022  Length of Stay: 6 days  Attending Physician: Navi Domínguez III, MD  Primary Care Provider: Navi Domínguez III, MD        Subjective:     Principal Problem:Pneumonia        HPI:  81 year old male with hypertension, hypoerlipidemia, CHF, permanent atrial fibriallation on Eliquis, CAD s/p CABGx3, CKD stage 3, recent lung and brain mass findings presents to ED with worsening shortness of breath that started about a week ago with bilateral leg swelling with difficulty ambulating with his walker.     Patient hard of hearing endorsed since treatment in the ED, he has been feeling better. At home he does not use oxygen, breathing at SpO2 >95% on continuous O2 2L via nasal cannula on exam. There is no change in appetite, nausea, vomiting, diarrhea, constipation. Denies headaches, fever, chills, vision changes, chest pain, abdominal pain. Denies joint pain and loss of sensation in toes and foot bilaterally with increasing leg swelling.    ED course:  Tachypnea rate >30, CXR right lower lobe pneumonia, positive influenza A and COVID19 (fully vaccinated), NTproBNT >6000, lactate <0.2 x2, blood cultures x2 pending,  BUN/Cr 33/1.8 with improved eGFR from last hospitalization and discharge one week ago. Patient started on IV lasix 80 mg, responding well with clear urine collecting in Sanders catheter and IV antibiotics empirically started for right lobe pneumonia.       Overview/Hospital Course:  9/3/22 CG:  Says it feels much better and does complain of a cough.  He has had to go to the bathroom and is waiting for help and assistance getting up from the bed to go to the restroom.  No other acute overnight events.  9/4/22 CG: Doing better, still has a cough and feels weak.   9/5/22 CG:  Became more agitated last night and was trying to rip out his Sanders catheter.   Had to be given Seroquel to help him sleep and to calm him down.  9/6/22 FM:  Events of the weekend are noted.  Patient was admitted with COVID and influenza and a deterioration in his condition.  Patient has a history of lung cancer and is seeing an oncologist at an outside facility.  Patient has a history of chronic kidney disease as well as obesity diabetes neuropathy atrial fibrillation and his general health has come line significantly over the last year.  The patient has been in and out of the hospital and nursing facilities.  Last night the patient had a rapid response and ended up on the ventilator.  I reviewed the events and I am unsure if this was related to Seroquel will and I also see that he was becoming hypotensive.  Patient's chest x-ray is abnormal I will discuss with the family this morning.  9/7/22 FM:  Met with family today.  They were unable to make a decision on do not resuscitate.  They will consult with other family members.  Patient's volume status is improved as he was dry yesterday.  Patient's renal numbers all reviewed and noted.  Patient is able to open eyes on the ventilator and follow simple commands.  Will continue ventilatory support today as well as tube feedings.  9/8/22 FM:  Patient had a positive stool for blood as well as a persistent drop in his hemoglobin and was transfused 2 units of packed red blood cells yesterday.  Patient is admitted with COVID pneumonia influenza pneumonia and possible sepsis.  Patient has a history of lung cancer as well as multiple other comorbidities and I have met with the family yesterday and they are unable to come to a code status or long-term status decision.  I am encouraging hospice care as the patient has multiple irreversible illnesses and a decline in his quality of life.      Past Medical History:   Diagnosis Date    A-fib     Abdominal pain, epigastric     Abdominal pain, generalized     Acute on chronic congestive heart failure  12/29/2021    Anticoagulant long-term use     Arthritis     Asteatotic eczema     Benign essential HTN     Benign prostatic hyperplasia     Brain tumor     Cardiovascular accident     Carotid artery stenosis     CHF (congestive heart failure)     CKD (chronic kidney disease), stage IV     Constipation     COPD (chronic obstructive pulmonary disease)     Depressed     DM (diabetes mellitus), secondary     Drug eruption     Eczema     Edema     Encounter for blood transfusion     Fever     H. pylori infection     Hearing loss     History of tobacco use     Hypercholesterolemia     Hypertensive renal disease, benign     Hypokalemia     Hyponatremia     Impaired fasting glucose     Lumbar pain     Lung mass     Malaise and fatigue     Malignant neoplasm of prostate     Meningioma     Mixed hyperlipidemia     Nasal bleeding     Neuropathy     Obesity, unspecified     SHAYNE (obstructive sleep apnea)     Osteoarthritis of knee     Osteoarthritis of multiple joints     Peripheral vascular disease, unspecified     Psychosis     Renal disorder     Seizure disorder     Seizures     Sleep apnea     Unspecified cataract     Unspecified chronic bronchitis     Unspecified osteoarthritis, unspecified site     Vertigo     Vitamin D deficiency     Wheezing        Past Surgical History:   Procedure Laterality Date    APPENDECTOMY      COLONOSCOPY      CORONARY ARTERY BYPASS GRAFT      FOOT SURGERY Left     HIP REPLACEMENT ARTHROPLASTY Right     HIP SURGERY      JOINT REPLACEMENT      KNEE SURGERY      LUNG BIOPSY Right 03/03/2022    benign    SHOULDER SURGERY Left     SHOULDER SURGERY      TOTAL KNEE ARTHROPLASTY Right     triple bypass         Review of patient's allergies indicates:   Allergen Reactions    Zolpidem        No current facility-administered medications on file prior to encounter.     Current Outpatient Medications on File Prior to Encounter   Medication Sig     albuterol (PROVENTIL/VENTOLIN HFA) 90 mcg/actuation inhaler Inhale 1-2 puffs into the lungs every 6 (six) hours as needed for Wheezing or Shortness of Breath. Rescue    albuterol-ipratropium (DUO-NEB) 2.5 mg-0.5 mg/3 mL nebulizer solution inhale 3 milliliters by nebulization route 4 times per day and as needed, up to 6 doses per day for 30 days    apixaban (ELIQUIS) 5 mg Tab Take 5 mg by mouth 2 (two) times daily.    aspirin (ECOTRIN) 81 MG EC tablet Take 81 mg by mouth once daily.    atorvastatin (LIPITOR) 20 MG tablet Take 1 tablet (20 mg total) by mouth once daily.    multivitamin capsule Take 1 capsule by mouth once daily.    OXcarbazepine (TRILEPTAL) 300 MG Tab Take 1 tablet (300 mg total) by mouth 2 (two) times daily.    senna (SENOKOT) 8.6 mg tablet 2 tablets at bedtime as needed    tamsulosin (FLOMAX) 0.4 mg Cap Take 0.4 mg by mouth once daily.    torsemide (DEMADEX) 20 MG Tab Take 1 tablet (20 mg total) by mouth daily as needed (weight gain greater than 5 lbs).    vitamin D (VITAMIN D3) 1000 units Tab Take 1,000 Units by mouth once daily.    [DISCONTINUED] calcium carbonate (OS-DAVID) 600 mg calcium (1,500 mg) Tab Take 600 mg by mouth once.    [DISCONTINUED] carvediloL (COREG) 6.25 MG tablet TAKE ONE TABLET BY MOUTH TWICE DAILY (EVERY 12 HOURS) FOR BLOOD PRESSURE. THANKS !!    [DISCONTINUED] ezetimibe (ZETIA) 10 mg tablet Take 10 mg by mouth once daily.    [DISCONTINUED] FLUoxetine 20 MG capsule     [DISCONTINUED] gabapentin (NEURONTIN) 300 MG capsule 1 capsule    [DISCONTINUED] miconazole NITRATE 2 % (MICOTIN) 2 % top powder Apply topically as needed for Itching.    [DISCONTINUED] risperiDONE (RISPERDAL) 0.5 MG Tab Take 1 tablet (0.5 mg total) by mouth nightly as needed (agitation and halluctions).     Family History       Problem Relation (Age of Onset)    Arthritis Mother, Father    Asthma Sister    Heart disease Father    Hyperlipidemia Mother, Sister    Hypertension Mother, Father,  Sister, Brother          Tobacco Use    Smoking status: Every Day    Smokeless tobacco: Never    Tobacco comments:     quit 30 years ago   Substance and Sexual Activity    Alcohol use: Not Currently    Drug use: Never    Sexual activity: Not Currently     Review of Systems   Unable to perform ROS: Intubated   Objective:     Vital Signs (Most Recent):  Temp: 99.4 °F (37.4 °C) (09/08/22 0403)  Pulse: 64 (09/08/22 0756)  Resp: (!) 23 (09/08/22 0756)  BP: (!) 147/66 (09/08/22 0500)  SpO2: 100 % (09/08/22 0756)   Vital Signs (24h Range):  Temp:  [98.5 °F (36.9 °C)-101 °F (38.3 °C)] 99.4 °F (37.4 °C)  Pulse:  [46-72] 64  Resp:  [19-43] 23  SpO2:  [99 %-100 %] 100 %  BP: (107-181)/(53-90) 147/66     Weight: 103 kg (227 lb 1.2 oz)  Body mass index is 33.53 kg/m².    Physical Exam  Constitutional:       General: He is in acute distress.      Appearance: He is obese. He is ill-appearing. He is not toxic-appearing.   HENT:      Head: Normocephalic.      Right Ear: External ear normal.      Left Ear: External ear normal.      Nose: No congestion or rhinorrhea.      Mouth/Throat:      Pharynx: Oropharynx is clear.   Eyes:      General: No scleral icterus.     Extraocular Movements: Extraocular movements intact.      Conjunctiva/sclera: Conjunctivae normal.      Pupils: Pupils are equal, round, and reactive to light.   Cardiovascular:      Rate and Rhythm: Bradycardia present. Rhythm irregular.      Heart sounds: No murmur heard.  Pulmonary:      Breath sounds: Rhonchi and rales present.      Comments: Reduced breath sounds to lung bases bilaterally  Chest:      Chest wall: No tenderness.   Abdominal:      General: There is no distension.      Palpations: Abdomen is soft.      Tenderness: There is no abdominal tenderness. There is no right CVA tenderness, left CVA tenderness or guarding.   Musculoskeletal:      Cervical back: Neck supple.   Lymphadenopathy:      Cervical: No cervical adenopathy.   Skin:     General: Skin is  warm and dry.      Comments: Hemosiderin staining bilateral LE   Neurological:      Comments: Eyes open, alert, ET in place         CRANIAL NERVES     CN III, IV, VI   Pupils are equal, round, and reactive to light.     Significant Labs: All pertinent labs within the past 24 hours have been reviewed.  A1C: No results for input(s): HGBA1C in the last 4320 hours.  ABGs:   Recent Labs   Lab 09/07/22  0452 09/08/22  0512   PH 7.452* 7.431   PCO2 33.0* 33.7*   HCO3 23.8 23.1   POCSATURATED 99.8 99.5   PO2 149* 148*       Blood Culture: No results for input(s): LABBLOO in the last 48 hours.    BMP:   Recent Labs   Lab 09/08/22  0601   *      K 3.4*      CO2 25   BUN 31*   CREATININE 2.2*   CALCIUM 8.3*   MG 1.9       CBC:   Recent Labs   Lab 09/07/22  0534 09/08/22  0601   WBC 4.35 3.53*   HGB 6.9* 7.5*   HCT 23.1* 24.5*    164       CMP:   Recent Labs   Lab 09/07/22  0534 09/08/22  0601    139   K 3.5 3.4*    110   CO2 25 25   * 111*   BUN 33* 31*   CREATININE 2.4* 2.2*   CALCIUM 8.2* 8.3*   PROT 5.9* 5.2*   ALBUMIN 2.2* 1.9*   BILITOT 0.4 0.4   ALKPHOS 43* 39*   AST 19 28   ALT 15 18   ANIONGAP 4* 4*       No results for input(s): LACTATE in the last 48 hours.    No results for input(s): COLORU, APPEARANCEUA, PHUR, SPECGRAV, PROTEINUA, GLUCUA, KETONESU, BILIRUBINUA, OCCULTUA, NITRITE, UROBILINOGEN, LEUKOCYTESUR, RBCUA, WBCUA, BACTERIA, SQUAMEPITHEL, HYALINECASTS in the last 48 hours.    Invalid input(s): WRIGHTSUR    X-Ray Chest 1 View  Narrative: EXAMINATION:  XR CHEST 1 VIEW    CLINICAL HISTORY:  vent;    COMPARISON:  Portable chest 09/06/2022 at 09:48 hours.    FINDINGS:  Frontal chest radiograph demonstrates endotracheal tube in place including nasogastric tube.  There is improved aeration in the left lung.  Stable right lower lung zone opacity.  No pleural fluid.  Impression: Improved aeration in the left lung, consistent with resolved edema or atelectasis.    Stable  opacity right lower lung zone, may reflect pneumonia.    Electronically signed by: Jamey Gannon MD  Date:    09/07/2022  Time:    09:02   Significant Imaging: I have reviewed all pertinent imaging results/findings within the past 24 hours.      Assessment/Plan:      * Pneumonia  CXR findings with new right lower lobe pneumonia. Bilateral infiltrates likely secondary to pulmonary edema from infectious.  - supplement oxygen as needed SpO2 >92-94%  - daily pulmonary hygiene  - continue coverage for community acquired PNA, azithrmoycin and rocephin  - f/u blood cultures x2 pending  - trend CBC  9/6 FM:  Now in ICU, expand coverage, speak with family.  9/7 FM:  Rec DNR, hospice care, family to discuss.  9/8 FM:  Encouraging hospice care.        Malignant neoplasm of lung  Poor prognosis.  Encouraging hospice care.      Tachypnea        COVID-19  Patient is identified as mild moderate severity.   Initiate standard COVID protocols; COVID-19 testing ,Infection Control notification  and isolation- respiratory, contact and droplet per protocol    Diagnostics: (leukopenia, hyponatremia, hyperferritinemia, elevated troponin, elevated d-dimer, age, and comorbidities are significant predictors of poor clinical outcome)  CBC, CMP and Portable CXR    Management: Initiate targeted therapy with possible use of Paxlovid. Maintain oxygen saturations 92-96% via Nasal Cannula  LPM and monitor with continuous/intermittent pulse oximetry. Inhaled bronchodilators as needed for shortness of breath., Continuous cardiac monitoring. and Manage respiratory failure (O2 requirement >10LPM or needing NIPPV/Mechanical ventilation) and/or Pneumonia (active chest infiltrates) separately as described below.  Empirically started on IV antibiotics for right lower lobe pneumonia on chest xray.   Follow up blood cultures and adjust antibiotics accordingly.     Influenza  Positive flu A. Continue tamiflu. Supplemental oxygen as needed to maintain  >94%.      SOB (shortness of breath)  Multifactorial, cardiopulmonary (CHF, atrial fibrillation, COPD) and infectious processes (COVID19 and fluA).  SpO2 maintained >95% on 2L NC at this time.  - see above management.        CHF (congestive heart failure)        Stage 3 chronic kidney disease  Stable BUN/Cr 33/1.8 likely at new baseline with recovering eGFR of >40 from <20 5 weeks ago. Continue to monitor as patient on IV diuretics.       Seizure disorder        A-fib  Patient with Permanent atrial fibrillation which is controlled currently with No rate controlling home medications at this time. . Patient is currently in atrial fibrillation.[id number] Score: 4. HASBLED Score: Unable to calculate. Anticoagulation indicated. Anticoagulation done with eliquis 5 mg BID.    Eliquis on hold, cards following.    COPD (chronic obstructive pulmonary disease)        Localized edema  Home med diuretic torsemide 20 mg PRN. +3 pitting on exam. Patient responding to IV lasix 80 mg in ED.   Continue with IV diuretics and taper with progress, patient endorses breathing a little better.   - IV lasix 40 mg BID  - restrict PO intake to 1.5 L fluid ounces  - monitor I/Os  9/6 FM:  Now hypotension, low dose IVFs  9/7 FM:  Volume improved, was dry.  9/8 FM:  Improved.    Weakness          VTE Risk Mitigation (From admission, onward)         Ordered     IP VTE HIGH RISK PATIENT  Once         09/02/22 1513     Place sequential compression device  Until discontinued         09/02/22 1513                Discharge Planning   GLORY:      Code Status: Full Code   Is the patient medically ready for discharge?:     Reason for patient still in hospital (select all that apply): Patient trending condition  Discharge Plan A: Home with family, Home Health                  Navi Domínguez III, MD  Department of Hospital Medicine   [place] [facility]

## 2022-09-08 NOTE — ASSESSMENT & PLAN NOTE
Patient with Permanent atrial fibrillation which is controlled currently with No rate controlling home medications at this time. . Patient is currently in atrial fibrillation.[id number] Score: 4. HASBLED Score: Unable to calculate. Anticoagulation indicated. Anticoagulation done with eliquis 5 mg BID.    Eliquis on hold, cards following.

## 2022-09-08 NOTE — EICU
Rounding (Video Assessment):  Yes    Intervention Initiated From:  [facility] / EICU    Delilah Communicated with Bedside Nurse regarding:  Respiratory    Nurse Notified:  Yes    Doctor Notified:  No    Comments: Pt ventilated. Vent FiO2 at 40%.  VSS.  Pt awake, alert, and able to follow commands.  Propofol infusing@ 10.  RN at BS.  NAD observed.

## 2022-09-08 NOTE — PLAN OF CARE
Pt 210 remains on vent - now on sedation - cpap trial tolerated this am- tolerating tube feedings - family present in room with pt - espinal catheter draining yellow urine via gravity - secured in place - 2 units of prbcs infused today without complications - remains on antibiotics of vanc and zosyn - no elevated temp - will continue to monitor

## 2022-09-08 NOTE — EICU
Rounding (Video Assessment):  Yes    Chart, labs, lines reviewed     creatinine slightly improved-2.2      H&H improved: 7.5/24.5 s/p 2units PRBC infusion  9/7-Stool + occult blood     Lung CA, Covid PNA, Flu A +  FULL CODE--Goals of care discussions ongoing     Pt in bed, intubated via ETT on ventilator. 40% FiO2, O2 sat 100%. Sedated with Propofol-No distress observed.. Other VSS. Family at bedside, Questions answered--emotional support provided. D5 1/2NS w/20mEq KCL infusing.

## 2022-09-08 NOTE — ASSESSMENT & PLAN NOTE
Home med diuretic torsemide 20 mg PRN. +3 pitting on exam. Patient responding to IV lasix 80 mg in ED.   Continue with IV diuretics and taper with progress, patient endorses breathing a little better.   - IV lasix 40 mg BID  - restrict PO intake to 1.5 L fluid ounces  - monitor I/Os  9/6 FM:  Now hypotension, low dose IVFs  9/7 FM:  Volume improved, was dry.  9/8 FM:  Improved.

## 2022-09-08 NOTE — PROGRESS NOTES
Pharmacokinetic Assessment Follow Up: IV Vancomycin    Vancomycin serum concentration assessment(s):    The random level was drawn correctly and can be used to guide therapy at this time. The measurement is above the desired definitive target range of 15 to 20 mcg/mL.    Patient's Vanco random level was 21 this morning. Will redose patient later today with a lower dose and get another random in the morning.     Patient at risk of accumulation bc of weight. Monitor levels closely     Scr slightly improved.     Vancomycin Regimen Plan:    Re-dose when the random level is less than 20 mcg/mL, next level to be drawn at ~0430 with am labs on 9/9/2022    Drug levels (last 3 results):  Recent Labs   Lab Result Units 09/07/22  0535 09/08/22  0601   Vancomycin, Random ug/mL 18.3 21.6       Pharmacy will continue to follow and monitor vancomycin.    Please contact pharmacy at extension [phone] for questions regarding this assessment.    Thank you for the consult,   Nolan Huff, Pharm.D.  Inpatient Pharmacist       Patient brief summary:  Jamey Lei is a 81 y.o. male initiated on antimicrobial therapy with IV Vancomycin for treatment of bacteremia    The patient's current regimen is pulse dosing     Drug Allergies:   Review of patient's allergies indicates:   Allergen Reactions    Zolpidem        Actual Body Weight:   103 kg  BMI: 33.53    Renal Function:   Estimated Creatinine Clearance: 32.2 mL/min (A) (based on SCr of 2.2 mg/dL (H)).,     Dialysis Method (if applicable):  N/A    CBC (last 72 hours):  Recent Labs   Lab Result Units 09/06/22  0611 09/07/22  0534 09/08/22  0601   WBC K/uL 4.61 4.35 3.53*   Hemoglobin g/dL 7.6* 6.9* 7.5*   Hematocrit % 25.5* 23.1* 24.5*   Platelets K/uL 177 165 164   Gran % % 75.9* 78.4* 68.8   Lymph % % 12.4* 9.4* 13.3*   Mono % % 9.8 11.0 13.6   Eosinophil % % 1.5 0.5 3.7   Basophil % % 0.2 0.2 0.3   Differential Method  Automated Automated Automated       Metabolic Panel (last 72  hours):  Recent Labs   Lab Result Units 09/06/22  0611 09/07/22  0534 09/08/22  0601   Sodium mmol/L 142 139 139   Potassium mmol/L 3.2* 3.5 3.4*   Chloride mmol/L 110 110 110   CO2 mmol/L 27 25 25   Glucose mg/dL 157* 130* 111*   BUN mg/dL 30* 33* 31*   Creatinine mg/dL 1.9* 2.4* 2.2*   Albumin g/dL 2.5* 2.2* 1.9*   Total Bilirubin mg/dL 0.3 0.4 0.4   Alkaline Phosphatase U/L 50* 43* 39*   AST U/L 16 19 28   ALT U/L 16 15 18   Magnesium mg/dL  --  1.9 1.9       Vancomycin Administrations:  vancomycin given in the last 96 hours                     vancomycin 1.25 g in dextrose 5% 250 mL IVPB (ready to mix) (mg) 1,250 mg New Bag 09/07/22 1411    vancomycin 2 g in 0.9% sodium chloride 500 mL IVPB (mg) 2,000 mg New Bag 09/06/22 1245                    Microbiologic Results:  Microbiology Results (last 7 days)       Procedure Component Value Units Date/Time    Culture, Respiratory with Gram Stain [[id number]] Collected: 09/06/22 0603    Order Status: Completed Specimen: Respiratory from Sputum Updated: 09/08/22 0907     Respiratory Culture Normal respiratory carl     Gram Stain (Respiratory) <10 epithelial cells per low power field.     Gram Stain (Respiratory) Rare WBC's     Gram Stain (Respiratory) No organisms seen    Blood culture x two cultures. Draw prior to antibiotics. [[id number]] Collected: 09/02/22 1141    Order Status: Completed Specimen: Blood Updated: 09/08/22 0612     Blood Culture, Routine No growth after 5 days.    Narrative:      Aerobic and anaerobic    Blood culture x two cultures. Draw prior to antibiotics. [[id number]] Collected: 09/02/22 1134    Order Status: Completed Specimen: Blood Updated: 09/08/22 0612     Blood Culture, Routine No growth after 5 days.    Narrative:      Aerobic and anaerobic    Blood Culture #1 **CANNOT BE ORDERED STAT** [[id number]]     Order Status: Canceled Specimen: Blood

## 2022-09-08 NOTE — NURSING
NG Tube noted in right hand. TF on hold at present. Breath sounds unchanged. Will notify MD. Educated not to pull at medical equipment. Shakes head. Will cont. to reinforce. Bilateral wrist restraints in place.

## 2022-09-08 NOTE — SUBJECTIVE & OBJECTIVE
Past Medical History:   Diagnosis Date    A-fib     Abdominal pain, epigastric     Abdominal pain, generalized     Acute on chronic congestive heart failure 12/29/2021    Anticoagulant long-term use     Arthritis     Asteatotic eczema     Benign essential HTN     Benign prostatic hyperplasia     Brain tumor     Cardiovascular accident     Carotid artery stenosis     CHF (congestive heart failure)     CKD (chronic kidney disease), stage IV     Constipation     COPD (chronic obstructive pulmonary disease)     Depressed     DM (diabetes mellitus), secondary     Drug eruption     Eczema     Edema     Encounter for blood transfusion     Fever     H. pylori infection     Hearing loss     History of tobacco use     Hypercholesterolemia     Hypertensive renal disease, benign     Hypokalemia     Hyponatremia     Impaired fasting glucose     Lumbar pain     Lung mass     Malaise and fatigue     Malignant neoplasm of prostate     Meningioma     Mixed hyperlipidemia     Nasal bleeding     Neuropathy     Obesity, unspecified     SHAYNE (obstructive sleep apnea)     Osteoarthritis of knee     Osteoarthritis of multiple joints     Peripheral vascular disease, unspecified     Psychosis     Renal disorder     Seizure disorder     Seizures     Sleep apnea     Unspecified cataract     Unspecified chronic bronchitis     Unspecified osteoarthritis, unspecified site     Vertigo     Vitamin D deficiency     Wheezing        Past Surgical History:   Procedure Laterality Date    APPENDECTOMY      COLONOSCOPY      CORONARY ARTERY BYPASS GRAFT      FOOT SURGERY Left     HIP REPLACEMENT ARTHROPLASTY Right     HIP SURGERY      JOINT REPLACEMENT      KNEE SURGERY      LUNG BIOPSY Right 03/03/2022    benign    SHOULDER SURGERY Left     SHOULDER SURGERY      TOTAL KNEE ARTHROPLASTY Right     triple bypass         Review of patient's allergies indicates:   Allergen Reactions    Zolpidem        No current facility-administered medications on file prior  to encounter.     Current Outpatient Medications on File Prior to Encounter   Medication Sig    albuterol (PROVENTIL/VENTOLIN HFA) 90 mcg/actuation inhaler Inhale 1-2 puffs into the lungs every 6 (six) hours as needed for Wheezing or Shortness of Breath. Rescue    albuterol-ipratropium (DUO-NEB) 2.5 mg-0.5 mg/3 mL nebulizer solution inhale 3 milliliters by nebulization route 4 times per day and as needed, up to 6 doses per day for 30 days    apixaban (ELIQUIS) 5 mg Tab Take 5 mg by mouth 2 (two) times daily.    aspirin (ECOTRIN) 81 MG EC tablet Take 81 mg by mouth once daily.    atorvastatin (LIPITOR) 20 MG tablet Take 1 tablet (20 mg total) by mouth once daily.    multivitamin capsule Take 1 capsule by mouth once daily.    OXcarbazepine (TRILEPTAL) 300 MG Tab Take 1 tablet (300 mg total) by mouth 2 (two) times daily.    senna (SENOKOT) 8.6 mg tablet 2 tablets at bedtime as needed    tamsulosin (FLOMAX) 0.4 mg Cap Take 0.4 mg by mouth once daily.    torsemide (DEMADEX) 20 MG Tab Take 1 tablet (20 mg total) by mouth daily as needed (weight gain greater than 5 lbs).    vitamin D (VITAMIN D3) 1000 units Tab Take 1,000 Units by mouth once daily.    [DISCONTINUED] calcium carbonate (OS-DAVID) 600 mg calcium (1,500 mg) Tab Take 600 mg by mouth once.    [DISCONTINUED] carvediloL (COREG) 6.25 MG tablet TAKE ONE TABLET BY MOUTH TWICE DAILY (EVERY 12 HOURS) FOR BLOOD PRESSURE. THANKS !!    [DISCONTINUED] ezetimibe (ZETIA) 10 mg tablet Take 10 mg by mouth once daily.    [DISCONTINUED] FLUoxetine 20 MG capsule     [DISCONTINUED] gabapentin (NEURONTIN) 300 MG capsule 1 capsule    [DISCONTINUED] miconazole NITRATE 2 % (MICOTIN) 2 % top powder Apply topically as needed for Itching.    [DISCONTINUED] risperiDONE (RISPERDAL) 0.5 MG Tab Take 1 tablet (0.5 mg total) by mouth nightly as needed (agitation and halluctions).     Family History       Problem Relation (Age of Onset)    Arthritis Mother, Father    Asthma Sister    Heart  disease Father    Hyperlipidemia Mother, Sister    Hypertension Mother, Father, Sister, Brother          Tobacco Use    Smoking status: Every Day    Smokeless tobacco: Never    Tobacco comments:     quit 30 years ago   Substance and Sexual Activity    Alcohol use: Not Currently    Drug use: Never    Sexual activity: Not Currently     Review of Systems   Unable to perform ROS: Intubated   Objective:     Vital Signs (Most Recent):  Temp: 99.4 °F (37.4 °C) (09/08/22 0403)  Pulse: 64 (09/08/22 0756)  Resp: (!) 23 (09/08/22 0756)  BP: (!) 147/66 (09/08/22 0500)  SpO2: 100 % (09/08/22 0756)   Vital Signs (24h Range):  Temp:  [98.5 °F (36.9 °C)-101 °F (38.3 °C)] 99.4 °F (37.4 °C)  Pulse:  [46-72] 64  Resp:  [19-43] 23  SpO2:  [99 %-100 %] 100 %  BP: (107-181)/(53-90) 147/66     Weight: 103 kg (227 lb 1.2 oz)  Body mass index is 33.53 kg/m².    Physical Exam  Constitutional:       General: He is in acute distress.      Appearance: He is obese. He is ill-appearing. He is not toxic-appearing.   HENT:      Head: Normocephalic.      Right Ear: External ear normal.      Left Ear: External ear normal.      Nose: No congestion or rhinorrhea.      Mouth/Throat:      Pharynx: Oropharynx is clear.   Eyes:      General: No scleral icterus.     Extraocular Movements: Extraocular movements intact.      Conjunctiva/sclera: Conjunctivae normal.      Pupils: Pupils are equal, round, and reactive to light.   Cardiovascular:      Rate and Rhythm: Bradycardia present. Rhythm irregular.      Heart sounds: No murmur heard.  Pulmonary:      Breath sounds: Rhonchi and rales present.      Comments: Reduced breath sounds to lung bases bilaterally  Chest:      Chest wall: No tenderness.   Abdominal:      General: There is no distension.      Palpations: Abdomen is soft.      Tenderness: There is no abdominal tenderness. There is no right CVA tenderness, left CVA tenderness or guarding.   Musculoskeletal:      Cervical back: Neck supple.    Lymphadenopathy:      Cervical: No cervical adenopathy.   Skin:     General: Skin is warm and dry.      Comments: Hemosiderin staining bilateral LE   Neurological:      Comments: Eyes open, alert, ET in place         CRANIAL NERVES     CN III, IV, VI   Pupils are equal, round, and reactive to light.     Significant Labs: All pertinent labs within the past 24 hours have been reviewed.  A1C: No results for input(s): HGBA1C in the last 4320 hours.  ABGs:   Recent Labs   Lab 09/07/22  0452 09/08/22  0512   PH 7.452* 7.431   PCO2 33.0* 33.7*   HCO3 23.8 23.1   POCSATURATED 99.8 99.5   PO2 149* 148*       Blood Culture: No results for input(s): LABBLOO in the last 48 hours.    BMP:   Recent Labs   Lab 09/08/22  0601   *      K 3.4*      CO2 25   BUN 31*   CREATININE 2.2*   CALCIUM 8.3*   MG 1.9       CBC:   Recent Labs   Lab 09/07/22  0534 09/08/22  0601   WBC 4.35 3.53*   HGB 6.9* 7.5*   HCT 23.1* 24.5*    164       CMP:   Recent Labs   Lab 09/07/22  0534 09/08/22  0601    139   K 3.5 3.4*    110   CO2 25 25   * 111*   BUN 33* 31*   CREATININE 2.4* 2.2*   CALCIUM 8.2* 8.3*   PROT 5.9* 5.2*   ALBUMIN 2.2* 1.9*   BILITOT 0.4 0.4   ALKPHOS 43* 39*   AST 19 28   ALT 15 18   ANIONGAP 4* 4*       No results for input(s): LACTATE in the last 48 hours.    No results for input(s): COLORU, APPEARANCEUA, PHUR, SPECGRAV, PROTEINUA, GLUCUA, KETONESU, BILIRUBINUA, OCCULTUA, NITRITE, UROBILINOGEN, LEUKOCYTESUR, RBCUA, WBCUA, BACTERIA, SQUAMEPITHEL, HYALINECASTS in the last 48 hours.    Invalid input(s): WRIGHTSUR    X-Ray Chest 1 View  Narrative: EXAMINATION:  XR CHEST 1 VIEW    CLINICAL HISTORY:  vent;    COMPARISON:  Portable chest 09/06/2022 at 09:48 hours.    FINDINGS:  Frontal chest radiograph demonstrates endotracheal tube in place including nasogastric tube.  There is improved aeration in the left lung.  Stable right lower lung zone opacity.  No pleural fluid.  Impression: Improved  aeration in the left lung, consistent with resolved edema or atelectasis.    Stable opacity right lower lung zone, may reflect pneumonia.    Electronically signed by: Jamey Gannon MD  Date:    09/07/2022  Time:    09:02   Significant Imaging: I have reviewed all pertinent imaging results/findings within the past 24 hours.

## 2022-09-08 NOTE — NURSING
Spoke with son, Remberto for blood consent. Reports think had transfusion before without adverse reaction. No questions at present. Verified with OTTO Escoto RN.

## 2022-09-08 NOTE — ASSESSMENT & PLAN NOTE
CXR findings with new right lower lobe pneumonia. Bilateral infiltrates likely secondary to pulmonary edema from infectious.  - supplement oxygen as needed SpO2 >92-94%  - daily pulmonary hygiene  - continue coverage for community acquired PNA, azithrmoycin and rocephin  - f/u blood cultures x2 pending  - trend CBC  9/6 FM:  Now in ICU, expand coverage, speak with family.  9/7 FM:  Rec DNR, hospice care, family to discuss.  9/8 FM:  Encouraging hospice care.

## 2022-09-08 NOTE — RESPIRATORY THERAPY
09/08/22 0929   PRE-TX-O2   O2 Device (Oxygen Therapy) ventilator   $ Is the patient on Low Flow Oxygen? Yes   Oxygen Concentration (%) 40   Oxygen Analyzed Concentration (%) 40 %   SpO2 100 %   Pulse Oximetry Type Continuous   $ Pulse Oximetry - Multiple Charge Pulse Oximetry - Multiple   Pulse (!) 50   Resp (!) 30   BP (!) 183/78   Positioning HOB elevated 30 degrees   Vent Select   Charged w/in last 24h YES   Preset Conventional Ventilator Settings   Vent ID 3   Vent Type    Ventilation Type VC   Vent Mode (S)  Spont   Humidity HME   PEEP/CPAP (S)  5 cmH20   Pressure Support (S)  10 cmH20   Waveform RAMP   Insp Rise Time  70 %   I-Trigger Type  V-TRIG   Trigger Sensitivity Flow/I-Trigger 3 L/min   Patient Ventilator Parameters   Resp Rate Total 30 br/min   Peak Airway Pressure 16 cmH2O   Mean Airway Pressure 9.2 cmH20   Plateau Pressure 15 cmH20   Exhaled Vt 393 mL   Total Ve 11.8 mL   Spont Ve 11.8 L   I:E Ratio Measured 1:1.40   Auto PEEP 0 cmH20   Tubing ID (mm) 8 mm   Tube Type ET   Inspired Tidal Volume (VTI) 393 mL   Conventional Ventilator Alarms   Alarms On Y  (Ventilator alarms on and functioning. Vent alarm cable plugged into call bell system and working properly. Cindy ARIAS aware)   Ve High Alarm 24 L/min   Ve Low Alarm 4 L/min   Resp Rate High Alarm 40 br/min   Press High Alarm 45 cmH2O   Apnea Rate 20   Apnea Volume (mL) 500 mL   Apnea Oxygen Concentration  100   Apnea Flow Rate (L/min) 60   T Apnea 20 sec(s)   Ready to Wean/Extubation Screen   FIO2<=50 (chart decimal) 0.4   MV<16L (chart vol.) 11.8   PEEP <=8 (chart #) 5   Ready to Wean Parameters   F/VT Ratio<105 (RSBI) (!) 76.34   Negative Inspiratory Force (cm H2O) 0   Vital Capacity (mL) 0   Respiratory Evaluation   $ Care Plan Tech Time 15 min       Patient placed on CPAP trial at this time per Dr. Domínguez's order. No distress noted. Cindy ARIAS notified

## 2022-09-08 NOTE — PLAN OF CARE
Patient had a uneventful night. He did have a high residual that required TF to be stopped and is now resumed with residual now 40cc/hr. He was awake and alert and answered questions with a head nod or head shake. He does mouth some words.      Problem: Infection  Goal: Absence of Infection Signs and Symptoms  Outcome: Ongoing, Progressing     Problem: Adult Inpatient Plan of Care  Goal: Plan of Care Review  Outcome: Ongoing, Progressing  Goal: Patient-Specific Goal (Individualized)  Outcome: Ongoing, Progressing  Goal: Absence of Hospital-Acquired Illness or Injury  Outcome: Ongoing, Progressing  Goal: Optimal Comfort and Wellbeing  Outcome: Ongoing, Progressing  Goal: Readiness for Transition of Care  Outcome: Ongoing, Progressing     Problem: Fluid and Electrolyte Imbalance (Acute Kidney Injury/Impairment)  Goal: Fluid and Electrolyte Balance  Outcome: Ongoing, Progressing     Problem: Oral Intake Inadequate (Acute Kidney Injury/Impairment)  Goal: Optimal Nutrition Intake  Outcome: Ongoing, Progressing     Problem: Renal Function Impairment (Acute Kidney Injury/Impairment)  Goal: Effective Renal Function  Outcome: Ongoing, Progressing

## 2022-09-08 NOTE — RESPIRATORY THERAPY
09/08/22 1254   Patient Assessment/Suction   Level of Consciousness (AVPU) alert   Respiratory Effort Unlabored   Expansion/Accessory Muscles/Retractions expansion symmetric;no retractions   All Lung Fields Breath Sounds coarse   Rhythm/Pattern, Respiratory assisted mechanically   Cough Frequency with stimulation   Cough Type assisted   Suction Method oral;tracheal   Suction Pressure (mmHg) -120 mmHg   $ Suction Charges Inline Suction Procedure Stat Charge   Secretions Amount moderate   Secretions Color white   Secretions Characteristics thick   $ Swab or suction? Suction   Aspirate Toleration BRADLY (no adverse reactions)   Sent to the lab? No   Is this patient in SNF or Inpatient Rehab? No   Skin Integrity   Area Observed Left;Right;Cheek;Upper lip;Lower lip;Corner lip   Skin Appearance without discoloration   PRE-TX-O2   O2 Device (Oxygen Therapy) ventilator   $ Is the patient on Low Flow Oxygen? Yes   Flow (L/min) 60   Oxygen Concentration (%) 40   Oxygen Analyzed Concentration (%) 40 %   SpO2 100 %   Pulse Oximetry Type Continuous   $ Pulse Oximetry - Multiple Charge Pulse Oximetry - Multiple   Pulse (!) 46   Resp (!) 21   Positioning HOB elevated 30 degrees   Positioning   Body Position position maintained   Head of Bed (HOB) Positioning HOB at 30 degrees   Aerosol Therapy   $ Aerosol Therapy Charges Aerosol Treatment   Daily Review of Necessity (SVN) completed   Respiratory Treatment Status (SVN) given   Treatment Route (SVN) in-line;ventilator   Patient Position (SVN) HOB elevated   Post Treatment Assessment (SVN) breath sounds unchanged   Signs of Intolerance (SVN) none   Breath Sounds Post-Respiratory Treatment   Throughout All Fields Post-Treatment All Fields   Throughout All Fields Post-Treatment no change   Post-treatment Heart Rate (beats/min) 52   Post-treatment Resp Rate (breaths/min) 22        Airway - Non-Surgical 09/06/22 0445 Endotracheal Tube   Placement Date/Time: 09/06/22 0445   Method of  Intubation: Rapid Sequence Induction  Inserted by: MD  Staff/Resident Name(s): Joey GOMEZ MD  Airway Device: Endotracheal Tube  Airway Device Size: 8.0  Style: Cuffed  Cuff Inflated With: Air  Placement ...   Secured at 23 cm   Measured At Lips   Secured Location (S)  Left  (ETT on left side of mouth, Rosa Maria RN at bedside and aware)   Secured by Commercial tube kendrick   Bite Block none   Site Condition Dry   Status Intact;Secured;Patent   Site Assessment Clean;Dry   Cuff Volume   (MLT)   General Safety Checklist   Safety Promotion/Fall Prevention side rails raised   Airway Safety   Ambu bag with the patient? Yes, Adult Ambu   Is mask with the patient? Yes, Adult Mask   Suction set is at the bedside? Yes   Respiratory Interventions   Cough And Deep Breathing unable to perform   Airway/Ventilation Management airway patency maintained   Vent Select   Conventional Vent Y   Humidification Changed? No  (not required at this time)   $ Ventilator Subsequent 1   Charged w/in last 24h YES   Preset Conventional Ventilator Settings   Vent ID 3   Vent Type    Ventilation Type VC   Vent Mode (S)  A/C   Humidity HME   Set Rate (S)  18 BPM   Vt Set (S)  500 mL   PEEP/CPAP (S)  5 cmH20   Waveform RAMP   Peak Flow 60 L/min   Plateau Set/Insp. Hold (sec) 0   I-Trigger Type  V-TRIG   Trigger Sensitivity Flow/I-Trigger 3 L/min   Patient Ventilator Parameters   Resp Rate Total 21 br/min   All Fields Breath Sounds coarse   Peak Airway Pressure 31 cmH2O   Mean Airway Pressure 12 cmH20   Plateau Pressure 15 cmH20   Exhaled Vt 663 mL   Total Ve 11.6 mL   I:E Ratio Measured 1:2.70   Auto PEEP 0 cmH20   Tubing ID (mm) 8 mm   Tube Type ET   Conventional Ventilator Alarms   Alarms On Y  (Ventilator alarms on and functioning. Vent alarm cable plugged into call bell system and working properly. Rosa Maria RN at bedside and aware)   Ve High Alarm 24 L/min   Ve Low Alarm 4 L/min   Resp Rate High Alarm 40 br/min   Press High Alarm 45 cmH2O   Apnea  Rate 20   Apnea Volume (mL) 500 mL   Apnea Oxygen Concentration  100   Apnea Flow Rate (L/min) 60   T Apnea 10 sec(s)   IHI Ventilator Associated Pneumonia Bundle (Required)   Head of Bed Elevated  HOB 30   Oral Care CHG;Lip moisturizer applied;Mouth swabbed;Mouth moisturizer;Mouth suctioned   Vent Circut Breaks Minimized Yes   Ready to Wean/Extubation Screen   FIO2<=50 (chart decimal) 0.4   MV<16L (chart vol.) 11.6   PEEP <=8 (chart #) 5   Ready to Wean Parameters   F/VT Ratio<105 (RSBI) (!) 31.67   Negative Inspiratory Force (cm H2O) 0   Vital Capacity (mL) 0   Respiratory Evaluation   $ Care Plan Tech Time 15 min       Patient placed back on previous AC vent settings post CPAP trial as per Dr. Domínguez's orders. Patient remained on CPAP trial for approximately 3.5 hours and tolerated well. Rosa Maria RN at bedside and aware of changes made

## 2022-09-09 LAB
AC: 18
ALBUMIN SERPL BCP-MCNC: 1.9 G/DL (ref 3.5–5.2)
ALP SERPL-CCNC: 46 U/L (ref 55–135)
ALT SERPL W/O P-5'-P-CCNC: 22 U/L (ref 10–44)
ANION GAP SERPL CALC-SCNC: 5 MMOL/L (ref 8–16)
AST SERPL-CCNC: 30 U/L (ref 10–40)
BACTERIA SPEC AEROBE CULT: NORMAL
BACTERIA SPEC AEROBE CULT: NORMAL
BASOPHILS # BLD AUTO: 0.02 K/UL (ref 0–0.2)
BASOPHILS NFR BLD: 0.4 % (ref 0–1.9)
BILIRUB SERPL-MCNC: 0.3 MG/DL (ref 0.1–1)
BUN SERPL-MCNC: 31 MG/DL (ref 8–23)
CALCIUM SERPL-MCNC: 8.6 MG/DL (ref 8.7–10.5)
CHLORIDE SERPL-SCNC: 110 MMOL/L (ref 95–110)
CO2 SERPL-SCNC: 23 MMOL/L (ref 23–29)
CORRECTED TEMPERATURE (PCO2): 36.4 MMHG
CORRECTED TEMPERATURE (PH): 7.39
CORRECTED TEMPERATURE (PO2): 134 MMHG
CREAT SERPL-MCNC: 1.9 MG/DL (ref 0.5–1.4)
DIFFERENTIAL METHOD: ABNORMAL
EOSINOPHIL # BLD AUTO: 0.2 K/UL (ref 0–0.5)
EOSINOPHIL NFR BLD: 3 % (ref 0–8)
ERYTHROCYTE [DISTWIDTH] IN BLOOD BY AUTOMATED COUNT: 16.8 % (ref 11.5–14.5)
EST. GFR  (NO RACE VARIABLE): 35.4 ML/MIN/1.73 M^2
FIO2: 40 %
GLUCOSE SERPL-MCNC: 127 MG/DL (ref 70–110)
GRAM STN SPEC: NORMAL
HCT VFR BLD AUTO: 32.8 % (ref 40–54)
HGB BLD-MCNC: 10.6 G/DL (ref 14–18)
IMM GRANULOCYTES # BLD AUTO: 0.03 K/UL (ref 0–0.04)
IMM GRANULOCYTES NFR BLD AUTO: 0.6 % (ref 0–0.5)
LYMPHOCYTES # BLD AUTO: 0.7 K/UL (ref 1–4.8)
LYMPHOCYTES NFR BLD: 15 % (ref 18–48)
MAGNESIUM SERPL-MCNC: 2 MG/DL (ref 1.6–2.6)
MCH RBC QN AUTO: 26.6 PG (ref 27–31)
MCHC RBC AUTO-ENTMCNC: 32.3 G/DL (ref 32–36)
MCV RBC AUTO: 82 FL (ref 82–98)
MODIFIED ALLEN'S TEST: ABNORMAL
MONOCYTES # BLD AUTO: 0.8 K/UL (ref 0.3–1)
MONOCYTES NFR BLD: 16.1 % (ref 4–15)
NEUTROPHILS # BLD AUTO: 3.2 K/UL (ref 1.8–7.7)
NEUTROPHILS NFR BLD: 64.9 % (ref 38–73)
NRBC BLD-RTO: 0 /100 WBC
O2DEVICE: ABNORMAL
PCO2 BLDA: 36.4 MMHG (ref 35–45)
PEEP: 5
PH SMN: 7.39 [PH] (ref 7.34–7.45)
PLATELET # BLD AUTO: 164 K/UL (ref 150–450)
PMV BLD AUTO: 11.3 FL (ref 9.2–12.9)
PO2 BLDA: 134 MMHG (ref 80–100)
POC BASE DEFICIT: -3 MMOL/L
POC HCO3: 22.2 MMOL/L
POC PERFORMED BY: ABNORMAL
POC SATURATED O2: 99.2 %
POC TCO2: 45.5 ML/DL
POC TEMPERATURE: 37 C
POTASSIUM SERPL-SCNC: 3.4 MMOL/L (ref 3.5–5.1)
PROT SERPL-MCNC: 5.7 G/DL (ref 6–8.4)
RBC # BLD AUTO: 3.98 M/UL (ref 4.6–6.2)
SODIUM SERPL-SCNC: 138 MMOL/L (ref 136–145)
SPECIMEN SOURCE: ABNORMAL
VANCOMYCIN SERPL-MCNC: 20.1 UG/ML
VT: 500
WBC # BLD AUTO: 4.92 K/UL (ref 3.9–12.7)

## 2022-09-09 PROCEDURE — 63600175 PHARM REV CODE 636 W HCPCS: Performed by: INTERNAL MEDICINE

## 2022-09-09 PROCEDURE — 94640 AIRWAY INHALATION TREATMENT: CPT

## 2022-09-09 PROCEDURE — 82803 BLOOD GASES ANY COMBINATION: CPT

## 2022-09-09 PROCEDURE — 36415 COLL VENOUS BLD VENIPUNCTURE: CPT | Performed by: INTERNAL MEDICINE

## 2022-09-09 PROCEDURE — 94003 VENT MGMT INPAT SUBQ DAY: CPT

## 2022-09-09 PROCEDURE — 80202 ASSAY OF VANCOMYCIN: CPT | Performed by: INTERNAL MEDICINE

## 2022-09-09 PROCEDURE — 25000242 PHARM REV CODE 250 ALT 637 W/ HCPCS: Performed by: INTERNAL MEDICINE

## 2022-09-09 PROCEDURE — 99900031 HC PATIENT EDUCATION (STAT)

## 2022-09-09 PROCEDURE — 20000000 HC ICU ROOM

## 2022-09-09 PROCEDURE — 94761 N-INVAS EAR/PLS OXIMETRY MLT: CPT

## 2022-09-09 PROCEDURE — 99900035 HC TECH TIME PER 15 MIN (STAT)

## 2022-09-09 PROCEDURE — 83735 ASSAY OF MAGNESIUM: CPT | Performed by: INTERNAL MEDICINE

## 2022-09-09 PROCEDURE — 27000221 HC OXYGEN, UP TO 24 HOURS

## 2022-09-09 PROCEDURE — 25000003 PHARM REV CODE 250: Performed by: INTERNAL MEDICINE

## 2022-09-09 PROCEDURE — 36600 WITHDRAWAL OF ARTERIAL BLOOD: CPT

## 2022-09-09 PROCEDURE — 27000207 HC ISOLATION

## 2022-09-09 PROCEDURE — 80053 COMPREHEN METABOLIC PANEL: CPT | Performed by: INTERNAL MEDICINE

## 2022-09-09 PROCEDURE — 85025 COMPLETE CBC W/AUTO DIFF WBC: CPT | Performed by: INTERNAL MEDICINE

## 2022-09-09 PROCEDURE — 99900026 HC AIRWAY MAINTENANCE (STAT)

## 2022-09-09 RX ADMIN — PIPERACILLIN AND TAZOBACTAM 4.5 G: 4; .5 INJECTION, POWDER, LYOPHILIZED, FOR SOLUTION INTRAVENOUS; PARENTERAL at 08:09

## 2022-09-09 RX ADMIN — RISPERIDONE 0.5 MG: 0.5 TABLET ORAL at 09:09

## 2022-09-09 RX ADMIN — DEXTROSE, SODIUM CHLORIDE, AND POTASSIUM CHLORIDE: 5; .45; .15 INJECTION INTRAVENOUS at 10:09

## 2022-09-09 RX ADMIN — OXCARBAZEPINE 300 MG: 300 TABLET, FILM COATED ORAL at 08:09

## 2022-09-09 RX ADMIN — ATORVASTATIN CALCIUM 40 MG: 40 TABLET, FILM COATED ORAL at 08:09

## 2022-09-09 RX ADMIN — PIPERACILLIN AND TAZOBACTAM 4.5 G: 4; .5 INJECTION, POWDER, LYOPHILIZED, FOR SOLUTION INTRAVENOUS; PARENTERAL at 05:09

## 2022-09-09 RX ADMIN — PROPOFOL 30 MCG/KG/MIN: 10 INJECTION, EMULSION INTRAVENOUS at 09:09

## 2022-09-09 RX ADMIN — IPRATROPIUM BROMIDE AND ALBUTEROL SULFATE 3 ML: 2.5; .5 SOLUTION RESPIRATORY (INHALATION) at 07:09

## 2022-09-09 RX ADMIN — OXCARBAZEPINE 300 MG: 300 TABLET, FILM COATED ORAL at 09:09

## 2022-09-09 RX ADMIN — VANCOMYCIN HYDROCHLORIDE 750 MG: 750 INJECTION, POWDER, LYOPHILIZED, FOR SOLUTION INTRAVENOUS at 09:09

## 2022-09-09 RX ADMIN — PROPOFOL 30 MCG/KG/MIN: 10 INJECTION, EMULSION INTRAVENOUS at 10:09

## 2022-09-09 RX ADMIN — PIPERACILLIN AND TAZOBACTAM 4.5 G: 4; .5 INJECTION, POWDER, LYOPHILIZED, FOR SOLUTION INTRAVENOUS; PARENTERAL at 12:09

## 2022-09-09 RX ADMIN — PROPOFOL 30 MCG/KG/MIN: 10 INJECTION, EMULSION INTRAVENOUS at 03:09

## 2022-09-09 RX ADMIN — FAMOTIDINE 20 MG: 20 TABLET ORAL at 08:09

## 2022-09-09 RX ADMIN — RISPERIDONE 0.5 MG: 0.5 TABLET ORAL at 08:09

## 2022-09-09 RX ADMIN — IPRATROPIUM BROMIDE AND ALBUTEROL SULFATE 3 ML: 2.5; .5 SOLUTION RESPIRATORY (INHALATION) at 12:09

## 2022-09-09 RX ADMIN — IPRATROPIUM BROMIDE AND ALBUTEROL SULFATE 3 ML: 2.5; .5 SOLUTION RESPIRATORY (INHALATION) at 01:09

## 2022-09-09 RX ADMIN — DEXTROSE, SODIUM CHLORIDE, AND POTASSIUM CHLORIDE: 5; .45; .15 INJECTION INTRAVENOUS at 12:09

## 2022-09-09 RX ADMIN — TAMSULOSIN HYDROCHLORIDE 0.4 MG: 0.4 CAPSULE ORAL at 08:09

## 2022-09-09 NOTE — PLAN OF CARE
Recommendations  1. Rec'd EN: Suplenna at rate of of 10mL/hr increasing by 10mL every 4-6 hrs to goal rate of 50mL/hr to provide 2160 kcals (95% EEN), 54g of protein (43% EPN), and 206mL water with propofol infusing@ 6.2mL/hr providing an additional 164 kcals.    2. Rec'd Lino BID via NG tube to promote wound healing and provide 190 kcals and 5g of protein.   -Do not mix Lino with formula in a tube-feeding bag.   -Pour one packet of Lino in a clean, small (approximately 6- to 8-fl-oz) container for mixing.   -Add 4 fl oz (120 mL) water at room temperature.   -Mix well with disposable spoon or tongue blade until all particles are completely hydrated.   -Flush feeding tube with 30 mL water. -Administer Lino through feeding tube using a 60-mL or larger syringe. -Flush with an additional 30 mL water.   3. RD to follow and make rec's accordingly  Goals: Pt will reach TF goal rate within 48 hrs of inititation.  Nutrition Goal Status: progressing towards goal

## 2022-09-09 NOTE — RESPIRATORY THERAPY
09/09/22 1048   Patient Assessment/Suction   Level of Consciousness (AVPU) responds to voice   Respiratory Effort Unlabored   Expansion/Accessory Muscles/Retractions no use of accessory muscles   Rhythm/Pattern, Respiratory assisted mechanically;unlabored;tachypneic   PRE-TX-O2   O2 Device (Oxygen Therapy) ventilator   Oxygen Concentration (%) 40   SpO2 100 %   Pulse (!) 46   Resp (!) 33   /62   Vent Select   Charged w/in last 24h YES   Preset Conventional Ventilator Settings   Vent ID 3   Vent Type    Ventilation Type VC   Vent Mode Spont   PEEP/CPAP 5 cmH20   Pressure Support 10 cmH20   Insp Rise Time  70 %   I-Trigger Type  V-TRIG   Trigger Sensitivity Flow/I-Trigger 3 L/min   Patient Ventilator Parameters   Resp Rate Total 33 br/min   Peak Airway Pressure 16 cmH2O   Mean Airway Pressure 8.8 cmH20   Plateau Pressure 15 cmH20   Exhaled Vt 329 mL   Total Ve 11 mL   Spont Ve 11 L   I:E Ratio Measured 1:1.70   Auto PEEP 0 cmH20   Inspired Tidal Volume (VTI) 341 mL   Conventional Ventilator Alarms   Alarms On Y  (alarms on and functioning at this time. vent alarm cable plugged into call bell system. working properly.)   Ve High Alarm 24 L/min   Ve Low Alarm 4 L/min   Vt High Alarm 1000 mL   Vt Low Alarm 200 mL   Resp Rate High Alarm 40 br/min   Press High Alarm 45 cmH2O   Apnea Rate 20   Apnea Volume (mL) 500 mL   Apnea Oxygen Concentration  100   Apnea Flow Rate (L/min) 60   T Apnea 10 sec(s)   Ready to Wean/Extubation Screen   FIO2<=50 (chart decimal) 0.4   MV<16L (chart vol.) 11   PEEP <=8 (chart #) 5   Ready to Wean Parameters   F/VT Ratio<105 (RSBI) (!) 100.3   Respiratory Evaluation   $ Care Plan Tech Time 15 min

## 2022-09-09 NOTE — ASSESSMENT & PLAN NOTE
Home med diuretic torsemide 20 mg PRN. +3 pitting on exam. Patient responding to IV lasix 80 mg in ED.   Continue with IV diuretics and taper with progress, patient endorses breathing a little better.   - IV lasix 40 mg BID  - restrict PO intake to 1.5 L fluid ounces  - monitor I/Os  9/6 FM:  Now hypotension, low dose IVFs  9/7 FM:  Volume improved, was dry.  9/8 FM:  Improved.  9/9 FM:  Stable.

## 2022-09-09 NOTE — ASSESSMENT & PLAN NOTE
CXR findings with new right lower lobe pneumonia. Bilateral infiltrates likely secondary to pulmonary edema from infectious.  - supplement oxygen as needed SpO2 >92-94%  - daily pulmonary hygiene  - continue coverage for community acquired PNA, azithrmoycin and rocephin  - f/u blood cultures x2 pending  - trend CBC  9/6 FM:  Now in ICU, expand coverage, speak with family.  9/7 FM:  Rec DNR, hospice care, family to discuss.  9/8 FM:  Encouraging hospice care.  9/9 FM:  Multifactorial

## 2022-09-09 NOTE — ASSESSMENT & PLAN NOTE
Patient with Permanent atrial fibrillation which is controlled currently with No rate controlling home medications at this time. . Patient is currently in atrial fibrillation.[id number] Score: 4. HASBLED Score: Unable to calculate. Anticoagulation indicated. Anticoagulation done with eliquis 5 mg BID.    Eliquis on hold, cards following.

## 2022-09-09 NOTE — PROGRESS NOTES
Pharmacokinetic Assessment Follow Up: IV Vancomycin    Vancomycin serum concentration assessment(s):    The random level was drawn correctly and can be used to guide therapy at this time. The measurement is above the desired definitive target range of 15 to 20 mcg/mL. Will redose at 1000    Vancomycin Regimen Plan:    Continue regimen to Vancomycin 750 mg IV every pulse dose hours with next serum trough concentration measured at am labs prior to next dose on 09/10/2022    Drug levels (last 3 results):  Recent Labs   Lab Result Units 09/07/22  0535 09/08/22  0601 09/09/22  0414   Vancomycin, Random ug/mL 18.3 21.6 20.1       Pharmacy will continue to follow and monitor vancomycin.    Please contact pharmacy at extension [phone] for questions regarding this assessment.    Thank you for the consult,   Bonnie Montalvo       Patient brief summary:  Jamey Lei is a 81 y.o. male initiated on antimicrobial therapy with IV Vancomycin for treatment of bacteremia    The patient's current regimen is 750 mg pulse dose    Drug Allergies:   Review of patient's allergies indicates:   Allergen Reactions    Zolpidem        Actual Body Weight:   103 kg    Renal Function:   Estimated Creatinine Clearance: 32.2 mL/min (A) (based on SCr of 2.2 mg/dL (H)).,     Dialysis Method (if applicable):  N/A    CBC (last 72 hours):  Recent Labs   Lab Result Units 09/07/22  0534 09/08/22  0601   WBC K/uL 4.35 3.53*   Hemoglobin g/dL 6.9* 7.5*   Hematocrit % 23.1* 24.5*   Platelets K/uL 165 164   Gran % % 78.4* 68.8   Lymph % % 9.4* 13.3*   Mono % % 11.0 13.6   Eosinophil % % 0.5 3.7   Basophil % % 0.2 0.3   Differential Method  Automated Automated       Metabolic Panel (last 72 hours):  Recent Labs   Lab Result Units 09/07/22  0534 09/08/22  0601 09/09/22  0414   Sodium mmol/L 139 139  --    Potassium mmol/L 3.5 3.4*  --    Chloride mmol/L 110 110  --    CO2 mmol/L 25 25  --    Glucose mg/dL 130* 111*  --    BUN mg/dL 33* 31*  --    Creatinine mg/dL  2.4* 2.2*  --    Albumin g/dL 2.2* 1.9*  --    Total Bilirubin mg/dL 0.4 0.4  --    Alkaline Phosphatase U/L 43* 39*  --    AST U/L 19 28  --    ALT U/L 15 18  --    Magnesium mg/dL 1.9 1.9 2.0       Vancomycin Administrations:  vancomycin given in the last 96 hours                     vancomycin 750 mg in dextrose 5 % 250 mL IVPB (ready to mix system) (mg) 750 mg New Bag 09/08/22 1250    vancomycin 1.25 g in dextrose 5% 250 mL IVPB (ready to mix) (mg) 1,250 mg New Bag 09/07/22 1411    vancomycin 2 g in 0.9% sodium chloride 500 mL IVPB (mg) 2,000 mg New Bag 09/06/22 1245                    Microbiologic Results:  Microbiology Results (last 7 days)       Procedure Component Value Units Date/Time    Culture, Respiratory with Gram Stain [[id number]] Collected: 09/06/22 0603    Order Status: Completed Specimen: Respiratory from Sputum Updated: 09/08/22 0907     Respiratory Culture Normal respiratory carl     Gram Stain (Respiratory) <10 epithelial cells per low power field.     Gram Stain (Respiratory) Rare WBC's     Gram Stain (Respiratory) No organisms seen    Blood culture x two cultures. Draw prior to antibiotics. [[id number]] Collected: 09/02/22 1141    Order Status: Completed Specimen: Blood Updated: 09/08/22 0612     Blood Culture, Routine No growth after 5 days.    Narrative:      Aerobic and anaerobic    Blood culture x two cultures. Draw prior to antibiotics. [[id number]] Collected: 09/02/22 1134    Order Status: Completed Specimen: Blood Updated: 09/08/22 0612     Blood Culture, Routine No growth after 5 days.    Narrative:      Aerobic and anaerobic    Blood Culture #1 **CANNOT BE ORDERED STAT** [[id number]]     Order Status: Canceled Specimen: Blood

## 2022-09-09 NOTE — PLAN OF CARE
No issues overnight. Resting well sedated with diprivan 30mcg/min. VSS, MT 99.8. arouses easily to verbal command, follows simple commands and nods appropriately. UOP 500ml, BM X2

## 2022-09-09 NOTE — RESPIRATORY THERAPY
09/09/22 0820   Preset Conventional Ventilator Settings   Vent Type    Ventilation Type VC   Vent Mode (S)  Spont   Humidity HME   PEEP/CPAP (S)  5 cmH20   Pressure Support (S)  10 cmH20     Placed patient on CPAP trial at this time. HUGO Crane aware.

## 2022-09-09 NOTE — PLAN OF CARE
Pt had cpap trial this am - tolerated - now back on ac settings with sedation in place - remains in isolation - no elevated temp - turned and repostioned for comfort - will continue to monitor

## 2022-09-09 NOTE — RESPIRATORY THERAPY
09/09/22 1208   Patient Assessment/Suction   Level of Consciousness (AVPU) responds to voice   Respiratory Effort Unlabored   Expansion/Accessory Muscles/Retractions no use of accessory muscles;no retractions   All Lung Fields Breath Sounds equal bilaterally   Rhythm/Pattern, Respiratory assisted mechanically   Cough Frequency with stimulation   Cough Type assisted   Suction Method oral;tracheal   Suction Pressure (mmHg) -120 mmHg   $ Suction Charges Inline Suction Procedure Stat Charge   Secretions Amount small   Secretions Color white   Secretions Characteristics thick   $ Swab or suction? Suction   Aspirate Toleration BRADLY (no adverse reactions)   Skin Integrity   Area Observed Left;Right;Cheek;Corner lip   Skin Appearance without discoloration   PRE-TX-O2   O2 Device (Oxygen Therapy) ventilator   $ Is the patient on Low Flow Oxygen? Yes   Flow (L/min) 60   SpO2 100 %   Pulse Oximetry Type Continuous   $ Pulse Oximetry - Multiple Charge Pulse Oximetry - Multiple   Pulse (!) 46   Resp (!) 33   /64   Positioning HOB elevated 30 degrees   Aerosol Therapy   $ Aerosol Therapy Charges Aerosol Treatment   Daily Review of Necessity (SVN) completed   Respiratory Treatment Status (SVN) given   Treatment Route (SVN) in-line;ventilator   Patient Position (SVN) HOB elevated   Post Treatment Assessment (SVN) breath sounds unchanged   Signs of Intolerance (SVN) none   Breath Sounds Post-Respiratory Treatment   Throughout All Fields Post-Treatment All Fields   Throughout All Fields Post-Treatment aeration increased   Post-treatment Heart Rate (beats/min) 46   Post-treatment Resp Rate (breaths/min) 33        Airway - Non-Surgical 09/06/22 0445 Endotracheal Tube   Placement Date/Time: 09/06/22 0445   Method of Intubation: Rapid Sequence Induction  Inserted by: MD  Staff/Resident Name(s): Joey GOMEZ MD  Airway Device: Endotracheal Tube  Airway Device Size: 8.0  Style: Cuffed  Cuff Inflated With: Air  Placement ...    Secured at 23 cm   Measured At Lips   Secured Location Center   Secured by Commercial tube kendrick   Bite Block none   Site Condition Dry   Status Patent   Site Assessment Clean   Cuff Volume   (MLT)   General Safety Checklist   Safety Promotion/Fall Prevention side rails raised   Airway Safety   Ambu bag with the patient? Yes, Adult Ambu   Is mask with the patient? Yes, Adult Mask   Suction set is at the bedside? Yes   Respiratory Interventions   Airway/Ventilation Management airway patency maintained   Vent Select   Conventional Vent Y   Charged w/in last 24h YES   Preset Conventional Ventilator Settings   Vent ID [id number]   Vent Type    Vent Mode (S)  A/C   Humidity HME   Set Rate (S)  18 BPM   Vt Set (S)  500 mL   PEEP/CPAP (S)  5 cmH20   Waveform RAMP   Patient Ventilator Parameters   All Fields Breath Sounds coarse   Tubing ID (mm) 8 mm   Tube Type ET   Conventional Ventilator Alarms   Alarms On Y  (alarms on and functioning at this time. vent alarm cable plugged into call bell system. working properly.)   Vt High Alarm 1000 mL   Vt Low Alarm 200 mL   IHI Ventilator Associated Pneumonia Bundle (Required)   Daily Awakening Trials Performed Yes   Daily Assessment of Readiness to Extubate Yes   Head of Bed Elevated  HOB 30   Oral Care Mouth suctioned   Ready to Wean/Extubation Screen   PEEP <=8 (chart #) 5   Respiratory Evaluation   $ Care Plan Tech Time 15 min     Placed patient back on previous vent settings; patient tolerated CPAP TRIAL well. HUGO Crane aware.

## 2022-09-09 NOTE — PROGRESS NOTES
[facility] - Intensive Care  Adult Nutrition  Progress Note    SUMMARY       Recommendations  1. Rec'd EN: Suplenna at rate of of 10mL/hr increasing by 10mL every 4-6 hrs to goal rate of 50mL/hr to provide 2160 kcals (95% EEN), 54g of protein (43% EPN), and 206mL water with propofol infusing@ 6.2mL/hr providing an additional 164 kcals.    2. Rec'd Lino BID via NG tube to promote wound healing and provide 190 kcals and 5g of protein.   -Do not mix Lino with formula in a tube-feeding bag.   -Pour one packet of Lino in a clean, small (approximately 6- to 8-fl-oz) container for mixing.   -Add 4 fl oz (120 mL) water at room temperature.   -Mix well with disposable spoon or tongue blade until all particles are completely hydrated.   -Flush feeding tube with 30 mL water. -Administer Lino through feeding tube using a 60-mL or larger syringe. -Flush with an additional 30 mL water.   3. RD to follow and make rec's accordingly  Goals: Pt will reach TF goal rate within 48 hrs of inititation.  Nutrition Goal Status: progressing towards goal  Communication of RD Recs: reviewed with RN    Assessment and Plan    Nutrition Problem  Inadequate oral intake     Related to (etiology):   NPO/intubation status     Signs and Symptoms (as evidenced by):   0% PO intake     Interventions/Recommendations (treatment strategy):     Recommendations  1. Rec'd EN: Suplenna at rate of of 10mL/hr increasing by 10mL every 4-6 hrs to goal rate of 50mL/hr to provide 2160 kcals (95% EEN), 54g of protein (43% EPN), and 206mL water with propofol infusing@ 6.2mL/hr providing an additional 164 kcals.    2. Rec'd Lino BID via NG tube to promote wound healing and provide 190 kcals and 5g of protein.   -Do not mix Lino with formula in a tube-feeding bag.   -Pour one packet of Lino in a clean, small (approximately 6- to 8-fl-oz) container for mixing.   -Add 4 fl oz (120 mL) water at room temperature.   -Mix well with disposable spoon or tongue blade until  all particles are completely hydrated.   -Flush feeding tube with 30 mL water. -Administer Lino through feeding tube using a 60-mL or larger syringe. -Flush with an additional 30 mL water.   3. RD to follow and make rec's accordingly     Nutrition Diagnosis Status:   Continues     Reason for Assessment    Reason For Assessment: RD follow-up  Diagnosis: other (see comments) (Pneumonia)  Relevant Medical History: A-fib,Abdominal pain, epigastric,bdominal pain,  Acute on chronic congestive heart failure,nticoagulant long-term use,rthritis  ,Asteatotic eczema ,enign essential HTN    , Benign prostatic hyperplasia  ,Brain tumor   ,Cardiovascular accident  ,  Carotid artery stenosis  ,  CHF (congestive heart failure)   ,CKD , stage IV , Constipation ,COPD ,Depressed ,DM ), secondary    ,dug eruption ,Eczema  ,lashanda     Encounter for blood transfusion     Fever     H. pylori infection     Hearing loss     History of tobacco use     Hypercholesterolemia     Hypertensive renal disease, benign     Hypokalemia     Hyponatremia     Impaired fasting glucose     Lumbar pain     Lung mass     Malaise and fatigue     Malignant neoplasm of prostate     Meningioma     Mixed hyperlipidemia     Nasal bleeding     Neuropathy     Obesity, unspecified     SHAYNE (obstructive sleep apnea)     Osteoarthritis of knee     Osteoarthritis of multiple joints     Peripheral vascular disease, unspecified     Psychosis     Renal disorder     Seizure disorder     Seizures     Sleep apnea     Unspecified cataract     Unspecified chronic bronchitis     Unspecified osteoarthritis, unspecified site     Vertigo     Vitamin D deficiency     Wheezing  Interdisciplinary Rounds: did not attend  General Information Comments: Followed up on pt this morning. Spoke with RN about pt's current TF rate, flushes, and tolerance. RN stated that the pt is tolerating well @ 40mL/hr and is progressing well towards goal rate of 50mL/hr. RD to follow and make rec's  "accordingly.  Nutrition Discharge Planning: TBD as care progresses    Nutrition Risk Screen    Nutrition Risk Screen: tube feeding or parenteral nutrition    Nutrition/Diet History    Spiritual, Cultural Beliefs, Muslim Practices, Values that Affect Care: no  Food Allergies: NKFA    Anthropometrics    Temp: 98.3 °F (36.8 °C)  Height Method: Stated  Height: 5' 9" (175.3 cm)  Height (inches): 69 in  Weight Method: Bed Scale  Weight: 103 kg (227 lb 1.2 oz)  Weight (lb): 227.08 lb  Ideal Body Weight (IBW), Male: 160 lb  % Ideal Body Weight, Male (lb): 141.93 %  BMI (Calculated): 33.5  BMI Grade: 30 - 34.9- obesity - grade I    Lab/Procedures/Meds    Pertinent Labs Reviewed: reviewed  Pertinent Medications Reviewed: reviewed    Estimated/Assessed Needs    Weight Used For Calorie Calculations: 103 kg (227 lb 1.2 oz)  Energy Calorie Requirements (kcal): 2432  Energy Need Method: [facility]  Protein Requirements: 124-155 (1.2-1.5g/kg)  Weight Used For Protein Calculations: 103 kg (227 lb 1.2 oz)  Fluid Requirements (mL): 2432 (1mL/kcal)  Estimated Fluid Requirement Method: RDA Method  RDA Method (mL): 2432    Nutrition Prescription Ordered    Current Diet Order: NPO  Current Nutrition Support Formula Ordered: Suplena  Current Nutrition Support Rate Ordered: 40 (ml)  Current Nutrition Support Frequency Ordered: continuous  Oral Nutrition Supplement: None    Evaluation of Received Nutrient/Fluid Intake    Enteral Calories (kcal): 1728  Enteral Protein (gm): 43  Enteral (Free Water) Fluid (mL): 165  Free Water Flush Fluid (mL): 12 (mL/hr)  Other Calories (kcal): 190 (Lino)  Total Calories (kcal): 1918  % Kcal Needs: 85%  Other Protein (gm): 5 (Lino)  Total Protein (gm): 48  % Protein Needs: 39%  I/O: +950.8  Energy Calories Required: meeting needs  Protein Required: not meeting needs  Tolerance: tolerating  % Intake of Estimated Energy Needs: 50 - 75 %  % Meal Intake: NPO    Nutrition Risk    Level of Risk/Frequency of " Follow-up: moderate     Monitor and Evaluation    Food and Nutrient Intake: enteral nutrition intake  Food and Nutrient Adminstration: enteral and parenteral nutrition administration  Knowledge/Beliefs/Attitudes: beliefs and attitudes, food and nutrition knowledge/skill  Physical Activity and Function: nutrition-related ADLs and IADLs  Anthropometric Measurements: height/length, weight, weight change, body mass index  Biochemical Data, Medical Tests and Procedures: electrolyte and renal panel, gastrointestinal profile, inflammatory profile, glucose/endocrine profile, lipid profile  Nutrition-Focused Physical Findings: overall appearance     Nutrition Follow-Up    RD Follow-up?: Yes

## 2022-09-09 NOTE — EICU
Rounding (Video Assessment):  Yes    I  Comments: EICU rounding done. Pt sedated on vent, appears to be resting comfortably.  Chart and meds reviewed.  VS stable.

## 2022-09-09 NOTE — EICU
Rounding (Video Assessment):  Yes  Comments: Video rounds completed.   Continues on vent support (refer to flowsheets for settings), sedated on propofol IV drip (see MAR),  nad noted.

## 2022-09-09 NOTE — PROGRESS NOTES
[facility] Medicine  Progress Note    Patient Name: Jamey Lei  MRN: [id number]  Patient Class: IP- Inpatient   Admission Date: 9/2/2022  Length of Stay: 7 days  Attending Physician: Navi Domínguez III, MD  Primary Care Provider: Navi Domínguez III, MD        Subjective:     Principal Problem:Pneumonia        HPI:  81 year old male with hypertension, hypoerlipidemia, CHF, permanent atrial fibriallation on Eliquis, CAD s/p CABGx3, CKD stage 3, recent lung and brain mass findings presents to ED with worsening shortness of breath that started about a week ago with bilateral leg swelling with difficulty ambulating with his walker.     Patient hard of hearing endorsed since treatment in the ED, he has been feeling better. At home he does not use oxygen, breathing at SpO2 >95% on continuous O2 2L via nasal cannula on exam. There is no change in appetite, nausea, vomiting, diarrhea, constipation. Denies headaches, fever, chills, vision changes, chest pain, abdominal pain. Denies joint pain and loss of sensation in toes and foot bilaterally with increasing leg swelling.    ED course:  Tachypnea rate >30, CXR right lower lobe pneumonia, positive influenza A and COVID19 (fully vaccinated), NTproBNT >6000, lactate <0.2 x2, blood cultures x2 pending,  BUN/Cr 33/1.8 with improved eGFR from last hospitalization and discharge one week ago. Patient started on IV lasix 80 mg, responding well with clear urine collecting in Sanders catheter and IV antibiotics empirically started for right lobe pneumonia.       Overview/Hospital Course:  9/3/22 CG:  Says it feels much better and does complain of a cough.  He has had to go to the bathroom and is waiting for help and assistance getting up from the bed to go to the restroom.  No other acute overnight events.  9/4/22 CG: Doing better, still has a cough and feels weak.   9/5/22 CG:  Became more agitated last night and was trying to rip out his Sanders catheter.   Had to be given Seroquel to help him sleep and to calm him down.  9/6/22 FM:  Events of the weekend are noted.  Patient was admitted with COVID and influenza and a deterioration in his condition.  Patient has a history of lung cancer and is seeing an oncologist at an outside facility.  Patient has a history of chronic kidney disease as well as obesity diabetes neuropathy atrial fibrillation and his general health has come line significantly over the last year.  The patient has been in and out of the hospital and nursing facilities.  Last night the patient had a rapid response and ended up on the ventilator.  I reviewed the events and I am unsure if this was related to Seroquel will and I also see that he was becoming hypotensive.  Patient's chest x-ray is abnormal I will discuss with the family this morning.  9/7/22 FM:  Met with family today.  They were unable to make a decision on do not resuscitate.  They will consult with other family members.  Patient's volume status is improved as he was dry yesterday.  Patient's renal numbers all reviewed and noted.  Patient is able to open eyes on the ventilator and follow simple commands.  Will continue ventilatory support today as well as tube feedings.  9/8/22 FM:  Patient had a positive stool for blood as well as a persistent drop in his hemoglobin and was transfused 2 units of packed red blood cells yesterday.  Patient is admitted with COVID pneumonia influenza pneumonia and possible sepsis.  Patient has a history of lung cancer as well as multiple other comorbidities and I have met with the family yesterday and they are unable to come to a code status or long-term status decision.  I am encouraging hospice care as the patient has multiple irreversible illnesses and a decline in his quality of life.  9/9/22 FM:  Patient is relatively unchanged today.  Yesterday the patient did well with his CPAP challenges and we will continue today.  I suspect he can be taken off the  ventilator over the weekend some time.  Labs this morning are pending he has received 4 units of blood.      Past Medical History:   Diagnosis Date    A-fib     Abdominal pain, epigastric     Abdominal pain, generalized     Acute on chronic congestive heart failure 12/29/2021    Anticoagulant long-term use     Arthritis     Asteatotic eczema     Benign essential HTN     Benign prostatic hyperplasia     Brain tumor     Cardiovascular accident     Carotid artery stenosis     CHF (congestive heart failure)     CKD (chronic kidney disease), stage IV     Constipation     COPD (chronic obstructive pulmonary disease)     Depressed     DM (diabetes mellitus), secondary     Drug eruption     Eczema     Edema     Encounter for blood transfusion     Fever     H. pylori infection     Hearing loss     History of tobacco use     Hypercholesterolemia     Hypertensive renal disease, benign     Hypokalemia     Hyponatremia     Impaired fasting glucose     Lumbar pain     Lung mass     Malaise and fatigue     Malignant neoplasm of prostate     Meningioma     Mixed hyperlipidemia     Nasal bleeding     Neuropathy     Obesity, unspecified     SHAYNE (obstructive sleep apnea)     Osteoarthritis of knee     Osteoarthritis of multiple joints     Peripheral vascular disease, unspecified     Psychosis     Renal disorder     Seizure disorder     Seizures     Sleep apnea     Unspecified cataract     Unspecified chronic bronchitis     Unspecified osteoarthritis, unspecified site     Vertigo     Vitamin D deficiency     Wheezing        Past Surgical History:   Procedure Laterality Date    APPENDECTOMY      COLONOSCOPY      CORONARY ARTERY BYPASS GRAFT      FOOT SURGERY Left     HIP REPLACEMENT ARTHROPLASTY Right     HIP SURGERY      JOINT REPLACEMENT      KNEE SURGERY      LUNG BIOPSY Right 03/03/2022    benign    SHOULDER SURGERY Left     SHOULDER SURGERY      TOTAL KNEE  ARTHROPLASTY Right     triple bypass         Review of patient's allergies indicates:   Allergen Reactions    Zolpidem        No current facility-administered medications on file prior to encounter.     Current Outpatient Medications on File Prior to Encounter   Medication Sig    albuterol (PROVENTIL/VENTOLIN HFA) 90 mcg/actuation inhaler Inhale 1-2 puffs into the lungs every 6 (six) hours as needed for Wheezing or Shortness of Breath. Rescue    albuterol-ipratropium (DUO-NEB) 2.5 mg-0.5 mg/3 mL nebulizer solution inhale 3 milliliters by nebulization route 4 times per day and as needed, up to 6 doses per day for 30 days    apixaban (ELIQUIS) 5 mg Tab Take 5 mg by mouth 2 (two) times daily.    aspirin (ECOTRIN) 81 MG EC tablet Take 81 mg by mouth once daily.    atorvastatin (LIPITOR) 20 MG tablet Take 1 tablet (20 mg total) by mouth once daily.    multivitamin capsule Take 1 capsule by mouth once daily.    OXcarbazepine (TRILEPTAL) 300 MG Tab Take 1 tablet (300 mg total) by mouth 2 (two) times daily.    senna (SENOKOT) 8.6 mg tablet 2 tablets at bedtime as needed    tamsulosin (FLOMAX) 0.4 mg Cap Take 0.4 mg by mouth once daily.    torsemide (DEMADEX) 20 MG Tab Take 1 tablet (20 mg total) by mouth daily as needed (weight gain greater than 5 lbs).    vitamin D (VITAMIN D3) 1000 units Tab Take 1,000 Units by mouth once daily.    [DISCONTINUED] calcium carbonate (OS-DAVID) 600 mg calcium (1,500 mg) Tab Take 600 mg by mouth once.    [DISCONTINUED] carvediloL (COREG) 6.25 MG tablet TAKE ONE TABLET BY MOUTH TWICE DAILY (EVERY 12 HOURS) FOR BLOOD PRESSURE. THANKS !!    [DISCONTINUED] ezetimibe (ZETIA) 10 mg tablet Take 10 mg by mouth once daily.    [DISCONTINUED] FLUoxetine 20 MG capsule     [DISCONTINUED] gabapentin (NEURONTIN) 300 MG capsule 1 capsule    [DISCONTINUED] miconazole NITRATE 2 % (MICOTIN) 2 % top powder Apply topically as needed for Itching.    [DISCONTINUED] risperiDONE (RISPERDAL) 0.5 MG  Tab Take 1 tablet (0.5 mg total) by mouth nightly as needed (agitation and halluctions).     Family History       Problem Relation (Age of Onset)    Arthritis Mother, Father    Asthma Sister    Heart disease Father    Hyperlipidemia Mother, Sister    Hypertension Mother, Father, Sister, Brother          Tobacco Use    Smoking status: Every Day    Smokeless tobacco: Never    Tobacco comments:     quit 30 years ago   Substance and Sexual Activity    Alcohol use: Not Currently    Drug use: Never    Sexual activity: Not Currently     Review of Systems   Unable to perform ROS: Intubated   Objective:     Vital Signs (Most Recent):  Temp: 98.3 °F (36.8 °C) (09/09/22 0716)  Pulse: (!) 44 (09/09/22 0759)  Resp: (!) 28 (09/09/22 0759)  BP: 137/63 (09/09/22 0730)  SpO2: 100 % (09/09/22 0759)   Vital Signs (24h Range):  Temp:  [98.3 °F (36.8 °C)-99.8 °F (37.7 °C)] 98.3 °F (36.8 °C)  Pulse:  [40-55] 44  Resp:  [17-34] 28  SpO2:  [95 %-100 %] 100 %  BP: ()/() 137/63     Weight: 103 kg (227 lb 1.2 oz)  Body mass index is 33.53 kg/m².    Physical Exam  Constitutional:       General: He is in acute distress.      Appearance: He is obese. He is ill-appearing. He is not toxic-appearing.   HENT:      Head: Normocephalic.      Right Ear: External ear normal.      Left Ear: External ear normal.      Nose: No congestion or rhinorrhea.      Mouth/Throat:      Pharynx: Oropharynx is clear.   Eyes:      General: No scleral icterus.     Extraocular Movements: Extraocular movements intact.      Conjunctiva/sclera: Conjunctivae normal.      Pupils: Pupils are equal, round, and reactive to light.   Cardiovascular:      Rate and Rhythm: Bradycardia present. Rhythm irregular.      Heart sounds: No murmur heard.  Pulmonary:      Breath sounds: Rhonchi and rales present.      Comments: Reduced breath sounds to lung bases bilaterally  Chest:      Chest wall: No tenderness.   Abdominal:      General: There is no distension.       Palpations: Abdomen is soft.      Tenderness: There is no abdominal tenderness. There is no right CVA tenderness, left CVA tenderness or guarding.   Musculoskeletal:      Cervical back: Neck supple.   Lymphadenopathy:      Cervical: No cervical adenopathy.   Skin:     General: Skin is warm and dry.      Comments: Hemosiderin staining bilateral LE   Neurological:      Comments: Eyes open, alert, ET in place         CRANIAL NERVES     CN III, IV, VI   Pupils are equal, round, and reactive to light.     Significant Labs: All pertinent labs within the past 24 hours have been reviewed.  A1C: No results for input(s): HGBA1C in the last 4320 hours.  ABGs:   Recent Labs   Lab 09/08/22  0512 09/09/22  0525   PH 7.431 7.388   PCO2 33.7* 36.4   HCO3 23.1 22.2   POCSATURATED 99.5 99.2   PO2 148* 134*       Blood Culture: No results for input(s): LABBLOO in the last 48 hours.    BMP:   Recent Labs   Lab 09/08/22  0601 09/09/22  0414   *  --      --    K 3.4*  --      --    CO2 25  --    BUN 31*  --    CREATININE 2.2*  --    CALCIUM 8.3*  --    MG 1.9 2.0       CBC:   Recent Labs   Lab 09/08/22  0601   WBC 3.53*   HGB 7.5*   HCT 24.5*          CMP:   Recent Labs   Lab 09/08/22  0601      K 3.4*      CO2 25   *   BUN 31*   CREATININE 2.2*   CALCIUM 8.3*   PROT 5.2*   ALBUMIN 1.9*   BILITOT 0.4   ALKPHOS 39*   AST 28   ALT 18   ANIONGAP 4*       No results for input(s): LACTATE in the last 48 hours.    No results for input(s): COLORU, APPEARANCEUA, PHUR, SPECGRAV, PROTEINUA, GLUCUA, KETONESU, BILIRUBINUA, OCCULTUA, NITRITE, UROBILINOGEN, LEUKOCYTESUR, RBCUA, WBCUA, BACTERIA, SQUAMEPITHEL, HYALINECASTS in the last 48 hours.    Invalid input(s): WRIGHTSUR    X-Ray Abdomen AP 1 View  Narrative: EXAMINATION:  XR ABDOMEN AP 1 VIEW    CLINICAL HISTORY:  NG tube placement;    FINDINGS:  Tip of NG tube overlies left upper quadrant of the abdomen.  Left infrahilar infiltrate/pneumonia suspected.   Small right pleural effusion.  Impression: NG tube overlies left upper abdomen    Electronically signed by: Hodan Coy MD  Date:    09/08/2022  Time:    09:51  X-Ray Chest 1 View  Narrative: EXAMINATION:  XR CHEST 1 VIEW    CLINICAL HISTORY:  vent;    COMPARISON:  Portable chest 09/07/2022.    FINDINGS:  Frontal chest radiograph demonstrates endotracheal tube in place including a nasogastric tube.  Right lower lung zone opacity is stable.  No pleural fluid.  Impression: Stable right lower lung zone opacity, could reflect atelectasis, edema or pneumonia.    Electronically signed by: Jamey Gannon MD  Date:    09/08/2022  Time:    09:19   Significant Imaging: I have reviewed all pertinent imaging results/findings within the past 24 hours.      Assessment/Plan:      * Pneumonia  CXR findings with new right lower lobe pneumonia. Bilateral infiltrates likely secondary to pulmonary edema from infectious.  - supplement oxygen as needed SpO2 >92-94%  - daily pulmonary hygiene  - continue coverage for community acquired PNA, azithrmoycin and rocephin  - f/u blood cultures x2 pending  - trend CBC  9/6 FM:  Now in ICU, expand coverage, speak with family.  9/7 FM:  Rec DNR, hospice care, family to discuss.  9/8 FM:  Encouraging hospice care.  9/9 FM:  Multifactorial        Malignant neoplasm of lung  Poor prognosis.  Encouraging hospice care.      Tachypnea        COVID-19  Patient is identified as mild moderate severity.   Initiate standard COVID protocols; COVID-19 testing ,Infection Control notification  and isolation- respiratory, contact and droplet per protocol    Diagnostics: (leukopenia, hyponatremia, hyperferritinemia, elevated troponin, elevated d-dimer, age, and comorbidities are significant predictors of poor clinical outcome)  CBC, CMP and Portable CXR    Management: Initiate targeted therapy with possible use of Paxlovid. Maintain oxygen saturations 92-96% via Nasal Cannula  LPM and monitor with  continuous/intermittent pulse oximetry. Inhaled bronchodilators as needed for shortness of breath., Continuous cardiac monitoring. and Manage respiratory failure (O2 requirement >10LPM or needing NIPPV/Mechanical ventilation) and/or Pneumonia (active chest infiltrates) separately as described below.  Empirically started on IV antibiotics for right lower lobe pneumonia on chest xray.   Follow up blood cultures and adjust antibiotics accordingly.     Influenza  Positive flu A. Continue tamiflu. Supplemental oxygen as needed to maintain >94%.  Has completed tamiflu.      SOB (shortness of breath)  Multifactorial, cardiopulmonary (CHF, atrial fibrillation, COPD) and infectious processes (COVID19 and fluA).  SpO2 maintained >95% on 2L NC at this time.  - see above management.        CHF (congestive heart failure)  Sees euvolemic      Stage 3 chronic kidney disease  Stable BUN/Cr 33/1.8 likely at new baseline with recovering eGFR of >40 from <20 5 weeks ago. Continue to monitor as patient on IV diuretics.       Seizure disorder        A-fib  Patient with Permanent atrial fibrillation which is controlled currently with No rate controlling home medications at this time. . Patient is currently in atrial fibrillation.[id number] Score: 4. HASBLED Score: Unable to calculate. Anticoagulation indicated. Anticoagulation done with eliquis 5 mg BID.    Eliquis on hold, cards following.    COPD (chronic obstructive pulmonary disease)  Cont. txt.      Localized edema  Home med diuretic torsemide 20 mg PRN. +3 pitting on exam. Patient responding to IV lasix 80 mg in ED.   Continue with IV diuretics and taper with progress, patient endorses breathing a little better.   - IV lasix 40 mg BID  - restrict PO intake to 1.5 L fluid ounces  - monitor I/Os  9/6 FM:  Now hypotension, low dose IVFs  9/7 FM:  Volume improved, was dry.  9/8 FM:  Improved.  9/9 FM:  Stable.    Weakness          VTE Risk Mitigation (From admission, onward)          Ordered     IP VTE HIGH RISK PATIENT  Once         09/02/22 1513     Place sequential compression device  Until discontinued         09/02/22 1513                Discharge Planning   GLORY:      Code Status: Full Code   Is the patient medically ready for discharge?:     Reason for patient still in hospital (select all that apply): Patient trending condition  Discharge Plan A: Home with family, Home Health                  Navi Domínguez III, MD  Department of Hospital Medicine   [place] [facility]

## 2022-09-09 NOTE — ASSESSMENT & PLAN NOTE
Positive flu A. Continue tamiflu. Supplemental oxygen as needed to maintain >94%.  Has completed tamiflu.

## 2022-09-09 NOTE — SUBJECTIVE & OBJECTIVE
Past Medical History:   Diagnosis Date    A-fib     Abdominal pain, epigastric     Abdominal pain, generalized     Acute on chronic congestive heart failure 12/29/2021    Anticoagulant long-term use     Arthritis     Asteatotic eczema     Benign essential HTN     Benign prostatic hyperplasia     Brain tumor     Cardiovascular accident     Carotid artery stenosis     CHF (congestive heart failure)     CKD (chronic kidney disease), stage IV     Constipation     COPD (chronic obstructive pulmonary disease)     Depressed     DM (diabetes mellitus), secondary     Drug eruption     Eczema     Edema     Encounter for blood transfusion     Fever     H. pylori infection     Hearing loss     History of tobacco use     Hypercholesterolemia     Hypertensive renal disease, benign     Hypokalemia     Hyponatremia     Impaired fasting glucose     Lumbar pain     Lung mass     Malaise and fatigue     Malignant neoplasm of prostate     Meningioma     Mixed hyperlipidemia     Nasal bleeding     Neuropathy     Obesity, unspecified     SHAYNE (obstructive sleep apnea)     Osteoarthritis of knee     Osteoarthritis of multiple joints     Peripheral vascular disease, unspecified     Psychosis     Renal disorder     Seizure disorder     Seizures     Sleep apnea     Unspecified cataract     Unspecified chronic bronchitis     Unspecified osteoarthritis, unspecified site     Vertigo     Vitamin D deficiency     Wheezing        Past Surgical History:   Procedure Laterality Date    APPENDECTOMY      COLONOSCOPY      CORONARY ARTERY BYPASS GRAFT      FOOT SURGERY Left     HIP REPLACEMENT ARTHROPLASTY Right     HIP SURGERY      JOINT REPLACEMENT      KNEE SURGERY      LUNG BIOPSY Right 03/03/2022    benign    SHOULDER SURGERY Left     SHOULDER SURGERY      TOTAL KNEE ARTHROPLASTY Right     triple bypass         Review of patient's allergies indicates:   Allergen Reactions    Zolpidem        No current facility-administered medications on file prior  to encounter.     Current Outpatient Medications on File Prior to Encounter   Medication Sig    albuterol (PROVENTIL/VENTOLIN HFA) 90 mcg/actuation inhaler Inhale 1-2 puffs into the lungs every 6 (six) hours as needed for Wheezing or Shortness of Breath. Rescue    albuterol-ipratropium (DUO-NEB) 2.5 mg-0.5 mg/3 mL nebulizer solution inhale 3 milliliters by nebulization route 4 times per day and as needed, up to 6 doses per day for 30 days    apixaban (ELIQUIS) 5 mg Tab Take 5 mg by mouth 2 (two) times daily.    aspirin (ECOTRIN) 81 MG EC tablet Take 81 mg by mouth once daily.    atorvastatin (LIPITOR) 20 MG tablet Take 1 tablet (20 mg total) by mouth once daily.    multivitamin capsule Take 1 capsule by mouth once daily.    OXcarbazepine (TRILEPTAL) 300 MG Tab Take 1 tablet (300 mg total) by mouth 2 (two) times daily.    senna (SENOKOT) 8.6 mg tablet 2 tablets at bedtime as needed    tamsulosin (FLOMAX) 0.4 mg Cap Take 0.4 mg by mouth once daily.    torsemide (DEMADEX) 20 MG Tab Take 1 tablet (20 mg total) by mouth daily as needed (weight gain greater than 5 lbs).    vitamin D (VITAMIN D3) 1000 units Tab Take 1,000 Units by mouth once daily.    [DISCONTINUED] calcium carbonate (OS-DAVID) 600 mg calcium (1,500 mg) Tab Take 600 mg by mouth once.    [DISCONTINUED] carvediloL (COREG) 6.25 MG tablet TAKE ONE TABLET BY MOUTH TWICE DAILY (EVERY 12 HOURS) FOR BLOOD PRESSURE. THANKS !!    [DISCONTINUED] ezetimibe (ZETIA) 10 mg tablet Take 10 mg by mouth once daily.    [DISCONTINUED] FLUoxetine 20 MG capsule     [DISCONTINUED] gabapentin (NEURONTIN) 300 MG capsule 1 capsule    [DISCONTINUED] miconazole NITRATE 2 % (MICOTIN) 2 % top powder Apply topically as needed for Itching.    [DISCONTINUED] risperiDONE (RISPERDAL) 0.5 MG Tab Take 1 tablet (0.5 mg total) by mouth nightly as needed (agitation and halluctions).     Family History       Problem Relation (Age of Onset)    Arthritis Mother, Father    Asthma Sister    Heart  disease Father    Hyperlipidemia Mother, Sister    Hypertension Mother, Father, Sister, Brother          Tobacco Use    Smoking status: Every Day    Smokeless tobacco: Never    Tobacco comments:     quit 30 years ago   Substance and Sexual Activity    Alcohol use: Not Currently    Drug use: Never    Sexual activity: Not Currently     Review of Systems   Unable to perform ROS: Intubated   Objective:     Vital Signs (Most Recent):  Temp: 98.3 °F (36.8 °C) (09/09/22 0716)  Pulse: (!) 44 (09/09/22 0759)  Resp: (!) 28 (09/09/22 0759)  BP: 137/63 (09/09/22 0730)  SpO2: 100 % (09/09/22 0759)   Vital Signs (24h Range):  Temp:  [98.3 °F (36.8 °C)-99.8 °F (37.7 °C)] 98.3 °F (36.8 °C)  Pulse:  [40-55] 44  Resp:  [17-34] 28  SpO2:  [95 %-100 %] 100 %  BP: ()/() 137/63     Weight: 103 kg (227 lb 1.2 oz)  Body mass index is 33.53 kg/m².    Physical Exam  Constitutional:       General: He is in acute distress.      Appearance: He is obese. He is ill-appearing. He is not toxic-appearing.   HENT:      Head: Normocephalic.      Right Ear: External ear normal.      Left Ear: External ear normal.      Nose: No congestion or rhinorrhea.      Mouth/Throat:      Pharynx: Oropharynx is clear.   Eyes:      General: No scleral icterus.     Extraocular Movements: Extraocular movements intact.      Conjunctiva/sclera: Conjunctivae normal.      Pupils: Pupils are equal, round, and reactive to light.   Cardiovascular:      Rate and Rhythm: Bradycardia present. Rhythm irregular.      Heart sounds: No murmur heard.  Pulmonary:      Breath sounds: Rhonchi and rales present.      Comments: Reduced breath sounds to lung bases bilaterally  Chest:      Chest wall: No tenderness.   Abdominal:      General: There is no distension.      Palpations: Abdomen is soft.      Tenderness: There is no abdominal tenderness. There is no right CVA tenderness, left CVA tenderness or guarding.   Musculoskeletal:      Cervical back: Neck supple.    Lymphadenopathy:      Cervical: No cervical adenopathy.   Skin:     General: Skin is warm and dry.      Comments: Hemosiderin staining bilateral LE   Neurological:      Comments: Eyes open, alert, ET in place         CRANIAL NERVES     CN III, IV, VI   Pupils are equal, round, and reactive to light.     Significant Labs: All pertinent labs within the past 24 hours have been reviewed.  A1C: No results for input(s): HGBA1C in the last 4320 hours.  ABGs:   Recent Labs   Lab 09/08/22  0512 09/09/22  0525   PH 7.431 7.388   PCO2 33.7* 36.4   HCO3 23.1 22.2   POCSATURATED 99.5 99.2   PO2 148* 134*       Blood Culture: No results for input(s): LABBLOO in the last 48 hours.    BMP:   Recent Labs   Lab 09/08/22  0601 09/09/22  0414   *  --      --    K 3.4*  --      --    CO2 25  --    BUN 31*  --    CREATININE 2.2*  --    CALCIUM 8.3*  --    MG 1.9 2.0       CBC:   Recent Labs   Lab 09/08/22  0601   WBC 3.53*   HGB 7.5*   HCT 24.5*          CMP:   Recent Labs   Lab 09/08/22  0601      K 3.4*      CO2 25   *   BUN 31*   CREATININE 2.2*   CALCIUM 8.3*   PROT 5.2*   ALBUMIN 1.9*   BILITOT 0.4   ALKPHOS 39*   AST 28   ALT 18   ANIONGAP 4*       No results for input(s): LACTATE in the last 48 hours.    No results for input(s): COLORU, APPEARANCEUA, PHUR, SPECGRAV, PROTEINUA, GLUCUA, KETONESU, BILIRUBINUA, OCCULTUA, NITRITE, UROBILINOGEN, LEUKOCYTESUR, RBCUA, WBCUA, BACTERIA, SQUAMEPITHEL, HYALINECASTS in the last 48 hours.    Invalid input(s): WRIGHTSUR    X-Ray Abdomen AP 1 View  Narrative: EXAMINATION:  XR ABDOMEN AP 1 VIEW    CLINICAL HISTORY:  NG tube placement;    FINDINGS:  Tip of NG tube overlies left upper quadrant of the abdomen.  Left infrahilar infiltrate/pneumonia suspected.  Small right pleural effusion.  Impression: NG tube overlies left upper abdomen    Electronically signed by: Hodan Coy MD  Date:    09/08/2022  Time:    09:51  X-Ray Chest 1 View  Narrative:  EXAMINATION:  XR CHEST 1 VIEW    CLINICAL HISTORY:  vent;    COMPARISON:  Portable chest 09/07/2022.    FINDINGS:  Frontal chest radiograph demonstrates endotracheal tube in place including a nasogastric tube.  Right lower lung zone opacity is stable.  No pleural fluid.  Impression: Stable right lower lung zone opacity, could reflect atelectasis, edema or pneumonia.    Electronically signed by: Jamey Gannon MD  Date:    09/08/2022  Time:    09:19   Significant Imaging: I have reviewed all pertinent imaging results/findings within the past 24 hours.

## 2022-09-10 LAB
AC: 18
ALBUMIN SERPL BCP-MCNC: 1.8 G/DL (ref 3.5–5.2)
ALP SERPL-CCNC: 60 U/L (ref 55–135)
ALT SERPL W/O P-5'-P-CCNC: 22 U/L (ref 10–44)
ANION GAP SERPL CALC-SCNC: 6 MMOL/L (ref 8–16)
AST SERPL-CCNC: 27 U/L (ref 10–40)
BASOPHILS # BLD AUTO: 0.02 K/UL (ref 0–0.2)
BASOPHILS NFR BLD: 0.5 % (ref 0–1.9)
BILIRUB SERPL-MCNC: 0.3 MG/DL (ref 0.1–1)
BUN SERPL-MCNC: 30 MG/DL (ref 8–23)
CALCIUM SERPL-MCNC: 8.5 MG/DL (ref 8.7–10.5)
CHLORIDE SERPL-SCNC: 108 MMOL/L (ref 95–110)
CO2 SERPL-SCNC: 23 MMOL/L (ref 23–29)
CORRECTED TEMPERATURE (PCO2): 36.2 MMHG
CORRECTED TEMPERATURE (PH): 7.38
CORRECTED TEMPERATURE (PO2): 140 MMHG
CREAT SERPL-MCNC: 2 MG/DL (ref 0.5–1.4)
DIFFERENTIAL METHOD: ABNORMAL
EOSINOPHIL # BLD AUTO: 0.3 K/UL (ref 0–0.5)
EOSINOPHIL NFR BLD: 5.9 % (ref 0–8)
ERYTHROCYTE [DISTWIDTH] IN BLOOD BY AUTOMATED COUNT: 17.2 % (ref 11.5–14.5)
EST. GFR  (NO RACE VARIABLE): 33.3 ML/MIN/1.73 M^2
FIO2: 40 %
GLUCOSE SERPL-MCNC: 126 MG/DL (ref 70–110)
HCT VFR BLD AUTO: 31.6 % (ref 40–54)
HGB BLD-MCNC: 9.9 G/DL (ref 14–18)
IMM GRANULOCYTES # BLD AUTO: 0.02 K/UL (ref 0–0.04)
IMM GRANULOCYTES NFR BLD AUTO: 0.5 % (ref 0–0.5)
LYMPHOCYTES # BLD AUTO: 0.6 K/UL (ref 1–4.8)
LYMPHOCYTES NFR BLD: 14.2 % (ref 18–48)
MAGNESIUM SERPL-MCNC: 2.2 MG/DL (ref 1.6–2.6)
MCH RBC QN AUTO: 26.1 PG (ref 27–31)
MCHC RBC AUTO-ENTMCNC: 31.3 G/DL (ref 32–36)
MCV RBC AUTO: 83 FL (ref 82–98)
MODIFIED ALLEN'S TEST: ABNORMAL
MONOCYTES # BLD AUTO: 0.7 K/UL (ref 0.3–1)
MONOCYTES NFR BLD: 16.6 % (ref 4–15)
NEUTROPHILS # BLD AUTO: 2.6 K/UL (ref 1.8–7.7)
NEUTROPHILS NFR BLD: 62.3 % (ref 38–73)
NRBC BLD-RTO: 0 /100 WBC
O2DEVICE: ABNORMAL
PCO2 BLDA: 36.2 MMHG (ref 35–45)
PEEP: 5
PH SMN: 7.38 [PH] (ref 7.34–7.45)
PLATELET # BLD AUTO: 179 K/UL (ref 150–450)
PMV BLD AUTO: 11.8 FL (ref 9.2–12.9)
PO2 BLDA: 140 MMHG (ref 80–100)
POC BASE DEFICIT: -3.8 MMOL/L
POC HCO3: 21.6 MMOL/L
POC PERFORMED BY: ABNORMAL
POC SATURATED O2: 99.4 %
POC TCO2: 44.5 ML/DL
POC TEMPERATURE: 37 C
POTASSIUM SERPL-SCNC: 3.3 MMOL/L (ref 3.5–5.1)
PROT SERPL-MCNC: 5.3 G/DL (ref 6–8.4)
RBC # BLD AUTO: 3.79 M/UL (ref 4.6–6.2)
SODIUM SERPL-SCNC: 137 MMOL/L (ref 136–145)
SPECIMEN SOURCE: ABNORMAL
VANCOMYCIN SERPL-MCNC: 19.1 UG/ML
VT: 500
WBC # BLD AUTO: 4.22 K/UL (ref 3.9–12.7)

## 2022-09-10 PROCEDURE — 80053 COMPREHEN METABOLIC PANEL: CPT | Performed by: INTERNAL MEDICINE

## 2022-09-10 PROCEDURE — 63600175 PHARM REV CODE 636 W HCPCS: Performed by: INTERNAL MEDICINE

## 2022-09-10 PROCEDURE — 99900035 HC TECH TIME PER 15 MIN (STAT)

## 2022-09-10 PROCEDURE — 36415 COLL VENOUS BLD VENIPUNCTURE: CPT | Performed by: INTERNAL MEDICINE

## 2022-09-10 PROCEDURE — 25000242 PHARM REV CODE 250 ALT 637 W/ HCPCS: Performed by: INTERNAL MEDICINE

## 2022-09-10 PROCEDURE — 80202 ASSAY OF VANCOMYCIN: CPT | Performed by: INTERNAL MEDICINE

## 2022-09-10 PROCEDURE — 25000003 PHARM REV CODE 250: Performed by: INTERNAL MEDICINE

## 2022-09-10 PROCEDURE — 99900026 HC AIRWAY MAINTENANCE (STAT)

## 2022-09-10 PROCEDURE — 94761 N-INVAS EAR/PLS OXIMETRY MLT: CPT

## 2022-09-10 PROCEDURE — 83735 ASSAY OF MAGNESIUM: CPT | Performed by: INTERNAL MEDICINE

## 2022-09-10 PROCEDURE — 82803 BLOOD GASES ANY COMBINATION: CPT

## 2022-09-10 PROCEDURE — 94003 VENT MGMT INPAT SUBQ DAY: CPT

## 2022-09-10 PROCEDURE — 94640 AIRWAY INHALATION TREATMENT: CPT

## 2022-09-10 PROCEDURE — 20000000 HC ICU ROOM

## 2022-09-10 PROCEDURE — 27000221 HC OXYGEN, UP TO 24 HOURS

## 2022-09-10 PROCEDURE — 85025 COMPLETE CBC W/AUTO DIFF WBC: CPT | Performed by: INTERNAL MEDICINE

## 2022-09-10 PROCEDURE — 27000207 HC ISOLATION

## 2022-09-10 PROCEDURE — 36600 WITHDRAWAL OF ARTERIAL BLOOD: CPT

## 2022-09-10 PROCEDURE — 99900031 HC PATIENT EDUCATION (STAT)

## 2022-09-10 RX ADMIN — FAMOTIDINE 20 MG: 20 TABLET ORAL at 08:09

## 2022-09-10 RX ADMIN — PIPERACILLIN AND TAZOBACTAM 4.5 G: 4; .5 INJECTION, POWDER, LYOPHILIZED, FOR SOLUTION INTRAVENOUS; PARENTERAL at 01:09

## 2022-09-10 RX ADMIN — PIPERACILLIN AND TAZOBACTAM 4.5 G: 4; .5 INJECTION, POWDER, LYOPHILIZED, FOR SOLUTION INTRAVENOUS; PARENTERAL at 08:09

## 2022-09-10 RX ADMIN — PIPERACILLIN AND TAZOBACTAM 4.5 G: 4; .5 INJECTION, POWDER, LYOPHILIZED, FOR SOLUTION INTRAVENOUS; PARENTERAL at 04:09

## 2022-09-10 RX ADMIN — PROPOFOL 30 MCG/KG/MIN: 10 INJECTION, EMULSION INTRAVENOUS at 03:09

## 2022-09-10 RX ADMIN — POTASSIUM BICARBONATE 25 MEQ: 977.5 TABLET, EFFERVESCENT ORAL at 09:09

## 2022-09-10 RX ADMIN — VANCOMYCIN HYDROCHLORIDE 750 MG: 750 INJECTION, POWDER, LYOPHILIZED, FOR SOLUTION INTRAVENOUS at 01:09

## 2022-09-10 RX ADMIN — OXCARBAZEPINE 300 MG: 300 TABLET, FILM COATED ORAL at 08:09

## 2022-09-10 RX ADMIN — ATORVASTATIN CALCIUM 40 MG: 40 TABLET, FILM COATED ORAL at 08:09

## 2022-09-10 RX ADMIN — TAMSULOSIN HYDROCHLORIDE 0.4 MG: 0.4 CAPSULE ORAL at 08:09

## 2022-09-10 RX ADMIN — IPRATROPIUM BROMIDE AND ALBUTEROL SULFATE 3 ML: 2.5; .5 SOLUTION RESPIRATORY (INHALATION) at 07:09

## 2022-09-10 RX ADMIN — IPRATROPIUM BROMIDE AND ALBUTEROL SULFATE 3 ML: 2.5; .5 SOLUTION RESPIRATORY (INHALATION) at 01:09

## 2022-09-10 RX ADMIN — PROPOFOL 30 MCG/KG/MIN: 10 INJECTION, EMULSION INTRAVENOUS at 01:09

## 2022-09-10 RX ADMIN — RISPERIDONE 0.5 MG: 0.5 TABLET ORAL at 08:09

## 2022-09-10 RX ADMIN — PROPOFOL 30 MCG/KG/MIN: 10 INJECTION, EMULSION INTRAVENOUS at 07:09

## 2022-09-10 RX ADMIN — PROPOFOL 30 MCG/KG/MIN: 10 INJECTION, EMULSION INTRAVENOUS at 08:09

## 2022-09-10 RX ADMIN — DEXTROSE, SODIUM CHLORIDE, AND POTASSIUM CHLORIDE: 5; .45; .15 INJECTION INTRAVENOUS at 05:09

## 2022-09-10 NOTE — ASSESSMENT & PLAN NOTE
CXR findings with new right lower lobe pneumonia. Bilateral infiltrates likely secondary to pulmonary edema from infectious.  - supplement oxygen as needed SpO2 >92-94%  - daily pulmonary hygiene  - continue coverage for community acquired PNA, azithrmoycin and rocephin  - f/u blood cultures x2 pending  - trend CBC  9/6 FM:  Now in ICU, expand coverage, speak with family.  9/7 FM:  Rec DNR, hospice care, family to discuss.  9/8 FM:  Encouraging hospice care.  9/9 FM:  Multifactorial  9/10 Cont abxs, nebs and cont to wean from vent

## 2022-09-10 NOTE — ASSESSMENT & PLAN NOTE
Patient with Permanent atrial fibrillation which is controlled currently with No rate controlling home medications at this time. . Patient is currently in atrial fibrillation.[id number] Score: 4. HASBLED Score: Unable to calculate. Anticoagulation indicated. Anticoagulation done with eliquis 5 mg BID.    Eliquis on hold, cards following.

## 2022-09-10 NOTE — PLAN OF CARE
No issues, rested well. Remains on vent and sedated with diprivan 30mcg/min. VSS, afebrile. Tolerating TF with minimal residual. UOP 300ml, BM X1, FMS in place, patient tolerated well.

## 2022-09-10 NOTE — EICU
Rounding (Video Assessment):  Yes      Comments: EICU rounding done. Pt sedated on vent, appears to be resting comfortably.  Chart and meds reviewed.  VS stable.

## 2022-09-10 NOTE — PROGRESS NOTES
[facility] Medicine  Progress Note    Patient Name: Jamey Lei  MRN: [id number]  Patient Class: IP- Inpatient   Admission Date: 9/2/2022  Length of Stay: 8 days  Attending Physician: Navi Domínguez III, MD  Primary Care Provider: Navi Domínguez III, MD        Subjective:     Principal Problem:Pneumonia        HPI:  81 year old male with hypertension, hypoerlipidemia, CHF, permanent atrial fibriallation on Eliquis, CAD s/p CABGx3, CKD stage 3, recent lung and brain mass findings presents to ED with worsening shortness of breath that started about a week ago with bilateral leg swelling with difficulty ambulating with his walker.     Patient hard of hearing endorsed since treatment in the ED, he has been feeling better. At home he does not use oxygen, breathing at SpO2 >95% on continuous O2 2L via nasal cannula on exam. There is no change in appetite, nausea, vomiting, diarrhea, constipation. Denies headaches, fever, chills, vision changes, chest pain, abdominal pain. Denies joint pain and loss of sensation in toes and foot bilaterally with increasing leg swelling.    ED course:  Tachypnea rate >30, CXR right lower lobe pneumonia, positive influenza A and COVID19 (fully vaccinated), NTproBNT >6000, lactate <0.2 x2, blood cultures x2 pending,  BUN/Cr 33/1.8 with improved eGFR from last hospitalization and discharge one week ago. Patient started on IV lasix 80 mg, responding well with clear urine collecting in Sanders catheter and IV antibiotics empirically started for right lobe pneumonia.       Overview/Hospital Course:  9/3/22 CG:  Says it feels much better and does complain of a cough.  He has had to go to the bathroom and is waiting for help and assistance getting up from the bed to go to the restroom.  No other acute overnight events.  9/4/22 CG: Doing better, still has a cough and feels weak.   9/5/22 CG:  Became more agitated last night and was trying to rip out his Sanders catheter.   Had to be given Seroquel to help him sleep and to calm him down.  9/6/22 FM:  Events of the weekend are noted.  Patient was admitted with COVID and influenza and a deterioration in his condition.  Patient has a history of lung cancer and is seeing an oncologist at an outside facility.  Patient has a history of chronic kidney disease as well as obesity diabetes neuropathy atrial fibrillation and his general health has come line significantly over the last year.  The patient has been in and out of the hospital and nursing facilities.  Last night the patient had a rapid response and ended up on the ventilator.  I reviewed the events and I am unsure if this was related to Seroquel will and I also see that he was becoming hypotensive.  Patient's chest x-ray is abnormal I will discuss with the family this morning.  9/7/22 FM:  Met with family today.  They were unable to make a decision on do not resuscitate.  They will consult with other family members.  Patient's volume status is improved as he was dry yesterday.  Patient's renal numbers all reviewed and noted.  Patient is able to open eyes on the ventilator and follow simple commands.  Will continue ventilatory support today as well as tube feedings.  9/8/22 FM:  Patient had a positive stool for blood as well as a persistent drop in his hemoglobin and was transfused 2 units of packed red blood cells yesterday.  Patient is admitted with COVID pneumonia influenza pneumonia and possible sepsis.  Patient has a history of lung cancer as well as multiple other comorbidities and I have met with the family yesterday and they are unable to come to a code status or long-term status decision.  I am encouraging hospice care as the patient has multiple irreversible illnesses and a decline in his quality of life.  9/9/22 FM:  Patient is relatively unchanged today.  Yesterday the patient did well with his CPAP challenges and we will continue today.  I suspect he can be taken off the  ventilator over the weekend some time.  Labs this morning are pending he has received 4 units of blood.  9/10 ND patient tolerating CPAP trials again today attempt to new trial.  Continue to wean sedation completely off.        Past Medical History:   Diagnosis Date    A-fib     Abdominal pain, epigastric     Abdominal pain, generalized     Acute on chronic congestive heart failure 12/29/2021    Anticoagulant long-term use     Arthritis     Asteatotic eczema     Benign essential HTN     Benign prostatic hyperplasia     Brain tumor     Cardiovascular accident     Carotid artery stenosis     CHF (congestive heart failure)     CKD (chronic kidney disease), stage IV     Constipation     COPD (chronic obstructive pulmonary disease)     Depressed     DM (diabetes mellitus), secondary     Drug eruption     Eczema     Edema     Encounter for blood transfusion     Fever     H. pylori infection     Hearing loss     History of tobacco use     Hypercholesterolemia     Hypertensive renal disease, benign     Hypokalemia     Hyponatremia     Impaired fasting glucose     Lumbar pain     Lung mass     Malaise and fatigue     Malignant neoplasm of prostate     Meningioma     Mixed hyperlipidemia     Nasal bleeding     Neuropathy     Obesity, unspecified     SHAYNE (obstructive sleep apnea)     Osteoarthritis of knee     Osteoarthritis of multiple joints     Peripheral vascular disease, unspecified     Psychosis     Renal disorder     Seizure disorder     Seizures     Sleep apnea     Unspecified cataract     Unspecified chronic bronchitis     Unspecified osteoarthritis, unspecified site     Vertigo     Vitamin D deficiency     Wheezing        Past Surgical History:   Procedure Laterality Date    APPENDECTOMY      COLONOSCOPY      CORONARY ARTERY BYPASS GRAFT      FOOT SURGERY Left     HIP REPLACEMENT ARTHROPLASTY Right     HIP SURGERY      JOINT REPLACEMENT      KNEE SURGERY       LUNG BIOPSY Right 03/03/2022    benign    SHOULDER SURGERY Left     SHOULDER SURGERY      TOTAL KNEE ARTHROPLASTY Right     triple bypass         Review of patient's allergies indicates:   Allergen Reactions    Zolpidem        No current facility-administered medications on file prior to encounter.     Current Outpatient Medications on File Prior to Encounter   Medication Sig    albuterol (PROVENTIL/VENTOLIN HFA) 90 mcg/actuation inhaler Inhale 1-2 puffs into the lungs every 6 (six) hours as needed for Wheezing or Shortness of Breath. Rescue    albuterol-ipratropium (DUO-NEB) 2.5 mg-0.5 mg/3 mL nebulizer solution inhale 3 milliliters by nebulization route 4 times per day and as needed, up to 6 doses per day for 30 days    apixaban (ELIQUIS) 5 mg Tab Take 5 mg by mouth 2 (two) times daily.    aspirin (ECOTRIN) 81 MG EC tablet Take 81 mg by mouth once daily.    atorvastatin (LIPITOR) 20 MG tablet Take 1 tablet (20 mg total) by mouth once daily.    multivitamin capsule Take 1 capsule by mouth once daily.    OXcarbazepine (TRILEPTAL) 300 MG Tab Take 1 tablet (300 mg total) by mouth 2 (two) times daily.    senna (SENOKOT) 8.6 mg tablet 2 tablets at bedtime as needed    tamsulosin (FLOMAX) 0.4 mg Cap Take 0.4 mg by mouth once daily.    torsemide (DEMADEX) 20 MG Tab Take 1 tablet (20 mg total) by mouth daily as needed (weight gain greater than 5 lbs).    vitamin D (VITAMIN D3) 1000 units Tab Take 1,000 Units by mouth once daily.    [DISCONTINUED] calcium carbonate (OS-DAVID) 600 mg calcium (1,500 mg) Tab Take 600 mg by mouth once.    [DISCONTINUED] carvediloL (COREG) 6.25 MG tablet TAKE ONE TABLET BY MOUTH TWICE DAILY (EVERY 12 HOURS) FOR BLOOD PRESSURE. THANKS !!    [DISCONTINUED] ezetimibe (ZETIA) 10 mg tablet Take 10 mg by mouth once daily.    [DISCONTINUED] FLUoxetine 20 MG capsule     [DISCONTINUED] gabapentin (NEURONTIN) 300 MG capsule 1 capsule    [DISCONTINUED] miconazole NITRATE 2 %  (MICOTIN) 2 % top powder Apply topically as needed for Itching.    [DISCONTINUED] risperiDONE (RISPERDAL) 0.5 MG Tab Take 1 tablet (0.5 mg total) by mouth nightly as needed (agitation and halluctions).     Family History       Problem Relation (Age of Onset)    Arthritis Mother, Father    Asthma Sister    Heart disease Father    Hyperlipidemia Mother, Sister    Hypertension Mother, Father, Sister, Brother          Tobacco Use    Smoking status: Every Day    Smokeless tobacco: Never    Tobacco comments:     quit 30 years ago   Substance and Sexual Activity    Alcohol use: Not Currently    Drug use: Never    Sexual activity: Not Currently     Review of Systems   Unable to perform ROS: Intubated   Objective:     Vital Signs (Most Recent):  Temp: 97.6 °F (36.4 °C) (09/10/22 0711)  Pulse: (!) 52 (09/10/22 0815)  Resp: (!) 28 (09/10/22 0815)  BP: 129/60 (09/10/22 0815)  SpO2: 100 % (09/10/22 0815)   Vital Signs (24h Range):  Temp:  [97.5 °F (36.4 °C)-98.4 °F (36.9 °C)] 97.6 °F (36.4 °C)  Pulse:  [43-57] 52  Resp:  [18-46] 28  SpO2:  [100 %] 100 %  BP: ()/(45-92) 129/60     Weight: 103 kg (227 lb 1.2 oz)  Body mass index is 33.53 kg/m².    Physical Exam  Constitutional:       Appearance: He is obese. He is not toxic-appearing.      Comments: sedated   HENT:      Head: Normocephalic.      Right Ear: External ear normal.      Left Ear: External ear normal.      Nose: No congestion or rhinorrhea.      Mouth/Throat:      Pharynx: Oropharynx is clear.   Eyes:      General: No scleral icterus.     Extraocular Movements: Extraocular movements intact.      Conjunctiva/sclera: Conjunctivae normal.      Pupils: Pupils are equal, round, and reactive to light.   Cardiovascular:      Rate and Rhythm: Bradycardia present. Rhythm irregular.      Heart sounds: No murmur heard.  Pulmonary:      Breath sounds: Rhonchi and rales present.      Comments: On vent  Chest:      Chest wall: No tenderness.   Abdominal:      General:  There is no distension.      Palpations: Abdomen is soft.      Tenderness: There is no abdominal tenderness. There is no right CVA tenderness, left CVA tenderness or guarding.   Genitourinary:     Comments: Sanders with yellow urine; fecal management system in place  Musculoskeletal:      Cervical back: Neck supple.   Lymphadenopathy:      Cervical: No cervical adenopathy.   Skin:     General: Skin is warm and dry.      Comments: Hemosiderin staining bilateral LE         CRANIAL NERVES     CN III, IV, VI   Pupils are equal, round, and reactive to light.     Significant Labs: All pertinent labs within the past 24 hours have been reviewed.  A1C: No results for input(s): HGBA1C in the last 4320 hours.  ABGs:   Recent Labs   Lab 09/09/22  0525 09/10/22  0525   PH 7.388 7.378   PCO2 36.4 36.2   HCO3 22.2 21.6   POCSATURATED 99.2 99.4   PO2 134* 140*       Blood Culture: No results for input(s): LABBLOO in the last 48 hours.    BMP:   Recent Labs   Lab 09/10/22  0356   *      K 3.3*      CO2 23   BUN 30*   CREATININE 2.0*   CALCIUM 8.5*   MG 2.2       CBC:   Recent Labs   Lab 09/09/22  0910 09/10/22  0356   WBC 4.92 4.22   HGB 10.6* 9.9*   HCT 32.8* 31.6*    179       CMP:   Recent Labs   Lab 09/09/22  0910 09/10/22  0356    137   K 3.4* 3.3*    108   CO2 23 23   * 126*   BUN 31* 30*   CREATININE 1.9* 2.0*   CALCIUM 8.6* 8.5*   PROT 5.7* 5.3*   ALBUMIN 1.9* 1.8*   BILITOT 0.3 0.3   ALKPHOS 46* 60   AST 30 27   ALT 22 22   ANIONGAP 5* 6*       No results for input(s): LACTATE in the last 48 hours.    No results for input(s): COLORU, APPEARANCEUA, PHUR, SPECGRAV, PROTEINUA, GLUCUA, KETONESU, BILIRUBINUA, OCCULTUA, NITRITE, UROBILINOGEN, LEUKOCYTESUR, RBCUA, WBCUA, BACTERIA, SQUAMEPITHEL, HYALINECASTS in the last 48 hours.    Invalid input(s): WRIGHTSUR    X-Ray Chest 1 View  Narrative: EXAMINATION:  XR CHEST 1 VIEW    CLINICAL HISTORY:  vent;    COMPARISON:  Chest x-ray  09/08/2022.    FINDINGS:  Compared to the prior study, no significant interval change.  Right basilar airspace disease stable.  Tubes and lines unchanged.  Impression: Stable right basilar airspace disease.    Electronically signed by: Marshal Acevedo MD  Date:    09/09/2022  Time:    08:44   Significant Imaging: I have reviewed all pertinent imaging results/findings within the past 24 hours.      Assessment/Plan:      * Pneumonia  CXR findings with new right lower lobe pneumonia. Bilateral infiltrates likely secondary to pulmonary edema from infectious.  - supplement oxygen as needed SpO2 >92-94%  - daily pulmonary hygiene  - continue coverage for community acquired PNA, azithrmoycin and rocephin  - f/u blood cultures x2 pending  - trend CBC  9/6 FM:  Now in ICU, expand coverage, speak with family.  9/7 FM:  Rec DNR, hospice care, family to discuss.  9/8 FM:  Encouraging hospice care.  9/9 FM:  Multifactorial  9/10 Cont abxs, nebs and cont to wean from vent        Malignant neoplasm of lung  Poor prognosis.  Encouraging hospice care.      Tachypnea        COVID-19  Patient is identified as mild moderate severity.   Initiate standard COVID protocols; COVID-19 testing ,Infection Control notification  and isolation- respiratory, contact and droplet per protocol    Diagnostics: (leukopenia, hyponatremia, hyperferritinemia, elevated troponin, elevated d-dimer, age, and comorbidities are significant predictors of poor clinical outcome)  CBC, CMP and Portable CXR    Management: Initiate targeted therapy with possible use of Paxlovid. Maintain oxygen saturations 92-96% via Nasal Cannula  LPM and monitor with continuous/intermittent pulse oximetry. Inhaled bronchodilators as needed for shortness of breath., Continuous cardiac monitoring. and Manage respiratory failure (O2 requirement >10LPM or needing NIPPV/Mechanical ventilation) and/or Pneumonia (active chest infiltrates) separately as described below.  Empirically started  on IV antibiotics for right lower lobe pneumonia on chest xray.   Follow up blood cultures and adjust antibiotics accordingly.     Influenza  Positive flu A. Supplemental oxygen as needed to maintain >94%.  Has completed tamiflu.      SOB (shortness of breath)  Multifactorial, cardiopulmonary (CHF, atrial fibrillation, COPD) and infectious processes (COVID19 and fluA).  SpO2 maintained >95% on 2L NC at this time.  - see above management.        CHF (congestive heart failure)  Seems euvolemic      Stage 3 chronic kidney disease  Stable BUN/Cr 33/1.8 likely at new baseline with recovering eGFR of >40 from <20 5 weeks ago. Continue to monitor as patient on IV diuretics.       Seizure disorder  On meds - no active szs at this time      A-fib  Patient with Permanent atrial fibrillation which is controlled currently with No rate controlling home medications at this time. . Patient is currently in atrial fibrillation.[id number] Score: 4. HASBLED Score: Unable to calculate. Anticoagulation indicated. Anticoagulation done with eliquis 5 mg BID.    Eliquis on hold, cards following.    COPD (chronic obstructive pulmonary disease)  Cont. Neb txt.      Localized edema  Home med diuretic torsemide 20 mg PRN. +3 pitting on exam. Patient responding to IV lasix 80 mg in ED.   Continue with IV diuretics and taper with progress, patient endorses breathing a little better.   - IV lasix 40 mg BID  - restrict PO intake to 1.5 L fluid ounces  - monitor I/Os  9/6 FM:  Now hypotension, low dose IVFs  9/7 FM:  Volume improved, was dry.  9/8 FM:  Improved.  9/9 FM:  Stable.    Weakness  Pt/ot once extubated        VTE Risk Mitigation (From admission, onward)         Ordered     IP VTE HIGH RISK PATIENT  Once         09/02/22 1513     Place sequential compression device  Until discontinued         09/02/22 1513                Discharge Planning   GLORY:      Code Status: Full Code   Is the patient medically ready for discharge?:     Reason for  patient still in hospital (select all that apply): Treatment  Discharge Plan A: Home with family, Home Health                  Bernie Yang MD  Department of Hospital Medicine   [place] - [facility]

## 2022-09-10 NOTE — CARE UPDATE
09/10/22 0901   PRE-TX-O2   O2 Device (Oxygen Therapy) ventilator   Oxygen Concentration (%) (S)  35   SpO2 100 %   Pulse (!) 53   Resp (!) 28   BP (!) 142/63   Vent Select   Charged w/in last 24h YES   Preset Conventional Ventilator Settings   Ventilation Type VC   Vent Mode Spont   PEEP/CPAP 5 cmH20   Pressure Support 10 cmH20   Insp Rise Time  70 %   I-Trigger Type  V-TRIG   Trigger Sensitivity Flow/I-Trigger 3 L/min   Patient Ventilator Parameters   Resp Rate Total 30 br/min   Peak Airway Pressure 16 cmH2O   Mean Airway Pressure 8.9 cmH20   Plateau Pressure 15 cmH20   Exhaled Vt 459 mL   Total Ve 12.9 mL   Spont Ve 12.9 L   I:E Ratio Measured 1:1.90   Auto PEEP 0 cmH20   Inspired Tidal Volume (VTI) 406 mL   Conventional Ventilator Alarms   Ve High Alarm 24 L/min   Ve Low Alarm 4 L/min   Resp Rate High Alarm 40 br/min   Press High Alarm 45 cmH2O   Apnea Rate 20   Apnea Volume (mL) 500 mL   Apnea Oxygen Concentration  100   Apnea Flow Rate (L/min) 60   T Apnea 10 sec(s)   Ready to Wean/Extubation Screen   FIO2<=50 (chart decimal) 0.35   MV<16L (chart vol.) 12.9   PEEP <=8 (chart #) 5

## 2022-09-10 NOTE — EICU
Rounding (Video Assessment):  Yes      Comments: Video rounds completed. Pt resting w vent intact diprivan gtt infusing., no distress noted @ this time

## 2022-09-10 NOTE — PROGRESS NOTES
Pharmacokinetic Assessment Follow Up: IV Vancomycin    Vancomycin serum concentration assessment(s):    The random level was drawn correctly and can be used to guide therapy at this time. The measurement is within the desired definitive target range of 15 to 20 mcg/mL.    Vancomycin Regimen Plan:    Will give one time dose of 750 mg today ( 09/10)    Re-dose when the random level is less than 20 mcg/mL, next level to be drawn at 0430 on 09/11    Drug levels (last 3 results):  Recent Labs   Lab Result Units 09/08/22  0601 09/09/22 0414 09/10/22  0356   Vancomycin, Random ug/mL 21.6 20.1 19.1       Pharmacy will continue to follow and monitor vancomycin.    Please contact pharmacy at [email or web address] [phone] for questions regarding this assessment.    Thank you for the consult,   JANIE STREET       Patient brief summary:  Jamey Lei is a 81 y.o. male initiated on antimicrobial therapy with IV Vancomycin for treatment of lower respiratory infection    The patient's current regimen is Pulse dose therapy    Drug Allergies:   Review of patient's allergies indicates:   Allergen Reactions    Zolpidem        Actual Body Weight:   103 kg     Renal Function:   Estimated Creatinine Clearance: 35.4 mL/min (A) (based on SCr of 2 mg/dL (H)).,     Dialysis Method (if applicable):  N/A    CBC (last 72 hours):  Recent Labs   Lab Result Units 09/08/22  0601 09/09/22  0910 09/10/22  0356   WBC K/uL 3.53* 4.92 4.22   Hemoglobin g/dL 7.5* 10.6* 9.9*   Hematocrit % 24.5* 32.8* 31.6*   Platelets K/uL 164 164 179   Gran % % 68.8 64.9 62.3   Lymph % % 13.3* 15.0* 14.2*   Mono % % 13.6 16.1* 16.6*   Eosinophil % % 3.7 3.0 5.9   Basophil % % 0.3 0.4 0.5   Differential Method  Automated Automated Automated       Metabolic Panel (last 72 hours):  Recent Labs   Lab Result Units 09/08/22  0601 09/09/22 0414 09/09/22  0910 09/10/22  0356   Sodium mmol/L 139  --  138 137   Potassium mmol/L 3.4*  --  3.4* 3.3*   Chloride mmol/L 110  --  110  108   CO2 mmol/L 25  --  23 23   Glucose mg/dL 111*  --  127* 126*   BUN mg/dL 31*  --  31* 30*   Creatinine mg/dL 2.2*  --  1.9* 2.0*   Albumin g/dL 1.9*  --  1.9* 1.8*   Total Bilirubin mg/dL 0.4  --  0.3 0.3   Alkaline Phosphatase U/L 39*  --  46* 60   AST U/L 28  --  30 27   ALT U/L 18  --  22 22   Magnesium mg/dL 1.9 2.0  --  2.2       Vancomycin Administrations:  vancomycin given in the last 96 hours                     vancomycin 750 mg in dextrose 5 % 250 mL IVPB (ready to mix system) (mg) 750 mg New Bag 09/09/22 0951    vancomycin 750 mg in dextrose 5 % 250 mL IVPB (ready to mix system) (mg) 750 mg New Bag 09/08/22 1250    vancomycin 1.25 g in dextrose 5% 250 mL IVPB (ready to mix) (mg) 1,250 mg New Bag 09/07/22 1411    vancomycin 2 g in 0.9% sodium chloride 500 mL IVPB (mg) 2,000 mg New Bag 09/06/22 1245                    Microbiologic Results:  Microbiology Results (last 7 days)       Procedure Component Value Units Date/Time    Culture, Respiratory with Gram Stain [[id number]] Collected: 09/06/22 0603    Order Status: Completed Specimen: Respiratory from Sputum Updated: 09/09/22 1006     Respiratory Culture Normal respiratory carl      No S aureus or Pseudomonas isolated.     Gram Stain (Respiratory) <10 epithelial cells per low power field.     Gram Stain (Respiratory) Rare WBC's     Gram Stain (Respiratory) No organisms seen    Blood culture x two cultures. Draw prior to antibiotics. [[id number]] Collected: 09/02/22 1141    Order Status: Completed Specimen: Blood Updated: 09/08/22 0612     Blood Culture, Routine No growth after 5 days.    Narrative:      Aerobic and anaerobic    Blood culture x two cultures. Draw prior to antibiotics. [[id number]] Collected: 09/02/22 1134    Order Status: Completed Specimen: Blood Updated: 09/08/22 0612     Blood Culture, Routine No growth after 5 days.    Narrative:      Aerobic and anaerobic

## 2022-09-10 NOTE — PLAN OF CARE
Remains on vent - cpap trial this am - pt tolerated - diprivan infusion in place - tolerating tube feedings - increased to 45 ml per hour - espinal catheter with 425 cc output - no elevated temp - vital signs stable - will continue to monitor  - no visitors today - family trying to coordinate visitation between themselves  - no signs of pain or distress

## 2022-09-10 NOTE — CARE UPDATE
Fio2 decreased to 35 percent on vent - pt remains on cpap and is tolerating - fio2 prior to change 100 percent

## 2022-09-10 NOTE — CARE UPDATE
09/10/22 0756   PRE-TX-O2   Oxygen Concentration (%) 40   SpO2 100 %   Pulse (!) 50   Resp (!) 28   Vent Select   Charged w/in last 24h YES   Preset Conventional Ventilator Settings   Ventilation Type VC   Vent Mode (S)  Spont   Set Rate 18 BPM   Vt Set 500 mL   PEEP/CPAP 5 cmH20   Pressure Support 10 cmH20   Peak Flow 60 L/min   Plateau Set/Insp. Hold (sec) 0   Insp Rise Time  70 %   I-Trigger Type  V-TRIG   Trigger Sensitivity Flow/I-Trigger 3 L/min   Patient Ventilator Parameters   Resp Rate Total 31 br/min   Peak Airway Pressure 16 cmH2O   Mean Airway Pressure 9 cmH20   Plateau Pressure 15 cmH20   Exhaled Vt 373 mL   Total Ve 10.9 mL   Spont Ve 10.9 L   I:E Ratio Measured 1:1.70   Auto PEEP 0 cmH20   Inspired Tidal Volume (VTI) 346 mL   Conventional Ventilator Alarms   Ve High Alarm 24 L/min   Ve Low Alarm 4 L/min   Resp Rate High Alarm 40 br/min   Press High Alarm 45 cmH2O   Apnea Rate 20   Apnea Volume (mL) 500 mL   Apnea Oxygen Concentration  100   Apnea Flow Rate (L/min) 60   T Apnea 10 sec(s)   Ready to Wean/Extubation Screen   FIO2<=50 (chart decimal) 0.4   MV<16L (chart vol.) 10.9   PEEP <=8 (chart #) 5

## 2022-09-10 NOTE — SUBJECTIVE & OBJECTIVE
Past Medical History:   Diagnosis Date    A-fib     Abdominal pain, epigastric     Abdominal pain, generalized     Acute on chronic congestive heart failure 12/29/2021    Anticoagulant long-term use     Arthritis     Asteatotic eczema     Benign essential HTN     Benign prostatic hyperplasia     Brain tumor     Cardiovascular accident     Carotid artery stenosis     CHF (congestive heart failure)     CKD (chronic kidney disease), stage IV     Constipation     COPD (chronic obstructive pulmonary disease)     Depressed     DM (diabetes mellitus), secondary     Drug eruption     Eczema     Edema     Encounter for blood transfusion     Fever     H. pylori infection     Hearing loss     History of tobacco use     Hypercholesterolemia     Hypertensive renal disease, benign     Hypokalemia     Hyponatremia     Impaired fasting glucose     Lumbar pain     Lung mass     Malaise and fatigue     Malignant neoplasm of prostate     Meningioma     Mixed hyperlipidemia     Nasal bleeding     Neuropathy     Obesity, unspecified     SHAYNE (obstructive sleep apnea)     Osteoarthritis of knee     Osteoarthritis of multiple joints     Peripheral vascular disease, unspecified     Psychosis     Renal disorder     Seizure disorder     Seizures     Sleep apnea     Unspecified cataract     Unspecified chronic bronchitis     Unspecified osteoarthritis, unspecified site     Vertigo     Vitamin D deficiency     Wheezing        Past Surgical History:   Procedure Laterality Date    APPENDECTOMY      COLONOSCOPY      CORONARY ARTERY BYPASS GRAFT      FOOT SURGERY Left     HIP REPLACEMENT ARTHROPLASTY Right     HIP SURGERY      JOINT REPLACEMENT      KNEE SURGERY      LUNG BIOPSY Right 03/03/2022    benign    SHOULDER SURGERY Left     SHOULDER SURGERY      TOTAL KNEE ARTHROPLASTY Right     triple bypass         Review of patient's allergies indicates:   Allergen Reactions    Zolpidem        No current facility-administered medications on file prior  to encounter.     Current Outpatient Medications on File Prior to Encounter   Medication Sig    albuterol (PROVENTIL/VENTOLIN HFA) 90 mcg/actuation inhaler Inhale 1-2 puffs into the lungs every 6 (six) hours as needed for Wheezing or Shortness of Breath. Rescue    albuterol-ipratropium (DUO-NEB) 2.5 mg-0.5 mg/3 mL nebulizer solution inhale 3 milliliters by nebulization route 4 times per day and as needed, up to 6 doses per day for 30 days    apixaban (ELIQUIS) 5 mg Tab Take 5 mg by mouth 2 (two) times daily.    aspirin (ECOTRIN) 81 MG EC tablet Take 81 mg by mouth once daily.    atorvastatin (LIPITOR) 20 MG tablet Take 1 tablet (20 mg total) by mouth once daily.    multivitamin capsule Take 1 capsule by mouth once daily.    OXcarbazepine (TRILEPTAL) 300 MG Tab Take 1 tablet (300 mg total) by mouth 2 (two) times daily.    senna (SENOKOT) 8.6 mg tablet 2 tablets at bedtime as needed    tamsulosin (FLOMAX) 0.4 mg Cap Take 0.4 mg by mouth once daily.    torsemide (DEMADEX) 20 MG Tab Take 1 tablet (20 mg total) by mouth daily as needed (weight gain greater than 5 lbs).    vitamin D (VITAMIN D3) 1000 units Tab Take 1,000 Units by mouth once daily.    [DISCONTINUED] calcium carbonate (OS-DAVID) 600 mg calcium (1,500 mg) Tab Take 600 mg by mouth once.    [DISCONTINUED] carvediloL (COREG) 6.25 MG tablet TAKE ONE TABLET BY MOUTH TWICE DAILY (EVERY 12 HOURS) FOR BLOOD PRESSURE. THANKS !!    [DISCONTINUED] ezetimibe (ZETIA) 10 mg tablet Take 10 mg by mouth once daily.    [DISCONTINUED] FLUoxetine 20 MG capsule     [DISCONTINUED] gabapentin (NEURONTIN) 300 MG capsule 1 capsule    [DISCONTINUED] miconazole NITRATE 2 % (MICOTIN) 2 % top powder Apply topically as needed for Itching.    [DISCONTINUED] risperiDONE (RISPERDAL) 0.5 MG Tab Take 1 tablet (0.5 mg total) by mouth nightly as needed (agitation and halluctions).     Family History       Problem Relation (Age of Onset)    Arthritis Mother, Father    Asthma Sister    Heart  disease Father    Hyperlipidemia Mother, Sister    Hypertension Mother, Father, Sister, Brother          Tobacco Use    Smoking status: Every Day    Smokeless tobacco: Never    Tobacco comments:     quit 30 years ago   Substance and Sexual Activity    Alcohol use: Not Currently    Drug use: Never    Sexual activity: Not Currently     Review of Systems   Unable to perform ROS: Intubated   Objective:     Vital Signs (Most Recent):  Temp: 97.6 °F (36.4 °C) (09/10/22 0711)  Pulse: (!) 52 (09/10/22 0815)  Resp: (!) 28 (09/10/22 0815)  BP: 129/60 (09/10/22 0815)  SpO2: 100 % (09/10/22 0815)   Vital Signs (24h Range):  Temp:  [97.5 °F (36.4 °C)-98.4 °F (36.9 °C)] 97.6 °F (36.4 °C)  Pulse:  [43-57] 52  Resp:  [18-46] 28  SpO2:  [100 %] 100 %  BP: ()/(45-92) 129/60     Weight: 103 kg (227 lb 1.2 oz)  Body mass index is 33.53 kg/m².    Physical Exam  Constitutional:       Appearance: He is obese. He is not toxic-appearing.      Comments: sedated   HENT:      Head: Normocephalic.      Right Ear: External ear normal.      Left Ear: External ear normal.      Nose: No congestion or rhinorrhea.      Mouth/Throat:      Pharynx: Oropharynx is clear.   Eyes:      General: No scleral icterus.     Extraocular Movements: Extraocular movements intact.      Conjunctiva/sclera: Conjunctivae normal.      Pupils: Pupils are equal, round, and reactive to light.   Cardiovascular:      Rate and Rhythm: Bradycardia present. Rhythm irregular.      Heart sounds: No murmur heard.  Pulmonary:      Breath sounds: Rhonchi and rales present.      Comments: On vent  Chest:      Chest wall: No tenderness.   Abdominal:      General: There is no distension.      Palpations: Abdomen is soft.      Tenderness: There is no abdominal tenderness. There is no right CVA tenderness, left CVA tenderness or guarding.   Genitourinary:     Comments: Sanders with yellow urine; fecal management system in place  Musculoskeletal:      Cervical back: Neck supple.    Lymphadenopathy:      Cervical: No cervical adenopathy.   Skin:     General: Skin is warm and dry.      Comments: Hemosiderin staining bilateral LE         CRANIAL NERVES     CN III, IV, VI   Pupils are equal, round, and reactive to light.     Significant Labs: All pertinent labs within the past 24 hours have been reviewed.  A1C: No results for input(s): HGBA1C in the last 4320 hours.  ABGs:   Recent Labs   Lab 09/09/22  0525 09/10/22  0525   PH 7.388 7.378   PCO2 36.4 36.2   HCO3 22.2 21.6   POCSATURATED 99.2 99.4   PO2 134* 140*       Blood Culture: No results for input(s): LABBLOO in the last 48 hours.    BMP:   Recent Labs   Lab 09/10/22  0356   *      K 3.3*      CO2 23   BUN 30*   CREATININE 2.0*   CALCIUM 8.5*   MG 2.2       CBC:   Recent Labs   Lab 09/09/22  0910 09/10/22  0356   WBC 4.92 4.22   HGB 10.6* 9.9*   HCT 32.8* 31.6*    179       CMP:   Recent Labs   Lab 09/09/22  0910 09/10/22  0356    137   K 3.4* 3.3*    108   CO2 23 23   * 126*   BUN 31* 30*   CREATININE 1.9* 2.0*   CALCIUM 8.6* 8.5*   PROT 5.7* 5.3*   ALBUMIN 1.9* 1.8*   BILITOT 0.3 0.3   ALKPHOS 46* 60   AST 30 27   ALT 22 22   ANIONGAP 5* 6*       No results for input(s): LACTATE in the last 48 hours.    No results for input(s): COLORU, APPEARANCEUA, PHUR, SPECGRAV, PROTEINUA, GLUCUA, KETONESU, BILIRUBINUA, OCCULTUA, NITRITE, UROBILINOGEN, LEUKOCYTESUR, RBCUA, WBCUA, BACTERIA, SQUAMEPITHEL, HYALINECASTS in the last 48 hours.    Invalid input(s): WRIGHTSUR    X-Ray Chest 1 View  Narrative: EXAMINATION:  XR CHEST 1 VIEW    CLINICAL HISTORY:  vent;    COMPARISON:  Chest x-ray 09/08/2022.    FINDINGS:  Compared to the prior study, no significant interval change.  Right basilar airspace disease stable.  Tubes and lines unchanged.  Impression: Stable right basilar airspace disease.    Electronically signed by: Marshal Acevedo MD  Date:    09/09/2022  Time:    08:44   Significant Imaging: I have  reviewed all pertinent imaging results/findings within the past 24 hours.

## 2022-09-11 PROBLEM — R11.2 N&V (NAUSEA AND VOMITING): Status: ACTIVE | Noted: 2022-09-11

## 2022-09-11 LAB
AC: 18
ALBUMIN SERPL BCP-MCNC: 1.9 G/DL (ref 3.5–5.2)
ALP SERPL-CCNC: 55 U/L (ref 55–135)
ALT SERPL W/O P-5'-P-CCNC: 24 U/L (ref 10–44)
ANION GAP SERPL CALC-SCNC: 5 MMOL/L (ref 8–16)
AST SERPL-CCNC: 22 U/L (ref 10–40)
BASOPHILS # BLD AUTO: 0.03 K/UL (ref 0–0.2)
BASOPHILS NFR BLD: 0.7 % (ref 0–1.9)
BILIRUB SERPL-MCNC: 0.4 MG/DL (ref 0.1–1)
BUN SERPL-MCNC: 34 MG/DL (ref 8–23)
CALCIUM SERPL-MCNC: 8.9 MG/DL (ref 8.7–10.5)
CHLORIDE SERPL-SCNC: 107 MMOL/L (ref 95–110)
CO2 SERPL-SCNC: 23 MMOL/L (ref 23–29)
CORRECTED TEMPERATURE (PCO2): 37.8 MMHG
CORRECTED TEMPERATURE (PH): 7.37
CORRECTED TEMPERATURE (PO2): 84.2 MMHG
CREAT SERPL-MCNC: 1.9 MG/DL (ref 0.5–1.4)
DIFFERENTIAL METHOD: ABNORMAL
EOSINOPHIL # BLD AUTO: 0.2 K/UL (ref 0–0.5)
EOSINOPHIL NFR BLD: 5.3 % (ref 0–8)
ERYTHROCYTE [DISTWIDTH] IN BLOOD BY AUTOMATED COUNT: 17.2 % (ref 11.5–14.5)
EST. GFR  (NO RACE VARIABLE): 35.4 ML/MIN/1.73 M^2
FIO2: 35 %
GLUCOSE SERPL-MCNC: 101 MG/DL (ref 70–110)
HCT VFR BLD AUTO: 32.6 % (ref 40–54)
HGB BLD-MCNC: 10.3 G/DL (ref 14–18)
IMM GRANULOCYTES # BLD AUTO: 0.02 K/UL (ref 0–0.04)
IMM GRANULOCYTES NFR BLD AUTO: 0.5 % (ref 0–0.5)
LYMPHOCYTES # BLD AUTO: 0.8 K/UL (ref 1–4.8)
LYMPHOCYTES NFR BLD: 18.4 % (ref 18–48)
MAGNESIUM SERPL-MCNC: 2.3 MG/DL (ref 1.6–2.6)
MCH RBC QN AUTO: 26.3 PG (ref 27–31)
MCHC RBC AUTO-ENTMCNC: 31.6 G/DL (ref 32–36)
MCV RBC AUTO: 83 FL (ref 82–98)
MODIFIED ALLEN'S TEST: NORMAL
MONOCYTES # BLD AUTO: 0.8 K/UL (ref 0.3–1)
MONOCYTES NFR BLD: 19.1 % (ref 4–15)
NEUTROPHILS # BLD AUTO: 2.4 K/UL (ref 1.8–7.7)
NEUTROPHILS NFR BLD: 56 % (ref 38–73)
NRBC BLD-RTO: 0 /100 WBC
O2DEVICE: NORMAL
PCO2 BLDA: 37.8 MMHG (ref 35–45)
PEEP: 5
PH SMN: 7.37 [PH] (ref 7.34–7.45)
PLATELET # BLD AUTO: 189 K/UL (ref 150–450)
PMV BLD AUTO: 11.9 FL (ref 9.2–12.9)
PO2 BLDA: 84.2 MMHG (ref 80–100)
POC BASE DEFICIT: -3.4 MMOL/L
POC HCO3: 21.8 MMOL/L
POC PERFORMED BY: NORMAL
POC SATURATED O2: 96.6 %
POC TCO2: 45.9 ML/DL
POC TEMPERATURE: 37 C
POTASSIUM SERPL-SCNC: 3.9 MMOL/L (ref 3.5–5.1)
PROT SERPL-MCNC: 5.8 G/DL (ref 6–8.4)
RBC # BLD AUTO: 3.92 M/UL (ref 4.6–6.2)
SODIUM SERPL-SCNC: 135 MMOL/L (ref 136–145)
SPECIMEN SOURCE: NORMAL
VANCOMYCIN SERPL-MCNC: 17.1 UG/ML
VT: 500
WBC # BLD AUTO: 4.3 K/UL (ref 3.9–12.7)

## 2022-09-11 PROCEDURE — 99900035 HC TECH TIME PER 15 MIN (STAT)

## 2022-09-11 PROCEDURE — 80053 COMPREHEN METABOLIC PANEL: CPT | Performed by: INTERNAL MEDICINE

## 2022-09-11 PROCEDURE — 85025 COMPLETE CBC W/AUTO DIFF WBC: CPT | Performed by: INTERNAL MEDICINE

## 2022-09-11 PROCEDURE — 25000242 PHARM REV CODE 250 ALT 637 W/ HCPCS: Performed by: INTERNAL MEDICINE

## 2022-09-11 PROCEDURE — 99900026 HC AIRWAY MAINTENANCE (STAT)

## 2022-09-11 PROCEDURE — 63600175 PHARM REV CODE 636 W HCPCS: Performed by: STUDENT IN AN ORGANIZED HEALTH CARE EDUCATION/TRAINING PROGRAM

## 2022-09-11 PROCEDURE — 27000221 HC OXYGEN, UP TO 24 HOURS

## 2022-09-11 PROCEDURE — 94640 AIRWAY INHALATION TREATMENT: CPT

## 2022-09-11 PROCEDURE — 94761 N-INVAS EAR/PLS OXIMETRY MLT: CPT

## 2022-09-11 PROCEDURE — 99900031 HC PATIENT EDUCATION (STAT)

## 2022-09-11 PROCEDURE — 20000000 HC ICU ROOM

## 2022-09-11 PROCEDURE — 63600175 PHARM REV CODE 636 W HCPCS: Performed by: INTERNAL MEDICINE

## 2022-09-11 PROCEDURE — 80202 ASSAY OF VANCOMYCIN: CPT | Performed by: INTERNAL MEDICINE

## 2022-09-11 PROCEDURE — 27000207 HC ISOLATION

## 2022-09-11 PROCEDURE — 83735 ASSAY OF MAGNESIUM: CPT | Performed by: INTERNAL MEDICINE

## 2022-09-11 PROCEDURE — 82803 BLOOD GASES ANY COMBINATION: CPT

## 2022-09-11 PROCEDURE — 36415 COLL VENOUS BLD VENIPUNCTURE: CPT | Performed by: INTERNAL MEDICINE

## 2022-09-11 PROCEDURE — 25000003 PHARM REV CODE 250: Performed by: INTERNAL MEDICINE

## 2022-09-11 PROCEDURE — 36600 WITHDRAWAL OF ARTERIAL BLOOD: CPT

## 2022-09-11 PROCEDURE — 94003 VENT MGMT INPAT SUBQ DAY: CPT

## 2022-09-11 RX ADMIN — ATORVASTATIN CALCIUM 40 MG: 40 TABLET, FILM COATED ORAL at 08:09

## 2022-09-11 RX ADMIN — TAMSULOSIN HYDROCHLORIDE 0.4 MG: 0.4 CAPSULE ORAL at 08:09

## 2022-09-11 RX ADMIN — IPRATROPIUM BROMIDE AND ALBUTEROL SULFATE 3 ML: 2.5; .5 SOLUTION RESPIRATORY (INHALATION) at 07:09

## 2022-09-11 RX ADMIN — VANCOMYCIN HYDROCHLORIDE 750 MG: 750 INJECTION, POWDER, LYOPHILIZED, FOR SOLUTION INTRAVENOUS at 01:09

## 2022-09-11 RX ADMIN — IPRATROPIUM BROMIDE AND ALBUTEROL SULFATE 3 ML: 2.5; .5 SOLUTION RESPIRATORY (INHALATION) at 01:09

## 2022-09-11 RX ADMIN — FAMOTIDINE 20 MG: 20 TABLET ORAL at 08:09

## 2022-09-11 RX ADMIN — PIPERACILLIN AND TAZOBACTAM 4.5 G: 4; .5 INJECTION, POWDER, LYOPHILIZED, FOR SOLUTION INTRAVENOUS; PARENTERAL at 04:09

## 2022-09-11 RX ADMIN — OXCARBAZEPINE 300 MG: 300 TABLET, FILM COATED ORAL at 08:09

## 2022-09-11 RX ADMIN — RISPERIDONE 0.5 MG: 0.5 TABLET ORAL at 08:09

## 2022-09-11 RX ADMIN — PIPERACILLIN AND TAZOBACTAM 4.5 G: 4; .5 INJECTION, POWDER, LYOPHILIZED, FOR SOLUTION INTRAVENOUS; PARENTERAL at 08:09

## 2022-09-11 RX ADMIN — PROPOFOL 30 MCG/KG/MIN: 10 INJECTION, EMULSION INTRAVENOUS at 07:09

## 2022-09-11 RX ADMIN — ONDANSETRON HYDROCHLORIDE 4 MG: 2 SOLUTION INTRAMUSCULAR; INTRAVENOUS at 08:09

## 2022-09-11 RX ADMIN — PIPERACILLIN AND TAZOBACTAM 4.5 G: 4; .5 INJECTION, POWDER, LYOPHILIZED, FOR SOLUTION INTRAVENOUS; PARENTERAL at 12:09

## 2022-09-11 RX ADMIN — DEXTROSE, SODIUM CHLORIDE, AND POTASSIUM CHLORIDE: 5; .45; .15 INJECTION INTRAVENOUS at 02:09

## 2022-09-11 RX ADMIN — PROPOFOL 30 MCG/KG/MIN: 10 INJECTION, EMULSION INTRAVENOUS at 12:09

## 2022-09-11 RX ADMIN — PROPOFOL 30 MCG/KG/MIN: 10 INJECTION, EMULSION INTRAVENOUS at 02:09

## 2022-09-11 RX ADMIN — PROPOFOL 30 MCG/KG/MIN: 10 INJECTION, EMULSION INTRAVENOUS at 05:09

## 2022-09-11 NOTE — EICU
Rounding (Video Assessment):  Yes    Comments:   Video rounds completed. Pt lying In bed w vent intact fio2 35% diprivan gtt infusing. Hr 52 bp 140/50, o2 sats 100% no distress noted @ this time

## 2022-09-11 NOTE — EICU
Rounding (Video Assessment):  Yes    Video rounding complete. Pt sedated on the vent. Meds reviewed, VSS.

## 2022-09-11 NOTE — PLAN OF CARE
remains sedated on vent. VSS, afebrile. Patient had 2 episodes of small amount of vomiting dark brown emesis. NGT put to LIWS with no further events. UOP 300ml, FMS intact.

## 2022-09-11 NOTE — CARE UPDATE
Ng tube advanced scott 3 inches per dr martínez request - secured in place - remains to lis - will fold feedings today due to large amounts of gas in colon

## 2022-09-11 NOTE — ASSESSMENT & PLAN NOTE
Home med diuretic torsemide 20 mg PRN. +3 pitting on exam. Patient responding to IV lasix 80 mg in ED.   Continue with IV diuretics and taper with progress, patient endorses breathing a little better.   - IV lasix 40 mg BID  - restrict PO intake to 1.5 L fluid ounces  - monitor I/Os  9/6 FM:  Now hypotension, low dose IVFs  9/7 FM:  Volume improved, was dry.  9/8 FM:  Improved.  9/9 FM:  Stable.   well-nourished/well-developed/well-groomed/no distress

## 2022-09-11 NOTE — PLAN OF CARE
Daughter called today with regards to the following prescription request: New    Provider: Sachin Rubin MD  Medication: Novolog Flexpen    Tiffany called [facility] and was told patient is not due for a refill until 6-18-19.  Patient is out of medication.    Pharmacy:   [facility] [address] [place] [address] AT [address] [address]  Phone: [phone] Fax: [phone]      For additional information, or if you need to contact the caller at the following number:    Contact Phone Number:   Mobile [phone]       Daughter states that it is okay to leave a detailed message.    Preferred time for callback: n/a    Daughter was instructed to call pharmacy directly for future refills.    Advised Daughter that refill processing may take 48-72 hours and that the pharmacy will contact them when their prescription is ready for pickup.   Pt remains on vent but is now on simv settings - cpap trial today tolerated -  remains on diprivan - zosyn and vancomycin with iv fluids - no signs of pain or distress - feedings remain off per dr martínez request due to kub results of large amounts of gas in colon - - bath given with oral care - will continue to monitor

## 2022-09-11 NOTE — ASSESSMENT & PLAN NOTE
CXR findings with new right lower lobe pneumonia. Bilateral infiltrates likely secondary to pulmonary edema from infectious.  - supplement oxygen as needed SpO2 >92-94%  - daily pulmonary hygiene  - continue coverage for community acquired PNA, azithrmoycin and rocephin  - f/u blood cultures x2 pending  - trend CBC  9/6 FM:  Now in ICU, expand coverage, speak with family.  9/7 FM:  Rec DNR, hospice care, family to discuss.  9/8 FM:  Encouraging hospice care.  9/9 FM:  Multifactorial  9/10 Cont abxs, nebs and cont to wean from vent  9/11cont abxs, nebs, cpap trials - wean sedation - change to simv settings

## 2022-09-11 NOTE — ASSESSMENT & PLAN NOTE
Patient with Permanent atrial fibrillation which is controlled currently with No rate controlling home medications at this time. . Patient is currently in atrial fibrillation.[id number] Score: 4. HASBLED Score: Unable to calculate. Anticoagulation indicated. Anticoagulation done with eliquis 5 mg BID.    Eliquis on hold, cards following.

## 2022-09-11 NOTE — PROGRESS NOTES
Pharmacokinetic Assessment Follow Up: IV Vancomycin    Vancomycin serum concentration assessment(s):    The random level was drawn correctly and can be used to guide therapy at this time. The measurement is within the desired definitive target range of 15 to 20 mcg/mL.    Vancomycin Regimen Plan:    Change regimen to Vancomycin 750  mg IV every 24 hours with next serum trough concentration measured at 1300 prior to 5th dose on 09/16    Drug levels (last 3 results):  Recent Labs   Lab Result Units 09/09/22  0414 09/10/22  0356 09/11/22  0422   Vancomycin, Random ug/mL 20.1 19.1 17.1       Pharmacy will continue to follow and monitor vancomycin.    Please contact pharmacy at extension [phone] for questions regarding this assessment.    Thank you for the consult,   JANIE STREET       Patient brief summary:  Jamey Lei is a 81 y.o. male initiated on antimicrobial therapy with IV Vancomycin for treatment of lower respiratory infection    The patient's current regimen is pulse dose therapy - received 750 mg x 3 days- changed to 750 mg qday maintenance dosing    Drug Allergies:   Review of patient's allergies indicates:   Allergen Reactions    Zolpidem        Actual Body Weight:   103 kg     Renal Function:   Estimated Creatinine Clearance: 37.3 mL/min (A) (based on SCr of 1.9 mg/dL (H)).,     Dialysis Method (if applicable):  N/A    CBC (last 72 hours):  Recent Labs   Lab Result Units 09/09/22  0910 09/10/22  0356 09/11/22  0422   WBC K/uL 4.92 4.22 4.30   Hemoglobin g/dL 10.6* 9.9* 10.3*   Hematocrit % 32.8* 31.6* 32.6*   Platelets K/uL 164 179 189   Gran % % 64.9 62.3 56.0   Lymph % % 15.0* 14.2* 18.4   Mono % % 16.1* 16.6* 19.1*   Eosinophil % % 3.0 5.9 5.3   Basophil % % 0.4 0.5 0.7   Differential Method  Automated Automated Automated       Metabolic Panel (last 72 hours):  Recent Labs   Lab Result Units 09/09/22  0414 09/09/22  0910 09/10/22  0356 09/11/22  0422   Sodium mmol/L  --  138 137 135*    Potassium mmol/L  --  3.4* 3.3* 3.9   Chloride mmol/L  --  110 108 107   CO2 mmol/L  --  23 23 23   Glucose mg/dL  --  127* 126* 101   BUN mg/dL  --  31* 30* 34*   Creatinine mg/dL  --  1.9* 2.0* 1.9*   Albumin g/dL  --  1.9* 1.8* 1.9*   Total Bilirubin mg/dL  --  0.3 0.3 0.4   Alkaline Phosphatase U/L  --  46* 60 55   AST U/L  --  30 27 22   ALT U/L  --  22 22 24   Magnesium mg/dL 2.0  --  2.2 2.3       Vancomycin Administrations:  vancomycin given in the last 96 hours                     vancomycin 750 mg in dextrose 5 % 250 mL IVPB (ready to mix system) (mg) 750 mg New Bag 09/10/22 1352    vancomycin 750 mg in dextrose 5 % 250 mL IVPB (ready to mix system) (mg) 750 mg New Bag 09/09/22 0951    vancomycin 750 mg in dextrose 5 % 250 mL IVPB (ready to mix system) (mg) 750 mg New Bag 09/08/22 1250    vancomycin 1.25 g in dextrose 5% 250 mL IVPB (ready to mix) (mg) 1,250 mg New Bag 09/07/22 1411                    Microbiologic Results:  Microbiology Results (last 7 days)       Procedure Component Value Units Date/Time    Culture, Respiratory with Gram Stain [[id number]] Collected: 09/06/22 0603    Order Status: Completed Specimen: Respiratory from Sputum Updated: 09/09/22 1006     Respiratory Culture Normal respiratory carl      No S aureus or Pseudomonas isolated.     Gram Stain (Respiratory) <10 epithelial cells per low power field.     Gram Stain (Respiratory) Rare WBC's     Gram Stain (Respiratory) No organisms seen    Blood culture x two cultures. Draw prior to antibiotics. [[id number]] Collected: 09/02/22 1141    Order Status: Completed Specimen: Blood Updated: 09/08/22 0612     Blood Culture, Routine No growth after 5 days.    Narrative:      Aerobic and anaerobic    Blood culture x two cultures. Draw prior to antibiotics. [[id number]] Collected: 09/02/22 1134    Order Status: Completed Specimen: Blood Updated: 09/08/22 0612     Blood Culture, Routine No growth after 5 days.    Narrative:      Aerobic and  anaerobic

## 2022-09-11 NOTE — CARE UPDATE
09/11/22 0940   PRE-TX-O2   Oxygen Concentration (%) 35   SpO2 100 %   Pulse (!) 43   Resp 19   Vent Select   Charged w/in last 24h YES   Preset Conventional Ventilator Settings   Ventilation Type VC   Vent Mode (S)  SIMV   Set Rate 14 BPM   Vt Set 500 mL   PEEP/CPAP 5 cmH20   Pressure Support 10 cmH20   Peak Flow 60 L/min   Plateau Set/Insp. Hold (sec) 0   Insp Rise Time  70 %   I-Trigger Type  V-TRIG   Trigger Sensitivity Flow/I-Trigger 3 L/min   Patient Ventilator Parameters   Resp Rate Total 12 br/min   Peak Airway Pressure 26 cmH2O   Mean Airway Pressure 8.3 cmH20   Plateau Pressure 15 cmH20   Exhaled Vt 545 mL   Total Ve 5.87 mL   Spont Ve 0 L   I:E Ratio Measured 1:3.80   Auto PEEP 0 cmH20   Inspired Tidal Volume (VTI) 516 mL   Conventional Ventilator Alarms   Alarms On Y  (alarm cable plugged into call bell system and working properly)   Ve High Alarm 24 L/min   Ve Low Alarm 4 L/min   Resp Rate High Alarm 40 br/min   Press High Alarm 70 cmH2O   Apnea Rate 18   Apnea Volume (mL) 500 mL   Apnea Oxygen Concentration  100   Apnea Flow Rate (L/min) 60   T Apnea 10 sec(s)   Ready to Wean/Extubation Screen   FIO2<=50 (chart decimal) 0.35   MV<16L (chart vol.) 5.87   PEEP <=8 (chart #) 5

## 2022-09-11 NOTE — CARE UPDATE
09/11/22 0936   PRE-TX-O2   Oxygen Concentration (%) 35   SpO2 99 %   Pulse (!) 45   Resp 19   Vent Select   Charged w/in last 24h YES   Preset Conventional Ventilator Settings   Ventilation Type VC   Vent Mode (S)  Spont  (apnea alarm triggered times 3. Changing to SIMV mode and will retry CPAP mode once pt more awake. Rosa Maria at bedside.)   Set Rate 14 BPM   Vt Set 500 mL   PEEP/CPAP 5 cmH20   Pressure Support 10 cmH20   Peak Flow 60 L/min   Plateau Set/Insp. Hold (sec) 0   Insp Rise Time  70 %   I-Trigger Type  V-TRIG   Trigger Sensitivity Flow/I-Trigger 3 L/min   Patient Ventilator Parameters   Resp Rate Total 8.7 br/min   Peak Airway Pressure 25 cmH2O   Mean Airway Pressure 7.3 cmH20   Plateau Pressure 15 cmH20   Exhaled Vt 618 mL   Total Ve 5.4 mL   Spont Ve 0 L   I:E Ratio Measured 1:6.60   Auto PEEP 0 cmH20   Inspired Tidal Volume (VTI) 318 mL   Conventional Ventilator Alarms   Ve High Alarm 24 L/min   Ve Low Alarm 4 L/min   Resp Rate High Alarm 40 br/min   Press High Alarm 70 cmH2O   Apnea Rate 20   Apnea Volume (mL) 500 mL   Apnea Oxygen Concentration  100   Apnea Flow Rate (L/min) 60   T Apnea 10 sec(s)   Ready to Wean/Extubation Screen   FIO2<=50 (chart decimal) 0.35   MV<16L (chart vol.) 5.4   PEEP <=8 (chart #) 5

## 2022-09-11 NOTE — CARE UPDATE
Changed to cpap for scott 3 minutes - apneic events  - placed on simv rate of 14 - tidal volume 500 fio2 35 percent - peep of +5 and pressure support of 10 by resp - will try cpap again

## 2022-09-11 NOTE — ASSESSMENT & PLAN NOTE
Multifactorial, cardiopulmonary (CHF, atrial fibrillation, COPD) and infectious processes (COVID19 and fluA).  - see above management.

## 2022-09-11 NOTE — SUBJECTIVE & OBJECTIVE
Past Medical History:   Diagnosis Date    A-fib     Abdominal pain, epigastric     Abdominal pain, generalized     Acute on chronic congestive heart failure 12/29/2021    Anticoagulant long-term use     Arthritis     Asteatotic eczema     Benign essential HTN     Benign prostatic hyperplasia     Brain tumor     Cardiovascular accident     Carotid artery stenosis     CHF (congestive heart failure)     CKD (chronic kidney disease), stage IV     Constipation     COPD (chronic obstructive pulmonary disease)     Depressed     DM (diabetes mellitus), secondary     Drug eruption     Eczema     Edema     Encounter for blood transfusion     Fever     H. pylori infection     Hearing loss     History of tobacco use     Hypercholesterolemia     Hypertensive renal disease, benign     Hypokalemia     Hyponatremia     Impaired fasting glucose     Lumbar pain     Lung mass     Malaise and fatigue     Malignant neoplasm of prostate     Meningioma     Mixed hyperlipidemia     Nasal bleeding     Neuropathy     Obesity, unspecified     SHAYNE (obstructive sleep apnea)     Osteoarthritis of knee     Osteoarthritis of multiple joints     Peripheral vascular disease, unspecified     Psychosis     Renal disorder     Seizure disorder     Seizures     Sleep apnea     Unspecified cataract     Unspecified chronic bronchitis     Unspecified osteoarthritis, unspecified site     Vertigo     Vitamin D deficiency     Wheezing        Past Surgical History:   Procedure Laterality Date    APPENDECTOMY      COLONOSCOPY      CORONARY ARTERY BYPASS GRAFT      FOOT SURGERY Left     HIP REPLACEMENT ARTHROPLASTY Right     HIP SURGERY      JOINT REPLACEMENT      KNEE SURGERY      LUNG BIOPSY Right 03/03/2022    benign    SHOULDER SURGERY Left     SHOULDER SURGERY      TOTAL KNEE ARTHROPLASTY Right     triple bypass         Review of patient's allergies indicates:   Allergen Reactions    Zolpidem        No current facility-administered medications on file prior  to encounter.     Current Outpatient Medications on File Prior to Encounter   Medication Sig    albuterol (PROVENTIL/VENTOLIN HFA) 90 mcg/actuation inhaler Inhale 1-2 puffs into the lungs every 6 (six) hours as needed for Wheezing or Shortness of Breath. Rescue    albuterol-ipratropium (DUO-NEB) 2.5 mg-0.5 mg/3 mL nebulizer solution inhale 3 milliliters by nebulization route 4 times per day and as needed, up to 6 doses per day for 30 days    apixaban (ELIQUIS) 5 mg Tab Take 5 mg by mouth 2 (two) times daily.    aspirin (ECOTRIN) 81 MG EC tablet Take 81 mg by mouth once daily.    atorvastatin (LIPITOR) 20 MG tablet Take 1 tablet (20 mg total) by mouth once daily.    multivitamin capsule Take 1 capsule by mouth once daily.    OXcarbazepine (TRILEPTAL) 300 MG Tab Take 1 tablet (300 mg total) by mouth 2 (two) times daily.    senna (SENOKOT) 8.6 mg tablet 2 tablets at bedtime as needed    tamsulosin (FLOMAX) 0.4 mg Cap Take 0.4 mg by mouth once daily.    torsemide (DEMADEX) 20 MG Tab Take 1 tablet (20 mg total) by mouth daily as needed (weight gain greater than 5 lbs).    vitamin D (VITAMIN D3) 1000 units Tab Take 1,000 Units by mouth once daily.    [DISCONTINUED] calcium carbonate (OS-DAVID) 600 mg calcium (1,500 mg) Tab Take 600 mg by mouth once.    [DISCONTINUED] carvediloL (COREG) 6.25 MG tablet TAKE ONE TABLET BY MOUTH TWICE DAILY (EVERY 12 HOURS) FOR BLOOD PRESSURE. THANKS !!    [DISCONTINUED] ezetimibe (ZETIA) 10 mg tablet Take 10 mg by mouth once daily.    [DISCONTINUED] FLUoxetine 20 MG capsule     [DISCONTINUED] gabapentin (NEURONTIN) 300 MG capsule 1 capsule    [DISCONTINUED] miconazole NITRATE 2 % (MICOTIN) 2 % top powder Apply topically as needed for Itching.    [DISCONTINUED] risperiDONE (RISPERDAL) 0.5 MG Tab Take 1 tablet (0.5 mg total) by mouth nightly as needed (agitation and halluctions).     Family History       Problem Relation (Age of Onset)    Arthritis Mother, Father    Asthma Sister    Heart  disease Father    Hyperlipidemia Mother, Sister    Hypertension Mother, Father, Sister, Brother          Tobacco Use    Smoking status: Every Day    Smokeless tobacco: Never    Tobacco comments:     quit 30 years ago   Substance and Sexual Activity    Alcohol use: Not Currently    Drug use: Never    Sexual activity: Not Currently     Review of Systems   Unable to perform ROS: Intubated   Objective:     Vital Signs (Most Recent):  Temp: 97.9 °F (36.6 °C) (09/11/22 0705)  Pulse: (!) 47 (09/11/22 0830)  Resp: 18 (09/11/22 0830)  BP: 137/65 (09/11/22 0830)  SpO2: 100 % (09/11/22 0830)   Vital Signs (24h Range):  Temp:  [97.2 °F (36.2 °C)-98.6 °F (37 °C)] 97.9 °F (36.6 °C)  Pulse:  [43-57] 47  Resp:  [18-38] 18  SpO2:  [100 %] 100 %  BP: ()/(48-73) 137/65     Weight: 103 kg (227 lb 1.2 oz)  Body mass index is 33.53 kg/m².    Physical Exam  Constitutional:       Appearance: He is obese. He is not toxic-appearing.      Comments: sedated   HENT:      Head: Normocephalic.      Right Ear: External ear normal.      Left Ear: External ear normal.      Nose: No congestion or rhinorrhea.      Mouth/Throat:      Pharynx: Oropharynx is clear.   Eyes:      General: No scleral icterus.     Extraocular Movements: Extraocular movements intact.      Conjunctiva/sclera: Conjunctivae normal.      Pupils: Pupils are equal, round, and reactive to light.   Cardiovascular:      Rate and Rhythm: Bradycardia present. Rhythm irregular.      Heart sounds: No murmur heard.  Pulmonary:      Breath sounds: Rhonchi and rales present.      Comments: On vent  Chest:      Chest wall: No tenderness.   Abdominal:      General: There is no distension.      Palpations: Abdomen is soft.      Tenderness: There is no abdominal tenderness. There is no right CVA tenderness, left CVA tenderness or guarding.   Genitourinary:     Comments: Sanders with yellow urine; fecal management system in place  Musculoskeletal:      Cervical back: Neck supple.    Lymphadenopathy:      Cervical: No cervical adenopathy.   Skin:     General: Skin is warm and dry.      Comments: Hemosiderin staining bilateral LE         CRANIAL NERVES     CN III, IV, VI   Pupils are equal, round, and reactive to light.     Significant Labs: All pertinent labs within the past 24 hours have been reviewed.  A1C: No results for input(s): HGBA1C in the last 4320 hours.  ABGs:   Recent Labs   Lab 09/10/22  0525 09/11/22  0501   PH 7.378 7.370   PCO2 36.2 37.8   HCO3 21.6 21.8   POCSATURATED 99.4 96.6   PO2 140* 84.2       Blood Culture: No results for input(s): LABBLOO in the last 48 hours.    BMP:   Recent Labs   Lab 09/11/22  0422      *   K 3.9      CO2 23   BUN 34*   CREATININE 1.9*   CALCIUM 8.9   MG 2.3       CBC:   Recent Labs   Lab 09/09/22  0910 09/10/22  0356 09/11/22  0422   WBC 4.92 4.22 4.30   HGB 10.6* 9.9* 10.3*   HCT 32.8* 31.6* 32.6*    179 189       CMP:   Recent Labs   Lab 09/09/22  0910 09/10/22  0356 09/11/22  0422    137 135*   K 3.4* 3.3* 3.9    108 107   CO2 23 23 23   * 126* 101   BUN 31* 30* 34*   CREATININE 1.9* 2.0* 1.9*   CALCIUM 8.6* 8.5* 8.9   PROT 5.7* 5.3* 5.8*   ALBUMIN 1.9* 1.8* 1.9*   BILITOT 0.3 0.3 0.4   ALKPHOS 46* 60 55   AST 30 27 22   ALT 22 22 24   ANIONGAP 5* 6* 5*       No results for input(s): LACTATE in the last 48 hours.    No results for input(s): COLORU, APPEARANCEUA, PHUR, SPECGRAV, PROTEINUA, GLUCUA, KETONESU, BILIRUBINUA, OCCULTUA, NITRITE, UROBILINOGEN, LEUKOCYTESUR, RBCUA, WBCUA, BACTERIA, SQUAMEPITHEL, HYALINECASTS in the last 48 hours.    Invalid input(s): WRIGHTSUR    X-Ray Chest 1 View  Narrative: EXAMINATION:  XR CHEST 1 VIEW    CLINICAL HISTORY:  vent;    COMPARISON:  Chest x-ray 09/08/2022.    FINDINGS:  Compared to the prior study, no significant interval change.  Right basilar airspace disease stable.  Tubes and lines unchanged.  Impression: Stable right basilar airspace  disease.    Electronically signed by: Marshal Acevedo MD  Date:    09/09/2022  Time:    08:44   Significant Imaging: I have reviewed all pertinent imaging results/findings within the past 24 hours.

## 2022-09-11 NOTE — PROGRESS NOTES
[facility] Medicine  Progress Note    Patient Name: Jamey Lei  MRN: [id number]  Patient Class: IP- Inpatient   Admission Date: 9/2/2022  Length of Stay: 9 days  Attending Physician: Navi Domínguez III, MD  Primary Care Provider: Navi Domínguez III, MD        Subjective:     Principal Problem:Pneumonia        HPI:  81 year old male with hypertension, hypoerlipidemia, CHF, permanent atrial fibriallation on Eliquis, CAD s/p CABGx3, CKD stage 3, recent lung and brain mass findings presents to ED with worsening shortness of breath that started about a week ago with bilateral leg swelling with difficulty ambulating with his walker.     Patient hard of hearing endorsed since treatment in the ED, he has been feeling better. At home he does not use oxygen, breathing at SpO2 >95% on continuous O2 2L via nasal cannula on exam. There is no change in appetite, nausea, vomiting, diarrhea, constipation. Denies headaches, fever, chills, vision changes, chest pain, abdominal pain. Denies joint pain and loss of sensation in toes and foot bilaterally with increasing leg swelling.    ED course:  Tachypnea rate >30, CXR right lower lobe pneumonia, positive influenza A and COVID19 (fully vaccinated), NTproBNT >6000, lactate <0.2 x2, blood cultures x2 pending,  BUN/Cr 33/1.8 with improved eGFR from last hospitalization and discharge one week ago. Patient started on IV lasix 80 mg, responding well with clear urine collecting in Sanders catheter and IV antibiotics empirically started for right lobe pneumonia.       Overview/Hospital Course:  9/3/22 CG:  Says it feels much better and does complain of a cough.  He has had to go to the bathroom and is waiting for help and assistance getting up from the bed to go to the restroom.  No other acute overnight events.  9/4/22 CG: Doing better, still has a cough and feels weak.   9/5/22 CG:  Became more agitated last night and was trying to rip out his Sanders catheter.   Had to be given Seroquel to help him sleep and to calm him down.  9/6/22 FM:  Events of the weekend are noted.  Patient was admitted with COVID and influenza and a deterioration in his condition.  Patient has a history of lung cancer and is seeing an oncologist at an outside facility.  Patient has a history of chronic kidney disease as well as obesity diabetes neuropathy atrial fibrillation and his general health has come line significantly over the last year.  The patient has been in and out of the hospital and nursing facilities.  Last night the patient had a rapid response and ended up on the ventilator.  I reviewed the events and I am unsure if this was related to Seroquel will and I also see that he was becoming hypotensive.  Patient's chest x-ray is abnormal I will discuss with the family this morning.  9/7/22 FM:  Met with family today.  They were unable to make a decision on do not resuscitate.  They will consult with other family members.  Patient's volume status is improved as he was dry yesterday.  Patient's renal numbers all reviewed and noted.  Patient is able to open eyes on the ventilator and follow simple commands.  Will continue ventilatory support today as well as tube feedings.  9/8/22 FM:  Patient had a positive stool for blood as well as a persistent drop in his hemoglobin and was transfused 2 units of packed red blood cells yesterday.  Patient is admitted with COVID pneumonia influenza pneumonia and possible sepsis.  Patient has a history of lung cancer as well as multiple other comorbidities and I have met with the family yesterday and they are unable to come to a code status or long-term status decision.  I am encouraging hospice care as the patient has multiple irreversible illnesses and a decline in his quality of life.  9/9/22 FM:  Patient is relatively unchanged today.  Yesterday the patient did well with his CPAP challenges and we will continue today.  I suspect he can be taken off the  ventilator over the weekend some time.  Labs this morning are pending he has received 4 units of blood.  9/10 ND patient tolerating CPAP trials again today attempt to new trial.  Continue to wean sedation completely off.    9/11 ND pt with nv overnight - tfs beign held.  Has had bms.   Has been toleratign cpap trials. Wean sedation       Past Medical History:   Diagnosis Date    A-fib     Abdominal pain, epigastric     Abdominal pain, generalized     Acute on chronic congestive heart failure 12/29/2021    Anticoagulant long-term use     Arthritis     Asteatotic eczema     Benign essential HTN     Benign prostatic hyperplasia     Brain tumor     Cardiovascular accident     Carotid artery stenosis     CHF (congestive heart failure)     CKD (chronic kidney disease), stage IV     Constipation     COPD (chronic obstructive pulmonary disease)     Depressed     DM (diabetes mellitus), secondary     Drug eruption     Eczema     Edema     Encounter for blood transfusion     Fever     H. pylori infection     Hearing loss     History of tobacco use     Hypercholesterolemia     Hypertensive renal disease, benign     Hypokalemia     Hyponatremia     Impaired fasting glucose     Lumbar pain     Lung mass     Malaise and fatigue     Malignant neoplasm of prostate     Meningioma     Mixed hyperlipidemia     Nasal bleeding     Neuropathy     Obesity, unspecified     SHAYNE (obstructive sleep apnea)     Osteoarthritis of knee     Osteoarthritis of multiple joints     Peripheral vascular disease, unspecified     Psychosis     Renal disorder     Seizure disorder     Seizures     Sleep apnea     Unspecified cataract     Unspecified chronic bronchitis     Unspecified osteoarthritis, unspecified site     Vertigo     Vitamin D deficiency     Wheezing        Past Surgical History:   Procedure Laterality Date    APPENDECTOMY      COLONOSCOPY      CORONARY ARTERY BYPASS GRAFT      FOOT  SURGERY Left     HIP REPLACEMENT ARTHROPLASTY Right     HIP SURGERY      JOINT REPLACEMENT      KNEE SURGERY      LUNG BIOPSY Right 03/03/2022    benign    SHOULDER SURGERY Left     SHOULDER SURGERY      TOTAL KNEE ARTHROPLASTY Right     triple bypass         Review of patient's allergies indicates:   Allergen Reactions    Zolpidem        No current facility-administered medications on file prior to encounter.     Current Outpatient Medications on File Prior to Encounter   Medication Sig    albuterol (PROVENTIL/VENTOLIN HFA) 90 mcg/actuation inhaler Inhale 1-2 puffs into the lungs every 6 (six) hours as needed for Wheezing or Shortness of Breath. Rescue    albuterol-ipratropium (DUO-NEB) 2.5 mg-0.5 mg/3 mL nebulizer solution inhale 3 milliliters by nebulization route 4 times per day and as needed, up to 6 doses per day for 30 days    apixaban (ELIQUIS) 5 mg Tab Take 5 mg by mouth 2 (two) times daily.    aspirin (ECOTRIN) 81 MG EC tablet Take 81 mg by mouth once daily.    atorvastatin (LIPITOR) 20 MG tablet Take 1 tablet (20 mg total) by mouth once daily.    multivitamin capsule Take 1 capsule by mouth once daily.    OXcarbazepine (TRILEPTAL) 300 MG Tab Take 1 tablet (300 mg total) by mouth 2 (two) times daily.    senna (SENOKOT) 8.6 mg tablet 2 tablets at bedtime as needed    tamsulosin (FLOMAX) 0.4 mg Cap Take 0.4 mg by mouth once daily.    torsemide (DEMADEX) 20 MG Tab Take 1 tablet (20 mg total) by mouth daily as needed (weight gain greater than 5 lbs).    vitamin D (VITAMIN D3) 1000 units Tab Take 1,000 Units by mouth once daily.    [DISCONTINUED] calcium carbonate (OS-DAVID) 600 mg calcium (1,500 mg) Tab Take 600 mg by mouth once.    [DISCONTINUED] carvediloL (COREG) 6.25 MG tablet TAKE ONE TABLET BY MOUTH TWICE DAILY (EVERY 12 HOURS) FOR BLOOD PRESSURE. THANKS !!    [DISCONTINUED] ezetimibe (ZETIA) 10 mg tablet Take 10 mg by mouth once daily.    [DISCONTINUED] FLUoxetine 20 MG capsule      [DISCONTINUED] gabapentin (NEURONTIN) 300 MG capsule 1 capsule    [DISCONTINUED] miconazole NITRATE 2 % (MICOTIN) 2 % top powder Apply topically as needed for Itching.    [DISCONTINUED] risperiDONE (RISPERDAL) 0.5 MG Tab Take 1 tablet (0.5 mg total) by mouth nightly as needed (agitation and halluctions).     Family History       Problem Relation (Age of Onset)    Arthritis Mother, Father    Asthma Sister    Heart disease Father    Hyperlipidemia Mother, Sister    Hypertension Mother, Father, Sister, Brother          Tobacco Use    Smoking status: Every Day    Smokeless tobacco: Never    Tobacco comments:     quit 30 years ago   Substance and Sexual Activity    Alcohol use: Not Currently    Drug use: Never    Sexual activity: Not Currently     Review of Systems   Unable to perform ROS: Intubated   Objective:     Vital Signs (Most Recent):  Temp: 97.9 °F (36.6 °C) (09/11/22 0705)  Pulse: (!) 47 (09/11/22 0830)  Resp: 18 (09/11/22 0830)  BP: 137/65 (09/11/22 0830)  SpO2: 100 % (09/11/22 0830)   Vital Signs (24h Range):  Temp:  [97.2 °F (36.2 °C)-98.6 °F (37 °C)] 97.9 °F (36.6 °C)  Pulse:  [43-57] 47  Resp:  [18-38] 18  SpO2:  [100 %] 100 %  BP: ()/(48-73) 137/65     Weight: 103 kg (227 lb 1.2 oz)  Body mass index is 33.53 kg/m².    Physical Exam  Constitutional:       Appearance: He is obese. He is not toxic-appearing.      Comments: sedated   HENT:      Head: Normocephalic.      Right Ear: External ear normal.      Left Ear: External ear normal.      Nose: No congestion or rhinorrhea.      Mouth/Throat:      Pharynx: Oropharynx is clear.   Eyes:      General: No scleral icterus.     Extraocular Movements: Extraocular movements intact.      Conjunctiva/sclera: Conjunctivae normal.      Pupils: Pupils are equal, round, and reactive to light.   Cardiovascular:      Rate and Rhythm: Bradycardia present. Rhythm irregular.      Heart sounds: No murmur heard.  Pulmonary:      Breath sounds: Rhonchi and  rales present.      Comments: On vent  Chest:      Chest wall: No tenderness.   Abdominal:      General: There is no distension.      Palpations: Abdomen is soft.      Tenderness: There is no abdominal tenderness. There is no right CVA tenderness, left CVA tenderness or guarding.   Genitourinary:     Comments: Sanders with yellow urine; fecal management system in place  Musculoskeletal:      Cervical back: Neck supple.   Lymphadenopathy:      Cervical: No cervical adenopathy.   Skin:     General: Skin is warm and dry.      Comments: Hemosiderin staining bilateral LE         CRANIAL NERVES     CN III, IV, VI   Pupils are equal, round, and reactive to light.     Significant Labs: All pertinent labs within the past 24 hours have been reviewed.  A1C: No results for input(s): HGBA1C in the last 4320 hours.  ABGs:   Recent Labs   Lab 09/10/22  0525 09/11/22  0501   PH 7.378 7.370   PCO2 36.2 37.8   HCO3 21.6 21.8   POCSATURATED 99.4 96.6   PO2 140* 84.2       Blood Culture: No results for input(s): LABBLOO in the last 48 hours.    BMP:   Recent Labs   Lab 09/11/22  0422      *   K 3.9      CO2 23   BUN 34*   CREATININE 1.9*   CALCIUM 8.9   MG 2.3       CBC:   Recent Labs   Lab 09/09/22  0910 09/10/22  0356 09/11/22  0422   WBC 4.92 4.22 4.30   HGB 10.6* 9.9* 10.3*   HCT 32.8* 31.6* 32.6*    179 189       CMP:   Recent Labs   Lab 09/09/22  0910 09/10/22  0356 09/11/22  0422    137 135*   K 3.4* 3.3* 3.9    108 107   CO2 23 23 23   * 126* 101   BUN 31* 30* 34*   CREATININE 1.9* 2.0* 1.9*   CALCIUM 8.6* 8.5* 8.9   PROT 5.7* 5.3* 5.8*   ALBUMIN 1.9* 1.8* 1.9*   BILITOT 0.3 0.3 0.4   ALKPHOS 46* 60 55   AST 30 27 22   ALT 22 22 24   ANIONGAP 5* 6* 5*       No results for input(s): LACTATE in the last 48 hours.    No results for input(s): COLORU, APPEARANCEUA, PHUR, SPECGRAV, PROTEINUA, GLUCUA, KETONESU, BILIRUBINUA, OCCULTUA, NITRITE, UROBILINOGEN, LEUKOCYTESUR, RBCUA, WBCUA,  BACTERIA, SQUAMEPITHEL, HYALINECASTS in the last 48 hours.    Invalid input(s): WRIGHTSUR    X-Ray Chest 1 View  Narrative: EXAMINATION:  XR CHEST 1 VIEW    CLINICAL HISTORY:  vent;    COMPARISON:  Chest x-ray 09/08/2022.    FINDINGS:  Compared to the prior study, no significant interval change.  Right basilar airspace disease stable.  Tubes and lines unchanged.  Impression: Stable right basilar airspace disease.    Electronically signed by: Marshal Acevedo MD  Date:    09/09/2022  Time:    08:44   Significant Imaging: I have reviewed all pertinent imaging results/findings within the past 24 hours.      Assessment/Plan:      * Pneumonia  CXR findings with new right lower lobe pneumonia. Bilateral infiltrates likely secondary to pulmonary edema from infectious.  - supplement oxygen as needed SpO2 >92-94%  - daily pulmonary hygiene  - continue coverage for community acquired PNA, azithrmoycin and rocephin  - f/u blood cultures x2 pending  - trend CBC  9/6 FM:  Now in ICU, expand coverage, speak with family.  9/7 FM:  Rec DNR, hospice care, family to discuss.  9/8 FM:  Encouraging hospice care.  9/9 FM:  Multifactorial  9/10 Cont abxs, nebs and cont to wean from vent  9/11cont abxs, nebs, cpap trials - wean sedation - change to simv settings        N&V (nausea and vomiting)  Hold tfs currently - check xray       Malignant neoplasm of lung  Poor prognosis.  Encouraging hospice care.      Tachypnea  improving      COVID-19  Patient is identified as mild moderate severity.   Initiate standard COVID protocols; COVID-19 testing ,Infection Control notification  and isolation- respiratory, contact and droplet per protocol    Diagnostics: (leukopenia, hyponatremia, hyperferritinemia, elevated troponin, elevated d-dimer, age, and comorbidities are significant predictors of poor clinical outcome)  CBC, CMP and Portable CXR    Management: Initiate targeted therapy with possible use of Paxlovid. Maintain oxygen saturations 92-96%  via Nasal Cannula  LPM and monitor with continuous/intermittent pulse oximetry. Inhaled bronchodilators as needed for shortness of breath., Continuous cardiac monitoring. and Manage respiratory failure (O2 requirement >10LPM or needing NIPPV/Mechanical ventilation) and/or Pneumonia (active chest infiltrates) separately as described below.  Empirically started on IV antibiotics for right lower lobe pneumonia on chest xray.   Follow up blood cultures and adjust antibiotics accordingly.     Influenza  Positive flu A. Supplemental oxygen as needed to maintain >90%.  Has completed tamiflu.      SOB (shortness of breath)  Multifactorial, cardiopulmonary (CHF, atrial fibrillation, COPD) and infectious processes (COVID19 and fluA).  - see above management.        CHF (congestive heart failure)  Seems euvolemic      Stage 3 chronic kidney disease  Stable BUN/Cr 33/1.8 likely at new baseline with recovering eGFR of >40 from <20 5 weeks ago. Continue to monitor as patient on IV diuretics.       Seizure disorder  On meds - no active szs at this time      A-fib  Patient with Permanent atrial fibrillation which is controlled currently with No rate controlling home medications at this time. . Patient is currently in atrial fibrillation.[id number] Score: 4. HASBLED Score: Unable to calculate. Anticoagulation indicated. Anticoagulation done with eliquis 5 mg BID.    Eliquis on hold, cards following.    COPD (chronic obstructive pulmonary disease)  Cont. Neb txt.      Localized edema  Home med diuretic torsemide 20 mg PRN. +3 pitting on exam. Patient responding to IV lasix 80 mg in ED.   Continue with IV diuretics and taper with progress, patient endorses breathing a little better.   - IV lasix 40 mg BID  - restrict PO intake to 1.5 L fluid ounces  - monitor I/Os  9/6 FM:  Now hypotension, low dose IVFs  9/7 FM:  Volume improved, was dry.  9/8 FM:  Improved.  9/9 FM:  Stable.    Weakness  Pt/ot once extubated        VTE Risk  Mitigation (From admission, onward)         Ordered     IP VTE HIGH RISK PATIENT  Once         09/02/22 1513     Place sequential compression device  Until discontinued         09/02/22 1513                Discharge Planning   GLORY:      Code Status: Full Code   Is the patient medically ready for discharge?:     Reason for patient still in hospital (select all that apply): Treatment  Discharge Plan A: Home with family, Home Health                  Bernie Yang MD  Department of Hospital Medicine   [place] [facility]

## 2022-09-12 PROBLEM — J96.02 ACUTE RESPIRATORY FAILURE WITH HYPOXIA AND HYPERCARBIA: Status: ACTIVE | Noted: 2022-09-02

## 2022-09-12 PROBLEM — J96.01 ACUTE RESPIRATORY FAILURE WITH HYPOXIA AND HYPERCARBIA: Status: ACTIVE | Noted: 2022-09-02

## 2022-09-12 LAB
ALBUMIN SERPL BCP-MCNC: 2 G/DL (ref 3.5–5.2)
ALP SERPL-CCNC: 49 U/L (ref 55–135)
ALT SERPL W/O P-5'-P-CCNC: 20 U/L (ref 10–44)
ANION GAP SERPL CALC-SCNC: 6 MMOL/L (ref 8–16)
AST SERPL-CCNC: 20 U/L (ref 10–40)
BASOPHILS # BLD AUTO: 0.03 K/UL (ref 0–0.2)
BASOPHILS NFR BLD: 0.7 % (ref 0–1.9)
BILIRUB SERPL-MCNC: 0.4 MG/DL (ref 0.1–1)
BUN SERPL-MCNC: 35 MG/DL (ref 8–23)
CALCIUM SERPL-MCNC: 8.8 MG/DL (ref 8.7–10.5)
CHLORIDE SERPL-SCNC: 105 MMOL/L (ref 95–110)
CO2 SERPL-SCNC: 23 MMOL/L (ref 23–29)
CORRECTED TEMPERATURE (PCO2): 40.4 MMHG
CORRECTED TEMPERATURE (PH): 7.34
CORRECTED TEMPERATURE (PO2): 121 MMHG
CREAT SERPL-MCNC: 2 MG/DL (ref 0.5–1.4)
DIFFERENTIAL METHOD: ABNORMAL
EOSINOPHIL # BLD AUTO: 0.2 K/UL (ref 0–0.5)
EOSINOPHIL NFR BLD: 4 % (ref 0–8)
ERYTHROCYTE [DISTWIDTH] IN BLOOD BY AUTOMATED COUNT: 17.2 % (ref 11.5–14.5)
EST. GFR  (NO RACE VARIABLE): 33.3 ML/MIN/1.73 M^2
FIO2: 35 %
GLUCOSE SERPL-MCNC: 97 MG/DL (ref 70–110)
HCT VFR BLD AUTO: 32.7 % (ref 40–54)
HGB BLD-MCNC: 10.3 G/DL (ref 14–18)
IMM GRANULOCYTES # BLD AUTO: 0.03 K/UL (ref 0–0.04)
IMM GRANULOCYTES NFR BLD AUTO: 0.7 % (ref 0–0.5)
LYMPHOCYTES # BLD AUTO: 0.7 K/UL (ref 1–4.8)
LYMPHOCYTES NFR BLD: 18.1 % (ref 18–48)
MAGNESIUM SERPL-MCNC: 2.4 MG/DL (ref 1.6–2.6)
MCH RBC QN AUTO: 26.1 PG (ref 27–31)
MCHC RBC AUTO-ENTMCNC: 31.5 G/DL (ref 32–36)
MCV RBC AUTO: 83 FL (ref 82–98)
MODIFIED ALLEN'S TEST: ABNORMAL
MONOCYTES # BLD AUTO: 0.7 K/UL (ref 0.3–1)
MONOCYTES NFR BLD: 17.9 % (ref 4–15)
NEUTROPHILS # BLD AUTO: 2.4 K/UL (ref 1.8–7.7)
NEUTROPHILS NFR BLD: 58.6 % (ref 38–73)
NRBC BLD-RTO: 0 /100 WBC
O2DEVICE: ABNORMAL
PCO2 BLDA: 40.4 MMHG (ref 35–45)
PEEP: 5
PH SMN: 7.34 [PH] (ref 7.34–7.45)
PLATELET # BLD AUTO: 177 K/UL (ref 150–450)
PMV BLD AUTO: 11.7 FL (ref 9.2–12.9)
PO2 BLDA: 121 MMHG (ref 80–100)
POC BASE DEFICIT: -3.7 MMOL/L
POC HCO3: 21.5 MMOL/L
POC PERFORMED BY: ABNORMAL
POC SATURATED O2: 98.9 %
POC TCO2: 46.3 ML/DL
POC TEMPERATURE: 37 C
POTASSIUM SERPL-SCNC: 3.8 MMOL/L (ref 3.5–5.1)
PRESSURE SUPPORT: 10
PROT SERPL-MCNC: 6 G/DL (ref 6–8.4)
RBC # BLD AUTO: 3.94 M/UL (ref 4.6–6.2)
SIMV: 14
SODIUM SERPL-SCNC: 134 MMOL/L (ref 136–145)
SPECIMEN SOURCE: ABNORMAL
VT: 500
WBC # BLD AUTO: 4.03 K/UL (ref 3.9–12.7)

## 2022-09-12 PROCEDURE — 20000000 HC ICU ROOM

## 2022-09-12 PROCEDURE — 27000207 HC ISOLATION

## 2022-09-12 PROCEDURE — 27000221 HC OXYGEN, UP TO 24 HOURS

## 2022-09-12 PROCEDURE — 99900035 HC TECH TIME PER 15 MIN (STAT)

## 2022-09-12 PROCEDURE — 94003 VENT MGMT INPAT SUBQ DAY: CPT

## 2022-09-12 PROCEDURE — 63600175 PHARM REV CODE 636 W HCPCS: Performed by: INTERNAL MEDICINE

## 2022-09-12 PROCEDURE — 36415 COLL VENOUS BLD VENIPUNCTURE: CPT | Performed by: INTERNAL MEDICINE

## 2022-09-12 PROCEDURE — 94761 N-INVAS EAR/PLS OXIMETRY MLT: CPT

## 2022-09-12 PROCEDURE — 36600 WITHDRAWAL OF ARTERIAL BLOOD: CPT

## 2022-09-12 PROCEDURE — 83735 ASSAY OF MAGNESIUM: CPT | Performed by: INTERNAL MEDICINE

## 2022-09-12 PROCEDURE — 82803 BLOOD GASES ANY COMBINATION: CPT

## 2022-09-12 PROCEDURE — 85025 COMPLETE CBC W/AUTO DIFF WBC: CPT | Performed by: INTERNAL MEDICINE

## 2022-09-12 PROCEDURE — 99900026 HC AIRWAY MAINTENANCE (STAT)

## 2022-09-12 PROCEDURE — 25000242 PHARM REV CODE 250 ALT 637 W/ HCPCS: Performed by: INTERNAL MEDICINE

## 2022-09-12 PROCEDURE — 25000003 PHARM REV CODE 250: Performed by: INTERNAL MEDICINE

## 2022-09-12 PROCEDURE — 94640 AIRWAY INHALATION TREATMENT: CPT

## 2022-09-12 PROCEDURE — 99900031 HC PATIENT EDUCATION (STAT)

## 2022-09-12 PROCEDURE — 80053 COMPREHEN METABOLIC PANEL: CPT | Performed by: INTERNAL MEDICINE

## 2022-09-12 RX ADMIN — PROPOFOL 15 MCG/KG/MIN: 10 INJECTION, EMULSION INTRAVENOUS at 04:09

## 2022-09-12 RX ADMIN — IPRATROPIUM BROMIDE AND ALBUTEROL SULFATE 3 ML: 2.5; .5 SOLUTION RESPIRATORY (INHALATION) at 07:09

## 2022-09-12 RX ADMIN — FAMOTIDINE 20 MG: 20 TABLET ORAL at 10:09

## 2022-09-12 RX ADMIN — PIPERACILLIN AND TAZOBACTAM 4.5 G: 4; .5 INJECTION, POWDER, LYOPHILIZED, FOR SOLUTION INTRAVENOUS; PARENTERAL at 06:09

## 2022-09-12 RX ADMIN — PROPOFOL 30 MCG/KG/MIN: 10 INJECTION, EMULSION INTRAVENOUS at 01:09

## 2022-09-12 RX ADMIN — PIPERACILLIN AND TAZOBACTAM 4.5 G: 4; .5 INJECTION, POWDER, LYOPHILIZED, FOR SOLUTION INTRAVENOUS; PARENTERAL at 01:09

## 2022-09-12 RX ADMIN — VANCOMYCIN HYDROCHLORIDE 750 MG: 750 INJECTION, POWDER, LYOPHILIZED, FOR SOLUTION INTRAVENOUS at 02:09

## 2022-09-12 RX ADMIN — ATORVASTATIN CALCIUM 40 MG: 40 TABLET, FILM COATED ORAL at 10:09

## 2022-09-12 RX ADMIN — OXCARBAZEPINE 300 MG: 300 TABLET, FILM COATED ORAL at 08:09

## 2022-09-12 RX ADMIN — OXCARBAZEPINE 300 MG: 300 TABLET, FILM COATED ORAL at 10:09

## 2022-09-12 RX ADMIN — PROPOFOL 20 MCG/KG/MIN: 10 INJECTION, EMULSION INTRAVENOUS at 09:09

## 2022-09-12 RX ADMIN — PROPOFOL 20 MCG/KG/MIN: 10 INJECTION, EMULSION INTRAVENOUS at 10:09

## 2022-09-12 RX ADMIN — PIPERACILLIN AND TAZOBACTAM 4.5 G: 4; .5 INJECTION, POWDER, LYOPHILIZED, FOR SOLUTION INTRAVENOUS; PARENTERAL at 10:09

## 2022-09-12 RX ADMIN — IPRATROPIUM BROMIDE AND ALBUTEROL SULFATE 3 ML: 2.5; .5 SOLUTION RESPIRATORY (INHALATION) at 08:09

## 2022-09-12 RX ADMIN — TAMSULOSIN HYDROCHLORIDE 0.4 MG: 0.4 CAPSULE ORAL at 10:09

## 2022-09-12 RX ADMIN — RISPERIDONE 0.5 MG: 0.5 TABLET ORAL at 10:09

## 2022-09-12 RX ADMIN — IPRATROPIUM BROMIDE AND ALBUTEROL SULFATE 3 ML: 2.5; .5 SOLUTION RESPIRATORY (INHALATION) at 12:09

## 2022-09-12 RX ADMIN — IPRATROPIUM BROMIDE AND ALBUTEROL SULFATE 3 ML: 2.5; .5 SOLUTION RESPIRATORY (INHALATION) at 01:09

## 2022-09-12 RX ADMIN — RISPERIDONE 0.5 MG: 0.5 TABLET ORAL at 08:09

## 2022-09-12 NOTE — ASSESSMENT & PLAN NOTE
Home med diuretic torsemide 20 mg PRN. +3 pitting on exam. Patient responding to IV lasix 80 mg in ED.   Continue with IV diuretics and taper with progress, patient endorses breathing a little better.   - IV lasix 40 mg BID  - restrict PO intake to 1.5 L fluid ounces  - monitor I/Os  9/6 FM:  Now hypotension, low dose IVFs  9/7 FM:  Volume improved, was dry.  9/8 FM:  Improved.  9/9 FM:  Stable.  9/12 FM:  Holding IVFs.

## 2022-09-12 NOTE — EICU
Rounding (Video Assessment):  Yes    Intervention Initiated From:  [facility] / EICU      Comments: video rounding complete,intubated/sedated, appears comfortable, siddhartha @ 43, NAD

## 2022-09-12 NOTE — PROGRESS NOTES
[facility] Medicine  Progress Note    Patient Name: Jamey Lei  MRN: [id number]  Patient Class: IP- Inpatient   Admission Date: 9/2/2022  Length of Stay: 10 days  Attending Physician: Navi Domínguez III, MD  Primary Care Provider: Navi Domínguez III, MD        Subjective:     Principal Problem:Pneumonia        HPI:  81 year old male with hypertension, hypoerlipidemia, CHF, permanent atrial fibriallation on Eliquis, CAD s/p CABGx3, CKD stage 3, recent lung and brain mass findings presents to ED with worsening shortness of breath that started about a week ago with bilateral leg swelling with difficulty ambulating with his walker.     Patient hard of hearing endorsed since treatment in the ED, he has been feeling better. At home he does not use oxygen, breathing at SpO2 >95% on continuous O2 2L via nasal cannula on exam. There is no change in appetite, nausea, vomiting, diarrhea, constipation. Denies headaches, fever, chills, vision changes, chest pain, abdominal pain. Denies joint pain and loss of sensation in toes and foot bilaterally with increasing leg swelling.    ED course:  Tachypnea rate >30, CXR right lower lobe pneumonia, positive influenza A and COVID19 (fully vaccinated), NTproBNT >6000, lactate <0.2 x2, blood cultures x2 pending,  BUN/Cr 33/1.8 with improved eGFR from last hospitalization and discharge one week ago. Patient started on IV lasix 80 mg, responding well with clear urine collecting in Sanders catheter and IV antibiotics empirically started for right lobe pneumonia.       Overview/Hospital Course:  9/3/22 CG:  Says it feels much better and does complain of a cough.  He has had to go to the bathroom and is waiting for help and assistance getting up from the bed to go to the restroom.  No other acute overnight events.  9/4/22 CG: Doing better, still has a cough and feels weak.   9/5/22 CG:  Became more agitated last night and was trying to rip out his Sanders catheter.   Had to be given Seroquel to help him sleep and to calm him down.  9/6/22 FM:  Events of the weekend are noted.  Patient was admitted with COVID and influenza and a deterioration in his condition.  Patient has a history of lung cancer and is seeing an oncologist at an outside facility.  Patient has a history of chronic kidney disease as well as obesity diabetes neuropathy atrial fibrillation and his general health has come line significantly over the last year.  The patient has been in and out of the hospital and nursing facilities.  Last night the patient had a rapid response and ended up on the ventilator.  I reviewed the events and I am unsure if this was related to Seroquel will and I also see that he was becoming hypotensive.  Patient's chest x-ray is abnormal I will discuss with the family this morning.  9/7/22 FM:  Met with family today.  They were unable to make a decision on do not resuscitate.  They will consult with other family members.  Patient's volume status is improved as he was dry yesterday.  Patient's renal numbers all reviewed and noted.  Patient is able to open eyes on the ventilator and follow simple commands.  Will continue ventilatory support today as well as tube feedings.  9/8/22 FM:  Patient had a positive stool for blood as well as a persistent drop in his hemoglobin and was transfused 2 units of packed red blood cells yesterday.  Patient is admitted with COVID pneumonia influenza pneumonia and possible sepsis.  Patient has a history of lung cancer as well as multiple other comorbidities and I have met with the family yesterday and they are unable to come to a code status or long-term status decision.  I am encouraging hospice care as the patient has multiple irreversible illnesses and a decline in his quality of life.  9/9/22 FM:  Patient is relatively unchanged today.  Yesterday the patient did well with his CPAP challenges and we will continue today.  I suspect he can be taken off the  ventilator over the weekend some time.  Labs this morning are pending he has received 4 units of blood.  9/10 ND patient tolerating CPAP trials again today attempt to new trial.  Continue to wean sedation completely off.    9/11 ND pt with nv overnight - tfs beign held.  Has had bms.   Has been toleratign cpap trials. Wean sedation   9/12 FM:  Patient opens eyes to verbal stimulation this morning.  Patient's Diprivan is off family is at bedside.  Patient's tidal volumes are 650 on a pressure support of 10.  Tube feeds have been off and we may try extubation later today.  Encouraged family to place the patient do not resuscitate and they state they are not ready for this they would like to talk to patient once he is extubated.      Past Medical History:   Diagnosis Date    A-fib     Abdominal pain, epigastric     Abdominal pain, generalized     Acute on chronic congestive heart failure 12/29/2021    Anticoagulant long-term use     Arthritis     Asteatotic eczema     Benign essential HTN     Benign prostatic hyperplasia     Brain tumor     Cardiovascular accident     Carotid artery stenosis     CHF (congestive heart failure)     CKD (chronic kidney disease), stage IV     Constipation     COPD (chronic obstructive pulmonary disease)     Depressed     DM (diabetes mellitus), secondary     Drug eruption     Eczema     Edema     Encounter for blood transfusion     Fever     H. pylori infection     Hearing loss     History of tobacco use     Hypercholesterolemia     Hypertensive renal disease, benign     Hypokalemia     Hyponatremia     Impaired fasting glucose     Lumbar pain     Lung mass     Malaise and fatigue     Malignant neoplasm of prostate     Meningioma     Mixed hyperlipidemia     Nasal bleeding     Neuropathy     Obesity, unspecified     SHAYNE (obstructive sleep apnea)     Osteoarthritis of knee     Osteoarthritis of multiple joints     Peripheral vascular disease,  unspecified     Psychosis     Renal disorder     Seizure disorder     Seizures     Sleep apnea     Unspecified cataract     Unspecified chronic bronchitis     Unspecified osteoarthritis, unspecified site     Vertigo     Vitamin D deficiency     Wheezing        Past Surgical History:   Procedure Laterality Date    APPENDECTOMY      COLONOSCOPY      CORONARY ARTERY BYPASS GRAFT      FOOT SURGERY Left     HIP REPLACEMENT ARTHROPLASTY Right     HIP SURGERY      JOINT REPLACEMENT      KNEE SURGERY      LUNG BIOPSY Right 03/03/2022    benign    SHOULDER SURGERY Left     SHOULDER SURGERY      TOTAL KNEE ARTHROPLASTY Right     triple bypass         Review of patient's allergies indicates:   Allergen Reactions    Zolpidem        No current facility-administered medications on file prior to encounter.     Current Outpatient Medications on File Prior to Encounter   Medication Sig    albuterol (PROVENTIL/VENTOLIN HFA) 90 mcg/actuation inhaler Inhale 1-2 puffs into the lungs every 6 (six) hours as needed for Wheezing or Shortness of Breath. Rescue    albuterol-ipratropium (DUO-NEB) 2.5 mg-0.5 mg/3 mL nebulizer solution inhale 3 milliliters by nebulization route 4 times per day and as needed, up to 6 doses per day for 30 days    apixaban (ELIQUIS) 5 mg Tab Take 5 mg by mouth 2 (two) times daily.    aspirin (ECOTRIN) 81 MG EC tablet Take 81 mg by mouth once daily.    atorvastatin (LIPITOR) 20 MG tablet Take 1 tablet (20 mg total) by mouth once daily.    multivitamin capsule Take 1 capsule by mouth once daily.    OXcarbazepine (TRILEPTAL) 300 MG Tab Take 1 tablet (300 mg total) by mouth 2 (two) times daily.    senna (SENOKOT) 8.6 mg tablet 2 tablets at bedtime as needed    tamsulosin (FLOMAX) 0.4 mg Cap Take 0.4 mg by mouth once daily.    torsemide (DEMADEX) 20 MG Tab Take 1 tablet (20 mg total) by mouth daily as needed (weight gain greater than 5 lbs).    vitamin D (VITAMIN D3) 1000 units Tab  Take 1,000 Units by mouth once daily.    [DISCONTINUED] calcium carbonate (OS-DAVID) 600 mg calcium (1,500 mg) Tab Take 600 mg by mouth once.    [DISCONTINUED] carvediloL (COREG) 6.25 MG tablet TAKE ONE TABLET BY MOUTH TWICE DAILY (EVERY 12 HOURS) FOR BLOOD PRESSURE. THANKS !!    [DISCONTINUED] ezetimibe (ZETIA) 10 mg tablet Take 10 mg by mouth once daily.    [DISCONTINUED] FLUoxetine 20 MG capsule     [DISCONTINUED] gabapentin (NEURONTIN) 300 MG capsule 1 capsule    [DISCONTINUED] miconazole NITRATE 2 % (MICOTIN) 2 % top powder Apply topically as needed for Itching.    [DISCONTINUED] risperiDONE (RISPERDAL) 0.5 MG Tab Take 1 tablet (0.5 mg total) by mouth nightly as needed (agitation and halluctions).     Family History       Problem Relation (Age of Onset)    Arthritis Mother, Father    Asthma Sister    Heart disease Father    Hyperlipidemia Mother, Sister    Hypertension Mother, Father, Sister, Brother          Tobacco Use    Smoking status: Every Day    Smokeless tobacco: Never    Tobacco comments:     quit 30 years ago   Substance and Sexual Activity    Alcohol use: Not Currently    Drug use: Never    Sexual activity: Not Currently     Review of Systems   Unable to perform ROS: Intubated   Objective:     Vital Signs (Most Recent):  Temp: 97.2 °F (36.2 °C) (09/12/22 0400)  Pulse: (!) 40 (09/12/22 0600)  Resp: (!) 26 (09/12/22 0600)  BP: (!) 103/51 (09/12/22 0600)  SpO2: 100 % (09/12/22 0600)   Vital Signs (24h Range):  Temp:  [97.2 °F (36.2 °C)-98.2 °F (36.8 °C)] 97.2 °F (36.2 °C)  Pulse:  [40-51] 40  Resp:  [18-40] 26  SpO2:  [99 %-100 %] 100 %  BP: ()/(27-77) 103/51     Weight: 103 kg (227 lb 1.2 oz)  Body mass index is 33.53 kg/m².    Physical Exam  Constitutional:       Appearance: He is obese. He is not toxic-appearing.      Comments: sedated   HENT:      Head: Normocephalic.      Right Ear: External ear normal.      Left Ear: External ear normal.      Nose: No congestion or rhinorrhea.       Mouth/Throat:      Pharynx: Oropharynx is clear.   Eyes:      General: No scleral icterus.     Extraocular Movements: Extraocular movements intact.      Conjunctiva/sclera: Conjunctivae normal.      Pupils: Pupils are equal, round, and reactive to light.   Cardiovascular:      Rate and Rhythm: Bradycardia present. Rhythm irregular.      Heart sounds: No murmur heard.  Pulmonary:      Breath sounds: Rhonchi and rales present.      Comments: On vent  Chest:      Chest wall: No tenderness.   Abdominal:      General: There is no distension.      Palpations: Abdomen is soft.      Tenderness: There is no abdominal tenderness. There is no right CVA tenderness, left CVA tenderness or guarding.   Genitourinary:     Comments: Sanders with yellow urine; fecal management system in place  Musculoskeletal:      Cervical back: Neck supple.   Lymphadenopathy:      Cervical: No cervical adenopathy.   Skin:     General: Skin is warm and dry.      Comments: Hemosiderin staining bilateral LE        Significant Labs: All pertinent labs within the past 24 hours have been reviewed.  A1C: No results for input(s): HGBA1C in the last 4320 hours.  ABGs:   Recent Labs   Lab 09/11/22  0501 09/12/22  0507   PH 7.370 7.345*   PCO2 37.8 40.4   HCO3 21.8 21.5   POCSATURATED 96.6 98.9   PO2 84.2 121*       Blood Culture: No results for input(s): LABBLOO in the last 48 hours.    BMP:   Recent Labs   Lab 09/12/22 0420   GLU 97   *   K 3.8      CO2 23   BUN 35*   CREATININE 2.0*   CALCIUM 8.8   MG 2.4       CBC:   Recent Labs   Lab 09/11/22  0422 09/12/22 0420   WBC 4.30 4.03   HGB 10.3* 10.3*   HCT 32.6* 32.7*    177       CMP:   Recent Labs   Lab 09/11/22 0422 09/12/22 0420   * 134*   K 3.9 3.8    105   CO2 23 23    97   BUN 34* 35*   CREATININE 1.9* 2.0*   CALCIUM 8.9 8.8   PROT 5.8* 6.0   ALBUMIN 1.9* 2.0*   BILITOT 0.4 0.4   ALKPHOS 55 49*   AST 22 20   ALT 24 20   ANIONGAP 5* 6*       No results for  input(s): LACTATE in the last 48 hours.    No results for input(s): COLORU, APPEARANCEUA, PHUR, SPECGRAV, PROTEINUA, GLUCUA, KETONESU, BILIRUBINUA, OCCULTUA, NITRITE, UROBILINOGEN, LEUKOCYTESUR, RBCUA, WBCUA, BACTERIA, SQUAMEPITHEL, HYALINECASTS in the last 48 hours.    Invalid input(s): WRIGHTSUR    X-Ray Chest 1 View  Narrative: EXAMINATION:  XR CHEST 1 VIEW    CLINICAL HISTORY:  fu pneumo; intubated;    COMPARISON:  09/09/2022    FINDINGS:  Endotracheal tube and NG tube remain.  Suspect bilateral pleural effusions with adjacent atelectasis and or infiltrates, similar.  No new finding/detrimental interval change detected  Impression: No significant change since 09/09/2022    Electronically signed by: Hodan Coy MD  Date:    09/11/2022  Time:    10:07  X-Ray Abdomen AP 1 View  Narrative: EXAMINATION:  XR ABDOMEN AP 1 VIEW    CLINICAL HISTORY:  n/v;    FINDINGS:  Tip of NG tube overlies upper abdomen.  Catheter overlies pelvis.  Nonspecific bowel gas pattern with gas-filled portions of colon.  Impression: Nonspecific bowel gas pattern.  No significant change since 09/08/2022    Electronically signed by: Hodna Coy MD  Date:    09/11/2022  Time:    09:31   Significant Imaging: I have reviewed all pertinent imaging results/findings within the past 24 hours.      Assessment/Plan:      * Pneumonia  CXR findings with new right lower lobe pneumonia. Bilateral infiltrates likely secondary to pulmonary edema from infectious.  - supplement oxygen as needed SpO2 >92-94%  - daily pulmonary hygiene  - continue coverage for community acquired PNA, azithrmoycin and rocephin  - f/u blood cultures x2 pending  - trend CBC  9/6 FM:  Now in ICU, expand coverage, speak with family.  9/7 FM:  Rec DNR, hospice care, family to discuss.  9/8 FM:  Encouraging hospice care.  9/9 FM:  Multifactorial  9/10 Cont abxs, nebs and cont to wean from vent  9/11 cont abxs, nebs, cpap trials - wean sedation - change to simv settings  9/12 FM:   May try extubation today.        N&V (nausea and vomiting)  Hold tfs currently - check xray  Planning extubation today.      Malignant neoplasm of lung  Poor prognosis.  Encouraging hospice care.      Tachypnea  improving      COVID-19  Patient is identified as mild moderate severity.   Initiate standard COVID protocols; COVID-19 testing ,Infection Control notification  and isolation- respiratory, contact and droplet per protocol    Diagnostics: (leukopenia, hyponatremia, hyperferritinemia, elevated troponin, elevated d-dimer, age, and comorbidities are significant predictors of poor clinical outcome)  CBC, CMP and Portable CXR    Management: Initiate targeted therapy with possible use of Paxlovid. Maintain oxygen saturations 92-96% via Nasal Cannula  LPM and monitor with continuous/intermittent pulse oximetry. Inhaled bronchodilators as needed for shortness of breath., Continuous cardiac monitoring. and Manage respiratory failure (O2 requirement >10LPM or needing NIPPV/Mechanical ventilation) and/or Pneumonia (active chest infiltrates) separately as described below.  Empirically started on IV antibiotics for right lower lobe pneumonia on chest xray.   Follow up blood cultures and adjust antibiotics accordingly.     Influenza  Positive flu A. Supplemental oxygen as needed to maintain >90%.  Has completed tamiflu.      Acute respiratory failure with hypoxia and hypercarbia  Multifactorial, cardiopulmonary (CHF, atrial fibrillation, COPD) and infectious processes (COVID19 and fluA).  - see above management.        CHF (congestive heart failure)  Seems euvolemic      Stage 3 chronic kidney disease  Stable BUN/Cr 33/1.8 likely at new baseline with recovering eGFR of >40 from <20 5 weeks ago. Continue to monitor as patient on IV diuretics.       Seizure disorder  On meds - no active szs at this time      A-fib  Patient with Permanent atrial fibrillation which is controlled currently with No rate controlling home  medications at this time. . Patient is currently in atrial fibrillation.[id number] Score: 4. HASBLED Score: Unable to calculate. Anticoagulation indicated. Anticoagulation done with eliquis 5 mg BID.    Eliquis on hold, cards following.    COPD (chronic obstructive pulmonary disease)  Cont. Neb txt.      Localized edema  Home med diuretic torsemide 20 mg PRN. +3 pitting on exam. Patient responding to IV lasix 80 mg in ED.   Continue with IV diuretics and taper with progress, patient endorses breathing a little better.   - IV lasix 40 mg BID  - restrict PO intake to 1.5 L fluid ounces  - monitor I/Os  9/6 FM:  Now hypotension, low dose IVFs  9/7 FM:  Volume improved, was dry.  9/8 FM:  Improved.  9/9 FM:  Stable.  9/12 FM:  Holding IVFs.    Weakness  Pt/ot once extubated        VTE Risk Mitigation (From admission, onward)         Ordered     IP VTE HIGH RISK PATIENT  Once         09/02/22 1513     Place sequential compression device  Until discontinued         09/02/22 1513                Discharge Planning   GLORY:      Code Status: Full Code   Is the patient medically ready for discharge?:     Reason for patient still in hospital (select all that apply): Patient trending condition  Discharge Plan A: Home with family, Home Health          Navi Domínguez III, MD  Department of Hospital Medicine   [facility]

## 2022-09-12 NOTE — PROGRESS NOTES
[facility] - Intensive Care  Adult Nutrition  Progress Note    SUMMARY       Recommendations  1. Rec'd EN: Suplenna at rate of of 10mL/hr increasing by 10mL every 4-6 hrs to goal rate of 50mL/hr to provide 2160 kcals (95% EEN), 54g of protein (43% EPN), and 206mL water with propofol infusing@ 6.2mL/hr providing an additional 164 kcals.    2. Rec'd Lino BID via NG tube to promote wound healing and provide 190 kcals and 5g of protein.   -Do not mix Lino with formula in a tube-feeding bag.   -Pour one packet of Lino in a clean, small (approximately 6- to 8-fl-oz) container for mixing.   -Add 4 fl oz (120 mL) water at room temperature.   -Mix well with disposable spoon or tongue blade until all particles are completely hydrated.   -Flush feeding tube with 30mL water. -Administer Lino through feeding tube using a 60-mL or larger syringe. -Flush with an additional 30 mL water.   3. RD to follow and make rec's accordingly  Goals:   1. Pt will reach TF goal rate within 48 hrs of inititation.   2. Pt will be extubated by next RD follow up.  Nutrition Goal Status: progressing towards goal, new  Communication of RD Recs: other (comment) (Documented in POC)    Assessment and Plan    Nutrition Problem  Inadequate oral intake     Related to (etiology):   NPO/intubation status     Signs and Symptoms (as evidenced by):   0% PO intake     Interventions/Recommendations (treatment strategy):     Recommendations  1. Rec'd EN: Suplenna at rate of of 10mL/hr increasing by 10mL every 4-6 hrs to goal rate of 50mL/hr to provide 2160 kcals (95% EEN), 54g of protein (43% EPN), and 206mL water with propofol infusing@ 6.2mL/hr providing an additional 164 kcals.    2. Rec'd Lino BID via NG tube to promote wound healing and provide 190 kcals and 5g of protein.   -Do not mix Lino with formula in a tube-feeding bag.   -Pour one packet of Lino in a clean, small (approximately 6- to 8-fl-oz) container for mixing.   -Add 4 fl oz (120 mL) water at  room temperature.   -Mix well with disposable spoon or tongue blade until all particles are completely hydrated.   -Flush feeding tube with 30 mL water. -Administer Lino through feeding tube using a 60-mL or larger syringe. -Flush with an additional 30 mL water.   3. RD to follow and make rec's accordingly     Nutrition Diagnosis Status:   Continues     Reason for Assessment    Reason For Assessment: RD follow-up  Diagnosis: other (see comments) (Pneumonia)  Relevant Medical History: A-fib,Abdominal pain, epigastric,bdominal pain,  Acute on chronic congestive heart failure,nticoagulant long-term use,rthritis  ,Asteatotic eczema ,enign essential HTN    , Benign prostatic hyperplasia  ,Brain tumor   ,Cardiovascular accident  ,  Carotid artery stenosis  ,  CHF (congestive heart failure)   ,CKD , stage IV , Constipation ,COPD ,Depressed ,DM ), secondary    ,dug eruption ,Eczema  ,lashanda     Encounter for blood transfusion     Fever     H. pylori infection     Hearing loss     History of tobacco use     Hypercholesterolemia     Hypertensive renal disease, benign     Hypokalemia     Hyponatremia     Impaired fasting glucose     Lumbar pain     Lung mass     Malaise and fatigue     Malignant neoplasm of prostate     Meningioma     Mixed hyperlipidemia     Nasal bleeding     Neuropathy     Obesity, unspecified     SHAYNE (obstructive sleep apnea)     Osteoarthritis of knee     Osteoarthritis of multiple joints     Peripheral vascular disease, unspecified     Psychosis     Renal disorder     Seizure disorder     Seizures     Sleep apnea     Unspecified cataract     Unspecified chronic bronchitis     Unspecified osteoarthritis, unspecified site     Vertigo     Vitamin D deficiency     Wheezing  Interdisciplinary Rounds: did not attend  General Information Comments: Followed up on pt today. Noted as per chart TF is on hold dute to possible extubation later today. RD to follow and make rec's accordingly.  Nutrition Discharge  "Planning: TBD as care progresses    Nutrition Risk Screen    Nutrition Risk Screen: tube feeding or parenteral nutrition    Nutrition/Diet History    Spiritual, Cultural Beliefs, Scientologist Practices, Values that Affect Care: no  Food Allergies: NKFA    Anthropometrics    Temp: 97.2 °F (36.2 °C)  Height Method: Stated  Height: 5' 9" (175.3 cm)  Height (inches): 69 in  Weight Method: Bed Scale  Weight: 103 kg (227 lb 1.2 oz)  Weight (lb): 227.08 lb  Ideal Body Weight (IBW), Male: 160 lb  % Ideal Body Weight, Male (lb): 141.93 %  BMI (Calculated): 33.5  BMI Grade: 30 - 34.9- obesity - grade I    Lab/Procedures/Meds    Pertinent Labs Reviewed: reviewed  Pertinent Medications Reviewed: reviewed    Estimated/Assessed Needs    Weight Used For Calorie Calculations: 103 kg (227 lb 1.2 oz)  Energy Calorie Requirements (kcal): 2432  Energy Need Method: [facility]  Protein Requirements: 124-155 (1.2-1.5g/kg)  Weight Used For Protein Calculations: 103 kg (227 lb 1.2 oz)  Fluid Requirements (mL): 2432 (1mL/kcal)  Estimated Fluid Requirement Method: RDA Method  RDA Method (mL): 2432    Nutrition Prescription Ordered    Current Diet Order: NPO  Current Nutrition Support Formula Ordered: Suplena  Current Nutrition Support Rate Ordered: 40 (ml)  Current Nutrition Support Frequency Ordered: on hold  Oral Nutrition Supplement: Lino (via tube)    Evaluation of Received Nutrient/Fluid Intake    Enteral Calories (kcal): 1728  Enteral Protein (gm): 43  Enteral (Free Water) Fluid (mL): 165  Free Water Flush Fluid (mL): 12 (mL/hr)  Other Calories (kcal): 190 (Lino)  Total Calories (kcal): 1918  % Kcal Needs: 0  Other Protein (gm): 5 (Lino)  Total Protein (gm): 48  % Protein Needs: 0  I/O: -221  Energy Calories Required: not meeting needs  Protein Required: not meeting needs  Tolerance: other (see comments) (TF on hold)  % Intake of Estimated Energy Needs: 0 - 25 %  % Meal Intake: NPO    Nutrition Risk    Level of Risk/Frequency of " Follow-up: moderate     Monitor and Evaluation    Food and Nutrient Intake: enteral nutrition intake  Food and Nutrient Adminstration: enteral and parenteral nutrition administration  Knowledge/Beliefs/Attitudes: beliefs and attitudes, food and nutrition knowledge/skill  Physical Activity and Function: nutrition-related ADLs and IADLs  Anthropometric Measurements: height/length, weight, weight change, body mass index  Biochemical Data, Medical Tests and Procedures: electrolyte and renal panel, gastrointestinal profile, glucose/endocrine profile, inflammatory profile, lipid profile  Nutrition-Focused Physical Findings: overall appearance     Nutrition Follow-Up    RD Follow-up?: Yes

## 2022-09-12 NOTE — PROGRESS NOTES
09/12/22 1359        Altered Skin Integrity Left Buttocks   No Date First Assessed or Time First Assessed found.   Side: Left  Location: Buttocks   Wound Image    Description of Altered Skin Integrity Partial thickness tissue loss. Shallow open ulcer with a red or pink wound bed, without slough. Intact or Open/Ruptured Serum-filled blister.   Dressing Appearance Clean;Intact   Drainage Amount None   Appearance Red;Pink   Tissue loss description Partial thickness   Periwound Area Pink;Dry;Intact   Wound Edges Rolled/closed   Wound Length (cm) 4.5 cm   Wound Width (cm) 2 cm   Wound Surface Area (cm^2) 9 cm^2   Care Cleansed with:;Sterile normal saline   Dressing Applied;Silicone   Periwound Care Cleansed with pH balanced cleanser   Dressing Change Due 09/13/22

## 2022-09-12 NOTE — PLAN OF CARE
Recommendations  1. Rec'd EN: Suplenna at rate of of 10mL/hr increasing by 10mL every 4-6 hrs to goal rate of 50mL/hr to provide 2160 kcals (95% EEN), 54g of protein (43% EPN), and 206mL water with propofol infusing@ 6.2mL/hr providing an additional 164 kcals.    2. Rec'd Lino BID via NG tube to promote wound healing and provide 190 kcals and 5g of protein.   -Do not mix Lino with formula in a tube-feeding bag.   -Pour one packet of Lino in a clean, small (approximately 6- to 8-fl-oz) container for mixing.   -Add 4 fl oz (120 mL) water at room temperature.   -Mix well with disposable spoon or tongue blade until all particles are completely hydrated.   -Flush feeding tube with 30mL water. -Administer Lino through feeding tube using a 60-mL or larger syringe. -Flush with an additional 30 mL water.   3. RD to follow and make rec's accordingly  Goals:   1. Pt will reach TF goal rate within 48 hrs of inititation.   2. Pt will be extubated by next RD follow up.  Nutrition Goal Status: progressing towards goal, new

## 2022-09-12 NOTE — RESPIRATORY THERAPY
09/12/22 1340   PRE-TX-O2   O2 Device (Oxygen Therapy) ventilator   $ Is the patient on Low Flow Oxygen? Yes   Flow (L/min) 60   Oxygen Concentration (%) (S)  35   Oxygen Analyzed Concentration (%) 35 %   SpO2 100 %   Vent Select   Charged w/in last 24h YES   Preset Conventional Ventilator Settings   Vent ID [id number]   Vent Type    Ventilation Type VC   Vent Mode (S)  SIMV   Humidity HME   Set Rate (S)  14 BPM   Vt Set (S)  500 mL   PEEP/CPAP (S)  5 cmH20   Pressure Support (S)  10 cmH20   Waveform RAMP   Peak Flow 60 L/min   Plateau Set/Insp. Hold (sec) 0   Insp Rise Time  70 %   I-Trigger Type  V-TRIG   Trigger Sensitivity Flow/I-Trigger 3 L/min   Ready to Wean/Extubation Screen   FIO2<=50 (chart decimal) 0.35   PEEP <=8 (chart #) 5   Respiratory Evaluation   $ Care Plan Tech Time 15 min       Patient placed back on previous SIMV vent settings post CPAP trial per Dr. Domínguez's request. Cindy ARIAS aware

## 2022-09-12 NOTE — EICU
Rounding (Video Assessment):  Yes      Comments:   Video rounds completed. Pt lying in bed vent intact.diprivan gtt infusing. Hr 45 bp 98/49 o2 sats 100% per monitor. No distress noted @ this time

## 2022-09-12 NOTE — ASSESSMENT & PLAN NOTE
Patient with Permanent atrial fibrillation which is controlled currently with No rate controlling home medications at this time. . Patient is currently in atrial fibrillation.[id number] Score: 4. HASBLED Score: Unable to calculate. Anticoagulation indicated. Anticoagulation done with eliquis 5 mg BID.    Eliquis on hold, cards following.

## 2022-09-12 NOTE — ASSESSMENT & PLAN NOTE
CXR findings with new right lower lobe pneumonia. Bilateral infiltrates likely secondary to pulmonary edema from infectious.  - supplement oxygen as needed SpO2 >92-94%  - daily pulmonary hygiene  - continue coverage for community acquired PNA, azithrmoycin and rocephin  - f/u blood cultures x2 pending  - trend CBC  9/6 FM:  Now in ICU, expand coverage, speak with family.  9/7 FM:  Rec DNR, hospice care, family to discuss.  9/8 FM:  Encouraging hospice care.  9/9 FM:  Multifactorial  9/10 Cont abxs, nebs and cont to wean from vent  9/11 cont abxs, nebs, cpap trials - wean sedation - change to simv settings  9/12 FM:  May try extubation today.

## 2022-09-12 NOTE — RESPIRATORY THERAPY
09/12/22 0848   Patient Assessment/Suction   Level of Consciousness (AVPU) responds to voice   Respiratory Effort Unlabored   Expansion/Accessory Muscles/Retractions expansion symmetric;no retractions   All Lung Fields Breath Sounds diminished   Rhythm/Pattern, Respiratory assisted mechanically   Cough Frequency with stimulation   Cough Type assisted   Suction Method oral;tracheal   Suction Pressure (mmHg) -120 mmHg   $ Suction Charges Inline Suction Procedure Stat Charge   Secretions Amount moderate   Secretions Color white   Secretions Characteristics thick   $ Swab or suction? Suction   Aspirate Toleration BRADLY (no adverse reactions)   Sent to the lab? No   Is this patient in SNF or Inpatient Rehab? No   Skin Integrity   Area Observed Left;Right;Cheek;Upper lip;Lower lip;Corner lip   Skin Appearance without discoloration   PRE-TX-O2   O2 Device (Oxygen Therapy) ventilator   $ Is the patient on Low Flow Oxygen? Yes   Flow (L/min) 60   Oxygen Concentration (%) 35   Oxygen Analyzed Concentration (%) 35 %   SpO2 100 %   Pulse Oximetry Type Continuous   $ Pulse Oximetry - Multiple Charge Pulse Oximetry - Multiple   Pulse (!) 51   Resp (!) 24   BP (!) 170/79   Positioning HOB elevated 30 degrees   Positioning   Head of Bed (HOB) Positioning HOB at 30 degrees   Aerosol Therapy   $ Aerosol Therapy Charges Aerosol Treatment  (Nebulizer treatment and ventilator check late due to priorities in ER. Cindy ARIAS notified)   Daily Review of Necessity (SVN) completed   Respiratory Treatment Status (SVN) given   Treatment Route (SVN) in-line;ventilator   Patient Position (SVN) HOB elevated   Post Treatment Assessment (SVN) breath sounds unchanged   Signs of Intolerance (SVN) none   Breath Sounds Post-Respiratory Treatment   Throughout All Fields Post-Treatment All Fields   Throughout All Fields Post-Treatment no change   Post-treatment Heart Rate (beats/min) 48   Post-treatment Resp Rate (breaths/min) 23        Airway -  Non-Surgical 09/06/22 0445 Endotracheal Tube   Placement Date/Time: 09/06/22 0445   Method of Intubation: Rapid Sequence Induction  Inserted by: MD  Staff/Resident Name(s): Joey GOMEZ MD  Airway Device: Endotracheal Tube  Airway Device Size: 8.0  Style: Cuffed  Cuff Inflated With: Air  Placement ...   Secured at 23 cm   Measured At Lips   Secured Location (S)  Right  (ETT moved to right side, Cindy RAIAS notified)   Secured by Commercial tube kendrick   Bite Block none   Site Condition Dry   Status Intact;Secured;Patent   Site Assessment Clean;Dry   Cuff Volume   (MLT)   Cuff Pressure 30 cm H2O   General Safety Checklist   Safety Promotion/Fall Prevention side rails raised   Airway Safety   Ambu bag with the patient? Yes, Adult Ambu   Is mask with the patient? Yes, Adult Mask   Suction set is at the bedside? Yes   Respiratory Interventions   Cough And Deep Breathing unable to perform   Airway/Ventilation Management airway patency maintained   Vent Select   Conventional Vent Y   Humidification Changed? No  (not required at this time)   $ Ventilator Subsequent 1   Charged w/in last 24h YES   Preset Conventional Ventilator Settings   Vent ID 3   Vent Type    Ventilation Type VC   Vent Mode (S)  Spont   Humidity HME   PEEP/CPAP (S)  5 cmH20   Pressure Support (S)  10 cmH20   Waveform RAMP   Insp Rise Time  70 %   I-Trigger Type  V-TRIG   Trigger Sensitivity Flow/I-Trigger 3 L/min   Patient Ventilator Parameters   Resp Rate Total 24 br/min   All Fields Breath Sounds diminished   Peak Airway Pressure 16 cmH2O   Mean Airway Pressure 8.8 cmH20   Plateau Pressure 15 cmH20   Exhaled Vt 477 mL   Total Ve 10.9 mL   Spont Ve 10.9 L   I:E Ratio Measured 1:1.80   Auto PEEP 0 cmH20   Tubing ID (mm) 8 mm   Tube Type ET   Inspired Tidal Volume (VTI) 502 mL   Conventional Ventilator Alarms   Alarms On Y  (Ventilator alarms on and functioning. Vent alarm cable plugged into call bell system and working properly. Cindy ARIAS aware)    Ve High Alarm 24 L/min   Ve Low Alarm 4 L/min   Resp Rate High Alarm 40 br/min   Press High Alarm 45 cmH2O   Apnea Rate 16   Apnea Volume (mL) 500 mL   Apnea Oxygen Concentration  100   Apnea Flow Rate (L/min) 60   T Apnea 20 sec(s)   IHI Ventilator Associated Pneumonia Bundle (Required)   Head of Bed Elevated  HOB 30   Oral Care CHG;Lip moisturizer applied;Mouth swabbed;Mouth moisturizer;Mouth suctioned   Vent Circut Breaks Minimized Yes   Ready to Wean/Extubation Screen   FIO2<=50 (chart decimal) 0.35   MV<16L (chart vol.) 10.9   PEEP <=8 (chart #) 5   Ready to Wean Parameters   F/VT Ratio<105 (RSBI) (!) 50.31   Negative Inspiratory Force (cm H2O) 0   Vital Capacity (mL) 0   Respiratory Evaluation   $ Care Plan Tech Time 15 min       Patient placed on CPAP trial at this time per Dr. Domínguez's orders. Cindy ARIAS notified

## 2022-09-12 NOTE — SUBJECTIVE & OBJECTIVE
Past Medical History:   Diagnosis Date    A-fib     Abdominal pain, epigastric     Abdominal pain, generalized     Acute on chronic congestive heart failure 12/29/2021    Anticoagulant long-term use     Arthritis     Asteatotic eczema     Benign essential HTN     Benign prostatic hyperplasia     Brain tumor     Cardiovascular accident     Carotid artery stenosis     CHF (congestive heart failure)     CKD (chronic kidney disease), stage IV     Constipation     COPD (chronic obstructive pulmonary disease)     Depressed     DM (diabetes mellitus), secondary     Drug eruption     Eczema     Edema     Encounter for blood transfusion     Fever     H. pylori infection     Hearing loss     History of tobacco use     Hypercholesterolemia     Hypertensive renal disease, benign     Hypokalemia     Hyponatremia     Impaired fasting glucose     Lumbar pain     Lung mass     Malaise and fatigue     Malignant neoplasm of prostate     Meningioma     Mixed hyperlipidemia     Nasal bleeding     Neuropathy     Obesity, unspecified     SHAYNE (obstructive sleep apnea)     Osteoarthritis of knee     Osteoarthritis of multiple joints     Peripheral vascular disease, unspecified     Psychosis     Renal disorder     Seizure disorder     Seizures     Sleep apnea     Unspecified cataract     Unspecified chronic bronchitis     Unspecified osteoarthritis, unspecified site     Vertigo     Vitamin D deficiency     Wheezing        Past Surgical History:   Procedure Laterality Date    APPENDECTOMY      COLONOSCOPY      CORONARY ARTERY BYPASS GRAFT      FOOT SURGERY Left     HIP REPLACEMENT ARTHROPLASTY Right     HIP SURGERY      JOINT REPLACEMENT      KNEE SURGERY      LUNG BIOPSY Right 03/03/2022    benign    SHOULDER SURGERY Left     SHOULDER SURGERY      TOTAL KNEE ARTHROPLASTY Right     triple bypass         Review of patient's allergies indicates:   Allergen Reactions    Zolpidem        No current facility-administered medications on file prior  to encounter.     Current Outpatient Medications on File Prior to Encounter   Medication Sig    albuterol (PROVENTIL/VENTOLIN HFA) 90 mcg/actuation inhaler Inhale 1-2 puffs into the lungs every 6 (six) hours as needed for Wheezing or Shortness of Breath. Rescue    albuterol-ipratropium (DUO-NEB) 2.5 mg-0.5 mg/3 mL nebulizer solution inhale 3 milliliters by nebulization route 4 times per day and as needed, up to 6 doses per day for 30 days    apixaban (ELIQUIS) 5 mg Tab Take 5 mg by mouth 2 (two) times daily.    aspirin (ECOTRIN) 81 MG EC tablet Take 81 mg by mouth once daily.    atorvastatin (LIPITOR) 20 MG tablet Take 1 tablet (20 mg total) by mouth once daily.    multivitamin capsule Take 1 capsule by mouth once daily.    OXcarbazepine (TRILEPTAL) 300 MG Tab Take 1 tablet (300 mg total) by mouth 2 (two) times daily.    senna (SENOKOT) 8.6 mg tablet 2 tablets at bedtime as needed    tamsulosin (FLOMAX) 0.4 mg Cap Take 0.4 mg by mouth once daily.    torsemide (DEMADEX) 20 MG Tab Take 1 tablet (20 mg total) by mouth daily as needed (weight gain greater than 5 lbs).    vitamin D (VITAMIN D3) 1000 units Tab Take 1,000 Units by mouth once daily.    [DISCONTINUED] calcium carbonate (OS-DAVID) 600 mg calcium (1,500 mg) Tab Take 600 mg by mouth once.    [DISCONTINUED] carvediloL (COREG) 6.25 MG tablet TAKE ONE TABLET BY MOUTH TWICE DAILY (EVERY 12 HOURS) FOR BLOOD PRESSURE. THANKS !!    [DISCONTINUED] ezetimibe (ZETIA) 10 mg tablet Take 10 mg by mouth once daily.    [DISCONTINUED] FLUoxetine 20 MG capsule     [DISCONTINUED] gabapentin (NEURONTIN) 300 MG capsule 1 capsule    [DISCONTINUED] miconazole NITRATE 2 % (MICOTIN) 2 % top powder Apply topically as needed for Itching.    [DISCONTINUED] risperiDONE (RISPERDAL) 0.5 MG Tab Take 1 tablet (0.5 mg total) by mouth nightly as needed (agitation and halluctions).     Family History       Problem Relation (Age of Onset)    Arthritis Mother, Father    Asthma Sister    Heart  disease Father    Hyperlipidemia Mother, Sister    Hypertension Mother, Father, Sister, Brother          Tobacco Use    Smoking status: Every Day    Smokeless tobacco: Never    Tobacco comments:     quit 30 years ago   Substance and Sexual Activity    Alcohol use: Not Currently    Drug use: Never    Sexual activity: Not Currently     Review of Systems   Unable to perform ROS: Intubated   Objective:     Vital Signs (Most Recent):  Temp: 97.2 °F (36.2 °C) (09/12/22 0400)  Pulse: (!) 40 (09/12/22 0600)  Resp: (!) 26 (09/12/22 0600)  BP: (!) 103/51 (09/12/22 0600)  SpO2: 100 % (09/12/22 0600)   Vital Signs (24h Range):  Temp:  [97.2 °F (36.2 °C)-98.2 °F (36.8 °C)] 97.2 °F (36.2 °C)  Pulse:  [40-51] 40  Resp:  [18-40] 26  SpO2:  [99 %-100 %] 100 %  BP: ()/(27-77) 103/51     Weight: 103 kg (227 lb 1.2 oz)  Body mass index is 33.53 kg/m².    Physical Exam  Constitutional:       Appearance: He is obese. He is not toxic-appearing.      Comments: sedated   HENT:      Head: Normocephalic.      Right Ear: External ear normal.      Left Ear: External ear normal.      Nose: No congestion or rhinorrhea.      Mouth/Throat:      Pharynx: Oropharynx is clear.   Eyes:      General: No scleral icterus.     Extraocular Movements: Extraocular movements intact.      Conjunctiva/sclera: Conjunctivae normal.      Pupils: Pupils are equal, round, and reactive to light.   Cardiovascular:      Rate and Rhythm: Bradycardia present. Rhythm irregular.      Heart sounds: No murmur heard.  Pulmonary:      Breath sounds: Rhonchi and rales present.      Comments: On vent  Chest:      Chest wall: No tenderness.   Abdominal:      General: There is no distension.      Palpations: Abdomen is soft.      Tenderness: There is no abdominal tenderness. There is no right CVA tenderness, left CVA tenderness or guarding.   Genitourinary:     Comments: Sanders with yellow urine; fecal management system in place  Musculoskeletal:      Cervical back: Neck  supple.   Lymphadenopathy:      Cervical: No cervical adenopathy.   Skin:     General: Skin is warm and dry.      Comments: Hemosiderin staining bilateral LE        Significant Labs: All pertinent labs within the past 24 hours have been reviewed.  A1C: No results for input(s): HGBA1C in the last 4320 hours.  ABGs:   Recent Labs   Lab 09/11/22  0501 09/12/22  0507   PH 7.370 7.345*   PCO2 37.8 40.4   HCO3 21.8 21.5   POCSATURATED 96.6 98.9   PO2 84.2 121*       Blood Culture: No results for input(s): LABBLOO in the last 48 hours.    BMP:   Recent Labs   Lab 09/12/22  0420   GLU 97   *   K 3.8      CO2 23   BUN 35*   CREATININE 2.0*   CALCIUM 8.8   MG 2.4       CBC:   Recent Labs   Lab 09/11/22  0422 09/12/22  0420   WBC 4.30 4.03   HGB 10.3* 10.3*   HCT 32.6* 32.7*    177       CMP:   Recent Labs   Lab 09/11/22  0422 09/12/22  0420   * 134*   K 3.9 3.8    105   CO2 23 23    97   BUN 34* 35*   CREATININE 1.9* 2.0*   CALCIUM 8.9 8.8   PROT 5.8* 6.0   ALBUMIN 1.9* 2.0*   BILITOT 0.4 0.4   ALKPHOS 55 49*   AST 22 20   ALT 24 20   ANIONGAP 5* 6*       No results for input(s): LACTATE in the last 48 hours.    No results for input(s): COLORU, APPEARANCEUA, PHUR, SPECGRAV, PROTEINUA, GLUCUA, KETONESU, BILIRUBINUA, OCCULTUA, NITRITE, UROBILINOGEN, LEUKOCYTESUR, RBCUA, WBCUA, BACTERIA, SQUAMEPITHEL, HYALINECASTS in the last 48 hours.    Invalid input(s): WRIGHTSUR    X-Ray Chest 1 View  Narrative: EXAMINATION:  XR CHEST 1 VIEW    CLINICAL HISTORY:  fu pneumo; intubated;    COMPARISON:  09/09/2022    FINDINGS:  Endotracheal tube and NG tube remain.  Suspect bilateral pleural effusions with adjacent atelectasis and or infiltrates, similar.  No new finding/detrimental interval change detected  Impression: No significant change since 09/09/2022    Electronically signed by: Hodan Coy MD  Date:    09/11/2022  Time:    10:07  X-Ray Abdomen AP 1 View  Narrative: EXAMINATION:  XR ABDOMEN AP 1  VIEW    CLINICAL HISTORY:  n/v;    FINDINGS:  Tip of NG tube overlies upper abdomen.  Catheter overlies pelvis.  Nonspecific bowel gas pattern with gas-filled portions of colon.  Impression: Nonspecific bowel gas pattern.  No significant change since 09/08/2022    Electronically signed by: Hodan Coy MD  Date:    09/11/2022  Time:    09:31   Significant Imaging: I have reviewed all pertinent imaging results/findings within the past 24 hours.

## 2022-09-12 NOTE — PLAN OF CARE
No issues overnight. Remains sedated on vent, arouses easily. VSS, afebrile. Tube feedings on hold and NGT to LIWS with 400ml dark brown return this shift. UOP 400ml, no BM noted

## 2022-09-12 NOTE — PROGRESS NOTES
09/12/22 1402        Altered Skin Integrity Right Buttocks Partial thickness tissue loss. Shallow open ulcer with a red or pink wound bed, without slough. Intact or Open/Ruptured Serum-filled blister.   No Date First Assessed or Time First Assessed found.   Altered Skin Integrity Present on Admission: suspected hospital acquired  Side: Right  Location: Buttocks  Description of Altered Skin Integrity: Partial thickness tissue loss. Shallow open ulcer ...   Description of Altered Skin Integrity Partial thickness tissue loss. Shallow open ulcer with a red or pink wound bed, without slough. Intact or Open/Ruptured Serum-filled blister.   Dressing Appearance Intact;Clean   Drainage Amount None   Appearance Pink;Red   Tissue loss description Partial thickness   Periwound Area Intact;Dry;Pink   Wound Edges Rolled/closed   Wound Length (cm) 2 cm   Wound Width (cm) 2 cm   Wound Surface Area (cm^2) 4 cm^2   Care Cleansed with:;Sterile normal saline   Dressing Applied;Silicone   Periwound Care Cleansed with pH balanced cleanser   Dressing Change Due 09/13/22

## 2022-09-13 LAB
ALBUMIN SERPL BCP-MCNC: 2 G/DL (ref 3.5–5.2)
ALP SERPL-CCNC: 49 U/L (ref 55–135)
ALT SERPL W/O P-5'-P-CCNC: 18 U/L (ref 10–44)
ANION GAP SERPL CALC-SCNC: 7 MMOL/L (ref 8–16)
AST SERPL-CCNC: 20 U/L (ref 10–40)
BASOPHILS # BLD AUTO: 0.03 K/UL (ref 0–0.2)
BASOPHILS NFR BLD: 0.6 % (ref 0–1.9)
BILIRUB SERPL-MCNC: 0.6 MG/DL (ref 0.1–1)
BUN SERPL-MCNC: 34 MG/DL (ref 8–23)
CALCIUM SERPL-MCNC: 8.9 MG/DL (ref 8.7–10.5)
CHLORIDE SERPL-SCNC: 106 MMOL/L (ref 95–110)
CO2 SERPL-SCNC: 23 MMOL/L (ref 23–29)
CORRECTED TEMPERATURE (PCO2): 41.7 MMHG
CORRECTED TEMPERATURE (PH): 7.35
CORRECTED TEMPERATURE (PO2): 109 MMHG
CREAT SERPL-MCNC: 1.7 MG/DL (ref 0.5–1.4)
DIFFERENTIAL METHOD: ABNORMAL
EOSINOPHIL # BLD AUTO: 0.1 K/UL (ref 0–0.5)
EOSINOPHIL NFR BLD: 2.8 % (ref 0–8)
ERYTHROCYTE [DISTWIDTH] IN BLOOD BY AUTOMATED COUNT: 17.1 % (ref 11.5–14.5)
EST. GFR  (NO RACE VARIABLE): 40.5 ML/MIN/1.73 M^2
FIO2: 35 %
GLUCOSE SERPL-MCNC: 84 MG/DL (ref 70–110)
HCT VFR BLD AUTO: 34.2 % (ref 40–54)
HGB BLD-MCNC: 10.6 G/DL (ref 14–18)
IMM GRANULOCYTES # BLD AUTO: 0.04 K/UL (ref 0–0.04)
IMM GRANULOCYTES NFR BLD AUTO: 0.9 % (ref 0–0.5)
LYMPHOCYTES # BLD AUTO: 0.7 K/UL (ref 1–4.8)
LYMPHOCYTES NFR BLD: 14.9 % (ref 18–48)
MAGNESIUM SERPL-MCNC: 2.4 MG/DL (ref 1.6–2.6)
MCH RBC QN AUTO: 25.5 PG (ref 27–31)
MCHC RBC AUTO-ENTMCNC: 31 G/DL (ref 32–36)
MCV RBC AUTO: 82 FL (ref 82–98)
MODIFIED ALLEN'S TEST: ABNORMAL
MONOCYTES # BLD AUTO: 0.8 K/UL (ref 0.3–1)
MONOCYTES NFR BLD: 16.6 % (ref 4–15)
NEUTROPHILS # BLD AUTO: 3 K/UL (ref 1.8–7.7)
NEUTROPHILS NFR BLD: 64.2 % (ref 38–73)
NRBC BLD-RTO: 0 /100 WBC
O2DEVICE: ABNORMAL
PCO2 BLDA: 41.7 MMHG (ref 35–45)
PEEP: 5
PH SMN: 7.35 [PH] (ref 7.34–7.45)
PLATELET # BLD AUTO: 180 K/UL (ref 150–450)
PMV BLD AUTO: 11.3 FL (ref 9.2–12.9)
PO2 BLDA: 109 MMHG (ref 80–100)
POC BASE DEFICIT: -2.4 MMOL/L
POC HCO3: 22.5 MMOL/L
POC PERFORMED BY: ABNORMAL
POC SATURATED O2: 98.4 %
POC TCO2: 49 ML/DL
POC TEMPERATURE: 37 C
POTASSIUM SERPL-SCNC: 3.5 MMOL/L (ref 3.5–5.1)
PRESSURE SUPPORT: 10
PROT SERPL-MCNC: 6 G/DL (ref 6–8.4)
RBC # BLD AUTO: 4.15 M/UL (ref 4.6–6.2)
SIMV: 14
SODIUM SERPL-SCNC: 136 MMOL/L (ref 136–145)
SPECIMEN SOURCE: ABNORMAL
VT: 500
WBC # BLD AUTO: 4.69 K/UL (ref 3.9–12.7)

## 2022-09-13 PROCEDURE — 20000000 HC ICU ROOM

## 2022-09-13 PROCEDURE — 36600 WITHDRAWAL OF ARTERIAL BLOOD: CPT

## 2022-09-13 PROCEDURE — 80053 COMPREHEN METABOLIC PANEL: CPT | Performed by: INTERNAL MEDICINE

## 2022-09-13 PROCEDURE — 82803 BLOOD GASES ANY COMBINATION: CPT

## 2022-09-13 PROCEDURE — 99900031 HC PATIENT EDUCATION (STAT)

## 2022-09-13 PROCEDURE — 25000242 PHARM REV CODE 250 ALT 637 W/ HCPCS: Performed by: INTERNAL MEDICINE

## 2022-09-13 PROCEDURE — 99900026 HC AIRWAY MAINTENANCE (STAT)

## 2022-09-13 PROCEDURE — 63600175 PHARM REV CODE 636 W HCPCS: Performed by: INTERNAL MEDICINE

## 2022-09-13 PROCEDURE — 25000003 PHARM REV CODE 250: Performed by: STUDENT IN AN ORGANIZED HEALTH CARE EDUCATION/TRAINING PROGRAM

## 2022-09-13 PROCEDURE — 27000207 HC ISOLATION

## 2022-09-13 PROCEDURE — 27000221 HC OXYGEN, UP TO 24 HOURS

## 2022-09-13 PROCEDURE — 25000003 PHARM REV CODE 250: Performed by: INTERNAL MEDICINE

## 2022-09-13 PROCEDURE — 83735 ASSAY OF MAGNESIUM: CPT | Performed by: INTERNAL MEDICINE

## 2022-09-13 PROCEDURE — 85025 COMPLETE CBC W/AUTO DIFF WBC: CPT | Performed by: INTERNAL MEDICINE

## 2022-09-13 PROCEDURE — 94640 AIRWAY INHALATION TREATMENT: CPT

## 2022-09-13 PROCEDURE — 99900035 HC TECH TIME PER 15 MIN (STAT)

## 2022-09-13 PROCEDURE — 36415 COLL VENOUS BLD VENIPUNCTURE: CPT | Performed by: INTERNAL MEDICINE

## 2022-09-13 PROCEDURE — 94003 VENT MGMT INPAT SUBQ DAY: CPT

## 2022-09-13 PROCEDURE — 94761 N-INVAS EAR/PLS OXIMETRY MLT: CPT

## 2022-09-13 RX ORDER — OXCARBAZEPINE 150 MG/1
300 TABLET, FILM COATED ORAL 2 TIMES DAILY
Status: DISCONTINUED | OUTPATIENT
Start: 2022-09-13 | End: 2022-09-19 | Stop reason: HOSPADM

## 2022-09-13 RX ORDER — RISPERIDONE 0.5 MG/1
0.5 TABLET ORAL 2 TIMES DAILY
Status: DISCONTINUED | OUTPATIENT
Start: 2022-09-13 | End: 2022-09-19 | Stop reason: HOSPADM

## 2022-09-13 RX ORDER — ACETAMINOPHEN 325 MG/1
650 TABLET ORAL EVERY 8 HOURS PRN
Status: DISCONTINUED | OUTPATIENT
Start: 2022-09-13 | End: 2022-09-19 | Stop reason: HOSPADM

## 2022-09-13 RX ORDER — ATORVASTATIN CALCIUM 40 MG/1
40 TABLET, FILM COATED ORAL DAILY
Status: DISCONTINUED | OUTPATIENT
Start: 2022-09-14 | End: 2022-09-19 | Stop reason: HOSPADM

## 2022-09-13 RX ORDER — FAMOTIDINE 20 MG/1
20 TABLET, FILM COATED ORAL DAILY
Status: DISCONTINUED | OUTPATIENT
Start: 2022-09-14 | End: 2022-09-19 | Stop reason: HOSPADM

## 2022-09-13 RX ADMIN — Medication 6 MG: at 08:09

## 2022-09-13 RX ADMIN — ATORVASTATIN CALCIUM 40 MG: 40 TABLET, FILM COATED ORAL at 09:09

## 2022-09-13 RX ADMIN — IPRATROPIUM BROMIDE AND ALBUTEROL SULFATE 3 ML: 2.5; .5 SOLUTION RESPIRATORY (INHALATION) at 01:09

## 2022-09-13 RX ADMIN — PROPOFOL 20 MCG/KG/MIN: 10 INJECTION, EMULSION INTRAVENOUS at 05:09

## 2022-09-13 RX ADMIN — PIPERACILLIN AND TAZOBACTAM 4.5 G: 4; .5 INJECTION, POWDER, LYOPHILIZED, FOR SOLUTION INTRAVENOUS; PARENTERAL at 06:09

## 2022-09-13 RX ADMIN — IPRATROPIUM BROMIDE AND ALBUTEROL SULFATE 3 ML: 2.5; .5 SOLUTION RESPIRATORY (INHALATION) at 07:09

## 2022-09-13 RX ADMIN — PIPERACILLIN AND TAZOBACTAM 4.5 G: 4; .5 INJECTION, POWDER, LYOPHILIZED, FOR SOLUTION INTRAVENOUS; PARENTERAL at 09:09

## 2022-09-13 RX ADMIN — VANCOMYCIN HYDROCHLORIDE 750 MG: 750 INJECTION, POWDER, LYOPHILIZED, FOR SOLUTION INTRAVENOUS at 02:09

## 2022-09-13 RX ADMIN — OXCARBAZEPINE 300 MG: 300 TABLET, FILM COATED ORAL at 08:09

## 2022-09-13 RX ADMIN — FAMOTIDINE 20 MG: 20 TABLET ORAL at 09:09

## 2022-09-13 RX ADMIN — RISPERIDONE 0.5 MG: 0.5 TABLET ORAL at 08:09

## 2022-09-13 RX ADMIN — OXCARBAZEPINE 300 MG: 300 TABLET, FILM COATED ORAL at 09:09

## 2022-09-13 RX ADMIN — RISPERIDONE 0.5 MG: 0.5 TABLET ORAL at 09:09

## 2022-09-13 RX ADMIN — ACETAMINOPHEN 650 MG: 325 TABLET ORAL at 01:09

## 2022-09-13 RX ADMIN — PIPERACILLIN AND TAZOBACTAM 4.5 G: 4; .5 INJECTION, POWDER, LYOPHILIZED, FOR SOLUTION INTRAVENOUS; PARENTERAL at 01:09

## 2022-09-13 RX ADMIN — IPRATROPIUM BROMIDE AND ALBUTEROL SULFATE 3 ML: 2.5; .5 SOLUTION RESPIRATORY (INHALATION) at 08:09

## 2022-09-13 RX ADMIN — TAMSULOSIN HYDROCHLORIDE 0.4 MG: 0.4 CAPSULE ORAL at 09:09

## 2022-09-13 RX ADMIN — IPRATROPIUM BROMIDE AND ALBUTEROL SULFATE 3 ML: 2.5; .5 SOLUTION RESPIRATORY (INHALATION) at 12:09

## 2022-09-13 NOTE — RESPIRATORY THERAPY
09/13/22 1246   Patient Assessment/Suction   Level of Consciousness (AVPU) alert   Respiratory Effort Unlabored   Expansion/Accessory Muscles/Retractions expansion symmetric;no retractions   All Lung Fields Breath Sounds diminished;coarse   Rhythm/Pattern, Respiratory unlabored   Cough Frequency infrequent   Cough Type nonproductive   PRE-TX-O2   O2 Device (Oxygen Therapy) (S)  nasal cannula   $ Is the patient on Low Flow Oxygen? Yes   Flow (L/min) (S)  3   Oxygen Concentration (%) 32   SpO2 99 %   Pulse Oximetry Type Continuous   $ Pulse Oximetry - Multiple Charge Pulse Oximetry - Multiple   Pulse 71   Resp (!) 24   Positioning HOB elevated 45 degrees   Aerosol Therapy   $ Aerosol Therapy Charges Aerosol Treatment   Daily Review of Necessity (SVN) completed   Respiratory Treatment Status (SVN) given   Treatment Route (SVN) mask   Patient Position (SVN) HOB elevated   Post Treatment Assessment (SVN) breath sounds unchanged   Signs of Intolerance (SVN) none   Breath Sounds Post-Respiratory Treatment   Throughout All Fields Post-Treatment All Fields   Throughout All Fields Post-Treatment no change   Post-treatment Heart Rate (beats/min) 72   Post-treatment Resp Rate (breaths/min) 24   Equipment Change   $ RT Equipment Aerosol treatment mask;Treatment nebulizer   Respiratory Evaluation   $ Care Plan Tech Time 15 min       Weaned patient's Oxygen from 35% venti mask to 3lpm nasal cannula at this time. No distress noted. Cindy ARIAS notified

## 2022-09-13 NOTE — PLAN OF CARE
Patient had an uneventful night. Remains on Covid restrictions. Patient is awake and calm on 20mcg of propofol. Remains on SIMV.    Problem: Infection  Goal: Absence of Infection Signs and Symptoms  Outcome: Ongoing, Progressing     Problem: Adult Inpatient Plan of Care  Goal: Plan of Care Review  Outcome: Ongoing, Progressing  Goal: Patient-Specific Goal (Individualized)  Outcome: Ongoing, Progressing  Goal: Absence of Hospital-Acquired Illness or Injury  Outcome: Ongoing, Progressing  Goal: Optimal Comfort and Wellbeing  Outcome: Ongoing, Progressing  Goal: Readiness for Transition of Care  Outcome: Ongoing, Progressing     Problem: Fluid and Electrolyte Imbalance (Acute Kidney Injury/Impairment)  Goal: Fluid and Electrolyte Balance  Outcome: Ongoing, Progressing     Problem: Oral Intake Inadequate (Acute Kidney Injury/Impairment)  Goal: Optimal Nutrition Intake  Outcome: Ongoing, Progressing     Problem: Renal Function Impairment (Acute Kidney Injury/Impairment)  Goal: Effective Renal Function  Outcome: Ongoing, Progressing     Problem: Fluid Imbalance (Pneumonia)  Goal: Fluid Balance  Outcome: Ongoing, Progressing     Problem: Infection (Pneumonia)  Goal: Resolution of Infection Signs and Symptoms  Outcome: Ongoing, Progressing     Problem: Respiratory Compromise (Pneumonia)  Goal: Effective Oxygenation and Ventilation  Outcome: Ongoing, Progressing     Problem: Diabetes Comorbidity  Goal: Blood Glucose Level Within Targeted Range  Outcome: Ongoing, Progressing     Problem: Fall Injury Risk  Goal: Absence of Fall and Fall-Related Injury  Outcome: Ongoing, Progressing     Problem: Restraint, Nonbehavioral (Nonviolent)  Goal: Absence of Harm or Injury  Outcome: Ongoing, Progressing     Problem: Communication Impairment (Mechanical Ventilation, Invasive)  Goal: Effective Communication  Outcome: Ongoing, Progressing     Problem: Device-Related Complication Risk (Mechanical Ventilation, Invasive)  Goal: Optimal  Device Function  Outcome: Ongoing, Progressing     Problem: Inability to Wean (Mechanical Ventilation, Invasive)  Goal: Mechanical Ventilation Liberation  Outcome: Ongoing, Progressing     Problem: Skin and Tissue Injury (Mechanical Ventilation, Invasive)  Goal: Absence of Device-Related Skin and Tissue Injury  Outcome: Ongoing, Progressing     Problem: Ventilator-Induced Lung Injury (Mechanical Ventilation, Invasive)  Goal: Absence of Ventilator-Induced Lung Injury  Outcome: Ongoing, Progressing     Problem: Communication Impairment (Artificial Airway)  Goal: Effective Communication  Outcome: Ongoing, Progressing     Problem: Device-Related Complication Risk (Artificial Airway)  Goal: Optimal Device Function  Outcome: Ongoing, Progressing     Problem: Skin and Tissue Injury (Artificial Airway)  Goal: Absence of Device-Related Skin or Tissue Injury  Outcome: Ongoing, Progressing     Problem: Skin Injury Risk Increased  Goal: Skin Health and Integrity  Outcome: Ongoing, Progressing     Problem: Impaired Wound Healing  Goal: Optimal Wound Healing  Outcome: Ongoing, Progressing

## 2022-09-13 NOTE — ASSESSMENT & PLAN NOTE
CXR findings with new right lower lobe pneumonia. Bilateral infiltrates likely secondary to pulmonary edema from infectious.  - supplement oxygen as needed SpO2 >92-94%  - daily pulmonary hygiene  - continue coverage for community acquired PNA, azithrmoycin and rocephin  - f/u blood cultures x2 pending  - trend CBC  9/6 FM:  Now in ICU, expand coverage, speak with family.  9/7 FM:  Rec DNR, hospice care, family to discuss.  9/8 FM:  Encouraging hospice care.  9/9 FM:  Multifactorial  9/10 Cont abxs, nebs and cont to wean from vent  9/11 cont abxs, nebs, cpap trials - wean sedation - change to simv settings  9/12 FM:  May try extubation today.  9/13 FM:  Should be able to extubate this am.

## 2022-09-13 NOTE — NURSING
Dr. Domínguez notified of toleration of CPAP trials. RSBI- 94. SATS- 95-98%. Tachypnea. Place back on previous vent settings. Resp aware. Will hold off on extubation today

## 2022-09-13 NOTE — PLAN OF CARE
CPAP trial completed today. Placed back on SIMV after completion.  Will hold extubation today. Cont with FULL code status. IVF dc'd. NG tube in place with LIS. TF on hold. Covid precautions maintained. Cont with Diprivan drip. FMS in place

## 2022-09-13 NOTE — PROGRESS NOTES
[facility] Medicine  Progress Note    Patient Name: Jamey Lei  MRN: [id number]  Patient Class: IP- Inpatient   Admission Date: 9/2/2022  Length of Stay: 11 days  Attending Physician: Navi Domínguez III, MD  Primary Care Provider: Navi Domínguez III, MD        Subjective:     Principal Problem:Pneumonia        HPI:  81 year old male with hypertension, hypoerlipidemia, CHF, permanent atrial fibriallation on Eliquis, CAD s/p CABGx3, CKD stage 3, recent lung and brain mass findings presents to ED with worsening shortness of breath that started about a week ago with bilateral leg swelling with difficulty ambulating with his walker.     Patient hard of hearing endorsed since treatment in the ED, he has been feeling better. At home he does not use oxygen, breathing at SpO2 >95% on continuous O2 2L via nasal cannula on exam. There is no change in appetite, nausea, vomiting, diarrhea, constipation. Denies headaches, fever, chills, vision changes, chest pain, abdominal pain. Denies joint pain and loss of sensation in toes and foot bilaterally with increasing leg swelling.    ED course:  Tachypnea rate >30, CXR right lower lobe pneumonia, positive influenza A and COVID19 (fully vaccinated), NTproBNT >6000, lactate <0.2 x2, blood cultures x2 pending,  BUN/Cr 33/1.8 with improved eGFR from last hospitalization and discharge one week ago. Patient started on IV lasix 80 mg, responding well with clear urine collecting in Sanders catheter and IV antibiotics empirically started for right lobe pneumonia.       Overview/Hospital Course:  9/3/22 CG:  Says it feels much better and does complain of a cough.  He has had to go to the bathroom and is waiting for help and assistance getting up from the bed to go to the restroom.  No other acute overnight events.  9/4/22 CG: Doing better, still has a cough and feels weak.   9/5/22 CG:  Became more agitated last night and was trying to rip out his Sanders catheter.   Had to be given Seroquel to help him sleep and to calm him down.  9/6/22 FM:  Events of the weekend are noted.  Patient was admitted with COVID and influenza and a deterioration in his condition.  Patient has a history of lung cancer and is seeing an oncologist at an outside facility.  Patient has a history of chronic kidney disease as well as obesity diabetes neuropathy atrial fibrillation and his general health has come line significantly over the last year.  The patient has been in and out of the hospital and nursing facilities.  Last night the patient had a rapid response and ended up on the ventilator.  I reviewed the events and I am unsure if this was related to Seroquel will and I also see that he was becoming hypotensive.  Patient's chest x-ray is abnormal I will discuss with the family this morning.  9/7/22 FM:  Met with family today.  They were unable to make a decision on do not resuscitate.  They will consult with other family members.  Patient's volume status is improved as he was dry yesterday.  Patient's renal numbers all reviewed and noted.  Patient is able to open eyes on the ventilator and follow simple commands.  Will continue ventilatory support today as well as tube feedings.  9/8/22 FM:  Patient had a positive stool for blood as well as a persistent drop in his hemoglobin and was transfused 2 units of packed red blood cells yesterday.  Patient is admitted with COVID pneumonia influenza pneumonia and possible sepsis.  Patient has a history of lung cancer as well as multiple other comorbidities and I have met with the family yesterday and they are unable to come to a code status or long-term status decision.  I am encouraging hospice care as the patient has multiple irreversible illnesses and a decline in his quality of life.  9/9/22 FM:  Patient is relatively unchanged today.  Yesterday the patient did well with his CPAP challenges and we will continue today.  I suspect he can be taken off the  ventilator over the weekend some time.  Labs this morning are pending he has received 4 units of blood.  9/10 ND patient tolerating CPAP trials again today attempt to new trial.  Continue to wean sedation completely off.    9/11 ND pt with nv overnight - tfs beign held.  Has had bms.   Has been toleratign cpap trials. Wean sedation   9/12 FM:  Patient opens eyes to verbal stimulation this morning.  Patient's Diprivan is off family is at bedside.  Patient's tidal volumes are 650 on a pressure support of 10.  Tube feeds have been off and we may try extubation later today.  Encouraged family to place the patient do not resuscitate and they state they are not ready for this they would like to talk to patient once he is extubated.  9/13 FM:  Patient's CPAP trials yesterday ended up lasting 4-5 hours and the patient fatigued.  I suspect we will be able to extubate today.      Past Medical History:   Diagnosis Date    A-fib     Abdominal pain, epigastric     Abdominal pain, generalized     Acute on chronic congestive heart failure 12/29/2021    Anticoagulant long-term use     Arthritis     Asteatotic eczema     Benign essential HTN     Benign prostatic hyperplasia     Brain tumor     Cardiovascular accident     Carotid artery stenosis     CHF (congestive heart failure)     CKD (chronic kidney disease), stage IV     Constipation     COPD (chronic obstructive pulmonary disease)     Depressed     DM (diabetes mellitus), secondary     Drug eruption     Eczema     Edema     Encounter for blood transfusion     Fever     H. pylori infection     Hearing loss     History of tobacco use     Hypercholesterolemia     Hypertensive renal disease, benign     Hypokalemia     Hyponatremia     Impaired fasting glucose     Lumbar pain     Lung mass     Malaise and fatigue     Malignant neoplasm of prostate     Meningioma     Mixed hyperlipidemia     Nasal bleeding     Neuropathy     Obesity,  unspecified     SHAYNE (obstructive sleep apnea)     Osteoarthritis of knee     Osteoarthritis of multiple joints     Peripheral vascular disease, unspecified     Psychosis     Renal disorder     Seizure disorder     Seizures     Sleep apnea     Unspecified cataract     Unspecified chronic bronchitis     Unspecified osteoarthritis, unspecified site     Vertigo     Vitamin D deficiency     Wheezing        Past Surgical History:   Procedure Laterality Date    APPENDECTOMY      COLONOSCOPY      CORONARY ARTERY BYPASS GRAFT      FOOT SURGERY Left     HIP REPLACEMENT ARTHROPLASTY Right     HIP SURGERY      JOINT REPLACEMENT      KNEE SURGERY      LUNG BIOPSY Right 03/03/2022    benign    SHOULDER SURGERY Left     SHOULDER SURGERY      TOTAL KNEE ARTHROPLASTY Right     triple bypass         Review of patient's allergies indicates:   Allergen Reactions    Zolpidem        No current facility-administered medications on file prior to encounter.     Current Outpatient Medications on File Prior to Encounter   Medication Sig    albuterol (PROVENTIL/VENTOLIN HFA) 90 mcg/actuation inhaler Inhale 1-2 puffs into the lungs every 6 (six) hours as needed for Wheezing or Shortness of Breath. Rescue    albuterol-ipratropium (DUO-NEB) 2.5 mg-0.5 mg/3 mL nebulizer solution inhale 3 milliliters by nebulization route 4 times per day and as needed, up to 6 doses per day for 30 days    apixaban (ELIQUIS) 5 mg Tab Take 5 mg by mouth 2 (two) times daily.    aspirin (ECOTRIN) 81 MG EC tablet Take 81 mg by mouth once daily.    atorvastatin (LIPITOR) 20 MG tablet Take 1 tablet (20 mg total) by mouth once daily.    multivitamin capsule Take 1 capsule by mouth once daily.    OXcarbazepine (TRILEPTAL) 300 MG Tab Take 1 tablet (300 mg total) by mouth 2 (two) times daily.    senna (SENOKOT) 8.6 mg tablet 2 tablets at bedtime as needed    tamsulosin (FLOMAX) 0.4 mg Cap Take 0.4 mg by mouth once daily.    torsemide  (DEMADEX) 20 MG Tab Take 1 tablet (20 mg total) by mouth daily as needed (weight gain greater than 5 lbs).    vitamin D (VITAMIN D3) 1000 units Tab Take 1,000 Units by mouth once daily.    [DISCONTINUED] calcium carbonate (OS-DAVID) 600 mg calcium (1,500 mg) Tab Take 600 mg by mouth once.    [DISCONTINUED] carvediloL (COREG) 6.25 MG tablet TAKE ONE TABLET BY MOUTH TWICE DAILY (EVERY 12 HOURS) FOR BLOOD PRESSURE. THANKS !!    [DISCONTINUED] ezetimibe (ZETIA) 10 mg tablet Take 10 mg by mouth once daily.    [DISCONTINUED] FLUoxetine 20 MG capsule     [DISCONTINUED] gabapentin (NEURONTIN) 300 MG capsule 1 capsule    [DISCONTINUED] miconazole NITRATE 2 % (MICOTIN) 2 % top powder Apply topically as needed for Itching.    [DISCONTINUED] risperiDONE (RISPERDAL) 0.5 MG Tab Take 1 tablet (0.5 mg total) by mouth nightly as needed (agitation and halluctions).     Family History       Problem Relation (Age of Onset)    Arthritis Mother, Father    Asthma Sister    Heart disease Father    Hyperlipidemia Mother, Sister    Hypertension Mother, Father, Sister, Brother          Tobacco Use    Smoking status: Every Day    Smokeless tobacco: Never    Tobacco comments:     quit 30 years ago   Substance and Sexual Activity    Alcohol use: Not Currently    Drug use: Never    Sexual activity: Not Currently     Review of Systems   Unable to perform ROS: Intubated   Objective:     Vital Signs (Most Recent):  Temp: 97.4 °F (36.3 °C) (09/13/22 0715)  Pulse: (!) 44 (09/13/22 0700)  Resp: (!) 25 (09/13/22 0700)  BP: (!) 149/65 (09/13/22 0700)  SpO2: 100 % (09/13/22 0700)   Vital Signs (24h Range):  Temp:  [97.4 °F (36.3 °C)-98.6 °F (37 °C)] 97.4 °F (36.3 °C)  Pulse:  [43-94] 44  Resp:  [14-45] 25  SpO2:  [89 %-100 %] 100 %  BP: ()/(55-92) 149/65     Weight: 103 kg (227 lb 1.2 oz)  Body mass index is 33.53 kg/m².    Physical Exam  Constitutional:       Appearance: He is obese. He is not toxic-appearing.      Comments: sedated    HENT:      Head: Normocephalic.      Right Ear: External ear normal.      Left Ear: External ear normal.      Nose: No congestion or rhinorrhea.      Mouth/Throat:      Pharynx: Oropharynx is clear.   Eyes:      General: No scleral icterus.     Extraocular Movements: Extraocular movements intact.      Conjunctiva/sclera: Conjunctivae normal.      Pupils: Pupils are equal, round, and reactive to light.   Cardiovascular:      Rate and Rhythm: Bradycardia present. Rhythm irregular.      Heart sounds: No murmur heard.  Pulmonary:      Breath sounds: Rhonchi and rales present.      Comments: On vent  Chest:      Chest wall: No tenderness.   Abdominal:      General: There is no distension.      Palpations: Abdomen is soft.      Tenderness: There is no abdominal tenderness. There is no right CVA tenderness, left CVA tenderness or guarding.   Genitourinary:     Comments: Sanders with yellow urine; fecal management system in place  Musculoskeletal:      Cervical back: Neck supple.   Lymphadenopathy:      Cervical: No cervical adenopathy.   Skin:     General: Skin is warm and dry.      Comments: Hemosiderin staining bilateral LE        Significant Labs: All pertinent labs within the past 24 hours have been reviewed.  A1C: No results for input(s): HGBA1C in the last 4320 hours.  ABGs:   Recent Labs   Lab 09/12/22  0507 09/13/22  0523   PH 7.345* 7.352   PCO2 40.4 41.7   HCO3 21.5 22.5   POCSATURATED 98.9 98.4   PO2 121* 109*       Blood Culture: No results for input(s): LABBLOO in the last 48 hours.    BMP:   Recent Labs   Lab 09/13/22 0425   GLU 84      K 3.5      CO2 23   BUN 34*   CREATININE 1.7*   CALCIUM 8.9   MG 2.4       CBC:   Recent Labs   Lab 09/12/22 0420 09/13/22 0425   WBC 4.03 4.69   HGB 10.3* 10.6*   HCT 32.7* 34.2*    180       CMP:   Recent Labs   Lab 09/12/22 0420 09/13/22 0425   * 136   K 3.8 3.5    106   CO2 23 23   GLU 97 84   BUN 35* 34*   CREATININE 2.0* 1.7*   CALCIUM  8.8 8.9   PROT 6.0 6.0   ALBUMIN 2.0* 2.0*   BILITOT 0.4 0.6   ALKPHOS 49* 49*   AST 20 20   ALT 20 18   ANIONGAP 6* 7*       No results for input(s): LACTATE in the last 48 hours.    No results for input(s): COLORU, APPEARANCEUA, PHUR, SPECGRAV, PROTEINUA, GLUCUA, KETONESU, BILIRUBINUA, OCCULTUA, NITRITE, UROBILINOGEN, LEUKOCYTESUR, RBCUA, WBCUA, BACTERIA, SQUAMEPITHEL, HYALINECASTS in the last 48 hours.    Invalid input(s): WRIGHTSUR    X-Ray Chest 1 View  Narrative: EXAMINATION:  XR CHEST 1 VIEW    CLINICAL HISTORY:  fu pneumo; intubated;    COMPARISON:  09/09/2022    FINDINGS:  Endotracheal tube and NG tube remain.  Suspect bilateral pleural effusions with adjacent atelectasis and or infiltrates, similar.  No new finding/detrimental interval change detected  Impression: No significant change since 09/09/2022    Electronically signed by: Hodan Coy MD  Date:    09/11/2022  Time:    10:07  X-Ray Abdomen AP 1 View  Narrative: EXAMINATION:  XR ABDOMEN AP 1 VIEW    CLINICAL HISTORY:  n/v;    FINDINGS:  Tip of NG tube overlies upper abdomen.  Catheter overlies pelvis.  Nonspecific bowel gas pattern with gas-filled portions of colon.  Impression: Nonspecific bowel gas pattern.  No significant change since 09/08/2022    Electronically signed by: Hodan Coy MD  Date:    09/11/2022  Time:    09:31   Significant Imaging: I have reviewed all pertinent imaging results/findings within the past 24 hours.      Assessment/Plan:      * Pneumonia  CXR findings with new right lower lobe pneumonia. Bilateral infiltrates likely secondary to pulmonary edema from infectious.  - supplement oxygen as needed SpO2 >92-94%  - daily pulmonary hygiene  - continue coverage for community acquired PNA, azithrmoycin and rocephin  - f/u blood cultures x2 pending  - trend CBC  9/6 FM:  Now in ICU, expand coverage, speak with family.  9/7 FM:  Rec DNR, hospice care, family to discuss.  9/8 FM:  Encouraging hospice care.  9/9 FM:   Multifactorial  9/10 Cont abxs, nebs and cont to wean from vent  9/11 cont abxs, nebs, cpap trials - wean sedation - change to simv settings  9/12 FM:  May try extubation today.  9/13 FM:  Should be able to extubate this am.      N&V (nausea and vomiting)  Hold tfs currently - check xray  Planning extubation today.      Malignant neoplasm of lung  Poor prognosis.  Encouraging DNR and/or hospice care.      COVID-19  Patient is identified as mild moderate severity.   Initiate standard COVID protocols; COVID-19 testing ,Infection Control notification  and isolation- respiratory, contact and droplet per protocol    Diagnostics: (leukopenia, hyponatremia, hyperferritinemia, elevated troponin, elevated d-dimer, age, and comorbidities are significant predictors of poor clinical outcome)  CBC, CMP and Portable CXR    Management: Initiate targeted therapy with possible use of Paxlovid. Maintain oxygen saturations 92-96% via Nasal Cannula  LPM and monitor with continuous/intermittent pulse oximetry. Inhaled bronchodilators as needed for shortness of breath., Continuous cardiac monitoring. and Manage respiratory failure (O2 requirement >10LPM or needing NIPPV/Mechanical ventilation) and/or Pneumonia (active chest infiltrates) separately as described below.  Empirically started on IV antibiotics for right lower lobe pneumonia on chest xray.   Follow up blood cultures and adjust antibiotics accordingly.     Influenza  Positive flu A. Supplemental oxygen as needed to maintain >90%.  Has completed tamiflu.      Acute respiratory failure with hypoxia and hypercarbia  Multifactorial, cardiopulmonary (CHF, atrial fibrillation, COPD) and infectious processes (COVID19 and fluA).  - see above management.        CHF (congestive heart failure)  Seems euvolemic      Stage 3 chronic kidney disease  Stable BUN/Cr 33/1.8 likely at new baseline with recovering eGFR of >40 from <20 5 weeks ago. Continue to monitor as patient on IV diuretics.        Seizure disorder  On meds - no active szs at this time      A-fib  Patient with Permanent atrial fibrillation which is controlled currently with No rate controlling home medications at this time. . Patient is currently in atrial fibrillation.[id number] Score: 4. HASBLED Score: Unable to calculate. Anticoagulation indicated. Anticoagulation done with eliquis 5 mg BID.    Eliquis on hold, cards following.    COPD (chronic obstructive pulmonary disease)  Cont. Neb txt.      Localized edema  Home med diuretic torsemide 20 mg PRN. +3 pitting on exam. Patient responding to IV lasix 80 mg in ED.   Continue with IV diuretics and taper with progress, patient endorses breathing a little better.   - IV lasix 40 mg BID  - restrict PO intake to 1.5 L fluid ounces  - monitor I/Os  9/6 FM:  Now hypotension, low dose IVFs  9/7 FM:  Volume improved, was dry.  9/8 FM:  Improved.  9/9 FM:  Stable.  9/12 FM:  Holding IVFs.    Weakness  Pt/ot once extubated        VTE Risk Mitigation (From admission, onward)         Ordered     IP VTE HIGH RISK PATIENT  Once         09/02/22 1513     Place sequential compression device  Until discontinued         09/02/22 1513                Discharge Planning   GLORY:      Code Status: Full Code   Is the patient medically ready for discharge?:     Reason for patient still in hospital (select all that apply): Patient trending condition  Discharge Plan A: Home with family, Home Health                  Navi Domínguez III, MD  Department of Hospital Medicine   [place] [facility], [place]

## 2022-09-13 NOTE — RESPIRATORY THERAPY
09/13/22 1125   Patient Assessment/Suction   Level of Consciousness (AVPU) alert   Respiratory Effort Unlabored   Expansion/Accessory Muscles/Retractions expansion symmetric;no retractions   Rhythm/Pattern, Respiratory unlabored   Cough Frequency infrequent   Cough Type nonproductive   Suction Method oral;tracheal  (Patient suctioned via ETT and orally prior to extubation)   Suction Pressure (mmHg) -120 mmHg   $ Suction Charges Inline Suction Procedure Stat Charge   Secretions Amount moderate   Secretions Color white;red-streaked   Secretions Characteristics thick   $ Swab or suction? Suction   Aspirate Toleration BRADLY (no adverse reactions)   Sent to the lab? No   Is this patient in SNF or Inpatient Rehab? No   Skin Integrity   Area Observed Left;Right;Cheek;Upper lip;Lower lip;Corner lip   Skin Appearance redness blanchable  (ETT kendrick removed at this time -- patient extubated. Blanchable redness noted to patient's cheeks under ETT kendrick. Cindy ARIAS at bedside and aware)   PRE-TX-O2   O2 Device (Oxygen Therapy) venti mask   $ Is the patient on Low Flow Oxygen? Yes   Flow (L/min) 6   Oxygen Concentration (%) 35   SpO2 99 %   Pulse Oximetry Type Continuous   $ Pulse Oximetry - Multiple Charge Pulse Oximetry - Multiple   Pulse 71   Resp (!) 22   Positioning HOB elevated 30 degrees   Equipment Change   $ RT Equipment Venti Mask   Ready to Wean/Extubation Screen   FIO2<=50 (chart decimal) 0.35   Ready to Wean Parameters   $ Extubation Tech Time Tech Time 30 min   Extubated? Yes   Reason for Extubation Extubated  (Extubated per Dr. Domínguez's orders)   Ventilator Discontinued Yes       Patient extubated at this time per Dr. Domínguez's orders. Explained procedure to patient and patient's wife prior to performing. Patient tolerated extubation well. Patient placed on 35% venti mask post extubation -- O2 saturation 99%. Cindy ARIAS at bedside

## 2022-09-13 NOTE — NURSING
Restraints off due to not pulling at medical equipment. Educated not to pull at tubes. Shakes head in agreement. Ex-wife at side of bed. No distress noted. VM in use.

## 2022-09-13 NOTE — ASSESSMENT & PLAN NOTE
Patient with Permanent atrial fibrillation which is controlled currently with No rate controlling home medications at this time. . Patient is currently in atrial fibrillation.[id number] Score: 4. HASBLED Score: Unable to calculate. Anticoagulation indicated. Anticoagulation done with eliquis 5 mg BID.    Eliquis on hold, cards following.

## 2022-09-13 NOTE — SUBJECTIVE & OBJECTIVE
Past Medical History:   Diagnosis Date    A-fib     Abdominal pain, epigastric     Abdominal pain, generalized     Acute on chronic congestive heart failure 12/29/2021    Anticoagulant long-term use     Arthritis     Asteatotic eczema     Benign essential HTN     Benign prostatic hyperplasia     Brain tumor     Cardiovascular accident     Carotid artery stenosis     CHF (congestive heart failure)     CKD (chronic kidney disease), stage IV     Constipation     COPD (chronic obstructive pulmonary disease)     Depressed     DM (diabetes mellitus), secondary     Drug eruption     Eczema     Edema     Encounter for blood transfusion     Fever     H. pylori infection     Hearing loss     History of tobacco use     Hypercholesterolemia     Hypertensive renal disease, benign     Hypokalemia     Hyponatremia     Impaired fasting glucose     Lumbar pain     Lung mass     Malaise and fatigue     Malignant neoplasm of prostate     Meningioma     Mixed hyperlipidemia     Nasal bleeding     Neuropathy     Obesity, unspecified     SHAYNE (obstructive sleep apnea)     Osteoarthritis of knee     Osteoarthritis of multiple joints     Peripheral vascular disease, unspecified     Psychosis     Renal disorder     Seizure disorder     Seizures     Sleep apnea     Unspecified cataract     Unspecified chronic bronchitis     Unspecified osteoarthritis, unspecified site     Vertigo     Vitamin D deficiency     Wheezing        Past Surgical History:   Procedure Laterality Date    APPENDECTOMY      COLONOSCOPY      CORONARY ARTERY BYPASS GRAFT      FOOT SURGERY Left     HIP REPLACEMENT ARTHROPLASTY Right     HIP SURGERY      JOINT REPLACEMENT      KNEE SURGERY      LUNG BIOPSY Right 03/03/2022    benign    SHOULDER SURGERY Left     SHOULDER SURGERY      TOTAL KNEE ARTHROPLASTY Right     triple bypass         Review of patient's allergies indicates:   Allergen Reactions    Zolpidem        No current facility-administered medications on file prior  to encounter.     Current Outpatient Medications on File Prior to Encounter   Medication Sig    albuterol (PROVENTIL/VENTOLIN HFA) 90 mcg/actuation inhaler Inhale 1-2 puffs into the lungs every 6 (six) hours as needed for Wheezing or Shortness of Breath. Rescue    albuterol-ipratropium (DUO-NEB) 2.5 mg-0.5 mg/3 mL nebulizer solution inhale 3 milliliters by nebulization route 4 times per day and as needed, up to 6 doses per day for 30 days    apixaban (ELIQUIS) 5 mg Tab Take 5 mg by mouth 2 (two) times daily.    aspirin (ECOTRIN) 81 MG EC tablet Take 81 mg by mouth once daily.    atorvastatin (LIPITOR) 20 MG tablet Take 1 tablet (20 mg total) by mouth once daily.    multivitamin capsule Take 1 capsule by mouth once daily.    OXcarbazepine (TRILEPTAL) 300 MG Tab Take 1 tablet (300 mg total) by mouth 2 (two) times daily.    senna (SENOKOT) 8.6 mg tablet 2 tablets at bedtime as needed    tamsulosin (FLOMAX) 0.4 mg Cap Take 0.4 mg by mouth once daily.    torsemide (DEMADEX) 20 MG Tab Take 1 tablet (20 mg total) by mouth daily as needed (weight gain greater than 5 lbs).    vitamin D (VITAMIN D3) 1000 units Tab Take 1,000 Units by mouth once daily.    [DISCONTINUED] calcium carbonate (OS-DAVID) 600 mg calcium (1,500 mg) Tab Take 600 mg by mouth once.    [DISCONTINUED] carvediloL (COREG) 6.25 MG tablet TAKE ONE TABLET BY MOUTH TWICE DAILY (EVERY 12 HOURS) FOR BLOOD PRESSURE. THANKS !!    [DISCONTINUED] ezetimibe (ZETIA) 10 mg tablet Take 10 mg by mouth once daily.    [DISCONTINUED] FLUoxetine 20 MG capsule     [DISCONTINUED] gabapentin (NEURONTIN) 300 MG capsule 1 capsule    [DISCONTINUED] miconazole NITRATE 2 % (MICOTIN) 2 % top powder Apply topically as needed for Itching.    [DISCONTINUED] risperiDONE (RISPERDAL) 0.5 MG Tab Take 1 tablet (0.5 mg total) by mouth nightly as needed (agitation and halluctions).     Family History       Problem Relation (Age of Onset)    Arthritis Mother, Father    Asthma Sister    Heart  disease Father    Hyperlipidemia Mother, Sister    Hypertension Mother, Father, Sister, Brother          Tobacco Use    Smoking status: Every Day    Smokeless tobacco: Never    Tobacco comments:     quit 30 years ago   Substance and Sexual Activity    Alcohol use: Not Currently    Drug use: Never    Sexual activity: Not Currently     Review of Systems   Unable to perform ROS: Intubated   Objective:     Vital Signs (Most Recent):  Temp: 97.4 °F (36.3 °C) (09/13/22 0715)  Pulse: (!) 44 (09/13/22 0700)  Resp: (!) 25 (09/13/22 0700)  BP: (!) 149/65 (09/13/22 0700)  SpO2: 100 % (09/13/22 0700)   Vital Signs (24h Range):  Temp:  [97.4 °F (36.3 °C)-98.6 °F (37 °C)] 97.4 °F (36.3 °C)  Pulse:  [43-94] 44  Resp:  [14-45] 25  SpO2:  [89 %-100 %] 100 %  BP: ()/(55-92) 149/65     Weight: 103 kg (227 lb 1.2 oz)  Body mass index is 33.53 kg/m².    Physical Exam  Constitutional:       Appearance: He is obese. He is not toxic-appearing.      Comments: sedated   HENT:      Head: Normocephalic.      Right Ear: External ear normal.      Left Ear: External ear normal.      Nose: No congestion or rhinorrhea.      Mouth/Throat:      Pharynx: Oropharynx is clear.   Eyes:      General: No scleral icterus.     Extraocular Movements: Extraocular movements intact.      Conjunctiva/sclera: Conjunctivae normal.      Pupils: Pupils are equal, round, and reactive to light.   Cardiovascular:      Rate and Rhythm: Bradycardia present. Rhythm irregular.      Heart sounds: No murmur heard.  Pulmonary:      Breath sounds: Rhonchi and rales present.      Comments: On vent  Chest:      Chest wall: No tenderness.   Abdominal:      General: There is no distension.      Palpations: Abdomen is soft.      Tenderness: There is no abdominal tenderness. There is no right CVA tenderness, left CVA tenderness or guarding.   Genitourinary:     Comments: Sanders with yellow urine; fecal management system in place  Musculoskeletal:      Cervical back: Neck supple.    Lymphadenopathy:      Cervical: No cervical adenopathy.   Skin:     General: Skin is warm and dry.      Comments: Hemosiderin staining bilateral LE        Significant Labs: All pertinent labs within the past 24 hours have been reviewed.  A1C: No results for input(s): HGBA1C in the last 4320 hours.  ABGs:   Recent Labs   Lab 09/12/22  0507 09/13/22  0523   PH 7.345* 7.352   PCO2 40.4 41.7   HCO3 21.5 22.5   POCSATURATED 98.9 98.4   PO2 121* 109*       Blood Culture: No results for input(s): LABBLOO in the last 48 hours.    BMP:   Recent Labs   Lab 09/13/22  0425   GLU 84      K 3.5      CO2 23   BUN 34*   CREATININE 1.7*   CALCIUM 8.9   MG 2.4       CBC:   Recent Labs   Lab 09/12/22  0420 09/13/22  0425   WBC 4.03 4.69   HGB 10.3* 10.6*   HCT 32.7* 34.2*    180       CMP:   Recent Labs   Lab 09/12/22  0420 09/13/22  0425   * 136   K 3.8 3.5    106   CO2 23 23   GLU 97 84   BUN 35* 34*   CREATININE 2.0* 1.7*   CALCIUM 8.8 8.9   PROT 6.0 6.0   ALBUMIN 2.0* 2.0*   BILITOT 0.4 0.6   ALKPHOS 49* 49*   AST 20 20   ALT 20 18   ANIONGAP 6* 7*       No results for input(s): LACTATE in the last 48 hours.    No results for input(s): COLORU, APPEARANCEUA, PHUR, SPECGRAV, PROTEINUA, GLUCUA, KETONESU, BILIRUBINUA, OCCULTUA, NITRITE, UROBILINOGEN, LEUKOCYTESUR, RBCUA, WBCUA, BACTERIA, SQUAMEPITHEL, HYALINECASTS in the last 48 hours.    Invalid input(s): WRIGHTSUR    X-Ray Chest 1 View  Narrative: EXAMINATION:  XR CHEST 1 VIEW    CLINICAL HISTORY:  fu pneumo; intubated;    COMPARISON:  09/09/2022    FINDINGS:  Endotracheal tube and NG tube remain.  Suspect bilateral pleural effusions with adjacent atelectasis and or infiltrates, similar.  No new finding/detrimental interval change detected  Impression: No significant change since 09/09/2022    Electronically signed by: Hodan Coy MD  Date:    09/11/2022  Time:    10:07  X-Ray Abdomen AP 1 View  Narrative: EXAMINATION:  XR ABDOMEN AP 1  VIEW    CLINICAL HISTORY:  n/v;    FINDINGS:  Tip of NG tube overlies upper abdomen.  Catheter overlies pelvis.  Nonspecific bowel gas pattern with gas-filled portions of colon.  Impression: Nonspecific bowel gas pattern.  No significant change since 09/08/2022    Electronically signed by: Hodan Coy MD  Date:    09/11/2022  Time:    09:31   Significant Imaging: I have reviewed all pertinent imaging results/findings within the past 24 hours.

## 2022-09-13 NOTE — PLAN OF CARE
Extubated today. Tolerating 3L NC. NG tube removed. ST/PT/OT eval pending. Started on Cardiac diet. No coughing noted with PO intake. Restraints dc'd. Cont with espinal and FMS. Covid precautions maintained. Turn Q 2 hour schedule maintained

## 2022-09-14 LAB
ALBUMIN SERPL BCP-MCNC: 2 G/DL (ref 3.5–5.2)
ALP SERPL-CCNC: 52 U/L (ref 55–135)
ALT SERPL W/O P-5'-P-CCNC: 17 U/L (ref 10–44)
ANION GAP SERPL CALC-SCNC: 5 MMOL/L (ref 8–16)
AST SERPL-CCNC: 21 U/L (ref 10–40)
BASOPHILS # BLD AUTO: 0.04 K/UL (ref 0–0.2)
BASOPHILS NFR BLD: 0.8 % (ref 0–1.9)
BILIRUB SERPL-MCNC: 0.5 MG/DL (ref 0.1–1)
BUN SERPL-MCNC: 27 MG/DL (ref 8–23)
CALCIUM SERPL-MCNC: 9.1 MG/DL (ref 8.7–10.5)
CHLORIDE SERPL-SCNC: 109 MMOL/L (ref 95–110)
CO2 SERPL-SCNC: 25 MMOL/L (ref 23–29)
CREAT SERPL-MCNC: 1.4 MG/DL (ref 0.5–1.4)
DIFFERENTIAL METHOD: ABNORMAL
EOSINOPHIL # BLD AUTO: 0.1 K/UL (ref 0–0.5)
EOSINOPHIL NFR BLD: 2 % (ref 0–8)
ERYTHROCYTE [DISTWIDTH] IN BLOOD BY AUTOMATED COUNT: 16.7 % (ref 11.5–14.5)
EST. GFR  (NO RACE VARIABLE): 51.1 ML/MIN/1.73 M^2
GLUCOSE SERPL-MCNC: 95 MG/DL (ref 70–110)
HCT VFR BLD AUTO: 31 % (ref 40–54)
HGB BLD-MCNC: 9.8 G/DL (ref 14–18)
IMM GRANULOCYTES # BLD AUTO: 0.04 K/UL (ref 0–0.04)
IMM GRANULOCYTES NFR BLD AUTO: 0.8 % (ref 0–0.5)
LYMPHOCYTES # BLD AUTO: 0.7 K/UL (ref 1–4.8)
LYMPHOCYTES NFR BLD: 14.3 % (ref 18–48)
MAGNESIUM SERPL-MCNC: 2.4 MG/DL (ref 1.6–2.6)
MCH RBC QN AUTO: 25.8 PG (ref 27–31)
MCHC RBC AUTO-ENTMCNC: 31.6 G/DL (ref 32–36)
MCV RBC AUTO: 82 FL (ref 82–98)
MONOCYTES # BLD AUTO: 0.8 K/UL (ref 0.3–1)
MONOCYTES NFR BLD: 16.3 % (ref 4–15)
NEUTROPHILS # BLD AUTO: 3.4 K/UL (ref 1.8–7.7)
NEUTROPHILS NFR BLD: 65.8 % (ref 38–73)
NRBC BLD-RTO: 0 /100 WBC
PLATELET # BLD AUTO: 206 K/UL (ref 150–450)
PMV BLD AUTO: 10.8 FL (ref 9.2–12.9)
POTASSIUM SERPL-SCNC: 3.3 MMOL/L (ref 3.5–5.1)
PROT SERPL-MCNC: 5.9 G/DL (ref 6–8.4)
RBC # BLD AUTO: 3.8 M/UL (ref 4.6–6.2)
SODIUM SERPL-SCNC: 139 MMOL/L (ref 136–145)
WBC # BLD AUTO: 5.09 K/UL (ref 3.9–12.7)

## 2022-09-14 PROCEDURE — 36415 COLL VENOUS BLD VENIPUNCTURE: CPT | Performed by: INTERNAL MEDICINE

## 2022-09-14 PROCEDURE — 94660 CPAP INITIATION&MGMT: CPT

## 2022-09-14 PROCEDURE — 83735 ASSAY OF MAGNESIUM: CPT | Performed by: INTERNAL MEDICINE

## 2022-09-14 PROCEDURE — 27000207 HC ISOLATION

## 2022-09-14 PROCEDURE — 85025 COMPLETE CBC W/AUTO DIFF WBC: CPT | Performed by: INTERNAL MEDICINE

## 2022-09-14 PROCEDURE — 20000000 HC ICU ROOM

## 2022-09-14 PROCEDURE — 25000003 PHARM REV CODE 250: Performed by: STUDENT IN AN ORGANIZED HEALTH CARE EDUCATION/TRAINING PROGRAM

## 2022-09-14 PROCEDURE — 27000190 HC CPAP FULL FACE MASK W/VALVE

## 2022-09-14 PROCEDURE — 97163 PT EVAL HIGH COMPLEX 45 MIN: CPT

## 2022-09-14 PROCEDURE — 99900035 HC TECH TIME PER 15 MIN (STAT)

## 2022-09-14 PROCEDURE — 25000003 PHARM REV CODE 250: Performed by: INTERNAL MEDICINE

## 2022-09-14 PROCEDURE — 94640 AIRWAY INHALATION TREATMENT: CPT

## 2022-09-14 PROCEDURE — 63600175 PHARM REV CODE 636 W HCPCS: Performed by: INTERNAL MEDICINE

## 2022-09-14 PROCEDURE — 25000242 PHARM REV CODE 250 ALT 637 W/ HCPCS: Performed by: INTERNAL MEDICINE

## 2022-09-14 PROCEDURE — 94761 N-INVAS EAR/PLS OXIMETRY MLT: CPT

## 2022-09-14 PROCEDURE — 27000221 HC OXYGEN, UP TO 24 HOURS

## 2022-09-14 PROCEDURE — 80053 COMPREHEN METABOLIC PANEL: CPT | Performed by: INTERNAL MEDICINE

## 2022-09-14 PROCEDURE — 92610 EVALUATE SWALLOWING FUNCTION: CPT

## 2022-09-14 PROCEDURE — 99900031 HC PATIENT EDUCATION (STAT)

## 2022-09-14 PROCEDURE — 97166 OT EVAL MOD COMPLEX 45 MIN: CPT

## 2022-09-14 PROCEDURE — 99900026 HC AIRWAY MAINTENANCE (STAT)

## 2022-09-14 RX ORDER — HYDRALAZINE HYDROCHLORIDE 25 MG/1
25 TABLET, FILM COATED ORAL EVERY 8 HOURS
Status: DISCONTINUED | OUTPATIENT
Start: 2022-09-14 | End: 2022-09-14

## 2022-09-14 RX ORDER — AMLODIPINE BESYLATE 5 MG/1
5 TABLET ORAL DAILY
Status: DISCONTINUED | OUTPATIENT
Start: 2022-09-14 | End: 2022-09-19 | Stop reason: HOSPADM

## 2022-09-14 RX ORDER — ISOSORBIDE MONONITRATE 20 MG/1
20 TABLET ORAL 3 TIMES DAILY
Status: DISCONTINUED | OUTPATIENT
Start: 2022-09-14 | End: 2022-09-14

## 2022-09-14 RX ORDER — FUROSEMIDE 40 MG/1
40 TABLET ORAL 2 TIMES DAILY
Status: DISCONTINUED | OUTPATIENT
Start: 2022-09-14 | End: 2022-09-19 | Stop reason: HOSPADM

## 2022-09-14 RX ADMIN — PIPERACILLIN AND TAZOBACTAM 4.5 G: 4; .5 INJECTION, POWDER, LYOPHILIZED, FOR SOLUTION INTRAVENOUS; PARENTERAL at 12:09

## 2022-09-14 RX ADMIN — PIPERACILLIN AND TAZOBACTAM 4.5 G: 4; .5 INJECTION, POWDER, LYOPHILIZED, FOR SOLUTION INTRAVENOUS; PARENTERAL at 04:09

## 2022-09-14 RX ADMIN — FUROSEMIDE 40 MG: 40 TABLET ORAL at 05:09

## 2022-09-14 RX ADMIN — Medication 6 MG: at 08:09

## 2022-09-14 RX ADMIN — PIPERACILLIN AND TAZOBACTAM 4.5 G: 4; .5 INJECTION, POWDER, LYOPHILIZED, FOR SOLUTION INTRAVENOUS; PARENTERAL at 09:09

## 2022-09-14 RX ADMIN — OXCARBAZEPINE 300 MG: 300 TABLET, FILM COATED ORAL at 09:09

## 2022-09-14 RX ADMIN — ISOSORBIDE DINITRATE: 20 TABLET ORAL at 08:09

## 2022-09-14 RX ADMIN — AMLODIPINE BESYLATE 5 MG: 5 TABLET ORAL at 05:09

## 2022-09-14 RX ADMIN — ISOSORBIDE DINITRATE: 20 TABLET ORAL at 05:09

## 2022-09-14 RX ADMIN — VANCOMYCIN HYDROCHLORIDE 750 MG: 750 INJECTION, POWDER, LYOPHILIZED, FOR SOLUTION INTRAVENOUS at 01:09

## 2022-09-14 RX ADMIN — RISPERIDONE 0.5 MG: 0.5 TABLET ORAL at 09:09

## 2022-09-14 RX ADMIN — RISPERIDONE 0.5 MG: 0.5 TABLET ORAL at 08:09

## 2022-09-14 RX ADMIN — FAMOTIDINE 20 MG: 20 TABLET ORAL at 09:09

## 2022-09-14 RX ADMIN — IPRATROPIUM BROMIDE AND ALBUTEROL SULFATE 3 ML: 2.5; .5 SOLUTION RESPIRATORY (INHALATION) at 08:09

## 2022-09-14 RX ADMIN — OXCARBAZEPINE 300 MG: 300 TABLET, FILM COATED ORAL at 08:09

## 2022-09-14 RX ADMIN — FUROSEMIDE 40 MG: 40 TABLET ORAL at 09:09

## 2022-09-14 RX ADMIN — IPRATROPIUM BROMIDE AND ALBUTEROL SULFATE 3 ML: 2.5; .5 SOLUTION RESPIRATORY (INHALATION) at 07:09

## 2022-09-14 RX ADMIN — TAMSULOSIN HYDROCHLORIDE 0.4 MG: 0.4 CAPSULE ORAL at 09:09

## 2022-09-14 RX ADMIN — IPRATROPIUM BROMIDE AND ALBUTEROL SULFATE 3 ML: 2.5; .5 SOLUTION RESPIRATORY (INHALATION) at 01:09

## 2022-09-14 RX ADMIN — ATORVASTATIN CALCIUM 40 MG: 40 TABLET, FILM COATED ORAL at 09:09

## 2022-09-14 NOTE — PHYSICIAN QUERY
PT Name: Jamey Lei  MR #: [id number]     DOCUMENTATION CLARIFICATION     CDS/: Jennifer Pineda RN CCDS             Contact information:[email or web address]  This form is a permanent document in the medical record.     Query Date: September 14, 2022    By submitting this query, we are merely seeking further clarification of documentation.  Please utilize your independent clinical judgment when addressing the question(s) below.    The Medical Record contains the following   Indicators Supporting Clinical Findings Location in Medical Record   x Heart Failure documented Acute on chronic congestive heart failure H&P-Hospital medicine PN 9/13   x BNP [id number] Lab 9/2   x EF/Echo The left ventricle is normal in size with concentric remodeling and septal hypertyrophy with normal systolic function. The estimated ejection fraction is 67%.  Moderate right ventricular enlargement.  Mild left atrial enlargement.  Mild right atrial enlargement.  Mild mitral regurgitation.  Mild to moderate tricuspid regurgitation.  Mild pulmonic regurgitation.  The estimated PA systolic pressure is 52 mmHg.  There is pulmonary hypertension. 8/4/22   x Radiology findings  Patient with bilateral infiltrates on radiograph  his suspect this is more along the lines of pulmonary edema than bacterial pneumonia ER provider note   x Subjective/Objective Respiratory Conditions Patient tachypneic, speaking short complete sentences.  Remains in mild respiratory distress. ER provider note    Recent/Current MI      Heart Transplant, LVAD     x Edema, JVD Right lower leg: 3+ Pitting Edema present.     Left lower leg: 3+ Pitting Edema present. ER provider note    Ascites     x Diuretics/Meds Lasix 80 mg IV 9/2  Lasix 40 mg IV Q 12 hours 9/3-9/6  Lasix 40 mg oral 2 times daily 9/14- MAR    Other Treatment      Other       Heart failure is a clinical diagnosis which includes symptomatic fluid retention, elevated intracardiac pressures, and/or  the inability of the heart to deliver adequate blood flow.    Heart Failure with reduced Ejection Fraction (HFrEF) or Systolic Heart Failure (loses ability to contract normally, EF is <40%)    Heart Failure with preserved Ejection Fraction (HFpEF) or Diastolic Heart Failure (stiff ventricles, does not relax properly, EF is >50%)     Heart Failure with Combined Systolic and Diastolic Failure (stiff ventricles, does not relax properly and EF is <50%)    Mid-range or mildly reduced ejection fraction (HFmrEF) is classified as systolic heart failure.  Congestive heart failure with a recovered EF is classified as Diastolic Heart Failure.  Common clues to acute exacerbation:  Rapidly progressive symptoms (w/in 2 weeks of presentation), using IV diuretics, using supplemental O2, pulmonary edema on Xray, new or worsening pleural effusion, +JVD or other signs of volume overload, MI w/in 4 weeks, and/or BNP >500  The clinical guidelines noted are only system guidelines, and do not replace the providers clinical judgment.    Please further specify the type of Acute on Chronic CHF.  Thank you.    [  x ]  Acute on Chronic Diastolic Heart Failure (HFpEF) - worsening of CHF signs/symptoms in preexisting CHF   [   ]  Other (please specify): ___________________________________   [  ]  Clinically Undetermined           Please document in your progress notes daily for the duration of treatment until resolved and include in your discharge summary.    References:  American Heart Association editorial staff. (2017, May). Ejection Fraction Heart Failure Measurement. American Heart Association. https://www.heart.org/en/health-topics/heart-failure/diagnosing-heart-failure/ejection-fraction-heart-failure-measurement#:~:text=Ejection%20fraction%20(EF)%20is%20a,pushed%20out%20with%20each%20heartbeat  TORSTEN Valderrama (2020, December 15). Heart failure with preserved ejection fraction: Clinical manifestations and diagnosis. UpToDate.  https://www.[employer]/contents/heart-failure-with-preserved-ejection-fraction-clinical-manifestations-and-diagnosis.  ICD-10-CM/PCS Coding Clinic Third Quarter ICD-10, Effective with discharges: September 8, 2020 [facility] [place] Heart failure with mid-range or mildly reduced ejection fraction (2020).  ICD-10-CM/PCS Coding Clinic Third Quarter ICD-10, Effective with discharges: September 8, 2020 [facility] § Heart failure with recovered ejection fraction (2020).  Form No. [id number]

## 2022-09-14 NOTE — PLAN OF CARE
Recommendations  1. Rec'd cardiac, diabetic diet with consistency rec's from SLP.   2. Rec'd Lino BID via PO to promote wound healing and provide 190 kcals and 5g of protein.    3. Rec'd continue to encourage good PO intake.   4. RD to follow and make rec's accordingly  Goals: 1. Pt will consume > 75% EEN via PO intake by next RD follow up.  Nutrition Goal Status: new

## 2022-09-14 NOTE — SUBJECTIVE & OBJECTIVE
Past Medical History:   Diagnosis Date    A-fib     Abdominal pain, epigastric     Abdominal pain, generalized     Acute on chronic congestive heart failure 12/29/2021    Anticoagulant long-term use     Arthritis     Asteatotic eczema     Benign essential HTN     Benign prostatic hyperplasia     Brain tumor     Cardiovascular accident     Carotid artery stenosis     CHF (congestive heart failure)     CKD (chronic kidney disease), stage IV     Constipation     COPD (chronic obstructive pulmonary disease)     Depressed     DM (diabetes mellitus), secondary     Drug eruption     Eczema     Edema     Encounter for blood transfusion     Fever     H. pylori infection     Hearing loss     History of tobacco use     Hypercholesterolemia     Hypertensive renal disease, benign     Hypokalemia     Hyponatremia     Impaired fasting glucose     Lumbar pain     Lung mass     Malaise and fatigue     Malignant neoplasm of prostate     Meningioma     Mixed hyperlipidemia     Nasal bleeding     Neuropathy     Obesity, unspecified     SHAYNE (obstructive sleep apnea)     Osteoarthritis of knee     Osteoarthritis of multiple joints     Peripheral vascular disease, unspecified     Psychosis     Renal disorder     Seizure disorder     Seizures     Sleep apnea     Unspecified cataract     Unspecified chronic bronchitis     Unspecified osteoarthritis, unspecified site     Vertigo     Vitamin D deficiency     Wheezing        Past Surgical History:   Procedure Laterality Date    APPENDECTOMY      COLONOSCOPY      CORONARY ARTERY BYPASS GRAFT      FOOT SURGERY Left     HIP REPLACEMENT ARTHROPLASTY Right     HIP SURGERY      JOINT REPLACEMENT      KNEE SURGERY      LUNG BIOPSY Right 03/03/2022    benign    SHOULDER SURGERY Left     SHOULDER SURGERY      TOTAL KNEE ARTHROPLASTY Right     triple bypass         Review of patient's allergies indicates:   Allergen Reactions    Zolpidem        No current facility-administered medications on file prior  to encounter.     Current Outpatient Medications on File Prior to Encounter   Medication Sig    albuterol (PROVENTIL/VENTOLIN HFA) 90 mcg/actuation inhaler Inhale 1-2 puffs into the lungs every 6 (six) hours as needed for Wheezing or Shortness of Breath. Rescue    albuterol-ipratropium (DUO-NEB) 2.5 mg-0.5 mg/3 mL nebulizer solution inhale 3 milliliters by nebulization route 4 times per day and as needed, up to 6 doses per day for 30 days    apixaban (ELIQUIS) 5 mg Tab Take 5 mg by mouth 2 (two) times daily.    aspirin (ECOTRIN) 81 MG EC tablet Take 81 mg by mouth once daily.    atorvastatin (LIPITOR) 20 MG tablet Take 1 tablet (20 mg total) by mouth once daily.    multivitamin capsule Take 1 capsule by mouth once daily.    OXcarbazepine (TRILEPTAL) 300 MG Tab Take 1 tablet (300 mg total) by mouth 2 (two) times daily.    senna (SENOKOT) 8.6 mg tablet 2 tablets at bedtime as needed    tamsulosin (FLOMAX) 0.4 mg Cap Take 0.4 mg by mouth once daily.    torsemide (DEMADEX) 20 MG Tab Take 1 tablet (20 mg total) by mouth daily as needed (weight gain greater than 5 lbs).    vitamin D (VITAMIN D3) 1000 units Tab Take 1,000 Units by mouth once daily.    [DISCONTINUED] calcium carbonate (OS-DAVID) 600 mg calcium (1,500 mg) Tab Take 600 mg by mouth once.    [DISCONTINUED] carvediloL (COREG) 6.25 MG tablet TAKE ONE TABLET BY MOUTH TWICE DAILY (EVERY 12 HOURS) FOR BLOOD PRESSURE. THANKS !!    [DISCONTINUED] ezetimibe (ZETIA) 10 mg tablet Take 10 mg by mouth once daily.    [DISCONTINUED] FLUoxetine 20 MG capsule     [DISCONTINUED] gabapentin (NEURONTIN) 300 MG capsule 1 capsule    [DISCONTINUED] miconazole NITRATE 2 % (MICOTIN) 2 % top powder Apply topically as needed for Itching.    [DISCONTINUED] risperiDONE (RISPERDAL) 0.5 MG Tab Take 1 tablet (0.5 mg total) by mouth nightly as needed (agitation and halluctions).     Family History       Problem Relation (Age of Onset)    Arthritis Mother, Father    Asthma Sister    Heart  disease Father    Hyperlipidemia Mother, Sister    Hypertension Mother, Father, Sister, Brother          Tobacco Use    Smoking status: Every Day    Smokeless tobacco: Never    Tobacco comments:     quit 30 years ago   Substance and Sexual Activity    Alcohol use: Not Currently    Drug use: Never    Sexual activity: Not Currently     Review of Systems   Unable to perform ROS: Acuity of condition   Objective:     Vital Signs (Most Recent):  Temp: 97.5 °F (36.4 °C) (09/14/22 0712)  Pulse: 81 (09/14/22 0800)  Resp: (!) 41 (09/14/22 0800)  BP: (!) 144/92 (09/14/22 0800)  SpO2: 97 % (09/14/22 0800)   Vital Signs (24h Range):  Temp:  [96.5 °F (35.8 °C)-97.7 °F (36.5 °C)] 97.5 °F (36.4 °C)  Pulse:  [45-84] 81  Resp:  [17-50] 41  SpO2:  [97 %-100 %] 97 %  BP: (143-199)/(64-99) 144/92     Weight: 103 kg (227 lb 1.2 oz)  Body mass index is 33.53 kg/m².    Physical Exam  Constitutional:       General: He is in acute distress.      Appearance: He is obese. He is ill-appearing. He is not toxic-appearing.      Comments: sedated   HENT:      Head: Normocephalic.      Right Ear: External ear normal.      Left Ear: External ear normal.      Nose: No congestion or rhinorrhea.      Mouth/Throat:      Pharynx: Oropharynx is clear.   Eyes:      General: No scleral icterus.     Extraocular Movements: Extraocular movements intact.      Conjunctiva/sclera: Conjunctivae normal.   Cardiovascular:      Rate and Rhythm: Tachycardia present. Rhythm irregular.      Heart sounds: Normal heart sounds. No murmur heard.  Pulmonary:      Effort: Respiratory distress present.      Breath sounds: Rhonchi and rales present.      Comments: On vent  Chest:      Chest wall: No tenderness.   Abdominal:      General: There is no distension.      Palpations: Abdomen is soft.      Tenderness: There is no abdominal tenderness. There is no right CVA tenderness, left CVA tenderness or guarding.   Genitourinary:     Comments: Sanders with yellow urine; fecal  management system in place  Musculoskeletal:      Cervical back: Neck supple.   Lymphadenopathy:      Cervical: No cervical adenopathy.   Skin:     General: Skin is warm and dry.      Comments: Hemosiderin staining bilateral LE   Neurological:      Motor: Weakness present.   Psychiatric:         Mood and Affect: Mood normal.         Thought Content: Thought content normal.        Significant Labs: All pertinent labs within the past 24 hours have been reviewed.  A1C: No results for input(s): HGBA1C in the last 4320 hours.  ABGs:   Recent Labs   Lab 09/13/22  0523   PH 7.352   PCO2 41.7   HCO3 22.5   POCSATURATED 98.4   PO2 109*       Blood Culture: No results for input(s): LABBLOO in the last 48 hours.    BMP:   Recent Labs   Lab 09/14/22  0406   GLU 95      K 3.3*      CO2 25   BUN 27*   CREATININE 1.4   CALCIUM 9.1   MG 2.4       CBC:   Recent Labs   Lab 09/13/22  0425 09/14/22  0406   WBC 4.69 5.09   HGB 10.6* 9.8*   HCT 34.2* 31.0*    206       CMP:   Recent Labs   Lab 09/13/22  0425 09/14/22  0406    139   K 3.5 3.3*    109   CO2 23 25   GLU 84 95   BUN 34* 27*   CREATININE 1.7* 1.4   CALCIUM 8.9 9.1   PROT 6.0 5.9*   ALBUMIN 2.0* 2.0*   BILITOT 0.6 0.5   ALKPHOS 49* 52*   AST 20 21   ALT 18 17   ANIONGAP 7* 5*       No results for input(s): LACTATE in the last 48 hours.    No results for input(s): COLORU, APPEARANCEUA, PHUR, SPECGRAV, PROTEINUA, GLUCUA, KETONESU, BILIRUBINUA, OCCULTUA, NITRITE, UROBILINOGEN, LEUKOCYTESUR, RBCUA, WBCUA, BACTERIA, SQUAMEPITHEL, HYALINECASTS in the last 48 hours.    Invalid input(s): WRIGHTSUR    X-Ray Chest 1 View  Narrative: EXAMINATION:  XR CHEST 1 VIEW    CLINICAL HISTORY:  fu pneumo; intubated;    COMPARISON:  09/09/2022    FINDINGS:  Endotracheal tube and NG tube remain.  Suspect bilateral pleural effusions with adjacent atelectasis and or infiltrates, similar.  No new finding/detrimental interval change detected  Impression: No significant  change since 09/09/2022    Electronically signed by: Hodan Coy MD  Date:    09/11/2022  Time:    10:07  X-Ray Abdomen AP 1 View  Narrative: EXAMINATION:  XR ABDOMEN AP 1 VIEW    CLINICAL HISTORY:  n/v;    FINDINGS:  Tip of NG tube overlies upper abdomen.  Catheter overlies pelvis.  Nonspecific bowel gas pattern with gas-filled portions of colon.  Impression: Nonspecific bowel gas pattern.  No significant change since 09/08/2022    Electronically signed by: Hodan Coy MD  Date:    09/11/2022  Time:    09:31   Significant Imaging: I have reviewed all pertinent imaging results/findings within the past 24 hours.

## 2022-09-14 NOTE — ASSESSMENT & PLAN NOTE
Patient with Permanent atrial fibrillation which is controlled currently with No rate controlling home medications at this time. . Patient is currently in atrial fibrillation.[id number] Score: 4. HASBLED Score: Unable to calculate. Anticoagulation indicated. Anticoagulation done with eliquis 5 mg BID.    Eliquis on hold, cards following.

## 2022-09-14 NOTE — PLAN OF CARE
Patient rested well with no c/o. Awake, alert and pleasantly confused. Taking in PO without any difficulties. VSS, afebrile. UOP 700ml, FMS in place and patent

## 2022-09-14 NOTE — PROGRESS NOTES
[facility] Medicine  Progress Note    Patient Name: Jamey Lei  MRN: [id number]  Patient Class: IP- Inpatient   Admission Date: 9/2/2022  Length of Stay: 12 days  Attending Physician: Navi Domínguez III, MD  Primary Care Provider: Navi Domínguez III, MD        Subjective:     Principal Problem:Pneumonia        HPI:  81 year old male with hypertension, hypoerlipidemia, CHF, permanent atrial fibriallation on Eliquis, CAD s/p CABGx3, CKD stage 3, recent lung and brain mass findings presents to ED with worsening shortness of breath that started about a week ago with bilateral leg swelling with difficulty ambulating with his walker.     Patient hard of hearing endorsed since treatment in the ED, he has been feeling better. At home he does not use oxygen, breathing at SpO2 >95% on continuous O2 2L via nasal cannula on exam. There is no change in appetite, nausea, vomiting, diarrhea, constipation. Denies headaches, fever, chills, vision changes, chest pain, abdominal pain. Denies joint pain and loss of sensation in toes and foot bilaterally with increasing leg swelling.    ED course:  Tachypnea rate >30, CXR right lower lobe pneumonia, positive influenza A and COVID19 (fully vaccinated), NTproBNT >6000, lactate <0.2 x2, blood cultures x2 pending,  BUN/Cr 33/1.8 with improved eGFR from last hospitalization and discharge one week ago. Patient started on IV lasix 80 mg, responding well with clear urine collecting in Sanders catheter and IV antibiotics empirically started for right lobe pneumonia.       Overview/Hospital Course:  9/3/22 CG:  Says it feels much better and does complain of a cough.  He has had to go to the bathroom and is waiting for help and assistance getting up from the bed to go to the restroom.  No other acute overnight events.  9/4/22 CG: Doing better, still has a cough and feels weak.   9/5/22 CG:  Became more agitated last night and was trying to rip out his Sanders catheter.   Had to be given Seroquel to help him sleep and to calm him down.  9/6/22 FM:  Events of the weekend are noted.  Patient was admitted with COVID and influenza and a deterioration in his condition.  Patient has a history of lung cancer and is seeing an oncologist at an outside facility.  Patient has a history of chronic kidney disease as well as obesity diabetes neuropathy atrial fibrillation and his general health has come line significantly over the last year.  The patient has been in and out of the hospital and nursing facilities.  Last night the patient had a rapid response and ended up on the ventilator.  I reviewed the events and I am unsure if this was related to Seroquel will and I also see that he was becoming hypotensive.  Patient's chest x-ray is abnormal I will discuss with the family this morning.  9/7/22 FM:  Met with family today.  They were unable to make a decision on do not resuscitate.  They will consult with other family members.  Patient's volume status is improved as he was dry yesterday.  Patient's renal numbers all reviewed and noted.  Patient is able to open eyes on the ventilator and follow simple commands.  Will continue ventilatory support today as well as tube feedings.  9/8/22 FM:  Patient had a positive stool for blood as well as a persistent drop in his hemoglobin and was transfused 2 units of packed red blood cells yesterday.  Patient is admitted with COVID pneumonia influenza pneumonia and possible sepsis.  Patient has a history of lung cancer as well as multiple other comorbidities and I have met with the family yesterday and they are unable to come to a code status or long-term status decision.  I am encouraging hospice care as the patient has multiple irreversible illnesses and a decline in his quality of life.  9/9/22 FM:  Patient is relatively unchanged today.  Yesterday the patient did well with his CPAP challenges and we will continue today.  I suspect he can be taken off the  ventilator over the weekend some time.  Labs this morning are pending he has received 4 units of blood.  9/10 ND patient tolerating CPAP trials again today attempt to new trial.  Continue to wean sedation completely off.    9/11 ND pt with nv overnight - tfs beign held.  Has had bms.   Has been toleratign cpap trials. Wean sedation   9/12 FM:  Patient opens eyes to verbal stimulation this morning.  Patient's Diprivan is off family is at bedside.  Patient's tidal volumes are 650 on a pressure support of 10.  Tube feeds have been off and we may try extubation later today.  Encouraged family to place the patient do not resuscitate and they state they are not ready for this they would like to talk to patient once he is extubated.  9/13 FM:  Patient's CPAP trials yesterday ended up lasting 4-5 hours and the patient fatigued.  I suspect we will be able to extubate today.  9/14 FM:  Patient tolerated extubation yesterday and this morning is on nasal cannula with his ex-wife at bedside.  Patient has intermittent confusion but when questioned about wanting to be back on the ventilator he states he does not want this.  I will reach out to the son who is the primary decision maker.  The patient's tachypneic he is taking p.o. will re-ask therapy to begin slowly working with the patient.  Patient with significant edema/3rd spacing, will start lasix.      Past Medical History:   Diagnosis Date    A-fib     Abdominal pain, epigastric     Abdominal pain, generalized     Acute on chronic congestive heart failure 12/29/2021    Anticoagulant long-term use     Arthritis     Asteatotic eczema     Benign essential HTN     Benign prostatic hyperplasia     Brain tumor     Cardiovascular accident     Carotid artery stenosis     CHF (congestive heart failure)     CKD (chronic kidney disease), stage IV     Constipation     COPD (chronic obstructive pulmonary disease)     Depressed     DM (diabetes mellitus), secondary      Drug eruption     Eczema     Edema     Encounter for blood transfusion     Fever     H. pylori infection     Hearing loss     History of tobacco use     Hypercholesterolemia     Hypertensive renal disease, benign     Hypokalemia     Hyponatremia     Impaired fasting glucose     Lumbar pain     Lung mass     Malaise and fatigue     Malignant neoplasm of prostate     Meningioma     Mixed hyperlipidemia     Nasal bleeding     Neuropathy     Obesity, unspecified     SHAYNE (obstructive sleep apnea)     Osteoarthritis of knee     Osteoarthritis of multiple joints     Peripheral vascular disease, unspecified     Psychosis     Renal disorder     Seizure disorder     Seizures     Sleep apnea     Unspecified cataract     Unspecified chronic bronchitis     Unspecified osteoarthritis, unspecified site     Vertigo     Vitamin D deficiency     Wheezing        Past Surgical History:   Procedure Laterality Date    APPENDECTOMY      COLONOSCOPY      CORONARY ARTERY BYPASS GRAFT      FOOT SURGERY Left     HIP REPLACEMENT ARTHROPLASTY Right     HIP SURGERY      JOINT REPLACEMENT      KNEE SURGERY      LUNG BIOPSY Right 03/03/2022    benign    SHOULDER SURGERY Left     SHOULDER SURGERY      TOTAL KNEE ARTHROPLASTY Right     triple bypass         Review of patient's allergies indicates:   Allergen Reactions    Zolpidem        No current facility-administered medications on file prior to encounter.     Current Outpatient Medications on File Prior to Encounter   Medication Sig    albuterol (PROVENTIL/VENTOLIN HFA) 90 mcg/actuation inhaler Inhale 1-2 puffs into the lungs every 6 (six) hours as needed for Wheezing or Shortness of Breath. Rescue    albuterol-ipratropium (DUO-NEB) 2.5 mg-0.5 mg/3 mL nebulizer solution inhale 3 milliliters by nebulization route 4 times per day and as needed, up to 6 doses per day for 30 days    apixaban (ELIQUIS) 5 mg Tab Take 5 mg by mouth 2 (two) times  daily.    aspirin (ECOTRIN) 81 MG EC tablet Take 81 mg by mouth once daily.    atorvastatin (LIPITOR) 20 MG tablet Take 1 tablet (20 mg total) by mouth once daily.    multivitamin capsule Take 1 capsule by mouth once daily.    OXcarbazepine (TRILEPTAL) 300 MG Tab Take 1 tablet (300 mg total) by mouth 2 (two) times daily.    senna (SENOKOT) 8.6 mg tablet 2 tablets at bedtime as needed    tamsulosin (FLOMAX) 0.4 mg Cap Take 0.4 mg by mouth once daily.    torsemide (DEMADEX) 20 MG Tab Take 1 tablet (20 mg total) by mouth daily as needed (weight gain greater than 5 lbs).    vitamin D (VITAMIN D3) 1000 units Tab Take 1,000 Units by mouth once daily.    [DISCONTINUED] calcium carbonate (OS-DAVID) 600 mg calcium (1,500 mg) Tab Take 600 mg by mouth once.    [DISCONTINUED] carvediloL (COREG) 6.25 MG tablet TAKE ONE TABLET BY MOUTH TWICE DAILY (EVERY 12 HOURS) FOR BLOOD PRESSURE. THANKS !!    [DISCONTINUED] ezetimibe (ZETIA) 10 mg tablet Take 10 mg by mouth once daily.    [DISCONTINUED] FLUoxetine 20 MG capsule     [DISCONTINUED] gabapentin (NEURONTIN) 300 MG capsule 1 capsule    [DISCONTINUED] miconazole NITRATE 2 % (MICOTIN) 2 % top powder Apply topically as needed for Itching.    [DISCONTINUED] risperiDONE (RISPERDAL) 0.5 MG Tab Take 1 tablet (0.5 mg total) by mouth nightly as needed (agitation and halluctions).     Family History       Problem Relation (Age of Onset)    Arthritis Mother, Father    Asthma Sister    Heart disease Father    Hyperlipidemia Mother, Sister    Hypertension Mother, Father, Sister, Brother          Tobacco Use    Smoking status: Every Day    Smokeless tobacco: Never    Tobacco comments:     quit 30 years ago   Substance and Sexual Activity    Alcohol use: Not Currently    Drug use: Never    Sexual activity: Not Currently     Review of Systems   Unable to perform ROS: Acuity of condition   Objective:     Vital Signs (Most Recent):  Temp: 97.5 °F (36.4 °C) (09/14/22 0712)  Pulse:  81 (09/14/22 0800)  Resp: (!) 41 (09/14/22 0800)  BP: (!) 144/92 (09/14/22 0800)  SpO2: 97 % (09/14/22 0800)   Vital Signs (24h Range):  Temp:  [96.5 °F (35.8 °C)-97.7 °F (36.5 °C)] 97.5 °F (36.4 °C)  Pulse:  [45-84] 81  Resp:  [17-50] 41  SpO2:  [97 %-100 %] 97 %  BP: (143-199)/(64-99) 144/92     Weight: 103 kg (227 lb 1.2 oz)  Body mass index is 33.53 kg/m².    Physical Exam  Constitutional:       General: He is in acute distress.      Appearance: He is obese. He is ill-appearing. He is not toxic-appearing.      Comments: sedated   HENT:      Head: Normocephalic.      Right Ear: External ear normal.      Left Ear: External ear normal.      Nose: No congestion or rhinorrhea.      Mouth/Throat:      Pharynx: Oropharynx is clear.   Eyes:      General: No scleral icterus.     Extraocular Movements: Extraocular movements intact.      Conjunctiva/sclera: Conjunctivae normal.   Cardiovascular:      Rate and Rhythm: Tachycardia present. Rhythm irregular.      Heart sounds: Normal heart sounds. No murmur heard.  Pulmonary:      Effort: Respiratory distress present.      Breath sounds: Rhonchi and rales present.      Comments: On vent  Chest:      Chest wall: No tenderness.   Abdominal:      General: There is no distension.      Palpations: Abdomen is soft.      Tenderness: There is no abdominal tenderness. There is no right CVA tenderness, left CVA tenderness or guarding.   Genitourinary:     Comments: Sanders with yellow urine; fecal management system in place  Musculoskeletal:      Cervical back: Neck supple.   Lymphadenopathy:      Cervical: No cervical adenopathy.   Skin:     General: Skin is warm and dry.      Comments: Hemosiderin staining bilateral LE   Neurological:      Motor: Weakness present.   Psychiatric:         Mood and Affect: Mood normal.         Thought Content: Thought content normal.        Significant Labs: All pertinent labs within the past 24 hours have been reviewed.  A1C: No results for input(s):  HGBA1C in the last 4320 hours.  ABGs:   Recent Labs   Lab 09/13/22  0523   PH 7.352   PCO2 41.7   HCO3 22.5   POCSATURATED 98.4   PO2 109*       Blood Culture: No results for input(s): LABBLOO in the last 48 hours.    BMP:   Recent Labs   Lab 09/14/22  0406   GLU 95      K 3.3*      CO2 25   BUN 27*   CREATININE 1.4   CALCIUM 9.1   MG 2.4       CBC:   Recent Labs   Lab 09/13/22  0425 09/14/22  0406   WBC 4.69 5.09   HGB 10.6* 9.8*   HCT 34.2* 31.0*    206       CMP:   Recent Labs   Lab 09/13/22  0425 09/14/22  0406    139   K 3.5 3.3*    109   CO2 23 25   GLU 84 95   BUN 34* 27*   CREATININE 1.7* 1.4   CALCIUM 8.9 9.1   PROT 6.0 5.9*   ALBUMIN 2.0* 2.0*   BILITOT 0.6 0.5   ALKPHOS 49* 52*   AST 20 21   ALT 18 17   ANIONGAP 7* 5*       No results for input(s): LACTATE in the last 48 hours.    No results for input(s): COLORU, APPEARANCEUA, PHUR, SPECGRAV, PROTEINUA, GLUCUA, KETONESU, BILIRUBINUA, OCCULTUA, NITRITE, UROBILINOGEN, LEUKOCYTESUR, RBCUA, WBCUA, BACTERIA, SQUAMEPITHEL, HYALINECASTS in the last 48 hours.    Invalid input(s): WRIGHTSUR    X-Ray Chest 1 View  Narrative: EXAMINATION:  XR CHEST 1 VIEW    CLINICAL HISTORY:  fu pneumo; intubated;    COMPARISON:  09/09/2022    FINDINGS:  Endotracheal tube and NG tube remain.  Suspect bilateral pleural effusions with adjacent atelectasis and or infiltrates, similar.  No new finding/detrimental interval change detected  Impression: No significant change since 09/09/2022    Electronically signed by: Hodan Coy MD  Date:    09/11/2022  Time:    10:07  X-Ray Abdomen AP 1 View  Narrative: EXAMINATION:  XR ABDOMEN AP 1 VIEW    CLINICAL HISTORY:  n/v;    FINDINGS:  Tip of NG tube overlies upper abdomen.  Catheter overlies pelvis.  Nonspecific bowel gas pattern with gas-filled portions of colon.  Impression: Nonspecific bowel gas pattern.  No significant change since 09/08/2022    Electronically signed by: Hodan Coy,  MD  Date:    09/11/2022  Time:    09:31   Significant Imaging: I have reviewed all pertinent imaging results/findings within the past 24 hours.      Assessment/Plan:      * Pneumonia  CXR findings with new right lower lobe pneumonia. Bilateral infiltrates likely secondary to pulmonary edema from infectious.  - supplement oxygen as needed SpO2 >92-94%  - daily pulmonary hygiene  - continue coverage for community acquired PNA, azithrmoycin and rocephin  - f/u blood cultures x2 pending  - trend CBC  9/6 FM:  Now in ICU, expand coverage, speak with family.  9/7 FM:  Rec DNR, hospice care, family to discuss.  9/8 FM:  Encouraging hospice care.  9/9 FM:  Multifactorial  9/10 Cont abxs, nebs and cont to wean from vent  9/11 cont abxs, nebs, cpap trials - wean sedation - change to simv settings  9/12 FM:  May try extubation today.  9/13 FM:  Should be able to extubate this am.  9/14 FM:  Successfully extubated, patient requesting no vent, need to inform son.      N&V (nausea and vomiting)  Hold tfs currently - check xray  Planning extubation today.  Exatubated.      Malignant neoplasm of lung  Poor prognosis.  Encouraging DNR and/or hospice care.      COVID-19  Patient is identified as mild moderate severity.   Initiate standard COVID protocols; COVID-19 testing ,Infection Control notification  and isolation- respiratory, contact and droplet per protocol    Diagnostics: (leukopenia, hyponatremia, hyperferritinemia, elevated troponin, elevated d-dimer, age, and comorbidities are significant predictors of poor clinical outcome)  CBC, CMP and Portable CXR    Management: Initiate targeted therapy with possible use of Paxlovid. Maintain oxygen saturations 92-96% via Nasal Cannula  LPM and monitor with continuous/intermittent pulse oximetry. Inhaled bronchodilators as needed for shortness of breath., Continuous cardiac monitoring. and Manage respiratory failure (O2 requirement >10LPM or needing NIPPV/Mechanical ventilation)  and/or Pneumonia (active chest infiltrates) separately as described below.  Empirically started on IV antibiotics for right lower lobe pneumonia on chest xray.   Follow up blood cultures and adjust antibiotics accordingly.     Influenza  Positive flu A. Supplemental oxygen as needed to maintain >90%.  Has completed tamiflu.      Acute respiratory failure with hypoxia and hypercarbia  Multifactorial, cardiopulmonary (CHF, atrial fibrillation, COPD) and infectious processes (COVID19 and fluA).  - see above management.      CHF (congestive heart failure)  Hypervolemic today, start lasix and monitor closely.      Stage 3 chronic kidney disease  Stable BUN/Cr 33/1.8 likely at new baseline with recovering eGFR of >40 from <20 5 weeks ago. Continue to monitor as patient on IV diuretics.       Seizure disorder  On meds - no active szs at this time      A-fib  Patient with Permanent atrial fibrillation which is controlled currently with No rate controlling home medications at this time. . Patient is currently in atrial fibrillation.[id number] Score: 4. HASBLED Score: Unable to calculate. Anticoagulation indicated. Anticoagulation done with eliquis 5 mg BID.    Eliquis on hold, cards following.    COPD (chronic obstructive pulmonary disease)  Cont. Neb txt.      Localized edema  Home med diuretic torsemide 20 mg PRN. +3 pitting on exam. Patient responding to IV lasix 80 mg in ED.   Continue with IV diuretics and taper with progress, patient endorses breathing a little better.   - IV lasix 40 mg BID  - restrict PO intake to 1.5 L fluid ounces  - monitor I/Os  9/6 FM:  Now hypotension, low dose IVFs  9/7 FM:  Volume improved, was dry.  9/8 FM:  Improved.  9/9 FM:  Stable.  9/12 FM:  Holding IVFs.  9/14 FM:  Anasarca, 3rd spacing, see above.    Weakness  Pt/ot once extubated        VTE Risk Mitigation (From admission, onward)         Ordered     IP VTE HIGH RISK PATIENT  Once         09/02/22 1513     Place sequential  compression device  Until discontinued         09/02/22 1513                Discharge Planning   GLORY:      Code Status: Full Code   Is the patient medically ready for discharge?:     Reason for patient still in hospital (select all that apply): Patient trending condition  Discharge Plan A: Home with family, Home Health                  Navi Domínguez III, MD  Department of Hospital Medicine   [place] [facility]

## 2022-09-14 NOTE — PROGRESS NOTES
[place] - Intensive Care  Adult Nutrition  Progress Note    SUMMARY       Recommendations  1. Rec'd cardiac, diabetic diet with consistency rec's from SLP.   2. Rec'd Lino BID via PO to promote wound healing and provide 190 kcals and 5g of protein.    3. Rec'd continue to encourage good PO intake.   4. RD to follow and make rec's accordingly  Goals: 1. Pt will consume > 75% EEN via PO intake by next RD follow up.  Nutrition Goal Status: new  Communication of RD Recs: reviewed with RN    Assessment and Plan    Nutrition Problem  Inadequate oral intake     Related to (etiology):   NPO/intubation status     Signs and Symptoms (as evidenced by):   0% PO intake     Interventions/Recommendations (treatment strategy):  1. Rec'd cardiac, diabetic diet with consistency rec's from SLP.   2. Rec'd Lino BID via PO to promote wound healing and provide 190 kcals and 5g of protein.    3. Rec'd continue to encourage good PO intake.   4. RD to follow and make rec's accordingly     Nutrition Diagnosis Status:   Improved       Reason for Assessment    Reason For Assessment: RD follow-up  Diagnosis: other (see comments) (Pneumonia)  Relevant Medical History: A-fib,Abdominal pain, epigastric,bdominal pain,  Acute on chronic congestive heart failure,nticoagulant long-term use,rthritis  ,Asteatotic eczema ,enign essential HTN    , Benign prostatic hyperplasia  ,Brain tumor   ,Cardiovascular accident  ,  Carotid artery stenosis  ,  CHF (congestive heart failure)   ,CKD , stage IV , Constipation ,COPD ,Depressed ,DM ), secondary    ,dug eruption ,Eczema  ,lashanda     Encounter for blood transfusion     Fever     H. pylori infection     Hearing loss     History of tobacco use     Hypercholesterolemia     Hypertensive renal disease, benign     Hypokalemia     Hyponatremia     Impaired fasting glucose     Lumbar pain     Lung mass     Malaise and fatigue     Malignant neoplasm of prostate     Meningioma     Mixed hyperlipidemia     Nasal  "bleeding     Neuropathy     Obesity, unspecified     SHAYNE (obstructive sleep apnea)     Osteoarthritis of knee     Osteoarthritis of multiple joints     Peripheral vascular disease, unspecified     Psychosis     Renal disorder     Seizure disorder     Seizures     Sleep apnea     Unspecified cataract     Unspecified chronic bronchitis     Unspecified osteoarthritis, unspecified site     Vertigo     Vitamin D deficiency     Wheezing  Interdisciplinary Rounds: did not attend  General Information Comments: Followed up on pt today. Noted as per chart pt was successfully extubated and his diet was advanced. Spoke with RN about pt's current appetite and PO intake. RN stated pt's appetite is good and that pt ate almost 100% this morning. Rec'd continue to encourage good PO intake. RD to follow and make rec's accordingly.  Nutrition Discharge Planning: TBD as care progresses    Nutrition Risk Screen    Nutrition Risk Screen: dysphagia or difficulty swallowing    Nutrition/Diet History    Spiritual, Cultural Beliefs, Lutheran Practices, Values that Affect Care: no  Food Allergies: NKFA    Anthropometrics    Temp: 97.5 °F (36.4 °C)  Height Method: Stated  Height: 5' 9" (175.3 cm)  Height (inches): 69 in  Weight Method: Bed Scale  Weight: 103 kg (227 lb 1.2 oz)  Weight (lb): 227.08 lb  Ideal Body Weight (IBW), Male: 160 lb  % Ideal Body Weight, Male (lb): 141.93 %  BMI (Calculated): 33.5  BMI Grade: 30 - 34.9- obesity - grade I    Lab/Procedures/Meds    Pertinent Labs Reviewed: reviewed  Pertinent Labs Comments: GFR = 51.1  Pertinent Medications Reviewed: reviewed    Estimated/Assessed Needs    Weight Used For Calorie Calculations: 103 kg (227 lb 1.2 oz)  Energy Calorie Requirements (kcal): 2432  Energy Need Method: [facility]  Protein Requirements: 124-155 (1.2-1.5g/kg)  Weight Used For Protein Calculations: 103 kg (227 lb 1.2 oz)  Fluid Requirements (mL): 2432 (1mL/kcal)  Estimated Fluid Requirement Method: RDA Method  RDA " Method (mL): 2432    Nutrition Prescription Ordered    Current Diet Order: heart healthy, dysphagia soft  Nutrition Order Comments: Pt requires total assist with meals  Current Nutrition Support Formula Ordered: Suplena  Current Nutrition Support Rate Ordered: 40 (ml)  Current Nutrition Support Frequency Ordered: on hold  Oral Nutrition Supplement: None    Evaluation of Received Nutrient/Fluid Intake    Enteral Calories (kcal): 1728  Enteral Protein (gm): 43  Enteral (Free Water) Fluid (mL): 165  Free Water Flush Fluid (mL): 12 (mL/hr)  Other Calories (kcal): 190 (Lino)  Total Calories (kcal): 1918  % Kcal Needs: 100%  Other Protein (gm): 5 (Lino)  Total Protein (gm): 48  % Protein Needs: 100%  I/O: -480  Energy Calories Required: meeting needs  Protein Required: meeting needs  Tolerance: tolerating  % Intake of Estimated Energy Needs: 75 - 100 %  % Meal Intake: 75 - 100 %    Nutrition Risk    Level of Risk/Frequency of Follow-up: moderate     Monitor and Evaluation    Food and Nutrient Intake: energy intake, food and beverage intake  Food and Nutrient Adminstration: diet order  Knowledge/Beliefs/Attitudes: food and nutrition knowledge/skill, beliefs and attitudes  Physical Activity and Function: nutrition-related ADLs and IADLs  Anthropometric Measurements: height/length, weight change, weight, body mass index  Biochemical Data, Medical Tests and Procedures: electrolyte and renal panel, gastrointestinal profile, glucose/endocrine profile, inflammatory profile, lipid profile  Nutrition-Focused Physical Findings: overall appearance     Nutrition Follow-Up    RD Follow-up?: Yes

## 2022-09-14 NOTE — PT/OT/SLP EVAL
Physical Therapy Evaluation    Patient Name:  Jamey Lei   MRN:  [id number]    Recommendations:     Discharge Recommendations:  TBD  Discharge Equipment Recommendations: TBD  Barriers to discharge:  current medical and functional status    Assessment:     Jamey Lei is a 81 y.o. male admitted with a medical diagnosis of Pneumonia.  He presents with the following impairments/functional limitations:  weakness, impaired endurance, impaired functional mobility, impaired balance, decreased coordination, decreased lower extremity function, decreased safety awareness, impaired coordination (Simultaneous filing. User may not have seen previous data.) .    Rehab Prognosis: Fair; patient would benefit from acute skilled PT services to address these deficits and reach maximum level of function.    Recent Surgery: * No surgery found *      Plan:     During this hospitalization, patient to be seen 5 x/week to address the identified rehab impairments via gait training, therapeutic activities, therapeutic exercises and progress toward the following goals:    Plan of Care Expires:  09/28/22    Subjective     Chief Complaint: patient complains of generalized fatigue but agreeable to participate in PT.   Patient/Family Comments/goals: Significant other at bedside.   Pain/Comfort:  Pain Rating 1: 0/10 (Simultaneous filing. User may not have seen previous data.)    Patients cultural, spiritual, Muslim conflicts given the current situation: no (Simultaneous filing. User may not have seen previous data.)    Living Environment:  Lives with granddaughter and significant other was his sitter and was walking around home with RW prior to illness. No steps or stairs to enter home.   Prior to admission, patients level of function was Supervision/SBA  Equipment used at home: RW, hospital bed Upon discharge, patient will have assistance from significant other.    Objective:     Communicated with nurse prior to session.  Patient  found HOB elevated with espinal catheter, peripheral IV, pulse ox (continuous), telemetry, SCD, bowel management system, blood pressure cuff (Simultaneous filing. User may not have seen previous data.)  upon PT entry to room.    General Precautions: Standard, airborne, contact, droplet, fall (Simultaneous filing. User may not have seen previous data.)   Orthopedic Precautions:N/A (Simultaneous filing. User may not have seen previous data.)   Braces: N/A (Simultaneous filing. User may not have seen previous data.)  Respiratory Status: Room air    Exams:  RLE ROM: WFL  RLE Strength: Deficits: 2+/5  LLE ROM: WFL  LLE Strength: Deficits: 2+/5    Functional Mobility:  Bed Mobility:     Rolling Left:  dependence  Rolling Right: dependence  Scooting: dependence  Supine to Sit: dependence and longsitting; unable to sit up at EOB  Sit to Supine: dependence and longsitting to supine  Transfers:  unable at this time  Gait: unable at this time  Balance: sitting dependent      AM-PAC 6 CLICK MOBILITY  Total Score:6     Patient left HOB elevated with all lines intact, nurse notified, and significant other present.    GOALS:   Multidisciplinary Problems       Physical Therapy Goals          Problem: Physical Therapy    Goal Priority Disciplines Outcome Goal Variances Interventions   Physical Therapy Goal     PT, PT/OT      Description: Goals to be met by: 2022     Patient will increase functional independence with mobility by performin. Supine to sit with MInimal Assistance  2. Sit to supine with MInimal Assistance  3. Rolling to Left and Right with Minimal Assistance.  4. Sit to stand transfer with Minimal Assistance  5. Sitting at edge of bed x10 minutes with Minimal Assistance                         History:     Past Medical History:   Diagnosis Date    A-fib     Abdominal pain, epigastric     Abdominal pain, generalized     Acute on chronic congestive heart failure 2021    Anticoagulant long-term use      Arthritis     Asteatotic eczema     Benign essential HTN     Benign prostatic hyperplasia     Brain tumor     Cardiovascular accident     Carotid artery stenosis     CHF (congestive heart failure)     CKD (chronic kidney disease), stage IV     Constipation     COPD (chronic obstructive pulmonary disease)     Depressed     DM (diabetes mellitus), secondary     Drug eruption     Eczema     Edema     Encounter for blood transfusion     Fever     H. pylori infection     Hearing loss     History of tobacco use     Hypercholesterolemia     Hypertensive renal disease, benign     Hypokalemia     Hyponatremia     Impaired fasting glucose     Lumbar pain     Lung mass     Malaise and fatigue     Malignant neoplasm of prostate     Meningioma     Mixed hyperlipidemia     Nasal bleeding     Neuropathy     Obesity, unspecified     SHAYNE (obstructive sleep apnea)     Osteoarthritis of knee     Osteoarthritis of multiple joints     Peripheral vascular disease, unspecified     Psychosis     Renal disorder     Seizure disorder     Seizures     Sleep apnea     Unspecified cataract     Unspecified chronic bronchitis     Unspecified osteoarthritis, unspecified site     Vertigo     Vitamin D deficiency     Wheezing        Past Surgical History:   Procedure Laterality Date    APPENDECTOMY      COLONOSCOPY      CORONARY ARTERY BYPASS GRAFT      FOOT SURGERY Left     HIP REPLACEMENT ARTHROPLASTY Right     HIP SURGERY      JOINT REPLACEMENT      KNEE SURGERY      LUNG BIOPSY Right 03/03/2022    benign    SHOULDER SURGERY Left     SHOULDER SURGERY      TOTAL KNEE ARTHROPLASTY Right     triple bypass         Time Tracking:     PT Received On: 09/14/22  PT Start Time: 0926     PT Stop Time: 0949  PT Total Time (min): 23 min     Billable Minutes: Evaluation 23 09/14/2022

## 2022-09-14 NOTE — PT/OT/SLP EVAL
Occupational Therapy   Evaluation    Name: Jamey Lei  MRN: [id number]  Admitting Diagnosis:  Pneumonia  Recent Surgery: * No surgery found *      Recommendations:     Discharge Recommendations: other (see comments) (To be further determined based on progress prior to discharge.)  Discharge Equipment Recommendations:  other (see comments) (To be further determined based on progress prior to discharge.)  Barriers to discharge:  Other (Comment) (Medical status)    Assessment:     Jamey Lei is a 81 y.o. male with a medical diagnosis of Pneumonia.  He presents with functional deficits impacting independence with ADL's including functional mobility. Performance deficits affecting function: weakness, impaired endurance, impaired self care skills, impaired functional mobility, impaired balance, impaired cognition, decreased coordination, decreased upper extremity function, decreased lower extremity function, decreased safety awareness, pain, decreased ROM, edema, impaired cardiopulmonary response to activity.      Rehab Prognosis: Good; patient would benefit from acute skilled OT services to address these deficits and reach maximum level of function.       Plan:     Patient to be seen 6 x/week to address the above listed problems via self-care/home management, therapeutic activities, therapeutic exercises, cognitive retraining  Plan of Care Expires: 09/23/22  Plan of Care Reviewed with: patient    Subjective     Chief Complaint: weakness and pain   Patient/Family Comments/goals: Pt unable to provide personal goal on this date impacted by confusion.    Occupational Profile:  Living Environment: Pt lives with his granddaughter while his ex-wife is his caregiver.  Previous level of function: Assistance with most ADL's  Roles and Routines: BADL's  Equipment Used at Home:  other (see comments) (Unkown in which patient poor historian)  Assistance upon Discharge: Pt's granddaughter and ex-wife will assist as  needed.    Pain/Comfort:  Pain Rating 1: 8/10  Location - Side 1: Bilateral  Location 1: leg  Pain Addressed 1: Reposition, Cessation of Activity, Distraction, Nurse notified  Pain Rating Post-Intervention 1: 2/10    Patients cultural, spiritual, Samaritan conflicts given the current situation: no    Objective:     Communicated with: nurse prior to session.  Patient found supine with blood pressure cuff, bowel management system, espinal catheter, peripheral IV, pulse ox (continuous), SCD, telemetry upon OT entry to room.    General Precautions: Standard, airborne, contact, droplet, fall   Orthopedic Precautions:N/A   Braces: N/A  Respiratory Status: Room air    Occupational Performance:    Bed Mobility:    Patient completed Rolling/Turning to Left with  total assistance  Patient completed Rolling/Turning to Right with total assistance  Patient completed Scooting/Bridging with total assistance  Patient completed Supine to Sit with total assistance  Patient completed Sit to Supine with total assistance    Functional Mobility/Transfers:  Patient completed Sit <> Stand Transfer with total assistance  with  no assistive device   Patient completed Toilet Transfer Squat Pivot technique with total assistance with  bedside commode  Functional Mobility: Pt unable to ambulate at this time.    Activities of Daily Living:  Feeding:  maximal assistance    Grooming: total assistance    Bathing: total assistance    Upper Body Dressing: total assistance    Lower Body Dressing: total assistance    Toileting: total assistance      Cognitive/Visual Perceptual:  Cognitive/Psychosocial Skills:  -       Oriented to: Person   -       Follows Commands/attention:Easily distracted  -       Communication: clear/fluent  -       Memory: Poor immediate recall  -       Safety awareness/insight to disability: impaired   -       Mood/Affect/Coping skills/emotional control: Cooperative    Physical Exam:  Postural examination/scapula alignment: -        Rounded shoulders  -       Forward head  -       Kyphosis  Skin integrity: Wound buttocks  Edema:  Moderate bilateral UE's and LE's   Sensation: -       Intact  light/touch bilateral UE's  Upper Extremity Range of Motion:  -       Right Upper Extremity: Deficits: Active ROM to approx 25-50% of normal range while resisting passive ROM greater than 75%  -       Left Upper Extremity: Deficits: Active ROM to approx 25-50% of normal range while resisting passive ROM greater than 75%  Upper Extremity Strength: -       Right Upper Extremity: Deficits: 2 to 2+ / 5  -       Left Upper Extremity: Deficits: 2 to 2+ / 5  Fine Motor Coordination: -       Impaired  Left hand thumb/finger opposition skills  , Right hand thumb/finger opposition skills  , Left hand, manipulation of objects  , and Right hand, manipulation of objects    Gross motor coordination: Impaired bilateral UE hand-eye coordination    AMPAC 6 Click ADL:  AMPAC Total Score: 10    Treatment & Education:  Pt was provided education / instruction regarding role of OT and established OT POC.     Patient left supine with all lines intact, call button in reach, and nurse notified    GOALS:   Goals to be met by: 09/23/22     Patient will increase functional independence with ADLs by performing:    Feeding with Set-up Assistance.  UE Dressing with Minimal Assistance.  LE Dressing with Moderate Assistance.  Grooming while seated with Set-up Assistance.  Toileting from bedside commode with Moderate Assistance for hygiene and clothing management.   Bathing from  edge of bed with Moderate Assistance.  Step transfer with Moderate Assistance  Toilet transfer to bedside commode with Moderate Assistance.  Increased functional strength to 3+ to 4-/5 for bilateral UE's.      History:     Past Medical History:   Diagnosis Date    A-fib     Abdominal pain, epigastric     Abdominal pain, generalized     Acute on chronic congestive heart failure 12/29/2021    Anticoagulant long-term  use     Arthritis     Asteatotic eczema     Benign essential HTN     Benign prostatic hyperplasia     Brain tumor     Cardiovascular accident     Carotid artery stenosis     CHF (congestive heart failure)     CKD (chronic kidney disease), stage IV     Constipation     COPD (chronic obstructive pulmonary disease)     Depressed     DM (diabetes mellitus), secondary     Drug eruption     Eczema     Edema     Encounter for blood transfusion     Fever     H. pylori infection     Hearing loss     History of tobacco use     Hypercholesterolemia     Hypertensive renal disease, benign     Hypokalemia     Hyponatremia     Impaired fasting glucose     Lumbar pain     Lung mass     Malaise and fatigue     Malignant neoplasm of prostate     Meningioma     Mixed hyperlipidemia     Nasal bleeding     Neuropathy     Obesity, unspecified     SHAYNE (obstructive sleep apnea)     Osteoarthritis of knee     Osteoarthritis of multiple joints     Peripheral vascular disease, unspecified     Psychosis     Renal disorder     Seizure disorder     Seizures     Sleep apnea     Unspecified cataract     Unspecified chronic bronchitis     Unspecified osteoarthritis, unspecified site     Vertigo     Vitamin D deficiency     Wheezing          Past Surgical History:   Procedure Laterality Date    APPENDECTOMY      COLONOSCOPY      CORONARY ARTERY BYPASS GRAFT      FOOT SURGERY Left     HIP REPLACEMENT ARTHROPLASTY Right     HIP SURGERY      JOINT REPLACEMENT      KNEE SURGERY      LUNG BIOPSY Right 03/03/2022    benign    SHOULDER SURGERY Left     SHOULDER SURGERY      TOTAL KNEE ARTHROPLASTY Right     triple bypass         Time Tracking:     OT Date of Treatment: 09/14/22  OT Start Time: 1440  OT Stop Time: 1519  OT Total Time (min): 39 min    Billable Minutes:Evaluation 39    9/14/2022

## 2022-09-14 NOTE — ASSESSMENT & PLAN NOTE
CXR findings with new right lower lobe pneumonia. Bilateral infiltrates likely secondary to pulmonary edema from infectious.  - supplement oxygen as needed SpO2 >92-94%  - daily pulmonary hygiene  - continue coverage for community acquired PNA, azithrmoycin and rocephin  - f/u blood cultures x2 pending  - trend CBC  9/6 FM:  Now in ICU, expand coverage, speak with family.  9/7 FM:  Rec DNR, hospice care, family to discuss.  9/8 FM:  Encouraging hospice care.  9/9 FM:  Multifactorial  9/10 Cont abxs, nebs and cont to wean from vent  9/11 cont abxs, nebs, cpap trials - wean sedation - change to simv settings  9/12 FM:  May try extubation today.  9/13 FM:  Should be able to extubate this am.  9/14 FM:  Successfully extubated, patient requesting no vent, need to inform son.

## 2022-09-14 NOTE — PHYSICIAN QUERY
PT Name: Jamey Lei  MR #: [id number]     DOCUMENTATION CLARIFICATION     CDS/: Jennifer Pineda RN CCDS            Contact information:[email or web address]  This form is a permanent document in the medical record.     Query Date: September 14, 2022    By submitting this query, we are merely seeking further clarification of documentation.  Please utilize your independent clinical judgment when addressing the question(s) below.    The Medical Record contains the following:   Indicators   Supporting Clinical Findings Location in Medical Record   x Non-blanchable erythema/redness Partial thickness tissue loss. Shallow open ulcer with a red or pink wound bed, without slough. Intact or Open/Ruptured Serum-filled blister Wound care CN 9/12    Ulcer/Injury/Skin Breakdown      Deep Tissue Injury     x Wound care consult  Wound care CN 9/12   x Acute/Chronic Illness COVID, Influenza, pneumonia, CHF H&P   x Medication/Treatment Cleansed with:;Sterile normal saline   Applied;SiliconeCleansed with pH balanced cleanser    Wound care CN 9/12    Other       The clinical guidelines noted are only a system guideline. It does not replace the providers clinical judgment.    Per the National Pressure Injury Advisory Panel:   A pressure injury is localized damage to the skin and underlying soft tissue usually over a bony prominence or related to a medical or other device. The injury can present as intact skin or an open ulcer and may be painful. The injury occurs as a result of intense and/or prolonged pressure or pressure in combination with shear. The tolerance of soft tissue for pressure and shear may also be affected by microclimate, nutrition, perfusion, co-morbidities and condition of the soft tissue.       Stage 1 Pressure Injury:  Intact skin with a localized area of non-blanchable erythema, which may appear differently in darkly pigmented skin. Color changes do not include purple or maroon discoloration; these  may indicate deep tissue pressure injury.    Stage 2 Pressure Injury:  Partial-thickness loss of skin with exposed dermis. The wound bed is viable, pink or red, moist, and may also present as an intact or ruptured serum-filled blister.    Stage 3 Pressure Injury:  Full-thickness loss of skin, in which adipose (fat) is visible in the ulcer and granulation tissue and epibole (rolled wound edges) are often present. Slough and/or eschar may be visible. Undermining and tunneling may occur.    Stage 4 Pressure Injury:  Full-thickness skin and tissue loss with exposed or directly palpable fascia, muscle, tendon, ligament, cartilage or bone in the ulcer. Slough and/or eschar may be visible. Epibole (rolled edges), undermining and/or tunneling often occur.    Unstageable Pressure Injury:  Full-thickness skin and tissue loss in which the extent of tissue damage within the ulcer cannot be confirmed because it is obscured by slough or eschar. If slough or eschar is removed, a Stage 3 or Stage 4 pressure injury will be revealed.    Deep Tissue Pressure Injury:  Intact or non-intact skin with localized area of persistent non-blanchable deep red, maroon, purple discoloration or epidermal separation revealing a dark wound bed or blood filled blister. This injury results from intense and/or prolonged pressure and shear forces at the bone-muscle interface. The wound may evolve rapidly to reveal the actual extent of tissue injury, or may resolve without tissue loss. If necrotic tissue, subcutaneous tissue, granulation tissue, fascia, muscle or other underlying structures are visible, this indicates a full thickness pressure injury (Unstageable, Stage 3 or Stage 4). Do not use DTPI to describe vascular, traumatic, neuropathic, or dermatologic conditions.   Medical Device Related Pressure Injury: This describes an etiology. Medical device related pressure injuries result from the use of devices designed and applied for diagnostic or  therapeutic purposes. The resultant pressure injury generally conforms to the pattern or shape of the device. The injury should be staged using the staging system.    Mucosal Membrane Pressure Injury: Mucosal membrane pressure injury is found on mucous membranes with a history of a medical device in use at the location of the injury. Due to the anatomy of the tissue these ulcers cannot be staged.       Provider, please provide the integumentary diagnosis related to the documentation of _Left Buttock___________:     [  x ] Pressure Injury/Decubitus Ulcer, Stage 2   [   ] Other Integumentary Diagnosis (please specify):______________   [  ] Clinically Undetermined               Please document in your progress notes daily for the duration of treatment until resolved and include in your discharge summary.    Reference:    SALUD Harrell., Mike, MARITZA HOWARD., Goldberg, M., ANNIA Jenkins., SALUD Clark, & LEE Cisneros. (2016). Revised National Pressure Ulcer Advisory Panel Pressure Injury Staging System: Revised Pressure Injury Staging System. J Wound Ostomy Continence Nurs, 43(6), 585-597. doi:10.1097/won.[id number]    Form No.[id number]

## 2022-09-14 NOTE — ASSESSMENT & PLAN NOTE
Home med diuretic torsemide 20 mg PRN. +3 pitting on exam. Patient responding to IV lasix 80 mg in ED.   Continue with IV diuretics and taper with progress, patient endorses breathing a little better.   - IV lasix 40 mg BID  - restrict PO intake to 1.5 L fluid ounces  - monitor I/Os  9/6 FM:  Now hypotension, low dose IVFs  9/7 FM:  Volume improved, was dry.  9/8 FM:  Improved.  9/9 FM:  Stable.  9/12 FM:  Holding IVFs.  9/14 FM:  Anasarca, 3rd spacing, see above.

## 2022-09-14 NOTE — EICU
Rounding (Video Assessment):  Yes    Comments: Video assessment complete. Pt sitting up in bed w/ family and PT at bedside. VSS on NC and NAD noted.

## 2022-09-14 NOTE — PLAN OF CARE
Pt placed on room air this morning.  All meds given crushed and in pudding.  BP at 1700 201/88.  Dr. Domínguez notified and Bidil and Norvasc ordered and first doses given Stat.  Pt eating supper with help of CNA.  Will continue to monitor.

## 2022-09-14 NOTE — PT/OT/SLP EVAL
Speech Language Pathology Evaluation  Bedside Swallow    Patient Name:  Jamey Lei   MRN:  [id number]  Admitting Diagnosis: Pneumonia    Recommendations:                 General Recommendations:   Monitor diet toleration and follow up with full speech/language/cognitive evaluation   Diet recommendations:  Soft & Bite Sized Diet - IDDSI Level 6, Thin liquids - IDDSI Level 0 MEDS - whole as tolerated   Aspiration Precautions: Assistance with meals, Feed only when awake/alert, HOB to 90 degrees, Monitor for s/s of aspiration, Remain upright 30 minutes post meal, Standard aspiration precautions, and Wear oxygen during intake   General Precautions: Standard, special contact, fall  Communication strategies:  provide increased time to answer    History:     Past Medical History:   Diagnosis Date    A-fib     Abdominal pain, epigastric     Abdominal pain, generalized     Acute on chronic congestive heart failure 12/29/2021    Anticoagulant long-term use     Arthritis     Asteatotic eczema     Benign essential HTN     Benign prostatic hyperplasia     Brain tumor     Cardiovascular accident     Carotid artery stenosis     CHF (congestive heart failure)     CKD (chronic kidney disease), stage IV     Constipation     COPD (chronic obstructive pulmonary disease)     Depressed     DM (diabetes mellitus), secondary     Drug eruption     Eczema     Edema     Encounter for blood transfusion     Fever     H. pylori infection     Hearing loss     History of tobacco use     Hypercholesterolemia     Hypertensive renal disease, benign     Hypokalemia     Hyponatremia     Impaired fasting glucose     Lumbar pain     Lung mass     Malaise and fatigue     Malignant neoplasm of prostate     Meningioma     Mixed hyperlipidemia     Nasal bleeding     Neuropathy     Obesity, unspecified     SHAYNE (obstructive sleep apnea)     Osteoarthritis of knee     Osteoarthritis of multiple joints     Peripheral vascular disease, unspecified      Psychosis     Renal disorder     Seizure disorder     Seizures     Sleep apnea     Unspecified cataract     Unspecified chronic bronchitis     Unspecified osteoarthritis, unspecified site     Vertigo     Vitamin D deficiency     Wheezing        Past Surgical History:   Procedure Laterality Date    APPENDECTOMY      COLONOSCOPY      CORONARY ARTERY BYPASS GRAFT      FOOT SURGERY Left     HIP REPLACEMENT ARTHROPLASTY Right     HIP SURGERY      JOINT REPLACEMENT      KNEE SURGERY      LUNG BIOPSY Right 03/03/2022    benign    SHOULDER SURGERY Left     SHOULDER SURGERY      TOTAL KNEE ARTHROPLASTY Right     triple bypass         Modified Barium Swallow: n/a    Chest X-Rays: refer to most recent scans    Prior diet: NPO      Subjective     Pt stated he was hungry; spouse confirmed multiple moments of confusion in pts responses.   Patient goals: Pt goal is to be able to eat      Pain/Comfort:   Pt reported no pain    Objective:   Reason for Referral: A bedside swallow evaluation was performed to assess the efficiency of the patient's swallow function, rule out aspiration and make recommendations regarding safe dietary consistencies, and effective compensatory strategies.    Oral Musculature Evaluation  Oral Musculature: general weakness  Dentition: edentulous, other (see comments) (dentures absent)  Secretion Management: adequate  Mandibular Strength and Mobility: WFL  Oral Labial Strength and Mobility: WFL  Lingual Strength and Mobility: WFL  Volitional Cough: WFL  Volitional Swallow: Min decrease  Voice Prior to PO Intake: hoarse    Bedside Swallow Eval:   Consistencies Assessed:  Thin liquids via straw sip with total assist  Puree tsp bite with total assist   Soft solids tsp bite with total assist     Pt unable to feed self independently at this time secondary to physical weakness    Oral Phase:   Thin liquids WFL  Puree WFL  Soft solids WFL    Pharyngeal Phase:   Thin liquids no overt clinical signs/symptoms of  pharyngeal dysphagia and chronic cough   Puree no overt clinical signs/symptoms of pharyngeal dysphagia and chronic cough   Soft solids no overt clinical signs/symptoms of pharyngeal dysphagia and chronic cough     Minimal decrease of hyolaryngeal excursion secondary to physical positioning and weakness     Compensatory Strategies  Effortful swallow    Pt PO intake - 100% of oatmeal and chopped/moist pancake with milk.     Interpretation: Patient with overall functional swallow of the oral, pharyngeal, and esophageal stage of the swallow with generalized weakness present and max assist required with meals.     Activity Capabilities: oral motor abilities and participation     Activity Limitations: absent dentition, poor endurance, and confusion    Treatment: diet tolerance and liquid tolerance    Assessment:     Jamey Lei is a 81 y.o. male with a diagnosis of Pneumonia presents with an overall functional swallow and demonstrated adequate PO intake with max assist during feeding. Pt oriented to name; disoriented x3 and presents with confusion. Pt demonstrated overall ability to express wants/needs at this time.     Goals:   Multidisciplinary Problems       SLP Goals          Problem: SLP    Goal Priority Disciplines Outcome   SLP Goal     SLP    Description: Speech Therapy Goals     TPW tolerate soft and bite sized diet given max assist with feeding without s/s aspiration or dysphagia given min cues for compensatory strategies such as effortful swallow.    TPW tolerate soft and bite sized diet given max assist with feeding without s/s aspiration or dysphagia given min cues for compensatory strategies such as effortful swallow.    TPW participate in speech/language/cognitive evaluation with SLP.                       Plan:     Patient to be seen:  2 x/week   Plan of Care expires:  09/21/22  Plan of Care reviewed with:      SLP Follow-Up:  Yes       Discharge recommendations:  other (see comments) (TBD pending pt  progress)   Barriers to Discharge:  Level of Skilled Assistance Needed   and Safety Awareness      Time Tracking:   Therapy Minutes  SLP Treatment Date: 09/14/22  Speech Start Time: 0822  Speech Stop Time: 0859  Speech Total (min): 37 min    Billable Minutes:  Eval Swallow and Oral Function x 37 mins    09/14/2022

## 2022-09-15 PROBLEM — I63.9 CVA (CEREBRAL VASCULAR ACCIDENT): Status: ACTIVE | Noted: 2022-09-15

## 2022-09-15 PROBLEM — K92.2 GIB (GASTROINTESTINAL BLEEDING): Status: ACTIVE | Noted: 2022-09-15

## 2022-09-15 LAB
ALBUMIN SERPL BCP-MCNC: 1.8 G/DL (ref 3.5–5.2)
ALP SERPL-CCNC: 46 U/L (ref 55–135)
ALT SERPL W/O P-5'-P-CCNC: 14 U/L (ref 10–44)
ANION GAP SERPL CALC-SCNC: 3 MMOL/L (ref 8–16)
AST SERPL-CCNC: 17 U/L (ref 10–40)
BASOPHILS # BLD AUTO: 0.05 K/UL (ref 0–0.2)
BASOPHILS NFR BLD: 0.9 % (ref 0–1.9)
BILIRUB SERPL-MCNC: 0.4 MG/DL (ref 0.1–1)
BUN SERPL-MCNC: 34 MG/DL (ref 8–23)
CALCIUM SERPL-MCNC: 8.9 MG/DL (ref 8.7–10.5)
CHLORIDE SERPL-SCNC: 113 MMOL/L (ref 95–110)
CO2 SERPL-SCNC: 26 MMOL/L (ref 23–29)
CREAT SERPL-MCNC: 1.8 MG/DL (ref 0.5–1.4)
DIFFERENTIAL METHOD: ABNORMAL
EOSINOPHIL # BLD AUTO: 0.1 K/UL (ref 0–0.5)
EOSINOPHIL NFR BLD: 1.9 % (ref 0–8)
ERYTHROCYTE [DISTWIDTH] IN BLOOD BY AUTOMATED COUNT: 17.1 % (ref 11.5–14.5)
EST. GFR  (NO RACE VARIABLE): 37.8 ML/MIN/1.73 M^2
GLUCOSE SERPL-MCNC: 135 MG/DL (ref 70–110)
HCT VFR BLD AUTO: 27.8 % (ref 40–54)
HGB BLD-MCNC: 8.8 G/DL (ref 14–18)
IMM GRANULOCYTES # BLD AUTO: 0.02 K/UL (ref 0–0.04)
IMM GRANULOCYTES NFR BLD AUTO: 0.4 % (ref 0–0.5)
LYMPHOCYTES # BLD AUTO: 0.8 K/UL (ref 1–4.8)
LYMPHOCYTES NFR BLD: 13.9 % (ref 18–48)
MAGNESIUM SERPL-MCNC: 2 MG/DL (ref 1.6–2.6)
MCH RBC QN AUTO: 26 PG (ref 27–31)
MCHC RBC AUTO-ENTMCNC: 31.7 G/DL (ref 32–36)
MCV RBC AUTO: 82 FL (ref 82–98)
MONOCYTES # BLD AUTO: 0.8 K/UL (ref 0.3–1)
MONOCYTES NFR BLD: 14.8 % (ref 4–15)
NEUTROPHILS # BLD AUTO: 3.9 K/UL (ref 1.8–7.7)
NEUTROPHILS NFR BLD: 68.1 % (ref 38–73)
NRBC BLD-RTO: 0 /100 WBC
PLATELET # BLD AUTO: 202 K/UL (ref 150–450)
PMV BLD AUTO: 10.4 FL (ref 9.2–12.9)
POTASSIUM SERPL-SCNC: 3.4 MMOL/L (ref 3.5–5.1)
PROT SERPL-MCNC: 5.4 G/DL (ref 6–8.4)
RBC # BLD AUTO: 3.39 M/UL (ref 4.6–6.2)
SODIUM SERPL-SCNC: 142 MMOL/L (ref 136–145)
WBC # BLD AUTO: 5.67 K/UL (ref 3.9–12.7)

## 2022-09-15 PROCEDURE — 85025 COMPLETE CBC W/AUTO DIFF WBC: CPT | Performed by: INTERNAL MEDICINE

## 2022-09-15 PROCEDURE — 99900031 HC PATIENT EDUCATION (STAT)

## 2022-09-15 PROCEDURE — 83735 ASSAY OF MAGNESIUM: CPT | Performed by: INTERNAL MEDICINE

## 2022-09-15 PROCEDURE — 97110 THERAPEUTIC EXERCISES: CPT

## 2022-09-15 PROCEDURE — 94640 AIRWAY INHALATION TREATMENT: CPT

## 2022-09-15 PROCEDURE — 27201109 HC SYSTEM FECAL MANAGEMENT

## 2022-09-15 PROCEDURE — 25000003 PHARM REV CODE 250: Performed by: INTERNAL MEDICINE

## 2022-09-15 PROCEDURE — 97530 THERAPEUTIC ACTIVITIES: CPT | Mod: CQ

## 2022-09-15 PROCEDURE — 99900035 HC TECH TIME PER 15 MIN (STAT)

## 2022-09-15 PROCEDURE — 94761 N-INVAS EAR/PLS OXIMETRY MLT: CPT

## 2022-09-15 PROCEDURE — 27000221 HC OXYGEN, UP TO 24 HOURS

## 2022-09-15 PROCEDURE — 63600175 PHARM REV CODE 636 W HCPCS: Performed by: INTERNAL MEDICINE

## 2022-09-15 PROCEDURE — 36415 COLL VENOUS BLD VENIPUNCTURE: CPT | Performed by: INTERNAL MEDICINE

## 2022-09-15 PROCEDURE — 80053 COMPREHEN METABOLIC PANEL: CPT | Performed by: INTERNAL MEDICINE

## 2022-09-15 PROCEDURE — 25000242 PHARM REV CODE 250 ALT 637 W/ HCPCS: Performed by: INTERNAL MEDICINE

## 2022-09-15 PROCEDURE — 27000207 HC ISOLATION

## 2022-09-15 PROCEDURE — 94660 CPAP INITIATION&MGMT: CPT

## 2022-09-15 PROCEDURE — 11000001 HC ACUTE MED/SURG PRIVATE ROOM

## 2022-09-15 RX ORDER — PANTOPRAZOLE SODIUM 40 MG/1
40 TABLET, DELAYED RELEASE ORAL DAILY
Status: DISCONTINUED | OUTPATIENT
Start: 2022-09-15 | End: 2022-09-19 | Stop reason: HOSPADM

## 2022-09-15 RX ORDER — HYDROCODONE BITARTRATE AND ACETAMINOPHEN 5; 325 MG/1; MG/1
1 TABLET ORAL EVERY 6 HOURS PRN
Status: DISCONTINUED | OUTPATIENT
Start: 2022-09-15 | End: 2022-09-19 | Stop reason: HOSPADM

## 2022-09-15 RX ORDER — ASPIRIN 81 MG/1
81 TABLET ORAL DAILY
Status: DISCONTINUED | OUTPATIENT
Start: 2022-09-15 | End: 2022-09-19 | Stop reason: HOSPADM

## 2022-09-15 RX ADMIN — TAMSULOSIN HYDROCHLORIDE 0.4 MG: 0.4 CAPSULE ORAL at 08:09

## 2022-09-15 RX ADMIN — PIPERACILLIN AND TAZOBACTAM 4.5 G: 4; .5 INJECTION, POWDER, LYOPHILIZED, FOR SOLUTION INTRAVENOUS; PARENTERAL at 02:09

## 2022-09-15 RX ADMIN — ISOSORBIDE DINITRATE: 20 TABLET ORAL at 02:09

## 2022-09-15 RX ADMIN — RISPERIDONE 0.5 MG: 0.5 TABLET ORAL at 08:09

## 2022-09-15 RX ADMIN — OXCARBAZEPINE 300 MG: 300 TABLET, FILM COATED ORAL at 08:09

## 2022-09-15 RX ADMIN — ISOSORBIDE DINITRATE: 20 TABLET ORAL at 08:09

## 2022-09-15 RX ADMIN — FUROSEMIDE 40 MG: 40 TABLET ORAL at 08:09

## 2022-09-15 RX ADMIN — IPRATROPIUM BROMIDE AND ALBUTEROL SULFATE 3 ML: 2.5; .5 SOLUTION RESPIRATORY (INHALATION) at 01:09

## 2022-09-15 RX ADMIN — FUROSEMIDE 40 MG: 40 TABLET ORAL at 05:09

## 2022-09-15 RX ADMIN — FAMOTIDINE 20 MG: 20 TABLET ORAL at 08:09

## 2022-09-15 RX ADMIN — AMLODIPINE BESYLATE 5 MG: 5 TABLET ORAL at 08:09

## 2022-09-15 RX ADMIN — IPRATROPIUM BROMIDE AND ALBUTEROL SULFATE 3 ML: 2.5; .5 SOLUTION RESPIRATORY (INHALATION) at 07:09

## 2022-09-15 RX ADMIN — ASPIRIN 81 MG: 81 TABLET, COATED ORAL at 08:09

## 2022-09-15 RX ADMIN — ATORVASTATIN CALCIUM 40 MG: 40 TABLET, FILM COATED ORAL at 08:09

## 2022-09-15 RX ADMIN — PANTOPRAZOLE SODIUM 40 MG: 40 TABLET, DELAYED RELEASE ORAL at 08:09

## 2022-09-15 RX ADMIN — HYDROCODONE BITARTRATE AND ACETAMINOPHEN 1 TABLET: 5; 325 TABLET ORAL at 12:09

## 2022-09-15 NOTE — PLAN OF CARE
Patient rested well on bipap. VSS, afebrile. More awake and alert this shift, speech more clear. Tolerating PO well. UOP 300ml, FMS remains in place with moderate loose brown stool.

## 2022-09-15 NOTE — PLAN OF CARE
Pt resting quietly.  Uneventful shift noted today.  BP and HR WNL today since addition of BP meds added.  Pt ate good today.  Generalized edema noted.  Will continue to monitor.

## 2022-09-15 NOTE — PROGRESS NOTES
[facility] Medicine  Progress Note    Patient Name: Jamey Lei  MRN: [id number]  Patient Class: IP- Inpatient   Admission Date: 9/2/2022  Length of Stay: 13 days  Attending Physician: Navi Domínguez III, MD  Primary Care Provider: Navi Domínguez III, MD        Subjective:     Principal Problem:Pneumonia        HPI:  81 year old male with hypertension, hypoerlipidemia, CHF, permanent atrial fibriallation on Eliquis, CAD s/p CABGx3, CKD stage 3, recent lung and brain mass findings presents to ED with worsening shortness of breath that started about a week ago with bilateral leg swelling with difficulty ambulating with his walker.     Patient hard of hearing endorsed since treatment in the ED, he has been feeling better. At home he does not use oxygen, breathing at SpO2 >95% on continuous O2 2L via nasal cannula on exam. There is no change in appetite, nausea, vomiting, diarrhea, constipation. Denies headaches, fever, chills, vision changes, chest pain, abdominal pain. Denies joint pain and loss of sensation in toes and foot bilaterally with increasing leg swelling.    ED course:  Tachypnea rate >30, CXR right lower lobe pneumonia, positive influenza A and COVID19 (fully vaccinated), NTproBNT >6000, lactate <0.2 x2, blood cultures x2 pending,  BUN/Cr 33/1.8 with improved eGFR from last hospitalization and discharge one week ago. Patient started on IV lasix 80 mg, responding well with clear urine collecting in Sanders catheter and IV antibiotics empirically started for right lobe pneumonia.       Overview/Hospital Course:  9/3/22 CG:  Says it feels much better and does complain of a cough.  He has had to go to the bathroom and is waiting for help and assistance getting up from the bed to go to the restroom.  No other acute overnight events.  9/4/22 CG: Doing better, still has a cough and feels weak.   9/5/22 CG:  Became more agitated last night and was trying to rip out his Sanders catheter.   Had to be given Seroquel to help him sleep and to calm him down.  9/6/22 FM:  Events of the weekend are noted.  Patient was admitted with COVID and influenza and a deterioration in his condition.  Patient has a history of lung cancer and is seeing an oncologist at an outside facility.  Patient has a history of chronic kidney disease as well as obesity diabetes neuropathy atrial fibrillation and his general health has come line significantly over the last year.  The patient has been in and out of the hospital and nursing facilities.  Last night the patient had a rapid response and ended up on the ventilator.  I reviewed the events and I am unsure if this was related to Seroquel will and I also see that he was becoming hypotensive.  Patient's chest x-ray is abnormal I will discuss with the family this morning.  9/7/22 FM:  Met with family today.  They were unable to make a decision on do not resuscitate.  They will consult with other family members.  Patient's volume status is improved as he was dry yesterday.  Patient's renal numbers all reviewed and noted.  Patient is able to open eyes on the ventilator and follow simple commands.  Will continue ventilatory support today as well as tube feedings.  9/8/22 FM:  Patient had a positive stool for blood as well as a persistent drop in his hemoglobin and was transfused 2 units of packed red blood cells yesterday.  Patient is admitted with COVID pneumonia influenza pneumonia and possible sepsis.  Patient has a history of lung cancer as well as multiple other comorbidities and I have met with the family yesterday and they are unable to come to a code status or long-term status decision.  I am encouraging hospice care as the patient has multiple irreversible illnesses and a decline in his quality of life.  9/9/22 FM:  Patient is relatively unchanged today.  Yesterday the patient did well with his CPAP challenges and we will continue today.  I suspect he can be taken off the  ventilator over the weekend some time.  Labs this morning are pending he has received 4 units of blood.  9/10 ND patient tolerating CPAP trials again today attempt to new trial.  Continue to wean sedation completely off.    9/11 ND pt with nv overnight - tfs beign held.  Has had bms.   Has been toleratign cpap trials. Wean sedation   9/12 FM:  Patient opens eyes to verbal stimulation this morning.  Patient's Diprivan is off family is at bedside.  Patient's tidal volumes are 650 on a pressure support of 10.  Tube feeds have been off and we may try extubation later today.  Encouraged family to place the patient do not resuscitate and they state they are not ready for this they would like to talk to patient once he is extubated.  9/13 FM:  Patient's CPAP trials yesterday ended up lasting 4-5 hours and the patient fatigued.  I suspect we will be able to extubate today.  9/14 FM:  Patient tolerated extubation yesterday and this morning is on nasal cannula with his ex-wife at bedside.  Patient has intermittent confusion but when questioned about wanting to be back on the ventilator he states he does not want this.  I will reach out to the son who is the primary decision maker.  The patient's tachypneic he is taking p.o. will re-ask therapy to begin slowly working with the patient.  Patient with significant edema/3rd spacing, will start lasix.  9/15 FM:  Patient's family is at the bedside today.  Patient's vital signs are stable and his oxygen saturation is 97% on room air.  Patient has confusion and altered mental status.  He also seems to have some right-sided neglect and right-sided weakness.  Patient is off of all anticoagulants and antiplatelets as he has had a gastrointestinal bleed during this admission.  I have been continue to encourage do not resuscitate orders in the family is having difficulty making his decision.  Patient is unable to make this decision when questioned.  Transitioning to the floor  today.      Past Medical History:   Diagnosis Date    A-fib     Abdominal pain, epigastric     Abdominal pain, generalized     Acute on chronic congestive heart failure 12/29/2021    Anticoagulant long-term use     Arthritis     Asteatotic eczema     Benign essential HTN     Benign prostatic hyperplasia     Brain tumor     Cardiovascular accident     Carotid artery stenosis     CHF (congestive heart failure)     CKD (chronic kidney disease), stage IV     Constipation     COPD (chronic obstructive pulmonary disease)     Depressed     DM (diabetes mellitus), secondary     Drug eruption     Eczema     Edema     Encounter for blood transfusion     Fever     H. pylori infection     Hearing loss     History of tobacco use     Hypercholesterolemia     Hypertensive renal disease, benign     Hypokalemia     Hyponatremia     Impaired fasting glucose     Lumbar pain     Lung mass     Malaise and fatigue     Malignant neoplasm of prostate     Meningioma     Mixed hyperlipidemia     Nasal bleeding     Neuropathy     Obesity, unspecified     SHAYNE (obstructive sleep apnea)     Osteoarthritis of knee     Osteoarthritis of multiple joints     Peripheral vascular disease, unspecified     Psychosis     Renal disorder     Seizure disorder     Seizures     Sleep apnea     Unspecified cataract     Unspecified chronic bronchitis     Unspecified osteoarthritis, unspecified site     Vertigo     Vitamin D deficiency     Wheezing        Past Surgical History:   Procedure Laterality Date    APPENDECTOMY      COLONOSCOPY      CORONARY ARTERY BYPASS GRAFT      FOOT SURGERY Left     HIP REPLACEMENT ARTHROPLASTY Right     HIP SURGERY      JOINT REPLACEMENT      KNEE SURGERY      LUNG BIOPSY Right 03/03/2022    benign    SHOULDER SURGERY Left     SHOULDER SURGERY      TOTAL KNEE ARTHROPLASTY Right     triple bypass         Review of patient's allergies indicates:   Allergen  Reactions    Zolpidem        No current facility-administered medications on file prior to encounter.     Current Outpatient Medications on File Prior to Encounter   Medication Sig    albuterol (PROVENTIL/VENTOLIN HFA) 90 mcg/actuation inhaler Inhale 1-2 puffs into the lungs every 6 (six) hours as needed for Wheezing or Shortness of Breath. Rescue    albuterol-ipratropium (DUO-NEB) 2.5 mg-0.5 mg/3 mL nebulizer solution inhale 3 milliliters by nebulization route 4 times per day and as needed, up to 6 doses per day for 30 days    apixaban (ELIQUIS) 5 mg Tab Take 5 mg by mouth 2 (two) times daily.    aspirin (ECOTRIN) 81 MG EC tablet Take 81 mg by mouth once daily.    atorvastatin (LIPITOR) 20 MG tablet Take 1 tablet (20 mg total) by mouth once daily.    multivitamin capsule Take 1 capsule by mouth once daily.    OXcarbazepine (TRILEPTAL) 300 MG Tab Take 1 tablet (300 mg total) by mouth 2 (two) times daily.    senna (SENOKOT) 8.6 mg tablet 2 tablets at bedtime as needed    tamsulosin (FLOMAX) 0.4 mg Cap Take 0.4 mg by mouth once daily.    torsemide (DEMADEX) 20 MG Tab Take 1 tablet (20 mg total) by mouth daily as needed (weight gain greater than 5 lbs).    vitamin D (VITAMIN D3) 1000 units Tab Take 1,000 Units by mouth once daily.    [DISCONTINUED] calcium carbonate (OS-DAVID) 600 mg calcium (1,500 mg) Tab Take 600 mg by mouth once.    [DISCONTINUED] carvediloL (COREG) 6.25 MG tablet TAKE ONE TABLET BY MOUTH TWICE DAILY (EVERY 12 HOURS) FOR BLOOD PRESSURE. THANKS !!    [DISCONTINUED] ezetimibe (ZETIA) 10 mg tablet Take 10 mg by mouth once daily.    [DISCONTINUED] FLUoxetine 20 MG capsule     [DISCONTINUED] gabapentin (NEURONTIN) 300 MG capsule 1 capsule    [DISCONTINUED] miconazole NITRATE 2 % (MICOTIN) 2 % top powder Apply topically as needed for Itching.    [DISCONTINUED] risperiDONE (RISPERDAL) 0.5 MG Tab Take 1 tablet (0.5 mg total) by mouth nightly as needed (agitation and halluctions).     Family  History       Problem Relation (Age of Onset)    Arthritis Mother, Father    Asthma Sister    Heart disease Father    Hyperlipidemia Mother, Sister    Hypertension Mother, Father, Sister, Brother          Tobacco Use    Smoking status: Every Day    Smokeless tobacco: Never    Tobacco comments:     quit 30 years ago   Substance and Sexual Activity    Alcohol use: Not Currently    Drug use: Never    Sexual activity: Not Currently     Review of Systems   Unable to perform ROS: Acuity of condition   Objective:     Vital Signs (Most Recent):  Temp: 97.9 °F (36.6 °C) (09/15/22 0719)  Pulse: 63 (09/15/22 0815)  Resp: (!) 33 (09/15/22 0815)  BP: (!) 130/55 (09/15/22 0800)  SpO2: 97 % (09/15/22 0815)   Vital Signs (24h Range):  Temp:  [97.7 °F (36.5 °C)-98.3 °F (36.8 °C)] 97.9 °F (36.6 °C)  Pulse:  [30-97] 63  Resp:  [24-55] 33  SpO2:  [90 %-100 %] 97 %  BP: (103-201)/(51-88) 130/55     Weight: 103 kg (227 lb 1.2 oz)  Body mass index is 33.53 kg/m².    Physical Exam  Constitutional:       General: He is not in acute distress.     Appearance: He is obese. He is ill-appearing. He is not toxic-appearing.      Comments: sedated   HENT:      Head: Normocephalic.      Right Ear: External ear normal.      Left Ear: External ear normal.      Nose: No congestion or rhinorrhea.      Mouth/Throat:      Pharynx: Oropharynx is clear.   Eyes:      General: No scleral icterus.     Extraocular Movements: Extraocular movements intact.      Conjunctiva/sclera: Conjunctivae normal.   Cardiovascular:      Rate and Rhythm: Tachycardia present. Rhythm irregular.      Heart sounds: Normal heart sounds. No murmur heard.  Pulmonary:      Effort: No respiratory distress.      Breath sounds: No stridor. Rhonchi and rales present.      Comments: On vent  Chest:      Chest wall: No tenderness.   Abdominal:      General: There is no distension.      Palpations: Abdomen is soft.      Tenderness: There is no abdominal tenderness. There is no right  CVA tenderness, left CVA tenderness or guarding.   Genitourinary:     Comments: Sanders with yellow urine; fecal management system in place  Musculoskeletal:      Cervical back: Neck supple.   Lymphadenopathy:      Cervical: No cervical adenopathy.   Skin:     General: Skin is warm and dry.      Comments: Hemosiderin staining bilateral LE   Neurological:      Motor: Weakness present.   Psychiatric:         Mood and Affect: Mood normal.         Thought Content: Thought content normal.        Significant Labs: All pertinent labs within the past 24 hours have been reviewed.  A1C: No results for input(s): HGBA1C in the last 4320 hours.  ABGs: No results for input(s): PH, PCO2, HCO3, POCSATURATED, BE, TOTALHB, COHB, METHB, O2HB, POCFIO2, PO2 in the last 48 hours.    Blood Culture: No results for input(s): LABBLOO in the last 48 hours.    BMP:   Recent Labs   Lab 09/15/22  0338   *      K 3.4*   *   CO2 26   BUN 34*   CREATININE 1.8*   CALCIUM 8.9   MG 2.0       CBC:   Recent Labs   Lab 09/14/22  0406 09/15/22  0338   WBC 5.09 5.67   HGB 9.8* 8.8*   HCT 31.0* 27.8*    202       CMP:   Recent Labs   Lab 09/14/22 0406 09/15/22  0338    142   K 3.3* 3.4*    113*   CO2 25 26   GLU 95 135*   BUN 27* 34*   CREATININE 1.4 1.8*   CALCIUM 9.1 8.9   PROT 5.9* 5.4*   ALBUMIN 2.0* 1.8*   BILITOT 0.5 0.4   ALKPHOS 52* 46*   AST 21 17   ALT 17 14   ANIONGAP 5* 3*       No results for input(s): LACTATE in the last 48 hours.    No results for input(s): COLORU, APPEARANCEUA, PHUR, SPECGRAV, PROTEINUA, GLUCUA, KETONESU, BILIRUBINUA, OCCULTUA, NITRITE, UROBILINOGEN, LEUKOCYTESUR, RBCUA, WBCUA, BACTERIA, SQUAMEPITHEL, HYALINECASTS in the last 48 hours.    Invalid input(s): WRIGHTSUR    X-Ray Chest 1 View  Narrative: EXAMINATION:  XR CHEST 1 VIEW    CLINICAL HISTORY:  fu pneumo; intubated;    COMPARISON:  09/09/2022    FINDINGS:  Endotracheal tube and NG tube remain.  Suspect bilateral pleural effusions  with adjacent atelectasis and or infiltrates, similar.  No new finding/detrimental interval change detected  Impression: No significant change since 09/09/2022    Electronically signed by: Hodan Coy MD  Date:    09/11/2022  Time:    10:07  X-Ray Abdomen AP 1 View  Narrative: EXAMINATION:  XR ABDOMEN AP 1 VIEW    CLINICAL HISTORY:  n/v;    FINDINGS:  Tip of NG tube overlies upper abdomen.  Catheter overlies pelvis.  Nonspecific bowel gas pattern with gas-filled portions of colon.  Impression: Nonspecific bowel gas pattern.  No significant change since 09/08/2022    Electronically signed by: Hodan Coy MD  Date:    09/11/2022  Time:    09:31   Significant Imaging: I have reviewed all pertinent imaging results/findings within the past 24 hours.      Assessment/Plan:      * Pneumonia  CXR findings with new right lower lobe pneumonia. Bilateral infiltrates likely secondary to pulmonary edema from infectious.  - supplement oxygen as needed SpO2 >92-94%  - daily pulmonary hygiene  - continue coverage for community acquired PNA, azithrmoycin and rocephin  - f/u blood cultures x2 pending  - trend CBC  9/6 FM:  Now in ICU, expand coverage, speak with family.  9/7 FM:  Rec DNR, hospice care, family to discuss.  9/8 FM:  Encouraging hospice care.  9/9 FM:  Multifactorial  9/10 Cont abxs, nebs and cont to wean from vent  9/11 cont abxs, nebs, cpap trials - wean sedation - change to simv settings  9/12 FM:  May try extubation today.  9/13 FM:  Should be able to extubate this am.  9/14 FM:  Successfully extubated, patient requesting no vent, need to inform son.  9/15 FM:  S/P 10 days of abx, will stop.      CVA (cerebral vascular accident)    Suspect possible CVA, will CT, + right sided weakness.  Poor prognosis.    GIB (gastrointestinal bleeding)  Resolved for now.    N&V (nausea and vomiting)  Hold tfs currently - check xray  Planning extubation today.  Exatubated.      Malignant neoplasm of lung  Poor prognosis.   Encouraging DNR and/or hospice care.      COVID-19  Patient is identified as mild moderate severity.   Initiate standard COVID protocols; COVID-19 testing ,Infection Control notification  and isolation- respiratory, contact and droplet per protocol    Diagnostics: (leukopenia, hyponatremia, hyperferritinemia, elevated troponin, elevated d-dimer, age, and comorbidities are significant predictors of poor clinical outcome)  CBC, CMP and Portable CXR    Management: Initiate targeted therapy with possible use of Paxlovid. Maintain oxygen saturations 92-96% via Nasal Cannula  LPM and monitor with continuous/intermittent pulse oximetry. Inhaled bronchodilators as needed for shortness of breath., Continuous cardiac monitoring. and Manage respiratory failure (O2 requirement >10LPM or needing NIPPV/Mechanical ventilation) and/or Pneumonia (active chest infiltrates) separately as described below.  Empirically started on IV antibiotics for right lower lobe pneumonia on chest xray.   Follow up blood cultures and adjust antibiotics accordingly.     Influenza  Positive flu A. Supplemental oxygen as needed to maintain >90%.  Has completed tamiflu.      Acute respiratory failure with hypoxia and hypercarbia  Multifactorial, cardiopulmonary (CHF, atrial fibrillation, COPD) and infectious processes (COVID19 and fluA).  - see above management.   Off of O2    CHF (congestive heart failure)  Hypervolemic today, start lasix and monitor closely.      Stage 3 chronic kidney disease  Stable BUN/Cr 33/1.8 likely at new baseline with recovering eGFR of >40 from <20 5 weeks ago. Continue to monitor as patient on IV diuretics.       Seizure disorder  On meds - no active szs at this time      A-fib  Patient with Permanent atrial fibrillation which is controlled currently with No rate controlling home medications at this time. . Patient is currently in atrial fibrillation.[id number] Score: 4. HASBLED Score: Unable to calculate. Anticoagulation  indicated. Anticoagulation done with eliquis 5 mg BID.    Eliquis on hold, cards following.  Will resume ASA.    COPD (chronic obstructive pulmonary disease)  Cont. Neb txt.      Localized edema  Home med diuretic torsemide 20 mg PRN. +3 pitting on exam. Patient responding to IV lasix 80 mg in ED.   Continue with IV diuretics and taper with progress, patient endorses breathing a little better.   - IV lasix 40 mg BID  - restrict PO intake to 1.5 L fluid ounces  - monitor I/Os  9/6 FM:  Now hypotension, low dose IVFs  9/7 FM:  Volume improved, was dry.  9/8 FM:  Improved.  9/9 FM:  Stable.  9/12 FM:  Holding IVFs.  9/14 FM:  Anasarca, 3rd spacing, see above.  9/15 FM:  Continue po lasix.    Weakness  Pt/ot once extubated        VTE Risk Mitigation (From admission, onward)         Ordered     IP VTE HIGH RISK PATIENT  Once         09/02/22 1513     Place sequential compression device  Until discontinued         09/02/22 1513                Discharge Planning   GLORY:      Code Status: Full Code   Is the patient medically ready for discharge?:     Reason for patient still in hospital (select all that apply): Patient unstable  Discharge Plan A: Home with family, Home Health                  Navi Domínguez III, MD  Department of Hospital Medicine   [place] - Intensive [facility]

## 2022-09-15 NOTE — ASSESSMENT & PLAN NOTE
Home med diuretic torsemide 20 mg PRN. +3 pitting on exam. Patient responding to IV lasix 80 mg in ED.   Continue with IV diuretics and taper with progress, patient endorses breathing a little better.   - IV lasix 40 mg BID  - restrict PO intake to 1.5 L fluid ounces  - monitor I/Os  9/6 FM:  Now hypotension, low dose IVFs  9/7 FM:  Volume improved, was dry.  9/8 FM:  Improved.  9/9 FM:  Stable.  9/12 FM:  Holding IVFs.  9/14 FM:  Anasarca, 3rd spacing, see above.  9/15 FM:  Continue po lasix.

## 2022-09-15 NOTE — PT/OT/SLP PROGRESS
Occupational Therapy   Treatment    Name: Jamey Lei  MRN: [id number]  Admitting Diagnosis:  Pneumonia       Recommendations:     Discharge Recommendations: other (see comments) (TBD)  Discharge Equipment Recommendations:  other (see comments) (TBD)  Barriers to discharge:  Other (Comment) (Current medical status)    Assessment:     Jamey Lei is a 81 y.o. male with a medical diagnosis of Pneumonia.  He presents with weakness. Performance deficits affecting function are  .     Rehab Prognosis:  Fair; patient would benefit from acute skilled OT services to address these deficits and reach maximum level of function.       Plan:     Patient to be seen 6 x/week to address the above listed problems via self-care/home management, therapeutic activities, therapeutic exercises, cognitive retraining  Plan of Care Expires: 09/23/22  Plan of Care Reviewed with: patient    Subjective     Pain/Comfort:  Pain Rating 1: 0/10    Objective:     Communicated with: nurse, pt's son prior to session.  Patient found supine with peripheral IV, bowel management system, espinal catheter, pulse ox (continuous), SCD, telemetry, blood pressure cuff upon OT entry to room.    General Precautions: Standard, airborne, contact, droplet, fall   Orthopedic Precautions:N/A   Braces: N/A  Respiratory Status: Room air     Occupational Performance:     Bed Mobility:    Patient completed Rolling/Turning to Left with  dependent  Patient completed Rolling/Turning to Right with dependent  Patient completed Scooting/Bridging with dependent  Patient completed Supine to Sit with dependent  Patient completed Sit to Supine with dependent     Functional Mobility/Transfers:  Unable to complete at this time    Activities of Daily Living:  Unable to complete      AMPA 6 Click ADL: 9    Treatment & Education:  Co-treat w/ PTA due to pts low level of functional mobility. Pt dependent of 2 person assist for all activities. Pt sat EOB ~ 5 mins while performing  PROM x 4 of BUE needing dependent assistance to maintain seated position. O2 sat monitored on RA w/ all activities and remained 93-95% t/o session.     Patient left supine with all lines intact, call button in reach, and nurse notified    GOALS:   Multidisciplinary Problems       Occupational Therapy Goals          Problem: Occupational Therapy    Goal Priority Disciplines Outcome Interventions   Occupational Therapy Goal     OT, PT/OT Ongoing, Progressing    Description: Goals to be met by: 09/23/22     Patient will increase functional independence with ADLs by performing:    Feeding with Set-up Assistance.  UE Dressing with Minimal Assistance.  LE Dressing with Moderate Assistance.  Grooming while seated with Set-up Assistance.  Toileting from bedside commode with Moderate Assistance for hygiene and clothing management.   Bathing from  edge of bed with Moderate Assistance.  Step transfer with Moderate Assistance  Toilet transfer to bedside commode with Moderate Assistance.  Increased functional strength to 3+ to 4-/5 for bilateral UE's.                         Time Tracking:     OT Date of Treatment: 09/15/22  OT Start Time: 0920  OT Stop Time: 0944  OT Total Time (min): 24 min    Billable Minutes:Therapeutic Exercise 24    OT/DENISHA: OT          9/15/2022

## 2022-09-15 NOTE — ASSESSMENT & PLAN NOTE
CXR findings with new right lower lobe pneumonia. Bilateral infiltrates likely secondary to pulmonary edema from infectious.  - supplement oxygen as needed SpO2 >92-94%  - daily pulmonary hygiene  - continue coverage for community acquired PNA, azithrmoycin and rocephin  - f/u blood cultures x2 pending  - trend CBC  9/6 FM:  Now in ICU, expand coverage, speak with family.  9/7 FM:  Rec DNR, hospice care, family to discuss.  9/8 FM:  Encouraging hospice care.  9/9 FM:  Multifactorial  9/10 Cont abxs, nebs and cont to wean from vent  9/11 cont abxs, nebs, cpap trials - wean sedation - change to simv settings  9/12 FM:  May try extubation today.  9/13 FM:  Should be able to extubate this am.  9/14 FM:  Successfully extubated, patient requesting no vent, need to inform son.  9/15 FM:  S/P 10 days of abx, will stop.

## 2022-09-15 NOTE — SUBJECTIVE & OBJECTIVE
Past Medical History:   Diagnosis Date    A-fib     Abdominal pain, epigastric     Abdominal pain, generalized     Acute on chronic congestive heart failure 12/29/2021    Anticoagulant long-term use     Arthritis     Asteatotic eczema     Benign essential HTN     Benign prostatic hyperplasia     Brain tumor     Cardiovascular accident     Carotid artery stenosis     CHF (congestive heart failure)     CKD (chronic kidney disease), stage IV     Constipation     COPD (chronic obstructive pulmonary disease)     Depressed     DM (diabetes mellitus), secondary     Drug eruption     Eczema     Edema     Encounter for blood transfusion     Fever     H. pylori infection     Hearing loss     History of tobacco use     Hypercholesterolemia     Hypertensive renal disease, benign     Hypokalemia     Hyponatremia     Impaired fasting glucose     Lumbar pain     Lung mass     Malaise and fatigue     Malignant neoplasm of prostate     Meningioma     Mixed hyperlipidemia     Nasal bleeding     Neuropathy     Obesity, unspecified     SHAYNE (obstructive sleep apnea)     Osteoarthritis of knee     Osteoarthritis of multiple joints     Peripheral vascular disease, unspecified     Psychosis     Renal disorder     Seizure disorder     Seizures     Sleep apnea     Unspecified cataract     Unspecified chronic bronchitis     Unspecified osteoarthritis, unspecified site     Vertigo     Vitamin D deficiency     Wheezing        Past Surgical History:   Procedure Laterality Date    APPENDECTOMY      COLONOSCOPY      CORONARY ARTERY BYPASS GRAFT      FOOT SURGERY Left     HIP REPLACEMENT ARTHROPLASTY Right     HIP SURGERY      JOINT REPLACEMENT      KNEE SURGERY      LUNG BIOPSY Right 03/03/2022    benign    SHOULDER SURGERY Left     SHOULDER SURGERY      TOTAL KNEE ARTHROPLASTY Right     triple bypass         Review of patient's allergies indicates:   Allergen Reactions    Zolpidem        No current facility-administered medications on file prior  to encounter.     Current Outpatient Medications on File Prior to Encounter   Medication Sig    albuterol (PROVENTIL/VENTOLIN HFA) 90 mcg/actuation inhaler Inhale 1-2 puffs into the lungs every 6 (six) hours as needed for Wheezing or Shortness of Breath. Rescue    albuterol-ipratropium (DUO-NEB) 2.5 mg-0.5 mg/3 mL nebulizer solution inhale 3 milliliters by nebulization route 4 times per day and as needed, up to 6 doses per day for 30 days    apixaban (ELIQUIS) 5 mg Tab Take 5 mg by mouth 2 (two) times daily.    aspirin (ECOTRIN) 81 MG EC tablet Take 81 mg by mouth once daily.    atorvastatin (LIPITOR) 20 MG tablet Take 1 tablet (20 mg total) by mouth once daily.    multivitamin capsule Take 1 capsule by mouth once daily.    OXcarbazepine (TRILEPTAL) 300 MG Tab Take 1 tablet (300 mg total) by mouth 2 (two) times daily.    senna (SENOKOT) 8.6 mg tablet 2 tablets at bedtime as needed    tamsulosin (FLOMAX) 0.4 mg Cap Take 0.4 mg by mouth once daily.    torsemide (DEMADEX) 20 MG Tab Take 1 tablet (20 mg total) by mouth daily as needed (weight gain greater than 5 lbs).    vitamin D (VITAMIN D3) 1000 units Tab Take 1,000 Units by mouth once daily.    [DISCONTINUED] calcium carbonate (OS-DAVID) 600 mg calcium (1,500 mg) Tab Take 600 mg by mouth once.    [DISCONTINUED] carvediloL (COREG) 6.25 MG tablet TAKE ONE TABLET BY MOUTH TWICE DAILY (EVERY 12 HOURS) FOR BLOOD PRESSURE. THANKS !!    [DISCONTINUED] ezetimibe (ZETIA) 10 mg tablet Take 10 mg by mouth once daily.    [DISCONTINUED] FLUoxetine 20 MG capsule     [DISCONTINUED] gabapentin (NEURONTIN) 300 MG capsule 1 capsule    [DISCONTINUED] miconazole NITRATE 2 % (MICOTIN) 2 % top powder Apply topically as needed for Itching.    [DISCONTINUED] risperiDONE (RISPERDAL) 0.5 MG Tab Take 1 tablet (0.5 mg total) by mouth nightly as needed (agitation and halluctions).     Family History       Problem Relation (Age of Onset)    Arthritis Mother, Father    Asthma Sister    Heart  disease Father    Hyperlipidemia Mother, Sister    Hypertension Mother, Father, Sister, Brother          Tobacco Use    Smoking status: Every Day    Smokeless tobacco: Never    Tobacco comments:     quit 30 years ago   Substance and Sexual Activity    Alcohol use: Not Currently    Drug use: Never    Sexual activity: Not Currently     Review of Systems   Unable to perform ROS: Acuity of condition   Objective:     Vital Signs (Most Recent):  Temp: 97.9 °F (36.6 °C) (09/15/22 0719)  Pulse: 63 (09/15/22 0815)  Resp: (!) 33 (09/15/22 0815)  BP: (!) 130/55 (09/15/22 0800)  SpO2: 97 % (09/15/22 0815)   Vital Signs (24h Range):  Temp:  [97.7 °F (36.5 °C)-98.3 °F (36.8 °C)] 97.9 °F (36.6 °C)  Pulse:  [30-97] 63  Resp:  [24-55] 33  SpO2:  [90 %-100 %] 97 %  BP: (103-201)/(51-88) 130/55     Weight: 103 kg (227 lb 1.2 oz)  Body mass index is 33.53 kg/m².    Physical Exam  Constitutional:       General: He is not in acute distress.     Appearance: He is obese. He is ill-appearing. He is not toxic-appearing.      Comments: sedated   HENT:      Head: Normocephalic.      Right Ear: External ear normal.      Left Ear: External ear normal.      Nose: No congestion or rhinorrhea.      Mouth/Throat:      Pharynx: Oropharynx is clear.   Eyes:      General: No scleral icterus.     Extraocular Movements: Extraocular movements intact.      Conjunctiva/sclera: Conjunctivae normal.   Cardiovascular:      Rate and Rhythm: Tachycardia present. Rhythm irregular.      Heart sounds: Normal heart sounds. No murmur heard.  Pulmonary:      Effort: No respiratory distress.      Breath sounds: No stridor. Rhonchi and rales present.      Comments: On vent  Chest:      Chest wall: No tenderness.   Abdominal:      General: There is no distension.      Palpations: Abdomen is soft.      Tenderness: There is no abdominal tenderness. There is no right CVA tenderness, left CVA tenderness or guarding.   Genitourinary:     Comments: Sanders with yellow urine;  fecal management system in place  Musculoskeletal:      Cervical back: Neck supple.   Lymphadenopathy:      Cervical: No cervical adenopathy.   Skin:     General: Skin is warm and dry.      Comments: Hemosiderin staining bilateral LE   Neurological:      Motor: Weakness present.   Psychiatric:         Mood and Affect: Mood normal.         Thought Content: Thought content normal.        Significant Labs: All pertinent labs within the past 24 hours have been reviewed.  A1C: No results for input(s): HGBA1C in the last 4320 hours.  ABGs: No results for input(s): PH, PCO2, HCO3, POCSATURATED, BE, TOTALHB, COHB, METHB, O2HB, POCFIO2, PO2 in the last 48 hours.    Blood Culture: No results for input(s): LABBLOO in the last 48 hours.    BMP:   Recent Labs   Lab 09/15/22  0338   *      K 3.4*   *   CO2 26   BUN 34*   CREATININE 1.8*   CALCIUM 8.9   MG 2.0       CBC:   Recent Labs   Lab 09/14/22  0406 09/15/22  0338   WBC 5.09 5.67   HGB 9.8* 8.8*   HCT 31.0* 27.8*    202       CMP:   Recent Labs   Lab 09/14/22  0406 09/15/22  0338    142   K 3.3* 3.4*    113*   CO2 25 26   GLU 95 135*   BUN 27* 34*   CREATININE 1.4 1.8*   CALCIUM 9.1 8.9   PROT 5.9* 5.4*   ALBUMIN 2.0* 1.8*   BILITOT 0.5 0.4   ALKPHOS 52* 46*   AST 21 17   ALT 17 14   ANIONGAP 5* 3*       No results for input(s): LACTATE in the last 48 hours.    No results for input(s): COLORU, APPEARANCEUA, PHUR, SPECGRAV, PROTEINUA, GLUCUA, KETONESU, BILIRUBINUA, OCCULTUA, NITRITE, UROBILINOGEN, LEUKOCYTESUR, RBCUA, WBCUA, BACTERIA, SQUAMEPITHEL, HYALINECASTS in the last 48 hours.    Invalid input(s): WRIGHTSUR    X-Ray Chest 1 View  Narrative: EXAMINATION:  XR CHEST 1 VIEW    CLINICAL HISTORY:  fu pneumo; intubated;    COMPARISON:  09/09/2022    FINDINGS:  Endotracheal tube and NG tube remain.  Suspect bilateral pleural effusions with adjacent atelectasis and or infiltrates, similar.  No new finding/detrimental interval change  detected  Impression: No significant change since 09/09/2022    Electronically signed by: Hodan Coy MD  Date:    09/11/2022  Time:    10:07  X-Ray Abdomen AP 1 View  Narrative: EXAMINATION:  XR ABDOMEN AP 1 VIEW    CLINICAL HISTORY:  n/v;    FINDINGS:  Tip of NG tube overlies upper abdomen.  Catheter overlies pelvis.  Nonspecific bowel gas pattern with gas-filled portions of colon.  Impression: Nonspecific bowel gas pattern.  No significant change since 09/08/2022    Electronically signed by: Hodan Coy MD  Date:    09/11/2022  Time:    09:31   Significant Imaging: I have reviewed all pertinent imaging results/findings within the past 24 hours.

## 2022-09-15 NOTE — NURSING
1930 - telephoned Natalie Crawley NP with patient resp status. On room air with sats 95% but mild labored breathing with abdominal muscle use noted. Inspiratory and expiratory wheezeing also noted. Order for bipap at HS given.

## 2022-09-15 NOTE — ASSESSMENT & PLAN NOTE
Multifactorial, cardiopulmonary (CHF, atrial fibrillation, COPD) and infectious processes (COVID19 and fluA).  - see above management.   Off of O2

## 2022-09-15 NOTE — ASSESSMENT & PLAN NOTE
Patient with Permanent atrial fibrillation which is controlled currently with No rate controlling home medications at this time. . Patient is currently in atrial fibrillation.[id number] Score: 4. HASBLED Score: Unable to calculate. Anticoagulation indicated. Anticoagulation done with eliquis 5 mg BID.    Eliquis on hold, cards following.  Will resume ASA.

## 2022-09-15 NOTE — EICU
Rounding (Video Assessment):  Yes    Intervention Initiated From:  [facility] / EICU    Comments: Video rounds complete. Patient resting in bed with no alarms, or distress noted at this time.

## 2022-09-15 NOTE — PT/OT/SLP PROGRESS
Physical Therapy Treatment    Patient Name:  Jamey Lei   MRN:  [id number]    Time Tracking:     PT Start Time: 920     PT Stop Time: 944  PT Total Time (min): 24 min     Billable Minutes: Therapeutic Activity 24  Natalie Gudino, MIRELLA  09/15/2022      Subjective     Patient/Family Comments/goals: discomfort with movement to R UE.  Pain/Comfort: did not rate      Objective:     General Precautions: Standard, airborne, contact, droplet, fall (Simultaneous filing. User may not have seen previous data.)   Orthopedic Precautions:N/A (Simultaneous filing. User may not have seen previous data.)     Communicated with PT, OT, RN, & pt's son prior to session.  Patient found HOB elevated upon PT entry to room.     Functional Mobility:     Rolling Left:  dependence and of 2 persons  Rolling Right: dependence and of 2 persons  Scooting: dependence and of 2 persons  Supine to Sit: dependence and of 2 persons  Sit to Supine: dependence and of 2 persons      Therapeutic Activities and Exercises:   Co-treat w/ OT d/t pts significant low level of functional mobility. Pt dependent of 2 person assist for all activities. Pt sat EOB ~ 5 mins while performing PROM x 4 extremities needing dependent assistance to maintain seated position. O2 sat monitored on RA w/ all activities and remained 93-95% t/o session.     Patient left HOB elevated with all lines intact and call button in reach.    GOALS:   Multidisciplinary Problems       Physical Therapy Goals          Problem: Physical Therapy    Goal Priority Disciplines Outcome Goal Variances Interventions   Physical Therapy Goal     PT, PT/OT      Description: Goals to be met by: 2022     Patient will increase functional independence with mobility by performin. Supine to sit with MInimal Assistance  2. Sit to supine with MInimal Assistance  3. Rolling to Left and Right with Minimal Assistance.  4. Sit to stand transfer with Minimal Assistance  5. Sitting at edge of bed x10  minutes with Minimal Assistance                         Assessment:     Jamey Lei is a 81 y.o. male admitted with a medical diagnosis of Pneumonia. Pt currently dependent.       Plan:     During this hospitalization, patient to be seen 5 x/week to address the identified rehab impairments gait training, therapeutic activities, therapeutic exercises and progress toward the goals.      Recommendations:     Cont to progress as tolerated.

## 2022-09-16 LAB
ALBUMIN SERPL BCP-MCNC: 2.1 G/DL (ref 3.5–5.2)
ALP SERPL-CCNC: 49 U/L (ref 55–135)
ALT SERPL W/O P-5'-P-CCNC: 18 U/L (ref 10–44)
ANION GAP SERPL CALC-SCNC: 5 MMOL/L (ref 8–16)
AST SERPL-CCNC: 17 U/L (ref 10–40)
BASOPHILS # BLD AUTO: 0.03 K/UL (ref 0–0.2)
BASOPHILS NFR BLD: 0.5 % (ref 0–1.9)
BILIRUB SERPL-MCNC: 0.4 MG/DL (ref 0.1–1)
BUN SERPL-MCNC: 34 MG/DL (ref 8–23)
CALCIUM SERPL-MCNC: 9.2 MG/DL (ref 8.7–10.5)
CHLORIDE SERPL-SCNC: 113 MMOL/L (ref 95–110)
CO2 SERPL-SCNC: 26 MMOL/L (ref 23–29)
CREAT SERPL-MCNC: 1.7 MG/DL (ref 0.5–1.4)
DIFFERENTIAL METHOD: ABNORMAL
EOSINOPHIL # BLD AUTO: 0.1 K/UL (ref 0–0.5)
EOSINOPHIL NFR BLD: 2.2 % (ref 0–8)
ERYTHROCYTE [DISTWIDTH] IN BLOOD BY AUTOMATED COUNT: 17.6 % (ref 11.5–14.5)
EST. GFR  (NO RACE VARIABLE): 40.5 ML/MIN/1.73 M^2
GLUCOSE SERPL-MCNC: 103 MG/DL (ref 70–110)
HCT VFR BLD AUTO: 31.4 % (ref 40–54)
HGB BLD-MCNC: 9.8 G/DL (ref 14–18)
IMM GRANULOCYTES # BLD AUTO: 0.04 K/UL (ref 0–0.04)
IMM GRANULOCYTES NFR BLD AUTO: 0.6 % (ref 0–0.5)
LYMPHOCYTES # BLD AUTO: 0.9 K/UL (ref 1–4.8)
LYMPHOCYTES NFR BLD: 13.2 % (ref 18–48)
MAGNESIUM SERPL-MCNC: 1.9 MG/DL (ref 1.6–2.6)
MCH RBC QN AUTO: 25.8 PG (ref 27–31)
MCHC RBC AUTO-ENTMCNC: 31.2 G/DL (ref 32–36)
MCV RBC AUTO: 83 FL (ref 82–98)
MONOCYTES # BLD AUTO: 1 K/UL (ref 0.3–1)
MONOCYTES NFR BLD: 15.4 % (ref 4–15)
NEUTROPHILS # BLD AUTO: 4.4 K/UL (ref 1.8–7.7)
NEUTROPHILS NFR BLD: 68.1 % (ref 38–73)
NRBC BLD-RTO: 0 /100 WBC
PLATELET # BLD AUTO: 229 K/UL (ref 150–450)
PMV BLD AUTO: 11.1 FL (ref 9.2–12.9)
POTASSIUM SERPL-SCNC: 3.2 MMOL/L (ref 3.5–5.1)
PROT SERPL-MCNC: 6.2 G/DL (ref 6–8.4)
RBC # BLD AUTO: 3.8 M/UL (ref 4.6–6.2)
SARS-COV-2 RNA RESP QL NAA+PROBE: NOT DETECTED
SODIUM SERPL-SCNC: 144 MMOL/L (ref 136–145)
WBC # BLD AUTO: 6.5 K/UL (ref 3.9–12.7)

## 2022-09-16 PROCEDURE — 99900035 HC TECH TIME PER 15 MIN (STAT)

## 2022-09-16 PROCEDURE — 97535 SELF CARE MNGMENT TRAINING: CPT

## 2022-09-16 PROCEDURE — 25000242 PHARM REV CODE 250 ALT 637 W/ HCPCS: Performed by: INTERNAL MEDICINE

## 2022-09-16 PROCEDURE — 21400001 HC TELEMETRY ROOM

## 2022-09-16 PROCEDURE — 25000003 PHARM REV CODE 250: Performed by: INTERNAL MEDICINE

## 2022-09-16 PROCEDURE — 99900031 HC PATIENT EDUCATION (STAT)

## 2022-09-16 PROCEDURE — 94640 AIRWAY INHALATION TREATMENT: CPT

## 2022-09-16 PROCEDURE — 92523 SPEECH SOUND LANG COMPREHEN: CPT

## 2022-09-16 PROCEDURE — 27000221 HC OXYGEN, UP TO 24 HOURS

## 2022-09-16 PROCEDURE — 94761 N-INVAS EAR/PLS OXIMETRY MLT: CPT

## 2022-09-16 PROCEDURE — 94660 CPAP INITIATION&MGMT: CPT

## 2022-09-16 PROCEDURE — 92526 ORAL FUNCTION THERAPY: CPT

## 2022-09-16 PROCEDURE — 80053 COMPREHEN METABOLIC PANEL: CPT | Performed by: INTERNAL MEDICINE

## 2022-09-16 PROCEDURE — 36415 COLL VENOUS BLD VENIPUNCTURE: CPT | Performed by: INTERNAL MEDICINE

## 2022-09-16 PROCEDURE — 83735 ASSAY OF MAGNESIUM: CPT | Performed by: INTERNAL MEDICINE

## 2022-09-16 PROCEDURE — 85025 COMPLETE CBC W/AUTO DIFF WBC: CPT | Performed by: INTERNAL MEDICINE

## 2022-09-16 PROCEDURE — 97530 THERAPEUTIC ACTIVITIES: CPT

## 2022-09-16 PROCEDURE — U0002 COVID-19 LAB TEST NON-CDC: HCPCS | Performed by: INTERNAL MEDICINE

## 2022-09-16 RX ORDER — METOPROLOL TARTRATE 1 MG/ML
5 INJECTION, SOLUTION INTRAVENOUS ONCE
Status: COMPLETED | OUTPATIENT
Start: 2022-09-16 | End: 2022-09-16

## 2022-09-16 RX ORDER — BUDESONIDE 0.25 MG/2ML
0.25 INHALANT ORAL DAILY
Status: DISCONTINUED | OUTPATIENT
Start: 2022-09-16 | End: 2022-09-17

## 2022-09-16 RX ORDER — IPRATROPIUM BROMIDE AND ALBUTEROL SULFATE 2.5; .5 MG/3ML; MG/3ML
3 SOLUTION RESPIRATORY (INHALATION)
Status: DISCONTINUED | OUTPATIENT
Start: 2022-09-17 | End: 2022-09-19 | Stop reason: HOSPADM

## 2022-09-16 RX ADMIN — HYDROCODONE BITARTRATE AND ACETAMINOPHEN 1 TABLET: 5; 325 TABLET ORAL at 08:09

## 2022-09-16 RX ADMIN — ISOSORBIDE DINITRATE: 20 TABLET ORAL at 04:09

## 2022-09-16 RX ADMIN — FAMOTIDINE 20 MG: 20 TABLET ORAL at 08:09

## 2022-09-16 RX ADMIN — OXCARBAZEPINE 300 MG: 300 TABLET, FILM COATED ORAL at 08:09

## 2022-09-16 RX ADMIN — IPRATROPIUM BROMIDE AND ALBUTEROL SULFATE 3 ML: 2.5; .5 SOLUTION RESPIRATORY (INHALATION) at 12:09

## 2022-09-16 RX ADMIN — IPRATROPIUM BROMIDE AND ALBUTEROL SULFATE 3 ML: 2.5; .5 SOLUTION RESPIRATORY (INHALATION) at 08:09

## 2022-09-16 RX ADMIN — TAMSULOSIN HYDROCHLORIDE 0.4 MG: 0.4 CAPSULE ORAL at 08:09

## 2022-09-16 RX ADMIN — FUROSEMIDE 40 MG: 40 TABLET ORAL at 05:09

## 2022-09-16 RX ADMIN — AMLODIPINE BESYLATE 5 MG: 5 TABLET ORAL at 08:09

## 2022-09-16 RX ADMIN — ASPIRIN 81 MG: 81 TABLET, COATED ORAL at 08:09

## 2022-09-16 RX ADMIN — ISOSORBIDE DINITRATE: 20 TABLET ORAL at 08:09

## 2022-09-16 RX ADMIN — IPRATROPIUM BROMIDE AND ALBUTEROL SULFATE 3 ML: 2.5; .5 SOLUTION RESPIRATORY (INHALATION) at 01:09

## 2022-09-16 RX ADMIN — ATORVASTATIN CALCIUM 40 MG: 40 TABLET, FILM COATED ORAL at 08:09

## 2022-09-16 RX ADMIN — BUDESONIDE 0.25 MG: 0.25 SUSPENSION RESPIRATORY (INHALATION) at 09:09

## 2022-09-16 RX ADMIN — METOROPROLOL TARTRATE 5 MG: 5 INJECTION, SOLUTION INTRAVENOUS at 02:09

## 2022-09-16 RX ADMIN — FUROSEMIDE 40 MG: 40 TABLET ORAL at 08:09

## 2022-09-16 RX ADMIN — PANTOPRAZOLE SODIUM 40 MG: 40 TABLET, DELAYED RELEASE ORAL at 08:09

## 2022-09-16 RX ADMIN — RISPERIDONE 0.5 MG: 0.5 TABLET ORAL at 08:09

## 2022-09-16 NOTE — ASSESSMENT & PLAN NOTE
Patient with Permanent atrial fibrillation which is controlled currently with No rate controlling home medications at this time. . Patient is currently in atrial fibrillation.[id number] Score: 4. HASBLED Score: Unable to calculate. Anticoagulation indicated. Anticoagulation done with eliquis 5 mg BID.    Eliquis on hold, cards following.  Will resume ASA.

## 2022-09-16 NOTE — PT/OT/SLP PROGRESS
Occupational Therapy   Treatment    Name: Jamey Lei  MRN: [id number]  Admitting Diagnosis:  Pneumonia       Recommendations:     Discharge Recommendations: other (see comments) (TBD)  Discharge Equipment Recommendations:  other (see comments) (TBD)  Barriers to discharge:  Other (Comment) (Medical status)    Assessment:     Jamey Lei is a 81 y.o. male with a medical diagnosis of Pneumonia.  He presents with functional deficits impacting independence with ADL's including functional mobility. Performance deficits affecting function: weakness, impaired endurance, impaired self care skills, impaired functional mobility, impaired balance, impaired cognition, decreased coordination, decreased upper extremity function, decreased lower extremity function, decreased safety awareness, pain, decreased ROM, edema, impaired cardiopulmonary response to activity.      Rehab Prognosis:  Fair; patient would benefit from acute skilled OT services to address these deficits and reach maximum level of function.       Plan:     Patient to be seen 6 x/week to address the above listed problems via self-care/home management, therapeutic activities, therapeutic exercises, cognitive retraining  Plan of Care Expires: 09/23/22  Plan of Care Reviewed with: patient    Subjective     Pain/Comfort:  Pain Rating 1: 0/10  Pain Rating Post-Intervention 1: 0/10    Objective:     Communicated with: nurse prior to session.  Patient found supine with espinal catheter, SCD upon OT entry to room.    General Precautions: Standard, fall   Orthopedic Precautions:N/A   Braces:    Respiratory Status: Room air     Occupational Performance:     Bed Mobility:    Patient completed Rolling/Turning to Left with  maximal assistance  Patient completed Rolling/Turning to Right with maximal assistance  Patient completed Scooting/Bridging with total assistance  Patient completed Supine to Sit with total assistance  Patient completed Sit to Supine with maximal  assistance     Functional Mobility/Transfers:  Patient completed Sit <> Stand Transfer with total assistance  with  no assistive device   Patient completed Bed <> Chair Transfer using Stand Pivot technique with total assistance with no assistive device  Functional Mobility: Pt unable to ambulate on this date.    Activities of Daily Living:  Lower Body Dressing: total assistance        [facility] 6 Click ADL:      Treatment & Education:  Pt was cooperative with moderate verbal encouragement while exhibiting confusion. He performed bed mobility requiring max to total assist secondary to much lifting assist and stabilization of trunk with assist with bilateral LE's off EOB during supine to sit transition, much scooting assist at bilateral hips to EOB, and lowering assist of trunk with lifting assist of bilateral LE's during sit to supine transition. Pt then participated in ADL retraining regarding LB dressing emphasizing use of compensatory strategies providing extra time requiring total assist secondary to assist with all functional steps with additional steadying assist of trunk, and max cueing for technique. Also, he participated in functional transfer retraining to / from bed and chair emphasizing squat-pivot technique requiring total assist secondary to much lifting, lowering, and steadying assist with max verbal and tactile cueing for safety awareness and technique.     Patient left supine with all lines intact, call button in reach, and nurse notified    GOALS:   Multidisciplinary Problems       Occupational Therapy Goals          Problem: Occupational Therapy    Goal Priority Disciplines Outcome Interventions   Occupational Therapy Goal     OT, PT/OT Ongoing, Progressing    Description: Goals to be met by: 09/23/22     Patient will increase functional independence with ADLs by performing:    Feeding with Set-up Assistance.  UE Dressing with Minimal Assistance.  LE Dressing with Moderate Assistance.  Grooming while  seated with Set-up Assistance.  Toileting from bedside commode with Moderate Assistance for hygiene and clothing management.   Bathing from  edge of bed with Moderate Assistance.  Step transfer with Moderate Assistance  Toilet transfer to bedside commode with Moderate Assistance.  Increased functional strength to 3+ to 4-/5 for bilateral UE's.                         Time Tracking:     OT Date of Treatment: 09/16/22  OT Start Time: 1615  OT Stop Time: 1648  OT Total Time (min): 33 min    Billable Minutes:Self Care/Home Management 12  Therapeutic Activity 21    OT/DENISHA: OT          9/16/2022

## 2022-09-16 NOTE — PT/OT/SLP PROGRESS
"Speech Language Pathology Treatment    Patient Name:  Jamey Lei   MRN:  [id number]  Admitting Diagnosis: Pneumonia    Recommendations:                 General Recommendations:  Dysphagia therapy and Speech/language therapy  Diet recommendations:  Soft & Bite Sized Diet - IDDSI Level 6, Liquid Diet Level: Mildly thick/Nectar thick liquids - IDDSI Level 2   Aspiration Precautions: Assistance with meals, Feed only when awake/alert, HOB to 90 degrees, Monitor for s/s of aspiration, Remain upright 30 minutes post meal, Standard aspiration precautions, and Wear oxygen during intake   General Precautions: Standard, fall, aspiration, nectar thick  Communication strategies:  provide increased time to answer    Subjective     Pt stating "I dont know", "ralph"   SLP spoke with nurse following session and notified of aspiration/nectar thick precautions; nurse stated son fed pt AM meal and reported that he ate well and denied any coughing/choking with PO intake    Patient goals: did not state     Pain/Comfort:  Pain Rating 1: 0/10    Objective:     Has the patient been evaluated by SLP for swallowing?      Keep patient NPO?     Current Respiratory Status: nasal cannula      Pt found in bed resting; Pt oriented to name; disoriented x3. Pt pleasant but uncooperative with ST.  Pt demonstrated x3 s/s aspiration during thin liquid trials. Pt improved with 0 s/s aspiration during nectar thick trials.      SLP completed Speech/Language Eval:   Cognitive Status:    Arousal/Alertness Delayed response to stimuli verbal; requiring repetition and cues  Perseveration Present-verbal during naming task  Orientation Person  Memory unable to fully assess and recall general information moderately imapired       Receptive Language:   Comprehension:      Questions Simple yes/no mild impairment   Open ended questions mod impairment   Commands  One step mild impairment; also uncooperative in following commands at this time  Object " "identifications 0 in Fo 5  - perseveration and jargon noted with naming; pt dismissed activity stating "ralph"   Pragmatics:    Eye contact appropriate; however, keeps eyes closed as if resting often    Expressive Language:  Verbal:    Automatic Speech  Counting impaired/unable to assess; uncooperative  Repetition Words impaired/unable to assess; uncooperative   Naming Confrontation mod-max impaired  Conversational speech mod-max impaired   Nonverbal:   Facial Expression WFL      Motor Speech:  WFL for connected speech     Voice:   Quality Rough; WFL      Treatment: Pt could benefit from speech/language/cognitive-linguistic/ dysphagia therapy; however, pt uncooperative with accepting ST services at this time. SLP will continue to monitor/follow-up and treat as tolerated x2/week. Refer to pt medial status and prognosis.       Assessment:     Jamey Lei is a 81 y.o. male with a diagnosis of Pneumonia presents with an overall functional swallow and demonstrated adequate PO intake with max assist during feeding. Pt oriented to name; disoriented x3 and presents with confusion. Pt presenting with decrease in overall ability to express wants/needs at this time. Pt requires total assist. Pt demonstrating clinical s/s of aspiration with thin liquids and nectar thick liquids are recommended.     Goals:   Multidisciplinary Problems       SLP Goals          Problem: SLP    Goal Priority Disciplines Outcome   SLP Goal     SLP    Description: Speech Therapy Goals     TPW tolerate soft and bite sized diet given max assist with feeding without s/s aspiration or dysphagia given min cues for compensatory strategies such as effortful swallow. Progressing     TPW tolerate thin liquids given max assist with feeding without s/s aspiration or dysphagia given min cues for compensatory strategies such as effortful swallow. NOT MET  Goal Alt. 9/16/2022  TPW tolerate nectar-thick liquids given max assist with feeding without s/s " aspiration or dysphagia given min cues for compensatory strategies such as effortful swallow.    TPW participate in speech/language/cognitive evaluation with SLP. MET - 9/16/2022                       Plan:     Patient to be seen:  2 x/week   Plan of Care expires:  09/23/22  Plan of Care reviewed with:  patient   SLP Follow-Up:  Yes       Discharge recommendations:  other (see comments) (TBD pending pt progress)   Barriers to Discharge:  Decreased Care Giver Support and Level of Skilled Assistance Needed      Time Tracking:     SLP Treatment Date:   09/16/22  Speech Start Time:  1002  Speech Stop Time:  1023     Speech Total Time (min):  21 min    Billable Minutes: Eval x12 mins  and Treatment Swallowing Dysfunction x9    09/16/2022

## 2022-09-16 NOTE — PT/OT/SLP PROGRESS
Physical Therapy Treatment    Patient Name:  Jamey Lei   MRN:  [id number]    Recommendations:     Discharge Recommendations:  nursing facility, skilled (TBD)   Discharge Equipment Recommendations:  (TBD)   Barriers to discharge: Decreased caregiver support    Assessment:     Jamey Lei is a 81 y.o. male admitted with a medical diagnosis of Pneumonia.  He presents with the following impairments/functional limitations:  weakness, impaired endurance, impaired functional mobility, pain (impaired postural stability) impairing independence with ADLs. Patient requiring max A of 1-2  person to perform bed mobility. Patient is able to tolerate sitting EOB for 8 minutes with max-min A and perform LE exercises with min A. Patient with moderate SOB after sitting EOB. Overall, patient is making progress with functional mobility.    Rehab Prognosis: Fair; patient would benefit from acute skilled PT services to address these deficits and reach maximum level of function.    Recent Surgery: * No surgery found *      Plan:     During this hospitalization, patient to be seen 5 x/week to address the identified rehab impairments via therapeutic activities, therapeutic exercises, gait training and progress toward the following goals:    Plan of Care Expires:  09/30/22    Subjective     Chief Complaint: c/o leg pain when initially moving  Patient/Family Comments/goals: Son is present for treatment and reports patient had good appetite for breakfast this morning. Patient is pleasant and agreeable to therapy.  Pain/Comfort:  Pain Rating 1: other (see comments) (not rated)  Location - Side 1: Bilateral  Location 1: leg  Pain Addressed 1: Cessation of Activity      Objective:     Communicated with RN prior to session.  Patient found HOB elevated with pulse ox (continuous), peripheral IV, espinal catheter, bowel management system, SCD upon PT entry to room.     General Precautions: Standard, fall   Orthopedic Precautions:N/A   Braces:  N/A  Respiratory Status: Room air     Functional Mobility:  Bed Mobility:     Rolling Right: maximal assistance  Scooting: Max A x 1 person to scoot to EOB, max A x 2 persons to scoot to HOB in supine  Supine to Sit: maximal assistance and of 2 persons  Sit to Supine: maximal assistance and of 2 persons  Balance: Patient with poor-fair sitting balance at EOB requiring max-min A to remain upright but is able to hold self upright for small bouts of time. Pt with difficulty holding head upright 2/2 weakness of postural cervical musculature.      AM-PAC 6 CLICK MOBILITY  Turning over in bed (including adjusting bedclothes, sheets and blankets)?: 2  Sitting down on and standing up from a chair with arms (e.g., wheelchair, bedside commode, etc.): 1  Moving from lying on back to sitting on the side of the bed?: 1  Moving to and from a bed to a chair (including a wheelchair)?: 1  Need to walk in hospital room?: 1  Climbing 3-5 steps with a railing?: 1  Basic Mobility Total Score: 7       Therapeutic Activities and Exercises:   LAQ 10x ea LE    Patient left HOB elevated with all lines intact, call button in reach, and son present..    GOALS:   Multidisciplinary Problems       Physical Therapy Goals          Problem: Physical Therapy    Goal Priority Disciplines Outcome Goal Variances Interventions   Physical Therapy Goal     PT, PT/OT Ongoing, Progressing     Description: Goals to be met by: 2022     Patient will increase functional independence with mobility by performin. Supine to sit with MInimal Assistance  2. Sit to supine with MInimal Assistance  3. Rolling to Left and Right with Minimal Assistance.  4. Sit to stand transfer with Minimal Assistance  5. Sitting at edge of bed x10 minutes with Minimal Assistance                         Time Tracking:     PT Received On: 22  PT Start Time: 832     PT Stop Time: 849  PT Total Time (min): 17 min     Billable Minutes: Therapeutic Activity 17  minutes    Treatment Type: Treatment  PT/PTA: PT     PTA Visit Number: 0     09/16/2022

## 2022-09-16 NOTE — SUBJECTIVE & OBJECTIVE
Past Medical History:   Diagnosis Date    A-fib     Abdominal pain, epigastric     Abdominal pain, generalized     Acute on chronic congestive heart failure 12/29/2021    Anticoagulant long-term use     Arthritis     Asteatotic eczema     Benign essential HTN     Benign prostatic hyperplasia     Brain tumor     Cardiovascular accident     Carotid artery stenosis     CHF (congestive heart failure)     CKD (chronic kidney disease), stage IV     Constipation     COPD (chronic obstructive pulmonary disease)     Depressed     DM (diabetes mellitus), secondary     Drug eruption     Eczema     Edema     Encounter for blood transfusion     Fever     H. pylori infection     Hearing loss     History of tobacco use     Hypercholesterolemia     Hypertensive renal disease, benign     Hypokalemia     Hyponatremia     Impaired fasting glucose     Lumbar pain     Lung mass     Malaise and fatigue     Malignant neoplasm of prostate     Meningioma     Mixed hyperlipidemia     Nasal bleeding     Neuropathy     Obesity, unspecified     SHAYNE (obstructive sleep apnea)     Osteoarthritis of knee     Osteoarthritis of multiple joints     Peripheral vascular disease, unspecified     Psychosis     Renal disorder     Seizure disorder     Seizures     Sleep apnea     Unspecified cataract     Unspecified chronic bronchitis     Unspecified osteoarthritis, unspecified site     Vertigo     Vitamin D deficiency     Wheezing        Past Surgical History:   Procedure Laterality Date    APPENDECTOMY      COLONOSCOPY      CORONARY ARTERY BYPASS GRAFT      FOOT SURGERY Left     HIP REPLACEMENT ARTHROPLASTY Right     HIP SURGERY      JOINT REPLACEMENT      KNEE SURGERY      LUNG BIOPSY Right 03/03/2022    benign    SHOULDER SURGERY Left     SHOULDER SURGERY      TOTAL KNEE ARTHROPLASTY Right     triple bypass         Review of patient's allergies indicates:   Allergen Reactions    Zolpidem        No current facility-administered medications on file prior  to encounter.     Current Outpatient Medications on File Prior to Encounter   Medication Sig    albuterol (PROVENTIL/VENTOLIN HFA) 90 mcg/actuation inhaler Inhale 1-2 puffs into the lungs every 6 (six) hours as needed for Wheezing or Shortness of Breath. Rescue    albuterol-ipratropium (DUO-NEB) 2.5 mg-0.5 mg/3 mL nebulizer solution inhale 3 milliliters by nebulization route 4 times per day and as needed, up to 6 doses per day for 30 days    apixaban (ELIQUIS) 5 mg Tab Take 5 mg by mouth 2 (two) times daily.    aspirin (ECOTRIN) 81 MG EC tablet Take 81 mg by mouth once daily.    atorvastatin (LIPITOR) 20 MG tablet Take 1 tablet (20 mg total) by mouth once daily.    multivitamin capsule Take 1 capsule by mouth once daily.    OXcarbazepine (TRILEPTAL) 300 MG Tab Take 1 tablet (300 mg total) by mouth 2 (two) times daily.    senna (SENOKOT) 8.6 mg tablet 2 tablets at bedtime as needed    tamsulosin (FLOMAX) 0.4 mg Cap Take 0.4 mg by mouth once daily.    torsemide (DEMADEX) 20 MG Tab Take 1 tablet (20 mg total) by mouth daily as needed (weight gain greater than 5 lbs).    vitamin D (VITAMIN D3) 1000 units Tab Take 1,000 Units by mouth once daily.    [DISCONTINUED] calcium carbonate (OS-DAVID) 600 mg calcium (1,500 mg) Tab Take 600 mg by mouth once.    [DISCONTINUED] carvediloL (COREG) 6.25 MG tablet TAKE ONE TABLET BY MOUTH TWICE DAILY (EVERY 12 HOURS) FOR BLOOD PRESSURE. THANKS !!    [DISCONTINUED] ezetimibe (ZETIA) 10 mg tablet Take 10 mg by mouth once daily.    [DISCONTINUED] FLUoxetine 20 MG capsule     [DISCONTINUED] gabapentin (NEURONTIN) 300 MG capsule 1 capsule    [DISCONTINUED] miconazole NITRATE 2 % (MICOTIN) 2 % top powder Apply topically as needed for Itching.    [DISCONTINUED] risperiDONE (RISPERDAL) 0.5 MG Tab Take 1 tablet (0.5 mg total) by mouth nightly as needed (agitation and halluctions).     Family History       Problem Relation (Age of Onset)    Arthritis Mother, Father    Asthma Sister    Heart  disease Father    Hyperlipidemia Mother, Sister    Hypertension Mother, Father, Sister, Brother          Tobacco Use    Smoking status: Every Day    Smokeless tobacco: Never    Tobacco comments:     quit 30 years ago   Substance and Sexual Activity    Alcohol use: Not Currently    Drug use: Never    Sexual activity: Not Currently     Review of Systems   Unable to perform ROS: Acuity of condition   Objective:     Vital Signs (Most Recent):  Temp: 98.1 °F (36.7 °C) (09/16/22 0732)  Pulse: (!) 114 (09/16/22 0824)  Resp: 20 (09/16/22 0832)  BP: (!) 166/77 (09/16/22 0833)  SpO2: 96 % (09/16/22 0824)   Vital Signs (24h Range):  Temp:  [97.8 °F (36.6 °C)-98.9 °F (37.2 °C)] 98.1 °F (36.7 °C)  Pulse:  [] 114  Resp:  [19-44] 20  SpO2:  [68 %-100 %] 96 %  BP: (100-166)/(48-77) 166/77     Weight: 103 kg (227 lb 1.2 oz)  Body mass index is 33.53 kg/m².    Physical Exam  Constitutional:       General: He is in acute distress.      Appearance: He is obese. He is ill-appearing.   HENT:      Head: Normocephalic.      Right Ear: External ear normal.      Left Ear: External ear normal.      Nose: No congestion or rhinorrhea.      Mouth/Throat:      Pharynx: Oropharynx is clear.   Eyes:      General: No scleral icterus.     Extraocular Movements: Extraocular movements intact.      Conjunctiva/sclera: Conjunctivae normal.   Cardiovascular:      Rate and Rhythm: Tachycardia present. Rhythm irregular.      Heart sounds: Normal heart sounds. No murmur heard.  Pulmonary:      Effort: No respiratory distress.      Breath sounds: Normal breath sounds. No stridor.   Chest:      Chest wall: No tenderness.   Abdominal:      General: There is no distension.      Palpations: Abdomen is soft.      Tenderness: There is no abdominal tenderness. There is no right CVA tenderness, left CVA tenderness or guarding.   Genitourinary:     Comments: Sanders with yellow urine; fecal management system in place  Musculoskeletal:      Cervical back: Neck  supple.   Lymphadenopathy:      Cervical: No cervical adenopathy.   Skin:     General: Skin is warm and dry.      Comments: Hemosiderin staining bilateral LE   Neurological:      Mental Status: He is alert.      Motor: Weakness present.   Psychiatric:         Mood and Affect: Mood normal.         Thought Content: Thought content normal.        Significant Labs: All pertinent labs within the past 24 hours have been reviewed.  A1C: No results for input(s): HGBA1C in the last 4320 hours.  ABGs: No results for input(s): PH, PCO2, HCO3, POCSATURATED, BE, TOTALHB, COHB, METHB, O2HB, POCFIO2, PO2 in the last 48 hours.    Blood Culture: No results for input(s): LABBLOO in the last 48 hours.    BMP:   Recent Labs   Lab 09/16/22  0542         K 3.2*   *   CO2 26   BUN 34*   CREATININE 1.7*   CALCIUM 9.2   MG 1.9       CBC:   Recent Labs   Lab 09/15/22  0338 09/16/22  0542   WBC 5.67 6.50   HGB 8.8* 9.8*   HCT 27.8* 31.4*    229       CMP:   Recent Labs   Lab 09/15/22  0338 09/16/22  0542    144   K 3.4* 3.2*   * 113*   CO2 26 26   * 103   BUN 34* 34*   CREATININE 1.8* 1.7*   CALCIUM 8.9 9.2   PROT 5.4* 6.2   ALBUMIN 1.8* 2.1*   BILITOT 0.4 0.4   ALKPHOS 46* 49*   AST 17 17   ALT 14 18   ANIONGAP 3* 5*       No results for input(s): LACTATE in the last 48 hours.    No results for input(s): COLORU, APPEARANCEUA, PHUR, SPECGRAV, PROTEINUA, GLUCUA, KETONESU, BILIRUBINUA, OCCULTUA, NITRITE, UROBILINOGEN, LEUKOCYTESUR, RBCUA, WBCUA, BACTERIA, SQUAMEPITHEL, HYALINECASTS in the last 48 hours.    Invalid input(s): WRIGHTSUR    X-Ray Chest 1 View  Narrative: EXAMINATION:  XR CHEST 1 VIEW    CLINICAL HISTORY:  fu pneumo; intubated;    COMPARISON:  09/09/2022    FINDINGS:  Endotracheal tube and NG tube remain.  Suspect bilateral pleural effusions with adjacent atelectasis and or infiltrates, similar.  No new finding/detrimental interval change detected  Impression: No significant change since  09/09/2022    Electronically signed by: Hodan Coy MD  Date:    09/11/2022  Time:    10:07  X-Ray Abdomen AP 1 View  Narrative: EXAMINATION:  XR ABDOMEN AP 1 VIEW    CLINICAL HISTORY:  n/v;    FINDINGS:  Tip of NG tube overlies upper abdomen.  Catheter overlies pelvis.  Nonspecific bowel gas pattern with gas-filled portions of colon.  Impression: Nonspecific bowel gas pattern.  No significant change since 09/08/2022    Electronically signed by: Hodan Coy MD  Date:    09/11/2022  Time:    09:31   Significant Imaging: I have reviewed all pertinent imaging results/findings within the past 24 hours.

## 2022-09-16 NOTE — PROGRESS NOTES
[facility] Medicine  Progress Note    Patient Name: Jamey Lei  MRN: [id number]  Patient Class: IP- Inpatient   Admission Date: 9/2/2022  Length of Stay: 14 days  Attending Physician: Navi Domínguez III, MD  Primary Care Provider: Navi Domínguez III, MD        Subjective:     Principal Problem:Pneumonia        HPI:  81 year old male with hypertension, hypoerlipidemia, CHF, permanent atrial fibriallation on Eliquis, CAD s/p CABGx3, CKD stage 3, recent lung and brain mass findings presents to ED with worsening shortness of breath that started about a week ago with bilateral leg swelling with difficulty ambulating with his walker.     Patient hard of hearing endorsed since treatment in the ED, he has been feeling better. At home he does not use oxygen, breathing at SpO2 >95% on continuous O2 2L via nasal cannula on exam. There is no change in appetite, nausea, vomiting, diarrhea, constipation. Denies headaches, fever, chills, vision changes, chest pain, abdominal pain. Denies joint pain and loss of sensation in toes and foot bilaterally with increasing leg swelling.    ED course:  Tachypnea rate >30, CXR right lower lobe pneumonia, positive influenza A and COVID19 (fully vaccinated), NTproBNT >6000, lactate <0.2 x2, blood cultures x2 pending,  BUN/Cr 33/1.8 with improved eGFR from last hospitalization and discharge one week ago. Patient started on IV lasix 80 mg, responding well with clear urine collecting in Sanders catheter and IV antibiotics empirically started for right lobe pneumonia.       Overview/Hospital Course:  9/3/22 CG:  Says it feels much better and does complain of a cough.  He has had to go to the bathroom and is waiting for help and assistance getting up from the bed to go to the restroom.  No other acute overnight events.  9/4/22 CG: Doing better, still has a cough and feels weak.   9/5/22 CG:  Became more agitated last night and was trying to rip out his Sanders catheter.  Had to  be given Seroquel to help him sleep and to calm him down.  9/6/22 FM:  Events of the weekend are noted.  Patient was admitted with COVID and influenza and a deterioration in his condition.  Patient has a history of lung cancer and is seeing an oncologist at an outside facility.  Patient has a history of chronic kidney disease as well as obesity diabetes neuropathy atrial fibrillation and his general health has come line significantly over the last year.  The patient has been in and out of the hospital and nursing facilities.  Last night the patient had a rapid response and ended up on the ventilator.  I reviewed the events and I am unsure if this was related to Seroquel will and I also see that he was becoming hypotensive.  Patient's chest x-ray is abnormal I will discuss with the family this morning.  9/7/22 FM:  Met with family today.  They were unable to make a decision on do not resuscitate.  They will consult with other family members.  Patient's volume status is improved as he was dry yesterday.  Patient's renal numbers all reviewed and noted.  Patient is able to open eyes on the ventilator and follow simple commands.  Will continue ventilatory support today as well as tube feedings.  9/8/22 FM:  Patient had a positive stool for blood as well as a persistent drop in his hemoglobin and was transfused 2 units of packed red blood cells yesterday.  Patient is admitted with COVID pneumonia influenza pneumonia and possible sepsis.  Patient has a history of lung cancer as well as multiple other comorbidities and I have met with the family yesterday and they are unable to come to a code status or long-term status decision.  I am encouraging hospice care as the patient has multiple irreversible illnesses and a decline in his quality of life.  9/9/22 FM:  Patient is relatively unchanged today.  Yesterday the patient did well with his CPAP challenges and we will continue today.  I suspect he can be taken off the  ventilator over the weekend some time.  Labs this morning are pending he has received 4 units of blood.  9/10 ND patient tolerating CPAP trials again today attempt to new trial.  Continue to wean sedation completely off.    9/11 ND pt with nv overnight - tfs beign held.  Has had bms.   Has been toleratign cpap trials. Wean sedation   9/12 FM:  Patient opens eyes to verbal stimulation this morning.  Patient's Diprivan is off family is at bedside.  Patient's tidal volumes are 650 on a pressure support of 10.  Tube feeds have been off and we may try extubation later today.  Encouraged family to place the patient do not resuscitate and they state they are not ready for this they would like to talk to patient once he is extubated.  9/13 FM:  Patient's CPAP trials yesterday ended up lasting 4-5 hours and the patient fatigued.  I suspect we will be able to extubate today.  9/14 FM:  Patient tolerated extubation yesterday and this morning is on nasal cannula with his ex-wife at bedside.  Patient has intermittent confusion but when questioned about wanting to be back on the ventilator he states he does not want this.  I will reach out to the son who is the primary decision maker.  The patient's tachypneic he is taking p.o. will re-ask therapy to begin slowly working with the patient.  Patient with significant edema/3rd spacing, will start lasix.  9/15 FM:  Patient's family is at the bedside today.  Patient's vital signs are stable and his oxygen saturation is 97% on room air.  Patient has confusion and altered mental status.  He also seems to have some right-sided neglect and right-sided weakness.  Patient is off of all anticoagulants and antiplatelets as he has had a gastrointestinal bleed during this admission.  I have been continue to encourage do not resuscitate orders in the family is having difficulty making his decision.  Patient is unable to make this decision when questioned.  Transitioning to the floor  today.  9/16/22 FM:  Patient has been transition to the floor.  Patient's family is at bedside.  I have tried to encourage do not resuscitate orders and they state that there is a nephew they would like to visit and then they would feel comfortable placing that order.  Also I have recommended hospice care and we will have a informational visit.      Past Medical History:   Diagnosis Date    A-fib     Abdominal pain, epigastric     Abdominal pain, generalized     Acute on chronic congestive heart failure 12/29/2021    Anticoagulant long-term use     Arthritis     Asteatotic eczema     Benign essential HTN     Benign prostatic hyperplasia     Brain tumor     Cardiovascular accident     Carotid artery stenosis     CHF (congestive heart failure)     CKD (chronic kidney disease), stage IV     Constipation     COPD (chronic obstructive pulmonary disease)     Depressed     DM (diabetes mellitus), secondary     Drug eruption     Eczema     Edema     Encounter for blood transfusion     Fever     H. pylori infection     Hearing loss     History of tobacco use     Hypercholesterolemia     Hypertensive renal disease, benign     Hypokalemia     Hyponatremia     Impaired fasting glucose     Lumbar pain     Lung mass     Malaise and fatigue     Malignant neoplasm of prostate     Meningioma     Mixed hyperlipidemia     Nasal bleeding     Neuropathy     Obesity, unspecified     SHAYNE (obstructive sleep apnea)     Osteoarthritis of knee     Osteoarthritis of multiple joints     Peripheral vascular disease, unspecified     Psychosis     Renal disorder     Seizure disorder     Seizures     Sleep apnea     Unspecified cataract     Unspecified chronic bronchitis     Unspecified osteoarthritis, unspecified site     Vertigo     Vitamin D deficiency     Wheezing        Past Surgical History:   Procedure Laterality Date    APPENDECTOMY      COLONOSCOPY      CORONARY ARTERY BYPASS GRAFT       FOOT SURGERY Left     HIP REPLACEMENT ARTHROPLASTY Right     HIP SURGERY      JOINT REPLACEMENT      KNEE SURGERY      LUNG BIOPSY Right 03/03/2022    benign    SHOULDER SURGERY Left     SHOULDER SURGERY      TOTAL KNEE ARTHROPLASTY Right     triple bypass         Review of patient's allergies indicates:   Allergen Reactions    Zolpidem        No current facility-administered medications on file prior to encounter.     Current Outpatient Medications on File Prior to Encounter   Medication Sig    albuterol (PROVENTIL/VENTOLIN HFA) 90 mcg/actuation inhaler Inhale 1-2 puffs into the lungs every 6 (six) hours as needed for Wheezing or Shortness of Breath. Rescue    albuterol-ipratropium (DUO-NEB) 2.5 mg-0.5 mg/3 mL nebulizer solution inhale 3 milliliters by nebulization route 4 times per day and as needed, up to 6 doses per day for 30 days    apixaban (ELIQUIS) 5 mg Tab Take 5 mg by mouth 2 (two) times daily.    aspirin (ECOTRIN) 81 MG EC tablet Take 81 mg by mouth once daily.    atorvastatin (LIPITOR) 20 MG tablet Take 1 tablet (20 mg total) by mouth once daily.    multivitamin capsule Take 1 capsule by mouth once daily.    OXcarbazepine (TRILEPTAL) 300 MG Tab Take 1 tablet (300 mg total) by mouth 2 (two) times daily.    senna (SENOKOT) 8.6 mg tablet 2 tablets at bedtime as needed    tamsulosin (FLOMAX) 0.4 mg Cap Take 0.4 mg by mouth once daily.    torsemide (DEMADEX) 20 MG Tab Take 1 tablet (20 mg total) by mouth daily as needed (weight gain greater than 5 lbs).    vitamin D (VITAMIN D3) 1000 units Tab Take 1,000 Units by mouth once daily.    [DISCONTINUED] calcium carbonate (OS-DAVID) 600 mg calcium (1,500 mg) Tab Take 600 mg by mouth once.    [DISCONTINUED] carvediloL (COREG) 6.25 MG tablet TAKE ONE TABLET BY MOUTH TWICE DAILY (EVERY 12 HOURS) FOR BLOOD PRESSURE. THANKS !!    [DISCONTINUED] ezetimibe (ZETIA) 10 mg tablet Take 10 mg by mouth once daily.    [DISCONTINUED] FLUoxetine 20 MG  capsule     [DISCONTINUED] gabapentin (NEURONTIN) 300 MG capsule 1 capsule    [DISCONTINUED] miconazole NITRATE 2 % (MICOTIN) 2 % top powder Apply topically as needed for Itching.    [DISCONTINUED] risperiDONE (RISPERDAL) 0.5 MG Tab Take 1 tablet (0.5 mg total) by mouth nightly as needed (agitation and halluctions).     Family History       Problem Relation (Age of Onset)    Arthritis Mother, Father    Asthma Sister    Heart disease Father    Hyperlipidemia Mother, Sister    Hypertension Mother, Father, Sister, Brother          Tobacco Use    Smoking status: Every Day    Smokeless tobacco: Never    Tobacco comments:     quit 30 years ago   Substance and Sexual Activity    Alcohol use: Not Currently    Drug use: Never    Sexual activity: Not Currently     Review of Systems   Unable to perform ROS: Acuity of condition   Objective:     Vital Signs (Most Recent):  Temp: 98.1 °F (36.7 °C) (09/16/22 0732)  Pulse: (!) 114 (09/16/22 0824)  Resp: 20 (09/16/22 0832)  BP: (!) 166/77 (09/16/22 0833)  SpO2: 96 % (09/16/22 0824)   Vital Signs (24h Range):  Temp:  [97.8 °F (36.6 °C)-98.9 °F (37.2 °C)] 98.1 °F (36.7 °C)  Pulse:  [] 114  Resp:  [19-44] 20  SpO2:  [68 %-100 %] 96 %  BP: (100-166)/(48-77) 166/77     Weight: 103 kg (227 lb 1.2 oz)  Body mass index is 33.53 kg/m².    Physical Exam  Constitutional:       General: He is in acute distress.      Appearance: He is obese. He is ill-appearing.   HENT:      Head: Normocephalic.      Right Ear: External ear normal.      Left Ear: External ear normal.      Nose: No congestion or rhinorrhea.      Mouth/Throat:      Pharynx: Oropharynx is clear.   Eyes:      General: No scleral icterus.     Extraocular Movements: Extraocular movements intact.      Conjunctiva/sclera: Conjunctivae normal.   Cardiovascular:      Rate and Rhythm: Tachycardia present. Rhythm irregular.      Heart sounds: Normal heart sounds. No murmur heard.  Pulmonary:      Effort: No respiratory  distress.      Breath sounds: Normal breath sounds. No stridor.   Chest:      Chest wall: No tenderness.   Abdominal:      General: There is no distension.      Palpations: Abdomen is soft.      Tenderness: There is no abdominal tenderness. There is no right CVA tenderness, left CVA tenderness or guarding.   Genitourinary:     Comments: Sanders with yellow urine; fecal management system in place  Musculoskeletal:      Cervical back: Neck supple.   Lymphadenopathy:      Cervical: No cervical adenopathy.   Skin:     General: Skin is warm and dry.      Comments: Hemosiderin staining bilateral LE   Neurological:      Mental Status: He is alert.      Motor: Weakness present.   Psychiatric:         Mood and Affect: Mood normal.         Thought Content: Thought content normal.        Significant Labs: All pertinent labs within the past 24 hours have been reviewed.  A1C: No results for input(s): HGBA1C in the last 4320 hours.  ABGs: No results for input(s): PH, PCO2, HCO3, POCSATURATED, BE, TOTALHB, COHB, METHB, O2HB, POCFIO2, PO2 in the last 48 hours.    Blood Culture: No results for input(s): LABBLOO in the last 48 hours.    BMP:   Recent Labs   Lab 09/16/22  0542         K 3.2*   *   CO2 26   BUN 34*   CREATININE 1.7*   CALCIUM 9.2   MG 1.9       CBC:   Recent Labs   Lab 09/15/22  0338 09/16/22  0542   WBC 5.67 6.50   HGB 8.8* 9.8*   HCT 27.8* 31.4*    229       CMP:   Recent Labs   Lab 09/15/22  0338 09/16/22  0542    144   K 3.4* 3.2*   * 113*   CO2 26 26   * 103   BUN 34* 34*   CREATININE 1.8* 1.7*   CALCIUM 8.9 9.2   PROT 5.4* 6.2   ALBUMIN 1.8* 2.1*   BILITOT 0.4 0.4   ALKPHOS 46* 49*   AST 17 17   ALT 14 18   ANIONGAP 3* 5*       No results for input(s): LACTATE in the last 48 hours.    No results for input(s): COLORU, APPEARANCEUA, PHUR, SPECGRAV, PROTEINUA, GLUCUA, KETONESU, BILIRUBINUA, OCCULTUA, NITRITE, UROBILINOGEN, LEUKOCYTESUR, RBCUA, WBCUA, BACTERIA, SQUAMEPITHEL,  HYALINECASTS in the last 48 hours.    Invalid input(s): WRIGHTSUR    X-Ray Chest 1 View  Narrative: EXAMINATION:  XR CHEST 1 VIEW    CLINICAL HISTORY:  fu pneumo; intubated;    COMPARISON:  09/09/2022    FINDINGS:  Endotracheal tube and NG tube remain.  Suspect bilateral pleural effusions with adjacent atelectasis and or infiltrates, similar.  No new finding/detrimental interval change detected  Impression: No significant change since 09/09/2022    Electronically signed by: Hodan Coy MD  Date:    09/11/2022  Time:    10:07  X-Ray Abdomen AP 1 View  Narrative: EXAMINATION:  XR ABDOMEN AP 1 VIEW    CLINICAL HISTORY:  n/v;    FINDINGS:  Tip of NG tube overlies upper abdomen.  Catheter overlies pelvis.  Nonspecific bowel gas pattern with gas-filled portions of colon.  Impression: Nonspecific bowel gas pattern.  No significant change since 09/08/2022    Electronically signed by: Hodan Coy MD  Date:    09/11/2022  Time:    09:31   Significant Imaging: I have reviewed all pertinent imaging results/findings within the past 24 hours.      Assessment/Plan:      * Pneumonia  CXR findings with new right lower lobe pneumonia. Bilateral infiltrates likely secondary to pulmonary edema from infectious.  - supplement oxygen as needed SpO2 >92-94%  - daily pulmonary hygiene  - continue coverage for community acquired PNA, azithrmoycin and rocephin  - f/u blood cultures x2 pending  - trend CBC  9/6 FM:  Now in ICU, expand coverage, speak with family.  9/7 FM:  Rec DNR, hospice care, family to discuss.  9/8 FM:  Encouraging hospice care.  9/9 FM:  Multifactorial  9/10 Cont abxs, nebs and cont to wean from vent  9/11 cont abxs, nebs, cpap trials - wean sedation - change to simv settings  9/12 FM:  May try extubation today.  9/13 FM:  Should be able to extubate this am.  9/14 FM:  Successfully extubated, patient requesting no vent, need to inform son.  9/15 FM:  S/P 10 days of abx, will stop.      CVA (cerebral vascular  accident)    Suspect possible CVA, will CT, + right sided weakness.  Poor prognosis.    GIB (gastrointestinal bleeding)  Resolved for now.    N&V (nausea and vomiting)  Hold tfs currently - check xray  Planning extubation today.  Exatubated.      Malignant neoplasm of lung  Poor prognosis.  Encouraging DNR and/or hospice care.      COVID-19  Patient is identified as mild moderate severity.   Initiate standard COVID protocols; COVID-19 testing ,Infection Control notification  and isolation- respiratory, contact and droplet per protocol    Diagnostics: (leukopenia, hyponatremia, hyperferritinemia, elevated troponin, elevated d-dimer, age, and comorbidities are significant predictors of poor clinical outcome)  CBC, CMP and Portable CXR    Management: Initiate targeted therapy with possible use of Paxlovid. Maintain oxygen saturations 92-96% via Nasal Cannula  LPM and monitor with continuous/intermittent pulse oximetry. Inhaled bronchodilators as needed for shortness of breath., Continuous cardiac monitoring. and Manage respiratory failure (O2 requirement >10LPM or needing NIPPV/Mechanical ventilation) and/or Pneumonia (active chest infiltrates) separately as described below.  Empirically started on IV antibiotics for right lower lobe pneumonia on chest xray.   Follow up blood cultures and adjust antibiotics accordingly.     Influenza  Positive flu A. Supplemental oxygen as needed to maintain >90%.  Has completed tamiflu.      Acute respiratory failure with hypoxia and hypercarbia  Multifactorial, cardiopulmonary (CHF, atrial fibrillation, COPD) and infectious processes (COVID19 and fluA).  - see above management.   Off of O2  BIPAP QHS, encouraged hospice care.    CHF (congestive heart failure)  Hypervolemic today, start lasix and monitor closely.      Stage 3 chronic kidney disease  Stable BUN/Cr 33/1.8 likely at new baseline with recovering eGFR of >40 from <20 5 weeks ago. Continue to monitor as patient on IV  diuretics.       Seizure disorder  On meds - no active szs at this time      A-fib  Patient with Permanent atrial fibrillation which is controlled currently with No rate controlling home medications at this time. . Patient is currently in atrial fibrillation.[id number] Score: 4. HASBLED Score: Unable to calculate. Anticoagulation indicated. Anticoagulation done with eliquis 5 mg BID.    Eliquis on hold, cards following.  Will resume ASA.    COPD (chronic obstructive pulmonary disease)  Cont. Neb txt.      Localized edema  Home med diuretic torsemide 20 mg PRN. +3 pitting on exam. Patient responding to IV lasix 80 mg in ED.   Continue with IV diuretics and taper with progress, patient endorses breathing a little better.   - IV lasix 40 mg BID  - restrict PO intake to 1.5 L fluid ounces  - monitor I/Os  9/6 FM:  Now hypotension, low dose IVFs  9/7 FM:  Volume improved, was dry.  9/8 FM:  Improved.  9/9 FM:  Stable.  9/12 FM:  Holding IVFs.  9/14 FM:  Anasarca, 3rd spacing, see above.  9/15 FM:  Continue po lasix.    Weakness  Pt/ot once extubated        VTE Risk Mitigation (From admission, onward)         Ordered     IP VTE HIGH RISK PATIENT  Once         09/02/22 1513     Place sequential compression device  Until discontinued         09/02/22 1513                Discharge Planning   GLORY:      Code Status: Full Code   Is the patient medically ready for discharge?:     Reason for patient still in hospital (select all that apply): Patient trending condition  Discharge Plan A: Home with family, Home Health                  Navi Domínguez III, MD  Department of Hospital Medicine   [facility]

## 2022-09-16 NOTE — PLAN OF CARE
09/16/22 1527   Medicare Message   Important Message from Medicare regarding Discharge Appeal Rights Explained to patient/caregiver;Signed/date by patient/caregiver  (Reached out per phone, Rachid Lei, patient's son)   Date IMM was signed 09/16/22   Time IMM was signed 1528   Reached out to patient's son, Rachid Lei per phone,  Explained medicare IM to son, verbalizes understanding.  Gives verbal to document recipt of IM.

## 2022-09-16 NOTE — PROGRESS NOTES
[facility]  Adult Nutrition  Progress Note    SUMMARY       Recommendations  1. Rec'd cardiac, diabetic diet with consistency rec's from SLP.   2. Rec'd Lino BID via PO to promote wound healing and provide 190 kcals and 5g of protein.    3. Rec'd continue to encourage good PO intake.   4. RD to follow and make rec's accordingly  Goals: 1. Pt will consume > 75% EEN via PO intake by next RD follow up.  Nutrition Goal Status: goal not met  Communication of RD Recs: reviewed with RN    Assessment and Plan    No new Assessment & Plan notes have been filed under this hospital service since the last note was generated.  Service: Nutrition     Reason for Assessment    Reason For Assessment: RD follow-up  Diagnosis: other (see comments) (Pneumonia)  Relevant Medical History: A-fib,Abdominal pain, epigastric,bdominal pain,  Acute on chronic congestive heart failure,nticoagulant long-term use,rthritis  ,Asteatotic eczema ,enign essential HTN    , Benign prostatic hyperplasia  ,Brain tumor   ,Cardiovascular accident  ,  Carotid artery stenosis  ,  CHF (congestive heart failure)   ,CKD , stage IV , Constipation ,COPD ,Depressed ,DM ), secondary    ,dug eruption ,Eczema  ,lashanda     Encounter for blood transfusion     Fever     H. pylori infection     Hearing loss     History of tobacco use     Hypercholesterolemia     Hypertensive renal disease, benign     Hypokalemia     Hyponatremia     Impaired fasting glucose     Lumbar pain     Lung mass     Malaise and fatigue     Malignant neoplasm of prostate     Meningioma     Mixed hyperlipidemia     Nasal bleeding     Neuropathy     Obesity, unspecified     SHAYNE (obstructive sleep apnea)     Osteoarthritis of knee     Osteoarthritis of multiple joints     Peripheral vascular disease, unspecified     Psychosis     Renal disorder     Seizure disorder     Seizures     Sleep apnea     Unspecified cataract     Unspecified chronic bronchitis     Unspecified osteoarthritis,  "unspecified site     Vertigo     Vitamin D deficiency     Wheezing  Interdisciplinary Rounds: did not attend  General Information Comments: Followed up on pt today. Spoke with RN about pt's current appetite and PO intake. RN stated that the pt's son fed him breakfast and he ate 100% of breakfast. RN also stated that pt's PO intake has not been great and he refused to eat lunch today. Rec'd encourage PO intake. RD to follow and make rec's accordingly.  Nutrition Discharge Planning: TBD as care progresses    Nutrition Risk Screen    Nutrition Risk Screen: dysphagia or difficulty swallowing    Nutrition/Diet History    Spiritual, Cultural Beliefs, Caodaism Practices, Values that Affect Care: no (Simultaneous filing. User may not have seen previous data.)  Food Allergies: NKFA    Anthropometrics    Temp: 98 °F (36.7 °C)  Height Method: Stated  Height: 5' 9" (175.3 cm)  Height (inches): 69 in  Weight Method: Bed Scale  Weight: 103 kg (227 lb 1.2 oz)  Weight (lb): 227.08 lb  Ideal Body Weight (IBW), Male: 160 lb  % Ideal Body Weight, Male (lb): 141.93 %  BMI (Calculated): 33.5  BMI Grade: 30 - 34.9- obesity - grade I       Lab/Procedures/Meds    Pertinent Labs Reviewed: reviewed  Pertinent Labs Comments: GFR = 51.1  Pertinent Medications Reviewed: reviewed    Estimated/Assessed Needs    Weight Used For Calorie Calculations: 103 kg (227 lb 1.2 oz)  Energy Calorie Requirements (kcal): 2432  Energy Need Method: [facility]  Protein Requirements: 124-155 (1.2-1.5g/kg)  Weight Used For Protein Calculations: 103 kg (227 lb 1.2 oz)  Fluid Requirements (mL): 2432 (1mL/kcal)  Estimated Fluid Requirement Method: RDA Method  RDA Method (mL): 2432    Nutrition Prescription Ordered    Current Diet Order: heart healthy, dysphagia soft  Nutrition Order Comments: Pt requires total assist with meals  Current Nutrition Support Formula Ordered: Suplena  Current Nutrition Support Rate Ordered: 40 (ml)  Current Nutrition Support Frequency " Ordered: on hold  Oral Nutrition Supplement: None    Evaluation of Received Nutrient/Fluid Intake    Enteral Calories (kcal): 1728  Enteral Protein (gm): 43  Enteral (Free Water) Fluid (mL): 165  Free Water Flush Fluid (mL): 12 (mL/hr)  Other Calories (kcal): 190 (Lino)  Total Calories (kcal): 1918  % Kcal Needs: 50%  Other Protein (gm): 5 (Lino)  Total Protein (gm): 48  % Protein Needs: 50%  I/O: -1650  Energy Calories Required: not meeting needs  Protein Required: not meeting needs  Tolerance: tolerating  % Intake of Estimated Energy Needs: 25 - 50 %  % Meal Intake: 25 - 50 %    Nutrition Risk    Level of Risk/Frequency of Follow-up: moderate     Monitor and Evaluation    Food and Nutrient Intake: energy intake, food and beverage intake  Food and Nutrient Adminstration: diet order  Knowledge/Beliefs/Attitudes: food and nutrition knowledge/skill, beliefs and attitudes  Physical Activity and Function: nutrition-related ADLs and IADLs  Anthropometric Measurements: height/length, weight, weight change, body mass index  Biochemical Data, Medical Tests and Procedures: electrolyte and renal panel, gastrointestinal profile, glucose/endocrine profile, inflammatory profile, lipid profile  Nutrition-Focused Physical Findings: overall appearance     Nutrition Follow-Up    RD Follow-up?: Yes

## 2022-09-16 NOTE — PLAN OF CARE
Recommendations  1. Rec'd cardiac, diabetic diet with consistency rec's from SLP.   2. Rec'd Lino BID via PO to promote wound healing and provide 190 kcals and 5g of protein.    3. Rec'd continue to encourage good PO intake.   4. RD to follow and make rec's accordingly  Goals: 1. Pt will consume > 75% EEN via PO intake by next RD follow up.  Nutrition Goal Status: goal not met

## 2022-09-16 NOTE — ASSESSMENT & PLAN NOTE
Multifactorial, cardiopulmonary (CHF, atrial fibrillation, COPD) and infectious processes (COVID19 and fluA).  - see above management.   Off of O2  BIPAP QHS, encouraged hospice care.

## 2022-09-16 NOTE — PLAN OF CARE
09/16/22 1402   Post-Acute Status   Post-Acute Authorization Hospice   Hospice Status Referrals Sent   Discharge Delays None known at this time   Discharge Plan   Discharge Plan A Home with family;Hospice/home   Discharge Plan B Home with family;Hospice/home   New referral. Spoke to the patient's son, Rachid, regarding the hospice consult. The patient's son decided on [facility]. New referral was sent. [facility] was notified of the referral.

## 2022-09-16 NOTE — PLAN OF CARE
Rachid plans to meet with the  with Jennifer Eagle on tomorrow morning for an informational. Dr. Domínguez was notified.

## 2022-09-16 NOTE — CONSULTS
Spoke to the patient's son, Rachid, via phone. He plans to meet with me today at 1PM to discuss the hospice consult. Rachid expressed that he is the medical POA, and I asked him to please bring the paperwork with him when he returns back to the hospital for 1PM.

## 2022-09-17 LAB
ALBUMIN SERPL BCP-MCNC: 2 G/DL (ref 3.5–5.2)
ALP SERPL-CCNC: 49 U/L (ref 55–135)
ALT SERPL W/O P-5'-P-CCNC: 15 U/L (ref 10–44)
ANION GAP SERPL CALC-SCNC: 4 MMOL/L (ref 8–16)
AST SERPL-CCNC: 16 U/L (ref 10–40)
BASOPHILS # BLD AUTO: 0.05 K/UL (ref 0–0.2)
BASOPHILS NFR BLD: 0.7 % (ref 0–1.9)
BILIRUB SERPL-MCNC: 0.4 MG/DL (ref 0.1–1)
BUN SERPL-MCNC: 34 MG/DL (ref 8–23)
CALCIUM SERPL-MCNC: 9.4 MG/DL (ref 8.7–10.5)
CHLORIDE SERPL-SCNC: 116 MMOL/L (ref 95–110)
CO2 SERPL-SCNC: 27 MMOL/L (ref 23–29)
CREAT SERPL-MCNC: 1.6 MG/DL (ref 0.5–1.4)
DIFFERENTIAL METHOD: ABNORMAL
EOSINOPHIL # BLD AUTO: 0.2 K/UL (ref 0–0.5)
EOSINOPHIL NFR BLD: 2.5 % (ref 0–8)
ERYTHROCYTE [DISTWIDTH] IN BLOOD BY AUTOMATED COUNT: 17.9 % (ref 11.5–14.5)
EST. GFR  (NO RACE VARIABLE): 43.6 ML/MIN/1.73 M^2
GLUCOSE SERPL-MCNC: 107 MG/DL (ref 70–110)
HCT VFR BLD AUTO: 28.7 % (ref 40–54)
HGB BLD-MCNC: 9 G/DL (ref 14–18)
IMM GRANULOCYTES # BLD AUTO: 0.04 K/UL (ref 0–0.04)
IMM GRANULOCYTES NFR BLD AUTO: 0.6 % (ref 0–0.5)
LYMPHOCYTES # BLD AUTO: 0.9 K/UL (ref 1–4.8)
LYMPHOCYTES NFR BLD: 13.3 % (ref 18–48)
MAGNESIUM SERPL-MCNC: 2 MG/DL (ref 1.6–2.6)
MCH RBC QN AUTO: 26 PG (ref 27–31)
MCHC RBC AUTO-ENTMCNC: 31.4 G/DL (ref 32–36)
MCV RBC AUTO: 83 FL (ref 82–98)
MONOCYTES # BLD AUTO: 1.1 K/UL (ref 0.3–1)
MONOCYTES NFR BLD: 15 % (ref 4–15)
NEUTROPHILS # BLD AUTO: 4.8 K/UL (ref 1.8–7.7)
NEUTROPHILS NFR BLD: 67.9 % (ref 38–73)
NRBC BLD-RTO: 0 /100 WBC
PLATELET # BLD AUTO: 194 K/UL (ref 150–450)
PMV BLD AUTO: 10.5 FL (ref 9.2–12.9)
POTASSIUM SERPL-SCNC: 3.2 MMOL/L (ref 3.5–5.1)
PROT SERPL-MCNC: 6 G/DL (ref 6–8.4)
RBC # BLD AUTO: 3.46 M/UL (ref 4.6–6.2)
SODIUM SERPL-SCNC: 147 MMOL/L (ref 136–145)
WBC # BLD AUTO: 7.09 K/UL (ref 3.9–12.7)

## 2022-09-17 PROCEDURE — 36415 COLL VENOUS BLD VENIPUNCTURE: CPT | Performed by: INTERNAL MEDICINE

## 2022-09-17 PROCEDURE — 80053 COMPREHEN METABOLIC PANEL: CPT | Performed by: INTERNAL MEDICINE

## 2022-09-17 PROCEDURE — 94640 AIRWAY INHALATION TREATMENT: CPT

## 2022-09-17 PROCEDURE — 25000242 PHARM REV CODE 250 ALT 637 W/ HCPCS: Performed by: INTERNAL MEDICINE

## 2022-09-17 PROCEDURE — 94660 CPAP INITIATION&MGMT: CPT

## 2022-09-17 PROCEDURE — 25000003 PHARM REV CODE 250: Performed by: INTERNAL MEDICINE

## 2022-09-17 PROCEDURE — 85025 COMPLETE CBC W/AUTO DIFF WBC: CPT | Performed by: INTERNAL MEDICINE

## 2022-09-17 PROCEDURE — 21400001 HC TELEMETRY ROOM

## 2022-09-17 PROCEDURE — 99900035 HC TECH TIME PER 15 MIN (STAT)

## 2022-09-17 PROCEDURE — 99900031 HC PATIENT EDUCATION (STAT)

## 2022-09-17 PROCEDURE — 27000221 HC OXYGEN, UP TO 24 HOURS

## 2022-09-17 PROCEDURE — 94761 N-INVAS EAR/PLS OXIMETRY MLT: CPT

## 2022-09-17 PROCEDURE — 63600175 PHARM REV CODE 636 W HCPCS: Performed by: INTERNAL MEDICINE

## 2022-09-17 PROCEDURE — 83735 ASSAY OF MAGNESIUM: CPT | Performed by: INTERNAL MEDICINE

## 2022-09-17 RX ORDER — BUDESONIDE 0.25 MG/2ML
0.25 INHALANT ORAL EVERY 24 HOURS
Status: DISCONTINUED | OUTPATIENT
Start: 2022-09-17 | End: 2022-09-19 | Stop reason: HOSPADM

## 2022-09-17 RX ORDER — POTASSIUM CHLORIDE 20 MEQ/1
40 TABLET, EXTENDED RELEASE ORAL ONCE
Status: DISCONTINUED | OUTPATIENT
Start: 2022-09-17 | End: 2022-09-17

## 2022-09-17 RX ORDER — POTASSIUM CHLORIDE 7.45 MG/ML
10 INJECTION INTRAVENOUS
Status: COMPLETED | OUTPATIENT
Start: 2022-09-17 | End: 2022-09-17

## 2022-09-17 RX ORDER — SODIUM CHLORIDE 9 MG/ML
INJECTION, SOLUTION INTRAVENOUS CONTINUOUS
Status: ACTIVE | OUTPATIENT
Start: 2022-09-17 | End: 2022-09-17

## 2022-09-17 RX ADMIN — OXCARBAZEPINE 300 MG: 300 TABLET, FILM COATED ORAL at 09:09

## 2022-09-17 RX ADMIN — HYDROCODONE BITARTRATE AND ACETAMINOPHEN 1 TABLET: 5; 325 TABLET ORAL at 01:09

## 2022-09-17 RX ADMIN — ISOSORBIDE DINITRATE: 20 TABLET ORAL at 03:09

## 2022-09-17 RX ADMIN — FUROSEMIDE 40 MG: 40 TABLET ORAL at 05:09

## 2022-09-17 RX ADMIN — SODIUM CHLORIDE: 0.9 INJECTION, SOLUTION INTRAVENOUS at 12:09

## 2022-09-17 RX ADMIN — IPRATROPIUM BROMIDE AND ALBUTEROL SULFATE 3 ML: 2.5; .5 SOLUTION RESPIRATORY (INHALATION) at 08:09

## 2022-09-17 RX ADMIN — POTASSIUM CHLORIDE 10 MEQ: 10 INJECTION, SOLUTION INTRAVENOUS at 11:09

## 2022-09-17 RX ADMIN — ISOSORBIDE DINITRATE: 20 TABLET ORAL at 09:09

## 2022-09-17 RX ADMIN — RISPERIDONE 0.5 MG: 0.5 TABLET ORAL at 09:09

## 2022-09-17 RX ADMIN — POTASSIUM CHLORIDE 10 MEQ: 10 INJECTION, SOLUTION INTRAVENOUS at 12:09

## 2022-09-17 RX ADMIN — SODIUM CHLORIDE: 0.9 INJECTION, SOLUTION INTRAVENOUS at 03:09

## 2022-09-17 RX ADMIN — IPRATROPIUM BROMIDE AND ALBUTEROL SULFATE 3 ML: 2.5; .5 SOLUTION RESPIRATORY (INHALATION) at 07:09

## 2022-09-17 RX ADMIN — IPRATROPIUM BROMIDE AND ALBUTEROL SULFATE 3 ML: 2.5; .5 SOLUTION RESPIRATORY (INHALATION) at 01:09

## 2022-09-17 RX ADMIN — BUDESONIDE 0.25 MG: 0.25 INHALANT ORAL at 07:09

## 2022-09-17 NOTE — ASSESSMENT & PLAN NOTE
Patient with Permanent atrial fibrillation which is controlled currently with No rate controlling home medications at this time. . Patient is currently in atrial fibrillation.[id number] Score: 4. HASBLED Score: Unable to calculate. Anticoagulation indicated. Anticoagulation done with eliquis 5 mg BID.    Eliquis on hold, cards following.  Will resume ASA.

## 2022-09-17 NOTE — SUBJECTIVE & OBJECTIVE
Interval History: Patient seen and examined.    Review of Systems   Unable to perform ROS: Acuity of condition   Objective:     Vital Signs (Most Recent):  Temp: 98.7 °F (37.1 °C) (09/17/22 1140)  Pulse: 74 (09/17/22 1140)  Resp: 20 (09/17/22 1140)  BP: (!) 145/79 (09/17/22 1140)  SpO2: 100 % (09/17/22 1140)   Vital Signs (24h Range):  Temp:  [98 °F (36.7 °C)-99.9 °F (37.7 °C)] 98.7 °F (37.1 °C)  Pulse:  [] 74  Resp:  [20-40] 20  SpO2:  [92 %-100 %] 100 %  BP: (119-177)/(56-99) 145/79     Weight: 109 kg (240 lb 4.8 oz)  Body mass index is 35.49 kg/m².    Intake/Output Summary (Last 24 hours) at 9/17/2022 1153  Last data filed at 9/17/2022 0600  Gross per 24 hour   Intake 240 ml   Output 700 ml   Net -460 ml      Physical Exam  Constitutional:       General: He is not in acute distress.     Appearance: He is obese. He is ill-appearing.   HENT:      Head: Normocephalic and atraumatic.      Nose: No congestion or rhinorrhea.      Mouth/Throat:      Mouth: Mucous membranes are moist.   Eyes:      General: No scleral icterus.  Cardiovascular:      Rate and Rhythm: Normal rate.      Heart sounds: No murmur heard.  Pulmonary:      Effort: No respiratory distress.      Breath sounds: No stridor.   Chest:      Chest wall: No tenderness.   Abdominal:      General: There is no distension.      Palpations: Abdomen is soft.   Genitourinary:     Comments: Sanders with yellow urine; fecal management system in place  Lymphadenopathy:      Cervical: No cervical adenopathy.   Skin:     General: Skin is warm and dry.      Capillary Refill: Capillary refill takes less than 2 seconds.   Neurological:      Mental Status: He is alert.      Motor: Weakness present.       Significant Labs: All pertinent labs within the past 24 hours have been reviewed.  Recent Lab Results         09/17/22  0617        Albumin 2.0       Alkaline Phosphatase 49       ALT 15       Anion Gap 4       AST 16       Baso # 0.05       Basophil % 0.7        BILIRUBIN TOTAL 0.4  Comment: For infants and newborns, interpretation of results should be based  on gestational age, weight and in agreement with clinical  observations.    Premature Infant recommended reference ranges:  Up to 24 hours.............<8.0 mg/dL  Up to 48 hours............<12.0 mg/dL  3-5 days..................<15.0 mg/dL  6-29 days.................<15.0 mg/dL    For patients on Eltrombopag therapy, use of Dimension [place] TBIL is   not   recommended.         BUN 34       Calcium 9.4       Chloride 116       CO2 27       Creatinine 1.6       Differential Method Automated       eGFR 43.6       Eos # 0.2       Eosinophil % 2.5       Glucose 107       Gran # (ANC) 4.8       Gran % 67.9       Hematocrit 28.7       Hemoglobin 9.0       Immature Grans (Abs) 0.04  Comment: Mild elevation in immature granulocytes is non specific and   can be seen in a variety of conditions including stress response,   acute inflammation, trauma and pregnancy. Correlation with other   laboratory and clinical findings is essential.         Immature Granulocytes 0.6       Lymph # 0.9       Lymph % 13.3       Magnesium 2.0       MCH 26.0       MCHC 31.4       MCV 83       Mono # 1.1       Mono % 15.0       MPV 10.5       nRBC 0       Platelets 194       Potassium 3.2       PROTEIN TOTAL 6.0       RBC 3.46       RDW 17.9       Sodium 147       WBC 7.09               Significant Imaging: I have reviewed all pertinent imaging results/findings within the past 24 hours.

## 2022-09-17 NOTE — RESPIRATORY THERAPY
09/17/22 0729   Patient Assessment/Suction   Level of Consciousness (AVPU)   (pt appears to be asleep at this time)   Respiratory Effort Mouth breathing   Expansion/Accessory Muscles/Retractions abdominal muscle use  (Raffy RN at bedside)   All Lung Fields Breath Sounds coarse;wheezes, expiratory;wheezes, inspiratory   Rhythm/Pattern, Respiratory tachypneic   Cough Frequency no cough   Skin Integrity   Area Observed Left;Right;Cheek;Bridge of nose;Chin;Forehead   Skin Appearance without discoloration   Barrier used? Liquid Filled Cushion  (Liquicell Cushion in place on bridge of patient's nose)   PRE-TX-O2   O2 Device (Oxygen Therapy) BiPAP   $ Is the patient on Low Flow Oxygen? Yes   Oxygen Concentration (%) 35   SpO2 100 %   Pulse Oximetry Type Intermittent   $ Pulse Oximetry - Multiple Charge Pulse Oximetry - Multiple   Pulse 86   Resp (!) 26   Positioning HOB elevated 30 degrees   Aerosol Therapy   $ Aerosol Therapy Charges Aerosol Treatment   Daily Review of Necessity (SVN) completed   Respiratory Treatment Status (SVN) given   Treatment Route (SVN) in-line   Patient Position (SVN) HOB elevated   Post Treatment Assessment (SVN) breath sounds unchanged   Signs of Intolerance (SVN) none   Breath Sounds Post-Respiratory Treatment   Throughout All Fields Post-Treatment All Fields   Throughout All Fields Post-Treatment no change   Post-treatment Heart Rate (beats/min) 91   Post-treatment Resp Rate (breaths/min) 25   Preset CPAP/BiPAP Settings   Mode Of Delivery BiPAP S/T   $ CPAP/BiPAP Daily Charge BiPAP/CPAP Daily   $ Initial CPAP/BiPAP Setup? No   $ Is patient using? Yes   Size of Mask Large   Sized Appropriately? Yes   Equipment Type Trilogy    Airway Device Type large full face mask   Humidifier not applicable   Ipap 12   EPAP (cm H2O) 6   Pressure Support (cm H2O) 6   ITime (sec) 1.3   Rise Time (sec) 2   Patient CPAP/BiPAP Settings   CPAP/BIPAP ID 38   RR Total (Breaths/Min) 26   Tidal Volume (mL) 648    VE Minute Ventilation (L/min) 15.6 L/min   Peak Inspiratory Pressure (cm H2O) 12.4   Total Leak (L/Min) 37.1   Patient Trigger - ST Mode Only (%) 100   CPAP/BiPAP Backup Settings   Backup Rate 8 breaths per minute (bpm)   CPAP/BiPAP Alarms   Minute Ventilation (L/Min)   (low = 4.0 / high = 24.0)   High RR (breaths/min) 45   Low RR (breaths/min) 8   Apnea (Sec) 20       Bipap mask briefly removed to access patient's skin integrity. Patient with increased respirations and abdominal muscle use at this time. Patient to remain on Bipap -- Raffy ARIAS at bedside and aware

## 2022-09-17 NOTE — NURSING
Noted pt is tachypneic breathing 40-52 BPM, pt's SPO2 is 92% on RA. Pt refuses to put on O2 or Bipap at this time. Respiratory notified and at bedside trying to put the pt on bipap. Will continue to monitor.

## 2022-09-17 NOTE — PT/OT/SLP PROGRESS
Occupational Therapy      Patient Name:  Jamey Lei   MRN:  [id number]    Patient not seen today secondary to HOLD status as per nurse, Raffy. Pt remains on BiPAP secondary to low oxygen saturations. Pt to transition home on Hospice. Will follow-up 09/19/22.    9/17/2022

## 2022-09-17 NOTE — PT/OT/SLP PROGRESS
Physical Therapy      Patient Name:  Jamey Lei   MRN:  [id number]    Patient not seen today secondary to Spoke with Raffy Bauer RN and she stated to hold patient treatment today. Patient currently on BIPAP due to low oxygen saturations.  Will follow-up 9/19/2022.

## 2022-09-17 NOTE — ASSESSMENT & PLAN NOTE
CXR findings with new right lower lobe pneumonia. Bilateral infiltrates likely secondary to pulmonary edema from infectious.  - supplement oxygen as needed SpO2 >92-94%  - daily pulmonary hygiene  - continue coverage for community acquired PNA, azithrmoycin and rocephin  - f/u blood cultures x2 pending  - trend CBC  9/6 FM:  Now in ICU, expand coverage, speak with family.  9/7 FM:  Rec DNR, hospice care, family to discuss.  9/8 FM:  Encouraging hospice care.  9/9 FM:  Multifactorial  9/10 Cont abxs, nebs and cont to wean from vent  9/11 cont abxs, nebs, cpap trials - wean sedation - change to simv settings  9/12 FM:  May try extubation today.  9/13 FM:  Should be able to extubate this am.  9/14 FM:  Successfully extubated, patient requesting no vent, need to inform son.  9/15 FM:  S/P 10 days of abx, will stop.  9/17 antibiotic completed, patient was given solids for last night, developed some respiratory difficulties, had been on BiPAP, oxygen sat stable, chest x-ray ordered, concern for aspiration

## 2022-09-18 LAB
ALBUMIN SERPL BCP-MCNC: 2.2 G/DL (ref 3.5–5.2)
ALP SERPL-CCNC: 45 U/L (ref 55–135)
ALT SERPL W/O P-5'-P-CCNC: 15 U/L (ref 10–44)
ANION GAP SERPL CALC-SCNC: 6 MMOL/L (ref 8–16)
AST SERPL-CCNC: 18 U/L (ref 10–40)
BASOPHILS # BLD AUTO: 0.05 K/UL (ref 0–0.2)
BASOPHILS NFR BLD: 0.6 % (ref 0–1.9)
BILIRUB SERPL-MCNC: 0.5 MG/DL (ref 0.1–1)
BUN SERPL-MCNC: 26 MG/DL (ref 8–23)
CALCIUM SERPL-MCNC: 9.5 MG/DL (ref 8.7–10.5)
CHLORIDE SERPL-SCNC: 114 MMOL/L (ref 95–110)
CO2 SERPL-SCNC: 26 MMOL/L (ref 23–29)
CREAT SERPL-MCNC: 1.3 MG/DL (ref 0.5–1.4)
DIFFERENTIAL METHOD: ABNORMAL
EOSINOPHIL # BLD AUTO: 0.2 K/UL (ref 0–0.5)
EOSINOPHIL NFR BLD: 2.4 % (ref 0–8)
ERYTHROCYTE [DISTWIDTH] IN BLOOD BY AUTOMATED COUNT: 18.3 % (ref 11.5–14.5)
EST. GFR  (NO RACE VARIABLE): 55.9 ML/MIN/1.73 M^2
GLUCOSE SERPL-MCNC: 96 MG/DL (ref 70–110)
HCT VFR BLD AUTO: 30.4 % (ref 40–54)
HGB BLD-MCNC: 9.3 G/DL (ref 14–18)
IMM GRANULOCYTES # BLD AUTO: 0.04 K/UL (ref 0–0.04)
IMM GRANULOCYTES NFR BLD AUTO: 0.5 % (ref 0–0.5)
LYMPHOCYTES # BLD AUTO: 1.1 K/UL (ref 1–4.8)
LYMPHOCYTES NFR BLD: 14.2 % (ref 18–48)
MAGNESIUM SERPL-MCNC: 2 MG/DL (ref 1.6–2.6)
MCH RBC QN AUTO: 25.4 PG (ref 27–31)
MCHC RBC AUTO-ENTMCNC: 30.6 G/DL (ref 32–36)
MCV RBC AUTO: 83 FL (ref 82–98)
MONOCYTES # BLD AUTO: 1.2 K/UL (ref 0.3–1)
MONOCYTES NFR BLD: 14.4 % (ref 4–15)
NEUTROPHILS # BLD AUTO: 5.4 K/UL (ref 1.8–7.7)
NEUTROPHILS NFR BLD: 67.9 % (ref 38–73)
NRBC BLD-RTO: 0 /100 WBC
PLATELET # BLD AUTO: 196 K/UL (ref 150–450)
PMV BLD AUTO: 9.9 FL (ref 9.2–12.9)
POTASSIUM SERPL-SCNC: 3.1 MMOL/L (ref 3.5–5.1)
PROT SERPL-MCNC: 6.2 G/DL (ref 6–8.4)
RBC # BLD AUTO: 3.66 M/UL (ref 4.6–6.2)
SODIUM SERPL-SCNC: 146 MMOL/L (ref 136–145)
WBC # BLD AUTO: 7.97 K/UL (ref 3.9–12.7)

## 2022-09-18 PROCEDURE — 21400001 HC TELEMETRY ROOM

## 2022-09-18 PROCEDURE — 99900035 HC TECH TIME PER 15 MIN (STAT)

## 2022-09-18 PROCEDURE — 94761 N-INVAS EAR/PLS OXIMETRY MLT: CPT

## 2022-09-18 PROCEDURE — 99900031 HC PATIENT EDUCATION (STAT)

## 2022-09-18 PROCEDURE — 36415 COLL VENOUS BLD VENIPUNCTURE: CPT | Performed by: INTERNAL MEDICINE

## 2022-09-18 PROCEDURE — 83735 ASSAY OF MAGNESIUM: CPT | Performed by: INTERNAL MEDICINE

## 2022-09-18 PROCEDURE — 25000003 PHARM REV CODE 250: Performed by: INTERNAL MEDICINE

## 2022-09-18 PROCEDURE — 25000242 PHARM REV CODE 250 ALT 637 W/ HCPCS: Performed by: INTERNAL MEDICINE

## 2022-09-18 PROCEDURE — 85025 COMPLETE CBC W/AUTO DIFF WBC: CPT | Performed by: INTERNAL MEDICINE

## 2022-09-18 PROCEDURE — 94640 AIRWAY INHALATION TREATMENT: CPT

## 2022-09-18 PROCEDURE — 80053 COMPREHEN METABOLIC PANEL: CPT | Performed by: INTERNAL MEDICINE

## 2022-09-18 RX ORDER — CARVEDILOL 3.12 MG/1
6.25 TABLET ORAL 2 TIMES DAILY
Status: DISCONTINUED | OUTPATIENT
Start: 2022-09-18 | End: 2022-09-19 | Stop reason: HOSPADM

## 2022-09-18 RX ORDER — POTASSIUM CHLORIDE 20 MEQ/1
40 TABLET, EXTENDED RELEASE ORAL ONCE
Status: COMPLETED | OUTPATIENT
Start: 2022-09-18 | End: 2022-09-18

## 2022-09-18 RX ADMIN — TAMSULOSIN HYDROCHLORIDE 0.4 MG: 0.4 CAPSULE ORAL at 08:09

## 2022-09-18 RX ADMIN — CARVEDILOL 6.25 MG: 3.12 TABLET, FILM COATED ORAL at 01:09

## 2022-09-18 RX ADMIN — PANTOPRAZOLE SODIUM 40 MG: 40 TABLET, DELAYED RELEASE ORAL at 08:09

## 2022-09-18 RX ADMIN — HYDROCODONE BITARTRATE AND ACETAMINOPHEN 1 TABLET: 5; 325 TABLET ORAL at 08:09

## 2022-09-18 RX ADMIN — RISPERIDONE 0.5 MG: 0.5 TABLET ORAL at 08:09

## 2022-09-18 RX ADMIN — IPRATROPIUM BROMIDE AND ALBUTEROL SULFATE 3 ML: 2.5; .5 SOLUTION RESPIRATORY (INHALATION) at 08:09

## 2022-09-18 RX ADMIN — BUDESONIDE 0.25 MG: 0.25 INHALANT ORAL at 07:09

## 2022-09-18 RX ADMIN — IPRATROPIUM BROMIDE AND ALBUTEROL SULFATE 3 ML: 2.5; .5 SOLUTION RESPIRATORY (INHALATION) at 07:09

## 2022-09-18 RX ADMIN — FAMOTIDINE 20 MG: 20 TABLET ORAL at 08:09

## 2022-09-18 RX ADMIN — ISOSORBIDE DINITRATE: 20 TABLET ORAL at 08:09

## 2022-09-18 RX ADMIN — POTASSIUM CHLORIDE 40 MEQ: 1500 TABLET, EXTENDED RELEASE ORAL at 01:09

## 2022-09-18 RX ADMIN — CARVEDILOL 6.25 MG: 3.12 TABLET, FILM COATED ORAL at 08:09

## 2022-09-18 RX ADMIN — ISOSORBIDE DINITRATE: 20 TABLET ORAL at 02:09

## 2022-09-18 RX ADMIN — AMLODIPINE BESYLATE 5 MG: 5 TABLET ORAL at 08:09

## 2022-09-18 RX ADMIN — OXCARBAZEPINE 300 MG: 300 TABLET, FILM COATED ORAL at 08:09

## 2022-09-18 RX ADMIN — FUROSEMIDE 40 MG: 40 TABLET ORAL at 05:09

## 2022-09-18 RX ADMIN — ASPIRIN 81 MG: 81 TABLET, COATED ORAL at 08:09

## 2022-09-18 RX ADMIN — IPRATROPIUM BROMIDE AND ALBUTEROL SULFATE 3 ML: 2.5; .5 SOLUTION RESPIRATORY (INHALATION) at 01:09

## 2022-09-18 RX ADMIN — ATORVASTATIN CALCIUM 40 MG: 40 TABLET, FILM COATED ORAL at 09:09

## 2022-09-18 RX ADMIN — FUROSEMIDE 40 MG: 40 TABLET ORAL at 08:09

## 2022-09-18 NOTE — ASSESSMENT & PLAN NOTE
CXR findings with new right lower lobe pneumonia. Bilateral infiltrates likely secondary to pulmonary edema from infectious.  - supplement oxygen as needed SpO2 >92-94%  - daily pulmonary hygiene  - continue coverage for community acquired PNA, azithrmoycin and rocephin  - f/u blood cultures x2 pending  - trend CBC  9/6 FM:  Now in ICU, expand coverage, speak with family.  9/7 FM:  Rec DNR, hospice care, family to discuss.  9/8 FM:  Encouraging hospice care.  9/9 FM:  Multifactorial  9/10 Cont abxs, nebs and cont to wean from vent  9/11 cont abxs, nebs, cpap trials - wean sedation - change to simv settings  9/12 FM:  May try extubation today.  9/13 FM:  Should be able to extubate this am.  9/14 FM:  Successfully extubated, patient requesting no vent, need to inform son.  9/15 FM:  S/P 10 days of abx, will stop.  9/17 antibiotic completed, patient was given solids for last night, developed some respiratory difficulties, had been on BiPAP, oxygen sat stable, chest x-ray ordered, concern for aspiration  9/18 chest x-ray reviewed, improvement noted

## 2022-09-18 NOTE — PROGRESS NOTES
[facility] Medicine  Progress Note    Patient Name: Jamey Lei  MRN: [id number]  Patient Class: IP- Inpatient   Admission Date: 9/2/2022  Length of Stay: 16 days  Attending Physician: Navi Domínguez III, MD  Primary Care Provider: Navi Domínguez III, MD        Subjective:     Principal Problem:Pneumonia        HPI:  81 year old male with hypertension, hypoerlipidemia, CHF, permanent atrial fibriallation on Eliquis, CAD s/p CABGx3, CKD stage 3, recent lung and brain mass findings presents to ED with worsening shortness of breath that started about a week ago with bilateral leg swelling with difficulty ambulating with his walker.     Patient hard of hearing endorsed since treatment in the ED, he has been feeling better. At home he does not use oxygen, breathing at SpO2 >95% on continuous O2 2L via nasal cannula on exam. There is no change in appetite, nausea, vomiting, diarrhea, constipation. Denies headaches, fever, chills, vision changes, chest pain, abdominal pain. Denies joint pain and loss of sensation in toes and foot bilaterally with increasing leg swelling.    ED course:  Tachypnea rate >30, CXR right lower lobe pneumonia, positive influenza A and COVID19 (fully vaccinated), NTproBNT >6000, lactate <0.2 x2, blood cultures x2 pending,  BUN/Cr 33/1.8 with improved eGFR from last hospitalization and discharge one week ago. Patient started on IV lasix 80 mg, responding well with clear urine collecting in Sanders catheter and IV antibiotics empirically started for right lobe pneumonia.       Overview/Hospital Course:  9/3/22 CG:  Says it feels much better and does complain of a cough.  He has had to go to the bathroom and is waiting for help and assistance getting up from the bed to go to the restroom.  No other acute overnight events.  9/4/22 CG: Doing better, still has a cough and feels weak.   9/5/22 CG:  Became more agitated last night and was trying to rip out his Sanders catheter.  Had to  be given Seroquel to help him sleep and to calm him down.  9/6/22 FM:  Events of the weekend are noted.  Patient was admitted with COVID and influenza and a deterioration in his condition.  Patient has a history of lung cancer and is seeing an oncologist at an outside facility.  Patient has a history of chronic kidney disease as well as obesity diabetes neuropathy atrial fibrillation and his general health has come line significantly over the last year.  The patient has been in and out of the hospital and nursing facilities.  Last night the patient had a rapid response and ended up on the ventilator.  I reviewed the events and I am unsure if this was related to Seroquel will and I also see that he was becoming hypotensive.  Patient's chest x-ray is abnormal I will discuss with the family this morning.  9/7/22 FM:  Met with family today.  They were unable to make a decision on do not resuscitate.  They will consult with other family members.  Patient's volume status is improved as he was dry yesterday.  Patient's renal numbers all reviewed and noted.  Patient is able to open eyes on the ventilator and follow simple commands.  Will continue ventilatory support today as well as tube feedings.  9/8/22 FM:  Patient had a positive stool for blood as well as a persistent drop in his hemoglobin and was transfused 2 units of packed red blood cells yesterday.  Patient is admitted with COVID pneumonia influenza pneumonia and possible sepsis.  Patient has a history of lung cancer as well as multiple other comorbidities and I have met with the family yesterday and they are unable to come to a code status or long-term status decision.  I am encouraging hospice care as the patient has multiple irreversible illnesses and a decline in his quality of life.  9/9/22 FM:  Patient is relatively unchanged today.  Yesterday the patient did well with his CPAP challenges and we will continue today.  I suspect he can be taken off the  ventilator over the weekend some time.  Labs this morning are pending he has received 4 units of blood.  9/10 ND patient tolerating CPAP trials again today attempt to new trial.  Continue to wean sedation completely off.    9/11 ND pt with nv overnight - tfs beign held.  Has had bms.   Has been toleratign cpap trials. Wean sedation   9/12 FM:  Patient opens eyes to verbal stimulation this morning.  Patient's Diprivan is off family is at bedside.  Patient's tidal volumes are 650 on a pressure support of 10.  Tube feeds have been off and we may try extubation later today.  Encouraged family to place the patient do not resuscitate and they state they are not ready for this they would like to talk to patient once he is extubated.  9/13 FM:  Patient's CPAP trials yesterday ended up lasting 4-5 hours and the patient fatigued.  I suspect we will be able to extubate today.  9/14 FM:  Patient tolerated extubation yesterday and this morning is on nasal cannula with his ex-wife at bedside.  Patient has intermittent confusion but when questioned about wanting to be back on the ventilator he states he does not want this.  I will reach out to the son who is the primary decision maker.  The patient's tachypneic he is taking p.o. will re-ask therapy to begin slowly working with the patient.  Patient with significant edema/3rd spacing, will start lasix.  9/15 FM:  Patient's family is at the bedside today.  Patient's vital signs are stable and his oxygen saturation is 97% on room air.  Patient has confusion and altered mental status.  He also seems to have some right-sided neglect and right-sided weakness.  Patient is off of all anticoagulants and antiplatelets as he has had a gastrointestinal bleed during this admission.  I have been continue to encourage do not resuscitate orders in the family is having difficulty making his decision.  Patient is unable to make this decision when questioned.  Transitioning to the floor  today.  9/16/22 FM:  Patient has been transition to the floor.  Patient's family is at bedside.  I have tried to encourage do not resuscitate orders and they state that there is a nephew they would like to visit and then they would feel comfortable placing that order.  Also I have recommended hospice care and we will have a informational visit.  9/17 AA, weekend crosscover, patient with lung cancer, admission complicated with pneumonia, patient had been advised to do thickened liquid diet, nurse reported that patient was given solid food to eat, required BiPAP therapy overnight, chest x-ray ordered, concern for aspiration, no elevated white count, renal function appears at baseline, patient continued to decline, hospice evaluated, son who has power of  wishes to wait till Monday see take patient home, one patient to have BiPAP, patient currently full code, prognosis poor  9/18 AA, weekend crosscover, mentation overall improved, patient advised to stay away from solid food previously, thickened liquids, replete electrolytes, planning to discharge home tomorrow with hospice      Interval History: Patient seen and examined.    Review of Systems   Unable to perform ROS: Acuity of condition   Objective:     Vital Signs (Most Recent):  Temp: 98 °F (36.7 °C) (09/18/22 1233)  Pulse: 108 (09/18/22 1348)  Resp: 20 (09/18/22 1348)  BP: 128/74 (09/18/22 1233)  SpO2: 96 % (09/18/22 1348)   Vital Signs (24h Range):  Temp:  [98 °F (36.7 °C)-99.4 °F (37.4 °C)] 98 °F (36.7 °C)  Pulse:  [101-119] 108  Resp:  [18-32] 20  SpO2:  [92 %-100 %] 96 %  BP: (128-186)/(68-83) 128/74     Weight: 109 kg (240 lb 4.8 oz)  Body mass index is 35.49 kg/m².    Intake/Output Summary (Last 24 hours) at 9/18/2022 1403  Last data filed at 9/17/2022 1832  Gross per 24 hour   Intake 580.83 ml   Output 600 ml   Net -19.17 ml        Physical Exam  Constitutional:       General: He is not in acute distress.     Appearance: He is obese. He is  ill-appearing.   HENT:      Head: Normocephalic and atraumatic.      Nose: No congestion or rhinorrhea.      Mouth/Throat:      Mouth: Mucous membranes are moist.   Eyes:      General: No scleral icterus.  Cardiovascular:      Rate and Rhythm: Normal rate.      Heart sounds: No murmur heard.  Pulmonary:      Effort: No respiratory distress.      Breath sounds: No stridor.   Chest:      Chest wall: No tenderness.   Abdominal:      General: There is no distension.      Palpations: Abdomen is soft.   Genitourinary:     Comments: Sanders with yellow urine; fecal management system in place  Lymphadenopathy:      Cervical: No cervical adenopathy.   Skin:     General: Skin is warm and dry.      Capillary Refill: Capillary refill takes less than 2 seconds.   Neurological:      Mental Status: He is alert.      Motor: Weakness present.       Significant Labs: All pertinent labs within the past 24 hours have been reviewed.  Recent Lab Results         09/18/22  0528        Albumin 2.2       Alkaline Phosphatase 45       ALT 15       Anion Gap 6       AST 18       Baso # 0.05       Basophil % 0.6       BILIRUBIN TOTAL 0.5  Comment: For infants and newborns, interpretation of results should be based  on gestational age, weight and in agreement with clinical  observations.    Premature Infant recommended reference ranges:  Up to 24 hours.............<8.0 mg/dL  Up to 48 hours............<12.0 mg/dL  3-5 days..................<15.0 mg/dL  6-29 days.................<15.0 mg/dL    For patients on Eltrombopag therapy, use of Dimension [place] TBIL is   not   recommended.         BUN 26       Calcium 9.5       Chloride 114       CO2 26       Creatinine 1.3       Differential Method Automated       eGFR 55.9       Eos # 0.2       Eosinophil % 2.4       Glucose 96       Gran # (ANC) 5.4       Gran % 67.9       Hematocrit 30.4       Hemoglobin 9.3       Immature Grans (Abs) 0.04  Comment: Mild elevation in immature granulocytes is non  specific and   can be seen in a variety of conditions including stress response,   acute inflammation, trauma and pregnancy. Correlation with other   laboratory and clinical findings is essential.         Immature Granulocytes 0.5       Lymph # 1.1       Lymph % 14.2       Magnesium 2.0       MCH 25.4       MCHC 30.6       MCV 83       Mono # 1.2       Mono % 14.4       MPV 9.9       nRBC 0       Platelets 196       Potassium 3.1       PROTEIN TOTAL 6.2       RBC 3.66       RDW 18.3       Sodium 146       WBC 7.97               Significant Imaging: I have reviewed all pertinent imaging results/findings within the past 24 hours.      Assessment/Plan:      * Pneumonia  CXR findings with new right lower lobe pneumonia. Bilateral infiltrates likely secondary to pulmonary edema from infectious.  - supplement oxygen as needed SpO2 >92-94%  - daily pulmonary hygiene  - continue coverage for community acquired PNA, azithrmoycin and rocephin  - f/u blood cultures x2 pending  - trend CBC  9/6 FM:  Now in ICU, expand coverage, speak with family.  9/7 FM:  Rec DNR, hospice care, family to discuss.  9/8 FM:  Encouraging hospice care.  9/9 FM:  Multifactorial  9/10 Cont abxs, nebs and cont to wean from vent  9/11 cont abxs, nebs, cpap trials - wean sedation - change to simv settings  9/12 FM:  May try extubation today.  9/13 FM:  Should be able to extubate this am.  9/14 FM:  Successfully extubated, patient requesting no vent, need to inform son.  9/15 FM:  S/P 10 days of abx, will stop.  9/17 antibiotic completed, patient was given solids for last night, developed some respiratory difficulties, had been on BiPAP, oxygen sat stable, chest x-ray ordered, concern for aspiration  9/18 chest x-ray reviewed, improvement noted      CVA (cerebral vascular accident)  Suspect possible CVA, will CT, + right sided weakness.  Poor prognosis.    GIB (gastrointestinal bleeding)  Resolved for now.    N&V (nausea and vomiting)  Hold tfs  currently - check xray  Planning extubation today.  Exatubated.      Malignant neoplasm of lung  Poor prognosis.  Encouraging DNR and/or hospice care.      COVID-19  Patient is identified as mild moderate severity.   Initiate standard COVID protocols; COVID-19 testing ,Infection Control notification  and isolation- respiratory, contact and droplet per protocol    Diagnostics: (leukopenia, hyponatremia, hyperferritinemia, elevated troponin, elevated d-dimer, age, and comorbidities are significant predictors of poor clinical outcome)  CBC, CMP and Portable CXR    Management: Initiate targeted therapy with possible use of Paxlovid. Maintain oxygen saturations 92-96% via Nasal Cannula  LPM and monitor with continuous/intermittent pulse oximetry. Inhaled bronchodilators as needed for shortness of breath., Continuous cardiac monitoring. and Manage respiratory failure (O2 requirement >10LPM or needing NIPPV/Mechanical ventilation) and/or Pneumonia (active chest infiltrates) separately as described below.  Empirically started on IV antibiotics for right lower lobe pneumonia on chest xray.   Follow up blood cultures and adjust antibiotics accordingly.     Influenza  Positive flu A. Supplemental oxygen as needed to maintain >90%.  Has completed tamiflu.      Acute respiratory failure with hypoxia and hypercarbia  Multifactorial, cardiopulmonary (CHF, atrial fibrillation, COPD) and infectious processes (COVID19 and fluA).  - see above management.   Off of O2  BIPAP QHS, encouraged hospice care.    CHF (congestive heart failure)        Stage 3 chronic kidney disease  Stable BUN/Cr 33/1.8 likely at new baseline with recovering eGFR of >40 from <20 5 weeks ago. Continue to monitor as patient on IV diuretics.       Seizure disorder  On meds - no active szs at this time      A-fib  Patient with Permanent atrial fibrillation which is controlled currently with No rate controlling home medications at this time. . Patient is currently  in atrial fibrillation.[id number] Score: 4. HASBLED Score: Unable to calculate. Anticoagulation indicated. Anticoagulation done with eliquis 5 mg BID.    Eliquis on hold, cards following.  Will resume ASA.    COPD (chronic obstructive pulmonary disease)  Cont. Neb txt.      Localized edema  Home med diuretic torsemide 20 mg PRN. +3 pitting on exam. Patient responding to IV lasix 80 mg in ED.   Continue with IV diuretics and taper with progress, patient endorses breathing a little better.   - IV lasix 40 mg BID  - restrict PO intake to 1.5 L fluid ounces  - monitor I/Os  9/6 FM:  Now hypotension, low dose IVFs  9/7 FM:  Volume improved, was dry.  9/8 FM:  Improved.  9/9 FM:  Stable.  9/12 FM:  Holding IVFs.  9/14 FM:  Anasarca, 3rd spacing, see above.  9/15 FM:  Continue po lasix.    Weakness  Pt/ot once extubated        VTE Risk Mitigation (From admission, onward)         Ordered     IP VTE HIGH RISK PATIENT  Once         09/02/22 1513     Place sequential compression device  Until discontinued         09/02/22 1513                Discharge Planning   GLORY:      Code Status: Full Code   Is the patient medically ready for discharge?:     Reason for patient still in hospital (select all that apply): Treatment  Discharge Plan A: Home with family, Hospice/home   Discharge Delays: None known at this time              Trent Ramachandran MD  Department of Hospital Medicine   [facility]

## 2022-09-18 NOTE — PLAN OF CARE
Problem: Infection  Goal: Absence of Infection Signs and Symptoms  Outcome: Ongoing, Progressing     Problem: Adult Inpatient Plan of Care  Goal: Plan of Care Review  Outcome: Met  Goal: Patient-Specific Goal (Individualized)  Outcome: Ongoing, Progressing  Goal: Absence of Hospital-Acquired Illness or Injury  Outcome: Ongoing, Progressing  Goal: Optimal Comfort and Wellbeing  Outcome: Ongoing, Progressing  Goal: Readiness for Transition of Care  Outcome: Adequate for Care Transition     Problem: Fluid and Electrolyte Imbalance (Acute Kidney Injury/Impairment)  Goal: Fluid and Electrolyte Balance  Outcome: Ongoing, Progressing     Problem: Oral Intake Inadequate (Acute Kidney Injury/Impairment)  Goal: Optimal Nutrition Intake  Outcome: Ongoing, Progressing     Problem: Renal Function Impairment (Acute Kidney Injury/Impairment)  Goal: Effective Renal Function  Outcome: Ongoing, Progressing     Problem: Fluid Imbalance (Pneumonia)  Goal: Fluid Balance  Outcome: Ongoing, Progressing     Problem: Infection (Pneumonia)  Goal: Resolution of Infection Signs and Symptoms  Outcome: Ongoing, Progressing     Problem: Respiratory Compromise (Pneumonia)  Goal: Effective Oxygenation and Ventilation  Outcome: Ongoing, Progressing

## 2022-09-18 NOTE — SUBJECTIVE & OBJECTIVE
Interval History: Patient seen and examined.    Review of Systems   Unable to perform ROS: Acuity of condition   Objective:     Vital Signs (Most Recent):  Temp: 98 °F (36.7 °C) (09/18/22 1233)  Pulse: 108 (09/18/22 1348)  Resp: 20 (09/18/22 1348)  BP: 128/74 (09/18/22 1233)  SpO2: 96 % (09/18/22 1348)   Vital Signs (24h Range):  Temp:  [98 °F (36.7 °C)-99.4 °F (37.4 °C)] 98 °F (36.7 °C)  Pulse:  [101-119] 108  Resp:  [18-32] 20  SpO2:  [92 %-100 %] 96 %  BP: (128-186)/(68-83) 128/74     Weight: 109 kg (240 lb 4.8 oz)  Body mass index is 35.49 kg/m².    Intake/Output Summary (Last 24 hours) at 9/18/2022 1403  Last data filed at 9/17/2022 1832  Gross per 24 hour   Intake 580.83 ml   Output 600 ml   Net -19.17 ml        Physical Exam  Constitutional:       General: He is not in acute distress.     Appearance: He is obese. He is ill-appearing.   HENT:      Head: Normocephalic and atraumatic.      Nose: No congestion or rhinorrhea.      Mouth/Throat:      Mouth: Mucous membranes are moist.   Eyes:      General: No scleral icterus.  Cardiovascular:      Rate and Rhythm: Normal rate.      Heart sounds: No murmur heard.  Pulmonary:      Effort: No respiratory distress.      Breath sounds: No stridor.   Chest:      Chest wall: No tenderness.   Abdominal:      General: There is no distension.      Palpations: Abdomen is soft.   Genitourinary:     Comments: Sanders with yellow urine; fecal management system in place  Lymphadenopathy:      Cervical: No cervical adenopathy.   Skin:     General: Skin is warm and dry.      Capillary Refill: Capillary refill takes less than 2 seconds.   Neurological:      Mental Status: He is alert.      Motor: Weakness present.       Significant Labs: All pertinent labs within the past 24 hours have been reviewed.  Recent Lab Results         09/18/22  0528        Albumin 2.2       Alkaline Phosphatase 45       ALT 15       Anion Gap 6       AST 18       Baso # 0.05       Basophil % 0.6        BILIRUBIN TOTAL 0.5  Comment: For infants and newborns, interpretation of results should be based  on gestational age, weight and in agreement with clinical  observations.    Premature Infant recommended reference ranges:  Up to 24 hours.............<8.0 mg/dL  Up to 48 hours............<12.0 mg/dL  3-5 days..................<15.0 mg/dL  6-29 days.................<15.0 mg/dL    For patients on Eltrombopag therapy, use of Dimension [place] TBIL is   not   recommended.         BUN 26       Calcium 9.5       Chloride 114       CO2 26       Creatinine 1.3       Differential Method Automated       eGFR 55.9       Eos # 0.2       Eosinophil % 2.4       Glucose 96       Gran # (ANC) 5.4       Gran % 67.9       Hematocrit 30.4       Hemoglobin 9.3       Immature Grans (Abs) 0.04  Comment: Mild elevation in immature granulocytes is non specific and   can be seen in a variety of conditions including stress response,   acute inflammation, trauma and pregnancy. Correlation with other   laboratory and clinical findings is essential.         Immature Granulocytes 0.5       Lymph # 1.1       Lymph % 14.2       Magnesium 2.0       MCH 25.4       MCHC 30.6       MCV 83       Mono # 1.2       Mono % 14.4       MPV 9.9       nRBC 0       Platelets 196       Potassium 3.1       PROTEIN TOTAL 6.2       RBC 3.66       RDW 18.3       Sodium 146       WBC 7.97               Significant Imaging: I have reviewed all pertinent imaging results/findings within the past 24 hours.

## 2022-09-18 NOTE — ASSESSMENT & PLAN NOTE
Patient with Permanent atrial fibrillation which is controlled currently with No rate controlling home medications at this time. . Patient is currently in atrial fibrillation.[id number] Score: 4. HASBLED Score: Unable to calculate. Anticoagulation indicated. Anticoagulation done with eliquis 5 mg BID.    Eliquis on hold, cards following.  Will resume ASA.

## 2022-09-18 NOTE — NURSING
"Son at bedside asking for ice water for pt. Son instructed in great detail that pt needs to have thick it in ice cold water and no straw. Thick water mixed per this nurse to honey consistency. Pt son Rachid instructed that liquids need to be as thick as honey. Son verbalized understanding. Son took water out of pt hands, stated, "this is too thick" and proceeded to mix more water in to cup making water consistency thinner than nectar. This nurse reiterated to sonRachid that liquids must be thick as honey. Son said well hospice will take care of that. Will cont to monitor pt.   "

## 2022-09-19 VITALS
TEMPERATURE: 98 F | WEIGHT: 240.31 LBS | HEART RATE: 99 BPM | SYSTOLIC BLOOD PRESSURE: 124 MMHG | HEIGHT: 69 IN | DIASTOLIC BLOOD PRESSURE: 59 MMHG | BODY MASS INDEX: 35.59 KG/M2 | RESPIRATION RATE: 20 BRPM | OXYGEN SATURATION: 92 %

## 2022-09-19 LAB
ALBUMIN SERPL BCP-MCNC: 2.1 G/DL (ref 3.5–5.2)
ALP SERPL-CCNC: 47 U/L (ref 55–135)
ALT SERPL W/O P-5'-P-CCNC: 18 U/L (ref 10–44)
ANION GAP SERPL CALC-SCNC: 7 MMOL/L (ref 8–16)
AST SERPL-CCNC: 18 U/L (ref 10–40)
BASOPHILS # BLD AUTO: 0.07 K/UL (ref 0–0.2)
BASOPHILS NFR BLD: 1 % (ref 0–1.9)
BILIRUB SERPL-MCNC: 0.4 MG/DL (ref 0.1–1)
BUN SERPL-MCNC: 29 MG/DL (ref 8–23)
CALCIUM SERPL-MCNC: 9.3 MG/DL (ref 8.7–10.5)
CHLORIDE SERPL-SCNC: 113 MMOL/L (ref 95–110)
CO2 SERPL-SCNC: 23 MMOL/L (ref 23–29)
CREAT SERPL-MCNC: 1.4 MG/DL (ref 0.5–1.4)
DIFFERENTIAL METHOD: ABNORMAL
EOSINOPHIL # BLD AUTO: 0.3 K/UL (ref 0–0.5)
EOSINOPHIL NFR BLD: 3.7 % (ref 0–8)
ERYTHROCYTE [DISTWIDTH] IN BLOOD BY AUTOMATED COUNT: 18.4 % (ref 11.5–14.5)
EST. GFR  (NO RACE VARIABLE): 51.1 ML/MIN/1.73 M^2
GLUCOSE SERPL-MCNC: 98 MG/DL (ref 70–110)
HCT VFR BLD AUTO: 29.9 % (ref 40–54)
HGB BLD-MCNC: 9.4 G/DL (ref 14–18)
IMM GRANULOCYTES # BLD AUTO: 0.03 K/UL (ref 0–0.04)
IMM GRANULOCYTES NFR BLD AUTO: 0.4 % (ref 0–0.5)
LYMPHOCYTES # BLD AUTO: 1.1 K/UL (ref 1–4.8)
LYMPHOCYTES NFR BLD: 16.7 % (ref 18–48)
MAGNESIUM SERPL-MCNC: 2 MG/DL (ref 1.6–2.6)
MCH RBC QN AUTO: 26.3 PG (ref 27–31)
MCHC RBC AUTO-ENTMCNC: 31.4 G/DL (ref 32–36)
MCV RBC AUTO: 84 FL (ref 82–98)
MONOCYTES # BLD AUTO: 0.9 K/UL (ref 0.3–1)
MONOCYTES NFR BLD: 13.8 % (ref 4–15)
NEUTROPHILS # BLD AUTO: 4.3 K/UL (ref 1.8–7.7)
NEUTROPHILS NFR BLD: 64.4 % (ref 38–73)
NRBC BLD-RTO: 0 /100 WBC
PLATELET # BLD AUTO: 196 K/UL (ref 150–450)
PMV BLD AUTO: 10.5 FL (ref 9.2–12.9)
POTASSIUM SERPL-SCNC: 3.3 MMOL/L (ref 3.5–5.1)
PROT SERPL-MCNC: 6.1 G/DL (ref 6–8.4)
RBC # BLD AUTO: 3.58 M/UL (ref 4.6–6.2)
SODIUM SERPL-SCNC: 143 MMOL/L (ref 136–145)
WBC # BLD AUTO: 6.75 K/UL (ref 3.9–12.7)

## 2022-09-19 PROCEDURE — 25000003 PHARM REV CODE 250: Performed by: INTERNAL MEDICINE

## 2022-09-19 PROCEDURE — 80053 COMPREHEN METABOLIC PANEL: CPT | Performed by: INTERNAL MEDICINE

## 2022-09-19 PROCEDURE — 94640 AIRWAY INHALATION TREATMENT: CPT

## 2022-09-19 PROCEDURE — 99900035 HC TECH TIME PER 15 MIN (STAT)

## 2022-09-19 PROCEDURE — 25000242 PHARM REV CODE 250 ALT 637 W/ HCPCS: Performed by: INTERNAL MEDICINE

## 2022-09-19 PROCEDURE — 99900031 HC PATIENT EDUCATION (STAT)

## 2022-09-19 PROCEDURE — 83735 ASSAY OF MAGNESIUM: CPT | Performed by: INTERNAL MEDICINE

## 2022-09-19 PROCEDURE — 36415 COLL VENOUS BLD VENIPUNCTURE: CPT | Performed by: INTERNAL MEDICINE

## 2022-09-19 PROCEDURE — 85025 COMPLETE CBC W/AUTO DIFF WBC: CPT | Performed by: INTERNAL MEDICINE

## 2022-09-19 PROCEDURE — 94761 N-INVAS EAR/PLS OXIMETRY MLT: CPT

## 2022-09-19 RX ORDER — HYDROCODONE BITARTRATE AND ACETAMINOPHEN 5; 325 MG/1; MG/1
1 TABLET ORAL EVERY 6 HOURS PRN
Qty: 60 TABLET | Refills: 0 | Status: SHIPPED | OUTPATIENT
Start: 2022-09-19

## 2022-09-19 RX ORDER — HYDROCODONE BITARTRATE AND ACETAMINOPHEN 10; 325 MG/1; MG/1
1 TABLET ORAL EVERY 6 HOURS PRN
Status: DISCONTINUED | OUTPATIENT
Start: 2022-09-19 | End: 2022-09-19 | Stop reason: HOSPADM

## 2022-09-19 RX ORDER — ACETAMINOPHEN 325 MG/1
650 TABLET ORAL EVERY 8 HOURS PRN
Refills: 0
Start: 2022-09-19

## 2022-09-19 RX ORDER — ISOSORBIDE DINITRATE AND HYDRALAZINE HYDROCHLORIDE 37.5; 2 MG/1; MG/1
1 TABLET ORAL 2 TIMES DAILY
Qty: 60 TABLET | Refills: 1 | Status: SHIPPED | OUTPATIENT
Start: 2022-09-19 | End: 2022-10-19

## 2022-09-19 RX ORDER — AMLODIPINE BESYLATE 5 MG/1
5 TABLET ORAL DAILY
Qty: 30 TABLET | Refills: 1 | Status: SHIPPED | OUTPATIENT
Start: 2022-09-19 | End: 2022-10-19

## 2022-09-19 RX ORDER — BUDESONIDE 0.25 MG/2ML
0.25 INHALANT ORAL DAILY
Qty: 60 ML | Refills: 1 | Status: SHIPPED | OUTPATIENT
Start: 2022-09-19 | End: 2022-10-19

## 2022-09-19 RX ORDER — POTASSIUM CHLORIDE 20 MEQ/1
20 TABLET, EXTENDED RELEASE ORAL DAILY
Status: DISCONTINUED | OUTPATIENT
Start: 2022-09-19 | End: 2022-09-19 | Stop reason: HOSPADM

## 2022-09-19 RX ORDER — RISPERIDONE 0.5 MG/1
0.5 TABLET ORAL 2 TIMES DAILY
Qty: 60 TABLET | Refills: 1 | Status: SHIPPED | OUTPATIENT
Start: 2022-09-19 | End: 2022-10-19

## 2022-09-19 RX ORDER — TORSEMIDE 20 MG/1
20 TABLET ORAL DAILY
Qty: 30 TABLET | Refills: 1 | Status: SHIPPED | OUTPATIENT
Start: 2022-09-19 | End: 2022-10-19

## 2022-09-19 RX ORDER — PANTOPRAZOLE SODIUM 40 MG/1
40 TABLET, DELAYED RELEASE ORAL DAILY
Qty: 30 TABLET | Refills: 1 | Status: SHIPPED | OUTPATIENT
Start: 2022-09-19 | End: 2022-10-19

## 2022-09-19 RX ORDER — CARVEDILOL 6.25 MG/1
6.25 TABLET ORAL 2 TIMES DAILY
Qty: 60 TABLET | Refills: 1 | Status: SHIPPED | OUTPATIENT
Start: 2022-09-19 | End: 2022-10-19

## 2022-09-19 RX ADMIN — BUDESONIDE 0.25 MG: 0.25 INHALANT ORAL at 07:09

## 2022-09-19 RX ADMIN — FUROSEMIDE 40 MG: 40 TABLET ORAL at 09:09

## 2022-09-19 RX ADMIN — IPRATROPIUM BROMIDE AND ALBUTEROL SULFATE 3 ML: 2.5; .5 SOLUTION RESPIRATORY (INHALATION) at 07:09

## 2022-09-19 RX ADMIN — TAMSULOSIN HYDROCHLORIDE 0.4 MG: 0.4 CAPSULE ORAL at 09:09

## 2022-09-19 RX ADMIN — OXCARBAZEPINE 300 MG: 300 TABLET, FILM COATED ORAL at 09:09

## 2022-09-19 RX ADMIN — PANTOPRAZOLE SODIUM 40 MG: 40 TABLET, DELAYED RELEASE ORAL at 09:09

## 2022-09-19 RX ADMIN — ASPIRIN 81 MG: 81 TABLET, COATED ORAL at 09:09

## 2022-09-19 RX ADMIN — POTASSIUM CHLORIDE 20 MEQ: 1500 TABLET, EXTENDED RELEASE ORAL at 09:09

## 2022-09-19 RX ADMIN — RISPERIDONE 0.5 MG: 0.5 TABLET ORAL at 09:09

## 2022-09-19 RX ADMIN — FAMOTIDINE 20 MG: 20 TABLET ORAL at 09:09

## 2022-09-19 RX ADMIN — ATORVASTATIN CALCIUM 40 MG: 40 TABLET, FILM COATED ORAL at 09:09

## 2022-09-19 RX ADMIN — ISOSORBIDE DINITRATE: 20 TABLET ORAL at 09:09

## 2022-09-19 RX ADMIN — CARVEDILOL 6.25 MG: 3.12 TABLET, FILM COATED ORAL at 09:09

## 2022-09-19 RX ADMIN — AMLODIPINE BESYLATE 5 MG: 5 TABLET ORAL at 09:09

## 2022-09-19 NOTE — DISCHARGE SUMMARY
[facility] Medicine  Discharge Summary      Patient Name: Jamey Lei  MRN: [id number]  Patient Class: IP- Inpatient  Admission Date: 9/2/2022  Hospital Length of Stay: 17 days  Discharge Date and Time:  09/19/2022 8:55 AM  Attending Physician: Navi Domínguez III, MD   Discharging Provider: Navi Domínguez III, MD  Primary Care Provider: Navi Domínguez III, MD      HPI:   81 year old male with hypertension, hypoerlipidemia, CHF, permanent atrial fibriallation on Eliquis, CAD s/p CABGx3, CKD stage 3, recent lung and brain mass findings presents to ED with worsening shortness of breath that started about a week ago with bilateral leg swelling with difficulty ambulating with his walker.     Patient hard of hearing endorsed since treatment in the ED, he has been feeling better. At home he does not use oxygen, breathing at SpO2 >95% on continuous O2 2L via nasal cannula on exam. There is no change in appetite, nausea, vomiting, diarrhea, constipation. Denies headaches, fever, chills, vision changes, chest pain, abdominal pain. Denies joint pain and loss of sensation in toes and foot bilaterally with increasing leg swelling.    ED course:  Tachypnea rate >30, CXR right lower lobe pneumonia, positive influenza A and COVID19 (fully vaccinated), NTproBNT >6000, lactate <0.2 x2, blood cultures x2 pending,  BUN/Cr 33/1.8 with improved eGFR from last hospitalization and discharge one week ago. Patient started on IV lasix 80 mg, responding well with clear urine collecting in Sanders catheter and IV antibiotics empirically started for right lobe pneumonia.       * No surgery found *      Hospital Course:   9/3/22 CG:  Says it feels much better and does complain of a cough.  He has had to go to the bathroom and is waiting for help and assistance getting up from the bed to go to the restroom.  No other acute overnight events.  9/4/22 CG: Doing better, still has a cough and feels weak.   9/5/22 CG:  Became more  agitated last night and was trying to rip out his Sanders catheter.  Had to be given Seroquel to help him sleep and to calm him down.  9/6/22 FM:  Events of the weekend are noted.  Patient was admitted with COVID and influenza and a deterioration in his condition.  Patient has a history of lung cancer and is seeing an oncologist at an outside facility.  Patient has a history of chronic kidney disease as well as obesity diabetes neuropathy atrial fibrillation and his general health has come line significantly over the last year.  The patient has been in and out of the hospital and nursing facilities.  Last night the patient had a rapid response and ended up on the ventilator.  I reviewed the events and I am unsure if this was related to Seroquel will and I also see that he was becoming hypotensive.  Patient's chest x-ray is abnormal I will discuss with the family this morning.  9/7/22 FM:  Met with family today.  They were unable to make a decision on do not resuscitate.  They will consult with other family members.  Patient's volume status is improved as he was dry yesterday.  Patient's renal numbers all reviewed and noted.  Patient is able to open eyes on the ventilator and follow simple commands.  Will continue ventilatory support today as well as tube feedings.  9/8/22 FM:  Patient had a positive stool for blood as well as a persistent drop in his hemoglobin and was transfused 2 units of packed red blood cells yesterday.  Patient is admitted with COVID pneumonia influenza pneumonia and possible sepsis.  Patient has a history of lung cancer as well as multiple other comorbidities and I have met with the family yesterday and they are unable to come to a code status or long-term status decision.  I am encouraging hospice care as the patient has multiple irreversible illnesses and a decline in his quality of life.  9/9/22 FM:  Patient is relatively unchanged today.  Yesterday the patient did well with his CPAP  challenges and we will continue today.  I suspect he can be taken off the ventilator over the weekend some time.  Labs this morning are pending he has received 4 units of blood.  9/10 ND patient tolerating CPAP trials again today attempt to new trial.  Continue to wean sedation completely off.    9/11 ND pt with nv overnight - tfs beign held.  Has had bms.   Has been toleratign cpap trials. Wean sedation   9/12 FM:  Patient opens eyes to verbal stimulation this morning.  Patient's Diprivan is off family is at bedside.  Patient's tidal volumes are 650 on a pressure support of 10.  Tube feeds have been off and we may try extubation later today.  Encouraged family to place the patient do not resuscitate and they state they are not ready for this they would like to talk to patient once he is extubated.  9/13 FM:  Patient's CPAP trials yesterday ended up lasting 4-5 hours and the patient fatigued.  I suspect we will be able to extubate today.  9/14 FM:  Patient tolerated extubation yesterday and this morning is on nasal cannula with his ex-wife at bedside.  Patient has intermittent confusion but when questioned about wanting to be back on the ventilator he states he does not want this.  I will reach out to the son who is the primary decision maker.  The patient's tachypneic he is taking p.o. will re-ask therapy to begin slowly working with the patient.  Patient with significant edema/3rd spacing, will start lasix.  9/15 FM:  Patient's family is at the bedside today.  Patient's vital signs are stable and his oxygen saturation is 97% on room air.  Patient has confusion and altered mental status.  He also seems to have some right-sided neglect and right-sided weakness.  Patient is off of all anticoagulants and antiplatelets as he has had a gastrointestinal bleed during this admission.  I have been continue to encourage do not resuscitate orders in the family is having difficulty making his decision.  Patient is unable to  make this decision when questioned.  Transitioning to the floor today.  9/16/22 FM:  Patient has been transition to the floor.  Patient's family is at bedside.  I have tried to encourage do not resuscitate orders and they state that there is a nephew they would like to visit and then they would feel comfortable placing that order.  Also I have recommended hospice care and we will have a informational visit.  9/17 AA, weekend crosscover, patient with lung cancer, admission complicated with pneumonia, patient had been advised to do thickened liquid diet, nurse reported that patient was given solid food to eat, required BiPAP therapy overnight, chest x-ray ordered, concern for aspiration, no elevated white count, renal function appears at baseline, patient continued to decline, hospice evaluated, son who has power of  wishes to wait till Monday see take patient home, one patient to have BiPAP, patient currently full code, prognosis poor  9/18 AA, weekend crosscover, mentation overall improved, patient advised to stay away from solid food previously, thickened liquids, replete electrolytes, planning to discharge home tomorrow with hospice.      Discharge Note:  Patient has waxed and waned in complications related to his comorbidities and lung cancer over the last year.  Had multiple hospitalizations and ultimately was admitted to our acute care facility with delirium and sepsis.  Patient ended up needing mechanical ventilation and ICU care and we were able to wean the ventilator and extubate the patient after a short ICU stay.  I have continued to encourage the patient's son Rachid to place the take patient do not resuscitate and to begin comfort care.  It has taken them some time and there are some complicated family dynamics but they have agreed to hospice care at home but still are not ready to sign a do not resuscitate order.  The son is waiting for other family members to be able to visit the patient.  We  have encouraged him to keep him at home and care for him with hospice as we are only prolonging the patient's pain and suffering with repeat admissions and hospitalizations.  The patient's prognosis is terminal.       Goals of Care Treatment Preferences:  Code Status: Full Code      Consults:   Consults (From admission, onward)        Status Ordering Provider     Inpatient consult to Social Work/Case Management  Once        Provider:  (Not yet assigned)    Completed ROMULO FLORENTINO III     Inpatient consult to Registered Dietitian/Nutritionist  Once        Provider:  (Not yet assigned)    Completed ROMULO FLORENTINO III     Inpatient consult to Cardiology  Once        Provider:  Christian Webster MD    Completed ROMULO FLORENTINO III     IP consult to case management  Once        Provider:  (Not yet assigned)    Completed ASHLEE GARY          * Pneumonia  CXR findings with new right lower lobe pneumonia. Bilateral infiltrates likely secondary to pulmonary edema from infectious.  - supplement oxygen as needed SpO2 >92-94%  - daily pulmonary hygiene  - continue coverage for community acquired PNA, azithrmoycin and rocephin  - f/u blood cultures x2 pending  - trend CBC  9/6 FM:  Now in ICU, expand coverage, speak with family.  9/7 FM:  Rec DNR, hospice care, family to discuss.  9/8 FM:  Encouraging hospice care.  9/9 FM:  Multifactorial  9/10 Cont abxs, nebs and cont to wean from vent  9/11 cont abxs, nebs, cpap trials - wean sedation - change to simv settings  9/12 FM:  May try extubation today.  9/13 FM:  Should be able to extubate this am.  9/14 FM:  Successfully extubated, patient requesting no vent, need to inform son.  9/15 FM:  S/P 10 days of abx, will stop.  9/17 antibiotic completed, patient was given solids for last night, developed some respiratory difficulties, had been on BiPAP, oxygen sat stable, chest x-ray ordered, concern for aspiration  9/18 chest x-ray reviewed, improvement noted      CVA  (cerebral vascular accident)  Suspect possible CVA, will CT, + right sided weakness.  Poor prognosis.    GIB (gastrointestinal bleeding)  Resolved for now.    N&V (nausea and vomiting)  Hold tfs currently - check xray  Planning extubation today.  Exatubated.      Malignant neoplasm of lung  Poor prognosis.  Encouraging DNR and/or hospice care.      COVID-19  Patient is identified as mild moderate severity.   Initiate standard COVID protocols; COVID-19 testing ,Infection Control notification  and isolation- respiratory, contact and droplet per protocol    Diagnostics: (leukopenia, hyponatremia, hyperferritinemia, elevated troponin, elevated d-dimer, age, and comorbidities are significant predictors of poor clinical outcome)  CBC, CMP and Portable CXR    Management: Initiate targeted therapy with possible use of Paxlovid. Maintain oxygen saturations 92-96% via Nasal Cannula  LPM and monitor with continuous/intermittent pulse oximetry. Inhaled bronchodilators as needed for shortness of breath., Continuous cardiac monitoring. and Manage respiratory failure (O2 requirement >10LPM or needing NIPPV/Mechanical ventilation) and/or Pneumonia (active chest infiltrates) separately as described below.  Empirically started on IV antibiotics for right lower lobe pneumonia on chest xray.   Follow up blood cultures and adjust antibiotics accordingly.     Influenza  Positive flu A. Supplemental oxygen as needed to maintain >90%.  Has completed tamiflu.      Acute respiratory failure with hypoxia and hypercarbia  Multifactorial, cardiopulmonary (CHF, atrial fibrillation, COPD) and infectious processes (COVID19 and fluA).  - see above management.   Off of O2  BIPAP QHS, encouraged hospice care.    CHF (congestive heart failure)        Stage 3 chronic kidney disease  Stable BUN/Cr 33/1.8 likely at new baseline with recovering eGFR of >40 from <20 5 weeks ago. Continue to monitor as patient on IV diuretics.       Seizure disorder  On  meds - no active szs at this time      A-fib  Patient with Permanent atrial fibrillation which is controlled currently with No rate controlling home medications at this time. . Patient is currently in atrial fibrillation.[id number] Score: 4. HASBLED Score: Unable to calculate. Anticoagulation indicated. Anticoagulation done with eliquis 5 mg BID.    Eliquis on hold, cards following.  Will resume ASA.    COPD (chronic obstructive pulmonary disease)  Cont. Neb txt.      Localized edema  Home med diuretic torsemide 20 mg PRN. +3 pitting on exam. Patient responding to IV lasix 80 mg in ED.   Continue with IV diuretics and taper with progress, patient endorses breathing a little better.   - IV lasix 40 mg BID  - restrict PO intake to 1.5 L fluid ounces  - monitor I/Os  9/6 FM:  Now hypotension, low dose IVFs  9/7 FM:  Volume improved, was dry.  9/8 FM:  Improved.  9/9 FM:  Stable.  9/12 FM:  Holding IVFs.  9/14 FM:  Anasarca, 3rd spacing, see above.  9/15 FM:  Continue po lasix.    Weakness          Final Active Diagnoses:    Diagnosis Date Noted POA    PRINCIPAL PROBLEM:  Pneumonia [J18.9] 12/29/2021 Yes    GIB (gastrointestinal bleeding) [K92.2] 09/15/2022 Yes    CVA (cerebral vascular accident) [I63.9] 09/15/2022 Yes    N&V (nausea and vomiting) [R11.2] 09/11/2022 Yes    Malignant neoplasm of lung [C34.90] 09/07/2022 Yes    Acute respiratory failure with hypoxia and hypercarbia [J96.01, J96.02] 09/02/2022 Yes    Influenza [J11.1] 09/02/2022 Yes    COVID-19 [U07.1] 09/02/2022 Yes    CHF (congestive heart failure) [I50.9]  Yes    Stage 3 chronic kidney disease [N18.30] 04/11/2022 Yes    Seizure disorder [G40.909] 03/30/2022 Yes    A-fib [I48.91]  Yes    COPD (chronic obstructive pulmonary disease) [J44.9]  Yes    Localized edema [R60.0] 12/30/2021 Yes    Weakness [R53.1] 12/29/2021 Yes      Problems Resolved During this Admission:       Discharged Condition: poor    Disposition: Hospice/Home    Follow  Up:    Patient Instructions:      Diet Cardiac     Activity as tolerated       Significant Diagnostic Studies: Noted.    Pending Diagnostic Studies:     None         Medications:  Reconciled Home Medications:      Medication List      START taking these medications    acetaminophen 325 MG tablet  Commonly known as: TYLENOL  Take 2 tablets (650 mg total) by mouth every 8 (eight) hours as needed.     amLODIPine 5 MG tablet  Commonly known as: NORVASC  Take 1 tablet (5 mg total) by mouth once daily.     budesonide 0.25 mg/2 mL nebulizer solution  Commonly known as: PULMICORT  Take 2 mLs (0.25 mg total) by nebulization once daily. Controller     HYDROcodone-acetaminophen 5-325 mg per tablet  Commonly known as: NORCO  Take 1 tablet by mouth every 6 (six) hours as needed for Pain.     isosorbide-hydrALAZINE 20-37.5 mg 20-37.5 mg Tab  Commonly known as: BIDIL  Take 1 tablet by mouth 2 (two) times daily.     pantoprazole 40 MG tablet  Commonly known as: PROTONIX  Take 1 tablet (40 mg total) by mouth once daily.        CHANGE how you take these medications    carvediloL 6.25 MG tablet  Commonly known as: COREG  Take 1 tablet (6.25 mg total) by mouth 2 (two) times daily.  What changed: See the new instructions.     risperiDONE 0.5 MG Tab  Commonly known as: RISPERDAL  Take 1 tablet (0.5 mg total) by mouth 2 (two) times daily.  What changed:   · when to take this  · reasons to take this     torsemide 20 MG Tab  Commonly known as: DEMADEX  Take 1 tablet (20 mg total) by mouth once daily.  What changed:   · when to take this  · reasons to take this        CONTINUE taking these medications    albuterol 90 mcg/actuation inhaler  Commonly known as: PROVENTIL/VENTOLIN HFA  Inhale 1-2 puffs into the lungs every 6 (six) hours as needed for Wheezing or Shortness of Breath. Rescue     albuterol-ipratropium 2.5 mg-0.5 mg/3 mL nebulizer solution  Commonly known as: DUO-NEB  inhale 3 milliliters by nebulization route 4 times per day and as  needed, up to 6 doses per day for 30 days     aspirin 81 MG EC tablet  Commonly known as: ECOTRIN  Take 81 mg by mouth once daily.     atorvastatin 20 MG tablet  Commonly known as: LIPITOR  Take 1 tablet (20 mg total) by mouth once daily.     OXcarbazepine 300 MG Tab  Commonly known as: TRILEPTAL  Take 1 tablet (300 mg total) by mouth 2 (two) times daily.     senna 8.6 mg tablet  Commonly known as: SENOKOT  2 tablets at bedtime as needed     tamsulosin 0.4 mg Cap  Commonly known as: FLOMAX  Take 0.4 mg by mouth once daily.        STOP taking these medications    apixaban 5 mg Tab  Commonly known as: ELIQUIS     calcium carbonate 600 mg calcium (1,500 mg) Tab  Commonly known as: OS-DAVID     ezetimibe 10 mg tablet  Commonly known as: ZETIA     FLUoxetine 20 MG capsule     gabapentin 300 MG capsule  Commonly known as: NEURONTIN     miconazole NITRATE 2 % 2 % top powder  Commonly known as: MICOTIN     multivitamin capsule     vitamin D 1000 units Tab  Commonly known as: VITAMIN D3            Indwelling Lines/Drains at time of discharge:   Lines/Drains/Airways     Drain  Duration                Urethral Catheter 09/02/22 1231 Non-latex 16 Fr. 16 days         Fecal Incontinence  09/10/22 0432 9 days                Time spent on the discharge of patient: 45 minutes         Navi Domínguez III, MD  Department of Hospital Medicine  [facility]

## 2022-09-19 NOTE — ASSESSMENT & PLAN NOTE
Patient with Permanent atrial fibrillation which is controlled currently with No rate controlling home medications at this time. . Patient is currently in atrial fibrillation.[id number] Score: 4. HASBLED Score: Unable to calculate. Anticoagulation indicated. Anticoagulation done with eliquis 5 mg BID.    Eliquis on hold, cards following.  Will resume ASA.

## 2022-09-19 NOTE — NURSING
Called [facility] and setup transportation. Called Nicole with [facility] with dc report.

## 2022-11-21 PROBLEM — N17.9 AKI (ACUTE KIDNEY INJURY): Status: RESOLVED | Noted: 2022-08-21 | Resolved: 2022-11-21

## 2022-12-12 PROBLEM — J96.02 ACUTE RESPIRATORY FAILURE WITH HYPOXIA AND HYPERCARBIA: Status: RESOLVED | Noted: 2022-09-02 | Resolved: 2022-12-12

## 2022-12-12 PROBLEM — J18.9 PNEUMONIA: Status: RESOLVED | Noted: 2021-12-29 | Resolved: 2022-12-12

## 2022-12-12 PROBLEM — J96.01 ACUTE RESPIRATORY FAILURE WITH HYPOXIA AND HYPERCARBIA: Status: RESOLVED | Noted: 2022-09-02 | Resolved: 2022-12-12

## 2022-12-19 PROBLEM — I63.9 CVA (CEREBRAL VASCULAR ACCIDENT): Status: RESOLVED | Noted: 2022-09-15 | Resolved: 2022-12-19

## 2022-12-19 PROBLEM — K92.2 GIB (GASTROINTESTINAL BLEEDING): Status: RESOLVED | Noted: 2022-09-15 | Resolved: 2022-12-19

## 2023-10-04 ENCOUNTER — PATIENT MESSAGE (OUTPATIENT)
Dept: ADMINISTRATIVE | Facility: OTHER | Age: 83
End: 2023-10-04
Payer: MEDICARE